# Patient Record
Sex: MALE | Race: WHITE | NOT HISPANIC OR LATINO | Employment: OTHER | URBAN - METROPOLITAN AREA
[De-identification: names, ages, dates, MRNs, and addresses within clinical notes are randomized per-mention and may not be internally consistent; named-entity substitution may affect disease eponyms.]

---

## 2017-01-31 DIAGNOSIS — C90.00 MULTIPLE MYELOMA NOT HAVING ACHIEVED REMISSION (H): ICD-10-CM

## 2017-01-31 DIAGNOSIS — C90.00 MULTIPLE MYELOMA (H): ICD-10-CM

## 2017-01-31 DIAGNOSIS — I71.40 ABDOMINAL AORTIC ANEURYSM (AAA) WITHOUT RUPTURE (H): ICD-10-CM

## 2017-01-31 DIAGNOSIS — Z79.01 ANTICOAGULATION GOAL OF INR 2 TO 3: ICD-10-CM

## 2017-01-31 DIAGNOSIS — Z51.81 ANTICOAGULATION GOAL OF INR 2 TO 3: ICD-10-CM

## 2017-01-31 DIAGNOSIS — Z95.810 AUTOMATIC IMPLANTABLE CARDIOVERTER-DEFIBRILLATOR IN SITU: ICD-10-CM

## 2017-01-31 LAB
ALBUMIN SERPL-MCNC: 3.6 G/DL (ref 3.4–5)
ALP SERPL-CCNC: 82 U/L (ref 40–150)
ALT SERPL W P-5'-P-CCNC: 27 U/L (ref 0–70)
ANION GAP SERPL CALCULATED.3IONS-SCNC: 8 MMOL/L (ref 3–14)
AST SERPL W P-5'-P-CCNC: 19 U/L (ref 0–45)
BASOPHILS # BLD AUTO: 0 10E9/L (ref 0–0.2)
BASOPHILS NFR BLD AUTO: 0.4 %
BILIRUB SERPL-MCNC: 0.8 MG/DL (ref 0.2–1.3)
BUN SERPL-MCNC: 28 MG/DL (ref 7–30)
CALCIUM SERPL-MCNC: 8.7 MG/DL (ref 8.5–10.1)
CHLORIDE SERPL-SCNC: 104 MMOL/L (ref 94–109)
CO2 SERPL-SCNC: 29 MMOL/L (ref 20–32)
COLLECT DURATION TIME UR: 24 H
CREAT 24H UR-MRATE: 1.5 G/(24.H) (ref 1–2)
CREAT SERPL-MCNC: 1.24 MG/DL (ref 0.66–1.25)
CREAT UR-MCNC: 92 MG/DL
DIFFERENTIAL METHOD BLD: ABNORMAL
EOSINOPHIL # BLD AUTO: 0.1 10E9/L (ref 0–0.7)
EOSINOPHIL NFR BLD AUTO: 1 %
ERYTHROCYTE [DISTWIDTH] IN BLOOD BY AUTOMATED COUNT: 12.8 % (ref 10–15)
ERYTHROCYTE [SEDIMENTATION RATE] IN BLOOD BY WESTERGREN METHOD: 46 MM/H (ref 0–20)
GFR SERPL CREATININE-BSD FRML MDRD: 58 ML/MIN/1.7M2
GLUCOSE SERPL-MCNC: 104 MG/DL (ref 70–99)
HCT VFR BLD AUTO: 37.9 % (ref 40–53)
HGB BLD-MCNC: 12.8 G/DL (ref 13.3–17.7)
IMM GRANULOCYTES # BLD: 0 10E9/L (ref 0–0.4)
IMM GRANULOCYTES NFR BLD: 0.6 %
INR PPP: 2.15 (ref 0.86–1.14)
LYMPHOCYTES # BLD AUTO: 2.3 10E9/L (ref 0.8–5.3)
LYMPHOCYTES NFR BLD AUTO: 31.6 %
MCH RBC QN AUTO: 34.6 PG (ref 26.5–33)
MCHC RBC AUTO-ENTMCNC: 33.8 G/DL (ref 31.5–36.5)
MCV RBC AUTO: 102 FL (ref 78–100)
MONOCYTES # BLD AUTO: 0.7 10E9/L (ref 0–1.3)
MONOCYTES NFR BLD AUTO: 9.1 %
NEUTROPHILS # BLD AUTO: 4.1 10E9/L (ref 1.6–8.3)
NEUTROPHILS NFR BLD AUTO: 57.3 %
NRBC # BLD AUTO: 0 10*3/UL
NRBC BLD AUTO-RTO: 0 /100
PLATELET # BLD AUTO: 195 10E9/L (ref 150–450)
POTASSIUM SERPL-SCNC: 4.4 MMOL/L (ref 3.4–5.3)
PROT 24H UR-MRATE: 0.65 G/(24.H) (ref 0.04–0.23)
PROT SERPL-MCNC: 7.2 G/DL (ref 6.8–8.8)
PROT UR-MCNC: 0.4 G/L
PROT/CREAT 24H UR: 0.43 G/G CR (ref 0–0.2)
RBC # BLD AUTO: 3.7 10E12/L (ref 4.4–5.9)
SODIUM SERPL-SCNC: 141 MMOL/L (ref 133–144)
SPECIMEN VOL UR: 1620 ML
URATE SERPL-MCNC: 5.6 MG/DL (ref 3.5–7.2)
WBC # BLD AUTO: 7.2 10E9/L (ref 4–11)

## 2017-01-31 PROCEDURE — 84550 ASSAY OF BLOOD/URIC ACID: CPT | Performed by: INTERNAL MEDICINE

## 2017-01-31 PROCEDURE — 84166 PROTEIN E-PHORESIS/URINE/CSF: CPT | Performed by: INTERNAL MEDICINE

## 2017-01-31 PROCEDURE — 85652 RBC SED RATE AUTOMATED: CPT | Performed by: INTERNAL MEDICINE

## 2017-01-31 PROCEDURE — 83883 ASSAY NEPHELOMETRY NOT SPEC: CPT | Performed by: INTERNAL MEDICINE

## 2017-01-31 PROCEDURE — 99212 OFFICE O/P EST SF 10 MIN: CPT

## 2017-01-31 PROCEDURE — 85610 PROTHROMBIN TIME: CPT | Performed by: INTERNAL MEDICINE

## 2017-01-31 PROCEDURE — 86335 IMMUNFIX E-PHORSIS/URINE/CSF: CPT | Performed by: INTERNAL MEDICINE

## 2017-01-31 PROCEDURE — 80053 COMPREHEN METABOLIC PANEL: CPT | Performed by: INTERNAL MEDICINE

## 2017-01-31 PROCEDURE — 82784 ASSAY IGA/IGD/IGG/IGM EACH: CPT | Performed by: INTERNAL MEDICINE

## 2017-01-31 PROCEDURE — 82785 ASSAY OF IGE: CPT | Performed by: INTERNAL MEDICINE

## 2017-01-31 PROCEDURE — 85025 COMPLETE CBC W/AUTO DIFF WBC: CPT | Performed by: INTERNAL MEDICINE

## 2017-01-31 PROCEDURE — 40000269 ZZH STATISTIC NO CHARGE FACILITY FEE

## 2017-01-31 PROCEDURE — 84165 PROTEIN E-PHORESIS SERUM: CPT | Performed by: INTERNAL MEDICINE

## 2017-01-31 PROCEDURE — 00000402 ZZHCL STATISTIC TOTAL PROTEIN: Performed by: INTERNAL MEDICINE

## 2017-02-01 LAB
ALBUMIN SERPL ELPH-MCNC: 3.7 G/DL (ref 3.7–5.1)
ALPHA1 GLOB SERPL ELPH-MCNC: 0.4 G/DL (ref 0.2–0.4)
ALPHA2 GLOB SERPL ELPH-MCNC: 0.9 G/DL (ref 0.5–0.9)
B-GLOBULIN SERPL ELPH-MCNC: 0.8 G/DL (ref 0.6–1)
GAMMA GLOB SERPL ELPH-MCNC: 0.9 G/DL (ref 0.7–1.6)
IGA SERPL-MCNC: 381 MG/DL (ref 70–380)
IGG SERPL-MCNC: 784 MG/DL (ref 695–1620)
IGM SERPL-MCNC: 29 MG/DL (ref 60–265)
IMMUNOFIX ELP, URINE: NORMAL
KAPPA LC UR-MCNC: 1.75 MG/DL (ref 0.33–1.94)
KAPPA LC/LAMBDA SER: 0.02 {RATIO} (ref 0.26–1.65)
LAMBDA LC SERPL-MCNC: 84.75 MG/DL (ref 0.57–2.63)
M PROTEIN SERPL ELPH-MCNC: 0.2 G/DL
PROT PATTERN SERPL ELPH-IMP: ABNORMAL
PROT PATTERN UR ELPH-IMP: NORMAL

## 2017-02-02 LAB — IGE SERPL-ACNC: 17 KIU/L (ref 0–114)

## 2017-02-03 ENCOUNTER — PRE VISIT (OUTPATIENT)
Dept: CARDIOLOGY | Facility: CLINIC | Age: 67
End: 2017-02-03

## 2017-02-07 ENCOUNTER — ONCOLOGY VISIT (OUTPATIENT)
Dept: TRANSPLANT | Facility: CLINIC | Age: 67
End: 2017-02-07
Attending: INTERNAL MEDICINE
Payer: MEDICARE

## 2017-02-07 ENCOUNTER — RADIANT APPOINTMENT (OUTPATIENT)
Dept: CARDIOLOGY | Facility: CLINIC | Age: 67
End: 2017-02-07

## 2017-02-07 ENCOUNTER — ALLIED HEALTH/NURSE VISIT (OUTPATIENT)
Dept: CARDIOLOGY | Facility: CLINIC | Age: 67
End: 2017-02-07
Attending: INTERNAL MEDICINE
Payer: MEDICARE

## 2017-02-07 VITALS
WEIGHT: 198.1 LBS | DIASTOLIC BLOOD PRESSURE: 70 MMHG | HEART RATE: 68 BPM | OXYGEN SATURATION: 94 % | SYSTOLIC BLOOD PRESSURE: 105 MMHG | HEIGHT: 70 IN | BODY MASS INDEX: 28.36 KG/M2

## 2017-02-07 VITALS
HEART RATE: 77 BPM | DIASTOLIC BLOOD PRESSURE: 66 MMHG | SYSTOLIC BLOOD PRESSURE: 111 MMHG | BODY MASS INDEX: 29.83 KG/M2 | RESPIRATION RATE: 16 BRPM | TEMPERATURE: 97 F | OXYGEN SATURATION: 96 % | WEIGHT: 207.89 LBS

## 2017-02-07 DIAGNOSIS — Z95.810 AUTOMATIC IMPLANTABLE CARDIOVERTER-DEFIBRILLATOR IN SITU: ICD-10-CM

## 2017-02-07 DIAGNOSIS — I47.29 PAROXYSMAL VENTRICULAR TACHYCARDIA (H): ICD-10-CM

## 2017-02-07 DIAGNOSIS — C90.00 MULTIPLE MYELOMA NOT HAVING ACHIEVED REMISSION (H): Primary | ICD-10-CM

## 2017-02-07 DIAGNOSIS — I48.91 ATRIAL FIBRILLATION, UNSPECIFIED TYPE (H): ICD-10-CM

## 2017-02-07 DIAGNOSIS — I42.0 DILATED CARDIOMYOPATHY (H): ICD-10-CM

## 2017-02-07 DIAGNOSIS — I48.21 PERMANENT ATRIAL FIBRILLATION (H): ICD-10-CM

## 2017-02-07 DIAGNOSIS — I42.0 DILATED CARDIOMYOPATHY (H): Primary | ICD-10-CM

## 2017-02-07 DIAGNOSIS — I25.5 ISCHEMIC CARDIOMYOPATHY: Primary | ICD-10-CM

## 2017-02-07 PROCEDURE — 93289 INTERROG DEVICE EVAL HEART: CPT | Mod: 26 | Performed by: INTERNAL MEDICINE

## 2017-02-07 PROCEDURE — 99214 OFFICE O/P EST MOD 30 MIN: CPT | Mod: ZP | Performed by: INTERNAL MEDICINE

## 2017-02-07 PROCEDURE — 93282 PRGRMG EVAL IMPLANTABLE DFB: CPT | Mod: ZF

## 2017-02-07 RX ORDER — DEXAMETHASONE 4 MG/1
TABLET ORAL
Qty: 30 TABLET | Refills: 3 | COMMUNITY
Start: 2017-02-07 | End: 2017-11-21

## 2017-02-07 RX ORDER — LENALIDOMIDE 10 MG/1
CAPSULE ORAL
Qty: 21 EACH | Refills: 3 | COMMUNITY
Start: 2017-02-07 | End: 2017-06-02

## 2017-02-07 RX ORDER — METOPROLOL SUCCINATE 100 MG/1
150 TABLET, EXTENDED RELEASE ORAL DAILY
Qty: 135 TABLET | Refills: 3 | Status: SHIPPED
Start: 2017-02-07 | End: 2017-02-07

## 2017-02-07 RX ORDER — METOPROLOL SUCCINATE 100 MG/1
150 TABLET, EXTENDED RELEASE ORAL DAILY
Qty: 135 TABLET | Refills: 3 | Status: SHIPPED | OUTPATIENT
Start: 2017-02-07 | End: 2017-04-20

## 2017-02-07 RX ADMIN — Medication 6 ML: at 10:40

## 2017-02-07 ASSESSMENT — PAIN SCALES - GENERAL
PAINLEVEL: NO PAIN (0)
PAINLEVEL: NO PAIN (0)

## 2017-02-07 NOTE — MR AVS SNAPSHOT
After Visit Summary   2/7/2017    Erasmo Pearce    MRN: [id number]           Patient Information     Date Of Birth          1950        Visit Information        Provider Department      2/7/2017 9:00 AM 1, Uc Cv Device [facility]        Today's Diagnoses     Dilated cardiomyopathy (H)    -  1       Care Instructions    It was a pleasure to see you in clinic today. Please do not hesitate to call with any questions or concerns. You are scheduled for a remote ICD transmission on 5/15/17. This will happen automatically in the night. We will call in 1-2 business days to discuss the results with you. We look forward to seeing you in clinic at your next device check in 6 months.    Natali Arredondo, RN  Electrophysiology Nurse Clinician  [facility]  During business hours call:  [phone]  After business hours please call: [phone]- select option #4 and ask for job code [id number].          Follow-ups after your visit        Follow-up notes from your care team     Return in about 6 months (around 8/7/2017) for [place], ICD check.      Your next 10 appointments already scheduled     Feb 07, 2017 10:00 AM   Ech Complete with [id number]   [facility] Echo ([facility])    [address] [place] [address] [phone]           1.  Please bring or wear a comfortable two-piece outfit. 2.  You may eat, drink and take your normal medicines. 3.  For any questions that cannot be answered, please contact the ordering physician            Feb 07, 2017 11:00 AM   Return with Rojas Raygoza MD   [facility] Blood and Marrow Transplant ([facility])    [address] [place] [address] [phone]            Aug 08, 2017  9:30 AM   (Arrive by 9:15 AM)   Implanted Defibulator with Uc Cv Device 1   [facility] Heart Care ([facility])    [address] [place] [address]  [address] [facility] [address] [phone]            Aug 08, 2017 10:00 AM   (Arrive by 9:45 AM)   RETURN ARRHYTHMIA with Chuy Jean-Baptiste MD   [facility] ([facility])    [address]  [address] [facility] [address] [phone]              Future tests that were ordered for you today     Open Future Orders        Priority Expected Expires Ordered    Echocardiogram Complete Routine  2/7/2018 2/7/2017            Who to contact     If you have questions or need follow up information about today's clinic visit or your schedule please contact [facility] directly at [phone].  Normal or non-critical lab and imaging results will be communicated to you by Inkivehart, letter or phone within 4 business days after the clinic has received the results. If you do not hear from us within 7 days, please contact the clinic through [employer] or phone. If you have a critical or abnormal lab result, we will notify you by phone as soon as possible.  Submit refill requests through Hurray! or call your pharmacy and they will forward the refill request to us. Please allow 3 business days for your refill to be completed.          Additional Information About Your Visit        InkiveharBrightkit Information     Hurray! gives you secure access to your electronic health record. If you see a primary care provider, you can also send messages to your care team and make appointments. If you have questions, please call your primary care clinic.  If you do not have a primary care provider, please call [phone] and they will assist you.        Care EveryWhere ID     This is your Care EveryWhere ID. This could be used by other organizations to access your [place] medical records  [id number]         Blood Pressure from Last 3 Encounters:   02/07/17 105/70   11/08/16 108/62   08/02/16 113/72    Weight from Last 3 Encounters:   02/07/17 89.858 kg (198 lb 1.6 oz)   11/08/16 93.8  kg (206 lb 12.7 oz)   08/02/16 89.812 kg (198 lb)              We Performed the Following     ICD DEVICE PROGRAMMING EVAL, SINGLE LEAD ICD          Today's Medication Changes          These changes are accurate as of: 2/7/17  9:32 AM.  If you have any questions, ask your nurse or doctor.               Start taking these medicines.        Dose/Directions    metoprolol 100 MG 24 hr tablet   Commonly known as:  TOPROL-XL   Used for:  Dilated cardiomyopathy (H), Atrial fibrillation, unspecified type (H)   Started by:  Chuy Jean-Baptiste MD        Dose:  150 mg   Take 1.5 tablets (150 mg) by mouth daily   Quantity:  135 tablet   Refills:  3         Stop taking these medicines if you haven't already. Please contact your care team if you have questions.     metoprolol 50 MG tablet   Commonly known as:  LOPRESSOR   Stopped by:  Chuy Jean-Baptiste MD                Where to get your medicines      Some of these will need a paper prescription and others can be bought over the counter.  Ask your nurse if you have questions.     Bring a paper prescription for each of these medications    - metoprolol 100 MG 24 hr tablet             Primary Care Provider Office Phone # Fax #    Cong Mcdonald [phone]       [place] [address]        Thank you!     Thank you for choosing [facility]  for your care. Our goal is always to provide you with excellent care. Hearing back from our patients is one way we can continue to improve our services. Please take a few minutes to complete the written survey that you may receive in the mail after your visit with us. Thank you!             Your Updated Medication List - Protect others around you: Learn how to safely use, store and throw away your medicines at www.disposemymeds.org.          This list is accurate as of: 2/7/17  9:32 AM.  Always use your most recent med list.                   Brand Name Dispense Instructions for use     albuterol 108 (90 BASE) MCG/ACT Inhaler    PROAIR HFA/PROVENTIL HFA/VENTOLIN HFA     Inhale 2 puffs into the lungs every 6 hours as needed       amoxicillin 500 MG tablet    AMOXIL    4 tablet    Take 4 tabs one hour before dental appointment       baclofen 10 MG tablet    LIORESAL     Take 10 mg by mouth 3 times daily as needed prn       CALCIUM 500 + D PO      Take 2 tablets by mouth daily.       diclofenac 75 MG EC tablet    VOLTAREN     Take 75 mg by mouth 2 times daily as needed 1 tab prn       fentaNYL 25 mcg/hr 72 hr patch    DURAGESIC     Place 1 patch onto the skin every 72 hours       furosemide 20 MG tablet    LASIX    30 tablet    Take 40 mg by mouth daily       lisinopril 5 MG tablet    PRINIVIL/ZESTRIL     Take 5 mg by mouth 2 times daily       magnesium oxide 400 MG tablet    MAG-OX    4 tablet    Take 1,200 mg by mouth 2 times daily       metoprolol 100 MG 24 hr tablet    TOPROL-XL    135 tablet    Take 1.5 tablets (150 mg) by mouth daily       nitroglycerin 0.4 MG sublingual tablet    NITROSTAT     Place 0.4 mg under the tongue every 5 minutes as needed Prn       omeprazole 20 MG CR capsule    priLOSEC    90 capsule    Take 20 mg by mouth daily       oxyCODONE 5 MG IR tablet    ROXICODONE     Take 1-2 tablets by mouth every 6 hours as needed. For pain.       simvastatin 40 MG tablet    ZOCOR    30 tablet    Take 40 mg by mouth At Bedtime       tiotropium 18 MCG capsule    SPIRIVA    30 capsule    Inhale 1 capsule (18 mcg) into the lungs daily       TRAZODONE HCL PO      Take 50 mg by mouth At Bedtime       warfarin 5 MG tablet    COUMADIN    30 tablet    Take 0.5 tablets (2.5 mg) by mouth See Admin Instructions Take as directed. Take 2.5 mg daily for 5 days/week, then 5 mg daily the other 2 days, pending INR levels.

## 2017-02-07 NOTE — PROGRESS NOTES
Pt seen in the [facility] for evaluation and iterative programming of a [employer] single lead ICD, per MD orders. He also has an appointment with Dr. Jean-Baptiste. Today his intrinsic rhythm is an irregular ventricular rhythm ~70 bpm. Normal ICD function. 1,300 NSVT episodes recorded. The available EGMs show irregular ventricular rhythms, suggesting AF with RVR. = 2%. Lead trends appear stable. Pt reports that he is feeling well. Battery estimates 6 years to ROSEMARIE. Plan for pt to send a [place] remote transmission on 5/15/17 and RTC in 6 months.

## 2017-02-07 NOTE — Clinical Note
2/7/2017      RE: Erasmo Pearce  [address]  [address] [facility] [address]       Dear Colleague,    Thank you for the opportunity to participate in the care of your patient, Erasmo Pearce, at the [facility]. Please see a copy of my visit note below.    HPI:   Erasmo Pearce returns for follow-up. He is a 66-year-old gentleman with multiple myeloma, permanent atrial fibrillation, ischemic cardiomyopathy with an ejection fraction of approximately 35% who had an implantable cardioverter-defibrillator placed in December 2009 for primary prevention with subsequent appropriate shocks. He has been on amiodarone in the past, but this was discontinued relatively quickly because of a low DLCO. He was subsequently treated with sotalol, initially 80 mg twice daily, then reduce to 40 mg twice daily for prolonged QT interval and then discontinued because of low blood pressure.    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   He denies chest pain, lightheadedness, palpitations, shortness of breath or peripheral edema. He will follow up with Dr. Raygoza today for evaluation and treatment of multiple myeloma. He denies problems with warfarin or symptoms suggestive of embolic events.    Defibrillator evaluation shows normal function. Patient had multiple episodes of NSVT all atrial atrial fibrillation with a rapid ventricular response, no recent shocks.      PAST MEDICAL HISTORY:  Past Medical History   Diagnosis Date     Atrial fibrillation (H)      VF (ventricular fibrillation) (H)      Other premature beats        CURRENT MEDICATIONS:  Current Outpatient Prescriptions   Medication Sig Dispense Refill     metoprolol (TOPROL-XL) 100 MG 24 hr tablet Take 1.5 tablets (150 mg) by mouth daily 135 tablet 3      [DISCONTINUED] metoprolol (TOPROL-XL) 100 MG 24 hr tablet Take 1.5 tablets (150 mg) by mouth daily 135 tablet 3     TRAZODONE HCL PO Take 50 mg by mouth At Bedtime       fentaNYL (DURAGESIC) 25 mcg/hr patch 72 hr Place 1 patch onto the skin every 72 hours       furosemide (LASIX) 20 MG tablet Take 40 mg by mouth daily  30 tablet 3     lisinopril (PRINIVIL,ZESTRIL) 5 MG tablet Take 5 mg by mouth 2 times daily       tiotropium (SPIRIVA) 18 MCG inhalation capsule Inhale 1 capsule (18 mcg) into the lungs daily 30 capsule 3     simvastatin (ZOCOR) 40 MG tablet Take 40 mg by mouth At Bedtime  30 tablet      warfarin (COUMADIN) 5 MG tablet Take 0.5 tablets (2.5 mg) by mouth See Admin Instructions Take as directed. Take 2.5 mg daily for 5 days/week, then 5 mg daily the other 2 days, pending INR levels. 30 tablet      albuterol (PROAIR HFA, PROVENTIL HFA, VENTOLIN HFA) 108 (90 BASE) MCG/ACT inhaler Inhale 2 puffs into the lungs every 6 hours as needed        magnesium oxide (MAG-OX) 400 MG tablet Take 1,200 mg by mouth 2 times daily  4 tablet 0     baclofen (LIORESAL) 10 MG tablet Take 10 mg by mouth 3 times daily as needed prn       diclofenac (VOLTAREN) 75 MG EC tablet Take 75 mg by mouth 2 times daily as needed 1 tab prn       amoxicillin (AMOXIL) 500 MG tablet Take 4 tabs one hour before dental appointment 4 tablet 5     omeprazole (PRILOSEC) 20 MG capsule Take 20 mg by mouth daily  90 capsule 3     Calcium Carbonate-Vitamin D (CALCIUM 500 + D PO) Take 2 tablets by mouth daily.       nitroGLYCERIN (NITROSTAT) 0.4 MG SL tablet Place 0.4 mg under the tongue every 5 minutes as needed Prn         oxycodone (ROXICODONE) 5 MG immediate release tablet Take 1-2 tablets by mouth every 6 hours as needed. For pain.          PAST SURGICAL HISTORY:  Past Surgical History   Procedure Laterality Date     Implant automatic implantable cardioverter defibrillator         ALLERGIES:     Allergies   Allergen Reactions     Nkda [No Known  "Drug Allergies]        FAMILY HISTORY:  No family history on file.    SOCIAL HISTORY:  Social History   Substance Use Topics     Smoking status: Former Smoker -- 1.00 packs/day for 25 years     Types: Cigarettes     Quit date: 10/15/1995     Smokeless tobacco: Not on file     Alcohol Use: No       ROS:   Constitutional: No fever, chills, or sweats. Weight stable.   ENT: No visual disturbance, ear ache, epistaxis, sore throat.   Cardiovascular: As per HPI.   Respiratory: No cough, hemoptysis.    GI: No nausea, vomiting, hematemesis, melena, or hematochezia.   : No hematuria.   Integument: Negative.   Psychiatric: Negative.   Hematologic:  Easy bruising, no easy bleeding.  Neuro: Negative.   Endocrinology: No significant heat or cold intolerance   Musculoskeletal: No myalgia.    Exam:  /70 mmHg  Pulse 68  Ht 1.778 m (5' 10\")  Wt 89.858 kg (198 lb 1.6 oz)  BMI 28.42 kg/m2  SpO2 94%  No acute distress.  Alert and oriented.  HEENT:  Normocephalic, atraumatic, EOMI, sclera non-icteric, mucosa moist  Neck: No lymphadenopathy.  JVP approximately 6 cm without HJR.  Cor: Irregularly irregular rhythm.  Controlled ventricular rate. Variable S1, normal S2.  No murmur, rub, or gallop.    Lungs:  Clear  Abd: Soft, nontender, bowel sounds present.  No hepatosplenomegaly..  Extremities: No C/C/E.      Labs:  CBC RESULTS:   Lab Results   Component Value Date    WBC 7.2 01/31/2017    RBC 3.70* 01/31/2017    HGB 12.8* 01/31/2017    HCT 37.9* 01/31/2017    * 01/31/2017    MCH 34.6* 01/31/2017    MCHC 33.8 01/31/2017    RDW 12.8 01/31/2017     01/31/2017       BMP RESULTS:  Lab Results   Component Value Date     01/31/2017    POTASSIUM 4.4 01/31/2017    CHLORIDE 104 01/31/2017    CO2 29 01/31/2017    ANIONGAP 8 01/31/2017    * 01/31/2017    BUN 28 01/31/2017    CR 1.24 01/31/2017    CR 0.83 08/09/2006    GFRESTIMATED 58* 01/31/2017    GFRESTBLACK 71 01/31/2017    EILEEN 8.7 01/31/2017        INR " RESULTS:  Lab Results   Component Value Date    INR 2.15* 01/31/2017    INR 2.18* 11/01/2016    INR 1.71* 02/02/2016    INR 3.04* 04/14/2015       Procedures:  Echocardiogram: Plan to repeat echo today.     Assessment and Plan:   Erasmo Pearce is a 66-year-old gentleman with multiple myeloma, permanent atrial fibrillation, ischemic cardiomyopathy with an ejection fraction of approximately 35%, and an implantable cardioverter-defibrillator.  He has no cardiac symptoms at this time. He has had no recent defibrillator therapies and his defibrillator shows normal function.  Cardiomypathy: ischemic cardiomyopathy with LVEF 35%, plan to repeat echocardiogram today. Will transition his metoprolol to XL as there is mortality benefit in patients with heart failure and reduced LV function. We would like to increase his lisinopril dose and possibly start spironolactone for the treatment of heart failure but will wait for now as he might be having another round of chemotherapy and tends to have episodes of lower blood pressure.   - Switch metoprolol 75 mg BID to metoprolol  mg qday.   -continue lisinopril 5mg po daily  -continue lasix, we advised patient to hold lasix after chemo if he loses >5lbs in one day or has poor PO intake.   - continue simvastatin.     Follow up in 6 months.   Walter Martinez MD  Cardiology Fellow  Pager [phone]    Patient seen and discussed with Dr. Jean-Baptiste.     CARDIOLOGY STAFF  Patient seen and examined by me.  History and physical examination discussed with Dr. Martinez whose note reflects our joint assessment and recommendation/plans.  Mr. Pearce is doing very well from a cardiac standpoint.  His AF rate is well controlled.  His ICD shows normal function.  He may be starting chemotherapy again and is seeing Dr. Raygoza later today.  We will obtain an echo today as a new baseline.  We discussed a plan to reduce his diuretic if he loses > 5 lb or has poor oral intake with  initiation of chemotherapy.  We will arrange follow-up in 6 months.  It was a pleasure seeing Erasmo Pearce today.  Chuy Lenz  .      CC  CHUY LENZ        Please do not hesitate to contact me if you have any questions/concerns.     Sincerely,     Chuy Lenz MD

## 2017-02-07 NOTE — PROGRESS NOTES
HPI:   Erasmo Pearce returns for follow-up. He is a 66-year-old gentleman with multiple myeloma, permanent atrial fibrillation, ischemic cardiomyopathy with an ejection fraction of approximately 35% who had an implantable cardioverter-defibrillator placed in December 2009 for primary prevention with subsequent appropriate shocks. He has been on amiodarone in the past, but this was discontinued relatively quickly because of a low DLCO. He was subsequently treated with sotalol, initially 80 mg twice daily, then reduce to 40 mg twice daily for prolonged QT interval and then discontinued because of low blood pressure.    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   He denies chest pain, lightheadedness, palpitations, shortness of breath or peripheral edema. He will follow up with Dr. Raygoza today for evaluation and treatment of multiple myeloma. He denies problems with warfarin or symptoms suggestive of embolic events.    Defibrillator evaluation shows normal function. Patient had multiple episodes of NSVT all atrial atrial fibrillation with a rapid ventricular response, no recent shocks.      PAST MEDICAL HISTORY:  Past Medical History   Diagnosis Date     Atrial fibrillation (H)      VF (ventricular fibrillation) (H)      Other premature beats        CURRENT MEDICATIONS:  Current Outpatient Prescriptions   Medication Sig Dispense Refill     metoprolol (TOPROL-XL) 100 MG 24 hr tablet Take 1.5 tablets (150 mg) by mouth daily 135 tablet 3     [DISCONTINUED] metoprolol (TOPROL-XL) 100 MG 24 hr tablet Take 1.5 tablets (150 mg) by mouth daily 135 tablet 3     TRAZODONE HCL PO Take 50 mg by mouth At Bedtime       fentaNYL (DURAGESIC) 25 mcg/hr patch 72 hr Place 1 patch onto the skin every 72 hours       furosemide (LASIX) 20 MG tablet Take 40 mg by mouth daily  30 tablet 3      lisinopril (PRINIVIL,ZESTRIL) 5 MG tablet Take 5 mg by mouth 2 times daily       tiotropium (SPIRIVA) 18 MCG inhalation capsule Inhale 1 capsule (18 mcg) into the lungs daily 30 capsule 3     simvastatin (ZOCOR) 40 MG tablet Take 40 mg by mouth At Bedtime  30 tablet      warfarin (COUMADIN) 5 MG tablet Take 0.5 tablets (2.5 mg) by mouth See Admin Instructions Take as directed. Take 2.5 mg daily for 5 days/week, then 5 mg daily the other 2 days, pending INR levels. 30 tablet      albuterol (PROAIR HFA, PROVENTIL HFA, VENTOLIN HFA) 108 (90 BASE) MCG/ACT inhaler Inhale 2 puffs into the lungs every 6 hours as needed        magnesium oxide (MAG-OX) 400 MG tablet Take 1,200 mg by mouth 2 times daily  4 tablet 0     baclofen (LIORESAL) 10 MG tablet Take 10 mg by mouth 3 times daily as needed prn       diclofenac (VOLTAREN) 75 MG EC tablet Take 75 mg by mouth 2 times daily as needed 1 tab prn       amoxicillin (AMOXIL) 500 MG tablet Take 4 tabs one hour before dental appointment 4 tablet 5     omeprazole (PRILOSEC) 20 MG capsule Take 20 mg by mouth daily  90 capsule 3     Calcium Carbonate-Vitamin D (CALCIUM 500 + D PO) Take 2 tablets by mouth daily.       nitroGLYCERIN (NITROSTAT) 0.4 MG SL tablet Place 0.4 mg under the tongue every 5 minutes as needed Prn         oxycodone (ROXICODONE) 5 MG immediate release tablet Take 1-2 tablets by mouth every 6 hours as needed. For pain.          PAST SURGICAL HISTORY:  Past Surgical History   Procedure Laterality Date     Implant automatic implantable cardioverter defibrillator         ALLERGIES:     Allergies   Allergen Reactions     Nkda [No Known Drug Allergies]        FAMILY HISTORY:  No family history on file.    SOCIAL HISTORY:  Social History   Substance Use Topics     Smoking status: Former Smoker -- 1.00 packs/day for 25 years     Types: Cigarettes     Quit date: 10/15/1995     Smokeless tobacco: Not on file     Alcohol Use: No       ROS:   Constitutional: No fever, chills,  "or sweats. Weight stable.   ENT: No visual disturbance, ear ache, epistaxis, sore throat.   Cardiovascular: As per HPI.   Respiratory: No cough, hemoptysis.    GI: No nausea, vomiting, hematemesis, melena, or hematochezia.   : No hematuria.   Integument: Negative.   Psychiatric: Negative.   Hematologic:  Easy bruising, no easy bleeding.  Neuro: Negative.   Endocrinology: No significant heat or cold intolerance   Musculoskeletal: No myalgia.    Exam:  /70 mmHg  Pulse 68  Ht 1.778 m (5' 10\")  Wt 89.858 kg (198 lb 1.6 oz)  BMI 28.42 kg/m2  SpO2 94%  No acute distress.  Alert and oriented.  HEENT:  Normocephalic, atraumatic, EOMI, sclera non-icteric, mucosa moist  Neck: No lymphadenopathy.  JVP approximately 6 cm without HJR.  Cor: Irregularly irregular rhythm.  Controlled ventricular rate. Variable S1, normal S2.  No murmur, rub, or gallop.    Lungs:  Clear  Abd: Soft, nontender, bowel sounds present.  No hepatosplenomegaly..  Extremities: No C/C/E.      Labs:  CBC RESULTS:   Lab Results   Component Value Date    WBC 7.2 01/31/2017    RBC 3.70* 01/31/2017    HGB 12.8* 01/31/2017    HCT 37.9* 01/31/2017    * 01/31/2017    MCH 34.6* 01/31/2017    MCHC 33.8 01/31/2017    RDW 12.8 01/31/2017     01/31/2017       BMP RESULTS:  Lab Results   Component Value Date     01/31/2017    POTASSIUM 4.4 01/31/2017    CHLORIDE 104 01/31/2017    CO2 29 01/31/2017    ANIONGAP 8 01/31/2017    * 01/31/2017    BUN 28 01/31/2017    CR 1.24 01/31/2017    CR 0.83 08/09/2006    GFRESTIMATED 58* 01/31/2017    GFRESTBLACK 71 01/31/2017    EILEEN 8.7 01/31/2017        INR RESULTS:  Lab Results   Component Value Date    INR 2.15* 01/31/2017    INR 2.18* 11/01/2016    INR 1.71* 02/02/2016    INR 3.04* 04/14/2015       Procedures:  Echocardiogram: Plan to repeat echo today.     Assessment and Plan:   Erasmo Pearce is a 66-year-old gentleman with multiple myeloma, permanent atrial fibrillation, ischemic " cardiomyopathy with an ejection fraction of approximately 35%, and an implantable cardioverter-defibrillator.  He has no cardiac symptoms at this time. He has had no recent defibrillator therapies and his defibrillator shows normal function.  Cardiomypathy: ischemic cardiomyopathy with LVEF 35%, plan to repeat echocardiogram today. Will transition his metoprolol to XL as there is mortality benefit in patients with heart failure and reduced LV function. We would like to increase his lisinopril dose and possibly start spironolactone for the treatment of heart failure but will wait for now as he might be having another round of chemotherapy and tends to have episodes of lower blood pressure.   - Switch metoprolol 75 mg BID to metoprolol  mg qday.   -continue lisinopril 5mg po daily  -continue lasix, we advised patient to hold lasix after chemo if he loses >5lbs in one day or has poor PO intake.   - continue simvastatin.     Follow up in 6 months.   Walter Martinez MD  Cardiology Fellow  Pager [phone]    Patient seen and discussed with Dr. Jean-Baptiste.     CARDIOLOGY STAFF  Patient seen and examined by me.  History and physical examination discussed with Dr. Martinez whose note reflects our joint assessment and recommendation/plans.  Mr. Pearce is doing very well from a cardiac standpoint.  His AF rate is well controlled.  His ICD shows normal function.  He may be starting chemotherapy again and is seeing Dr. Raygoza later today.  We will obtain an echo today as a new baseline.  We discussed a plan to reduce his diuretic if he loses > 5 lb or has poor oral intake with initiation of chemotherapy.  We will arrange follow-up in 6 months.  It was a pleasure seeing Erasmo Pearce today.  Chuy Jean-Baptiste  .      CC  CHUY JEAN-BAPTISTE

## 2017-02-07 NOTE — Clinical Note
2/7/2017      RE: Erasmo Zapata  [address]  [address]  [place] [address]       HISTORY OF PRESENT ILLNESS:  John [facility] for evaluation of multiple myeloma, status post autologous peripheral blood stem cell transplant.  He also has cardiomyopathy and chronic atrial fibrillation.  Remains on warfarin to keep INR 2-3He has an ICD placed.  He has had no cardiac events recorded.  He is feeling good.  He denies fevers, chills, nausea or vomiting. No double vision. He had an URI in November given antibiotic which resolved and recently had another URI but no antibiotic.  He continues on metoprolol  75 mg twice a day and was given new Rx for 150mg XL/day. Had ultrasound of abdominal aorta done as seen below     PAST MEDICAL HISTORY, SOCIAL HISTORY AND FAMILY HISTORY:  See my note from 11/2016.      REVIEW OF SYSTEMS:  A 12-point review of systems is done and is negative as in HPI.      PHYSICAL EXAMINATION:   GENERAL:  He is an alert gentleman in no acute distress.   VITAL SIGNS:  Stable. /66 mmHg  Pulse 77  Temp(Src) 97  F (36.1  C)  Resp 16  Wt 94.3 kg (207 lb 14.3 oz)  SpO2 96%    HEENT:  Normocephalic.  EOM okay, no scleral icterus.  Mouth without lesion.   RESPIRATORY:  Clear to percussion and auscultation.   CARDIAC:  Atrial fibrillation noted.  No murmur.   ABDOMEN:  Soft.   EXTREMITIES:  Without edema.   Results for ERASMO ZAPATA (MRN [id number]) as of 2/7/2017 11:32   Ref. Range 1/31/2017 05:30 1/31/2017 10:05 1/31/2017 10:54 2/7/2017 10:42   Sodium Latest Ref Range: 133-144 mmol/L   141    Potassium Latest Ref Range: 3.4-5.3 mmol/L   4.4    Chloride Latest Ref Range:  mmol/L   104    Carbon Dioxide Latest Ref Range: 20-32 mmol/L   29    Urea Nitrogen Latest Ref Range: 7-30 mg/dL   28    Creatinine Latest Ref Range: 0.66-1.25 mg/dL   1.24    GFR Estimate Latest Ref Range: >60 mL/min/1.7m2   58 (L)    GFR Estimate If Black Latest Ref  Range: >60 mL/min/1.7m2   71    Calcium Latest Ref Range: 8.5-10.1 mg/dL   8.7    Anion Gap Latest Ref Range: 3-14 mmol/L   8    Albumin Latest Ref Range: 3.4-5.0 g/dL   3.6    Protein Total Latest Ref Range: 6.8-8.8 g/dL   7.2    Bilirubin Total Latest Ref Range: 0.2-1.3 mg/dL   0.8    Alkaline Phosphatase Latest Ref Range:  U/L   82    ALT Latest Ref Range: 0-70 U/L   27    AST Latest Ref Range: 0-45 U/L   19    Creatinine Urine Timed Latest Ref Range: 1.00-2.00  1.50      Creatinine Urine Latest Units: mg/dL 92      IGE Latest Ref Range: 0-114 KIU/L   17    Protein Random Urine Latest Units: g/L 0.40      Protein Total Urine g/gr Creatinine Latest Ref Range: 0-0.2 g/g Cr 0.43 (H)      Protein Total 24 Hr Urine Latest Ref Range: 0.04-0.23  0.65 (H)      Uric Acid Latest Ref Range: 3.5-7.2 mg/dL   5.6    Glucose Latest Ref Range: 70-99 mg/dL   104 (H)    WBC Latest Ref Range: 4.0-11.0 10e9/L   7.2    Hemoglobin Latest Ref Range: 13.3-17.7 g/dL   12.8 (L)    Hematocrit Latest Ref Range: 40.0-53.0 %   37.9 (L)    Platelet Count Latest Ref Range: 150-450 10e9/L   195    RBC Count Latest Ref Range: 4.4-5.9 10e12/L   3.70 (L)    MCV Latest Ref Range:  fl   102 (H)    MCH Latest Ref Range: 26.5-33.0 pg   34.6 (H)    MCHC Latest Ref Range: 31.5-36.5 g/dL   33.8    RDW Latest Ref Range: 10.0-15.0 %   12.8    Diff Method Unknown   Automated Method    % Neutrophils Latest Units: %   57.3    % Lymphocytes Latest Units: %   31.6    % Monocytes Latest Units: %   9.1    % Eosinophils Latest Units: %   1.0    % Basophils Latest Units: %   0.4    % Immature Granulocytes Latest Units: %   0.6    Nucleated RBCs Latest Ref Range: 0 /100   0    Absolute Neutrophil Latest Ref Range: 1.6-8.3 10e9/L   4.1    Absolute Lymphocytes Latest Ref Range: 0.8-5.3 10e9/L   2.3    Absolute Monocytes Latest Ref Range: 0.0-1.3 10e9/L   0.7    Absolute Eosinophils Latest Ref Range: 0.0-0.7 10e9/L   0.1    Absolute Basophils Latest Ref  Range: 0.0-0.2 10e9/L   0.0    Abs Immature Granulocytes Latest Ref Range: 0-0.4 10e9/L   0.0    Absolute Nucleated RBC Unknown   0.0    Sed Rate Latest Ref Range: 0-20 mm/h   46 (H)    INR Latest Ref Range: 0.86-1.14    2.15 (H)    Albumin Fraction Latest Ref Range: 3.7-5.1 g/dL   3.7    Alpha 1 Fraction Latest Ref Range: 0.2-0.4 g/dL   0.4    Alpha 2 Fraction Latest Ref Range: 0.5-0.9 g/dL   0.9    Beta Fraction Latest Ref Range: 0.6-1.0 g/dL   0.8    ELP Interpretation: Unknown   Small monoclonal ...    ELP Interpretation Urine Unknown Albumin and globu...      Gamma Fraction Latest Ref Range: 0.7-1.6 g/dL   0.9    IGA Latest Ref Range:  mg/dL   381 (H)    IGG Latest Ref Range: 695-1620 mg/dL   784    IGM Latest Ref Range:  mg/dL   29 (L)    Immunofix ELP Urine Unknown (Note)...      Kappa Free Lt Chain Latest Ref Range: 0.33-1.94 mg/dL   1.75    Kappa Lambda Ratio Latest Ref Range: 0.26-1.65    0.02 (L)    Lambda Free Lt Chain Latest Ref Range: 0.57-2.63 mg/dL   84.75 (H)    Monoclonal Peak Latest Ref Range: 0.0 g/dL   0.2 (H)    US ABDOMINAL AORTA LIMITED Unknown  Rpt     ECHO COMPLETE WITH DEFINITY Unknown    Rpt   EXAMINATION: US ABDOMINAL AORTA LIMITED, 1/31/2017 10:05 AM       COMPARISON: None     HISTORY: Abdominal aortic aneurysm     TECHNIQUE: Gray-scale and color Doppler ultrasound of the abdominal  aorta.     FINDINGS: Examination of the abdominal aorta is performed.     Proximal abdominal aorta, suprarenal aorta: 3.2 cm.     Mid abdominal aorta: 2.4 cm.     Distal abdominal aorta: 2.9 cm, which may be mildly overestimated.     Calcified atherosclerotic plaques along the abdominal aorta.     Proximal right common iliac artery is normal in caliber measuring 1.5  cm in maximal diameter.     Proximal left common iliac artery is normal in caliber measuring 1.0  cm in maximal diameter.                                                                       IMPRESSION:  Ectatic appearance of the  distal infrarenal abdominal aorta.     ------------------------------------------------------------  Aorta diameter measurement classification:  < 2.5cm: Normal.  2.5 - 2.9cm: Ectatic.  >3cm: Aneurysmal.     I have personally reviewed the examination and initial interpretation  and I agree with the findings.     CAMILLE HENSON MD  Name: DARRYL ZAPATA  MRN: [id number]  : 1950  Study Date: 2017 10:01 AM  Age: 66 yrs  Gender: Male  Patient Location: [facility]  Reason For Study: Dilated cardiomyopathy, Unspecified atrial fibrillation  Ordering Physician: NARINDER LENZ  Referring Physician: NARINDER LENZ  Performed By: Jaden Schaffer JESS     BSA: 2.1 m2  Height: 70 in  Weight: 198 lb  HR: 70  BP: 105/70 mmHg  _____________________________________________________________________________  __        Procedure  Echocardiogram with two-dimensional, color and spectral Doppler performed.  Contrast Definity. Definity (NDC #58807-110-86) given intravenously. Patient  was given 6ml mixture of 1.5ml Definity and 8.5ml saline. 4 ml wasted.  _____________________________________________________________________________  __        Interpretation Summary  Left ventricular size is normal. Moderately reduced left ventricular function  is present. The Ejection Fraction is estimated at 30-35%.  Moderate right ventricular dilation is present. Global right ventricular  function is moderately reduced.  No pericardial effusion is present.  This study was compared with the study from 2014 and there has been no  change.  _____________________________________________________________________________  __        Left Ventricle  Left ventricular size is normal. Moderately (EF 30-35%) reduced left  ventricular function is present. The Ejection Fraction is estimated at 30-35%.  Diastolic function not assessed due to atrial fibrillation. Inferior wall  akinesis is present. Wall thinning in the presence of a wall  motion  abnormality is present consistent with inferior scar.     Right Ventricle  Moderate right ventricular dilation is present. Global right ventricular  function is moderately reduced. A pacemaker lead is noted in the right  ventricle.     Atria  Severe biatrial enlargement is present. A pacemaker lead is noted in the right  atrium.     Mitral Valve  Mitral leaflet thickness is increased . Mild mitral insufficiency is present.  The cause of the mitral insufficiency appears to be restricted posterior  leaflet.        Aortic Valve  The aortic valve is tricuspid. Mild aortic valve sclerosis is present.     Tricuspid Valve  Mild to moderate tricuspid insufficiency is present. The right ventricular  systolic pressure is approximated at 22.2 mmHg plus the right atrial pressure.  Pulmonary artery systolic pressure is normal.     Pulmonic Valve  Mild to moderate pulmonic insufficiency is present.     Vessels  The aorta root is normal. The inferior vena cava was normal in size with  preserved respiratory variability.     Pericardium  No pericardial effusion is present.        Compared to Previous Study  This study was compared with the study from 2014 . There has been no change.  _____________________________________________________________________________  __  MMode/2D Measurements & Calculations  IVSd: 1.2 cm     LVIDd: 5.7 cm  LVIDs: 4.9 cm  LVPWd: 0.83 cm  FS: 14.1 %  EDV(Teich): 160.2 ml  ESV(Teich): 112.8 ml  LV mass(C)d: 223.4 grams  Ao root diam: 3.5 cm  asc Aorta Diam: 3.6 cm  LA/Ao: 1.7  LVOT diam: 2.4 cm  LVOT area: 4.6 cm2  LA Volume (BP): 165.0 ml  LA Volume Index (BP): 79.3 ml/m2           Doppler Measurements & Calculations  MV E max beverly: 110.3 cm/sec  PA acc time: 0.09 sec  TR max beverly: 235.2 cm/sec  TR max P.2 mmHg  Lateral E/e': 11.8  Medial E/e': 14.3           _____________________________________________________________________________  __           Report approved by: Rj Stephenson  02/07/2017 11:35 AM   Exam: Bone survey dated 11/1/2016.     COMPARISON: 10/6/2015.     CLINICAL HISTORY: Multiple myeloma.     FINDINGS:     Skull: Subtle lucencies in the skull, grossly unchanged.     Thoracolumbar spine: Multilevel degenerative disc disease in the  visualized spine without evidence of compression fracture.  Calcification in the distal aorta and iliac vessels measuring at least  3.4 cm in the distal aorta.     CHEST: Left-sided cardiac device in place. Cardiac silhouette mildly  prominent. No distinct rib lesions are noted.     Pelvis: Bones are diffusely osteopenic appearing, however no distinct  lytic lesions are noted.     Bilateral lower extremities: No distinct lytic lesions in the  bilateral lower extremities.     Bilateral upper extremities: No distinct lytic lesions in the  bilateral upper extremities.                                                                       IMPRESSION:  1. Unchanged subtle lucencies in the skull.  2. No distinct lytic lesions otherwise in the axial or appendicular  skeletal system.  3. No new lesions noted.  4. No compression fractures in the visualized spine.  5. Unchanged abdominal aortic aneurysm, further evaluation as  clinically indicated.     SHARAD HOWARD MD           ASSESSMENT:   1.  Multiple myeloma.   2.  Status post autologous peripheral blood stem cell transplant.   3.  Cardiomyopathy.   4.  Atrial fibrillation.   5.  History of deep venous thrombosis.   6.  Chronic back pain.   7.  Anticoagulation.   8.  Abdominal aortic aneurysm.   9.  History of plasmacytoma of the clivus, status post radiation.   10.  History of ventricular tachycardia with implantable defibrillator in place.       His light chains are increased to 84 and M spike  is 0.2 IgA is up and the % gamma protein increased to64% in his urine. He will need treatment for his relapsed myeloma with Revlimid 10mg/day every 21days and one week off in a 28day schedule and dexamethasone  40mg Days 1, 8 and 15.This regimen has worked with Jonh in the past March 2013-July 2014.He does feel good with no new lytic disease. He will see his primary doc at the [facility] and an oncologist to obtain the prescription.He should get cbc plat diff and CMP every 2 weeks and would check light chains every month. I expect he will be treated with this regimen for 3-6 months and then I would consider keeping him on daily low dose revlimid.  At this time, his hemoglobin is good, his white count is good and platelet count is good.  I will see him for 3 months for his relapsed myeloma.  He is now nearly 11+ years since diagnosis. Pt noted to have abdominal aortic aneurysm but ultrasound ok.Echocardiocardiogram shows stable LVEF at 30-35%.      Rojas Raygoza MD   [phone]

## 2017-02-07 NOTE — NURSING NOTE
Chief Complaint   Patient presents with     Follow Up For     ICD check, 6MFU: 8/2/16- perm AF, sotalol/warfarin

## 2017-02-07 NOTE — MR AVS SNAPSHOT
After Visit Summary   2/7/2017    Erasmo Pearce    MRN: [id number]           Patient Information     Date Of Birth          1950        Visit Information        Provider Department      2/7/2017 9:30 AM Chuy Jean-Baptiste MD [facility]        Today's Diagnoses     Dilated cardiomyopathy (H)    -  1     Atrial fibrillation, unspecified type (H)           Care Instructions    You will be scheduled for a follow up visit as instructed.    Instructions from today;   1. Stop metoprolol tartrate  2. Start metoprolol succinate 150 mg once daily  3. When undergoing chemotherapy: If weight drops 5 pounds or more- hold lasix (furosemide)  Resume lasix when weight it back to baseline.   4. Echocardiogram today. Aleida will contact you with the results.     If you have any questions regarding to your visit please contact your care team:     Cardiology  Telephone Number    Aleida Angeles [phone]   Or send a message to your provider via my chart.   For scheduling appts or procedures:    Stephanie Wells     [phone] or  [phone]   For the Device Clinic (Pacemakers and ICD's)   RN's :   Natali Vale  During business hours: [phone]    After business hours:   [phone]- select option 4 and ask for job code [phone].    You can also contact us using My Chart. If you need assistance in setting this up, please contact our office or ask at your follow up visit.     If you need a medication refill please contact your pharmacy. Please allow 3 business days for your refill to be completed.     As always, Thank you for trusting us with your health care needs!        Follow-ups after your visit        Follow-up notes from your care team     Return in about 6 months (around 8/7/2017) for device, sqakaguci.      Your next 10 appointments already scheduled     Feb 07, 2017 10:00 AM   Ech Complete with [id number]   [facility] ([facility])    Jace Pandey  [address] [address] [phone]           1.  Please bring or wear a comfortable two-piece outfit. 2.  You may eat, drink and take your normal medicines. 3.  For any questions that cannot be answered, please contact the ordering physician            Feb 07, 2017 11:00 AM   Return with Rojas Raygoza MD   [facility] ([place] [facility])    [address] [place] [id number]   [phone]            Aug 08, 2017  9:30 AM   (Arrive by 9:15 AM)   Implanted Defibulator with Uc Cv Device 1   [facility] ([place] [facility])    [address] [place] [id number] [phone]            Aug 08, 2017 10:00 AM   (Arrive by 9:45 AM)   RETURN ARRHYTHMIA with Chuy Jean-Baptiste MD   [facility] ([place] [facility])    [address] [place] [id number] [phone]              Future tests that were ordered for you today     Open Future Orders        Priority Expected Expires Ordered    Echocardiogram Complete Routine  2/7/2018 2/7/2017            Who to contact     If you have questions or need follow up information about today's clinic visit or your schedule please contact [facility] directly at [phone].  Normal or non-critical lab and imaging results will be communicated to you by SmartDrive Systemshart, letter or phone within 4 business days after the clinic has received the results. If you do not hear from us within 7 days, please contact the clinic through SmartDrive Systemshart or phone. If you have a critical or abnormal lab result, we will notify you by phone as soon as possible.  Submit refill requests through Eventdoo or call your pharmacy and they will forward the refill request to us. Please allow 3 business days for your refill to be completed.          Additional Information About Your Visit        SmartDrive SystemsharAgari Information      "What's Hot gives you secure access to your electronic health record. If you see a primary care provider, you can also send messages to your care team and make appointments. If you have questions, please call your primary care clinic.  If you do not have a primary care provider, please call [phone] and they will assist you.        Care EveryWhere ID     This is your Care EveryWhere ID. This could be used by other organizations to access your [facility] medical records  [id number]        Your Vitals Were     Pulse Height BMI (Body Mass Index) Pulse Oximetry          68 1.778 m (5' 10\") 28.42 kg/m2 94%         Blood Pressure from Last 3 Encounters:   02/07/17 105/70   11/08/16 108/62   08/02/16 113/72    Weight from Last 3 Encounters:   02/07/17 89.858 kg (198 lb 1.6 oz)   11/08/16 93.8 kg (206 lb 12.7 oz)   08/02/16 89.812 kg (198 lb)                 Today's Medication Changes          These changes are accurate as of: 2/7/17  9:32 AM.  If you have any questions, ask your nurse or doctor.               Start taking these medicines.        Dose/Directions    metoprolol 100 MG 24 hr tablet   Commonly known as:  TOPROL-XL   Used for:  Dilated cardiomyopathy (H), Atrial fibrillation, unspecified type (H)   Started by:  Chuy Jean-Baptiste MD        Dose:  150 mg   Take 1.5 tablets (150 mg) by mouth daily   Quantity:  135 tablet   Refills:  3         Stop taking these medicines if you haven't already. Please contact your care team if you have questions.     metoprolol 50 MG tablet   Commonly known as:  LOPRESSOR   Stopped by:  Chuy Jean-Baptiste MD                Where to get your medicines      Some of these will need a paper prescription and others can be bought over the counter.  Ask your nurse if you have questions.     Bring a paper prescription for each of these medications    - metoprolol 100 MG 24 hr tablet             Primary Care Provider Office Phone # Fax #    Cong Harry [phone]       " [facility] [address] [facility] [address]        Thank you!     Thank you for choosing [facility]  for your care. Our goal is always to provide you with excellent care. Hearing back from our patients is one way we can continue to improve our services. Please take a few minutes to complete the written survey that you may receive in the mail after your visit with us. Thank you!             Your Updated Medication List - Protect others around you: Learn how to safely use, store and throw away your medicines at www.disposemymeds.org.          This list is accurate as of: 2/7/17  9:32 AM.  Always use your most recent med list.                   Brand Name Dispense Instructions for use    albuterol 108 (90 BASE) MCG/ACT Inhaler    PROAIR HFA/PROVENTIL HFA/VENTOLIN HFA     Inhale 2 puffs into the lungs every 6 hours as needed       amoxicillin 500 MG tablet    AMOXIL    4 tablet    Take 4 tabs one hour before dental appointment       baclofen 10 MG tablet    LIORESAL     Take 10 mg by mouth 3 times daily as needed prn       CALCIUM 500 + D PO      Take 2 tablets by mouth daily.       diclofenac 75 MG EC tablet    VOLTAREN     Take 75 mg by mouth 2 times daily as needed 1 tab prn       fentaNYL 25 mcg/hr 72 hr patch    DURAGESIC     Place 1 patch onto the skin every 72 hours       furosemide 20 MG tablet    LASIX    30 tablet    Take 40 mg by mouth daily       lisinopril 5 MG tablet    PRINIVIL/ZESTRIL     Take 5 mg by mouth 2 times daily       magnesium oxide 400 MG tablet    MAG-OX    4 tablet    Take 1,200 mg by mouth 2 times daily       metoprolol 100 MG 24 hr tablet    TOPROL-XL    135 tablet    Take 1.5 tablets (150 mg) by mouth daily       nitroglycerin 0.4 MG sublingual tablet    NITROSTAT     Place 0.4 mg under the tongue every 5 minutes as needed Prn       omeprazole 20 MG CR capsule    priLOSEC    90 capsule    Take 20 mg by mouth daily       oxyCODONE 5 MG IR tablet    ROXICODONE     Take 1-2  tablets by mouth every 6 hours as needed. For pain.       simvastatin 40 MG tablet    ZOCOR    30 tablet    Take 40 mg by mouth At Bedtime       tiotropium 18 MCG capsule    SPIRIVA    30 capsule    Inhale 1 capsule (18 mcg) into the lungs daily       TRAZODONE HCL PO      Take 50 mg by mouth At Bedtime       warfarin 5 MG tablet    COUMADIN    30 tablet    Take 0.5 tablets (2.5 mg) by mouth See Admin Instructions Take as directed. Take 2.5 mg daily for 5 days/week, then 5 mg daily the other 2 days, pending INR levels.

## 2017-02-07 NOTE — Clinical Note
2/7/2017       RE: Erasmo Zapata  [address]  [address] [facility] [address]     Dear Colleague,    Thank you for referring your patient, Erasmo Zapata, to the [facility] BLOOD [facility] MARROW TRANSPLANT at [facility]. Please see a copy of my visit note below.    HISTORY OF PRESENT ILLNESS:  Jonh returns to the [facility] for evaluation of multiple myeloma, status post autologous peripheral blood stem cell transplant.  He also has cardiomyopathy and chronic atrial fibrillation.  Remains on warfarin to keep INR 2-3He has an ICD placed.  He has had no cardiac events recorded.  He is feeling good.  He denies fevers, chills, nausea or vomiting. No double vision. He had an URI in November given antibiotic which resolved and recently had another URI but no antibiotic.  He continues on metoprolol  75 mg twice a day and was given new Rx for 150mg XL/day. Had ultrasound of abdominal aorta done as seen below     PAST MEDICAL HISTORY, SOCIAL HISTORY AND FAMILY HISTORY:  See my note from 11/2016.      REVIEW OF SYSTEMS:  A 12-point review of systems is done and is negative as in HPI.      PHYSICAL EXAMINATION:   GENERAL:  He is an alert gentleman in no acute distress.   VITAL SIGNS:  Stable. /66 mmHg  Pulse 77  Temp(Src) 97  F (36.1  C)  Resp 16  Wt 94.3 kg (207 lb 14.3 oz)  SpO2 96%    HEENT:  Normocephalic.  EOM okay, no scleral icterus.  Mouth without lesion.   RESPIRATORY:  Clear to percussion and auscultation.   CARDIAC:  Atrial fibrillation noted.  No murmur.   ABDOMEN:  Soft.   EXTREMITIES:  Without edema.   Results for ERASMO ZAPATA (MRN [id number]) as of 2/7/2017 11:32   Ref. Range 1/31/2017 05:30 1/31/2017 10:05 1/31/2017 10:54 2/7/2017 10:42   Sodium Latest Ref Range: 133-144 mmol/L   141    Potassium Latest Ref Range: 3.4-5.3 mmol/L   4.4    Chloride Latest Ref Range:  mmol/L   104    Carbon Dioxide Latest Ref Range:  20-32 mmol/L   29    Urea Nitrogen Latest Ref Range: 7-30 mg/dL   28    Creatinine Latest Ref Range: 0.66-1.25 mg/dL   1.24    GFR Estimate Latest Ref Range: >60 mL/min/1.7m2   58 (L)    GFR Estimate If Black Latest Ref Range: >60 mL/min/1.7m2   71    Calcium Latest Ref Range: 8.5-10.1 mg/dL   8.7    Anion Gap Latest Ref Range: 3-14 mmol/L   8    Albumin Latest Ref Range: 3.4-5.0 g/dL   3.6    Protein Total Latest Ref Range: 6.8-8.8 g/dL   7.2    Bilirubin Total Latest Ref Range: 0.2-1.3 mg/dL   0.8    Alkaline Phosphatase Latest Ref Range:  U/L   82    ALT Latest Ref Range: 0-70 U/L   27    AST Latest Ref Range: 0-45 U/L   19    Creatinine Urine Timed Latest Ref Range: 1.00-2.00  1.50      Creatinine Urine Latest Units: mg/dL 92      IGE Latest Ref Range: 0-114 KIU/L   17    Protein Random Urine Latest Units: g/L 0.40      Protein Total Urine g/gr Creatinine Latest Ref Range: 0-0.2 g/g Cr 0.43 (H)      Protein Total 24 Hr Urine Latest Ref Range: 0.04-0.23  0.65 (H)      Uric Acid Latest Ref Range: 3.5-7.2 mg/dL   5.6    Glucose Latest Ref Range: 70-99 mg/dL   104 (H)    WBC Latest Ref Range: 4.0-11.0 10e9/L   7.2    Hemoglobin Latest Ref Range: 13.3-17.7 g/dL   12.8 (L)    Hematocrit Latest Ref Range: 40.0-53.0 %   37.9 (L)    Platelet Count Latest Ref Range: 150-450 10e9/L   195    RBC Count Latest Ref Range: 4.4-5.9 10e12/L   3.70 (L)    MCV Latest Ref Range:  fl   102 (H)    MCH Latest Ref Range: 26.5-33.0 pg   34.6 (H)    MCHC Latest Ref Range: 31.5-36.5 g/dL   33.8    RDW Latest Ref Range: 10.0-15.0 %   12.8    Diff Method Unknown   Automated Method    % Neutrophils Latest Units: %   57.3    % Lymphocytes Latest Units: %   31.6    % Monocytes Latest Units: %   9.1    % Eosinophils Latest Units: %   1.0    % Basophils Latest Units: %   0.4    % Immature Granulocytes Latest Units: %   0.6    Nucleated RBCs Latest Ref Range: 0 /100   0    Absolute Neutrophil Latest Ref Range: 1.6-8.3 10e9/L   4.1     Absolute Lymphocytes Latest Ref Range: 0.8-5.3 10e9/L   2.3    Absolute Monocytes Latest Ref Range: 0.0-1.3 10e9/L   0.7    Absolute Eosinophils Latest Ref Range: 0.0-0.7 10e9/L   0.1    Absolute Basophils Latest Ref Range: 0.0-0.2 10e9/L   0.0    Abs Immature Granulocytes Latest Ref Range: 0-0.4 10e9/L   0.0    Absolute Nucleated RBC Unknown   0.0    Sed Rate Latest Ref Range: 0-20 mm/h   46 (H)    INR Latest Ref Range: 0.86-1.14    2.15 (H)    Albumin Fraction Latest Ref Range: 3.7-5.1 g/dL   3.7    Alpha 1 Fraction Latest Ref Range: 0.2-0.4 g/dL   0.4    Alpha 2 Fraction Latest Ref Range: 0.5-0.9 g/dL   0.9    Beta Fraction Latest Ref Range: 0.6-1.0 g/dL   0.8    ELP Interpretation: Unknown   Small monoclonal ...    ELP Interpretation Urine Unknown Albumin and globu...      Gamma Fraction Latest Ref Range: 0.7-1.6 g/dL   0.9    IGA Latest Ref Range:  mg/dL   381 (H)    IGG Latest Ref Range: 695-1620 mg/dL   784    IGM Latest Ref Range:  mg/dL   29 (L)    Immunofix ELP Urine Unknown (Note)...      Kappa Free Lt Chain Latest Ref Range: 0.33-1.94 mg/dL   1.75    Kappa Lambda Ratio Latest Ref Range: 0.26-1.65    0.02 (L)    Lambda Free Lt Chain Latest Ref Range: 0.57-2.63 mg/dL   84.75 (H)    Monoclonal Peak Latest Ref Range: 0.0 g/dL   0.2 (H)    US ABDOMINAL AORTA LIMITED Unknown  Rpt     ECHO COMPLETE WITH DEFINITY Unknown    Rpt   EXAMINATION: US ABDOMINAL AORTA LIMITED, 1/31/2017 10:05 AM       COMPARISON: None     HISTORY: Abdominal aortic aneurysm     TECHNIQUE: Gray-scale and color Doppler ultrasound of the abdominal  aorta.     FINDINGS: Examination of the abdominal aorta is performed.     Proximal abdominal aorta, suprarenal aorta: 3.2 cm.     Mid abdominal aorta: 2.4 cm.     Distal abdominal aorta: 2.9 cm, which may be mildly overestimated.     Calcified atherosclerotic plaques along the abdominal aorta.     Proximal right common iliac artery is normal in caliber measuring 1.5  cm in maximal  diameter.     Proximal left common iliac artery is normal in caliber measuring 1.0  cm in maximal diameter.                                                                       IMPRESSION:  Ectatic appearance of the distal infrarenal abdominal aorta.     ------------------------------------------------------------  Aorta diameter measurement classification:  < 2.5cm: Normal.  2.5 - 2.9cm: Ectatic.  >3cm: Aneurysmal.     I have personally reviewed the examination and initial interpretation  and I agree with the findings.     CAMILLE HENSON MD  Name: DARRYL ZAPATA  MRN: [id number]  : 1950  Study Date: 2017 10:01 AM  Age: 66 yrs  Gender: Male  Patient Location: [facility]  Reason For Study: Dilated cardiomyopathy, Unspecified atrial fibrillation  Ordering Physician: NARINDER LENZ  Referring Physician: NARINDER LENZ  Performed By: Jaden Schaffer JESS     BSA: 2.1 m2  Height: 70 in  Weight: 198 lb  HR: 70  BP: 105/70 mmHg  _____________________________________________________________________________  __        Procedure  Echocardiogram with two-dimensional, color and spectral Doppler performed.  Contrast Definity. Definity (NDC #38274-342-07) given intravenously. Patient  was given 6ml mixture of 1.5ml Definity and 8.5ml saline. 4 ml wasted.  _____________________________________________________________________________  __        Interpretation Summary  Left ventricular size is normal. Moderately reduced left ventricular function  is present. The Ejection Fraction is estimated at 30-35%.  Moderate right ventricular dilation is present. Global right ventricular  function is moderately reduced.  No pericardial effusion is present.  This study was compared with the study from 2014 and there has been no  change.  _____________________________________________________________________________  __        Left Ventricle  Left ventricular size is normal. Moderately (EF 30-35%) reduced  left  ventricular function is present. The Ejection Fraction is estimated at 30-35%.  Diastolic function not assessed due to atrial fibrillation. Inferior wall  akinesis is present. Wall thinning in the presence of a wall motion  abnormality is present consistent with inferior scar.     Right Ventricle  Moderate right ventricular dilation is present. Global right ventricular  function is moderately reduced. A pacemaker lead is noted in the right  ventricle.     Atria  Severe biatrial enlargement is present. A pacemaker lead is noted in the right  atrium.     Mitral Valve  Mitral leaflet thickness is increased . Mild mitral insufficiency is present.  The cause of the mitral insufficiency appears to be restricted posterior  leaflet.        Aortic Valve  The aortic valve is tricuspid. Mild aortic valve sclerosis is present.     Tricuspid Valve  Mild to moderate tricuspid insufficiency is present. The right ventricular  systolic pressure is approximated at 22.2 mmHg plus the right atrial pressure.  Pulmonary artery systolic pressure is normal.     Pulmonic Valve  Mild to moderate pulmonic insufficiency is present.     Vessels  The aorta root is normal. The inferior vena cava was normal in size with  preserved respiratory variability.     Pericardium  No pericardial effusion is present.        Compared to Previous Study  This study was compared with the study from 6/2014 . There has been no change.  _____________________________________________________________________________  __  MMode/2D Measurements & Calculations  IVSd: 1.2 cm     LVIDd: 5.7 cm  LVIDs: 4.9 cm  LVPWd: 0.83 cm  FS: 14.1 %  EDV(Teich): 160.2 ml  ESV(Teich): 112.8 ml  LV mass(C)d: 223.4 grams  Ao root diam: 3.5 cm  asc Aorta Diam: 3.6 cm  LA/Ao: 1.7  LVOT diam: 2.4 cm  LVOT area: 4.6 cm2  LA Volume (BP): 165.0 ml  LA Volume Index (BP): 79.3 ml/m2           Doppler Measurements & Calculations  MV E max beverly: 110.3 cm/sec  PA acc time: 0.09 sec  TR max beverly:  235.2 cm/sec  TR max P.2 mmHg  Lateral E/e': 11.8  Medial E/e': 14.3           _____________________________________________________________________________  __           Report approved by: Rj Stephenson 2017 11:35 AM   Exam: Bone survey dated 2016.     COMPARISON: 10/6/2015.     CLINICAL HISTORY: Multiple myeloma.     FINDINGS:     Skull: Subtle lucencies in the skull, grossly unchanged.     Thoracolumbar spine: Multilevel degenerative disc disease in the  visualized spine without evidence of compression fracture.  Calcification in the distal aorta and iliac vessels measuring at least  3.4 cm in the distal aorta.     CHEST: Left-sided cardiac device in place. Cardiac silhouette mildly  prominent. No distinct rib lesions are noted.     Pelvis: Bones are diffusely osteopenic appearing, however no distinct  lytic lesions are noted.     Bilateral lower extremities: No distinct lytic lesions in the  bilateral lower extremities.     Bilateral upper extremities: No distinct lytic lesions in the  bilateral upper extremities.                                                                       IMPRESSION:  1. Unchanged subtle lucencies in the skull.  2. No distinct lytic lesions otherwise in the axial or appendicular  skeletal system.  3. No new lesions noted.  4. No compression fractures in the visualized spine.  5. Unchanged abdominal aortic aneurysm, further evaluation as  clinically indicated.     SHARAD HOWARD MD           ASSESSMENT:   1.  Multiple myeloma.   2.  Status post autologous peripheral blood stem cell transplant.   3.  Cardiomyopathy.   4.  Atrial fibrillation.   5.  History of deep venous thrombosis.   6.  Chronic back pain.   7.  Anticoagulation.   8.  Abdominal aortic aneurysm.   9.  History of plasmacytoma of the clivus, status post radiation.   10.  History of ventricular tachycardia with implantable defibrillator in place.       His light chains are increased to 84 and M spike   is 0.2 IgA is up and the % gamma protein increased to64% in his urine. He will need treatment for his relapsed myeloma with Revlimid 10mg/day every 21days and one week off in a 28day schedule and dexamethasone 40mg Days 1, 8 and 15.This regimen has worked with Jonh in the past March 2013-July 2014.He does feel good with no new lytic disease. He will see his primary doc at the [facility] and an oncologist to obtain the prescription.He should get cbc plat diff and CMP every 2 weeks and would check light chains every month. I expect he will be treated with this regimen for 3-6 months and then I would consider keeping him on daily low dose revlimid.  At this time, his hemoglobin is good, his white count is good and platelet count is good.  I will see him for 3 months for his relapsed myeloma.  He is now nearly 11+ years since diagnosis. Pt noted to have abdominal aortic aneurysm but ultrasound ok.Echocardiocardiogram shows stable LVEF at 30-35%.      Rojas Raygoza MD   [phone]    Again, thank you for allowing me to participate in the care of your patient.      Sincerely,    Rojas Raygoza MD

## 2017-02-07 NOTE — MR AVS SNAPSHOT
After Visit Summary   2/7/2017    Erasmo Pearce    MRN: [id number]           Patient Information     Date Of Birth          1950        Visit Information        Provider Department      2/7/2017 11:00 AM Rojas Raygoza MD [facility] Blood and Marrow Transplant        Today's Diagnoses     Multiple myeloma not having achieved remission (H)    -  1           [facility] ([facility])  [address] [place] [id number]  Phone: [phone]  Clinic Hours:   Monday-Friday:    8am to 4:30pm   Weekends and holidays:    8am to noon (in general)  If your fever is 100.5  or greater,   call the clinic.  After hours call the   hospital at [phone] or   [phone]. Ask for the BMT   fellow for pediatric or adult patients           Care Instructions    5/9 11am labs only  5/16 10am with         Follow-ups after your visit        Follow-up notes from your care team     Return in about 3 months (around 5/7/2017).      Your next 10 appointments already scheduled     May 09, 2017 11:30 AM   Return with Rojas Raygoza MD   [facility] Blood and Marrow Transplant ([facility])    [address] [place] [id number] [phone]            May 16, 2017 10:00 AM   Return with Rojas Raygoza MD   [facility] Blood and Marrow Transplant ([facility])    [address] [place] [id number] [phone]            Aug 08, 2017  9:30 AM   (Arrive by 9:15 AM)   Implanted Defibulator with Uc Cv Device 1   [facility] ([facility])    [address] [place] [id number] [phone]            Aug 08, 2017 10:00 AM   (Arrive by 9:45 AM)   RETURN ARRHYTHMIA with Chuy Jean-Baptiste MD   [facility] ([facility])    [address] [place] [id number] [phone]               Future tests that were ordered for you today     Open Future Orders        Priority Expected Expires Ordered    CBC with platelets differential Routine 5/7/2017 7/7/2017 2/7/2017    Comprehensive metabolic panel Routine 5/7/2017 7/7/2017 2/7/2017    Lindstrom and lambda light chain Routine 5/7/2017 7/7/2017 2/7/2017    Protein electrophoresis Routine 5/7/2017 7/7/2017 2/7/2017    IgA Routine 5/7/2017 7/7/2017 2/7/2017    IgE Routine 5/7/2017 7/7/2017 2/7/2017    IgG Routine 5/7/2017 7/7/2017 2/7/2017    IgM Routine 5/7/2017 7/7/2017 2/7/2017    Protein timed urine Routine 5/7/2017 7/7/2017 2/7/2017    Protein immunofixation urine Routine 5/7/2017 7/7/2017 2/7/2017    Protein electrophoresis timed urine Routine 5/7/2017 7/7/2017 2/7/2017            Who to contact     If you have questions or need follow up information about today's clinic visit or your schedule please contact [facility] BLOOD AND MARROW TRANSPLANT directly at [phone].  Normal or non-critical lab and imaging results will be communicated to you by [employer], letter or phone within 4 business days after the clinic has received the results. If you do not hear from us within 7 days, please contact the clinic through [employer] or phone. If you have a critical or abnormal lab result, we will notify you by phone as soon as possible.  Submit refill requests through [employer] or call your pharmacy and they will forward the refill request to us. Please allow 3 business days for your refill to be completed.          Additional Information About Your Visit        [employer] Information     [employer] gives you secure access to your electronic health record. If you see a primary care provider, you can also send messages to your care team and make appointments. If you have questions, please call your primary care clinic.  If you do not have a primary care provider, please call [phone] and they will assist you.        Care EveryWhere ID     This is your Care  EveryWhere ID. This could be used by other organizations to access your [facility] medical records  [id number]        Your Vitals Were     Pulse Temperature Respirations Pulse Oximetry          77 97  F (36.1  C) 16 96%         Blood Pressure from Last 3 Encounters:   02/07/17 111/66   02/07/17 105/70   11/08/16 108/62    Weight from Last 3 Encounters:   02/07/17 94.3 kg (207 lb 14.3 oz)   02/07/17 89.858 kg (198 lb 1.6 oz)   11/08/16 93.8 kg (206 lb 12.7 oz)                 Today's Medication Changes          These changes are accurate as of: 2/7/17 11:56 AM.  If you have any questions, ask your nurse or doctor.               Start taking these medicines.        Dose/Directions    metoprolol 100 MG 24 hr tablet   Commonly known as:  TOPROL-XL   Used for:  Atrial fibrillation, unspecified type (H), Paroxysmal ventricular tachycardia (H), Permanent atrial fibrillation (H)   Started by:  Chuy Jean-Baptiste MD        Dose:  150 mg   Take 1.5 tablets (150 mg) by mouth daily   Quantity:  135 tablet   Refills:  3         Stop taking these medicines if you haven't already. Please contact your care team if you have questions.     metoprolol 50 MG tablet   Commonly known as:  LOPRESSOR   Stopped by:  Chuy Jean-Baptiste MD                Where to get your medicines      Some of these will need a paper prescription and others can be bought over the counter.  Ask your nurse if you have questions.     Bring a paper prescription for each of these medications    - metoprolol 100 MG 24 hr tablet             Recent Review Flowsheet Data     BMT Recent Results Latest Ref Rng 7/21/2015 10/6/2015 2/2/2016 5/10/2016 7/6/2016 11/1/2016 1/31/2017    WBC 4.0 - 11.0 10e9/L 6.4 8.0 9.0 9.1 7.5 8.0 7.2    Hemoglobin 13.3 - 17.7 g/dL 12.4(L) 13.1(L) 13.1(L) 12.8(L) 12.3(L) 12.9(L) 12.8(L)    Platelet Count 150 - 450 10e9/L 163 178 179 195 169 180 195    Neutrophils (Absolute) 1.6 - 8.3 10e9/L 4.0 5.1 5.6 6.3 5.1 4.7 4.1    INR 0.86 - 1.14 - -  1.71(H) - - 2.18(H) 2.15(H)    Sodium 133 - 144 mmol/L 138 140 138 139 137 139 141    Potassium 3.4 - 5.3 mmol/L 4.1 4.0 4.3 4.8 4.2 4.8 4.4    Chloride 94 - 109 mmol/L 107 105 106 106 106 104 104    Glucose 70 - 99 mg/dL 110(H) 101(H) 105(H) 97 107(H) 106(H) 104(H)    Urea Nitrogen 7 - 30 mg/dL 22 24 29 30 18 23 28    Creatinine 0.66 - 1.25 mg/dL 0.99 1.17 1.12 1.22 1.10 1.15 1.24    Calcium (Total) 8.5 - 10.1 mg/dL 8.4(L) 8.9 9.1 9.3 9.2 8.9 8.7    Protein (Total) 6.8 - 8.8 g/dL 7.2 7.3 7.5 7.3 7.3 8.1 7.2    Albumin 3.4 - 5.0 g/dL 3.9 3.9 3.9 4.0 3.9 3.9 3.6    Alkaline Phosphatase 40 - 150 U/L 90 80 77 76 78 88 82    AST 0 - 45 U/L 19 18 16 17 22 20 19    ALT 0 - 70 U/L 24 25 19 24 21 23 27    MCV 78 - 100 fl 105(H) 105(H) 104(H) 105(H) 105(H) 103(H) 102(H)               Primary Care Provider Office Phone # Fax #    Cong Mcdonald [phone]       [place] [phone]        Thank you!     Thank you for choosing [facility] BLOOD AND MARROW TRANSPLANT  for your care. Our goal is always to provide you with excellent care. Hearing back from our patients is one way we can continue to improve our services. Please take a few minutes to complete the written survey that you may receive in the mail after your visit with us. Thank you!             Your Updated Medication List - Protect others around you: Learn how to safely use, store and throw away your medicines at www.disposemymeds.org.          This list is accurate as of: 2/7/17 11:56 AM.  Always use your most recent med list.                   Brand Name Dispense Instructions for use    albuterol 108 (90 BASE) MCG/ACT Inhaler    PROAIR HFA/PROVENTIL HFA/VENTOLIN HFA     Inhale 2 puffs into the lungs every 6 hours as needed       amoxicillin 500 MG tablet    AMOXIL    4 tablet    Take 4 tabs one hour before dental appointment       baclofen 10 MG tablet    LIORESAL     Take 10 mg by mouth 3 times daily as needed prn        CALCIUM 500 + D PO      Take 2 tablets by mouth daily.       dexamethasone 4 MG tablet    DECADRON    30 tablet    Take 10 tabs (40mg) Days 1, 8, and 15.       diclofenac 75 MG EC tablet    VOLTAREN     Take 75 mg by mouth 2 times daily as needed 1 tab prn       fentaNYL 25 mcg/hr 72 hr patch    DURAGESIC     Place 1 patch onto the skin every 72 hours       furosemide 20 MG tablet    LASIX    30 tablet    Take 40 mg by mouth daily       LENalidomide 10 MG Caps capsule CHEMO    REVLIMID    21 each    Take 10 mg daily days 1-21       lisinopril 5 MG tablet    PRINIVIL/ZESTRIL     Take 5 mg by mouth 2 times daily       magnesium oxide 400 MG tablet    MAG-OX    4 tablet    Take 1,200 mg by mouth 2 times daily       metoprolol 100 MG 24 hr tablet    TOPROL-XL    135 tablet    Take 1.5 tablets (150 mg) by mouth daily       nitroglycerin 0.4 MG sublingual tablet    NITROSTAT     Place 0.4 mg under the tongue every 5 minutes as needed Prn       omeprazole 20 MG CR capsule    priLOSEC    90 capsule    Take 20 mg by mouth daily       oxyCODONE 5 MG IR tablet    ROXICODONE     Take 1-2 tablets by mouth every 6 hours as needed. For pain.       simvastatin 40 MG tablet    ZOCOR    30 tablet    Take 40 mg by mouth At Bedtime       tiotropium 18 MCG capsule    SPIRIVA    30 capsule    Inhale 1 capsule (18 mcg) into the lungs daily       TRAZODONE HCL PO      Take 50 mg by mouth At Bedtime       warfarin 5 MG tablet    COUMADIN    30 tablet    Take 0.5 tablets (2.5 mg) by mouth See Admin Instructions Take as directed. Take 2.5 mg daily for 5 days/week, then 5 mg daily the other 2 days, pending INR levels.

## 2017-02-07 NOTE — PATIENT INSTRUCTIONS
It was a pleasure to see you in clinic today. Please do not hesitate to call with any questions or concerns. You are scheduled for a remote ICD transmission on 5/15/17. This will happen automatically in the night. We will call in 1-2 business days to discuss the results with you. We look forward to seeing you in clinic at your next device check in 6 months.    Natali Arredondo RN  Electrophysiology Nurse Clinician  [facility]  During business hours call:  [phone]  After business hours please call: [phone]- select option #4 and ask for job code [phone].

## 2017-02-07 NOTE — PATIENT INSTRUCTIONS
You will be scheduled for a follow up visit as instructed.    Instructions from today;   1. Stop metoprolol tartrate  2. Start metoprolol succinate 150 mg once daily  3. When undergoing chemotherapy: If weight drops 5 pounds or more- hold lasix (furosemide)  Resume lasix when weight it back to baseline.   4. Echocardiogram today. Aleida will contact you with the results.     If you have any questions regarding to your visit please contact your care team:     Cardiology  Telephone Number    Aleida Angeles [phone]   Or send a message to your provider via my chart.   For scheduling appts or procedures:    Stephanie Wells     [phone] or  [phone]   For the Device Clinic (Pacemakers and ICD's)   RN's :   Natali Vale  During business hours: [phone]    After business hours:   [phone]- select option 4 and ask for job code [phone].    You can also contact us using My Chart. If you need assistance in setting this up, please contact our office or ask at your follow up visit.     If you need a medication refill please contact your pharmacy. Please allow 3 business days for your refill to be completed.     As always, Thank you for trusting us with your health care needs!

## 2017-02-07 NOTE — PROGRESS NOTES
HISTORY OF PRESENT ILLNESS:  Jonh returns [facility] for evaluation of multiple myeloma, status post autologous peripheral blood stem cell transplant.  He also has cardiomyopathy and chronic atrial fibrillation.  Remains on warfarin to keep INR 2-3He has an ICD placed.  He has had no cardiac events recorded.  He is feeling good.  He denies fevers, chills, nausea or vomiting. No double vision. He had an URI in November given antibiotic which resolved and recently had another URI but no antibiotic.  He continues on metoprolol  75 mg twice a day and was given new Rx for 150mg XL/day. Had ultrasound of abdominal aorta done as seen below     PAST MEDICAL HISTORY, SOCIAL HISTORY AND FAMILY HISTORY:  See my note from 11/2016.      REVIEW OF SYSTEMS:  A 12-point review of systems is done and is negative as in HPI.      PHYSICAL EXAMINATION:   GENERAL:  He is an alert gentleman in no acute distress.   VITAL SIGNS:  Stable. /66 mmHg  Pulse 77  Temp(Src) 97  F (36.1  C)  Resp 16  Wt 94.3 kg (207 lb 14.3 oz)  SpO2 96%    HEENT:  Normocephalic.  EOM okay, no scleral icterus.  Mouth without lesion.   RESPIRATORY:  Clear to percussion and auscultation.   CARDIAC:  Atrial fibrillation noted.  No murmur.   ABDOMEN:  Soft.   EXTREMITIES:  Without edema.   Results for DARRYL ZAPATA (MRN [id number]) as of 2/7/2017 11:32   Ref. Range 1/31/2017 05:30 1/31/2017 10:05 1/31/2017 10:54 2/7/2017 10:42   Sodium Latest Ref Range: 133-144 mmol/L   141    Potassium Latest Ref Range: 3.4-5.3 mmol/L   4.4    Chloride Latest Ref Range:  mmol/L   104    Carbon Dioxide Latest Ref Range: 20-32 mmol/L   29    Urea Nitrogen Latest Ref Range: 7-30 mg/dL   28    Creatinine Latest Ref Range: 0.66-1.25 mg/dL   1.24    GFR Estimate Latest Ref Range: >60 mL/min/1.7m2   58 (L)    GFR Estimate If Black Latest Ref Range: >60 mL/min/1.7m2   71    Calcium Latest Ref Range: 8.5-10.1 mg/dL   8.7    Anion Gap Latest  Ref Range: 3-14 mmol/L   8    Albumin Latest Ref Range: 3.4-5.0 g/dL   3.6    Protein Total Latest Ref Range: 6.8-8.8 g/dL   7.2    Bilirubin Total Latest Ref Range: 0.2-1.3 mg/dL   0.8    Alkaline Phosphatase Latest Ref Range:  U/L   82    ALT Latest Ref Range: 0-70 U/L   27    AST Latest Ref Range: 0-45 U/L   19    Creatinine Urine Timed Latest Ref Range: 1.00-2.00  1.50      Creatinine Urine Latest Units: mg/dL 92      IGE Latest Ref Range: 0-114 KIU/L   17    Protein Random Urine Latest Units: g/L 0.40      Protein Total Urine g/gr Creatinine Latest Ref Range: 0-0.2 g/g Cr 0.43 (H)      Protein Total 24 Hr Urine Latest Ref Range: 0.04-0.23  0.65 (H)      Uric Acid Latest Ref Range: 3.5-7.2 mg/dL   5.6    Glucose Latest Ref Range: 70-99 mg/dL   104 (H)    WBC Latest Ref Range: 4.0-11.0 10e9/L   7.2    Hemoglobin Latest Ref Range: 13.3-17.7 g/dL   12.8 (L)    Hematocrit Latest Ref Range: 40.0-53.0 %   37.9 (L)    Platelet Count Latest Ref Range: 150-450 10e9/L   195    RBC Count Latest Ref Range: 4.4-5.9 10e12/L   3.70 (L)    MCV Latest Ref Range:  fl   102 (H)    MCH Latest Ref Range: 26.5-33.0 pg   34.6 (H)    MCHC Latest Ref Range: 31.5-36.5 g/dL   33.8    RDW Latest Ref Range: 10.0-15.0 %   12.8    Diff Method Unknown   Automated Method    % Neutrophils Latest Units: %   57.3    % Lymphocytes Latest Units: %   31.6    % Monocytes Latest Units: %   9.1    % Eosinophils Latest Units: %   1.0    % Basophils Latest Units: %   0.4    % Immature Granulocytes Latest Units: %   0.6    Nucleated RBCs Latest Ref Range: 0 /100   0    Absolute Neutrophil Latest Ref Range: 1.6-8.3 10e9/L   4.1    Absolute Lymphocytes Latest Ref Range: 0.8-5.3 10e9/L   2.3    Absolute Monocytes Latest Ref Range: 0.0-1.3 10e9/L   0.7    Absolute Eosinophils Latest Ref Range: 0.0-0.7 10e9/L   0.1    Absolute Basophils Latest Ref Range: 0.0-0.2 10e9/L   0.0    Abs Immature Granulocytes Latest Ref Range: 0-0.4 10e9/L   0.0     Absolute Nucleated RBC Unknown   0.0    Sed Rate Latest Ref Range: 0-20 mm/h   46 (H)    INR Latest Ref Range: 0.86-1.14    2.15 (H)    Albumin Fraction Latest Ref Range: 3.7-5.1 g/dL   3.7    Alpha 1 Fraction Latest Ref Range: 0.2-0.4 g/dL   0.4    Alpha 2 Fraction Latest Ref Range: 0.5-0.9 g/dL   0.9    Beta Fraction Latest Ref Range: 0.6-1.0 g/dL   0.8    ELP Interpretation: Unknown   Small monoclonal ...    ELP Interpretation Urine Unknown Albumin and globu...      Gamma Fraction Latest Ref Range: 0.7-1.6 g/dL   0.9    IGA Latest Ref Range:  mg/dL   381 (H)    IGG Latest Ref Range: 695-1620 mg/dL   784    IGM Latest Ref Range:  mg/dL   29 (L)    Immunofix ELP Urine Unknown (Note)...      Kappa Free Lt Chain Latest Ref Range: 0.33-1.94 mg/dL   1.75    Kappa Lambda Ratio Latest Ref Range: 0.26-1.65    0.02 (L)    Lambda Free Lt Chain Latest Ref Range: 0.57-2.63 mg/dL   84.75 (H)    Monoclonal Peak Latest Ref Range: 0.0 g/dL   0.2 (H)    US ABDOMINAL AORTA LIMITED Unknown  Rpt     ECHO COMPLETE WITH DEFINITY Unknown    Rpt   EXAMINATION: US ABDOMINAL AORTA LIMITED, 1/31/2017 10:05 AM       COMPARISON: None     HISTORY: Abdominal aortic aneurysm     TECHNIQUE: Gray-scale and color Doppler ultrasound of the abdominal  aorta.     FINDINGS: Examination of the abdominal aorta is performed.     Proximal abdominal aorta, suprarenal aorta: 3.2 cm.     Mid abdominal aorta: 2.4 cm.     Distal abdominal aorta: 2.9 cm, which may be mildly overestimated.     Calcified atherosclerotic plaques along the abdominal aorta.     Proximal right common iliac artery is normal in caliber measuring 1.5  cm in maximal diameter.     Proximal left common iliac artery is normal in caliber measuring 1.0  cm in maximal diameter.                                                                       IMPRESSION:  Ectatic appearance of the distal infrarenal abdominal  aorta.     ------------------------------------------------------------  Aorta diameter measurement classification:  < 2.5cm: Normal.  2.5 - 2.9cm: Ectatic.  >3cm: Aneurysmal.     I have personally reviewed the examination and initial interpretation  and I agree with the findings.     CAMILLE HENSON MD  Name: DARRYL ZAPATA  MRN: [id number]  : 1950  Study Date: 2017 10:01 AM  Age: 66 yrs  Gender: Male  Patient Location: [facility]  Reason For Study: Dilated cardiomyopathy, Unspecified atrial fibrillation  Ordering Physician: NARINDER LENZ  Referring Physician: NARINDER LENZ  Performed By: Jaden Schaffer [facility]     BSA: 2.1 m2  Height: 70 in  Weight: 198 lb  HR: 70  BP: 105/70 mmHg  _____________________________________________________________________________  __        Procedure  Echocardiogram with two-dimensional, color and spectral Doppler performed.  Contrast Definity. Definity (NDC #80700-601-45) given intravenously. Patient  was given 6ml mixture of 1.5ml Definity and 8.5ml saline. 4 ml wasted.  _____________________________________________________________________________  __        Interpretation Summary  Left ventricular size is normal. Moderately reduced left ventricular function  is present. The Ejection Fraction is estimated at 30-35%.  Moderate right ventricular dilation is present. Global right ventricular  function is moderately reduced.  No pericardial effusion is present.  This study was compared with the study from 2014 and there has been no  change.  _____________________________________________________________________________  __        Left Ventricle  Left ventricular size is normal. Moderately (EF 30-35%) reduced left  ventricular function is present. The Ejection Fraction is estimated at 30-35%.  Diastolic function not assessed due to atrial fibrillation. Inferior wall  akinesis is present. Wall thinning in the presence of a wall motion  abnormality is present  consistent with inferior scar.     Right Ventricle  Moderate right ventricular dilation is present. Global right ventricular  function is moderately reduced. A pacemaker lead is noted in the right  ventricle.     Atria  Severe biatrial enlargement is present. A pacemaker lead is noted in the right  atrium.     Mitral Valve  Mitral leaflet thickness is increased . Mild mitral insufficiency is present.  The cause of the mitral insufficiency appears to be restricted posterior  leaflet.        Aortic Valve  The aortic valve is tricuspid. Mild aortic valve sclerosis is present.     Tricuspid Valve  Mild to moderate tricuspid insufficiency is present. The right ventricular  systolic pressure is approximated at 22.2 mmHg plus the right atrial pressure.  Pulmonary artery systolic pressure is normal.     Pulmonic Valve  Mild to moderate pulmonic insufficiency is present.     Vessels  The aorta root is normal. The inferior vena cava was normal in size with  preserved respiratory variability.     Pericardium  No pericardial effusion is present.        Compared to Previous Study  This study was compared with the study from 2014 . There has been no change.  _____________________________________________________________________________  __  MMode/2D Measurements & Calculations  IVSd: 1.2 cm     LVIDd: 5.7 cm  LVIDs: 4.9 cm  LVPWd: 0.83 cm  FS: 14.1 %  EDV(Teich): 160.2 ml  ESV(Teich): 112.8 ml  LV mass(C)d: 223.4 grams  Ao root diam: 3.5 cm  asc Aorta Diam: 3.6 cm  LA/Ao: 1.7  LVOT diam: 2.4 cm  LVOT area: 4.6 cm2  LA Volume (BP): 165.0 ml  LA Volume Index (BP): 79.3 ml/m2           Doppler Measurements & Calculations  MV E max beverly: 110.3 cm/sec  PA acc time: 0.09 sec  TR max beverly: 235.2 cm/sec  TR max P.2 mmHg  Lateral E/e': 11.8  Medial E/e': 14.3           _____________________________________________________________________________  __           Report approved by: Rj Stephenson 2017 11:35 AM   Exam: Bone  survey dated 11/1/2016.     COMPARISON: 10/6/2015.     CLINICAL HISTORY: Multiple myeloma.     FINDINGS:     Skull: Subtle lucencies in the skull, grossly unchanged.     Thoracolumbar spine: Multilevel degenerative disc disease in the  visualized spine without evidence of compression fracture.  Calcification in the distal aorta and iliac vessels measuring at least  3.4 cm in the distal aorta.     CHEST: Left-sided cardiac device in place. Cardiac silhouette mildly  prominent. No distinct rib lesions are noted.     Pelvis: Bones are diffusely osteopenic appearing, however no distinct  lytic lesions are noted.     Bilateral lower extremities: No distinct lytic lesions in the  bilateral lower extremities.     Bilateral upper extremities: No distinct lytic lesions in the  bilateral upper extremities.                                                                       IMPRESSION:  1. Unchanged subtle lucencies in the skull.  2. No distinct lytic lesions otherwise in the axial or appendicular  skeletal system.  3. No new lesions noted.  4. No compression fractures in the visualized spine.  5. Unchanged abdominal aortic aneurysm, further evaluation as  clinically indicated.     SHARAD HOWARD MD           ASSESSMENT:   1.  Multiple myeloma.   2.  Status post autologous peripheral blood stem cell transplant.   3.  Cardiomyopathy.   4.  Atrial fibrillation.   5.  History of deep venous thrombosis.   6.  Chronic back pain.   7.  Anticoagulation.   8.  Abdominal aortic aneurysm.   9.  History of plasmacytoma of the clivus, status post radiation.   10.  History of ventricular tachycardia with implantable defibrillator in place.       His light chains are increased to 84 and M spike  is 0.2 IgA is up and the % gamma protein increased to64% in his urine. He will need treatment for his relapsed myeloma with Revlimid 10mg/day every 21days and one week off in a 28day schedule and dexamethasone 40mg Days 1, 8 and 15.This regimen  has worked with Jonh in the past March 2013-July 2014.He does feel good with no new lytic disease. He will see his primary doc at the [facility] and an oncologist to obtain the prescription.He should get cbc plat diff and CMP every 2 weeks and would check light chains every month. I expect he will be treated with this regimen for 3-6 months and then I would consider keeping him on daily low dose revlimid.  At this time, his hemoglobin is good, his white count is good and platelet count is good.  I will see him for 3 months for his relapsed myeloma.  He is now nearly 11+ years since diagnosis. Pt noted to have abdominal aortic aneurysm but ultrasound ok.Echocardiocardiogram shows stable LVEF at 30-35%.      Rojas Raygoza MD   [phone]

## 2017-02-07 NOTE — NURSING NOTE
Chief Complaint   Patient presents with     Follow Up For     ICD check, 6MFU: 8/2/16- perm AF, sotalol/warfarin   NOTE- Patient has been 'feeling well' overall.

## 2017-02-14 ENCOUNTER — CARE COORDINATION (OUTPATIENT)
Dept: TRANSPLANT | Facility: CLINIC | Age: 67
End: 2017-02-14

## 2017-02-14 NOTE — PROGRESS NOTES
RE: Erasmo Pearce  : 52    Multiple Myeloma 203.00    [address]   [address] [place] [address]  [phone] (H)  [phone] (M)    Verbal order from Dr. Rock Raygoza to Merry Mas RN    CBC diff and platelets, CMP to be drawn every two weeks.  Serum light chains to be drawn monthly.   Patient will call and arrange for his first blood draw.  Please fax results to Dr. Raygoza at [phone].    Labs to be drawn at [facility] in [place]  Lab phone: [phone]  Fax: [phone]    Please call Merry Mas RN care coordinator at [phone] with any questions.                    Dr. Rojas Raygoza MD/frankie  [facility]   Blood and Marrow Transplant Program  [facility] [address] [place] [address]      Ph [phone] fax [phone]

## 2017-03-08 ENCOUNTER — ALLIED HEALTH/NURSE VISIT (OUTPATIENT)
Dept: CARDIOLOGY | Facility: CLINIC | Age: 67
End: 2017-03-08
Attending: INTERNAL MEDICINE
Payer: MEDICARE

## 2017-03-08 ENCOUNTER — CARE COORDINATION (OUTPATIENT)
Dept: TRANSPLANT | Facility: CLINIC | Age: 67
End: 2017-03-08

## 2017-03-08 DIAGNOSIS — I42.0 DILATED CARDIOMYOPATHY (H): Primary | ICD-10-CM

## 2017-03-08 PROCEDURE — 93296 REM INTERROG EVL PM/IDS: CPT | Mod: ZF

## 2017-03-08 PROCEDURE — 93295 DEV INTERROG REMOTE 1/2/MLT: CPT | Performed by: INTERNAL MEDICINE

## 2017-03-08 NOTE — PROGRESS NOTES
RE: Erasmo Pearce  : [birth year]     Multiple Myeloma 203.00     [address]   [address]  [place] [address]  [phone] (H)  [phone] (M)     Verbal order from Dr. Rock Raygoza to Merry Mas, RN    Please add a magnesium to the previously requested CBC d/p and CMP to be drawn every two weeks. (see below previous request)     CBC diff and platelets, CMP to be drawn every two weeks.  Serum light chains to be drawn monthly.     Please fax results to Dr. Raygoza at [phone].     Labs to be drawn at [facility] in [place]  Lab phone: [phone]  Fax: [phone]     Please call Merry Mas RN care coordinator at [phone] with any questions.                          Dr. Rojas Raygoza MD/frankie  [facility]   Blood and Marrow Transplant Program  [facility] [address] [facility], [place] [address]       Ph [phone] fax [phone]

## 2017-03-08 NOTE — MR AVS SNAPSHOT
After Visit Summary   3/8/2017    Erasmo Pearce    MRN: [id number]           Patient Information     Date Of Birth          1950        Visit Information        Provider Department      3/8/2017 6:00 AM UC ICD REMOTE [facility]        Today's Diagnoses     Dilated cardiomyopathy (H)    -  1       Follow-ups after your visit        Your next 10 appointments already scheduled     May 09, 2017 11:30 AM CDT   [facility] Lab Draw with  MASONIC LAB DRAW   [facility] Masonic Lab Draw ([facility])    [address] [place] [id number] [phone]            May 16, 2017 10:00 AM CDT   Return with Rojas Raygoza MD   [facility] Blood and Marrow Transplant ([facility])    [address] [place] [id number] [phone]            Aug 08, 2017  9:30 AM CDT   (Arrive by 9:15 AM)   Implanted Defibulator with Uc Cv Device 1   [facility] ([facility])    [address] [place] [id number] [phone]            Aug 08, 2017 10:00 AM CDT   (Arrive by 9:45 AM)   RETURN ARRHYTHMIA with Chuy Jean-Baptiste MD   [facility] ([facility])    [address] [place] [id number] [phone]              Who to contact     If you have questions or need follow up information about today's clinic visit or your schedule please contact [facility] directly at [phone].  Normal or non-critical lab and imaging results will be communicated to you by MyChart, letter or phone within 4 business days after the clinic has received the results. If you do not hear from us within 7 days, please contact the clinic through MyChart or phone. If you have a critical or abnormal lab result, we will notify you by phone as soon as possible.  Submit refill requests through [employer] or call your  pharmacy and they will forward the refill request to us. Please allow 3 business days for your refill to be completed.          Additional Information About Your Visit        [employer] Information     [employer] gives you secure access to your electronic health record. If you see a primary care provider, you can also send messages to your care team and make appointments. If you have questions, please call your primary care clinic.  If you do not have a primary care provider, please call [phone] and they will assist you.        Care EveryWhere ID     This is your Care EveryWhere ID. This could be used by other organizations to access your [facility] medical records  [id number]         Blood Pressure from Last 3 Encounters:   02/07/17 111/66   02/07/17 105/70   11/08/16 108/62    Weight from Last 3 Encounters:   02/07/17 94.3 kg (207 lb 14.3 oz)   02/07/17 89.9 kg (198 lb 1.6 oz)   11/08/16 93.8 kg (206 lb 12.7 oz)              We Performed the Following     INTERROGATION DEVICE EVAL REMOTE, PACER/ICD        Primary Care Provider Office Phone # Fax #    Cong Mcdonald [phone]       [place] [address]        Thank you!     Thank you for choosing [facility]  for your care. Our goal is always to provide you with excellent care. Hearing back from our patients is one way we can continue to improve our services. Please take a few minutes to complete the written survey that you may receive in the mail after your visit with us. Thank you!             Your Updated Medication List - Protect others around you: Learn how to safely use, store and throw away your medicines at www.disposemymeds.org.          This list is accurate as of: 3/8/17 12:46 PM.  Always use your most recent med list.                   Brand Name Dispense Instructions for use    albuterol 108 (90 BASE) MCG/ACT Inhaler    PROAIR HFA/PROVENTIL HFA/VENTOLIN HFA     Inhale 2 puffs into the lungs every 6  hours as needed       amoxicillin 500 MG tablet    AMOXIL    4 tablet    Take 4 tabs one hour before dental appointment       baclofen 10 MG tablet    LIORESAL     Take 10 mg by mouth 3 times daily as needed prn       CALCIUM 500 + D PO      Take 2 tablets by mouth daily.       dexamethasone 4 MG tablet    DECADRON    30 tablet    Take 10 tabs (40mg) Days 1, 8, and 15.       diclofenac 75 MG EC tablet    VOLTAREN     Take 75 mg by mouth 2 times daily as needed 1 tab prn       fentaNYL 25 mcg/hr 72 hr patch    DURAGESIC     Place 1 patch onto the skin every 72 hours       furosemide 20 MG tablet    LASIX    30 tablet    Take 40 mg by mouth daily       LENalidomide 10 MG Caps capsule CHEMO    REVLIMID    21 each    Take 10 mg daily days 1-21       lisinopril 5 MG tablet    PRINIVIL/ZESTRIL     Take 5 mg by mouth 2 times daily       magnesium oxide 400 MG tablet    MAG-OX    4 tablet    Take 1,200 mg by mouth 2 times daily       metoprolol 100 MG 24 hr tablet    TOPROL-XL    135 tablet    Take 1.5 tablets (150 mg) by mouth daily       nitroglycerin 0.4 MG sublingual tablet    NITROSTAT     Place 0.4 mg under the tongue every 5 minutes as needed Prn       omeprazole 20 MG CR capsule    priLOSEC    90 capsule    Take 20 mg by mouth daily       oxyCODONE 5 MG IR tablet    ROXICODONE     Take 1-2 tablets by mouth every 6 hours as needed. For pain.       simvastatin 40 MG tablet    ZOCOR    30 tablet    Take 40 mg by mouth At Bedtime       tiotropium 18 MCG capsule    SPIRIVA    30 capsule    Inhale 1 capsule (18 mcg) into the lungs daily       TRAZODONE HCL PO      Take 50 mg by mouth At Bedtime       warfarin 5 MG tablet    COUMADIN    30 tablet    Take 0.5 tablets (2.5 mg) by mouth See Admin Instructions Take as directed. Take 2.5 mg daily for 5 days/week, then 5 mg daily the other 2 days, pending INR levels.

## 2017-03-08 NOTE — PROGRESS NOTES
Remote ICD transmission received and reviewed.  Device transmission sent per MD orders.  Patient has a [employer] single lead ICD.  Alert received for VF episode recorded on 3/6/17 at 1722 - 270 bpm, 41 J ICD shock delivered with successful termination of arrhythmia.  Patient called at home.  Patient reports that he was working at his computer on 3/6/17 at ~ 1720 when he started to feel lightheaded, layed his head down on the desk and woke up on the floor.  Patient denies injury.  Patient reports he is feeling well today.  Patient is taking Warfarin.  Normal ICD function.  362 NSVT episodes recorded - 5 sec - 2 min 3 sec in duration, 144-165 bpm, .  41 episodes recorded in the VT monitor zone - 141-190 bpm, 17 sec - 5 min 4 sec in duration.  Stored episodes appear to be AF with RVR.  Presenting EGM = irregular ventricular rhythm @ ~ 60-70 bpm.   = 2%.  Estimated battery longevity to ROSEMARIE = 6 years.  Plan for remote transmission in 3 mos and RTC in 6 mos.  Dr. Jean-Baptiste notified of interrogation results.    Remote ICD transmission

## 2017-03-10 ENCOUNTER — MEDICAL CORRESPONDENCE (OUTPATIENT)
Dept: TRANSPLANT | Facility: CLINIC | Age: 67
End: 2017-03-10

## 2017-03-15 ENCOUNTER — TRANSFERRED RECORDS (OUTPATIENT)
Dept: HEALTH INFORMATION MANAGEMENT | Facility: CLINIC | Age: 67
End: 2017-03-15

## 2017-04-20 DIAGNOSIS — I48.21 PERMANENT ATRIAL FIBRILLATION (H): ICD-10-CM

## 2017-04-20 DIAGNOSIS — I47.29 PAROXYSMAL VENTRICULAR TACHYCARDIA (H): ICD-10-CM

## 2017-04-20 DIAGNOSIS — I48.91 ATRIAL FIBRILLATION, UNSPECIFIED TYPE (H): ICD-10-CM

## 2017-04-20 RX ORDER — METOPROLOL SUCCINATE 100 MG/1
150 TABLET, EXTENDED RELEASE ORAL DAILY
Qty: 135 TABLET | Refills: 3 | Status: SHIPPED | OUTPATIENT
Start: 2017-04-20 | End: 2017-04-20

## 2017-04-20 RX ORDER — METOPROLOL SUCCINATE 100 MG/1
150 TABLET, EXTENDED RELEASE ORAL DAILY
Qty: 135 TABLET | Refills: 3 | Status: SHIPPED | OUTPATIENT
Start: 2017-04-20 | End: 2017-04-21

## 2017-04-21 RX ORDER — METOPROLOL SUCCINATE 100 MG/1
150 TABLET, EXTENDED RELEASE ORAL DAILY
Qty: 135 TABLET | Refills: 3 | Status: SHIPPED | OUTPATIENT
Start: 2017-04-21 | End: 2018-07-24

## 2017-04-24 ENCOUNTER — MEDICAL CORRESPONDENCE (OUTPATIENT)
Dept: TRANSPLANT | Facility: CLINIC | Age: 67
End: 2017-04-24

## 2017-05-09 DIAGNOSIS — C90.00 MULTIPLE MYELOMA NOT HAVING ACHIEVED REMISSION (H): ICD-10-CM

## 2017-05-09 LAB
ALBUMIN SERPL-MCNC: 3.2 G/DL (ref 3.4–5)
ALP SERPL-CCNC: 83 U/L (ref 40–150)
ALT SERPL W P-5'-P-CCNC: 28 U/L (ref 0–70)
ANION GAP SERPL CALCULATED.3IONS-SCNC: 9 MMOL/L (ref 3–14)
AST SERPL W P-5'-P-CCNC: 14 U/L (ref 0–45)
BASOPHILS # BLD AUTO: 0 10E9/L (ref 0–0.2)
BASOPHILS NFR BLD AUTO: 0.8 %
BILIRUB SERPL-MCNC: 1 MG/DL (ref 0.2–1.3)
BUN SERPL-MCNC: 17 MG/DL (ref 7–30)
CALCIUM SERPL-MCNC: 8.5 MG/DL (ref 8.5–10.1)
CHLORIDE SERPL-SCNC: 102 MMOL/L (ref 94–109)
CO2 SERPL-SCNC: 27 MMOL/L (ref 20–32)
COLLECT DURATION TIME UR: 24 H
CREAT 24H UR-MRATE: 1.25 G/(24.H) (ref 1–2)
CREAT SERPL-MCNC: 0.94 MG/DL (ref 0.66–1.25)
CREAT UR-MCNC: 60 MG/DL
DIFFERENTIAL METHOD BLD: ABNORMAL
EOSINOPHIL # BLD AUTO: 0.5 10E9/L (ref 0–0.7)
EOSINOPHIL NFR BLD AUTO: 9.4 %
ERYTHROCYTE [DISTWIDTH] IN BLOOD BY AUTOMATED COUNT: 14.4 % (ref 10–15)
GFR SERPL CREATININE-BSD FRML MDRD: 81 ML/MIN/1.7M2
GLUCOSE SERPL-MCNC: 101 MG/DL (ref 70–99)
HCT VFR BLD AUTO: 34.5 % (ref 40–53)
HGB BLD-MCNC: 11.3 G/DL (ref 13.3–17.7)
IGA SERPL-MCNC: 237 MG/DL (ref 70–380)
IGG SERPL-MCNC: 547 MG/DL (ref 695–1620)
IGM SERPL-MCNC: 53 MG/DL (ref 60–265)
IMM GRANULOCYTES # BLD: 0 10E9/L (ref 0–0.4)
IMM GRANULOCYTES NFR BLD: 0.6 %
KAPPA LC UR-MCNC: 1.47 MG/DL (ref 0.33–1.94)
KAPPA LC/LAMBDA SER: 0.06 {RATIO} (ref 0.26–1.65)
LAMBDA LC SERPL-MCNC: 26 MG/DL (ref 0.57–2.63)
LYMPHOCYTES # BLD AUTO: 1.2 10E9/L (ref 0.8–5.3)
LYMPHOCYTES NFR BLD AUTO: 22.1 %
MCH RBC QN AUTO: 34.1 PG (ref 26.5–33)
MCHC RBC AUTO-ENTMCNC: 32.8 G/DL (ref 31.5–36.5)
MCV RBC AUTO: 104 FL (ref 78–100)
MONOCYTES # BLD AUTO: 0.7 10E9/L (ref 0–1.3)
MONOCYTES NFR BLD AUTO: 13.1 %
NEUTROPHILS # BLD AUTO: 2.8 10E9/L (ref 1.6–8.3)
NEUTROPHILS NFR BLD AUTO: 54 %
NRBC # BLD AUTO: 0 10*3/UL
NRBC BLD AUTO-RTO: 0 /100
PLATELET # BLD AUTO: 141 10E9/L (ref 150–450)
POTASSIUM SERPL-SCNC: 4 MMOL/L (ref 3.4–5.3)
PROT 24H UR-MRATE: 0.5 G/(24.H) (ref 0.04–0.23)
PROT SERPL-MCNC: 6.6 G/DL (ref 6.8–8.8)
PROT UR-MCNC: 0.24 G/L
PROT/CREAT 24H UR: 0.4 G/G CR (ref 0–0.2)
RBC # BLD AUTO: 3.31 10E12/L (ref 4.4–5.9)
SODIUM SERPL-SCNC: 138 MMOL/L (ref 133–144)
SPECIMEN VOL UR: 2080 ML
WBC # BLD AUTO: 5.2 10E9/L (ref 4–11)

## 2017-05-09 PROCEDURE — 82784 ASSAY IGA/IGD/IGG/IGM EACH: CPT | Performed by: INTERNAL MEDICINE

## 2017-05-09 PROCEDURE — 00000402 ZZHCL STATISTIC TOTAL PROTEIN: Performed by: INTERNAL MEDICINE

## 2017-05-09 PROCEDURE — 82785 ASSAY OF IGE: CPT | Performed by: INTERNAL MEDICINE

## 2017-05-09 PROCEDURE — 86335 IMMUNFIX E-PHORSIS/URINE/CSF: CPT | Performed by: INTERNAL MEDICINE

## 2017-05-09 PROCEDURE — 84166 PROTEIN E-PHORESIS/URINE/CSF: CPT | Performed by: INTERNAL MEDICINE

## 2017-05-09 PROCEDURE — 83883 ASSAY NEPHELOMETRY NOT SPEC: CPT | Performed by: INTERNAL MEDICINE

## 2017-05-09 PROCEDURE — 85025 COMPLETE CBC W/AUTO DIFF WBC: CPT | Performed by: INTERNAL MEDICINE

## 2017-05-09 PROCEDURE — 80053 COMPREHEN METABOLIC PANEL: CPT | Performed by: INTERNAL MEDICINE

## 2017-05-09 PROCEDURE — 84165 PROTEIN E-PHORESIS SERUM: CPT | Performed by: INTERNAL MEDICINE

## 2017-05-09 NOTE — NURSING NOTE
Chief Complaint   Patient presents with     Lab Only     peripheral blood draw in right arm     Patient here with MM - here for labs only

## 2017-05-10 LAB
ALBUMIN MFR UR ELPH: 12.4 %
ALBUMIN SERPL ELPH-MCNC: 3.4 G/DL (ref 3.7–5.1)
ALPHA1 GLOB MFR UR ELPH: 8.2 %
ALPHA1 GLOB SERPL ELPH-MCNC: 0.4 G/DL (ref 0.2–0.4)
ALPHA2 GLOB MFR UR ELPH: 6 %
ALPHA2 GLOB SERPL ELPH-MCNC: 0.9 G/DL (ref 0.5–0.9)
B-GLOBULIN MFR UR ELPH: 8.3 %
B-GLOBULIN SERPL ELPH-MCNC: 0.7 G/DL (ref 0.6–1)
GAMMA GLOB MFR UR ELPH: 65.1 %
GAMMA GLOB SERPL ELPH-MCNC: 0.6 G/DL (ref 0.7–1.6)
IGE SERPL-ACNC: 7 KIU/L (ref 0–114)
IMMUNOFIX ELP, URINE: NORMAL
M PROTEIN MFR UR ELPH: 51 %
M PROTEIN SERPL ELPH-MCNC: 0.1 G/DL
PROT PATTERN SERPL ELPH-IMP: ABNORMAL
PROT PATTERN UR ELPH-IMP: ABNORMAL

## 2017-05-12 ENCOUNTER — MEDICAL CORRESPONDENCE (OUTPATIENT)
Dept: TRANSPLANT | Facility: CLINIC | Age: 67
End: 2017-05-12

## 2017-05-16 ENCOUNTER — ONCOLOGY VISIT (OUTPATIENT)
Dept: TRANSPLANT | Facility: CLINIC | Age: 67
End: 2017-05-16
Attending: INTERNAL MEDICINE
Payer: MEDICARE

## 2017-05-16 VITALS
DIASTOLIC BLOOD PRESSURE: 59 MMHG | OXYGEN SATURATION: 98 % | RESPIRATION RATE: 16 BRPM | TEMPERATURE: 97.1 F | BODY MASS INDEX: 29.08 KG/M2 | WEIGHT: 202.7 LBS | SYSTOLIC BLOOD PRESSURE: 91 MMHG | HEART RATE: 72 BPM

## 2017-05-16 DIAGNOSIS — C90.02 MULTIPLE MYELOMA IN RELAPSE (H): Primary | ICD-10-CM

## 2017-05-16 PROCEDURE — 99213 OFFICE O/P EST LOW 20 MIN: CPT | Mod: ZF

## 2017-05-16 ASSESSMENT — PAIN SCALES - GENERAL: PAINLEVEL: NO PAIN (0)

## 2017-05-16 NOTE — NURSING NOTE
"Chief Complaint   Patient presents with     RECHECK     Provider visit, PAZ     Oncology Rooming Note    May 16, 2017 10:26 AM   Erasmo Pearce is a 66 year old male who presents for:    Chief Complaint   Patient presents with     RECHECK     Provider visit, MM     Initial Vitals: BP 91/59  Pulse 72  Temp 97.1  F (36.2  C) (Oral)  Resp 16  Wt 91.9 kg (202 lb 11.2 oz)  SpO2 98%  BMI 29.08 kg/m2 Estimated body mass index is 29.08 kg/(m^2) as calculated from the following:    Height as of 2/7/17: 1.778 m (5' 10\").    Weight as of this encounter: 91.9 kg (202 lb 11.2 oz). Body surface area is 2.13 meters squared.  No Pain (0) Comment: Data Unavailable   No LMP for male patient.  Allergies reviewed: Yes  Medications reviewed: Yes    Medications: Medication refills not needed today.  Pharmacy name entered into EPIC:    [facility] - [place], [place] - [address] [place] [address] ('S) [facility] [place], [place] - [address] [facility] [phone]    Clinical concerns: None Dr. Raygoza was NOT notified.    8 minutes for nursing intake (face to face time)     Maru Reese RN              "

## 2017-05-16 NOTE — MR AVS SNAPSHOT
After Visit Summary   5/16/2017    Erasmo Pearce    MRN: [id number]           Patient Information     Date Of Birth          1950        Visit Information        Provider Department      5/16/2017 10:00 AM Rojas Raygoza MD [facility]        Today's Diagnoses     Multiple myeloma in relapse (H)    -  1          [facility] ([facility])  [address] [place] [id number]  Phone: [phone]  Clinic Hours:   Monday-Thursday: 7am to 7pm   Friday: 7am to 5:30pm   Weekends and holidays:    8am to noon (in general)  If your fever is 100.5  or greater,   call the clinic.  After hours call the   hospital at [phone] or   [phone]. Ask for the BMT   fellow on-call           Care Instructions    7/6 1030AM labs         Follow-ups after your visit        Follow-up notes from your care team     Return in about 8 weeks (around 7/11/2017).      Your next 10 appointments already scheduled     Jul 06, 2017 10:30 AM CDT   [facility] with  [facility]   [facility] ([facility])    [address] [place] [id number] [phone]            Jul 11, 2017 10:30 AM CDT   Return with Rojas Raygoza MD   [facility] ([facility])    [address] [place] [id number]   [phone]            Aug 08, 2017  9:30 AM CDT   (Arrive by 9:15 AM)   Implanted Defibulator with  Cv Device 1   [facility] ([facility])    [address] [place] [id number] [phone]            Aug 08, 2017 10:00 AM CDT   (Arrive by 9:45 AM)   RETURN ARRHYTHMIA with Chuy Jean-Baptiste MD   [facility] ([facility])    [address] [place] [id number] [phone]              Future tests that were  ordered for you today     Open Future Orders        Priority Expected Expires Ordered    CBC with platelets differential Routine 7/5/2017 8/16/2017 5/16/2017    Comprehensive metabolic panel Routine 7/5/2017 8/16/2017 5/16/2017    Crouch and lambda light chain Routine 7/5/2017 8/16/2017 5/16/2017    Magnesium Routine 7/5/2017 8/16/2017 5/16/2017    Protein electrophoresis Routine 7/5/2017 8/16/2017 5/16/2017    IgG Routine 7/5/2017 8/16/2017 5/16/2017    IgM Routine 7/5/2017 8/16/2017 5/16/2017    IgA Routine 7/5/2017 8/16/2017 5/16/2017    IgE Routine 7/5/2017 8/16/2017 5/16/2017    Lactate Dehydrogenase Routine 7/5/2017 8/16/2017 5/16/2017    Protein electrophoresis timed urine Routine 7/5/2017 8/16/2017 5/16/2017    Protein electrophoresis random urine Routine 7/5/2017 8/16/2017 5/16/2017    Protein immunofixation urine Routine 7/5/2017 8/16/2017 5/16/2017            Who to contact     If you have questions or need follow up information about today's clinic visit or your schedule please contact [facility] directly at [phone].  Normal or non-critical lab and imaging results will be communicated to you by Ledburyhart, letter or phone within 4 business days after the clinic has received the results. If you do not hear from us within 7 days, please contact the clinic through Ledburyhart or phone. If you have a critical or abnormal lab result, we will notify you by phone as soon as possible.  Submit refill requests through Neuravi or call your pharmacy and they will forward the refill request to us. Please allow 3 business days for your refill to be completed.          Additional Information About Your Visit        Neuravi Information     Neuravi gives you secure access to your electronic health record. If you see a primary care provider, you can also send messages to your care team and make appointments. If you have questions, please call your primary care clinic.  If you do not have a primary  care provider, please call [phone] and they will assist you.        Care EveryWhere ID     This is your Care EveryWhere ID. This could be used by other organizations to access your [facility] medical records  [id number]        Your Vitals Were     Pulse Temperature Respirations Pulse Oximetry BMI (Body Mass Index)       72 97.1  F (36.2  C) (Oral) 16 98% 29.08 kg/m2        Blood Pressure from Last 3 Encounters:   05/16/17 91/59   02/07/17 111/66   02/07/17 105/70    Weight from Last 3 Encounters:   05/16/17 91.9 kg (202 lb 11.2 oz)   02/07/17 94.3 kg (207 lb 14.3 oz)   02/07/17 89.9 kg (198 lb 1.6 oz)               Recent Review Flowsheet Data     BMT Recent Results Latest Ref Rng & Units 10/6/2015 2/2/2016 5/10/2016 7/6/2016 11/1/2016 1/31/2017 5/9/2017    WBC 4.0 - 11.0 10e9/L 8.0 9.0 9.1 7.5 8.0 7.2 5.2    Hemoglobin 13.3 - 17.7 g/dL 13.1(L) 13.1(L) 12.8(L) 12.3(L) 12.9(L) 12.8(L) 11.3(L)    Platelet Count 150 - 450 10e9/L 178 179 195 169 180 195 141(L)    Neutrophils (Absolute) 1.6 - 8.3 10e9/L 5.1 5.6 6.3 5.1 4.7 4.1 2.8    INR 0.86 - 1.14 - 1.71(H) - - 2.18(H) 2.15(H) -    Sodium 133 - 144 mmol/L 140 138 139 137 139 141 138    Potassium 3.4 - 5.3 mmol/L 4.0 4.3 4.8 4.2 4.8 4.4 4.0    Chloride 94 - 109 mmol/L 105 106 106 106 104 104 102    Glucose 70 - 99 mg/dL 101(H) 105(H) 97 107(H) 106(H) 104(H) 101(H)    Urea Nitrogen 7 - 30 mg/dL 24 29 30 18 23 28 17    Creatinine 0.66 - 1.25 mg/dL 1.17 1.12 1.22 1.10 1.15 1.24 0.94    Calcium (Total) 8.5 - 10.1 mg/dL 8.9 9.1 9.3 9.2 8.9 8.7 8.5    Protein (Total) 6.8 - 8.8 g/dL 7.3 7.5 7.3 7.3 8.1 7.2 6.6(L)    Albumin 3.4 - 5.0 g/dL 3.9 3.9 4.0 3.9 3.9 3.6 3.2(L)    Alkaline Phosphatase 40 - 150 U/L 80 77 76 78 88 82 83    AST 0 - 45 U/L 18 16 17 22 20 19 14    ALT 0 - 70 U/L 25 19 24 21 23 27 28    MCV 78 - 100 fl 105(H) 104(H) 105(H) 105(H) 103(H) 102(H) 104(H)               Primary Care Provider Office Phone # Fax #    Cong Harry [phone]        [facility] [address] [address]        Thank you!     Thank you for choosing [facility]  for your care. Our goal is always to provide you with excellent care. Hearing back from our patients is one way we can continue to improve our services. Please take a few minutes to complete the written survey that you may receive in the mail after your visit with us. Thank you!             Your Updated Medication List - Protect others around you: Learn how to safely use, store and throw away your medicines at www.disposemymeds.org.          This list is accurate as of: 5/16/17 11:02 AM.  Always use your most recent med list.                   Brand Name Dispense Instructions for use    albuterol 108 (90 BASE) MCG/ACT Inhaler    PROAIR HFA/PROVENTIL HFA/VENTOLIN HFA     Inhale 2 puffs into the lungs every 6 hours as needed       amoxicillin 500 MG tablet    AMOXIL    4 tablet    Take 4 tabs one hour before dental appointment       baclofen 10 MG tablet    LIORESAL     Take 10 mg by mouth 3 times daily as needed prn       CALCIUM 500 + D PO      Take 2 tablets by mouth daily.       dexamethasone 4 MG tablet    DECADRON    30 tablet    Take 10 tabs (40mg) Days 1, 8, and 15.       diclofenac 75 MG EC tablet    VOLTAREN     Take 75 mg by mouth 2 times daily as needed 1 tab prn       fentaNYL 25 mcg/hr 72 hr patch    DURAGESIC     Place 1 patch onto the skin every 72 hours       furosemide 20 MG tablet    LASIX    30 tablet    Take 40 mg by mouth daily       LENalidomide 10 MG Caps capsule CHEMO    REVLIMID    21 each    Take 10 mg daily days 1-21       lisinopril 5 MG tablet    PRINIVIL/ZESTRIL     Take 5 mg by mouth 2 times daily       magnesium oxide 400 MG tablet    MAG-OX    4 tablet    Take 1,200 mg by mouth 2 times daily       metoprolol 100 MG 24 hr tablet    TOPROL-XL    135 tablet    Take 1.5 tablets (150 mg) by mouth daily       nitroglycerin 0.4 MG sublingual tablet     NITROSTAT     Place 0.4 mg under the tongue every 5 minutes as needed Reported on 5/16/2017       omeprazole 20 MG CR capsule    priLOSEC    90 capsule    Take 20 mg by mouth daily       oxyCODONE 5 MG IR tablet    ROXICODONE     Take 1-2 tablets by mouth every 6 hours as needed. For pain.       simvastatin 40 MG tablet    ZOCOR    30 tablet    Take 40 mg by mouth At Bedtime       tiotropium 18 MCG capsule    SPIRIVA    30 capsule    Inhale 1 capsule (18 mcg) into the lungs daily       TRAZODONE HCL PO      Take 50 mg by mouth nightly as needed       warfarin 5 MG tablet    COUMADIN    30 tablet    Take 0.5 tablets (2.5 mg) by mouth See Admin Instructions Take as directed. Take 2.5 mg daily for 5 days/week, then 5 mg daily the other 2 days, pending INR levels.

## 2017-05-16 NOTE — Clinical Note
5/16/2017       RE: Erasmo Pearce  [address]  [address] [facility] [address]     Dear Colleague,    Thank you for referring your patient, Erasmo Pearce, to the [facility] MARROW TRANSPL[facility] at [facility]. Please see a copy of my visit note below.    No notes on file    Again, thank you for allowing me to participate in the care of your patient.      Sincerely,    Rojas Raygoza MD

## 2017-05-16 NOTE — PROGRESS NOTES
HISTORY OF PRESENT ILLNESS:  Jonh returns to the [facility] for evaluation of multiple myeloma, status post tandem autologous bone marrow transplant, a history of cardiomyopathy, and a history of atrial fibrillation.  He had a rise in his light chains.  We placed him on Revlimid 10 mg days 1 through 21 and dexamethasone 40 mg on days 1, 8 and 15 for a 28-day cycle.  Jonh has tolerated this well.  He has had some tingling and cramping, especially in the feet and legs.  He has also had some orthostatic changes.  He has completed 2 cycles but otherwise is feeling well.  He denies any chest pain.  He denies any significant arrhythmia.  He denies any nausea, vomiting or diarrhea.       PAST MEDICAL HISTORY:  See my note from 02/2017.        SOCIAL HISTORY:  See my note from 02/2017.       FAMILY HISTORY:  See my note from 02/2017.      REVIEW OF SYSTEMS:  A 12-point review of systems is done and is negative as in HPI.      PHYSICAL EXAMINATION:   GENERAL:  He is an alert gentleman in no acute distress.   VITAL SIGNS:  Stable. BP 91/59  Pulse 72  Temp 97.1  F (36.2  C) (Oral)  Resp 16  Wt 91.9 kg (202 lb 11.2 oz)  SpO2 98%  BMI 29.08 kg/m2    HEENT:  Normocephalic.  EOM okay, no scleral icterus.  Mouth without lesion.   RESPIRATORY:  Clear to percussion and auscultation.   CARDIAC:  Irregularly regular rhythm.  No murmur.   ABDOMEN:  Soft without hepatosplenomegaly.   EXTREMITIES:  Without edema.   NEUROLOGIC:  No resting tremor.  Finger-to-nose okay.  Strength good.    Results for DARRYL ZAPATA (MRN [id number]) as of 5/16/2017 14:49   Ref. Range 5/9/2017 11:30   Sodium Latest Ref Range: 133 - 144 mmol/L 138   Potassium Latest Ref Range: 3.4 - 5.3 mmol/L 4.0   Chloride Latest Ref Range: 94 - 109 mmol/L 102   Carbon Dioxide Latest Ref Range: 20 - 32 mmol/L 27   Urea Nitrogen Latest Ref Range: 7 - 30 mg/dL 17   Creatinine Latest Ref Range: 0.66 - 1.25 mg/dL 0.94   GFR Estimate Latest Ref  Range: >60 mL/min/1.7m2 81   GFR Estimate If Black Latest Ref Range: >60 mL/min/1.7m2 >90...   Calcium Latest Ref Range: 8.5 - 10.1 mg/dL 8.5   Anion Gap Latest Ref Range: 3 - 14 mmol/L 9   Albumin Latest Ref Range: 3.4 - 5.0 g/dL 3.2 (L)   Protein Total Latest Ref Range: 6.8 - 8.8 g/dL 6.6 (L)   Bilirubin Total Latest Ref Range: 0.2 - 1.3 mg/dL 1.0   Alkaline Phosphatase Latest Ref Range: 40 - 150 U/L 83   ALT Latest Ref Range: 0 - 70 U/L 28   AST Latest Ref Range: 0 - 45 U/L 14   IGE Latest Ref Range: 0 - 114 KIU/L 7   Glucose Latest Ref Range: 70 - 99 mg/dL 101 (H)   WBC Latest Ref Range: 4.0 - 11.0 10e9/L 5.2   Hemoglobin Latest Ref Range: 13.3 - 17.7 g/dL 11.3 (L)   Hematocrit Latest Ref Range: 40.0 - 53.0 % 34.5 (L)   Platelet Count Latest Ref Range: 150 - 450 10e9/L 141 (L)   RBC Count Latest Ref Range: 4.4 - 5.9 10e12/L 3.31 (L)   MCV Latest Ref Range: 78 - 100 fl 104 (H)   MCH Latest Ref Range: 26.5 - 33.0 pg 34.1 (H)   MCHC Latest Ref Range: 31.5 - 36.5 g/dL 32.8   RDW Latest Ref Range: 10.0 - 15.0 % 14.4   Diff Method Unknown Automated Method   % Neutrophils Latest Units: % 54.0   % Lymphocytes Latest Units: % 22.1   % Monocytes Latest Units: % 13.1   % Eosinophils Latest Units: % 9.4   % Basophils Latest Units: % 0.8   % Immature Granulocytes Latest Units: % 0.6   Nucleated RBCs Latest Ref Range: 0 /100 0   Absolute Neutrophil Latest Ref Range: 1.6 - 8.3 10e9/L 2.8   Absolute Lymphocytes Latest Ref Range: 0.8 - 5.3 10e9/L 1.2   Absolute Monocytes Latest Ref Range: 0.0 - 1.3 10e9/L 0.7   Absolute Eosinophils Latest Ref Range: 0.0 - 0.7 10e9/L 0.5   Absolute Basophils Latest Ref Range: 0.0 - 0.2 10e9/L 0.0   Abs Immature Granulocytes Latest Ref Range: 0 - 0.4 10e9/L 0.0   Absolute Nucleated RBC Unknown 0.0   Albumin Fraction Latest Ref Range: 3.7 - 5.1 g/dL 3.4 (L)   Alpha 1 Fraction Latest Ref Range: 0.2 - 0.4 g/dL 0.4   Alpha 2 Fraction Latest Ref Range: 0.5 - 0.9 g/dL 0.9   Beta Fraction Latest Ref  Range: 0.6 - 1.0 g/dL 0.7   ELP Interpretation: Unknown Hypoalbuminemia. ...   Gamma Fraction Latest Ref Range: 0.7 - 1.6 g/dL 0.6 (L)   IGA Latest Ref Range: 70 - 380 mg/dL 237   IGG Latest Ref Range: 695 - 1620 mg/dL 547 (L)   IGM Latest Ref Range: 60 - 265 mg/dL 53 (L)   [place] Free Lt Chain Latest Ref Range: 0.33 - 1.94 mg/dL 1.47   Kappa Lambda Ratio Latest Ref Range: 0.26 - 1.65  0.06 (L)   Lambda Free Lt Chain Latest Ref Range: 0.57 - 2.63 mg/dL 26.00 (H)   Monoclonal Peak Latest Ref Range: 0.0 g/dL 0.1 (H)     ASSESSMENT:   1.  Multiple myeloma.   2.  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   3.  Cardiomyopathy.   4.  Atrial fibrillation.   5.  History of deep vein thrombosis, on anticoagulation.   6.  Chronic back pain.   7.  Abdominal aortic aneurysm.   8.  History of plasmacytoma of the clivus, status post radiation.   9.  History of ventricular tachycardia with implantable defibrillator in place.     10. Hand cramps     Jonh has done remarkably well.  His lambda light chains have fallen very nicely from 84 to 26.  His    M-spike has decreased from 0.2 to 0.1.  His IgG has decreased to 547.  He seems to be tolerating this well except for muscle cramping.  I suggested that he continue the magnesium and potassium as needed.  I will see him again in 2 months' time.  Possibly we will just go with a total of 4 cycles and then stop and see how he does and then maybe consider low-dose Revlimid, maybe at 5 mg a day.  Jonh is feeling otherwise well.  His hemoglobin is stable.  [facility] and Dr. Fuentes.

## 2017-05-16 NOTE — LETTER
5/16/2017      RE: Erasmo Zapata  [address]  [address]   [place] [address]       HISTORY OF PRESENT ILLNESS:  Jonh returns to the [facility] for evaluation of multiple myeloma, status post tandem autologous bone marrow transplant, a history of cardiomyopathy, and a history of atrial fibrillation.  He had a rise in his light chains.  We placed him on Revlimid 10 mg days 1 through 21 and dexamethasone 40 mg on days 1, 8 and 15 for a 28-day cycle.  Jonh has tolerated this well.  He has had some tingling and cramping, especially in the feet and legs.  He has also had some orthostatic changes.  He has completed 2 cycles but otherwise is feeling well.  He denies any chest pain.  He denies any significant arrhythmia.  He denies any nausea, vomiting or diarrhea.       PAST MEDICAL HISTORY:  See my note from 02/2017.        SOCIAL HISTORY:  See my note from 02/2017.       FAMILY HISTORY:  See my note from 02/2017.      REVIEW OF SYSTEMS:  A 12-point review of systems is done and is negative as in HPI.      PHYSICAL EXAMINATION:   GENERAL:  He is an alert gentleman in no acute distress.   VITAL SIGNS:  Stable. BP 91/59  Pulse 72  Temp 97.1  F (36.2  C) (Oral)  Resp 16  Wt 91.9 kg (202 lb 11.2 oz)  SpO2 98%  BMI 29.08 kg/m2    HEENT:  Normocephalic.  EOM okay, no scleral icterus.  Mouth without lesion.   RESPIRATORY:  Clear to percussion and auscultation.   CARDIAC:  Irregularly regular rhythm.  No murmur.   ABDOMEN:  Soft without hepatosplenomegaly.   EXTREMITIES:  Without edema.   NEUROLOGIC:  No resting tremor.  Finger-to-nose okay.  Strength good.    Results for ERASMO ZAPATA (MRN [id number]) as of 5/16/2017 14:49   Ref. Range 5/9/2017 11:30   Sodium Latest Ref Range: 133 - 144 mmol/L 138   Potassium Latest Ref Range: 3.4 - 5.3 mmol/L 4.0   Chloride Latest Ref Range: 94 - 109 mmol/L 102   Carbon Dioxide Latest Ref Range: 20 - 32 mmol/L 27   Urea Nitrogen Latest Ref  Range: 7 - 30 mg/dL 17   Creatinine Latest Ref Range: 0.66 - 1.25 mg/dL 0.94   GFR Estimate Latest Ref Range: >60 mL/min/1.7m2 81   GFR Estimate If Black Latest Ref Range: >60 mL/min/1.7m2 >90...   Calcium Latest Ref Range: 8.5 - 10.1 mg/dL 8.5   Anion Gap Latest Ref Range: 3 - 14 mmol/L 9   Albumin Latest Ref Range: 3.4 - 5.0 g/dL 3.2 (L)   Protein Total Latest Ref Range: 6.8 - 8.8 g/dL 6.6 (L)   Bilirubin Total Latest Ref Range: 0.2 - 1.3 mg/dL 1.0   Alkaline Phosphatase Latest Ref Range: 40 - 150 U/L 83   ALT Latest Ref Range: 0 - 70 U/L 28   AST Latest Ref Range: 0 - 45 U/L 14   IGE Latest Ref Range: 0 - 114 KIU/L 7   Glucose Latest Ref Range: 70 - 99 mg/dL 101 (H)   WBC Latest Ref Range: 4.0 - 11.0 10e9/L 5.2   Hemoglobin Latest Ref Range: 13.3 - 17.7 g/dL 11.3 (L)   Hematocrit Latest Ref Range: 40.0 - 53.0 % 34.5 (L)   Platelet Count Latest Ref Range: 150 - 450 10e9/L 141 (L)   RBC Count Latest Ref Range: 4.4 - 5.9 10e12/L 3.31 (L)   MCV Latest Ref Range: 78 - 100 fl 104 (H)   MCH Latest Ref Range: 26.5 - 33.0 pg 34.1 (H)   MCHC Latest Ref Range: 31.5 - 36.5 g/dL 32.8   RDW Latest Ref Range: 10.0 - 15.0 % 14.4   Diff Method Unknown Automated Method   % Neutrophils Latest Units: % 54.0   % Lymphocytes Latest Units: % 22.1   % Monocytes Latest Units: % 13.1   % Eosinophils Latest Units: % 9.4   % Basophils Latest Units: % 0.8   % Immature Granulocytes Latest Units: % 0.6   Nucleated RBCs Latest Ref Range: 0 /100 0   Absolute Neutrophil Latest Ref Range: 1.6 - 8.3 10e9/L 2.8   Absolute Lymphocytes Latest Ref Range: 0.8 - 5.3 10e9/L 1.2   Absolute Monocytes Latest Ref Range: 0.0 - 1.3 10e9/L 0.7   Absolute Eosinophils Latest Ref Range: 0.0 - 0.7 10e9/L 0.5   Absolute Basophils Latest Ref Range: 0.0 - 0.2 10e9/L 0.0   Abs Immature Granulocytes Latest Ref Range: 0 - 0.4 10e9/L 0.0   Absolute Nucleated RBC Unknown 0.0   Albumin Fraction Latest Ref Range: 3.7 - 5.1 g/dL 3.4 (L)   Alpha 1 Fraction Latest Ref Range: 0.2  - 0.4 g/dL 0.4   Alpha 2 Fraction Latest Ref Range: 0.5 - 0.9 g/dL 0.9   Beta Fraction Latest Ref Range: 0.6 - 1.0 g/dL 0.7   ELP Interpretation: Unknown Hypoalbuminemia. ...   Gamma Fraction Latest Ref Range: 0.7 - 1.6 g/dL 0.6 (L)   IGA Latest Ref Range: 70 - 380 mg/dL 237   IGG Latest Ref Range: 695 - 1620 mg/dL 547 (L)   IGM Latest Ref Range: 60 - 265 mg/dL 53 (L)   [place] Free Lt Chain Latest Ref Range: 0.33 - 1.94 mg/dL 1.47   Kappa Lambda Ratio Latest Ref Range: 0.26 - 1.65  0.06 (L)   Lambda Free Lt Chain Latest Ref Range: 0.57 - 2.63 mg/dL 26.00 (H)   Monoclonal Peak Latest Ref Range: 0.0 g/dL 0.1 (H)     ASSESSMENT:   1.  Multiple myeloma.   2.  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   3.  Cardiomyopathy.   4.  Atrial fibrillation.   5.  History of deep vein thrombosis, on anticoagulation.   6.  Chronic back pain.   7.  Abdominal aortic aneurysm.   8.  History of plasmacytoma of the clivus, status post radiation.   9.  History of ventricular tachycardia with implantable defibrillator in place.     10. Hand cramps     Jonh has done remarkably well.  His lambda light chains have fallen very nicely from 84 to 26.  His    M-spike has decreased from 0.2 to 0.1.  His IgG has decreased to 547.  He seems to be tolerating this well except for muscle cramping.  I suggested that he continue the magnesium and potassium as needed.  I will see him again in 2 months' time.  Possibly we will just go with a total of 4 cycles and then stop and see how he does and then maybe consider low-dose Revlimid, maybe at 5 mg a day.  Jonh is feeling otherwise well.  His hemoglobin is stable.  [facility] Dr. Fuentes.         Rojas Raygoza MD

## 2017-05-19 ENCOUNTER — ALLIED HEALTH/NURSE VISIT (OUTPATIENT)
Dept: CARDIOLOGY | Facility: CLINIC | Age: 67
End: 2017-05-19
Attending: INTERNAL MEDICINE
Payer: MEDICARE

## 2017-05-19 DIAGNOSIS — I42.0 DILATED CARDIOMYOPATHY (H): Primary | ICD-10-CM

## 2017-05-19 PROCEDURE — 93296 REM INTERROG EVL PM/IDS: CPT | Mod: ZF

## 2017-05-19 PROCEDURE — 93295 DEV INTERROG REMOTE 1/2/MLT: CPT | Performed by: INTERNAL MEDICINE

## 2017-05-19 NOTE — PROGRESS NOTES
VTEX remote ICD transmission received and reviewed. Device transmission sent per MD orders. Transmission received for ICD shock delivered. His presenting rhythm is an irregular ventricular rhythm ~70 bpm. PMVT episode recorded 5/19/17 @ 0131. 1 41j shock converted the arrhythmia. Over the last 5 days 11 NSVT episodes have been recorded. Pt states the shock woke him up last night, but he is feeling fine now. He re-started chemo again. He is on chemo for 28 days and off for 7 days. He states he has been tired, but doesn't think that is unusual. Pt was instructed to seek medical attention should he receive another shock or have symptoms. He verbalized understanding. Normal device function. = 2%.  Battery estimates 5.5 years to ROSEMARIE.

## 2017-05-19 NOTE — MR AVS SNAPSHOT
After Visit Summary   5/19/2017    Erasmo Pearce    MRN: [id number]           Patient Information     Date Of Birth          1950        Visit Information        Provider Department      5/19/2017 6:00 AM UC ICD REMOTE [facility]        Today's Diagnoses     Dilated cardiomyopathy (H)    -  1       Follow-ups after your visit        Your next 10 appointments already scheduled     Jul 06, 2017 10:30 AM CDT   Masonic Lab Draw with  MASONIC LAB DRAW   [facility] Masonic Lab Draw ([facility])    [address] [place] [id number] [phone]            Jul 11, 2017 10:30 AM CDT   Return with Rojas Raygoza MD   [facility] Blood and Marrow Transplant ([facility])    [address] [place] [id number] [phone]            Aug 08, 2017  9:30 AM CDT   (Arrive by 9:15 AM)   Implanted Defibulator with Uc Cv Device 1   [facility] ([facility])    [address] [place] [id number] [phone]            Aug 08, 2017 10:00 AM CDT   (Arrive by 9:45 AM)   RETURN ARRHYTHMIA with Chuy Jean-Baptiste MD   [facility] ([facility])    [address] [place] [id number] [phone]              Who to contact     If you have questions or need follow up information about today's clinic visit or your schedule please contact [facility] directly at [phone].  Normal or non-critical lab and imaging results will be communicated to you by MyChart, letter or phone within 4 business days after the clinic has received the results. If you do not hear from us within 7 days, please contact the clinic through MyChart or phone. If you have a critical or abnormal lab result, we will notify you by phone as soon as possible.  Submit refill requests through [employer] or call your  pharmacy and they will forward the refill request to us. Please allow 3 business days for your refill to be completed.          Additional Information About Your Visit        [employer] Information     [employer] gives you secure access to your electronic health record. If you see a primary care provider, you can also send messages to your care team and make appointments. If you have questions, please call your primary care clinic.  If you do not have a primary care provider, please call [phone] and they will assist you.        Care EveryWhere ID     This is your Care EveryWhere ID. This could be used by other organizations to access your [place] medical records  [id number]         Blood Pressure from Last 3 Encounters:   05/16/17 91/59   02/07/17 111/66   02/07/17 105/70    Weight from Last 3 Encounters:   05/16/17 91.9 kg (202 lb 11.2 oz)   02/07/17 94.3 kg (207 lb 14.3 oz)   02/07/17 89.9 kg (198 lb 1.6 oz)              We Performed the Following     INTERROGATION DEVICE EVAL REMOTE, PACER/ICD        Primary Care Provider Office Phone # Fax #    Cong Mcdonald [phone]       [place] [address]        Thank you!     Thank you for choosing [facility]  for your care. Our goal is always to provide you with excellent care. Hearing back from our patients is one way we can continue to improve our services. Please take a few minutes to complete the written survey that you may receive in the mail after your visit with us. Thank you!             Your Updated Medication List - Protect others around you: Learn how to safely use, store and throw away your medicines at www.disposemymeds.org.          This list is accurate as of: 5/19/17  3:40 PM.  Always use your most recent med list.                   Brand Name Dispense Instructions for use    albuterol 108 (90 BASE) MCG/ACT Inhaler    PROAIR HFA/PROVENTIL HFA/VENTOLIN HFA     Inhale 2 puffs into the lungs every 6  hours as needed       amoxicillin 500 MG tablet    AMOXIL    4 tablet    Take 4 tabs one hour before dental appointment       baclofen 10 MG tablet    LIORESAL     Take 10 mg by mouth 3 times daily as needed prn       CALCIUM 500 + D PO      Take 2 tablets by mouth daily.       dexamethasone 4 MG tablet    DECADRON    30 tablet    Take 10 tabs (40mg) Days 1, 8, and 15.       diclofenac 75 MG EC tablet    VOLTAREN     Take 75 mg by mouth 2 times daily as needed 1 tab prn       fentaNYL 25 mcg/hr 72 hr patch    DURAGESIC     Place 1 patch onto the skin every 72 hours       furosemide 20 MG tablet    LASIX    30 tablet    Take 40 mg by mouth daily       LENalidomide 10 MG Caps capsule CHEMO    REVLIMID    21 each    Take 10 mg daily days 1-21       lisinopril 5 MG tablet    PRINIVIL/ZESTRIL     Take 5 mg by mouth 2 times daily       magnesium oxide 400 MG tablet    MAG-OX    4 tablet    Take 1,200 mg by mouth 2 times daily       metoprolol 100 MG 24 hr tablet    TOPROL-XL    135 tablet    Take 1.5 tablets (150 mg) by mouth daily       nitroglycerin 0.4 MG sublingual tablet    NITROSTAT     Place 0.4 mg under the tongue every 5 minutes as needed Reported on 5/16/2017       omeprazole 20 MG CR capsule    priLOSEC    90 capsule    Take 20 mg by mouth daily       oxyCODONE 5 MG IR tablet    ROXICODONE     Take 1-2 tablets by mouth every 6 hours as needed. For pain.       simvastatin 40 MG tablet    ZOCOR    30 tablet    Take 40 mg by mouth At Bedtime       tiotropium 18 MCG capsule    SPIRIVA    30 capsule    Inhale 1 capsule (18 mcg) into the lungs daily       TRAZODONE HCL PO      Take 50 mg by mouth nightly as needed       warfarin 5 MG tablet    COUMADIN    30 tablet    Take 0.5 tablets (2.5 mg) by mouth See Admin Instructions Take as directed. Take 2.5 mg daily for 5 days/week, then 5 mg daily the other 2 days, pending INR levels.

## 2017-05-31 ENCOUNTER — TRANSFERRED RECORDS (OUTPATIENT)
Dept: HEALTH INFORMATION MANAGEMENT | Facility: CLINIC | Age: 67
End: 2017-05-31

## 2017-06-01 ENCOUNTER — TELEPHONE (OUTPATIENT)
Dept: ONCOLOGY | Facility: CLINIC | Age: 67
End: 2017-06-01

## 2017-06-01 ENCOUNTER — CARE COORDINATION (OUTPATIENT)
Dept: TRANSPLANT | Facility: CLINIC | Age: 67
End: 2017-06-01

## 2017-06-01 DIAGNOSIS — Z92.21 STATUS POST CHEMOTHERAPY: ICD-10-CM

## 2017-06-01 DIAGNOSIS — R06.02 SOB (SHORTNESS OF BREATH): ICD-10-CM

## 2017-06-01 DIAGNOSIS — Z95.810 AUTOMATIC IMPLANTABLE CARDIOVERTER-DEFIBRILLATOR IN SITU: ICD-10-CM

## 2017-06-01 DIAGNOSIS — E83.42 HYPOMAGNESEMIA: ICD-10-CM

## 2017-06-01 DIAGNOSIS — T45.1X5A ANEMIA DUE TO ANTINEOPLASTIC CHEMOTHERAPY: ICD-10-CM

## 2017-06-01 DIAGNOSIS — I48.91 ATRIAL FIBRILLATION (H): ICD-10-CM

## 2017-06-01 DIAGNOSIS — I49.01 VF (VENTRICULAR FIBRILLATION) (H): ICD-10-CM

## 2017-06-01 DIAGNOSIS — I42.0 DILATED CARDIOMYOPATHY (H): ICD-10-CM

## 2017-06-01 DIAGNOSIS — C90.00 MULTIPLE MYELOMA (H): Primary | ICD-10-CM

## 2017-06-01 DIAGNOSIS — N28.9 RENAL INSUFFICIENCY: ICD-10-CM

## 2017-06-01 DIAGNOSIS — D64.81 ANEMIA DUE TO ANTINEOPLASTIC CHEMOTHERAPY: ICD-10-CM

## 2017-06-01 NOTE — PROGRESS NOTES
Outside labs were reviewed by Dr. Raygoza and found to have mg++ of 1.1, creat 1.59. Patient was instructed by Dr. HILL to hold his Lasix and Revlamid (day 15 of cycle) as of today until assessed tomorrow. Patient was provided with [place] [facility] [place] and to see a provider with possible infusion of fluids. Dr. HILL will look on the patient while he is in clinic. This information was conveyed to patient's wife by phone who agreed to the plan and was able to accurately verbalize it back.

## 2017-06-01 NOTE — TELEPHONE ENCOUNTER
Jonh had labs done yesterday on May 31.Cr up to1.59 and Mg 1.1. Not having diarrhea.Advise to increase mag 400 mg to 3x per day. Hold revlimid ,hold lasix, take gatorade and come to clinic tomorrow to see midlevel and labs  gr

## 2017-06-02 ENCOUNTER — INFUSION THERAPY VISIT (OUTPATIENT)
Dept: TRANSPLANT | Facility: CLINIC | Age: 67
End: 2017-06-02
Attending: PHYSICIAN ASSISTANT
Payer: MEDICARE

## 2017-06-02 ENCOUNTER — APPOINTMENT (OUTPATIENT)
Dept: LAB | Facility: CLINIC | Age: 67
End: 2017-06-02
Attending: INTERNAL MEDICINE
Payer: MEDICARE

## 2017-06-02 VITALS
TEMPERATURE: 97.7 F | SYSTOLIC BLOOD PRESSURE: 118 MMHG | DIASTOLIC BLOOD PRESSURE: 59 MMHG | WEIGHT: 196.65 LBS | OXYGEN SATURATION: 96 % | HEART RATE: 90 BPM | RESPIRATION RATE: 16 BRPM | BODY MASS INDEX: 28.22 KG/M2

## 2017-06-02 DIAGNOSIS — I42.0 DILATED CARDIOMYOPATHY (H): Primary | ICD-10-CM

## 2017-06-02 DIAGNOSIS — E83.42 HYPOMAGNESEMIA: ICD-10-CM

## 2017-06-02 DIAGNOSIS — N28.9 RENAL INSUFFICIENCY: ICD-10-CM

## 2017-06-02 DIAGNOSIS — Z95.810 AUTOMATIC IMPLANTABLE CARDIOVERTER-DEFIBRILLATOR IN SITU: ICD-10-CM

## 2017-06-02 DIAGNOSIS — C90.00 MULTIPLE MYELOMA NOT HAVING ACHIEVED REMISSION (H): ICD-10-CM

## 2017-06-02 DIAGNOSIS — D64.81 ANEMIA DUE TO ANTINEOPLASTIC CHEMOTHERAPY: ICD-10-CM

## 2017-06-02 DIAGNOSIS — N28.9 RENAL INSUFFICIENCY: Primary | ICD-10-CM

## 2017-06-02 DIAGNOSIS — Z92.21 STATUS POST CHEMOTHERAPY: ICD-10-CM

## 2017-06-02 DIAGNOSIS — R06.02 SOB (SHORTNESS OF BREATH): ICD-10-CM

## 2017-06-02 DIAGNOSIS — I49.01 VF (VENTRICULAR FIBRILLATION) (H): ICD-10-CM

## 2017-06-02 DIAGNOSIS — T45.1X5A ANEMIA DUE TO ANTINEOPLASTIC CHEMOTHERAPY: ICD-10-CM

## 2017-06-02 DIAGNOSIS — I42.0 DILATED CARDIOMYOPATHY (H): ICD-10-CM

## 2017-06-02 LAB
ALBUMIN SERPL-MCNC: 2.9 G/DL (ref 3.4–5)
ALP SERPL-CCNC: 71 U/L (ref 40–150)
ALT SERPL W P-5'-P-CCNC: 25 U/L (ref 0–70)
ANION GAP SERPL CALCULATED.3IONS-SCNC: 11 MMOL/L (ref 3–14)
AST SERPL W P-5'-P-CCNC: 16 U/L (ref 0–45)
BASOPHILS # BLD AUTO: 0 10E9/L (ref 0–0.2)
BASOPHILS NFR BLD AUTO: 0.1 %
BILIRUB SERPL-MCNC: 0.5 MG/DL (ref 0.2–1.3)
BUN SERPL-MCNC: 28 MG/DL (ref 7–30)
CALCIUM SERPL-MCNC: 8.2 MG/DL (ref 8.5–10.1)
CHLORIDE SERPL-SCNC: 106 MMOL/L (ref 94–109)
CO2 SERPL-SCNC: 23 MMOL/L (ref 20–32)
CREAT SERPL-MCNC: 1.23 MG/DL (ref 0.66–1.25)
DIFFERENTIAL METHOD BLD: ABNORMAL
EOSINOPHIL # BLD AUTO: 0 10E9/L (ref 0–0.7)
EOSINOPHIL NFR BLD AUTO: 0 %
ERYTHROCYTE [DISTWIDTH] IN BLOOD BY AUTOMATED COUNT: 15.1 % (ref 10–15)
GFR SERPL CREATININE-BSD FRML MDRD: 59 ML/MIN/1.7M2
GLUCOSE SERPL-MCNC: 130 MG/DL (ref 70–99)
HCT VFR BLD AUTO: 32.4 % (ref 40–53)
HGB BLD-MCNC: 10.4 G/DL (ref 13.3–17.7)
IMM GRANULOCYTES # BLD: 0.1 10E9/L (ref 0–0.4)
IMM GRANULOCYTES NFR BLD: 0.9 %
LYMPHOCYTES # BLD AUTO: 0.5 10E9/L (ref 0.8–5.3)
LYMPHOCYTES NFR BLD AUTO: 8 %
MAGNESIUM SERPL-MCNC: 1.4 MG/DL (ref 1.6–2.3)
MCH RBC QN AUTO: 33.8 PG (ref 26.5–33)
MCHC RBC AUTO-ENTMCNC: 32.1 G/DL (ref 31.5–36.5)
MCV RBC AUTO: 105 FL (ref 78–100)
MONOCYTES # BLD AUTO: 0.8 10E9/L (ref 0–1.3)
MONOCYTES NFR BLD AUTO: 12.2 %
NEUTROPHILS # BLD AUTO: 5.3 10E9/L (ref 1.6–8.3)
NEUTROPHILS NFR BLD AUTO: 78.8 %
NRBC # BLD AUTO: 0 10*3/UL
NRBC BLD AUTO-RTO: 0 /100
NT-PROBNP SERPL-MCNC: 9755 PG/ML (ref 0–125)
PLATELET # BLD AUTO: 143 10E9/L (ref 150–450)
POTASSIUM SERPL-SCNC: 3.8 MMOL/L (ref 3.4–5.3)
PROT SERPL-MCNC: 7.3 G/DL (ref 6.8–8.8)
RBC # BLD AUTO: 3.08 10E12/L (ref 4.4–5.9)
SODIUM SERPL-SCNC: 140 MMOL/L (ref 133–144)
WBC # BLD AUTO: 6.8 10E9/L (ref 4–11)

## 2017-06-02 PROCEDURE — 83735 ASSAY OF MAGNESIUM: CPT | Performed by: INTERNAL MEDICINE

## 2017-06-02 PROCEDURE — 85025 COMPLETE CBC W/AUTO DIFF WBC: CPT | Performed by: INTERNAL MEDICINE

## 2017-06-02 PROCEDURE — 83880 ASSAY OF NATRIURETIC PEPTIDE: CPT | Performed by: INTERNAL MEDICINE

## 2017-06-02 PROCEDURE — 40000268 ZZH STATISTIC NO CHARGES: Mod: ZF

## 2017-06-02 PROCEDURE — 36415 COLL VENOUS BLD VENIPUNCTURE: CPT | Performed by: INTERNAL MEDICINE

## 2017-06-02 PROCEDURE — 80053 COMPREHEN METABOLIC PANEL: CPT | Performed by: INTERNAL MEDICINE

## 2017-06-02 RX ORDER — MAGNESIUM SULFATE HEPTAHYDRATE 40 MG/ML
2 INJECTION, SOLUTION INTRAVENOUS ONCE
Status: CANCELLED
Start: 2017-06-02 | End: 2017-06-02

## 2017-06-02 RX ORDER — MAGNESIUM OXIDE 400 MG/1
800 TABLET ORAL 2 TIMES DAILY
Qty: 4 TABLET | Refills: 0 | COMMUNITY
Start: 2017-06-02 | End: 2017-06-02

## 2017-06-02 RX ORDER — MAGNESIUM OXIDE 400 MG/1
1200 TABLET ORAL 3 TIMES DAILY
Qty: 4 TABLET | Refills: 0 | COMMUNITY
Start: 2017-06-02 | End: 2018-01-23

## 2017-06-02 RX ORDER — LENALIDOMIDE 10 MG/1
CAPSULE ORAL
Qty: 21 EACH | Refills: 3 | COMMUNITY
Start: 2017-06-02 | End: 2017-11-21

## 2017-06-02 RX ORDER — MAGNESIUM SULFATE HEPTAHYDRATE 40 MG/ML
2 INJECTION, SOLUTION INTRAVENOUS ONCE
Status: DISCONTINUED | OUTPATIENT
Start: 2017-06-02 | End: 2017-06-02 | Stop reason: HOSPADM

## 2017-06-02 ASSESSMENT — PAIN SCALES - GENERAL: PAINLEVEL: NO PAIN (0)

## 2017-06-02 NOTE — MR AVS SNAPSHOT
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  After hours call the   hospital at [phone] or   [phone]. Ask for the BMT   fellow on-call           Care Instructions    F/U with Dr. Raygoza on 6/13 or 6/20 labs, visit      6/13 11am labs and           Follow-ups after your visit        Your next 10 appointments already scheduled     Jun 13, 2017 11:00 AM CDT   Masonic Lab Draw with  Ezoic LAB DRAW   [facility] Masonic Lab Draw ([facility])    [address] [place] [id number] [phone]            Jun 13, 2017 11:30 AM CDT   Return with Rojas Raygoza MD   [facility] Blood and Marrow Transplant ([facility])    [address] [place] [id number] [phone]            Jul 06, 2017 10:30 AM CDT   Masonic Lab Draw with  Ezoic LAB DRAW   [facility] Masonic Lab Draw ([facility])    [address] [place] [id number]    [phone]            Jul 11, 2017 10:30 AM CDT   Return with Rojas Raygoza MD   [facility] Blood and Marrow Transplant ([facility])    [address]  [address] [facility] [id number] [phone]            Aug 08, 2017  9:30 AM CDT   (Arrive by 9:15 AM)   Implanted Defibulator with Uc Cv Device 1   [facility] ([facility])    [address]  [address] [facility] [id number] [phone]            Aug 08, 2017 10:00 AM CDT   (Arrive by 9:45 AM)   RETURN ARRHYTHMIA with Chuy Jean-Baptiste MD   [facility] ([facility])    [address] [place] [address]  [address] [facility] [address] [phone]              Who to contact     If you have questions or need follow up information about today's clinic visit or your schedule please contact [facility] B[facility]OD [facility] MARROW [facility]ANSPLANT directly at [phone].  Normal or non-critical lab and imaging results will be communicated to you by [employer], letter or phone within 4 business days after the clinic has received the results. If you do not hear from us within 7 days, please contact the clinic through [employer] or phone. If you have a critical or abnormal lab result, we will notify you by phone as soon as possible.  Submit refill requests through [employer] or call your pharmacy and they will forward the refill request to us. Please allow 3 business days for your refill to be completed.          Additional Information About Your Visit        [employer] Information     [employer] gives you secure access to your electronic health record. If you see a primary care provider, you can also send messages to your care team and make appointments. If you have questions, please call your primary care clinic.  If you do not have a primary care provider, please call [phone] and they will assist you.        Care EveryWhere ID     This is your Care EveryWhere ID. This could be  used by other organizations to access your [place] medical records  [id number]        Your Vitals Were     Pulse Temperature Respirations Pulse Oximetry BMI (Body Mass Index)       90 97.7  F (36.5  C) 16 96% 28.22 kg/m2        Blood Pressure from Last 3 Encounters:   06/02/17 118/59   05/16/17 91/59   02/07/17 111/66    Weight from Last 3 Encounters:   06/02/17 89.2 kg (196 lb 10.4 oz)   05/16/17 91.9 kg (202 lb 11.2 oz)   02/07/17 94.3 kg (207 lb 14.3 oz)              We Performed the Following     CBC with platelets differential     Comprehensive metabolic panel     Magnesium     N terminal pro BNP outpatient        Recent Review Flowsheet Data     BMT Recent Results Latest Ref Rng & Units 2/2/2016 5/10/2016 7/6/2016 11/1/2016 1/31/2017 5/9/2017 6/2/2017    WBC 4.0 - 11.0 10e9/L 9.0 9.1 7.5 8.0 7.2 5.2 6.8    Hemoglobin 13.3 - 17.7 g/dL 13.1(L) 12.8(L) 12.3(L) 12.9(L) 12.8(L) 11.3(L) 10.4(L)    Platelet Count 150 - 450 10e9/L 179 195 169 180 195 141(L) 143(L)    Neutrophils (Absolute) 1.6 - 8.3 10e9/L 5.6 6.3 5.1 4.7 4.1 2.8 5.3    INR 0.86 - 1.14 1.71(H) - - 2.18(H) 2.15(H) - -    Sodium 133 - 144 mmol/L 138 139 137 139 141 138 140    Potassium 3.4 - 5.3 mmol/L 4.3 4.8 4.2 4.8 4.4 4.0 3.8    Chloride 94 - 109 mmol/L 106 106 106 104 104 102 106    Glucose 70 - 99 mg/dL 105(H) 97 107(H) 106(H) 104(H) 101(H) 130(H)    Urea Nitrogen 7 - 30 mg/dL 29 30 18 23 28 17 28    Creatinine 0.66 - 1.25 mg/dL 1.12 1.22 1.10 1.15 1.24 0.94 1.23    Calcium (Total) 8.5 - 10.1 mg/dL 9.1 9.3 9.2 8.9 8.7 8.5 8.2(L)    Protein (Total) 6.8 - 8.8 g/dL 7.5 7.3 7.3 8.1 7.2 6.6(L) 7.3    Albumin 3.4 - 5.0 g/dL 3.9 4.0 3.9 3.9 3.6 3.2(L) 2.9(L)    Alkaline Phosphatase 40 - 150 U/L 77 76 78 88 82 83 71    AST 0 - 45 U/L 16 17 22 20 19 14 16    ALT 0 - 70 U/L 19 24 21 23 27 28 25    MCV 78 - 100 fl 104(H) 105(H) 105(H) 103(H) 102(H) 104(H) 105(H)               Primary Care Provider Office Phone # Fax #    Cong Harry [phone]  [phone]       [address] [address]        Thank you!     Thank you for choosing [facility] BLOOD AND MARROW TRANSPLANT  for your care. Our goal is always to provide you with excellent care. Hearing back from our patients is one way we can continue to improve our services. Please take a few minutes to complete the written survey that you may receive in the mail after your visit with us. Thank you!             Your Updated Medication List - Protect others around you: Learn how to safely use, store and throw away your medicines at www.disposemymeds.org.          This list is accurate as of: 6/2/17 12:39 PM.  Always use your most recent med list.                   Brand Name Dispense Instructions for use    albuterol 108 (90 BASE) MCG/ACT Inhaler    PROAIR HFA/PROVENTIL HFA/VENTOLIN HFA     Inhale 2 puffs into the lungs every 6 hours as needed       amoxicillin 500 MG tablet    AMOXIL    4 tablet    Take 4 tabs one hour before dental appointment       baclofen 10 MG tablet    LIORESAL     Take 10 mg by mouth 3 times daily as needed prn       CALCIUM 500 + D PO      Take 2 tablets by mouth daily.       dexamethasone 4 MG tablet    DECADRON    30 tablet    Take 10 tabs (40mg) Days 1, 8, and 15.       diclofenac 75 MG EC tablet    VOLTAREN     Take 75 mg by mouth 2 times daily as needed 1 tab prn       fentaNYL 25 mcg/hr 72 hr patch    DURAGESIC     Place 1 patch onto the skin every 72 hours       furosemide 20 MG tablet    LASIX    30 tablet    Take 40 mg by mouth daily       LENalidomide 10 MG Caps capsule CHEMO    REVLIMID    21 each    Take 10 mg daily days 1-21       lisinopril 5 MG tablet    PRINIVIL/ZESTRIL     Take 5 mg by mouth 2 times daily       magnesium oxide 400 MG tablet    MAG-OX    4 tablet    Take 1,200 mg by mouth 2 times daily       metoprolol 100 MG 24 hr tablet    TOPROL-XL    135 tablet    Take 1.5 tablets (150 mg) by mouth daily       nitroglycerin 0.4 MG sublingual  tablet    NITROSTAT     Place 0.4 mg under the tongue every 5 minutes as needed Reported on 5/16/2017       omeprazole 20 MG CR capsule    priLOSEC    90 capsule    Take 20 mg by mouth daily       oxyCODONE 5 MG IR tablet    ROXICODONE     Take 1-2 tablets by mouth every 6 hours as needed. For pain.       simvastatin 40 MG tablet    ZOCOR    30 tablet    Take 40 mg by mouth At Bedtime       tiotropium 18 MCG capsule    SPIRIVA    30 capsule    Inhale 1 capsule (18 mcg) into the lungs daily       TRAZODONE HCL PO      Take 50 mg by mouth nightly as needed       warfarin 5 MG tablet    COUMADIN    30 tablet    Take 0.5 tablets (2.5 mg) by mouth See Admin Instructions Take as directed. Take 2.5 mg daily for 5 days/week, then 5 mg daily the other 2 days, pending INR levels.

## 2017-06-02 NOTE — PROGRESS NOTES
S: presenting today with elevated Cr and low mg on labs from yesterday. He tells he has had a cough for a couple weeks--finally improving some and low energy. No fevers. No other issues.    O:  /59  Pulse 90  Temp 97.7  F (36.5  C)  Resp 16  Wt 89.2 kg (196 lb 10.4 oz)  SpO2 96%  BMI 28.22 kg/m2   Gen: NAD  Cardiac: No murmur. Irregularly regular rhythm  Pulm: BCTA  Lymph: no real LE edema    Lab Results   Component Value Date    WBC 6.8 06/02/2017    ANEU 5.3 06/02/2017    HGB 10.4 (L) 06/02/2017    HCT 32.4 (L) 06/02/2017     (L) 06/02/2017     06/02/2017    POTASSIUM 3.8 06/02/2017    CHLORIDE 106 06/02/2017    CO2 23 06/02/2017     (H) 06/02/2017    BUN 28 06/02/2017    CR 1.23 06/02/2017    MAG 1.4 (L) 06/02/2017    INR 2.15 (H) 01/31/2017     BNP: 9755    CXR: Final read pending but don't see any acute issues.     A & P:    1.) MM: Hold revlimid for a few weeks to allow some recovery.   2.) CV:   -hx a.fib, cardiomyopathy, v. Tach with implantable defibrillator in place. Will hold lasix today and resume tomorrow. CXR doesn't show any volume overload. BNP hard to make anything of this?   3.) FEN/Renal:  - Renal fx back near baseline today. Push some fluids today. No IVF given as no IV access right now and didn't feel necessary to place IV to give 500cc fluid. Resume lasix radha.  - Mg 1.4. Will take the additional 3 tabs today then resume 3 tabs BID.   4.) ID:  - Cough: CXR clear. Lungs clear. Afebrile. Not neutropenic. No reason for anti-microbial. ACEI cough vs viral vs CHF vs other.     RTC:   in a few weeks with Dr. Raygoza. He will call in the meantime with any acute changes.     Roxanne Fox PA-C  #[id number]

## 2017-06-02 NOTE — MR AVS SNAPSHOT
After Visit Summary   6/2/2017    Erasmo Pearce    MRN: [id number]           Patient Information     Date Of Birth          1950        Visit Information        Provider Department      6/2/2017 12:30 PM UC 2 ATC; UC BMT INFUSION [facility] Blood and Marrow Transplant        Today's Diagnoses     Renal insufficiency    -  1          [facility] ([facility])  [address] [place] [address]  Phone: [phone]  Clinic Hours:   Monday-Thursday:7am to 7pm   Friday: 7am to 5pm   Weekends and holidays:    8am to noon (in general)  If your fever is 100.5  or greater,   call the clinic.  After hours call the   hospital at [phone] or   [phone]. Ask for the BMT   fellow on-call            Follow-ups after your visit        Your next 10 appointments already scheduled     Jun 13, 2017 11:00 AM CDT   Masonic Lab Draw with  MASONIC LAB DRAW   [facility] Masonic Lab Draw ([facility])    [address] [place] [address]   [phone]            Jun 13, 2017 11:30 AM CDT   Return with Rojas Raygoza MD   [facility] Blood and Marrow Transplant ([facility])    [address] [place] [address]   [phone]            Jul 06, 2017 10:30 AM CDT   Masonic Lab Draw with  MASONIC LAB DRAW   [facility] Masonic Lab Draw ([facility])    [address] [place] [address]   [phone]            Jul 11, 2017 10:30 AM CDT   Return with Rojas Raygoza MD   [facility] Blood and Marrow Transplant ([facility])    [address] [place] [address]   [phone]            Aug 08, 2017  9:30 AM CDT   (Arrive by 9:15 AM)   Implanted Defibulator with Uc Cv Device 1   [facility] ([facility])    [address] [place] [address]  [id number] [phone]            Aug 08, 2017 10:00 AM CDT   (Arrive by 9:45 AM)   RETURN ARRHYTHMIA with Chuy Jean-Baptiste MD   [facility] ([facility] [place])    [address] [facility] [address] [phone]              Future tests that were ordered for you today     Open Future Orders        Priority Expected Expires Ordered    CBC with platelets differential Routine 6/13/2017 11/29/2017 6/2/2017    Comprehensive metabolic panel Routine 6/13/2017 11/29/2017 6/2/2017    Magnesium Routine 6/13/2017 11/29/2017 6/2/2017            Who to contact     If you have questions or need follow up information about today's clinic visit or your schedule please contact [facility] BLOOD AND MARROW TRANSPLANT directly at [phone].  Normal or non-critical lab and imaging results will be communicated to you by MyChart, letter or phone within 4 business days after the clinic has received the results. If you do not hear from us within 7 days, please contact the clinic through [employer] or phone. If you have a critical or abnormal lab result, we will notify you by phone as soon as possible.  Submit refill requests through [employer] or call your pharmacy and they will forward the refill request to us. Please allow 3 business days for your refill to be completed.          Additional Information About Your Visit        MyChart Information     [employer] gives you secure access to your electronic health record. If you see a primary care provider, you can also send messages to your care team and make appointments. If you have questions, please call your primary care clinic.  If you do not have a primary care provider, please call [phone] and they will assist you.        Care EveryWhere ID     This is your Care EveryWhere ID. This could be used by other organizations to access your [facility] medical records  [id number]         Blood Pressure from Last 3 Encounters:   06/02/17  118/59   05/16/17 91/59   02/07/17 111/66    Weight from Last 3 Encounters:   06/02/17 89.2 kg (196 lb 10.4 oz)   05/16/17 91.9 kg (202 lb 11.2 oz)   02/07/17 94.3 kg (207 lb 14.3 oz)              Today, you had the following     No orders found for display         Today's Medication Changes          These changes are accurate as of: 6/2/17 12:56 PM.  If you have any questions, ask your nurse or doctor.               Start taking these medicines.        Dose/Directions    magnesium oxide 400 MG tablet   Commonly known as:  MAG-OX   Used for:  Hypomagnesemia        Dose:  1200 mg   Take 3 tablets (1,200 mg) by mouth 2 times daily   Quantity:  4 tablet   Refills:  0                Recent Review Flowsheet Data     BMT Recent Results Latest Ref Rng & Units 2/2/2016 5/10/2016 7/6/2016 11/1/2016 1/31/2017 5/9/2017 6/2/2017    WBC 4.0 - 11.0 10e9/L 9.0 9.1 7.5 8.0 7.2 5.2 6.8    Hemoglobin 13.3 - 17.7 g/dL 13.1(L) 12.8(L) 12.3(L) 12.9(L) 12.8(L) 11.3(L) 10.4(L)    Platelet Count 150 - 450 10e9/L 179 195 169 180 195 141(L) 143(L)    Neutrophils (Absolute) 1.6 - 8.3 10e9/L 5.6 6.3 5.1 4.7 4.1 2.8 5.3    INR 0.86 - 1.14 1.71(H) - - 2.18(H) 2.15(H) - -    Sodium 133 - 144 mmol/L 138 139 137 139 141 138 140    Potassium 3.4 - 5.3 mmol/L 4.3 4.8 4.2 4.8 4.4 4.0 3.8    Chloride 94 - 109 mmol/L 106 106 106 104 104 102 106    Glucose 70 - 99 mg/dL 105(H) 97 107(H) 106(H) 104(H) 101(H) 130(H)    Urea Nitrogen 7 - 30 mg/dL 29 30 18 23 28 17 28    Creatinine 0.66 - 1.25 mg/dL 1.12 1.22 1.10 1.15 1.24 0.94 1.23    Calcium (Total) 8.5 - 10.1 mg/dL 9.1 9.3 9.2 8.9 8.7 8.5 8.2(L)    Protein (Total) 6.8 - 8.8 g/dL 7.5 7.3 7.3 8.1 7.2 6.6(L) 7.3    Albumin 3.4 - 5.0 g/dL 3.9 4.0 3.9 3.9 3.6 3.2(L) 2.9(L)    Alkaline Phosphatase 40 - 150 U/L 77 76 78 88 82 83 71    AST 0 - 45 U/L 16 17 22 20 19 14 16    ALT 0 - 70 U/L 19 24 21 23 27 28 25    MCV 78 - 100 fl 104(H) 105(H) 105(H) 103(H) 102(H) 104(H) 105(H)               Primary Care Provider  Office Phone # Fax Cleopatra Mcdonald [phone]       [place] [address] [place] [address]        Thank you!     Thank you for choosing [facility] BLOOD AND MARROW TRANSPLANT  for your care. Our goal is always to provide you with excellent care. Hearing back from our patients is one way we can continue to improve our services. Please take a few minutes to complete the written survey that you may receive in the mail after your visit with us. Thank you!             Your Updated Medication List - Protect others around you: Learn how to safely use, store and throw away your medicines at www.disposemymeds.org.          This list is accurate as of: 6/2/17 12:56 PM.  Always use your most recent med list.                   Brand Name Dispense Instructions for use    albuterol 108 (90 BASE) MCG/ACT Inhaler    PROAIR HFA/PROVENTIL HFA/VENTOLIN HFA     Inhale 2 puffs into the lungs every 6 hours as needed       amoxicillin 500 MG tablet    AMOXIL    4 tablet    Take 4 tabs one hour before dental appointment       baclofen 10 MG tablet    LIORESAL     Take 10 mg by mouth 3 times daily as needed prn       CALCIUM 500 + D PO      Take 2 tablets by mouth daily.       dexamethasone 4 MG tablet    DECADRON    30 tablet    Take 10 tabs (40mg) Days 1, 8, and 15.       diclofenac 75 MG EC tablet    VOLTAREN     Take 75 mg by mouth 2 times daily as needed 1 tab prn       fentaNYL 25 mcg/hr 72 hr patch    DURAGESIC     Place 1 patch onto the skin every 72 hours       furosemide 20 MG tablet    LASIX    30 tablet    Take 40 mg by mouth daily       LENalidomide 10 MG Caps capsule CHEMO    REVLIMID    21 each    Take 10 mg daily days 1-21       lisinopril 5 MG tablet    PRINIVIL/ZESTRIL     Take 5 mg by mouth 2 times daily       magnesium oxide 400 MG tablet    MAG-OX    4 tablet    Take 3 tablets (1,200 mg) by mouth 2 times daily       metoprolol 100 MG 24 hr tablet    TOPROL-XL    135 tablet    Take 1.5  tablets (150 mg) by mouth daily       nitroglycerin 0.4 MG sublingual tablet    NITROSTAT     Place 0.4 mg under the tongue every 5 minutes as needed Reported on 5/16/2017       omeprazole 20 MG CR capsule    priLOSEC    90 capsule    Take 20 mg by mouth daily       oxyCODONE 5 MG IR tablet    ROXICODONE     Take 1-2 tablets by mouth every 6 hours as needed. For pain.       simvastatin 40 MG tablet    ZOCOR    30 tablet    Take 40 mg by mouth At Bedtime       tiotropium 18 MCG capsule    SPIRIVA    30 capsule    Inhale 1 capsule (18 mcg) into the lungs daily       TRAZODONE HCL PO      Take 50 mg by mouth nightly as needed       warfarin 5 MG tablet    COUMADIN    30 tablet    Take 0.5 tablets (2.5 mg) by mouth See Admin Instructions Take as directed. Take 2.5 mg daily for 5 days/week, then 5 mg daily the other 2 days, pending INR levels.

## 2017-06-04 ENCOUNTER — TELEPHONE (OUTPATIENT)
Dept: CARDIOLOGY | Facility: CLINIC | Age: 67
End: 2017-06-04

## 2017-06-06 ENCOUNTER — TRANSFERRED RECORDS (OUTPATIENT)
Dept: HEALTH INFORMATION MANAGEMENT | Facility: CLINIC | Age: 67
End: 2017-06-06

## 2017-06-08 NOTE — TELEPHONE ENCOUNTER
[employer] remote transmission received and reviewed.   6.4.17 @ 1804: Pt's wife called stating pt had ICD shock last night at 0230. He hasn't been feeling well and wife states labs are out of whack. [facility] [place] [place] He has chemo 3 wks/mo.  Transmission shows VT/VF lasting 12 seconds until successfully converted with 1 41j shock.

## 2017-06-13 ENCOUNTER — ONCOLOGY VISIT (OUTPATIENT)
Dept: TRANSPLANT | Facility: CLINIC | Age: 67
End: 2017-06-13
Attending: INTERNAL MEDICINE
Payer: MEDICARE

## 2017-06-13 VITALS
WEIGHT: 199.08 LBS | DIASTOLIC BLOOD PRESSURE: 64 MMHG | RESPIRATION RATE: 18 BRPM | BODY MASS INDEX: 28.56 KG/M2 | SYSTOLIC BLOOD PRESSURE: 110 MMHG | TEMPERATURE: 98.2 F | HEART RATE: 75 BPM | OXYGEN SATURATION: 95 %

## 2017-06-13 DIAGNOSIS — Z92.21 STATUS POST CHEMOTHERAPY: ICD-10-CM

## 2017-06-13 DIAGNOSIS — C90.02 MULTIPLE MYELOMA IN RELAPSE (H): Primary | ICD-10-CM

## 2017-06-13 LAB
ALBUMIN SERPL-MCNC: 3.3 G/DL (ref 3.4–5)
ALP SERPL-CCNC: 77 U/L (ref 40–150)
ALT SERPL W P-5'-P-CCNC: 17 U/L (ref 0–70)
ANION GAP SERPL CALCULATED.3IONS-SCNC: 8 MMOL/L (ref 3–14)
AST SERPL W P-5'-P-CCNC: 14 U/L (ref 0–45)
BASOPHILS # BLD AUTO: 0.1 10E9/L (ref 0–0.2)
BASOPHILS NFR BLD AUTO: 1.1 %
BILIRUB SERPL-MCNC: 0.4 MG/DL (ref 0.2–1.3)
BUN SERPL-MCNC: 19 MG/DL (ref 7–30)
CALCIUM SERPL-MCNC: 8.7 MG/DL (ref 8.5–10.1)
CHLORIDE SERPL-SCNC: 105 MMOL/L (ref 94–109)
CO2 SERPL-SCNC: 28 MMOL/L (ref 20–32)
CREAT SERPL-MCNC: 1.02 MG/DL (ref 0.66–1.25)
DIFFERENTIAL METHOD BLD: ABNORMAL
EOSINOPHIL # BLD AUTO: 0.1 10E9/L (ref 0–0.7)
EOSINOPHIL NFR BLD AUTO: 1.4 %
ERYTHROCYTE [DISTWIDTH] IN BLOOD BY AUTOMATED COUNT: 16.2 % (ref 10–15)
GFR SERPL CREATININE-BSD FRML MDRD: 73 ML/MIN/1.7M2
GLUCOSE SERPL-MCNC: 142 MG/DL (ref 70–99)
HCT VFR BLD AUTO: 30.3 % (ref 40–53)
HGB BLD-MCNC: 9.9 G/DL (ref 13.3–17.7)
IGG SERPL-MCNC: 689 MG/DL (ref 695–1620)
IMM GRANULOCYTES # BLD: 0 10E9/L (ref 0–0.4)
IMM GRANULOCYTES NFR BLD: 0.6 %
KAPPA LC UR-MCNC: 2.09 MG/DL (ref 0.33–1.94)
KAPPA LC/LAMBDA SER: 0.07 {RATIO} (ref 0.26–1.65)
LAMBDA LC SERPL-MCNC: 31.25 MG/DL (ref 0.57–2.63)
LYMPHOCYTES # BLD AUTO: 1.5 10E9/L (ref 0.8–5.3)
LYMPHOCYTES NFR BLD AUTO: 23.1 %
MAGNESIUM SERPL-MCNC: 1.4 MG/DL (ref 1.6–2.3)
MCH RBC QN AUTO: 34.6 PG (ref 26.5–33)
MCHC RBC AUTO-ENTMCNC: 32.7 G/DL (ref 31.5–36.5)
MCV RBC AUTO: 106 FL (ref 78–100)
MONOCYTES # BLD AUTO: 0.7 10E9/L (ref 0–1.3)
MONOCYTES NFR BLD AUTO: 10.8 %
NEUTROPHILS # BLD AUTO: 4 10E9/L (ref 1.6–8.3)
NEUTROPHILS NFR BLD AUTO: 63 %
NRBC # BLD AUTO: 0 10*3/UL
NRBC BLD AUTO-RTO: 1 /100
PLATELET # BLD AUTO: 217 10E9/L (ref 150–450)
POTASSIUM SERPL-SCNC: 3.9 MMOL/L (ref 3.4–5.3)
PROT SERPL-MCNC: 6.7 G/DL (ref 6.8–8.8)
RBC # BLD AUTO: 2.86 10E12/L (ref 4.4–5.9)
SODIUM SERPL-SCNC: 141 MMOL/L (ref 133–144)
WBC # BLD AUTO: 6.4 10E9/L (ref 4–11)

## 2017-06-13 PROCEDURE — 82784 ASSAY IGA/IGD/IGG/IGM EACH: CPT | Performed by: INTERNAL MEDICINE

## 2017-06-13 PROCEDURE — 00000402 ZZHCL STATISTIC TOTAL PROTEIN: Performed by: INTERNAL MEDICINE

## 2017-06-13 PROCEDURE — 83735 ASSAY OF MAGNESIUM: CPT | Performed by: PHYSICIAN ASSISTANT

## 2017-06-13 PROCEDURE — 84165 PROTEIN E-PHORESIS SERUM: CPT | Performed by: INTERNAL MEDICINE

## 2017-06-13 PROCEDURE — 99212 OFFICE O/P EST SF 10 MIN: CPT | Mod: ZF

## 2017-06-13 PROCEDURE — 80053 COMPREHEN METABOLIC PANEL: CPT | Performed by: PHYSICIAN ASSISTANT

## 2017-06-13 PROCEDURE — 83883 ASSAY NEPHELOMETRY NOT SPEC: CPT | Performed by: INTERNAL MEDICINE

## 2017-06-13 PROCEDURE — 85025 COMPLETE CBC W/AUTO DIFF WBC: CPT | Performed by: PHYSICIAN ASSISTANT

## 2017-06-13 PROCEDURE — 36415 COLL VENOUS BLD VENIPUNCTURE: CPT | Performed by: PHYSICIAN ASSISTANT

## 2017-06-13 ASSESSMENT — PAIN SCALES - GENERAL: PAINLEVEL: NO PAIN (0)

## 2017-06-13 NOTE — MR AVS SNAPSHOT
After Visit Summary   6/13/2017    Erasmo Pearce    MRN: [id number]           Patient Information     Date Of Birth          1950        Visit Information        Provider Department      6/13/2017 11:30 AM Rojas Raygoza MD [facility] Blood and Marrow Transplant        Today's Diagnoses     Multiple myeloma in relapse (H)    -  1          [facility] ([facility])  [address] [place] [address]  Phone: [phone]  Clinic Hours:   Monday-Thursday:7am to 7pm   Friday: 7am to 5pm   Weekends and holidays:    8am to noon (in general)  If your fever is 100.5  or greater,   call the clinic.  After hours call the   hospital at [phone] or   [phone]. Ask for the BMT   fellow on-call           Care Instructions    His labs is scheduled 7/6 per pt    Kya          Follow-ups after your visit        Your next 10 appointments already scheduled     Jul 06, 2017 10:30 AM CDT   Masonic Lab Draw with  MASONIC LAB DRAW   [facility] Masonic Lab Draw ([facility])    [address] [place] [id number] [phone]            Jul 11, 2017 10:30 AM CDT   Return with Rojas Raygoza MD   [facility] Blood and Marrow Transplant ([facility])    [address] [place] [id number] [phone]            Aug 08, 2017  9:30 AM CDT   (Arrive by 9:15 AM)   Implanted Defibulator with  Cv Device 1   [facility] ([facility])    [address] [place] [id number] [phone]            Aug 08, 2017 10:00 AM CDT   (Arrive by 9:45 AM)   RETURN ARRHYTHMIA with Chuy Jean-Baptiste MD   [facility] ([facility])    [address] [place] [id number] [phone]              Who to contact     If you have questions or need follow up information about today's clinic visit  or your schedule please contact [facility] BLOOD AND MARROW TRANSPLANT directly at [phone].  Normal or non-critical lab and imaging results will be communicated to you by MyChart, letter or phone within 4 business days after the clinic has received the results. If you do not hear from us within 7 days, please contact the clinic through [employer] or phone. If you have a critical or abnormal lab result, we will notify you by phone as soon as possible.  Submit refill requests through [employer] or call your pharmacy and they will forward the refill request to us. Please allow 3 business days for your refill to be completed.          Additional Information About Your Visit        [employer] Information     [employer] gives you secure access to your electronic health record. If you see a primary care provider, you can also send messages to your care team and make appointments. If you have questions, please call your primary care clinic.  If you do not have a primary care provider, please call [phone] and they will assist you.        Care EveryWhere ID     This is your Care EveryWhere ID. This could be used by other organizations to access your [facility] medical records  [id number]        Your Vitals Were     Pulse Temperature Respirations Pulse Oximetry BMI (Body Mass Index)       75 98.2  F (36.8  C) (Oral) 18 95% 28.56 kg/m2        Blood Pressure from Last 3 Encounters:   06/13/17 110/64   06/02/17 118/59   05/16/17 91/59    Weight from Last 3 Encounters:   06/13/17 90.3 kg (199 lb 1.2 oz)   06/02/17 89.2 kg (196 lb 10.4 oz)   05/16/17 91.9 kg (202 lb 11.2 oz)              We Performed the Following     IgG     Kappa and lambda light chain     Protein electrophoresis        Recent Review Flowsheet Data     BMT Recent Results Latest Ref Rng & Units 5/10/2016 7/6/2016 11/1/2016 1/31/2017 5/9/2017 6/2/2017 6/13/2017    WBC 4.0 - 11.0 10e9/L 9.1 7.5 8.0 7.2 5.2 6.8 6.4    Hemoglobin 13.3 - 17.7 g/dL 12.8(L) 12.3(L) 12.9(L)  12.8(L) 11.3(L) 10.4(L) 9.9(L)    Platelet Count 150 - 450 10e9/L 195 169 180 195 141(L) 143(L) 217    Neutrophils (Absolute) 1.6 - 8.3 10e9/L 6.3 5.1 4.7 4.1 2.8 5.3 4.0    INR 0.86 - 1.14 - - 2.18(H) 2.15(H) - - -    Sodium 133 - 144 mmol/L 139 137 139 141 138 140 141    Potassium 3.4 - 5.3 mmol/L 4.8 4.2 4.8 4.4 4.0 3.8 3.9    Chloride 94 - 109 mmol/L 106 106 104 104 102 106 105    Glucose 70 - 99 mg/dL 97 107(H) 106(H) 104(H) 101(H) 130(H) 142(H)    Urea Nitrogen 7 - 30 mg/dL 30 18 23 28 17 28 19    Creatinine 0.66 - 1.25 mg/dL 1.22 1.10 1.15 1.24 0.94 1.23 1.02    Calcium (Total) 8.5 - 10.1 mg/dL 9.3 9.2 8.9 8.7 8.5 8.2(L) 8.7    Protein (Total) 6.8 - 8.8 g/dL 7.3 7.3 8.1 7.2 6.6(L) 7.3 6.7(L)    Albumin 3.4 - 5.0 g/dL 4.0 3.9 3.9 3.6 3.2(L) 2.9(L) 3.3(L)    Alkaline Phosphatase 40 - 150 U/L 76 78 88 82 83 71 77    AST 0 - 45 U/L 17 22 20 19 14 16 14    ALT 0 - 70 U/L 24 21 23 27 28 25 17    MCV 78 - 100 fl 105(H) 105(H) 103(H) 102(H) 104(H) 105(H) 106(H)               Primary Care Provider Office Phone # Fax #    Cong Mcdonald [phone]       [address] [address]        Thank you!     Thank you for choosing [facility] BLOOD AND MARROW TRANSPLANT  for your care. Our goal is always to provide you with excellent care. Hearing back from our patients is one way we can continue to improve our services. Please take a few minutes to complete the written survey that you may receive in the mail after your visit with us. Thank you!             Your Updated Medication List - Protect others around you: Learn how to safely use, store and throw away your medicines at www.disposemymeds.org.          This list is accurate as of: 6/13/17 11:59 PM.  Always use your most recent med list.                   Brand Name Dispense Instructions for use    albuterol 108 (90 BASE) MCG/ACT Inhaler    PROAIR HFA/PROVENTIL HFA/VENTOLIN HFA     Inhale 2 puffs into the lungs every 6 hours as  needed       amoxicillin 500 MG tablet    AMOXIL    4 tablet    Take 4 tabs one hour before dental appointment       baclofen 10 MG tablet    LIORESAL     Take 10 mg by mouth 3 times daily as needed prn       CALCIUM 500 + D PO      Take 2 tablets by mouth daily.       dexamethasone 4 MG tablet    DECADRON    30 tablet    Take 10 tabs (40mg) Days 1, 8, and 15.       diclofenac 75 MG EC tablet    VOLTAREN     Take 75 mg by mouth 2 times daily as needed 1 tab prn       fentaNYL 25 mcg/hr 72 hr patch    DURAGESIC     Place 1 patch onto the skin every 72 hours       furosemide 20 MG tablet    LASIX    30 tablet    Take 40 mg by mouth daily       LENalidomide 10 MG Caps capsule CHEMO    REVLIMID    21 each    Take 10 mg daily days 1-21       lisinopril 5 MG tablet    PRINIVIL/ZESTRIL     Take 5 mg by mouth 2 times daily       magnesium oxide 400 MG tablet    MAG-OX    4 tablet    Take 3 tablets (1,200 mg) by mouth 2 times daily       metoprolol 100 MG 24 hr tablet    TOPROL-XL    135 tablet    Take 1.5 tablets (150 mg) by mouth daily       nitroglycerin 0.4 MG sublingual tablet    NITROSTAT     Place 0.4 mg under the tongue every 5 minutes as needed Reported on 5/16/2017       omeprazole 20 MG CR capsule    priLOSEC    90 capsule    Take 20 mg by mouth daily       oxyCODONE 5 MG IR tablet    ROXICODONE     Take 1-2 tablets by mouth every 6 hours as needed. For pain.       simvastatin 40 MG tablet    ZOCOR    30 tablet    Take 40 mg by mouth At Bedtime       tiotropium 18 MCG capsule    SPIRIVA    30 capsule    Inhale 1 capsule (18 mcg) into the lungs daily       TRAZODONE HCL PO      Take 50 mg by mouth nightly as needed       warfarin 5 MG tablet    COUMADIN    30 tablet    Take 0.5 tablets (2.5 mg) by mouth See Admin Instructions Take as directed. Take 2.5 mg daily for 5 days/week, then 5 mg daily the other 2 days, pending INR levels.

## 2017-06-13 NOTE — PROGRESS NOTES
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  Jonh has relapsed and was on Revlimid and dexamethasone.  He developed some loose stools but otherwise was doing okay.  He came in to the clinic last week with some fatigue.  It was unclear exactly what was going on.  His labs looked okay.  About a day later he developed abdominal pain and was hospitalized in [place] with an ileus.  The next day he had an enema and he felt much better.  He is feeling good now.  He is now off of Revlimid and dexamethasone.  He has had a nice response with a decrease in his serum free light chains on Revlimid and dexamethasone for his relapsed myeloma.  He denies fevers or chills.  He has increased his magnesium.  Over the weekend before hospitalization, his interventricular cardioverter did trigger, suggesting that he did have an episode of either V-tach or V-fib.       PAST MEDICAL HISTORY:  See my note from 05/2017.       SOCIAL HISTORY:  See my note from 05/2017.       FAMILY HISTORY:  See my note from 05/2017.      REVIEW OF SYSTEMS:  A 12-point review of systems is done and is negative as in HPI.      PHYSICAL EXAMINATION:   GENERAL:  He is an alert gentleman in no acute distress.   VITAL SIGNS:  Stable.   HEENT:  Normocephalic.  EOM okay, no scleral icterus.  Mouth without lesion.   RESPIRATORY:  Clear to percussion and auscultation.   CARDIAC:  Irregularly irregular rhythm, no murmur.  Cardioverter in place in the left upper chest wall.   ABDOMEN:  Soft without splenomegaly.   EXTREMITIES:  Without edema.   NEUROLOGIC:  No resting tremor.      ASSESSMENT:   1.  Multiple myeloma.   2.  Status post autologous peripheral blood stem cell transplant.   3.  Cardiomyopathy.   4.  Atrial  fibrillation.   5.  History of deep venous thrombosis, on anticoagulation.   6.  Chronic back pain.   7.  Aortic aneurysm.   8.  History of plasmacytoma of the clivus, status post radiation.   9.  History of ventricular tachycardia with an implantable defibrillator in place.     10.  Recent abdominal pain and ileus requiring hospitalization.       I am sure what happened to Jonh, whether this was a viral syndrome.  He did say that he had a cough and was feeling poorly for a couple of weeks before this event occurred.  It is possible that he did have possibly an impaction because the enema seemed to help very much.  It is disturbing, though, that it did trigger his implantable defibrillator.  We have increased his magnesium and his potassium is in better control.  I would like to suggest that we continue to hold the Revlimid and dexamethasone.  I am going to see him in a couple of weeks.  I am going to check his light chains today and M-spike and keep him off the Revlimid and dexamethasone for now.  We may come back with a lower dose of dexamethasone and Revlimid, but I would like to see what his 24-hour urine protein looks like as well as the serum proteins.  He should continue the magnesium and potassium as needed.  [facility]   and  , about this event.       He is feeling good, went golfing Thursday

## 2017-06-13 NOTE — NURSING NOTE
Chief Complaint   Patient presents with     Blood Draw     Labs only     Vitals and labs done peripherally.  See doc flow sheets for details.  Elena Luna CMA

## 2017-06-13 NOTE — NURSING NOTE
"Oncology Rooming Note    June 13, 2017 10:42 AM   Erasmo Pearce is a 66 year old male who presents for:    Chief Complaint   Patient presents with     RECHECK     MM     Initial Vitals: /64  Pulse 75  Temp 98.2  F (36.8  C) (Oral)  Resp 18  Wt 90.3 kg (199 lb 1.2 oz)  SpO2 95%  BMI 28.56 kg/m2 Estimated body mass index is 28.56 kg/(m^2) as calculated from the following:    Height as of 2/7/17: 1.778 m (5' 10\").    Weight as of this encounter: 90.3 kg (199 lb 1.2 oz). Body surface area is 2.11 meters squared.  No Pain (0) Comment: Data Unavailable   No LMP for male patient.  Allergies reviewed: Yes  Medications reviewed: Yes    Medications: Medication refills not needed today.  Pharmacy name entered into EPIC:    [facility] - [place], [place] - [address] [place] [address] ('S) [facility] - [place], [place] - [address] [facility] [phone]    Clinical concerns: n/a     6 minutes for nursing intake (face to face time)     JESSY LIRA CMA              "

## 2017-06-14 LAB
ALBUMIN SERPL ELPH-MCNC: 3.4 G/DL (ref 3.7–5.1)
ALPHA1 GLOB SERPL ELPH-MCNC: 0.4 G/DL (ref 0.2–0.4)
ALPHA2 GLOB SERPL ELPH-MCNC: 0.9 G/DL (ref 0.5–0.9)
B-GLOBULIN SERPL ELPH-MCNC: 0.8 G/DL (ref 0.6–1)
GAMMA GLOB SERPL ELPH-MCNC: 0.8 G/DL (ref 0.7–1.6)
M PROTEIN SERPL ELPH-MCNC: 0.2 G/DL
PROT PATTERN SERPL ELPH-IMP: ABNORMAL

## 2017-06-28 ENCOUNTER — TRANSFERRED RECORDS (OUTPATIENT)
Dept: HEALTH INFORMATION MANAGEMENT | Facility: CLINIC | Age: 67
End: 2017-06-28

## 2017-07-06 DIAGNOSIS — C90.00 MULTIPLE MYELOMA (H): Primary | ICD-10-CM

## 2017-07-06 DIAGNOSIS — C90.00 MULTIPLE MYELOMA (H): ICD-10-CM

## 2017-07-06 DIAGNOSIS — C90.02 MULTIPLE MYELOMA IN RELAPSE (H): ICD-10-CM

## 2017-07-06 LAB
ALBUMIN SERPL-MCNC: 4 G/DL (ref 3.4–5)
ALP SERPL-CCNC: 83 U/L (ref 40–150)
ALT SERPL W P-5'-P-CCNC: 19 U/L (ref 0–70)
ANION GAP SERPL CALCULATED.3IONS-SCNC: 9 MMOL/L (ref 3–14)
AST SERPL W P-5'-P-CCNC: 17 U/L (ref 0–45)
BASOPHILS # BLD AUTO: 0 10E9/L (ref 0–0.2)
BASOPHILS NFR BLD AUTO: 0.3 %
BILIRUB SERPL-MCNC: 1.2 MG/DL (ref 0.2–1.3)
BUN SERPL-MCNC: 22 MG/DL (ref 7–30)
CALCIUM SERPL-MCNC: 9 MG/DL (ref 8.5–10.1)
CHLORIDE SERPL-SCNC: 104 MMOL/L (ref 94–109)
CO2 SERPL-SCNC: 25 MMOL/L (ref 20–32)
COLLECT DURATION TIME UR: 24 H
CREAT SERPL-MCNC: 1.23 MG/DL (ref 0.66–1.25)
DIFFERENTIAL METHOD BLD: ABNORMAL
EOSINOPHIL # BLD AUTO: 0.1 10E9/L (ref 0–0.7)
EOSINOPHIL NFR BLD AUTO: 1.2 %
ERYTHROCYTE [DISTWIDTH] IN BLOOD BY AUTOMATED COUNT: 16.8 % (ref 10–15)
GFR SERPL CREATININE-BSD FRML MDRD: 59 ML/MIN/1.7M2
GLUCOSE SERPL-MCNC: 151 MG/DL (ref 70–99)
HCT VFR BLD AUTO: 33 % (ref 40–53)
HGB BLD-MCNC: 10.8 G/DL (ref 13.3–17.7)
IGA SERPL-MCNC: 248 MG/DL (ref 70–380)
IGG SERPL-MCNC: 633 MG/DL (ref 695–1620)
IGM SERPL-MCNC: 39 MG/DL (ref 60–265)
IMM GRANULOCYTES # BLD: 0 10E9/L (ref 0–0.4)
IMM GRANULOCYTES NFR BLD: 0.5 %
KAPPA LC UR-MCNC: 1.88 MG/DL (ref 0.33–1.94)
KAPPA LC/LAMBDA SER: 0.06 {RATIO} (ref 0.26–1.65)
LAMBDA LC SERPL-MCNC: 34 MG/DL (ref 0.57–2.63)
LDH SERPL L TO P-CCNC: 205 U/L (ref 85–227)
LYMPHOCYTES # BLD AUTO: 1.2 10E9/L (ref 0.8–5.3)
LYMPHOCYTES NFR BLD AUTO: 15.3 %
MAGNESIUM SERPL-MCNC: 2.1 MG/DL (ref 1.6–2.3)
MCH RBC QN AUTO: 35.4 PG (ref 26.5–33)
MCHC RBC AUTO-ENTMCNC: 32.7 G/DL (ref 31.5–36.5)
MCV RBC AUTO: 108 FL (ref 78–100)
MONOCYTES # BLD AUTO: 0.6 10E9/L (ref 0–1.3)
MONOCYTES NFR BLD AUTO: 7.8 %
NEUTROPHILS # BLD AUTO: 5.8 10E9/L (ref 1.6–8.3)
NEUTROPHILS NFR BLD AUTO: 74.9 %
NRBC # BLD AUTO: 0 10*3/UL
NRBC BLD AUTO-RTO: 0 /100
PLATELET # BLD AUTO: 165 10E9/L (ref 150–450)
POTASSIUM SERPL-SCNC: 4.1 MMOL/L (ref 3.4–5.3)
PROT SERPL-MCNC: 7.2 G/DL (ref 6.8–8.8)
RBC # BLD AUTO: 3.05 10E12/L (ref 4.4–5.9)
SODIUM SERPL-SCNC: 138 MMOL/L (ref 133–144)
SPECIMEN VOL UR: 1240 ML
WBC # BLD AUTO: 7.7 10E9/L (ref 4–11)

## 2017-07-06 PROCEDURE — 80053 COMPREHEN METABOLIC PANEL: CPT | Performed by: INTERNAL MEDICINE

## 2017-07-06 PROCEDURE — 82784 ASSAY IGA/IGD/IGG/IGM EACH: CPT | Performed by: INTERNAL MEDICINE

## 2017-07-06 PROCEDURE — 85025 COMPLETE CBC W/AUTO DIFF WBC: CPT | Performed by: INTERNAL MEDICINE

## 2017-07-06 PROCEDURE — 86335 IMMUNFIX E-PHORSIS/URINE/CSF: CPT | Performed by: INTERNAL MEDICINE

## 2017-07-06 PROCEDURE — 84166 PROTEIN E-PHORESIS/URINE/CSF: CPT | Performed by: INTERNAL MEDICINE

## 2017-07-06 PROCEDURE — 84156 ASSAY OF PROTEIN URINE: CPT | Performed by: INTERNAL MEDICINE

## 2017-07-06 PROCEDURE — 83883 ASSAY NEPHELOMETRY NOT SPEC: CPT | Performed by: INTERNAL MEDICINE

## 2017-07-06 PROCEDURE — 00000402 ZZHCL STATISTIC TOTAL PROTEIN: Performed by: INTERNAL MEDICINE

## 2017-07-06 PROCEDURE — 83615 LACTATE (LD) (LDH) ENZYME: CPT | Performed by: INTERNAL MEDICINE

## 2017-07-06 PROCEDURE — 83735 ASSAY OF MAGNESIUM: CPT | Performed by: INTERNAL MEDICINE

## 2017-07-06 PROCEDURE — 36415 COLL VENOUS BLD VENIPUNCTURE: CPT | Performed by: INTERNAL MEDICINE

## 2017-07-06 PROCEDURE — 84165 PROTEIN E-PHORESIS SERUM: CPT | Performed by: INTERNAL MEDICINE

## 2017-07-06 PROCEDURE — 82785 ASSAY OF IGE: CPT | Performed by: INTERNAL MEDICINE

## 2017-07-06 PROCEDURE — 81050 URINALYSIS VOLUME MEASURE: CPT | Performed by: INTERNAL MEDICINE

## 2017-07-06 NOTE — PROGRESS NOTES
Chief Complaint   Patient presents with     Lab Only     patient here with MM - here for labs and 24 hour urine drop off   labs drawn from right arm

## 2017-07-07 LAB
ALBUMIN MFR UR ELPH: 16.4 %
ALBUMIN SERPL ELPH-MCNC: 3.9 G/DL (ref 3.7–5.1)
ALPHA1 GLOB MFR UR ELPH: 3.7 %
ALPHA1 GLOB SERPL ELPH-MCNC: 0.4 G/DL (ref 0.2–0.4)
ALPHA2 GLOB MFR UR ELPH: 4.2 %
ALPHA2 GLOB SERPL ELPH-MCNC: 0.8 G/DL (ref 0.5–0.9)
B-GLOBULIN MFR UR ELPH: 3 %
B-GLOBULIN SERPL ELPH-MCNC: 0.8 G/DL (ref 0.6–1)
GAMMA GLOB MFR UR ELPH: 72.7 %
GAMMA GLOB SERPL ELPH-MCNC: 0.8 G/DL (ref 0.7–1.6)
IGE SERPL-ACNC: 6 KIU/L (ref 0–114)
IMMUNOFIX ELP, URINE: NORMAL
M PROTEIN MFR UR ELPH: 53 %
M PROTEIN SERPL ELPH-MCNC: 0.1 G/DL
PROT 24H UR-MRATE: 0.33 G/(24.H) (ref 0.04–0.23)
PROT PATTERN SERPL ELPH-IMP: ABNORMAL
PROT PATTERN UR ELPH-IMP: ABNORMAL
PROT UR-MCNC: 0.27 G/L
PROT/CREAT 24H UR: 0.22 G/G CR (ref 0–0.2)

## 2017-07-11 ENCOUNTER — ONCOLOGY VISIT (OUTPATIENT)
Dept: TRANSPLANT | Facility: CLINIC | Age: 67
End: 2017-07-11
Attending: INTERNAL MEDICINE
Payer: MEDICARE

## 2017-07-11 VITALS
WEIGHT: 201.5 LBS | SYSTOLIC BLOOD PRESSURE: 104 MMHG | OXYGEN SATURATION: 94 % | RESPIRATION RATE: 18 BRPM | TEMPERATURE: 97.6 F | DIASTOLIC BLOOD PRESSURE: 66 MMHG | BODY MASS INDEX: 28.91 KG/M2 | HEART RATE: 81 BPM

## 2017-07-11 DIAGNOSIS — C90.02 MULTIPLE MYELOMA IN RELAPSE (H): Primary | ICD-10-CM

## 2017-07-11 PROCEDURE — 99213 OFFICE O/P EST LOW 20 MIN: CPT | Mod: ZF

## 2017-07-11 RX ORDER — WARFARIN SODIUM 5 MG/1
TABLET ORAL
COMMUNITY
Start: 2017-06-14 | End: 2018-05-15

## 2017-07-11 ASSESSMENT — PAIN SCALES - GENERAL: PAINLEVEL: NO PAIN (0)

## 2017-07-11 NOTE — NURSING NOTE
"Oncology Rooming Note    July 11, 2017 10:03 AM   Erasmo Pearce is a 66 year old male who presents for:    Chief Complaint   Patient presents with     RECHECK     patient here with Multiple Myeloma - here for provider visit and vitals     Initial Vitals: /66  Pulse 81  Temp 97.6  F (36.4  C) (Oral)  Resp 18  Wt 91.4 kg (201 lb 8 oz)  SpO2 94%  BMI 28.91 kg/m2 Estimated body mass index is 28.91 kg/(m^2) as calculated from the following:    Height as of 2/7/17: 1.778 m (5' 10\").    Weight as of this encounter: 91.4 kg (201 lb 8 oz). Body surface area is 2.12 meters squared.  No Pain (0) Comment: Data Unavailable   No LMP for male patient.  Allergies reviewed: Yes  Medications reviewed: Yes    Medications: Medication refills not needed today.  Pharmacy name entered into EPIC:    [facility] - [place], [place] - [address] [place] [address] ('[facility] [place], [place] - [address] [facility] [phone]    Clinical concerns: NA Dr. Raygoza was NOT notified.    10 minutes for nursing intake (face to face time)     Tammy Goetz MA              "

## 2017-07-11 NOTE — PROGRESS NOTES
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  Jonh has relapsed and was on Revlimid and dexamethasone. He has been off revlimid since June 2. He had an episode of ileus in June that  Resolved with an enema. Feels good  Has some loose stools and nausea and vomiting. Thinks he caugh a \"bug\" Feels good this morning. His defibrillator possibly went off las night. No loss of consciousness. No chest pain.  PAST MEDICAL HISTORY:  See my note from 06/2017.       SOCIAL HISTORY:  See my note from 06/2017.       FAMILY HISTORY:  See my note from 06/2017.      REVIEW OF SYSTEMS:  A 12-point review of systems is done and is negative as in HPI.      PHYSICAL EXAMINATION:   GENERAL:  He is an alert gentleman in no acute distress.   VITAL SIGNS:  Stable.   HEENT:  Normocephalic.  EOM okay, no scleral icterus.  Mouth without lesion.   RESPIRATORY:  Clear to percussion and auscultation.   CARDIAC:  Irregularly irregular rhythm, no murmur.  Cardioverter in place in the left upper chest wall.   ABDOMEN:  Soft without splenomegaly.   EXTREMITIES:  Without edema.   NEUROLOGIC:  No resting tremor.    Results for DARRYL ZAPATA (MRN [id number]) as of 7/11/2017 10:29   Ref. Range 7/6/2017 08:00 7/6/2017 10:21   Sodium Latest Ref Range: 133 - 144 mmol/L  138   Potassium Latest Ref Range: 3.4 - 5.3 mmol/L  4.1   Chloride Latest Ref Range: 94 - 109 mmol/L  104   Carbon Dioxide Latest Ref Range: 20 - 32 mmol/L  25   Urea Nitrogen Latest Ref Range: 7 - 30 mg/dL  22   Creatinine Latest Ref Range: 0.66 - 1.25 mg/dL  1.23   GFR Estimate Latest Ref Range: >60 mL/min/1.7m2  59 (L)   GFR Estimate If Black Latest Ref Range: >60 mL/min/1.7m2  71   Calcium Latest Ref Range: 8.5 - 10.1 mg/dL  9.0 "   Anion Gap Latest Ref Range: 3 - 14 mmol/L  9   Magnesium Latest Ref Range: 1.6 - 2.3 mg/dL  2.1   Albumin Latest Ref Range: 3.4 - 5.0 g/dL  4.0   Protein Total Latest Ref Range: 6.8 - 8.8 g/dL  7.2   Bilirubin Total Latest Ref Range: 0.2 - 1.3 mg/dL  1.2   Alkaline Phosphatase Latest Ref Range: 40 - 150 U/L  83   ALT Latest Ref Range: 0 - 70 U/L  19   AST Latest Ref Range: 0 - 45 U/L  17   IGE Latest Ref Range: 0 - 114 KIU/L  6   Lactate Dehydrogenase Latest Ref Range: 85 - 227 U/L  205   Protein Random Urine Latest Units: g/L 0.27    Protein Total Urine g/gr Creatinine Latest Ref Range: 0 - 0.2 g/g Cr 0.22 (H)    Protein Total 24 Hr Urine Latest Ref Range: 0.04 - 0.23  0.33 (H)    URINALYSIS COLLECTION VOLUME & DURATION Unknown Rpt    Glucose Latest Ref Range: 70 - 99 mg/dL  151 (H)   WBC Latest Ref Range: 4.0 - 11.0 10e9/L  7.7   Hemoglobin Latest Ref Range: 13.3 - 17.7 g/dL  10.8 (L)   Hematocrit Latest Ref Range: 40.0 - 53.0 %  33.0 (L)   Platelet Count Latest Ref Range: 150 - 450 10e9/L  165   RBC Count Latest Ref Range: 4.4 - 5.9 10e12/L  3.05 (L)   MCV Latest Ref Range: 78 - 100 fl  108 (H)   MCH Latest Ref Range: 26.5 - 33.0 pg  35.4 (H)   MCHC Latest Ref Range: 31.5 - 36.5 g/dL  32.7   RDW Latest Ref Range: 10.0 - 15.0 %  16.8 (H)   Diff Method Unknown  Automated Method   % Neutrophils Latest Units: %  74.9   % Lymphocytes Latest Units: %  15.3   % Monocytes Latest Units: %  7.8   % Eosinophils Latest Units: %  1.2   % Basophils Latest Units: %  0.3   % Immature Granulocytes Latest Units: %  0.5   Nucleated RBCs Latest Ref Range: 0 /100  0   Absolute Neutrophil Latest Ref Range: 1.6 - 8.3 10e9/L  5.8   Absolute Lymphocytes Latest Ref Range: 0.8 - 5.3 10e9/L  1.2   Absolute Monocytes Latest Ref Range: 0.0 - 1.3 10e9/L  0.6   Absolute Eosinophils Latest Ref Range: 0.0 - 0.7 10e9/L  0.1   Absolute Basophils Latest Ref Range: 0.0 - 0.2 10e9/L  0.0   Abs Immature Granulocytes Latest Ref Range: 0 - 0.4 10e9/L   0.0   Absolute Nucleated RBC Unknown  0.0   Albumin Fraction Latest Ref Range: 3.7 - 5.1 g/dL  3.9   Albumin Fraction Urine Latest Ref Range: 0 % 16.4 (H)    Alpha 1 Fraction Latest Ref Range: 0.2 - 0.4 g/dL  0.4   Alpha 1 Fraction Urine Latest Ref Range: 0 % 3.7 (H)    Alpha 2 Fraction Latest Ref Range: 0.5 - 0.9 g/dL  0.8   Alpha 2 Fraction Urine Latest Ref Range: 0 % 4.2 (H)    Beta Fraction Latest Ref Range: 0.6 - 1.0 g/dL  0.8   Beta Fraction Urine Latest Ref Range: 0 % 3.0 (H)    ELP Interpretation: Unknown  Very small monocl...   ELP Interpretation Urine Unknown Albumin and globu...    Gamma Fraction Latest Ref Range: 0.7 - 1.6 g/dL  0.8   Gamma Fraction Urine Latest Ref Range: 0 % 72.7 (H)    IGA Latest Ref Range: 70 - 380 mg/dL  248   IGG Latest Ref Range: 695 - 1620 mg/dL  633 (L)   IGM Latest Ref Range: 60 - 265 mg/dL  39 (L)   Immunofix ELP Urine Unknown (Note)...    Kappa Free Lt Chain Latest Ref Range: 0.33 - 1.94 mg/dL  1.88   Kappa Lambda Ratio Latest Ref Range: 0.26 - 1.65   0.06 (L)   Lambda Free Lt Chain Latest Ref Range: 0.57 - 2.63 mg/dL  34.00 (H)   Monoclonal Peak Latest Ref Range: 0.0 g/dL  0.1 (H)   Monoclonal Peak Urine Latest Ref Range: 0% % 53.0 (H)      ASSESSMENT:   1.  Multiple myeloma.   2.  Status post autologous peripheral blood stem cell transplant.   3.  Cardiomyopathy.   4.  Atrial fibrillation.   5.  History of deep venous thrombosis, on anticoagulation.   6.  Chronic back pain.   7.  Aortic aneurysm.   8.  History of plasmacytoma of the clivus, status post radiation.   9.  History of ventricular tachycardia with an implantable defibrillator in place.     10.  Recent abdominal pain and ileus requiring hospitalization.       Lambda light chains are stable and he feels great.  I would like to suggest that we continue to hold the Revlimid and dexamethasone.  I am going to see him in a couple of months.  I am going to check his light chains today and M-spike and keep him off the  Revlimid and dexamethasone for now.  We may come back with a lower dose of dexamethasone and Revlimid, but I would like to see what his 24-hour urine protein looks like as well as the serum proteins.  He should continue the magnesium but decrease to 6 tabs with Mg 2.1 and potassium as needed.  [facility]   and  , about these datat.

## 2017-07-12 ENCOUNTER — ALLIED HEALTH/NURSE VISIT (OUTPATIENT)
Dept: CARDIOLOGY | Facility: CLINIC | Age: 67
End: 2017-07-12
Attending: INTERNAL MEDICINE
Payer: MEDICARE

## 2017-07-12 DIAGNOSIS — I42.0 DILATED CARDIOMYOPATHY (H): Primary | ICD-10-CM

## 2017-07-12 PROCEDURE — 93296 REM INTERROG EVL PM/IDS: CPT | Mod: ZF

## 2017-07-12 PROCEDURE — 93295 DEV INTERROG REMOTE 1/2/MLT: CPT | Performed by: INTERNAL MEDICINE

## 2017-07-12 NOTE — MR AVS SNAPSHOT
After Visit Summary   7/12/2017    Erasmo Pearce    MRN: [id number]           Patient Information     Date Of Birth          1950        Visit Information        Provider Department      7/12/2017 6:00 AM UC ICD REMOTE [facility]        Today's Diagnoses     Dilated cardiomyopathy (H)    -  1       Follow-ups after your visit        Your next 10 appointments already scheduled     Aug 08, 2017  9:30 AM CDT   (Arrive by 9:15 AM)   Implanted Defibulator with  Cv Device 1   [facility] ([facility])    [address] [place] [id number]   [phone]            Aug 08, 2017 10:00 AM CDT   (Arrive by 9:45 AM)   RETURN ARRHYTHMIA with Chuy Jean-Baptiste MD   [facility] ([facility])    [address] [place] [id number] [phone]            Sep 11, 2017 11:00 AM CDT   [facility] Draw with  [facility] LAB DRAW   [facility] ([facility])    [address] [place] [id number] [phone]            Sep 19, 2017 11:00 AM CDT   Return with Rojas Raygoza MD   [facility] ([facility])    [address] [place] [id number] [phone]              Who to contact     If you have questions or need follow up information about today's clinic visit or your schedule please contact [facility] directly at [phone].  Normal or non-critical lab and imaging results will be communicated to you by MyChart, letter or phone within 4 business days after the clinic has received the results. If you do not hear from us within 7 days, please contact the clinic through MyChart or phone. If you have a critical or abnormal lab result, we will notify you by phone as soon as possible.  Submit refill requests through [employer] or call your  pharmacy and they will forward the refill request to us. Please allow 3 business days for your refill to be completed.          Additional Information About Your Visit        [employer] Information     [employer] gives you secure access to your electronic health record. If you see a primary care provider, you can also send messages to your care team and make appointments. If you have questions, please call your primary care clinic.  If you do not have a primary care provider, please call [phone] and they will assist you.        Care EveryWhere ID     This is your Care EveryWhere ID. This could be used by other organizations to access your [facility] medical records  [id number]         Blood Pressure from Last 3 Encounters:   07/11/17 104/66   06/13/17 110/64   06/02/17 118/59    Weight from Last 3 Encounters:   07/11/17 91.4 kg (201 lb 8 oz)   06/13/17 90.3 kg (199 lb 1.2 oz)   06/02/17 89.2 kg (196 lb 10.4 oz)              We Performed the Following     INTERROGATION DEVICE EVAL REMOTE, PACER/ICD        Primary Care Provider Office Phone # Fax #    Cong Mcdonald [phone]       [place] [address]        Equal Access to Services     CALI DAVILA : Hadii aad ku hadasho Soomaali, waaxda luqadaha, qaybta kaalmada adeegyada, nino sierra. So [facility] [phone].    ATENCIÓN: Si habla español, tiene a miles disposición servicios gratuitos de asistencia lingüística. Llame al [phone].    We comply with applicable federal civil rights laws and [place] laws. We do not discriminate on the basis of race, color, national origin, age, disability sex, sexual orientation or gender identity.            Thank you!     Thank you for choosing [facility]  for your care. Our goal is always to provide you with excellent care. Hearing back from our patients is one way we can continue to improve our services. Please take a few minutes to complete the  written survey that you may receive in the mail after your visit with us. Thank you!             Your Updated Medication List - Protect others around you: Learn how to safely use, store and throw away your medicines at www.disposemymeds.org.          This list is accurate as of: 7/12/17 11:59 PM.  Always use your most recent med list.                   Brand Name Dispense Instructions for use Diagnosis    albuterol 108 (90 BASE) MCG/ACT Inhaler    PROAIR HFA/PROVENTIL HFA/VENTOLIN HFA     Inhale 2 puffs into the lungs every 6 hours as needed        amoxicillin 500 MG tablet    AMOXIL    4 tablet    Take 4 tabs one hour before dental appointment    Multiple myeloma, without mention of having achieved remission       baclofen 10 MG tablet    LIORESAL     Take 10 mg by mouth 3 times daily as needed prn        CALCIUM 500 + D PO      Take 2 tablets by mouth daily.        dexamethasone 4 MG tablet    DECADRON    30 tablet    Take 10 tabs (40mg) Days 1, 8, and 15.    Multiple myeloma not having achieved remission (H)       diclofenac 75 MG EC tablet    VOLTAREN     Take 75 mg by mouth 2 times daily as needed 1 tab prn        fentaNYL 25 mcg/hr 72 hr patch    DURAGESIC     Place 1 patch onto the skin every 72 hours        furosemide 20 MG tablet    LASIX    30 tablet    Take 40 mg by mouth daily    Status post chemotherapy, Multiple myeloma, without mention of having achieved remission       LENalidomide 10 MG Caps capsule CHEMO    REVLIMID    21 each    Take 10 mg daily days 1-21    Multiple myeloma not having achieved remission (H)       lisinopril 5 MG tablet    PRINIVIL/ZESTRIL     Take 5 mg by mouth 2 times daily    Multiple myeloma, without mention of having achieved remission, Hypomagnesemia, Ventricular fibrillation (H), Anemia of other chronic disease, Renal insufficiency, Dilated cardiomyopathy (H), Automatic implantable cardiac defibrillator in situ, Personal history of diseases of blood and blood-forming  organs, History of long-term use of multiple prescription drugs       magnesium oxide 400 MG tablet    MAG-OX    4 tablet    Take 1,200 mg by mouth 3 times daily    Hypomagnesemia       metoprolol 100 MG 24 hr tablet    TOPROL-XL    135 tablet    Take 1.5 tablets (150 mg) by mouth daily    Atrial fibrillation, unspecified type (H), Paroxysmal ventricular tachycardia (H), Permanent atrial fibrillation (H)       nitroGLYcerin 0.4 MG sublingual tablet    NITROSTAT     Place 0.4 mg under the tongue every 5 minutes as needed Reported on 5/16/2017        omeprazole 20 MG CR capsule    priLOSEC    90 capsule    Take 20 mg by mouth daily    GERD (gastroesophageal reflux disease)       oxyCODONE 5 MG IR tablet    ROXICODONE     Take 1-2 tablets by mouth every 6 hours as needed. For pain.        simvastatin 40 MG tablet    ZOCOR    30 tablet    Take 40 mg by mouth At Bedtime        tiotropium 18 MCG capsule    SPIRIVA    30 capsule    Inhale 1 capsule (18 mcg) into the lungs daily    SOB (shortness of breath)       TRAZODONE HCL PO      Take 50 mg by mouth nightly as needed        warfarin 5 MG tablet    COUMADIN      Multiple myeloma in relapse (H)

## 2017-07-13 ENCOUNTER — TRANSFERRED RECORDS (OUTPATIENT)
Dept: HEALTH INFORMATION MANAGEMENT | Facility: CLINIC | Age: 67
End: 2017-07-13

## 2017-07-13 NOTE — PROGRESS NOTES
[facility] remote ICD transmission received and reviewed. Device transmission sent per MD orders. Pt received a shock for VT on 7/11/17 @ 0413. VT @ 252 bpm was successfully converted with 1 41j shock. His presenting rhythm is an irregular ventricular rhythm ~80 bpm. The arrhythmia log shows pt also received a shock on 5/19/17. Normal device function. = 1%. Lead trends appear stable. Battery estimates 5 years to ROSEMARIE. Pt notified of transmission results via . Pt is scheduled to RTC 8/8/17 for an ICD check and appt with Dr. Jean-Baptiste.  Remote ICD transmission    7.13.17 @ 1400: Pt called stating he is feeling well. He didn't know if he was having a dream or had an ICD shock. He states he will be off chemo from 6/2/17 to August or September.

## 2017-07-26 ENCOUNTER — TRANSFERRED RECORDS (OUTPATIENT)
Dept: HEALTH INFORMATION MANAGEMENT | Facility: CLINIC | Age: 67
End: 2017-07-26

## 2017-08-08 ENCOUNTER — ALLIED HEALTH/NURSE VISIT (OUTPATIENT)
Dept: CARDIOLOGY | Facility: CLINIC | Age: 67
End: 2017-08-08
Attending: INTERNAL MEDICINE
Payer: MEDICARE

## 2017-08-08 ENCOUNTER — PRE VISIT (OUTPATIENT)
Dept: CARDIOLOGY | Facility: CLINIC | Age: 67
End: 2017-08-08

## 2017-08-08 VITALS
HEIGHT: 70 IN | OXYGEN SATURATION: 96 % | SYSTOLIC BLOOD PRESSURE: 96 MMHG | DIASTOLIC BLOOD PRESSURE: 53 MMHG | HEART RATE: 53 BPM | BODY MASS INDEX: 28.77 KG/M2 | WEIGHT: 201 LBS

## 2017-08-08 DIAGNOSIS — I49.01 VF (VENTRICULAR FIBRILLATION) (H): ICD-10-CM

## 2017-08-08 DIAGNOSIS — I48.0 PAROXYSMAL ATRIAL FIBRILLATION (H): Primary | ICD-10-CM

## 2017-08-08 DIAGNOSIS — I42.0 DILATED CARDIOMYOPATHY (H): Primary | ICD-10-CM

## 2017-08-08 DIAGNOSIS — Z95.810 AUTOMATIC IMPLANTABLE CARDIOVERTER-DEFIBRILLATOR IN SITU: Primary | ICD-10-CM

## 2017-08-08 DIAGNOSIS — I48.0 PAROXYSMAL ATRIAL FIBRILLATION (H): ICD-10-CM

## 2017-08-08 PROCEDURE — 99214 OFFICE O/P EST MOD 30 MIN: CPT | Mod: 25 | Performed by: INTERNAL MEDICINE

## 2017-08-08 PROCEDURE — 93282 PRGRMG EVAL IMPLANTABLE DFB: CPT | Mod: 26 | Performed by: INTERNAL MEDICINE

## 2017-08-08 PROCEDURE — 99212 OFFICE O/P EST SF 10 MIN: CPT | Mod: 25,ZF

## 2017-08-08 PROCEDURE — 93010 ELECTROCARDIOGRAM REPORT: CPT | Mod: 59 | Performed by: INTERNAL MEDICINE

## 2017-08-08 PROCEDURE — 93282 PRGRMG EVAL IMPLANTABLE DFB: CPT | Mod: ZF

## 2017-08-08 PROCEDURE — 93005 ELECTROCARDIOGRAM TRACING: CPT | Mod: ZF

## 2017-08-08 ASSESSMENT — PAIN SCALES - GENERAL: PAINLEVEL: NO PAIN (0)

## 2017-08-08 NOTE — LETTER
8/8/2017      RE: Earsmo Pearce  [address]  [address] [facility] [address]       Dear Colleague,    Thank you for the opportunity to participate in the care of your patient, Erasmo Pearce, at the [facility] at [facility]. Please see a copy of my visit note below.    CC: routine follow up    HPI:   67-year-old gentleman with multiple myeloma, permanent atrial fibrillation, ischemic cardiomyopathy (LVEF 30-35%) s/p ICD placed December 2009 for primary prevention with subsequent shocks.     He has been on amiodarone in the past, but this was discontinued relatively quickly because of a low DLCO. He was subsequently treated with sotalol, initially 80 mg twice daily, then reduce to 40 mg twice daily for prolonged QT interval and then discontinued because of low blood pressure.    Since last visit in 2/2017, he's been doing well. Currently off chemo since June and possibly could have more in September. He denies chest pain, lightheadedness, palpitations, peripheral edema. He denies problems with warfarin or symptoms suggestive of embolic events. Does have occasional dyspnea    He's had 3 shocks since last visit. He was on chemo (Revlimid + dex) since March-June 5/19/17 @ 0131 PMVT episode recorded s/p 1 41j shock  6.4.17 @ 1830- VT/VF lasting 12 seconds until successfully converted with 1 41j shock  7/11/17 @ 0413 - VT @ 252 bpm was successfully converted with 1 41j shock. His presenting rhythm is an irregular ventricular rhythm ~80 bpm     Interrogation today shows normal ICD function.  558 episodes recorded as VT since his last remote transmission on 7/11/17 - 5 sec - 1 min 21 sec, 132-186 bpm. The stored EGM's reveal what appears to be AF with RVR. Patient is taking Coumadin.  Intrinsic rhythm = irregular ventricular rhythm @ ~ 70's bpm.   = 1%.  Estimated battery longevity to ROSEMARIE = 5 years.      PAST MEDICAL HISTORY:  Past Medical History:    Diagnosis Date     Atrial fibrillation (H)      Other premature beats      VF (ventricular fibrillation) (H)        CURRENT MEDICATIONS:  Current Outpatient Prescriptions   Medication Sig Dispense Refill     warfarin (COUMADIN) 5 MG tablet        magnesium oxide (MAG-OX) 400 MG tablet Take 1,200 mg by mouth 3 times daily  4 tablet 0     metoprolol (TOPROL-XL) 100 MG 24 hr tablet Take 1.5 tablets (150 mg) by mouth daily 135 tablet 3     TRAZODONE HCL PO Take 50 mg by mouth nightly as needed        fentaNYL (DURAGESIC) 25 mcg/hr patch 72 hr Place 1 patch onto the skin every 72 hours       furosemide (LASIX) 20 MG tablet Take 40 mg by mouth daily  30 tablet 3     lisinopril (PRINIVIL,ZESTRIL) 5 MG tablet Take 5 mg by mouth 2 times daily       tiotropium (SPIRIVA) 18 MCG inhalation capsule Inhale 1 capsule (18 mcg) into the lungs daily 30 capsule 3     simvastatin (ZOCOR) 40 MG tablet Take 40 mg by mouth At Bedtime  30 tablet      albuterol (PROAIR HFA, PROVENTIL HFA, VENTOLIN HFA) 108 (90 BASE) MCG/ACT inhaler Inhale 2 puffs into the lungs every 6 hours as needed        baclofen (LIORESAL) 10 MG tablet Take 10 mg by mouth 3 times daily as needed prn       diclofenac (VOLTAREN) 75 MG EC tablet Take 75 mg by mouth 2 times daily as needed 1 tab prn       amoxicillin (AMOXIL) 500 MG tablet Take 4 tabs one hour before dental appointment 4 tablet 5     omeprazole (PRILOSEC) 20 MG capsule Take 20 mg by mouth daily  90 capsule 3     Calcium Carbonate-Vitamin D (CALCIUM 500 + D PO) Take 2 tablets by mouth daily.       nitroGLYCERIN (NITROSTAT) 0.4 MG SL tablet Place 0.4 mg under the tongue every 5 minutes as needed Reported on 5/16/2017       oxycodone (ROXICODONE) 5 MG immediate release tablet Take 1-2 tablets by mouth every 6 hours as needed. For pain.        LENalidomide (REVLIMID) 10 MG CAPS capsule CHEMO Take 10 mg daily days 1-21 21 each 3     dexamethasone (DECADRON) 4 MG tablet Take 10 tabs (40mg) Days 1, 8, and 15.  "30 tablet 3       PAST SURGICAL HISTORY:  Past Surgical History:   Procedure Laterality Date     IMPLANT AUTOMATIC IMPLANTABLE CARDIOVERTER DEFIBRILLATOR         ALLERGIES:     Allergies   Allergen Reactions     Nkda [No Known Drug Allergies]        FAMILY HISTORY:  No family history on file.    SOCIAL HISTORY:  Social History   Substance Use Topics     Smoking status: Former Smoker     Packs/day: 1.00     Years: 25.00     Types: Cigarettes     Quit date: 10/15/1995     Smokeless tobacco: Not on file     Alcohol use No       ROS:   Constitutional: No fever, chills, or sweats. Weight stable.   ENT: No visual disturbance, ear ache, epistaxis, sore throat.   Cardiovascular: As per HPI.   Respiratory: No cough, hemoptysis.    GI: No nausea, vomiting, hematemesis, melena, or hematochezia.   : No hematuria.   Integument: Negative.   Psychiatric: Negative.   Hematologic:  Easy bruising, no easy bleeding.  Neuro: Negative.   Endocrinology: No significant heat or cold intolerance   Musculoskeletal: No myalgia.    Exam:  BP 96/53 (BP Location: Right arm, Patient Position: Chair, Cuff Size: Adult Regular)  Pulse 53  Ht 1.778 m (5' 10\")  SpO2 96%  No acute distress.  Alert and oriented.  HEENT: EOMI, sclera non-icteric, mucosa moist  Neck: JVP approximately 6 cm without HJR.  Cor: Irregularly irregular rhythm, normal rate. normal S2.  No murmur, rub, or gallop.    Lungs:  Clear  Abd: Soft, nontender, bowel sounds present.  Extremities: No C/C/E.    Labs:  CBC RESULTS:   Lab Results   Component Value Date    WBC 7.7 07/06/2017    RBC 3.05 (L) 07/06/2017    HGB 10.8 (L) 07/06/2017    HCT 33.0 (L) 07/06/2017     (H) 07/06/2017    MCH 35.4 (H) 07/06/2017    MCHC 32.7 07/06/2017    RDW 16.8 (H) 07/06/2017     07/06/2017       BMP RESULTS:  Lab Results   Component Value Date     07/06/2017    POTASSIUM 4.1 07/06/2017    CHLORIDE 104 07/06/2017    CO2 25 07/06/2017    ANIONGAP 9 07/06/2017     (H) " 07/06/2017    BUN 22 07/06/2017    CR 1.23 07/06/2017    GFRESTIMATED 59 (L) 07/06/2017    GFRESTBLACK 71 07/06/2017    EILEEN 9.0 07/06/2017        INR RESULTS:  Lab Results   Component Value Date    INR 2.15 (H) 01/31/2017    INR 2.18 (H) 11/01/2016    INR 1.71 (H) 02/02/2016    INR 3.04 (H) 04/14/2015       Procedures:  TTE 2/7/17  Left ventricular size is normal. Moderately reduced left ventricular function is present. The Ejection Fraction is estimated at 30-35%. Moderate right ventricular dilation is present. Global right ventricular function is moderately reduced. No pericardial effusion is present. This study was compared with the study from 6/2014 and there has been no change.    Assessment and Plan:   67-year-old gentleman with multiple myeloma, permanent atrial fibrillation, ischemic cardiomyopathy with an ejection fraction of approximately 30-35%, and an implantable cardioverter-defibrillator.    ICM s/p ICD - LVEF 30-35%  - Continue metoprolol XL 150mg qdaily  - continue lisinopril 5mg po BID  - given low BP, will hold off on spironolactone or up titrating lisinopril  - continue lasix, we advised patient to hold lasix after chemo if he loses >5lbs in one day or has poor PO intake.   - continue simvastatin.     VT - reviewed shocks he's had since last visit which were appropriate. Since they occurred around the time of chemotherapy and he's doing well now, we will hold off on pursuing medical management of reducing VT burden. Also, our options are limited since amiodarone and sotalol had to be stopped for various reasons in the past.     Atrial fibrillation, permanent  - diagnosed 2006, non valvular, severe LA enlargement  - rate control w/ metoprolol  - anticoagulation w/ warfarin, goal INR 2-3    Follow up in 3 months.   Thelma Romero MD  Cardiology Fellow      ELECTROPHYSIOLOGY STAFF  Patient seen and examined by me.  History and physical examination discussed with Dr. Romero whose note reflects our joint  assessment and recommendation/plans.  Erasmo Pearce returns for follow-up. I last saw him in February 2017. He is a 67-year-old gentleman with multiple myeloma, permanent atrial fibrillation, ischemic cardiomyopathy with an ejection fraction of approximately 35% who had an implantable cardioverter-defibrillator placed in December 2009 for primary prevention with subsequent appropriate shocks. He has been on amiodarone in the past, but this was discontinued relatively quickly because of a low DLCO. He was subsequently treated with sotalol, initially 80 mg twice daily, then reduce to 40 mg twice daily for prolonged QT interval and then discontinued because of low blood pressure.   Later in the day that we last saw him he saw Dr. Raygoza and he has begun chemotherapy for relapse of his myeloma.   In anticipation of the possibility of restarting chemotherapy we obtained a new  baseline  echo on 2/7/2017. LVEF was 30-35%. there was inferior akinesis and wall thinning, consistent with a scar.   He received a shock for polymorphic VT 5/19/2017 at 1:31 am, terminated with a single 41J shock.   He received a shock for VT/VF 6/4/2017 at 6:30 pm for VT/VF, converted with one 41J shock. Per patient report he was not feeling well and his labs were abnormal.   He received an appropriate shock on 7/11/2017 (at 4:13 am) for an episode of VT at 252 bpm that was successfully terminated with a singe 41J shock.  He has been off chemotherapy since June. He had some problems with chemotherapy but also had a good initial response to treatment. He may be going back on chemotherapy in the next month or 2. As to empiric initiation of antiarrhythmic therapy, I think it would be best not to begin a new medication at this time. I would like to see if his rhythm remains stable in the absence of chemotherapy. We will arrange early follow-up, in 3 months, to see how his rhythm is doing, particularly if he resumes chemotherapy. As always,  it was a pleasure seeing Jessica Ramses today.   Chuy Jean-Baptiste

## 2017-08-08 NOTE — PROGRESS NOTES
CC: routine follow up    HPI:   67-year-old gentleman with multiple myeloma, permanent atrial fibrillation, ischemic cardiomyopathy (LVEF 30-35%) s/p ICD placed December 2009 for primary prevention with subsequent shocks.     He has been on amiodarone in the past, but this was discontinued relatively quickly because of a low DLCO. He was subsequently treated with sotalol, initially 80 mg twice daily, then reduce to 40 mg twice daily for prolonged QT interval and then discontinued because of low blood pressure.    Since last visit in 2/2017, he's been doing well. Currently off chemo since June and possibly could have more in September. He denies chest pain, lightheadedness, palpitations, peripheral edema. He denies problems with warfarin or symptoms suggestive of embolic events. Does have occasional dyspnea    He's had 3 shocks since last visit. He was on chemo (Revlimid + dex) since March-June 5/19/17 @ 0131 PMVT episode recorded s/p 1 41j shock  6.4.17 @ 1830- VT/VF lasting 12 seconds until successfully converted with 1 41j shock  7/11/17 @ 0413 - VT @ 252 bpm was successfully converted with 1 41j shock. His presenting rhythm is an irregular ventricular rhythm ~80 bpm     Interrogation today shows normal ICD function.  558 episodes recorded as VT since his last remote transmission on 7/11/17 - 5 sec - 1 min 21 sec, 132-186 bpm. The stored EGM's reveal what appears to be AF with RVR. Patient is taking Coumadin.  Intrinsic rhythm = irregular ventricular rhythm @ ~ 70's bpm.   = 1%.  Estimated battery longevity to ROSEMARIE = 5 years.     PAST MEDICAL HISTORY:  Past Medical History:   Diagnosis Date     Atrial fibrillation (H)      Other premature beats      VF (ventricular fibrillation) (H)        CURRENT MEDICATIONS:  Current Outpatient Prescriptions   Medication Sig Dispense Refill     warfarin (COUMADIN) 5 MG tablet        magnesium oxide (MAG-OX) 400 MG tablet Take 1,200 mg by mouth 3 times daily  4 tablet 0      metoprolol (TOPROL-XL) 100 MG 24 hr tablet Take 1.5 tablets (150 mg) by mouth daily 135 tablet 3     TRAZODONE HCL PO Take 50 mg by mouth nightly as needed        fentaNYL (DURAGESIC) 25 mcg/hr patch 72 hr Place 1 patch onto the skin every 72 hours       furosemide (LASIX) 20 MG tablet Take 40 mg by mouth daily  30 tablet 3     lisinopril (PRINIVIL,ZESTRIL) 5 MG tablet Take 5 mg by mouth 2 times daily       tiotropium (SPIRIVA) 18 MCG inhalation capsule Inhale 1 capsule (18 mcg) into the lungs daily 30 capsule 3     simvastatin (ZOCOR) 40 MG tablet Take 40 mg by mouth At Bedtime  30 tablet      albuterol (PROAIR HFA, PROVENTIL HFA, VENTOLIN HFA) 108 (90 BASE) MCG/ACT inhaler Inhale 2 puffs into the lungs every 6 hours as needed        baclofen (LIORESAL) 10 MG tablet Take 10 mg by mouth 3 times daily as needed prn       diclofenac (VOLTAREN) 75 MG EC tablet Take 75 mg by mouth 2 times daily as needed 1 tab prn       amoxicillin (AMOXIL) 500 MG tablet Take 4 tabs one hour before dental appointment 4 tablet 5     omeprazole (PRILOSEC) 20 MG capsule Take 20 mg by mouth daily  90 capsule 3     Calcium Carbonate-Vitamin D (CALCIUM 500 + D PO) Take 2 tablets by mouth daily.       nitroGLYCERIN (NITROSTAT) 0.4 MG SL tablet Place 0.4 mg under the tongue every 5 minutes as needed Reported on 5/16/2017       oxycodone (ROXICODONE) 5 MG immediate release tablet Take 1-2 tablets by mouth every 6 hours as needed. For pain.        LENalidomide (REVLIMID) 10 MG CAPS capsule CHEMO Take 10 mg daily days 1-21 21 each 3     dexamethasone (DECADRON) 4 MG tablet Take 10 tabs (40mg) Days 1, 8, and 15. 30 tablet 3       PAST SURGICAL HISTORY:  Past Surgical History:   Procedure Laterality Date     IMPLANT AUTOMATIC IMPLANTABLE CARDIOVERTER DEFIBRILLATOR         ALLERGIES:     Allergies   Allergen Reactions     Nkda [No Known Drug Allergies]        FAMILY HISTORY:  No family history on file.    SOCIAL HISTORY:  Social History   Substance  "Use Topics     Smoking status: Former Smoker     Packs/day: 1.00     Years: 25.00     Types: Cigarettes     Quit date: 10/15/1995     Smokeless tobacco: Not on file     Alcohol use No       ROS:   Constitutional: No fever, chills, or sweats. Weight stable.   ENT: No visual disturbance, ear ache, epistaxis, sore throat.   Cardiovascular: As per HPI.   Respiratory: No cough, hemoptysis.    GI: No nausea, vomiting, hematemesis, melena, or hematochezia.   : No hematuria.   Integument: Negative.   Psychiatric: Negative.   Hematologic:  Easy bruising, no easy bleeding.  Neuro: Negative.   Endocrinology: No significant heat or cold intolerance   Musculoskeletal: No myalgia.    Exam:  BP 96/53 (BP Location: Right arm, Patient Position: Chair, Cuff Size: Adult Regular)  Pulse 53  Ht 1.778 m (5' 10\")  SpO2 96%  No acute distress.  Alert and oriented.  HEENT: EOMI, sclera non-icteric, mucosa moist  Neck: JVP approximately 6 cm without HJR.  Cor: Irregularly irregular rhythm, normal rate. normal S2.  No murmur, rub, or gallop.    Lungs:  Clear  Abd: Soft, nontender, bowel sounds present.  Extremities: No C/C/E.    Labs:  CBC RESULTS:   Lab Results   Component Value Date    WBC 7.7 07/06/2017    RBC 3.05 (L) 07/06/2017    HGB 10.8 (L) 07/06/2017    HCT 33.0 (L) 07/06/2017     (H) 07/06/2017    MCH 35.4 (H) 07/06/2017    MCHC 32.7 07/06/2017    RDW 16.8 (H) 07/06/2017     07/06/2017       BMP RESULTS:  Lab Results   Component Value Date     07/06/2017    POTASSIUM 4.1 07/06/2017    CHLORIDE 104 07/06/2017    CO2 25 07/06/2017    ANIONGAP 9 07/06/2017     (H) 07/06/2017    BUN 22 07/06/2017    CR 1.23 07/06/2017    GFRESTIMATED 59 (L) 07/06/2017    GFRESTBLACK 71 07/06/2017    EILEEN 9.0 07/06/2017        INR RESULTS:  Lab Results   Component Value Date    INR 2.15 (H) 01/31/2017    INR 2.18 (H) 11/01/2016    INR 1.71 (H) 02/02/2016    INR 3.04 (H) 04/14/2015       Procedures:  TTE 2/7/17  Left " ventricular size is normal. Moderately reduced left ventricular function is present. The Ejection Fraction is estimated at 30-35%. Moderate right ventricular dilation is present. Global right ventricular function is moderately reduced. No pericardial effusion is present. This study was compared with the study from 6/2014 and there has been no change.    Assessment and Plan:   67-year-old gentleman with multiple myeloma, permanent atrial fibrillation, ischemic cardiomyopathy with an ejection fraction of approximately 30-35%, and an implantable cardioverter-defibrillator.    ICM s/p ICD - LVEF 30-35%  - Continue metoprolol XL 150mg qdaily  - continue lisinopril 5mg po BID  - given low BP, will hold off on spironolactone or up titrating lisinopril  - continue lasix, we advised patient to hold lasix after chemo if he loses >5lbs in one day or has poor PO intake.   - continue simvastatin.     VT - reviewed shocks he's had since last visit which were appropriate. Since they occurred around the time of chemotherapy and he's doing well now, we will hold off on pursuing medical management of reducing VT burden. Also, our options are limited since amiodarone and sotalol had to be stopped for various reasons in the past.     Atrial fibrillation, permanent  - diagnosed 2006, non valvular, severe LA enlargement  - rate control w/ metoprolol  - anticoagulation w/ warfarin, goal INR 2-3    Follow up in 3 months.   Thelma Romero MD  Cardiology Fellow      ELECTROPHYSIOLOGY STAFF  Patient seen and examined by me.  History and physical examination discussed with Dr. Romero whose note reflects our joint assessment and recommendation/plans.  Erasmo Pearce returns for follow-up. I last saw him in February 2017. He is a 67-year-old gentleman with multiple myeloma, permanent atrial fibrillation, ischemic cardiomyopathy with an ejection fraction of approximately 35% who had an implantable cardioverter-defibrillator placed in December  2009 for primary prevention with subsequent appropriate shocks. He has been on amiodarone in the past, but this was discontinued relatively quickly because of a low DLCO. He was subsequently treated with sotalol, initially 80 mg twice daily, then reduce to 40 mg twice daily for prolonged QT interval and then discontinued because of low blood pressure.   Later in the day that we last saw him he saw Dr. Raygoza and he has begun chemotherapy for relapse of his myeloma.   In anticipation of the possibility of restarting chemotherapy we obtained a new  baseline  echo on 2/7/2017. LVEF was 30-35%. there was inferior akinesis and wall thinning, consistent with a scar.   He received a shock for polymorphic VT 5/19/2017 at 1:31 am, terminated with a single 41J shock.   He received a shock for VT/VF 6/4/2017 at 6:30 pm for VT/VF, converted with one 41J shock. Per patient report he was not feeling well and his labs were abnormal.   He received an appropriate shock on 7/11/2017 (at 4:13 am) for an episode of VT at 252 bpm that was successfully terminated with a singe 41J shock.  He has been off chemotherapy since June. He had some problems with chemotherapy but also had a good initial response to treatment. He may be going back on chemotherapy in the next month or 2. As to empiric initiation of antiarrhythmic therapy, I think it would be best not to begin a new medication at this time. I would like to see if his rhythm remains stable in the absence of chemotherapy. We will arrange early follow-up, in 3 months, to see how his rhythm is doing, particularly if he resumes chemotherapy. As always, it was a pleasure seeing Mr. Pearce today.   NARINDER Rose

## 2017-08-08 NOTE — NURSING NOTE
Chief Complaint   Patient presents with     Follow Up For     ICD- EKG- NSVT, 7/11/17- shock for VT- has restarted chemotherapy.

## 2017-08-08 NOTE — PATIENT INSTRUCTIONS
It was a pleasure to see you in clinic today.  Please do not hesitate to call with any questions or concerns.  You are scheduled for a remote transmission on 11/9/17.  We look forward to seeing you in clinic at your next device check in 6 months.    Whit Lipscomb, RN, MS, CCRN  Electrophysiology Nurse Clinician  [facility]    During Business Hours Please Call:  [phone]  After Hours Please Call:  [phone] - select option #4 and ask for job code [phone]

## 2017-08-08 NOTE — MR AVS SNAPSHOT
After Visit Summary   8/8/2017    Erasmo Paerce    MRN: [id number]           Patient Information     Date Of Birth          1950        Visit Information        Provider Department      8/8/2017 10:00 AM Chuy Jean-Baptiste MD [facility]        Today's Diagnoses     Paroxysmal atrial fibrillation (H)        VF (ventricular fibrillation) (H)          Care Instructions    You will be scheduled for a follow up visit as instructed.    If you have any questions regarding to your visit please contact your care team:     Cardiology  Telephone Number    Aleida Angeles [phone]   Or send a message to your provider via my chart.   For scheduling appts or procedures:    Stephnaie Wells     [phone] or  [phone]   For the Device Clinic (Pacemakers and ICD's)   RN's :   Natali Vale  During business hours: [phone]    After business hours:   [phone]- select option 4 and ask for job code [phone].    You can also contact us using My Chart. If you need assistance in setting this up, please contact our office or ask at your follow up visit.     If you need a medication refill please contact your pharmacy. Please allow 3 business days for your refill to be completed.     As always, Thank you for trusting us with your health care needs!          Follow-ups after your visit        Follow-up notes from your care team     Return in about 3 months (around 11/8/2017) for ICD and kristy.      Your next 10 appointments already scheduled     Sep 11, 2017 11:00 AM CDT   [facility] Draw with  [facility] DRAW   [facility] ([facility])    [address] [place] [id number] [phone]            Sep 19, 2017 11:00 AM CDT   Return with Rojas Raygoza MD   [facility] Marrow Transplant ([facility])    [address] [place] [id number]  "  [phone]            Nov 21, 2017  8:30 AM CST   (Arrive by 8:15 AM)   Implanted Defibulator with Uc Cv Device 1   [facility] ([facility])    [address] [place] [id number] [phone]            Nov 21, 2017  9:00 AM CST   (Arrive by 8:45 AM)   RETURN ARRHYTHMIA with Chuy Jean-Baptiste MD   [facility] ([facility])    [address] [place] [address] [phone]              Who to contact     If you have questions or need follow up information about today's clinic visit or your schedule please contact [facility] directly at [phone].  Normal or non-critical lab and imaging results will be communicated to you by [employer], letter or phone within 4 business days after the clinic has received the results. If you do not hear from us within 7 days, please contact the clinic through [employer] or phone. If you have a critical or abnormal lab result, we will notify you by phone as soon as possible.  Submit refill requests through [employer] or call your pharmacy and they will forward the refill request to us. Please allow 3 business days for your refill to be completed.          Additional Information About Your Visit        [employer] Information     [employer] gives you secure access to your electronic health record. If you see a primary care provider, you can also send messages to your care team and make appointments. If you have questions, please call your primary care clinic.  If you do not have a primary care provider, please call [phone] and they will assist you.        Care EveryWhere ID     This is your Care EveryWhere ID. This could be used by other organizations to access your [facility] medical records  [id number]        Your Vitals Were     Pulse Height Pulse Oximetry BMI (Body Mass Index)          53 1.778 m (5' 10\") 96% 28.84 kg/m2         Blood Pressure from Last 3 " Encounters:   08/08/17 96/53   07/11/17 104/66   06/13/17 110/64    Weight from Last 3 Encounters:   08/08/17 91.2 kg (201 lb)   07/11/17 91.4 kg (201 lb 8 oz)   06/13/17 90.3 kg (199 lb 1.2 oz)              We Performed the Following     EKG 12-lead, tracing only (Future)        Primary Care Provider Office Phone # Fax #    Cong Mcdonald [phone]       [address] [address]        Equal Access to Services     [facility]: Hadii jericho ku hadasho Soomaali, waaxda luqadaha, qaybta kaalmada carlos, nino harmon . So [facility] [phone].    ATENCIÓN: Si habla español, tiene a miles disposición servicios gratuitos de asistencia lingüística. [facility] [phone].    We comply with applicable federal civil rights laws and [place] laws. We do not discriminate on the basis of race, color, national origin, age, disability sex, sexual orientation or gender identity.            Thank you!     Thank you for choosing [facility]  for your care. Our goal is always to provide you with excellent care. Hearing back from our patients is one way we can continue to improve our services. Please take a few minutes to complete the written survey that you may receive in the mail after your visit with us. Thank you!             Your Updated Medication List - Protect others around you: Learn how to safely use, store and throw away your medicines at www.disposemymeds.org.          This list is accurate as of: 8/8/17 11:14 AM.  Always use your most recent med list.                   Brand Name Dispense Instructions for use Diagnosis    albuterol 108 (90 BASE) MCG/ACT Inhaler    PROAIR HFA/PROVENTIL HFA/VENTOLIN HFA     Inhale 2 puffs into the lungs every 6 hours as needed        amoxicillin 500 MG tablet    AMOXIL    4 tablet    Take 4 tabs one hour before dental appointment    Multiple myeloma, without mention of having achieved remission       baclofen 10 MG  tablet    LIORESAL     Take 10 mg by mouth 3 times daily as needed prn        CALCIUM 500 + D PO      Take 2 tablets by mouth daily.        dexamethasone 4 MG tablet    DECADRON    30 tablet    Take 10 tabs (40mg) Days 1, 8, and 15.    Multiple myeloma not having achieved remission (H)       diclofenac 75 MG EC tablet    VOLTAREN     Take 75 mg by mouth 2 times daily as needed 1 tab prn        fentaNYL 25 mcg/hr 72 hr patch    DURAGESIC     Place 1 patch onto the skin every 72 hours        furosemide 20 MG tablet    LASIX    30 tablet    Take 40 mg by mouth daily    Status post chemotherapy, Multiple myeloma, without mention of having achieved remission       LENalidomide 10 MG Caps capsule CHEMO    REVLIMID    21 each    Take 10 mg daily days 1-21    Multiple myeloma not having achieved remission (H)       lisinopril 5 MG tablet    PRINIVIL/ZESTRIL     Take 5 mg by mouth 2 times daily    Multiple myeloma, without mention of having achieved remission, Hypomagnesemia, Ventricular fibrillation (H), Anemia of other chronic disease, Renal insufficiency, Dilated cardiomyopathy (H), Automatic implantable cardiac defibrillator in situ, Personal history of diseases of blood and blood-forming organs, History of long-term use of multiple prescription drugs       magnesium oxide 400 MG tablet    MAG-OX    4 tablet    Take 1,200 mg by mouth 3 times daily    Hypomagnesemia       metoprolol 100 MG 24 hr tablet    TOPROL-XL    135 tablet    Take 1.5 tablets (150 mg) by mouth daily    Atrial fibrillation, unspecified type (H), Paroxysmal ventricular tachycardia (H), Permanent atrial fibrillation (H)       nitroGLYcerin 0.4 MG sublingual tablet    NITROSTAT     Place 0.4 mg under the tongue every 5 minutes as needed Reported on 5/16/2017        omeprazole 20 MG CR capsule    priLOSEC    90 capsule    Take 20 mg by mouth daily    GERD (gastroesophageal reflux disease)       oxyCODONE 5 MG IR tablet    ROXICODONE     Take 1-2 tablets  by mouth every 6 hours as needed. For pain.        simvastatin 40 MG tablet    ZOCOR    30 tablet    Take 40 mg by mouth At Bedtime        tiotropium 18 MCG capsule    SPIRIVA    30 capsule    Inhale 1 capsule (18 mcg) into the lungs daily    SOB (shortness of breath)       TRAZODONE HCL PO      Take 50 mg by mouth nightly as needed        warfarin 5 MG tablet    COUMADIN      Multiple myeloma in relapse (H)

## 2017-08-08 NOTE — PATIENT INSTRUCTIONS
You will be scheduled for a follow up visit as instructed.    If you have any questions regarding to your visit please contact your care team:     Cardiology  Telephone Number    Aleida Angeles [phone]   Or send a message to your provider via my chart.   For scheduling appts or procedures:    Stephanie Wells     [phone] or  [phone]   For the Device Clinic (Pacemakers and ICD's)   RN's :   Natali Vale  During business hours: [phone]    After business hours:   [phone]- select option 4 and ask for job code [phone].    You can also contact us using My Chart. If you need assistance in setting this up, please contact our office or ask at your follow up visit.     If you need a medication refill please contact your pharmacy. Please allow 3 business days for your refill to be completed.     As always, Thank you for trusting us with your health care needs!

## 2017-08-08 NOTE — MR AVS SNAPSHOT
After Visit Summary   8/8/2017    Erasmo Pearce    MRN: [id number]           Patient Information     Date Of Birth          1950        Visit Information        Provider Department      8/8/2017 9:30 AM 1, Uc Cv Device [facility]        Today's Diagnoses     Dilated cardiomyopathy (H)    -  1      Care Instructions    It was a pleasure to see you in clinic today.  Please do not hesitate to call with any questions or concerns.  You are scheduled for a remote transmission on 11/9/17.  We look forward to seeing you in clinic at your next device check in 6 months.    Whit Lipscomb, RN, MS, CCRN  Electrophysiology Nurse Clinician  [facility] [place]    During Business Hours Please Call:  [phone]  After Hours Please Call:  [phone] - select option #4 and ask for job code [phone]                        Follow-ups after your visit        Follow-up notes from your care team     Return in about 6 months (around 2/8/2018) for ICD Check.      Your next 10 appointments already scheduled     Sep 11, 2017 11:00 AM CDT   Masonic Lab Draw with  MASONIC LAB DRAW   [facility] Masonic Lab Draw ([facility])    [address] [place] [address]   [phone]            Sep 19, 2017 11:00 AM CDT   Return with Rojas Raygoza MD   [facility] Blood and Marrow Transplant ([facility])    [address] [place] [address]   [phone]            Nov 21, 2017  8:30 AM CST   (Arrive by 8:15 AM)   Implanted Defibulator with Uc Cv Device 1   [facility] ([facility])    [address] [place] [address]   [phone]            Nov 21, 2017  9:00 AM CST   (Arrive by 8:45 AM)   RETURN ARRHYTHMIA with Chuy Jean-Baptiste MD   [facility] ([facility])    [address] [place] [address]  [place] [address] [phone]              Who to contact     If you have questions or need follow up information about today's clinic visit or your schedule please contact [facility] directly at [phone].  Normal or non-critical lab and imaging results will be communicated to you by MyChart, letter or phone within 4 business days after the clinic has received the results. If you do not hear from us within 7 days, please contact the clinic through MyChart or phone. If you have a critical or abnormal lab result, we will notify you by phone as soon as possible.  Submit refill requests through [employer] or call your pharmacy and they will forward the refill request to us. Please allow 3 business days for your refill to be completed.          Additional Information About Your Visit        [employer] Information     [employer] gives you secure access to your electronic health record. If you see a primary care provider, you can also send messages to your care team and make appointments. If you have questions, please call your primary care clinic.  If you do not have a primary care provider, please call [phone] and they will assist you.        Care EveryWhere ID     This is your Care EveryWhere ID. This could be used by other organizations to access your [facility] medical records  [id number]         Blood Pressure from Last 3 Encounters:   08/08/17 96/53   07/11/17 104/66   06/13/17 110/64    Weight from Last 3 Encounters:   08/08/17 91.2 kg (201 lb)   07/11/17 91.4 kg (201 lb 8 oz)   06/13/17 90.3 kg (199 lb 1.2 oz)              We Performed the Following     ICD DEVICE PROGRAMMING EVAL, SINGLE LEAD ICD        Primary Care Provider Office Phone # Fax #    Cong Mcdonald [phone]       [address] [address]        Equal Access to Services     CALI DAVILA : Jese Truong, devi carr, nino cummings  la'neyn rigo. So [facility] [phone].    ATENCIÓN: Si habla mariama, tiene a miles disposición servicios gratuitos de asistencia lingüística. Era mascorro [phone].    We comply with applicable federal civil rights laws and [place] laws. We do not discriminate on the basis of race, color, national origin, age, disability sex, sexual orientation or gender identity.            Thank you!     Thank you for choosing [facility]  for your care. Our goal is always to provide you with excellent care. Hearing back from our patients is one way we can continue to improve our services. Please take a few minutes to complete the written survey that you may receive in the mail after your visit with us. Thank you!             Your Updated Medication List - Protect others around you: Learn how to safely use, store and throw away your medicines at www.disposemymeds.org.          This list is accurate as of: 8/8/17 11:14 AM.  Always use your most recent med list.                   Brand Name Dispense Instructions for use Diagnosis    albuterol 108 (90 BASE) MCG/ACT Inhaler    PROAIR HFA/PROVENTIL HFA/VENTOLIN HFA     Inhale 2 puffs into the lungs every 6 hours as needed        amoxicillin 500 MG tablet    AMOXIL    4 tablet    Take 4 tabs one hour before dental appointment    Multiple myeloma, without mention of having achieved remission       baclofen 10 MG tablet    LIORESAL     Take 10 mg by mouth 3 times daily as needed prn        CALCIUM 500 + D PO      Take 2 tablets by mouth daily.        dexamethasone 4 MG tablet    DECADRON    30 tablet    Take 10 tabs (40mg) Days 1, 8, and 15.    Multiple myeloma not having achieved remission (H)       diclofenac 75 MG EC tablet    VOLTAREN     Take 75 mg by mouth 2 times daily as needed 1 tab prn        fentaNYL 25 mcg/hr 72 hr patch    DURAGESIC     Place 1 patch onto the skin every 72 hours        furosemide 20 MG tablet    LASIX    30 tablet    Take 40 mg by mouth daily    Status post  chemotherapy, Multiple myeloma, without mention of having achieved remission       LENalidomide 10 MG Caps capsule CHEMO    REVLIMID    21 each    Take 10 mg daily days 1-21    Multiple myeloma not having achieved remission (H)       lisinopril 5 MG tablet    PRINIVIL/ZESTRIL     Take 5 mg by mouth 2 times daily    Multiple myeloma, without mention of having achieved remission, Hypomagnesemia, Ventricular fibrillation (H), Anemia of other chronic disease, Renal insufficiency, Dilated cardiomyopathy (H), Automatic implantable cardiac defibrillator in situ, Personal history of diseases of blood and blood-forming organs, History of long-term use of multiple prescription drugs       magnesium oxide 400 MG tablet    MAG-OX    4 tablet    Take 1,200 mg by mouth 3 times daily    Hypomagnesemia       metoprolol 100 MG 24 hr tablet    TOPROL-XL    135 tablet    Take 1.5 tablets (150 mg) by mouth daily    Atrial fibrillation, unspecified type (H), Paroxysmal ventricular tachycardia (H), Permanent atrial fibrillation (H)       nitroGLYcerin 0.4 MG sublingual tablet    NITROSTAT     Place 0.4 mg under the tongue every 5 minutes as needed Reported on 5/16/2017        omeprazole 20 MG CR capsule    priLOSEC    90 capsule    Take 20 mg by mouth daily    GERD (gastroesophageal reflux disease)       oxyCODONE 5 MG IR tablet    ROXICODONE     Take 1-2 tablets by mouth every 6 hours as needed. For pain.        simvastatin 40 MG tablet    ZOCOR    30 tablet    Take 40 mg by mouth At Bedtime        tiotropium 18 MCG capsule    SPIRIVA    30 capsule    Inhale 1 capsule (18 mcg) into the lungs daily    SOB (shortness of breath)       TRAZODONE HCL PO      Take 50 mg by mouth nightly as needed        warfarin 5 MG tablet    COUMADIN      Multiple myeloma in relapse (H)

## 2017-08-08 NOTE — PROGRESS NOTES
Patient seen in clinic for evaluation and iterative programming of his [employer] single lead ICD per MD orders.  Patient has an appointment to see Dr. Jean-Baptiste today.  Normal ICD function.  558 episodes recorded as VT since his last remote transmission on 7/11/17 - 5 sec - 1 min 21 sec, 132-186 bpm.  The stored EGM's reveal what appears to be AF with RVR.  Patient is taking Coumadin.  Intrinsic rhythm = irregular ventricular rhythm @ ~ 70's bpm.   = 1%.  Estimated battery longevity to ROSEMARIE = 5 years.  Patient reports that he is feeling well and denies complaints.  Plan for patient to send a remote transmission in 3 months and return to clinic in 6 months.    Single lead ICD

## 2017-08-09 LAB — INTERPRETATION ECG - MUSE: NORMAL

## 2017-08-23 ENCOUNTER — TRANSFERRED RECORDS (OUTPATIENT)
Dept: HEALTH INFORMATION MANAGEMENT | Facility: CLINIC | Age: 67
End: 2017-08-23

## 2017-09-06 ENCOUNTER — TRANSFERRED RECORDS (OUTPATIENT)
Dept: HEALTH INFORMATION MANAGEMENT | Facility: CLINIC | Age: 67
End: 2017-09-06

## 2017-09-11 DIAGNOSIS — C90.02 MULTIPLE MYELOMA IN RELAPSE (H): ICD-10-CM

## 2017-09-11 LAB
ALBUMIN SERPL-MCNC: 3.7 G/DL (ref 3.4–5)
ALP SERPL-CCNC: 83 U/L (ref 40–150)
ALT SERPL W P-5'-P-CCNC: 21 U/L (ref 0–70)
ANION GAP SERPL CALCULATED.3IONS-SCNC: 5 MMOL/L (ref 3–14)
AST SERPL W P-5'-P-CCNC: 18 U/L (ref 0–45)
BASOPHILS # BLD AUTO: 0 10E9/L (ref 0–0.2)
BASOPHILS NFR BLD AUTO: 0.4 %
BILIRUB SERPL-MCNC: 1.1 MG/DL (ref 0.2–1.3)
BUN SERPL-MCNC: 21 MG/DL (ref 7–30)
CALCIUM SERPL-MCNC: 9.1 MG/DL (ref 8.5–10.1)
CHLORIDE SERPL-SCNC: 103 MMOL/L (ref 94–109)
CO2 SERPL-SCNC: 27 MMOL/L (ref 20–32)
COLLECT DURATION TIME UR: 24 H
CREAT 24H UR-MRATE: 1.52 G/(24.H) (ref 1–2)
CREAT SERPL-MCNC: 1.12 MG/DL (ref 0.66–1.25)
CREAT UR-MCNC: 70 MG/DL
DIFFERENTIAL METHOD BLD: ABNORMAL
EOSINOPHIL # BLD AUTO: 0.1 10E9/L (ref 0–0.7)
EOSINOPHIL NFR BLD AUTO: 0.6 %
ERYTHROCYTE [DISTWIDTH] IN BLOOD BY AUTOMATED COUNT: 12.8 % (ref 10–15)
GFR SERPL CREATININE-BSD FRML MDRD: 66 ML/MIN/1.7M2
GLUCOSE SERPL-MCNC: 112 MG/DL (ref 70–99)
HCT VFR BLD AUTO: 38.6 % (ref 40–53)
HGB BLD-MCNC: 13.1 G/DL (ref 13.3–17.7)
IMM GRANULOCYTES # BLD: 0 10E9/L (ref 0–0.4)
IMM GRANULOCYTES NFR BLD: 0.2 %
LYMPHOCYTES # BLD AUTO: 1.7 10E9/L (ref 0.8–5.3)
LYMPHOCYTES NFR BLD AUTO: 21.2 %
MAGNESIUM SERPL-MCNC: 1.8 MG/DL (ref 1.6–2.3)
MCH RBC QN AUTO: 34.4 PG (ref 26.5–33)
MCHC RBC AUTO-ENTMCNC: 33.9 G/DL (ref 31.5–36.5)
MCV RBC AUTO: 101 FL (ref 78–100)
MONOCYTES # BLD AUTO: 0.6 10E9/L (ref 0–1.3)
MONOCYTES NFR BLD AUTO: 7.7 %
NEUTROPHILS # BLD AUTO: 5.7 10E9/L (ref 1.6–8.3)
NEUTROPHILS NFR BLD AUTO: 69.9 %
NRBC # BLD AUTO: 0 10*3/UL
NRBC BLD AUTO-RTO: 0 /100
PLATELET # BLD AUTO: 173 10E9/L (ref 150–450)
POTASSIUM SERPL-SCNC: 4.4 MMOL/L (ref 3.4–5.3)
PROT 24H UR-MRATE: 0.38 G/(24.H) (ref 0.04–0.23)
PROT SERPL-MCNC: 7.3 G/DL (ref 6.8–8.8)
PROT UR-MCNC: 0.18 G/L
PROT/CREAT 24H UR: 0.26 G/G CR (ref 0–0.2)
RBC # BLD AUTO: 3.81 10E12/L (ref 4.4–5.9)
SODIUM SERPL-SCNC: 136 MMOL/L (ref 133–144)
SPECIMEN VOL UR: 2180 ML
WBC # BLD AUTO: 8.2 10E9/L (ref 4–11)

## 2017-09-11 PROCEDURE — 84165 PROTEIN E-PHORESIS SERUM: CPT | Performed by: INTERNAL MEDICINE

## 2017-09-11 PROCEDURE — 82784 ASSAY IGA/IGD/IGG/IGM EACH: CPT | Performed by: INTERNAL MEDICINE

## 2017-09-11 PROCEDURE — 86335 IMMUNFIX E-PHORSIS/URINE/CSF: CPT | Performed by: INTERNAL MEDICINE

## 2017-09-11 PROCEDURE — 82785 ASSAY OF IGE: CPT | Performed by: INTERNAL MEDICINE

## 2017-09-11 PROCEDURE — 00000402 ZZHCL STATISTIC TOTAL PROTEIN: Performed by: INTERNAL MEDICINE

## 2017-09-11 PROCEDURE — 84166 PROTEIN E-PHORESIS/URINE/CSF: CPT | Performed by: INTERNAL MEDICINE

## 2017-09-11 PROCEDURE — 83883 ASSAY NEPHELOMETRY NOT SPEC: CPT | Performed by: INTERNAL MEDICINE

## 2017-09-11 PROCEDURE — 85025 COMPLETE CBC W/AUTO DIFF WBC: CPT | Performed by: INTERNAL MEDICINE

## 2017-09-11 PROCEDURE — 84156 ASSAY OF PROTEIN URINE: CPT | Performed by: INTERNAL MEDICINE

## 2017-09-11 PROCEDURE — 80053 COMPREHEN METABOLIC PANEL: CPT | Performed by: INTERNAL MEDICINE

## 2017-09-11 PROCEDURE — 83735 ASSAY OF MAGNESIUM: CPT | Performed by: INTERNAL MEDICINE

## 2017-09-11 PROCEDURE — 81050 URINALYSIS VOLUME MEASURE: CPT | Performed by: INTERNAL MEDICINE

## 2017-09-11 NOTE — NURSING NOTE
Chief Complaint   Patient presents with     Blood Draw     VPT blood draw and 24 hour urine drop off     Venipuncture labs drawn from right arm

## 2017-09-11 NOTE — NURSING NOTE
Chief Complaint   Patient presents with     Blood Draw     VPT blood draw and 24 christopher urine drop off     Venipuncture labs drawn from right arm

## 2017-09-12 LAB
ALBUMIN SERPL ELPH-MCNC: 3.9 G/DL (ref 3.7–5.1)
ALPHA1 GLOB SERPL ELPH-MCNC: 0.4 G/DL (ref 0.2–0.4)
ALPHA2 GLOB SERPL ELPH-MCNC: 0.9 G/DL (ref 0.5–0.9)
B-GLOBULIN SERPL ELPH-MCNC: 0.8 G/DL (ref 0.6–1)
GAMMA GLOB SERPL ELPH-MCNC: 0.8 G/DL (ref 0.7–1.6)
IGA SERPL-MCNC: 283 MG/DL (ref 70–380)
IGG SERPL-MCNC: 687 MG/DL (ref 695–1620)
IGM SERPL-MCNC: 27 MG/DL (ref 60–265)
KAPPA LC UR-MCNC: 0.98 MG/DL (ref 0.33–1.94)
KAPPA LC/LAMBDA SER: 0.02 {RATIO} (ref 0.26–1.65)
LAMBDA LC SERPL-MCNC: 48.25 MG/DL (ref 0.57–2.63)
M PROTEIN SERPL ELPH-MCNC: 0.2 G/DL
PROT ELPH PNL UR ELPH: NORMAL
PROT PATTERN SERPL ELPH-IMP: ABNORMAL
PROT PATTERN UR ELPH-IMP: NORMAL

## 2017-09-13 LAB — IGE SERPL-ACNC: 8 KIU/L (ref 0–114)

## 2017-09-19 ENCOUNTER — ONCOLOGY VISIT (OUTPATIENT)
Dept: TRANSPLANT | Facility: CLINIC | Age: 67
End: 2017-09-19
Attending: INTERNAL MEDICINE
Payer: MEDICARE

## 2017-09-19 VITALS
HEIGHT: 70 IN | SYSTOLIC BLOOD PRESSURE: 113 MMHG | TEMPERATURE: 97 F | WEIGHT: 203.8 LBS | BODY MASS INDEX: 29.18 KG/M2 | RESPIRATION RATE: 16 BRPM | DIASTOLIC BLOOD PRESSURE: 66 MMHG | OXYGEN SATURATION: 98 % | HEART RATE: 72 BPM

## 2017-09-19 DIAGNOSIS — C90.00 MULTIPLE MYELOMA, REMISSION STATUS UNSPECIFIED (H): Primary | ICD-10-CM

## 2017-09-19 PROCEDURE — 99211 OFF/OP EST MAY X REQ PHY/QHP: CPT | Mod: ZF

## 2017-09-19 PROCEDURE — 99212 OFFICE O/P EST SF 10 MIN: CPT

## 2017-09-19 ASSESSMENT — PAIN SCALES - GENERAL: PAINLEVEL: NO PAIN (0)

## 2017-09-19 NOTE — MR AVS SNAPSHOT
After Visit Summary   9/19/2017    Erasmo Pearce    MRN: [id number]           Patient Information     Date Of Birth          1950        Visit Information        Provider Department      9/19/2017 11:00 AM Rojas Raygoza MD [facility] Blood and Marrow Transplant        Today's Diagnoses     Multiple myeloma, remission status unspecified (H)    -  1          [facility] ([facility])  [address] [place] [address]  Phone: [phone]  Clinic Hours:   Monday-Thursday:7am to 7pm   Friday: 7am to 5pm   Weekends and holidays:    8am to noon (in general)  If your fever is 100.5  or greater,   call the clinic.  After hours call the   hospital at [phone] or   [phone]. Ask for the BMT   fellow on-call            Follow-ups after your visit        Follow-up notes from your care team     Return in about 2 months (around 11/21/2017).      Your next 10 appointments already scheduled     Nov 17, 2017 10:00 AM CST   US AORTA MEDICARE AAA SCREENING with [id number] [address] [facility] ([facility])    [address] [place] [address] [phone]           Please bring a list of your medicines (including vitamins, minerals and over-the-counter drugs). Also, tell your doctor about any allergies you may have. Wear comfortable clothes and leave your valuables at home.  Adults: No eating or drinking for 8 hours before the exam. You may take medicine with a small sip of water.  Children: - Children 6+ years: No food or drink for 6 hours before exam. - Children 1-5 years: No food or drink for 4 hours before exam. - Infants, breast-fed: may have breast milk up to 2 hours before exam. - Infants, formula: may have bottle until 4 hours before exam.  Please call the Imaging Department at your exam site with any questions.            Nov 17, 2017 10:30 AM CST   Lab with  LAB   [facility] Lab ([facility])    [address] [facility]  [address] [facility] [address]   [phone]            Nov 21, 2017  8:30 AM CST   (Arrive by 8:15 AM)   Implanted Defibulator with Uc Cv Device 1   [facility] ([facility])    [address] [place] [address]  [address] [facility] [address] [phone]            Nov 21, 2017  9:00 AM CST   (Arrive by 8:45 AM)   RETURN ARRHYTHMIA with Chuy Jean-Baptiste MD   [facility] ([facility])    [address] [place] [address]  [address] [facility] [address] [phone]            Nov 21, 2017 11:00 AM CST   Return with Rojas Raygoza MD   [facility] Blood and Marrow Transplant ([facility])    [address] [place] [id number] [phone]              Future tests that were ordered for you today     Open Future Orders        Priority Expected Expires Ordered    Protein electrophoresis timed urine Routine 11/16/2017 12/19/2017 9/19/2017    Protein immunofixation urine Routine 11/16/2017 12/19/2017 9/19/2017    Protein timed urine with Creat Ratio Routine 11/16/2017 12/19/2017 9/19/2017    Comprehensive metabolic panel Routine 11/17/2017 9/5/2018 9/19/2017    Protein electrophoresis Routine 11/17/2017 9/5/2018 9/19/2017    [place] and lambda light chain Routine 11/17/2017 9/5/2018 9/19/2017    IgA Routine 11/17/2017 9/5/2018 9/19/2017    IgE Routine 11/17/2017 9/5/2018 9/19/2017    IgG Routine 11/17/2017 9/5/2018 9/19/2017    IgM Routine 11/17/2017 9/5/2018 9/19/2017    Magnesium Routine 11/17/2017 9/5/2018 9/19/2017    CBC with platelets differential Routine 11/17/2017 9/5/2018 9/19/2017    Beta 2 microglobulin Routine 11/17/2017 9/5/2018 9/19/2017            Who to contact     If you have questions or need follow up information about today's clinic visit or your schedule please contact [facility] B[facility]OD [facility] MARROW TRANSPLANT directly at [phone].  Normal or non-critical  "lab and imaging results will be communicated to you by Phone[employer]hart, letter or phone within 4 business days after the clinic has received the results. If you do not hear from us within 7 days, please contact the clinic through [employer] or phone. If you have a critical or abnormal lab result, we will notify you by phone as soon as possible.  Submit refill requests through [employer] or call your pharmacy and they will forward the refill request to us. Please allow 3 business days for your refill to be completed.          Additional Information About Your Visit        [employer] Information     [employer] gives you secure access to your electronic health record. If you see a primary care provider, you can also send messages to your care team and make appointments. If you have questions, please call your primary care clinic.  If you do not have a primary care provider, please call [phone] and they will assist you.        Care EveryWhere ID     This is your Care EveryWhere ID. This could be used by other organizations to access your [place] medical records  [id number]        Your Vitals Were     Pulse Temperature Respirations Height Pulse Oximetry BMI (Body Mass Index)    72 97  F (36.1  C) (Oral) 16 1.778 m (5' 10\") 98% 29.24 kg/m2       Blood Pressure from Last 3 Encounters:   09/19/17 113/66   08/08/17 96/53   07/11/17 104/66    Weight from Last 3 Encounters:   09/19/17 92.4 kg (203 lb 12.8 oz)   08/08/17 91.2 kg (201 lb)   07/11/17 91.4 kg (201 lb 8 oz)               Recent Review Flowsheet Data     BMT Recent Results Latest Ref Rng & Units 11/1/2016 1/31/2017 5/9/2017 6/2/2017 6/13/2017 7/6/2017 9/11/2017    WBC 4.0 - 11.0 10e9/L 8.0 7.2 5.2 6.8 6.4 7.7 8.2    Hemoglobin 13.3 - 17.7 g/dL 12.9(L) 12.8(L) 11.3(L) 10.4(L) 9.9(L) 10.8(L) 13.1(L)    Platelet Count 150 - 450 10e9/L 180 195 141(L) 143(L) 217 165 173    Neutrophils (Absolute) 1.6 - 8.3 10e9/L 4.7 4.1 2.8 5.3 4.0 5.8 5.7    INR 0.86 - 1.14 2.18(H) 2.15(H) - - - - " -    Sodium 133 - 144 mmol/L 139 141 138 140 141 138 136    Potassium 3.4 - 5.3 mmol/L 4.8 4.4 4.0 3.8 3.9 4.1 4.4    Chloride 94 - 109 mmol/L 104 104 102 106 105 104 103    Glucose 70 - 99 mg/dL 106(H) 104(H) 101(H) 130(H) 142(H) 151(H) 112(H)    Urea Nitrogen 7 - 30 mg/dL 23 28 17 28 19 22 21    Creatinine 0.66 - 1.25 mg/dL 1.15 1.24 0.94 1.23 1.02 1.23 1.12    Calcium (Total) 8.5 - 10.1 mg/dL 8.9 8.7 8.5 8.2(L) 8.7 9.0 9.1    Protein (Total) 6.8 - 8.8 g/dL 8.1 7.2 6.6(L) 7.3 6.7(L) 7.2 7.3    Albumin 3.4 - 5.0 g/dL 3.9 3.6 3.2(L) 2.9(L) 3.3(L) 4.0 3.7    Alkaline Phosphatase 40 - 150 U/L 88 82 83 71 77 83 83    AST 0 - 45 U/L 20 19 14 16 14 17 18    ALT 0 - 70 U/L 23 27 28 25 17 19 21    MCV 78 - 100 fl 103(H) 102(H) 104(H) 105(H) 106(H) 108(H) 101(H)               Primary Care Provider Office Phone # Fax #    Cong Mcdonald [phone]       [place] [address]        Equal Access to Services     [facility]: Hadii jericho pop Sohomer, waaxda luqadaha, qaybta kaalmada nino gonzalez idijake sierra. So [facility] [phone].    ATENCIÓN: Si habla español, tiene a miles disposición servicios gratuitos de asistencia lingüística. Era al [phone].    We comply with applicable federal civil rights laws and [place] laws. We do not discriminate on the basis of race, color, national origin, age, disability sex, sexual orientation or gender identity.            Thank you!     Thank you for choosing [facility] BLOOD AND MARROW TRANSPLANT  for your care. Our goal is always to provide you with excellent care. Hearing back from our patients is one way we can continue to improve our services. Please take a few minutes to complete the written survey that you may receive in the mail after your visit with us. Thank you!             Your Updated Medication List - Protect others around you: Learn how to safely use, store and throw away your medicines at  www.disposemymeds.org.          This list is accurate as of: 9/19/17 11:40 AM.  Always use your most recent med list.                   Brand Name Dispense Instructions for use Diagnosis    albuterol 108 (90 BASE) MCG/ACT Inhaler    PROAIR HFA/PROVENTIL HFA/VENTOLIN HFA     Inhale 2 puffs into the lungs every 6 hours as needed        amoxicillin 500 MG tablet    AMOXIL    4 tablet    Take 4 tabs one hour before dental appointment    Multiple myeloma, without mention of having achieved remission       baclofen 10 MG tablet    LIORESAL     Take 10 mg by mouth 3 times daily as needed prn        CALCIUM 500 + D PO      Take 2 tablets by mouth daily.        dexamethasone 4 MG tablet    DECADRON    30 tablet    Take 10 tabs (40mg) Days 1, 8, and 15.    Multiple myeloma not having achieved remission (H)       diclofenac 75 MG EC tablet    VOLTAREN     Take 75 mg by mouth 2 times daily as needed 1 tab prn        fentaNYL 25 mcg/hr 72 hr patch    DURAGESIC     Place 1 patch onto the skin every 72 hours        furosemide 20 MG tablet    LASIX    30 tablet    Take 20 mg by mouth daily    Status post chemotherapy, Multiple myeloma, without mention of having achieved remission       LENalidomide 10 MG Caps capsule CHEMO    REVLIMID    21 each    Take 10 mg daily days 1-21    Multiple myeloma not having achieved remission (H)       lisinopril 5 MG tablet    PRINIVIL/ZESTRIL     Take 5 mg by mouth 2 times daily    Multiple myeloma, without mention of having achieved remission, Hypomagnesemia, Ventricular fibrillation (H), Anemia of other chronic disease, Renal insufficiency, Dilated cardiomyopathy (H), Automatic implantable cardiac defibrillator in situ, Personal history of diseases of blood and blood-forming organs, History of long-term use of multiple prescription drugs       magnesium oxide 400 MG tablet    MAG-OX    4 tablet    Take 1,200 mg by mouth 3 times daily    Hypomagnesemia       metoprolol 100 MG 24 hr tablet     TOPROL-XL    135 tablet    Take 1.5 tablets (150 mg) by mouth daily    Atrial fibrillation, unspecified type (H), Paroxysmal ventricular tachycardia (H), Permanent atrial fibrillation (H)       nitroGLYcerin 0.4 MG sublingual tablet    NITROSTAT     Place 0.4 mg under the tongue every 5 minutes as needed Reported on 5/16/2017        omeprazole 20 MG CR capsule    priLOSEC    90 capsule    Take 20 mg by mouth daily    GERD (gastroesophageal reflux disease)       oxyCODONE 5 MG IR tablet    ROXICODONE     Take 1-2 tablets by mouth every 6 hours as needed. For pain.        simvastatin 40 MG tablet    ZOCOR    30 tablet    Take 40 mg by mouth At Bedtime        tiotropium 18 MCG capsule    SPIRIVA    30 capsule    Inhale 1 capsule (18 mcg) into the lungs daily    SOB (shortness of breath)       TRAZODONE HCL PO      Take 50 mg by mouth nightly as needed        warfarin 5 MG tablet    COUMADIN      Multiple myeloma in relapse (H)

## 2017-09-19 NOTE — PROGRESS NOTES
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  Jonh has relapsed and was on Revlimid and dexamethasone. He has been off revlimid since June 2. He had an episode of ileus in June that  Resolved with an enema. Feels good Still aas some loose stools..  No defibrillator  Discharges.Seen by Dr Jean-Baptiste in August. No change I meds  PAST MEDICAL HISTORY:  See my note from 07/2017.       SOCIAL HISTORY:  See my note from 07/2017.       FAMILY HISTORY:  See my note from 07/2017.      REVIEW OF SYSTEMS:  A 12-point review of systems is done and is negative as in HPI.      PHYSICAL EXAMINATION:   GENERAL:  He is an alert gentleman in no acute distress.   VITAL SIGNS:  Stable.   HEENT:  Normocephalic.  EOM okay, no scleral icterus.  Mouth without lesion.   RESPIRATORY:  Clear to percussion and auscultation.   CARDIAC:  Irregularly irregular rhythm, no murmur.  Cardioverter in place in the left upper chest wall.   ABDOMEN:  Soft without splenomegaly.   EXTREMITIES:  Without edema.   NEUROLOGIC:  No resting tremor.   Results for DARRYL ZAPATA (MRN [id number]) as of 9/19/2017 11:03   Ref. Range 9/10/2017 06:30 9/11/2017 11:26   Sodium Latest Ref Range: 133 - 144 mmol/L  136   Potassium Latest Ref Range: 3.4 - 5.3 mmol/L  4.4   Chloride Latest Ref Range: 94 - 109 mmol/L  103   Carbon Dioxide Latest Ref Range: 20 - 32 mmol/L  27   Urea Nitrogen Latest Ref Range: 7 - 30 mg/dL  21   Creatinine Latest Ref Range: 0.66 - 1.25 mg/dL  1.12   GFR Estimate Latest Ref Range: >60 mL/min/1.7m2  66   GFR Estimate If Black Latest Ref Range: >60 mL/min/1.7m2  79   Calcium Latest Ref Range: 8.5 - 10.1 mg/dL  9.1   Anion Gap Latest Ref Range: 3 - 14 mmol/L  5   Magnesium Latest Ref Range:  1.6 - 2.3 mg/dL  1.8   Albumin Latest Ref Range: 3.4 - 5.0 g/dL  3.7   Protein Total Latest Ref Range: 6.8 - 8.8 g/dL  7.3   Bilirubin Total Latest Ref Range: 0.2 - 1.3 mg/dL  1.1   Alkaline Phosphatase Latest Ref Range: 40 - 150 U/L  83   ALT Latest Ref Range: 0 - 70 U/L  21   AST Latest Ref Range: 0 - 45 U/L  18   Creatinine Urine Timed Latest Ref Range: 1.00 - 2.00  1.52    Creatinine Urine Latest Units: mg/dL 70    IGE Latest Ref Range: 0 - 114 KIU/L  8   Protein Random Urine Latest Units: g/L 0.18    Protein Total Urine g/gr Creatinine Latest Ref Range: 0 - 0.2 g/g Cr 0.26 (H)    Protein Total 24 Hr Urine Latest Ref Range: 0.04 - 0.23  0.38 (H)    Glucose Latest Ref Range: 70 - 99 mg/dL  112 (H)   WBC Latest Ref Range: 4.0 - 11.0 10e9/L  8.2   Hemoglobin Latest Ref Range: 13.3 - 17.7 g/dL  13.1 (L)   Hematocrit Latest Ref Range: 40.0 - 53.0 %  38.6 (L)   Platelet Count Latest Ref Range: 150 - 450 10e9/L  173   RBC Count Latest Ref Range: 4.4 - 5.9 10e12/L  3.81 (L)   MCV Latest Ref Range: 78 - 100 fl  101 (H)   MCH Latest Ref Range: 26.5 - 33.0 pg  34.4 (H)   MCHC Latest Ref Range: 31.5 - 36.5 g/dL  33.9   RDW Latest Ref Range: 10.0 - 15.0 %  12.8   Diff Method Unknown  Automated Method   % Neutrophils Latest Units: %  69.9   % Lymphocytes Latest Units: %  21.2   % Monocytes Latest Units: %  7.7   % Eosinophils Latest Units: %  0.6   % Basophils Latest Units: %  0.4   % Immature Granulocytes Latest Units: %  0.2   Nucleated RBCs Latest Ref Range: 0 /100  0   Absolute Neutrophil Latest Ref Range: 1.6 - 8.3 10e9/L  5.7   Absolute Lymphocytes Latest Ref Range: 0.8 - 5.3 10e9/L  1.7   Absolute Monocytes Latest Ref Range: 0.0 - 1.3 10e9/L  0.6   Absolute Eosinophils Latest Ref Range: 0.0 - 0.7 10e9/L  0.1   Absolute Basophils Latest Ref Range: 0.0 - 0.2 10e9/L  0.0   Abs Immature Granulocytes Latest Ref Range: 0 - 0.4 10e9/L  0.0   Absolute Nucleated RBC Unknown  0.0   Albumin Fraction Latest Ref Range: 3.7 - 5.1  g/dL  3.9   Alpha 1 Fraction Latest Ref Range: 0.2 - 0.4 g/dL  0.4   Alpha 2 Fraction Latest Ref Range: 0.5 - 0.9 g/dL  0.9   Beta Fraction Latest Ref Range: 0.6 - 1.0 g/dL  0.8   ELP Interpretation: Unknown  Small monoclonal ...   ELP Interpretation Urine Unknown Albumin and globu...    Gamma Fraction Latest Ref Range: 0.7 - 1.6 g/dL  0.8   IGA Latest Ref Range: 70 - 380 mg/dL  283   IGG Latest Ref Range: 695 - 1620 mg/dL  687 (L)   IGM Latest Ref Range: 60 - 265 mg/dL  27 (L)   Immunofix ELP Urine Unknown (Note)    Kappa Free Lt Chain Latest Ref Range: 0.33 - 1.94 mg/dL  0.98   Kappa Lambda Ratio Latest Ref Range: 0.26 - 1.65   0.02 (L)   Lambda Free Lt Chain Latest Ref Range: 0.57 - 2.63 mg/dL  48.25 (H)   Monoclonal Peak Latest Ref Range: 0.0 g/dL  0.2 (H)       ASSESSMENT:   1.  Multiple myeloma.   2.  Status post autologous peripheral blood stem cell transplant.   3.  Cardiomyopathy.   4.  Atrial fibrillation.   5.  History of deep venous thrombosis, on anticoagulation.   6.  Chronic back pain.   7.  Aortic aneurysm.   8.  History of plasmacytoma of the clivus, status post radiation.   9.  History of ventricular tachycardia with an implantable defibrillator in place.     10.  Recent abdominal pain and ileus requiring hospitalization.       Lambda light chains are inceased some but he feels great.  I would like to suggest that we continue to hold the Revlimid and dexamethasone.  I am going to see him in a couple of months.  I am going to check his light chains  and M-spike and keep him off the Revlimid and dexamethasone for now.    He should continue the magnesium 6 tabs/d with Mg 2.1.  [facility]  , [facility] these [facility]. Will get ultrasound of aorta in November

## 2017-09-19 NOTE — NURSING NOTE
"Oncology Rooming Note    September 19, 2017 11:00 AM   Erasmo Pearce is a 67 year old male who presents for:    Chief Complaint   Patient presents with     RECHECK     Multiple myeloma      Initial Vitals: /66 (BP Location: Right arm, Patient Position: Chair, Cuff Size: Adult Regular)  Pulse 72  Temp 97  F (36.1  C) (Oral)  Resp 16  Ht 1.778 m (5' 10\")  Wt 92.4 kg (203 lb 12.8 oz)  SpO2 98%  BMI 29.24 kg/m2 Estimated body mass index is 29.24 kg/(m^2) as calculated from the following:    Height as of this encounter: 1.778 m (5' 10\").    Weight as of this encounter: 92.4 kg (203 lb 12.8 oz). Body surface area is 2.14 meters squared.  No Pain (0) Comment: Data Unavailable   No LMP for male patient.  Allergies reviewed: Yes  Medications reviewed: Yes    Medications: Medication refills not needed today.  Pharmacy name entered into [email or web address]:    [facility] [place] - [address] [place] [address] ('S) [facility]    Clinical concerns:  No new concerns  Provider was notified.    7 minutes for nursing intake (face to face time)     Mireya Spence MA              "

## 2017-09-19 NOTE — LETTER
9/19/2017       RE: Erasmo Zapata  [address]  [address] [facility] [address]     Dear Colleague,    Thank you for referring your patient, Erasmo Zapata, to the [facility] at [facility]. Please see a copy of my visit note below.    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  Jonh has relapsed and was on Revlimid and dexamethasone. He has been off revlimid since June 2. He had an episode of ileus in June that  Resolved with an enema. Feels good Still aas some loose stools..  No defibrillator  Discharges.Seen by Dr Jean-Baptiste in August. No change I meds  PAST MEDICAL HISTORY:  See my note from 07/2017.       SOCIAL HISTORY:  See my note from 07/2017.       FAMILY HISTORY:  See my note from 07/2017.      REVIEW OF SYSTEMS:  A 12-point review of systems is done and is negative as in HPI.      PHYSICAL EXAMINATION:   GENERAL:  He is an alert gentleman in no acute distress.   VITAL SIGNS:  Stable.   HEENT:  Normocephalic.  EOM okay, no scleral icterus.  Mouth without lesion.   RESPIRATORY:  Clear to percussion and auscultation.   CARDIAC:  Irregularly irregular rhythm, no murmur.  Cardioverter in place in the left upper chest wall.   ABDOMEN:  Soft without splenomegaly.   EXTREMITIES:  Without edema.   NEUROLOGIC:  No resting tremor.   Results for ERASMO ZAPATA (MRN [id number]) as of 9/19/2017 11:03   Ref. Range 9/10/2017 06:30 9/11/2017 11:26   Sodium Latest Ref Range: 133 - 144 mmol/L  136   Potassium Latest Ref Range: 3.4 - 5.3 mmol/L  4.4   Chloride Latest Ref Range: 94 - 109 mmol/L  103   Carbon Dioxide Latest Ref Range: 20 - 32 mmol/L  27   Urea Nitrogen Latest Ref Range:  7 - 30 mg/dL  21   Creatinine Latest Ref Range: 0.66 - 1.25 mg/dL  1.12   GFR Estimate Latest Ref Range: >60 mL/min/1.7m2  66   GFR Estimate If Black Latest Ref Range: >60 mL/min/1.7m2  79   Calcium Latest Ref Range: 8.5 - 10.1 mg/dL  9.1   Anion Gap Latest Ref Range: 3 - 14 mmol/L  5   Magnesium Latest Ref Range: 1.6 - 2.3 mg/dL  1.8   Albumin Latest Ref Range: 3.4 - 5.0 g/dL  3.7   Protein Total Latest Ref Range: 6.8 - 8.8 g/dL  7.3   Bilirubin Total Latest Ref Range: 0.2 - 1.3 mg/dL  1.1   Alkaline Phosphatase Latest Ref Range: 40 - 150 U/L  83   ALT Latest Ref Range: 0 - 70 U/L  21   AST Latest Ref Range: 0 - 45 U/L  18   Creatinine Urine Timed Latest Ref Range: 1.00 - 2.00  1.52    Creatinine Urine Latest Units: mg/dL 70    IGE Latest Ref Range: 0 - 114 KIU/L  8   Protein Random Urine Latest Units: g/L 0.18    Protein Total Urine g/gr Creatinine Latest Ref Range: 0 - 0.2 g/g Cr 0.26 (H)    Protein Total 24 Hr Urine Latest Ref Range: 0.04 - 0.23  0.38 (H)    Glucose Latest Ref Range: 70 - 99 mg/dL  112 (H)   WBC Latest Ref Range: 4.0 - 11.0 10e9/L  8.2   Hemoglobin Latest Ref Range: 13.3 - 17.7 g/dL  13.1 (L)   Hematocrit Latest Ref Range: 40.0 - 53.0 %  38.6 (L)   Platelet Count Latest Ref Range: 150 - 450 10e9/L  173   RBC Count Latest Ref Range: 4.4 - 5.9 10e12/L  3.81 (L)   MCV Latest Ref Range: 78 - 100 fl  101 (H)   MCH Latest Ref Range: 26.5 - 33.0 pg  34.4 (H)   MCHC Latest Ref Range: 31.5 - 36.5 g/dL  33.9   RDW Latest Ref Range: 10.0 - 15.0 %  12.8   Diff Method Unknown  Automated Method   % Neutrophils Latest Units: %  69.9   % Lymphocytes Latest Units: %  21.2   % Monocytes Latest Units: %  7.7   % Eosinophils Latest Units: %  0.6   % Basophils Latest Units: %  0.4   % Immature Granulocytes Latest Units: %  0.2   Nucleated RBCs Latest Ref Range: 0 /100  0   Absolute Neutrophil Latest Ref Range: 1.6 - 8.3 10e9/L  5.7   Absolute Lymphocytes Latest Ref Range: 0.8 - 5.3 10e9/L  1.7   Absolute Monocytes  Latest Ref Range: 0.0 - 1.3 10e9/L  0.6   Absolute Eosinophils Latest Ref Range: 0.0 - 0.7 10e9/L  0.1   Absolute Basophils Latest Ref Range: 0.0 - 0.2 10e9/L  0.0   Abs Immature Granulocytes Latest Ref Range: 0 - 0.4 10e9/L  0.0   Absolute Nucleated RBC Unknown  0.0   Albumin Fraction Latest Ref Range: 3.7 - 5.1 g/dL  3.9   Alpha 1 Fraction Latest Ref Range: 0.2 - 0.4 g/dL  0.4   Alpha 2 Fraction Latest Ref Range: 0.5 - 0.9 g/dL  0.9   Beta Fraction Latest Ref Range: 0.6 - 1.0 g/dL  0.8   ELP Interpretation: Unknown  Small monoclonal ...   ELP Interpretation Urine Unknown Albumin and globu...    Gamma Fraction Latest Ref Range: 0.7 - 1.6 g/dL  0.8   IGA Latest Ref Range: 70 - 380 mg/dL  283   IGG Latest Ref Range: 695 - 1620 mg/dL  687 (L)   IGM Latest Ref Range: 60 - 265 mg/dL  27 (L)   Immunofix ELP Urine Unknown (Note)    Kappa Free Lt Chain Latest Ref Range: 0.33 - 1.94 mg/dL  0.98   Kappa Lambda Ratio Latest Ref Range: 0.26 - 1.65   0.02 (L)   Lambda Free Lt Chain Latest Ref Range: 0.57 - 2.63 mg/dL  48.25 (H)   Monoclonal Peak Latest Ref Range: 0.0 g/dL  0.2 (H)       ASSESSMENT:   1.  Multiple myeloma.   2.  Status post autologous peripheral blood stem cell transplant.   3.  Cardiomyopathy.   4.  Atrial fibrillation.   5.  History of deep venous thrombosis, on anticoagulation.   6.  Chronic back pain.   7.  Aortic aneurysm.   8.  History of plasmacytoma of the clivus, status post radiation.   9.  History of ventricular tachycardia with an implantable defibrillator in place.     10.  Recent abdominal pain and ileus requiring hospitalization.       Lambda light chains are inceased some but he feels great.  I would like to suggest that we continue to hold the Revlimid and dexamethasone.  I am going to see him in a couple of months.  I am going to check his light chains  and M-spike and keep him off the Revlimid and dexamethasone for now.    He should continue the magnesium 6 tabs/d with Mg 2.1.  [facility]   and  , about these data. Will get ultrasound of aorta in November    Again, thank you for allowing me to participate in the care of your patient.      Sincerely,    Rojas Raygoza MD

## 2017-10-18 ENCOUNTER — TRANSFERRED RECORDS (OUTPATIENT)
Dept: HEALTH INFORMATION MANAGEMENT | Facility: CLINIC | Age: 67
End: 2017-10-18

## 2017-11-01 ENCOUNTER — TRANSFERRED RECORDS (OUTPATIENT)
Dept: HEALTH INFORMATION MANAGEMENT | Facility: CLINIC | Age: 67
End: 2017-11-01

## 2017-11-06 ENCOUNTER — ALLIED HEALTH/NURSE VISIT (OUTPATIENT)
Dept: CARDIOLOGY | Facility: CLINIC | Age: 67
End: 2017-11-06
Attending: INTERNAL MEDICINE
Payer: MEDICARE

## 2017-11-06 DIAGNOSIS — I42.0 DILATED CARDIOMYOPATHY (H): Primary | ICD-10-CM

## 2017-11-06 PROCEDURE — 93296 REM INTERROG EVL PM/IDS: CPT | Mod: ZF

## 2017-11-06 PROCEDURE — 93295 DEV INTERROG REMOTE 1/2/MLT: CPT | Performed by: INTERNAL MEDICINE

## 2017-11-06 NOTE — PROGRESS NOTES
Latitude yellow alert received on 11/6/17 for ICD shock delivered.  Transmission received and reviewed.  Device transmission sent per MD orders.  Patient has a [employer] single lead ICD.  Normal ICD function.  1 episode recorded in the VT therapy zone on 11/5/17 at 1918 - 160 bpm, 1 min 32 sec, ATP x 1 and 1 41J ICD shock delivered.  Stored EGM reviewed with Dr. Duarte.  It appear that patient was in AFib with RVR followed by a run of VT which was terminated with 1 burst of ATP followed by an ICD shock delivered for AF with RVR. 1330 NSVT episodes recorded - 120-170 bpm, 6 sec - 1 min 29 sec, stored EGM's reveal what appears to be AF with RVR.  98 episodes recorded in the monitor zone - 146-197 bpm, 5 sec - 1 min 35 sec, stored EGM's reveal what appears to be AF with RVR.  Presenting EGM = irregular ventricular rhythm @ ~ 80 bpm.  Patient is taking Coumadin.   = 1%.  Estimated battery longevity to [facility] = 5 years.  Patient notified of interrogation results.  Patient reports that he forgot to take his medicine yesterday morning and then spent 9-10 hours working in his shop.  Patient states he had just gone into the house when he received the shock from his ICD.  Patient denies symptoms prior to and after the ICD shock.  Patient reports that he is leaving for [place] on 11/8 returning 11/15/17.  Will notify Dr. Jean-Baptiste of interrogation results.  Will call patient with further MD orders.  Patient is scheduled to see Dr. Jean-Baptiste and have his device checked on 11/21/17.      Remote ICD transmission    11/7/17 - Dr. Jean-Baptiste notified of interrogation results.  Plan for patient to come to clinic as scheduled on 11/21/17 to see Dr. Jean-Baptiste.

## 2017-11-06 NOTE — MR AVS SNAPSHOT
After Visit Summary   11/6/2017    Erasmo Pearce    MRN: [id number]           Patient Information     Date Of Birth          1950        Visit Information        Provider Department      11/6/2017 6:00 AM  ICD REMOTE [facility]        Today's Diagnoses     Dilated cardiomyopathy (H)    -  1       Follow-ups after your visit        Your next 10 appointments already scheduled     Nov 17, 2017 10:00 AM CST   US AORTA MEDICARE AAA SCREENING with [id number]   [facility] ([facility])    [address] [place] [address] [id number] [phone]           Please bring a list of your medicines (including vitamins, minerals and over-the-counter drugs). Also, tell your doctor about any allergies you may have. Wear comfortable clothes and leave your valuables at home.  Adults: No eating or drinking for 8 hours before the exam. You may take medicine with a small sip of water.  Children: - Children 6+ years: No food or drink for 6 hours before exam. - Children 1-5 years: No food or drink for 4 hours before exam. - Infants, breast-fed: may have breast milk up to 2 hours before exam. - Infants, formula: may have bottle until 4 hours before exam.  Please call the Imaging Department at your exam site with any questions.            Nov 17, 2017 10:30 AM CST   Lab with [facility]   [facility] ([facility])    [address] [place] [address] [id number] [phone]            Nov 21, 2017  8:30 AM CST   (Arrive by 8:15 AM)   Implanted Defibulator with  Cv Device 1   [facility])    [address] [place] [id number] [phone]            Nov 21, 2017  9:00 AM CST   (Arrive by 8:45 AM)   RETURN ARRHYTHMIA with Chuy Jean-Baptiste MD   [facility])    [address] [place] [address]  [place] [address]   [phone]            Nov 21, 2017 11:00 AM CST   Return with Rojas Raygoza MD   [facility] Blood and Marrow Transplant ([facility])    [address] [facility]  [address] [facility] [id number] [phone]              Who to contact     If you have questions or need follow up information about today's clinic visit or your schedule please contact [facility] directly at [phone].  Normal or non-critical lab and imaging results will be communicated to you by [employer], letter or phone within 4 business days after the clinic has received the results. If you do not hear from us within 7 days, please contact the clinic through [employer] or phone. If you have a critical or abnormal lab result, we will notify you by phone as soon as possible.  Submit refill requests through [employer] or call your pharmacy and they will forward the refill request to us. Please allow 3 business days for your refill to be completed.          Additional Information About Your Visit        [employer] Information     [employer] gives you secure access to your electronic health record. If you see a primary care provider, you can also send messages to your care team and make appointments. If you have questions, please call your primary care clinic.  If you do not have a primary care provider, please call [phone] and they will assist you.        Care EveryWhere ID     This is your Care EveryWhere ID. This could be used by other organizations to access your [facility] medical records  [id number]         Blood Pressure from Last 3 Encounters:   09/19/17 113/66   08/08/17 96/53   07/11/17 104/66    Weight from Last 3 Encounters:   09/19/17 92.4 kg (203 lb 12.8 oz)   08/08/17 91.2 kg (201 lb)   07/11/17 91.4 kg (201 lb 8 oz)              We Performed the Following     INTERROGATION DEVICE EVAL REMOTE, PACER/ICD        Primary Care Provider Office Phone # Fax #    Cong Mcdonald [phone]  [phone]       [address] [address]        Equal Access to Services     CALI DAVILA : Hadii jericho courtney kiesha Truong, wajjda lualba, jacintata kamervinda carlos, nino sarahin hayaamandie yuenandres arellano faustino sierra. So [facility] [phone].    ATENCIÓN: Si habla español, tiene a miles disposición servicios gratuitos de asistencia lingüística. Llame al [phone].    We comply with applicable federal civil rights laws and [place] laws. We do not discriminate on the basis of race, color, national origin, age, disability, sex, sexual orientation, or gender identity.            Thank you!     Thank you for choosing [facility]  for your care. Our goal is always to provide you with excellent care. Hearing back from our patients is one way we can continue to improve our services. Please take a few minutes to complete the written survey that you may receive in the mail after your visit with us. Thank you!             Your Updated Medication List - Protect others around you: Learn how to safely use, store and throw away your medicines at www.disposemymeds.org.          This list is accurate as of: 11/6/17  4:59 PM.  Always use your most recent med list.                   Brand Name Dispense Instructions for use Diagnosis    albuterol 108 (90 BASE) MCG/ACT Inhaler    PROAIR HFA/PROVENTIL HFA/VENTOLIN HFA     Inhale 2 puffs into the lungs every 6 hours as needed        amoxicillin 500 MG tablet    AMOXIL    4 tablet    Take 4 tabs one hour before dental appointment    Multiple myeloma, without mention of having achieved remission       baclofen 10 MG tablet    LIORESAL     Take 10 mg by mouth 3 times daily as needed prn        CALCIUM 500 + D PO      Take 2 tablets by mouth daily.        dexamethasone 4 MG tablet    DECADRON    30 tablet    Take 10 tabs (40mg) Days 1, 8, and 15.    Multiple myeloma not having achieved remission (H)       diclofenac 75 MG EC tablet    VOLTAREN     Take 75 mg by  mouth 2 times daily as needed 1 tab prn        fentaNYL 25 mcg/hr 72 hr patch    DURAGESIC     Place 1 patch onto the skin every 72 hours        furosemide 20 MG tablet    LASIX    30 tablet    Take 20 mg by mouth daily    Status post chemotherapy, Multiple myeloma, without mention of having achieved remission       LENalidomide 10 MG Caps capsule CHEMO    REVLIMID    21 each    Take 10 mg daily days 1-21    Multiple myeloma not having achieved remission (H)       lisinopril 5 MG tablet    PRINIVIL/ZESTRIL     Take 5 mg by mouth 2 times daily    Multiple myeloma, without mention of having achieved remission, Hypomagnesemia, Ventricular fibrillation (H), Anemia of other chronic disease, Renal insufficiency, Dilated cardiomyopathy (H), Automatic implantable cardiac defibrillator in situ, Personal history of diseases of blood and blood-forming organs, History of long-term use of multiple prescription drugs       magnesium oxide 400 MG tablet    MAG-OX    4 tablet    Take 1,200 mg by mouth 3 times daily    Hypomagnesemia       metoprolol 100 MG 24 hr tablet    TOPROL-XL    135 tablet    Take 1.5 tablets (150 mg) by mouth daily    Atrial fibrillation, unspecified type (H), Paroxysmal ventricular tachycardia (H), Permanent atrial fibrillation (H)       nitroGLYcerin 0.4 MG sublingual tablet    NITROSTAT     Place 0.4 mg under the tongue every 5 minutes as needed Reported on 5/16/2017        omeprazole 20 MG CR capsule    priLOSEC    90 capsule    Take 20 mg by mouth daily    GERD (gastroesophageal reflux disease)       oxyCODONE IR 5 MG tablet    ROXICODONE     Take 1-2 tablets by mouth every 6 hours as needed. For pain.        simvastatin 40 MG tablet    ZOCOR    30 tablet    Take 40 mg by mouth At Bedtime        tiotropium 18 MCG capsule    SPIRIVA    30 capsule    Inhale 1 capsule (18 mcg) into the lungs daily    SOB (shortness of breath)       TRAZODONE HCL PO      Take 50 mg by mouth nightly as needed        warfarin  5 MG tablet    COUMADIN      Multiple myeloma in relapse (H)

## 2017-11-16 ENCOUNTER — TRANSFERRED RECORDS (OUTPATIENT)
Dept: HEALTH INFORMATION MANAGEMENT | Facility: CLINIC | Age: 67
End: 2017-11-16

## 2017-11-17 DIAGNOSIS — C90.00 MULTIPLE MYELOMA, REMISSION STATUS UNSPECIFIED (H): ICD-10-CM

## 2017-11-17 LAB
ALBUMIN SERPL-MCNC: 4.1 G/DL (ref 3.4–5)
ALP SERPL-CCNC: 87 U/L (ref 40–150)
ALT SERPL W P-5'-P-CCNC: 24 U/L (ref 0–70)
ANION GAP SERPL CALCULATED.3IONS-SCNC: 7 MMOL/L (ref 3–14)
AST SERPL W P-5'-P-CCNC: 21 U/L (ref 0–45)
B2 MICROGLOB SERPL-MCNC: 2.9 MG/L
BASOPHILS # BLD AUTO: 0 10E9/L (ref 0–0.2)
BASOPHILS NFR BLD AUTO: 0.4 %
BILIRUB SERPL-MCNC: 1 MG/DL (ref 0.2–1.3)
BUN SERPL-MCNC: 31 MG/DL (ref 7–30)
CALCIUM SERPL-MCNC: 9.6 MG/DL (ref 8.5–10.1)
CHLORIDE SERPL-SCNC: 104 MMOL/L (ref 94–109)
CO2 SERPL-SCNC: 29 MMOL/L (ref 20–32)
COLLECT DURATION TIME UR: 24 H
CREAT 24H UR-MRATE: 1.44 G/(24.H) (ref 1–2)
CREAT SERPL-MCNC: 1.12 MG/DL (ref 0.66–1.25)
CREAT UR-MCNC: 71 MG/DL
DIFFERENTIAL METHOD BLD: ABNORMAL
EOSINOPHIL # BLD AUTO: 0.1 10E9/L (ref 0–0.7)
EOSINOPHIL NFR BLD AUTO: 0.6 %
ERYTHROCYTE [DISTWIDTH] IN BLOOD BY AUTOMATED COUNT: 13.5 % (ref 10–15)
GFR SERPL CREATININE-BSD FRML MDRD: 66 ML/MIN/1.7M2
GLUCOSE SERPL-MCNC: 118 MG/DL (ref 70–99)
HCT VFR BLD AUTO: 41.1 % (ref 40–53)
HGB BLD-MCNC: 13.6 G/DL (ref 13.3–17.7)
IGA SERPL-MCNC: 304 MG/DL (ref 70–380)
IGE SERPL-ACNC: 13 KIU/L (ref 0–114)
IGG SERPL-MCNC: 670 MG/DL (ref 695–1620)
IGM SERPL-MCNC: 28 MG/DL (ref 60–265)
IMM GRANULOCYTES # BLD: 0 10E9/L (ref 0–0.4)
IMM GRANULOCYTES NFR BLD: 0.4 %
KAPPA LC UR-MCNC: 0.9 MG/DL (ref 0.33–1.94)
KAPPA LC/LAMBDA SER: 0.02 {RATIO} (ref 0.26–1.65)
LAMBDA LC SERPL-MCNC: 55.5 MG/DL (ref 0.57–2.63)
LYMPHOCYTES # BLD AUTO: 1.6 10E9/L (ref 0.8–5.3)
LYMPHOCYTES NFR BLD AUTO: 18.3 %
MAGNESIUM SERPL-MCNC: 2.1 MG/DL (ref 1.6–2.3)
MCH RBC QN AUTO: 34.3 PG (ref 26.5–33)
MCHC RBC AUTO-ENTMCNC: 33.1 G/DL (ref 31.5–36.5)
MCV RBC AUTO: 104 FL (ref 78–100)
MONOCYTES # BLD AUTO: 0.7 10E9/L (ref 0–1.3)
MONOCYTES NFR BLD AUTO: 8.2 %
NEUTROPHILS # BLD AUTO: 6.1 10E9/L (ref 1.6–8.3)
NEUTROPHILS NFR BLD AUTO: 72.1 %
NRBC # BLD AUTO: 0 10*3/UL
NRBC BLD AUTO-RTO: 0 /100
PLATELET # BLD AUTO: 187 10E9/L (ref 150–450)
POTASSIUM SERPL-SCNC: 4.4 MMOL/L (ref 3.4–5.3)
PROT 24H UR-MRATE: 0.36 G/(24.H) (ref 0.04–0.23)
PROT SERPL-MCNC: 7.2 G/DL (ref 6.8–8.8)
PROT UR-MCNC: 0.18 G/L
PROT/CREAT 24H UR: 0.25 G/G CR (ref 0–0.2)
RBC # BLD AUTO: 3.97 10E12/L (ref 4.4–5.9)
SODIUM SERPL-SCNC: 139 MMOL/L (ref 133–144)
SPECIMEN VOL UR: 2039 ML
WBC # BLD AUTO: 8.5 10E9/L (ref 4–11)

## 2017-11-17 PROCEDURE — 83883 ASSAY NEPHELOMETRY NOT SPEC: CPT | Performed by: INTERNAL MEDICINE

## 2017-11-17 PROCEDURE — 82232 ASSAY OF BETA-2 PROTEIN: CPT | Performed by: INTERNAL MEDICINE

## 2017-11-17 PROCEDURE — 82784 ASSAY IGA/IGD/IGG/IGM EACH: CPT | Performed by: INTERNAL MEDICINE

## 2017-11-17 PROCEDURE — 84166 PROTEIN E-PHORESIS/URINE/CSF: CPT | Performed by: INTERNAL MEDICINE

## 2017-11-17 PROCEDURE — 00000402 ZZHCL STATISTIC TOTAL PROTEIN: Performed by: INTERNAL MEDICINE

## 2017-11-17 PROCEDURE — 82785 ASSAY OF IGE: CPT | Performed by: INTERNAL MEDICINE

## 2017-11-17 PROCEDURE — 84165 PROTEIN E-PHORESIS SERUM: CPT | Performed by: INTERNAL MEDICINE

## 2017-11-17 PROCEDURE — 86335 IMMUNFIX E-PHORSIS/URINE/CSF: CPT | Performed by: INTERNAL MEDICINE

## 2017-11-20 LAB
ALBUMIN MFR UR ELPH: 7.6 %
ALBUMIN SERPL ELPH-MCNC: 4.1 G/DL (ref 3.7–5.1)
ALPHA1 GLOB MFR UR ELPH: 4 %
ALPHA1 GLOB SERPL ELPH-MCNC: 0.3 G/DL (ref 0.2–0.4)
ALPHA2 GLOB MFR UR ELPH: 5.4 %
ALPHA2 GLOB SERPL ELPH-MCNC: 0.8 G/DL (ref 0.5–0.9)
B-GLOBULIN MFR UR ELPH: 9.7 %
B-GLOBULIN SERPL ELPH-MCNC: 0.8 G/DL (ref 0.6–1)
GAMMA GLOB MFR UR ELPH: 73.3 %
GAMMA GLOB SERPL ELPH-MCNC: 0.8 G/DL (ref 0.7–1.6)
M PROTEIN MFR UR ELPH: 50.9 %
M PROTEIN SERPL ELPH-MCNC: 0.2 G/DL
PROT PATTERN SERPL ELPH-IMP: ABNORMAL
PROT PATTERN UR ELPH-IMP: ABNORMAL

## 2017-11-21 ENCOUNTER — ONCOLOGY VISIT (OUTPATIENT)
Dept: TRANSPLANT | Facility: CLINIC | Age: 67
End: 2017-11-21
Attending: INTERNAL MEDICINE
Payer: MEDICARE

## 2017-11-21 ENCOUNTER — OFFICE VISIT (OUTPATIENT)
Dept: CARDIOLOGY | Facility: CLINIC | Age: 67
End: 2017-11-21
Attending: INTERNAL MEDICINE
Payer: MEDICARE

## 2017-11-21 ENCOUNTER — PRE VISIT (OUTPATIENT)
Dept: CARDIOLOGY | Facility: CLINIC | Age: 67
End: 2017-11-21

## 2017-11-21 VITALS
DIASTOLIC BLOOD PRESSURE: 61 MMHG | WEIGHT: 204 LBS | HEART RATE: 74 BPM | OXYGEN SATURATION: 98 % | BODY MASS INDEX: 29.27 KG/M2 | SYSTOLIC BLOOD PRESSURE: 124 MMHG

## 2017-11-21 DIAGNOSIS — C90.00 MULTIPLE MYELOMA NOT HAVING ACHIEVED REMISSION (H): ICD-10-CM

## 2017-11-21 DIAGNOSIS — I48.21 PERMANENT ATRIAL FIBRILLATION (H): Primary | ICD-10-CM

## 2017-11-21 DIAGNOSIS — I42.0 DILATED CARDIOMYOPATHY (H): ICD-10-CM

## 2017-11-21 DIAGNOSIS — Z95.810 AUTOMATIC IMPLANTABLE CARDIOVERTER-DEFIBRILLATOR IN SITU: ICD-10-CM

## 2017-11-21 DIAGNOSIS — Z51.81 ANTICOAGULATION GOAL OF INR 2 TO 3: Primary | ICD-10-CM

## 2017-11-21 DIAGNOSIS — Z79.01 ANTICOAGULATION GOAL OF INR 2 TO 3: Primary | ICD-10-CM

## 2017-11-21 DIAGNOSIS — I47.29 PAROXYSMAL VENTRICULAR TACHYCARDIA (H): ICD-10-CM

## 2017-11-21 DIAGNOSIS — I42.0 DILATED CARDIOMYOPATHY (H): Primary | ICD-10-CM

## 2017-11-21 LAB — PROT ELPH PNL UR ELPH: NORMAL

## 2017-11-21 PROCEDURE — 99211 OFF/OP EST MAY X REQ PHY/QHP: CPT | Mod: 27

## 2017-11-21 PROCEDURE — 93289 INTERROG DEVICE EVAL HEART: CPT | Mod: 26 | Performed by: INTERNAL MEDICINE

## 2017-11-21 PROCEDURE — 99212 OFFICE O/P EST SF 10 MIN: CPT | Mod: ZF

## 2017-11-21 PROCEDURE — 99214 OFFICE O/P EST MOD 30 MIN: CPT | Mod: ZP | Performed by: INTERNAL MEDICINE

## 2017-11-21 PROCEDURE — 93282 PRGRMG EVAL IMPLANTABLE DFB: CPT | Mod: ZF

## 2017-11-21 PROCEDURE — 99212 OFFICE O/P EST SF 10 MIN: CPT | Mod: 25,ZF

## 2017-11-21 PROCEDURE — 99212 OFFICE O/P EST SF 10 MIN: CPT

## 2017-11-21 RX ORDER — LENALIDOMIDE 10 MG/1
CAPSULE ORAL
Qty: 21 EACH | Refills: 3 | COMMUNITY
Start: 2017-11-21 | End: 2018-01-24

## 2017-11-21 RX ORDER — DEXAMETHASONE 4 MG/1
TABLET ORAL
Qty: 30 TABLET | Refills: 3 | COMMUNITY
Start: 2017-11-21 | End: 2018-01-24

## 2017-11-21 ASSESSMENT — PAIN SCALES - GENERAL: PAINLEVEL: NO PAIN (0)

## 2017-11-21 NOTE — LETTER
11/21/2017       RE: Erasmo Zapata  [address]  [address] [facility] [address]     Dear Colleague,    Thank you for referring your patient, Erasmo Zapata, to the [facility] BLOOD [facility] TRANSPLANT at [facility]. Please see a copy of my visit note below.    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  Jonh has relapsed and was on Revlimid and dexamethasone. He has been off revlimid since June 2. He had ao defibrillator  Discharges on Nov 5 when he forgot to take Toprol.No other sx no chest pain no SOB, KANG,Had abdominal ultrasound which suggests the aorta looks same,2.8cm .Seen by Dr Jean-Baptiste this am. No change in meds Continues to have low back pain on meds    PAST MEDICAL HISTORY:  See my note from 09/2017.       SOCIAL HISTORY:  See my note from 09/2017.       FAMILY HISTORY:  See my note from 09/2017.      REVIEW OF SYSTEMS:  A 12-point review of systems is done and is negative as in HPI.      PHYSICAL EXAMINATION:   GENERAL:  He is an alert gentleman in no acute distress.   VITAL SIGNS:  Stable.   HEENT:  Normocephalic.  EOM okay, no scleral icterus.  Mouth without lesion.   RESPIRATORY:  Clear to percussion and auscultation.   CARDIAC:  Irregularly irregular rhythm, no murmur.  Cardioverter in place in the left upper chest wall.   ABDOMEN:  Soft without splenomegaly.   EXTREMITIES:  Without edema.   NEUROLOGIC:  No resting tremor.     Results for ERASMO ZAPATA (MRN [id number]) as of 11/21/2017 09:39   Ref. Range 11/16/2017 10:00 11/17/2017 05:30 11/17/2017 09:25 11/17/2017 09:39   Sodium Latest Ref Range: 133 - 144 mmol/L    139   Sodium (External) Latest Ref Range: 136 - 146 MMOL/L 141       Potassium Latest Ref Range: 3.4 - 5.3 mmol/L    4.4   Potassium (External) Latest Ref Range: 3.5 - 5.1 MMOL/L 4.6      Chloride Latest Ref Range: 94 - 109 mmol/L    104   Chloride (External) Latest Ref Range: 98 - 107 MMOL/L 103      Carbon Dioxide Latest Ref Range: 20 - 32 mmol/L    29   CO2 (External) Latest Ref Range: 22.0 - 29.0 MMOL/L 30.0 (H)      Urea Nitrogen Latest Ref Range: 7 - 30 mg/dL    31 (H)   Urea Nitrogen (External) Latest Ref Range: 10.0 - 20.0 MG/DL 25.0 (H)      Creatinine Latest Ref Range: 0.66 - 1.25 mg/dL    1.12   Creatinine (External) Latest Ref Range: 0.72 - 1.25 MG/DL 1.32 (H)      GFR Estimate Latest Ref Range: >60 mL/min/1.7m2    66   GFR Estimate If Black Latest Ref Range: >60 mL/min/1.7m2    79   GFR Est if Black (External) Latest Ref Range: >60 ml/min/1.73m2 >60      GFR Estimated (External) Latest Ref Range: >60 ml/min/1.73m2 54 (L)      Calcium Latest Ref Range: 8.5 - 10.1 mg/dL    9.6   Calcium (External) Latest Ref Range: 8.6 - 10.5 MG/DL 9.8      Anion Gap Latest Ref Range: 3 - 14 mmol/L    7   Anion Gap (External) Latest Ref Range: 10.0 - 20.0  12.6      Magnesium Latest Ref Range: 1.6 - 2.3 mg/dL    2.1   Magnesium (External) Latest Ref Range: 1.8 - 2.6 MG/DL 2.0      Albumin Latest Ref Range: 3.4 - 5.0 g/dL    4.1   Albumin (External) Latest Ref Range: 3.4 - 4.8 GM/DL 4.3      Protein Total Latest Ref Range: 6.8 - 8.8 g/dL    7.2   Protein Total (External) Latest Ref Range: 6.0 - 8.0 GM/DL 7.2      Bilirubin Total Latest Ref Range: 0.2 - 1.3 mg/dL    1.0   Alkaline Phosphatase Latest Ref Range: 40 - 150 U/L    87   Alk Phosphatase (External) Latest Ref Range: 36 - 150 U/L 82      ALT Latest Ref Range: 0 - 70 U/L    24   ALT (External) Latest Ref Range: 8 - 45 U/L 17      AST Latest Ref Range: 0 - 45 U/L    21   AST (External) Latest Ref Range: 5 - 41 U/L 21      Beta-2-Microglobulin Latest Ref Range: <2.3 mg/L    2.9 (H)   Bilirubin Total (External) Latest Ref Range: 0.2 -  1.2 MG/DL 1.0      BUN/Creatinine Ratio (External) Latest Ref Range: 11.7 - 22.9  18.9      Creatinine Urine Timed Latest Ref Range: 1.00 - 2.00   1.44     Creatinine Urine Latest Units: mg/dL  71     IGE Latest Ref Range: 0 - 114 KIU/L    13   Protein Random Urine Latest Units: g/L  0.18     Protein Total Urine g/gr Creatinine Latest Ref Range: 0 - 0.2 g/g Cr  0.25 (H)     Protein Total 24 Hr Urine Latest Ref Range: 0.04 - 0.23   0.36 (H)     Glucose Latest Ref Range: 70 - 99 mg/dL    118 (H)   Glucose (External) Latest Ref Range: 70 - 100 MG/ (H)      WBC Latest Ref Range: 4.0 - 11.0 10e9/L    8.5   WBC Count (External) Latest Ref Range: 3.9 - 11.9 K/UL 7.6      Hemoglobin Latest Ref Range: 13.3 - 17.7 g/dL    13.6   Hemoglobin (External) Latest Ref Range: 13.1 - 17.1 GM/DL 13.8      Hematocrit Latest Ref Range: 40.0 - 53.0 %    41.1   Hematocrit (External) Latest Ref Range: 38.7 - 51.4 % 41.1      Platelet Count Latest Ref Range: 150 - 450 10e9/L    187   Platelet Count (External) Latest Ref Range: 179 - 450 K/      RBC Count Latest Ref Range: 4.4 - 5.9 10e12/L    3.97 (L)   RBC Count (External) Latest Ref Range: 4.08 - 5.79 M/UL 4.02 (L)      MCV Latest Ref Range: 78 - 100 fl    104 (H)   MCV (External) Latest Ref Range: 82.9 - 100.6 .2 (H)      MCH Latest Ref Range: 26.5 - 33.0 pg    34.3 (H)   MCH (External) Latest Ref Range: 27.6 - 33.2 PG 34.2 (H)      MCHC Latest Ref Range: 31.5 - 36.5 g/dL    33.1   MCHC (External) Latest Ref Range: 32.0 - 36.0 % 33.4      RDW Latest Ref Range: 10.0 - 15.0 %    13.5   RDW (External) Latest Ref Range: 10.0 - 16.2 % 11.1      Diff Method Unknown    Automated Method   % Neutrophils Latest Units: %    72.1   % Lymphocytes Latest Units: %    18.3   % Neutrophils (External) Latest Ref Range: 43 - 80 % 70.5      % Lymphocytes (External) Latest Ref Range: 16.0 - 49.0 % 19.8      % Monocytes Latest Units: %    8.2   % Monocytes (External) Latest Ref Range: 0.0 -  10.0 % 7.8      % Eosinophils Latest Units: %    0.6   % Eosinophils (External) Latest Ref Range: 0 - 7 % 1.6      % Basophils Latest Units: %    0.4   % Basophils (External) Latest Ref Range: 0 - 2 % 0.3      % Immature Granulocytes Latest Units: %    0.4   Nucleated RBCs Latest Ref Range: 0 /100    0   Absolute Neutrophil Latest Ref Range: 1.6 - 8.3 10e9/L    6.1   Absolute Lymphocytes Latest Ref Range: 0.8 - 5.3 10e9/L    1.6   Absolute Monocytes Latest Ref Range: 0.0 - 1.3 10e9/L    0.7   Absolute Eosinophils Latest Ref Range: 0.0 - 0.7 10e9/L    0.1   Absolute Basophils Latest Ref Range: 0.0 - 0.2 10e9/L    0.0   Absolute Basophils (External) Latest Ref Range: 0.0 - 0.2 K/UL 0.0      Absolute Eosinophils (External) Latest Ref Range: 0.0 - 0.8 K/UL 0.1      Absolute Lymphocytes (External) Latest Ref Range: 0.9 - 3.5 K/UL 1.5      Absolute Monocytes (External) Latest Ref Range: 0.0 - 1.1 K/UL 0.6      Absolute Neutrophils (External) Latest Ref Range: 1.6 - 8.1 K/UL 5.4      Abs Immature Granulocytes Latest Ref Range: 0 - 0.4 10e9/L    0.0   Absolute Nucleated RBC Unknown    0.0   Albumin Fraction Latest Ref Range: 3.7 - 5.1 g/dL    4.1   Albumin Fraction Urine Latest Ref Range: 0 %  7.6 (H)     Alpha 1 Fraction Latest Ref Range: 0.2 - 0.4 g/dL    0.3   Alpha 1 Fraction Urine Latest Ref Range: 0 %  4.0 (H)     Alpha 2 Fraction Latest Ref Range: 0.5 - 0.9 g/dL    0.8   Alpha 2 Fraction Urine Latest Ref Range: 0 %  5.4 (H)     Beta Fraction Latest Ref Range: 0.6 - 1.0 g/dL    0.8   Beta Fraction Urine Latest Ref Range: 0 %  9.7 (H)     ELP Interpretation: Unknown    Small monoclonal ...   ELP Interpretation Urine Unknown  Albumin and globu...     Gamma Fraction Latest Ref Range: 0.7 - 1.6 g/dL    0.8   Gamma Fraction Urine Latest Ref Range: 0 %  73.3 (H)     IGA Latest Ref Range: 70 - 380 mg/dL    304   IGG Latest Ref Range: 695 - 1620 mg/dL    670 (L)   IGM Latest Ref Range: 60 - 265 mg/dL    28 (L)   Immunofix ELP  Urine Unknown  (Note)     Kappa Free Lt Chain Latest Ref Range: 0.33 - 1.94 mg/dL    0.90   Kappa Lambda Ratio Latest Ref Range: 0.26 - 1.65     0.02 (L)   Lambda Free Lt Chain Latest Ref Range: 0.57 - 2.63 mg/dL    55.50 (H)   Monoclonal Peak Latest Ref Range: 0.0 g/dL    0.2 (H)   Monoclonal Peak Urine Latest Ref Range: 0% %  50.9 (H)     US AORTA MEDICARE AAA SCREENING Unknown   Rpt    Ultrasound retroperitoneal dated 11/17/2017 9:25 AM     Comparison study: PET/CT from 8/21/2012     Clinical history: Abdominal aortic aneurysm     Findings:  On the transverse view the aortic AP diameters were as follows:  Supraceliac: 2.7 cm  Suprarenal: 2.0 cm  Infrarenal (proximal): 2.0 cm  Infrarenal (mid): 2.8 cm  Infrarenal (distal): 2.8 cm     Right GILMER: 1.1 cm  Left GILMER: 1.6 cm proximally, 1.1 cm distally.         Impression:   Maximal AP diameter of the infrarenal abdominal aorta is 2.8  centimeters, ectatic, previously 2.7 cm on 8/21/2012.       Aorta diameter measurement classification:  < 2.5cm: Normal.  2.5 - 2.9cm: Ectatic.  >3cm: Aneurysmal.    ASSESSMENT:   1.  Multiple myeloma.   2.  Status post autologous peripheral blood stem cell transplant.   3.  Cardiomyopathy.   4.  Atrial fibrillation.   5.  History of deep venous thrombosis, on anticoagulation.   6.  Chronic back pain.   7.  Aortic aneurysm.   8.  History of plasmacytoma of the clivus, status post radiation.   9.  History of ventricular tachycardia with an implantable defibrillator in place.       Lambda light chains are inceased some but he feels great.  I would like to suggest that we continue to hold the Revlimid and dexamethasone.  I am going to see him in a couple of months in Jan 2018.  I am going to check his light chains  and M-spike and keep him off the Revlimid and dexamethasone for now but likely begin in Jan same dose 10mg rev x 21days and weekly dex 40mg days 1 ,8 and 15.    He should continue the magnesium 6 tabs/d with .  [facility],   and  , about these data. The ultrasound of aorta show no significant change Continue on wafarin Continue pain meds      Again, thank you for allowing me to participate in the care of your patient.      Sincerely,    Rojas Raygoza MD

## 2017-11-21 NOTE — PROGRESS NOTES
Patient seen in clinic for evaluation and iterative programming of his [employer] single lead ICD per MD orders.  Patient has an appointment to see Dr. Jean-Baptiste today.  Normal ICD function.  10 NSVT episodes recorded since patient's last ICD shock on 11/5/17 - 141-185 bpm, 4-8 beats.  1 episode recorded in the VF zone on 11/6/17 - 16 beats, 274 bpm.  Intrinsic rhythm = Irregular ventricular rhythm @ ~ 60 bpm.   = <1%.  Estimated battery longevity to ROSEMARIE = 5 years.  Patient reports that he is feeling well and denies complaints.  Plan for patient to send a remote transmission in 3 months and return to clinic in 6 months.    Single lead ICD

## 2017-11-21 NOTE — MR AVS SNAPSHOT
"              After Visit Summary   11/21/2017    Erasmo Pearce    MRN: [id number]           Patient Information     Date Of Birth          1950        Visit Information        Provider Department      11/21/2017 9:00 AM Chuy Jean-Baptiste MD [facility]        Today's Diagnoses     Permanent atrial fibrillation (H)    -  1    Automatic implantable cardioverter-defibrillator in situ        Paroxysmal ventricular tachycardia (H)        Dilated cardiomyopathy (H)          Care Instructions    You will be scheduled for a follow up visit: 6 months with Device check.    [facility] will mail out an adapter for your ICD remote checks. It will take 2-4 weeks to receive. This will allow you to stop \"land line service\"      We encourage you to use My Chart as your primary form of communication if possible. If you need assistance in setting this up, please contact our office or ask at your follow up visit.     If you need a medication refill please contact your pharmacy. Please allow at least 3 business days for your refill to be completed.       Cardiology  Telephone Number    Aleida Angeles [phone]   Or send a message to your provider via my chart.   For scheduling procedures:    [facility]    Clinic appointments       [phone]   For the Device Clinic (Pacemakers and ICD's)   RN's :   Natali Vale  During business hours: [phone]    After business hours:   [phone]- select option 4 and ask for job code [id number].          As always, Thank you for trusting us with your health care needs!          Follow-ups after your visit        Follow-up notes from your care team     Return in about 6 months (around 5/21/2018) for device and kristy .      Your next 10 appointments already scheduled     Nov 21, 2017  9:00 AM CST   (Arrive by 8:45 AM)   RETURN ARRHYTHMIA with Chuy Jean-Baptiste MD   [facility] ([facility])    [address]  3rd [address]  [facility] [id number] [phone]            Nov 21, 2017 11:00 AM CST   Return with Rojas Raygoza MD   [facility] Blood and Marrow Transplant ([facility])    [address]  [address] [facility] [id number] [phone]              Who to contact     If you have questions or need follow up information about today's clinic visit or your schedule please contact [facility] directly at [phone].  Normal or non-critical lab and imaging results will be communicated to you by Deliv[facility], letter or phone within 4 business days after the clinic has received the results. If you do not hear from us within 7 days, please contact the clinic through iWelcomet or phone. If you have a critical or abnormal lab result, we will notify you by phone as soon as possible.  Submit refill requests through Renrendai or call your pharmacy and they will forward the refill request to us. Please allow 3 business days for your refill to be completed.          Additional Information About Your Visit        [facility] Information     [facility] gives you secure access to your electronic health record. If you see a primary care provider, you can also send messages to your care team and make appointments. If you have questions, please call your primary care clinic.  If you do not have a primary care provider, please call [phone] and they will assist you.        Care EveryWhere ID     This is your Care EveryWhere ID. This could be used by other organizations to access your [facility] medical records  [id number]        Your Vitals Were     Pulse Pulse Oximetry BMI (Body Mass Index)             74 98% 29.27 kg/m2          Blood Pressure from Last 3 Encounters:   11/21/17 124/61   09/19/17 113/66   08/08/17 96/53    Weight from Last 3 Encounters:   11/21/17 92.5 kg (204 lb)   09/19/17 92.4 kg (203 lb 12.8 oz)   08/08/17 91.2 kg (201 lb)              Today, you had the  following     No orders found for display       Primary Care Provider Office Phone # Fax Cleopatra Mcdonald [phone]       [place] [address] [address] [facility] [address]        Equal Access to Services     CALI DAVILA : Jese courtney elpidioo Somykeali, waaxda luqadaha, qaybta kaalmada ademarc, nino arellano laTamikomj sierra. So [facility] [phone].    ATENCIÓN: Si habla español, tiene a miles disposición servicios gratuitos de asistencia lingüística. Llame al [phone].    We comply with applicable federal civil rights laws and [place] laws. We do not discriminate on the basis of race, color, national origin, age, disability, sex, sexual orientation, or gender identity.            Thank you!     Thank you for choosing [facility]  for your care. Our goal is always to provide you with excellent care. Hearing back from our patients is one way we can continue to improve our services. Please take a few minutes to complete the written survey that you may receive in the mail after your visit with us. Thank you!             Your Updated Medication List - Protect others around you: Learn how to safely use, store and throw away your medicines at www.disposemymeds.org.          This list is accurate as of: 11/21/17  8:35 AM.  Always use your most recent med list.                   Brand Name Dispense Instructions for use Diagnosis    albuterol 108 (90 BASE) MCG/ACT Inhaler    PROAIR HFA/PROVENTIL HFA/VENTOLIN HFA     Inhale 2 puffs into the lungs every 6 hours as needed        amoxicillin 500 MG tablet    AMOXIL    4 tablet    Take 4 tabs one hour before dental appointment    Multiple myeloma, without mention of having achieved remission       baclofen 10 MG tablet    LIORESAL     Take 10 mg by mouth 3 times daily as needed prn        CALCIUM 500 + D PO      Take 2 tablets by mouth daily.        dexamethasone 4 MG tablet    DECADRON    30 tablet    Take 10 tabs (40mg) Days 1, 8, and  15.    Multiple myeloma not having achieved remission (H)       diclofenac 75 MG EC tablet    VOLTAREN     Take 75 mg by mouth 2 times daily as needed 1 tab prn        fentaNYL 25 mcg/hr 72 hr patch    DURAGESIC     Place 1 patch onto the skin every 72 hours        furosemide 20 MG tablet    LASIX    30 tablet    Take 20 mg by mouth daily    Status post chemotherapy, Multiple myeloma, without mention of having achieved remission       LENalidomide 10 MG Caps capsule CHEMO    REVLIMID    21 each    Take 10 mg daily days 1-21    Multiple myeloma not having achieved remission (H)       lisinopril 5 MG tablet    PRINIVIL/ZESTRIL     Take 5 mg by mouth 2 times daily    Multiple myeloma, without mention of having achieved remission, Hypomagnesemia, Ventricular fibrillation (H), Anemia of other chronic disease, Renal insufficiency, Dilated cardiomyopathy (H), Automatic implantable cardiac defibrillator in situ, Personal history of diseases of blood and blood-forming organs, History of long-term use of multiple prescription drugs       magnesium oxide 400 MG tablet    MAG-OX    4 tablet    Take 1,200 mg by mouth 3 times daily    Hypomagnesemia       metoprolol 100 MG 24 hr tablet    TOPROL-XL    135 tablet    Take 1.5 tablets (150 mg) by mouth daily    Atrial fibrillation, unspecified type (H), Paroxysmal ventricular tachycardia (H), Permanent atrial fibrillation (H)       nitroGLYcerin 0.4 MG sublingual tablet    NITROSTAT     Place 0.4 mg under the tongue every 5 minutes as needed Reported on 5/16/2017        omeprazole 20 MG CR capsule    priLOSEC    90 capsule    Take 20 mg by mouth daily    GERD (gastroesophageal reflux disease)       oxyCODONE IR 5 MG tablet    ROXICODONE     Take 1-2 tablets by mouth every 6 hours as needed. For pain.        simvastatin 40 MG tablet    ZOCOR    30 tablet    Take 40 mg by mouth At Bedtime        tiotropium 18 MCG capsule    SPIRIVA    30 capsule    Inhale 1 capsule (18 mcg) into the  lungs daily    SOB (shortness of breath)       TRAZODONE HCL PO      Take 50 mg by mouth nightly as needed        warfarin 5 MG tablet    COUMADIN      Multiple myeloma in relapse (H)

## 2017-11-21 NOTE — MR AVS SNAPSHOT
After Visit Summary   11/21/2017    Erasmo Pearce    MRN: [id number]           Patient Information     Date Of Birth          1950        Visit Information        Provider Department      11/21/2017 8:30 AM 1, Uc Cv Device [facility]        Today's Diagnoses     Dilated cardiomyopathy (H)    -  1      Care Instructions    It was a pleasure to see you in clinic today.  Please do not hesitate to call with any questions or concerns.  You are scheduled for a remote transmission on 2/22/18.  We look forward to seeing you in clinic at your next device check in 6 months.    Whit Lipscomb, RN, MS, CCRN  Electrophysiology Nurse Clinician  [facility]    During Business Hours Please Call:  [phone]  After Hours Please Call:  [phone] - select option #4 and ask for job code [phone]                        Follow-ups after your visit        Follow-up notes from your care team     Return in about 6 months (around 5/21/2018) for ICD Check.      Your next 10 appointments already scheduled     Nov 21, 2017  8:30 AM CST   (Arrive by 8:15 AM)   Implanted Defibulator with Uc Cv Device 1   [facility] ([facility])    [address] [place] [id number] [phone]            Nov 21, 2017  9:00 AM CST   (Arrive by 8:45 AM)   RETURN ARRHYTHMIA with Chuy Jean-Baptiste MD   [facility] ([facility])    [address] [place] [id number] [phone]            Nov 21, 2017 11:00 AM CST   Return with Rojas Raygoza MD   [facility] ([facility])    [address] [place] [id number] [phone]              Who to contact     If you have questions or need follow up information about today's clinic visit or your schedule please contact [facility] directly at  [phone].  Normal or non-critical lab and imaging results will be communicated to you by MyChart, letter or phone within 4 business days after the clinic has received the results. If you do not hear from us within 7 days, please contact the clinic through Medlaneshart or phone. If you have a critical or abnormal lab result, we will notify you by phone as soon as possible.  Submit refill requests through fabrooms or call your pharmacy and they will forward the refill request to us. Please allow 3 business days for your refill to be completed.          Additional Information About Your Visit        Med[facility] Information     fabrooms gives you secure access to your electronic health record. If you see a primary care provider, you can also send messages to your care team and make appointments. If you have questions, please call your primary care clinic.  If you do not have a primary care provider, please call [phone] and they will assist you.        Care EveryWhere ID     This is your Care EveryWhere ID. This could be used by other organizations to access your [place] medical records  [id number]         Blood Pressure from Last 3 Encounters:   09/19/17 113/66   08/08/17 96/53   07/11/17 104/66    Weight from Last 3 Encounters:   09/19/17 92.4 kg (203 lb 12.8 oz)   08/08/17 91.2 kg (201 lb)   07/11/17 91.4 kg (201 lb 8 oz)              We Performed the Following     ICD DEVICE PROGRAMMING EVAL, SINGLE LEAD ICD        Primary Care Provider Office Phone # Fax #    Cong Mcdonald [phone]       [place] [address]        Equal Access to Services     [place] campus[facility]SIN : Hadii aad ku hadasho Somykeali, waaxda luqadaha, qaybta kaalmada carlos, nino sierra. So [facility] [phone].    ATENCIÓN: Si habla español, tiene a miles disposición servicios gratuitos de asistencia lingüística. Llame al [phone].    We comply with applicable federal civil rights  laws and [place] laws. We do not discriminate on the basis of race, color, national origin, age, disability, sex, sexual orientation, or gender identity.            Thank you!     Thank you for choosing [facility]  for your care. Our goal is always to provide you with excellent care. Hearing back from our patients is one way we can continue to improve our services. Please take a few minutes to complete the written survey that you may receive in the mail after your visit with us. Thank you!             Your Updated Medication List - Protect others around you: Learn how to safely use, store and throw away your medicines at www.disposemymeds.org.          This list is accurate as of: 11/21/17  8:03 AM.  Always use your most recent med list.                   Brand Name Dispense Instructions for use Diagnosis    albuterol 108 (90 BASE) MCG/ACT Inhaler    PROAIR HFA/PROVENTIL HFA/VENTOLIN HFA     Inhale 2 puffs into the lungs every 6 hours as needed        amoxicillin 500 MG tablet    AMOXIL    4 tablet    Take 4 tabs one hour before dental appointment    Multiple myeloma, without mention of having achieved remission       baclofen 10 MG tablet    LIORESAL     Take 10 mg by mouth 3 times daily as needed prn        CALCIUM 500 + D PO      Take 2 tablets by mouth daily.        dexamethasone 4 MG tablet    DECADRON    30 tablet    Take 10 tabs (40mg) Days 1, 8, and 15.    Multiple myeloma not having achieved remission (H)       diclofenac 75 MG EC tablet    VOLTAREN     Take 75 mg by mouth 2 times daily as needed 1 tab prn        fentaNYL 25 mcg/hr 72 hr patch    DURAGESIC     Place 1 patch onto the skin every 72 hours        furosemide 20 MG tablet    LASIX    30 tablet    Take 20 mg by mouth daily    Status post chemotherapy, Multiple myeloma, without mention of having achieved remission       LENalidomide 10 MG Caps capsule CHEMO    REVLIMID    21 each    Take 10 mg daily days 1-21    Multiple myeloma not  having achieved remission (H)       lisinopril 5 MG tablet    PRINIVIL/ZESTRIL     Take 5 mg by mouth 2 times daily    Multiple myeloma, without mention of having achieved remission, Hypomagnesemia, Ventricular fibrillation (H), Anemia of other chronic disease, Renal insufficiency, Dilated cardiomyopathy (H), Automatic implantable cardiac defibrillator in situ, Personal history of diseases of blood and blood-forming organs, History of long-term use of multiple prescription drugs       magnesium oxide 400 MG tablet    MAG-OX    4 tablet    Take 1,200 mg by mouth 3 times daily    Hypomagnesemia       metoprolol 100 MG 24 hr tablet    TOPROL-XL    135 tablet    Take 1.5 tablets (150 mg) by mouth daily    Atrial fibrillation, unspecified type (H), Paroxysmal ventricular tachycardia (H), Permanent atrial fibrillation (H)       nitroGLYcerin 0.4 MG sublingual tablet    NITROSTAT     Place 0.4 mg under the tongue every 5 minutes as needed Reported on 5/16/2017        omeprazole 20 MG CR capsule    priLOSEC    90 capsule    Take 20 mg by mouth daily    GERD (gastroesophageal reflux disease)       oxyCODONE IR 5 MG tablet    ROXICODONE     Take 1-2 tablets by mouth every 6 hours as needed. For pain.        simvastatin 40 MG tablet    ZOCOR    30 tablet    Take 40 mg by mouth At Bedtime        tiotropium 18 MCG capsule    SPIRIVA    30 capsule    Inhale 1 capsule (18 mcg) into the lungs daily    SOB (shortness of breath)       TRAZODONE HCL PO      Take 50 mg by mouth nightly as needed        warfarin 5 MG tablet    COUMADIN      Multiple myeloma in relapse (H)

## 2017-11-21 NOTE — LETTER
11/21/2017      RE: Erasmo Pearce  [address]  [address] [facility] [address]       Dear Colleague,    Thank you for the opportunity to participate in the care of your patient, Erasmo Pearce, at the [facility]. Please see a copy of my visit note below.    HPI:  Erasmo Pearce returns for a previously scheduled follow-up. We last saw him in August 2017. He is a 67-year-old gentleman with multiple myeloma, permanent atrial fibrillation, ischemic cardiomyopathy with an ejection fraction of approximately 35% who had an implantable cardioverter-defibrillator placed in December 2009 for primary prevention with subsequent appropriate shocks. He has been on amiodarone in the past, but this was discontinued relatively quickly because of a low DLCO. He was subsequently treated with sotalol, initially 80 mg twice daily, then reduce to 40 mg twice daily for prolonged QT interval and then discontinued because of low blood pressure.   He had been back on chemotherapy for relapse of his myeloma. In anticipation of restarting chemotherapy we obtained a new  baseline  echo on 2/7/2017. LVEF was 30-35%. there was inferior akinesis and wall thinning, consistent with a scar. Chemotherapy ended last June.  He received an ICD shock on 11/6/2017. He had AF with RVR that initialed VT that was successfully pace-terminated, but he then received a shock for continued AF with RVR.  I reviewed the EGMs.  Mr. 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 He denies symptoms before or after his shock.   At this time, Mr. Pearce feels well. He denies chest pain, shortness of breath or palpitations. He has orthostatic lightheadedness that is better when he is not on chemotherapy. He will be seeing Dr. Raygoza later today.    Defibrillator evaluation shows normal function. He had 10  episodes of nonsustained ventricular tachycardia lasting 4-6 beats. He had one 3-1/2 second episode of tachycardia that fell into the ventricular fibrillation zone. His heart rate histogram is excellent.      PAST MEDICAL HISTORY:  Past Medical History:   Diagnosis Date     Atrial fibrillation (H)      Other premature beats      VF (ventricular fibrillation) (H)        CURRENT MEDICATIONS:  Current Outpatient Prescriptions   Medication Sig Dispense Refill     warfarin (COUMADIN) 5 MG tablet        LENalidomide (REVLIMID) 10 MG CAPS capsule CHEMO Take 10 mg daily days 1-21 21 each 3     magnesium oxide (MAG-OX) 400 MG tablet Take 1,200 mg by mouth 3 times daily  4 tablet 0     metoprolol (TOPROL-XL) 100 MG 24 hr tablet Take 1.5 tablets (150 mg) by mouth daily 135 tablet 3     dexamethasone (DECADRON) 4 MG tablet Take 10 tabs (40mg) Days 1, 8, and 15. 30 tablet 3     TRAZODONE HCL PO Take 50 mg by mouth nightly as needed        fentaNYL (DURAGESIC) 25 mcg/hr patch 72 hr Place 1 patch onto the skin every 72 hours       furosemide (LASIX) 20 MG tablet Take 20 mg by mouth daily  30 tablet 3     lisinopril (PRINIVIL,ZESTRIL) 5 MG tablet Take 5 mg by mouth 2 times daily       tiotropium (SPIRIVA) 18 MCG inhalation capsule Inhale 1 capsule (18 mcg) into the lungs daily 30 capsule 3     simvastatin (ZOCOR) 40 MG tablet Take 40 mg by mouth At Bedtime  30 tablet      albuterol (PROAIR HFA, PROVENTIL HFA, VENTOLIN HFA) 108 (90 BASE) MCG/ACT inhaler Inhale 2 puffs into the lungs every 6 hours as needed        baclofen (LIORESAL) 10 MG tablet Take 10 mg by mouth 3 times daily as needed prn       diclofenac (VOLTAREN) 75 MG EC tablet Take 75 mg by mouth 2 times daily as needed 1 tab prn       amoxicillin (AMOXIL) 500 MG tablet Take 4 tabs one hour before dental appointment (Patient not taking: Reported on 9/19/2017) 4 tablet 5     omeprazole (PRILOSEC) 20 MG capsule Take 20 mg by mouth daily  90 capsule 3     Calcium  Carbonate-Vitamin D (CALCIUM 500 + D PO) Take 2 tablets by mouth daily.       nitroGLYCERIN (NITROSTAT) 0.4 MG SL tablet Place 0.4 mg under the tongue every 5 minutes as needed Reported on 5/16/2017       oxycodone (ROXICODONE) 5 MG immediate release tablet Take 1-2 tablets by mouth every 6 hours as needed. For pain.          PAST SURGICAL HISTORY:  Past Surgical History:   Procedure Laterality Date     IMPLANT AUTOMATIC IMPLANTABLE CARDIOVERTER DEFIBRILLATOR         ALLERGIES:     Allergies   Allergen Reactions     Nkda [No Known Drug Allergies]        FAMILY HISTORY:  History reviewed. No pertinent family history.    SOCIAL HISTORY:  Social History   Substance Use Topics     Smoking status: Former Smoker     Packs/day: 1.00     Years: 25.00     Types: Cigarettes     Quit date: 10/15/1995     Smokeless tobacco: Not on file     Alcohol use No       ROS:   Constitutional: No fever, chills, or sweats. Weight stable.   ENT: No visual disturbance, ear ache, epistaxis, sore throat.   Cardiovascular: As per HPI.   Respiratory: No cough, hemoptysis.    GI: No nausea, vomiting, hematemesis, melena, or hematochezia.   : No hematuria.   Integument: Negative.   Psychiatric: Negative.   Hematologic:  Easy bruising, no easy bleeding.  Neuro: Negative.   Endocrinology: No significant heat or cold intolerance   Musculoskeletal: No myalgia.    Exam:  /61 (BP Location: Left arm, Patient Position: Sitting)  Pulse 74  Wt 92.5 kg (204 lb)  SpO2 98%  BMI 29.27 kg/m2  No acute distress.  Alert and oriented.  HEENT:  Normocephalic, atraumatic, EOMI, sclera non-icteric, mucosa moist  Neck: No lymphadenopathy.  JVP 6 cm without HJR.  Cor: Irregularly irregular rhythm.  Controlled ventricular rate. Variable S1, normal S2.  No murmur, rub, or gallop.  PMI in Lf 5th ICS.  Lungs:  Clear  Abd: Soft, nontender, bowel sounds present.  No hepatosplenomegaly..  Extremities: No C/C.  No edema.        Labs:  CBC RESULTS:   Lab Results    Component Value Date    WBC 8.5 2017    RBC 3.97 (L) 2017    HGB 13.6 2017    HCT 41.1 2017     (H) 2017    MCH 34.3 (H) 2017    MCHC 33.1 2017    RDW 13.5 2017     2017       BMP RESULTS:  Lab Results   Component Value Date     2017    POTASSIUM 4.4 2017    CHLORIDE 104 2017    CO2 29 2017    ANIONGAP 7 2017     (H) 2017    BUN 31 (H) 2017    CR 1.12 2017    GFRESTIMATED 66 2017    GFRESTBLACK 79 2017    EILEEN 9.6 2017        INR RESULTS:  Lab Results   Component Value Date    INR 2.15 (H) 2017    INR 2.18 (H) 2016    INR 1.71 (H) 2016    INR 3.04 (H) 2015       Procedures:  Echocardiogram:   Recent Results (from the past 8760 hour(s))   Echo complete with definity    Narrative    [id number]  [id number]  [id number]  [id number]^YOANNA^NARINDER^           [facility]  [facility]  [address] [place] [id number]     Name: DARRYL ZAPATA  MRN: [id number]  : 1950  Study Date: 2017 10:01 AM  Age: 66 yrs  Gender: Male  Patient Location: [facility]  Reason For Study: Dilated cardiomyopathy, Unspecified atrial fibrillation  Ordering Physician: NARINDER LENZ  Referring Physician: NARINDER LENZ  Performed By: Jaden Schaffer RDCS     BSA: 2.1 m2  Height: 70 in  Weight: 198 lb  HR: 70  BP: 105/70 mmHg  _____________________________________________________________________________  __        Procedure  Echocardiogram with two-dimensional, color and spectral Doppler performed.  Contrast Definity. Definity (NDC #74553-217-75) given intravenously. Patient  was given 6ml mixture of 1.5ml Definity and 8.5ml saline. 4 ml wasted.  _____________________________________________________________________________  __        Interpretation Summary  Left ventricular size is normal.  Moderately reduced left ventricular function  is present. The Ejection Fraction is estimated at 30-35%.  Moderate right ventricular dilation is present. Global right ventricular  function is moderately reduced.  No pericardial effusion is present.  This study was compared with the study from 6/2014 and there has been no  change.  _____________________________________________________________________________  __        Left Ventricle  Left ventricular size is normal. Moderately (EF 30-35%) reduced left  ventricular function is present. The Ejection Fraction is estimated at 30-35%.  Diastolic function not assessed due to atrial fibrillation. Inferior wall  akinesis is present. Wall thinning in the presence of a wall motion  abnormality is present consistent with inferior scar.     Right Ventricle  Moderate right ventricular dilation is present. Global right ventricular  function is moderately reduced. A pacemaker lead is noted in the right  ventricle.     Atria  Severe biatrial enlargement is present. A pacemaker lead is noted in the right  atrium.     Mitral Valve  Mitral leaflet thickness is increased . Mild mitral insufficiency is present.  The cause of the mitral insufficiency appears to be restricted posterior  leaflet.        Aortic Valve  The aortic valve is tricuspid. Mild aortic valve sclerosis is present.     Tricuspid Valve  Mild to moderate tricuspid insufficiency is present. The right ventricular  systolic pressure is approximated at 22.2 mmHg plus the right atrial pressure.  Pulmonary artery systolic pressure is normal.     Pulmonic Valve  Mild to moderate pulmonic insufficiency is present.     Vessels  The aorta root is normal. The inferior vena cava was normal in size with  preserved respiratory variability.     Pericardium  No pericardial effusion is present.        Compared to Previous Study  This study was compared with the study from 6/2014 . There has been no  change.  _____________________________________________________________________________  __  MMode/2D Measurements & Calculations  IVSd: 1.2 cm     LVIDd: 5.7 cm  LVIDs: 4.9 cm  LVPWd: 0.83 cm  FS: 14.1 %  EDV(Teich): 160.2 ml  ESV(Teich): 112.8 ml  LV mass(C)d: 223.4 grams  Ao root diam: 3.5 cm  asc Aorta Diam: 3.6 cm  LA/Ao: 1.7  LVOT diam: 2.4 cm  LVOT area: 4.6 cm2  LA Volume (BP): 165.0 ml  LA Volume Index (BP): 79.3 ml/m2           Doppler Measurements & Calculations  MV E max beverly: 110.3 cm/sec  PA acc time: 0.09 sec  TR max beverly: 235.2 cm/sec  TR max P.2 mmHg  Lateral E/e': 11.8  Medial E/e': 14.3           _____________________________________________________________________________  __           Report approved by: Rj Stephenson 2017 11:35 AM          Assessment and Plan:  Erasmo Pearce is a 67-year-old gentleman with multiple myeloma, permanent atrial fibrillation, ischemic cardiomyopathy with an ejection fraction of approximately 35% who had an implantable cardioverter-defibrillator placed in 2009 for primary prevention with subsequent appropriate shocks. He is currently off antiarrhythmic drugs (he had a low DLCO precluding amiodarone; sotalol was discontinued due to low blood pressure). He is on metoprolol succinate 150 mg daily for rate control. His heart rate histogram is excellent. He had one defibrillator shock earlier this month when he forgot to take his medication and his atrial fibrillation produced a rapid ventricular response that triggered ventricular tachycardia that was successfully pace terminated with the continued rapid atrial fibrillation resulted in a defibrillator shock. His defibrillator showing normal function. We will make no change in his medications. More aggressive attempts to control his ventricular response will likely worsen his orthostatic symptoms. He will continue follow-up in the device clinic. Unless he has problems sooner, we will see him  back in 6 months with a defibrillator check. It was a pleasure seeing Erasmo Pearce today.    Chuy Jean-Baptiste MD

## 2017-11-21 NOTE — NURSING NOTE
"Oncology Rooming Note    November 21, 2017 9:25 AM   Erasmo Pearce is a 67 year old male who presents for:    Chief Complaint   Patient presents with     RECHECK     provider visit, hx MM.     Initial Vitals: There were no vitals taken for this visit. Estimated body mass index is 29.27 kg/(m^2) as calculated from the following:    Height as of 9/19/17: 1.778 m (5' 10\").    Weight as of an earlier encounter on 11/21/17: 92.5 kg (204 lb). There is no height or weight on file to calculate BSA.  Data Unavailable Comment: Data Unavailable   No LMP for male patient.  Allergies reviewed: Yes  Medications reviewed: Yes    Medications: Medication refills not needed today.  Pharmacy name entered into NEXAGE:    [facility] - [place], [place] - [address] [place] [address] ('[facility]    Clinical concerns: None    2 minutes for nursing intake (face to face time)     Tawny Aldridge RN              "

## 2017-11-21 NOTE — MR AVS SNAPSHOT
After Visit Summary   11/21/2017    Erasmo Pearce    MRN: [id number]           Patient Information     Date Of Birth          1950        Visit Information        Provider Department      11/21/2017 11:00 AM Rojas Raygoza MD [facility] and Marrow Transplant        Today's Diagnoses     Anticoagulation goal of INR 2 to 3    -  1    Multiple myeloma not having achieved remission (H)              [facility] ([facility] [place])  [address] [place] [address]  Phone: [phone]  Clinic Hours:   Monday-Thursday:7am to 7pm   Friday: 7am to 5pm   Weekends and holidays:    8am to noon (in general)  If your fever is 100.5  or greater,   call the clinic.  After hours call the   hospital at [phone] or   [phone]. Ask for the BMT   fellow on-call            Follow-ups after your visit        Follow-up notes from your care team     Return in about 2 months (around 1/23/2018).      Your next 10 appointments already scheduled     Nov 21, 2017 11:00 AM CST   Return with Rojas Raygoza MD   [facility] and Marrow Transplant ([facility])    [address] [place] [id number] [phone]            Jan 23, 2018 11:30 AM CST   [facility] with  [facility]   [facility] ([facility])    [address] [place] [address]   [phone]            Jan 23, 2018 12:00 PM CST   Return with Rojas Raygoza MD   [facility] Marrow Transplant ([facility])    [address] [place] [address]   [phone]              Future tests that were ordered for you today     Open Future Orders        Priority Expected Expires Ordered    CBC with platelets differential Routine 1/16/2018 1/25/2018 11/21/2017    Comprehensive metabolic panel Routine 1/16/2018 1/25/2018 11/21/2017     Protein electrophoresis Routine 1/16/2018 1/25/2018 11/21/2017    Kappa and lambda light chain Routine 1/16/2018 1/25/2018 11/21/2017    Magnesium Routine 1/16/2018 1/25/2018 11/21/2017    INR Routine 1/16/2018 1/25/2018 11/21/2017    Uric acid Routine 1/16/2018 1/25/2018 11/21/2017    Protein electrophoresis timed urine Routine 1/16/2018 1/25/2018 11/21/2017    Protein immunofixation urine Routine 1/16/2018 1/25/2018 11/21/2017    Protein timed urine with Creat Ratio Routine 1/16/2018 1/25/2018 11/21/2017    IgM Routine 1/16/2018 1/25/2018 11/21/2017    IgG Routine 1/16/2018 1/25/2018 11/21/2017    IgE Routine 1/16/2018 1/25/2018 11/21/2017    IgA Routine 1/16/2018 1/25/2018 11/21/2017            Who to contact     If you have questions or need follow up information about today's clinic visit or your schedule please contact [facility] directly at [phone].  Normal or non-critical lab and imaging results will be communicated to you by Marketbrighthart, letter or phone within 4 business days after the clinic has received the results. If you do not hear from us within 7 days, please contact the clinic through HomeVivat or phone. If you have a critical or abnormal lab result, we will notify you by phone as soon as possible.  Submit refill requests through MedCenterDisplay or call your pharmacy and they will forward the refill request to us. Please allow 3 business days for your refill to be completed.          Additional Information About Your Visit        MarketbrighthareZono Information     MedCenterDisplay gives you secure access to your electronic health record. If you see a primary care provider, you can also send messages to your care team and make appointments. If you have questions, please call your primary care clinic.  If you do not have a primary care provider, please call [phone] and they will assist you.        Care EveryWhere ID     This is your Care EveryWhere ID. This could be used by other organizations to  access your [place] medical records  [id number]         Blood Pressure from Last 3 Encounters:   11/21/17 124/61   09/19/17 113/66   08/08/17 96/53    Weight from Last 3 Encounters:   11/21/17 92.5 kg (204 lb)   09/19/17 92.4 kg (203 lb 12.8 oz)   08/08/17 91.2 kg (201 lb)                 Today's Medication Changes          These changes are accurate as of: 11/21/17 10:17 AM.  If you have any questions, ask your nurse or doctor.               These medicines have changed or have updated prescriptions.        Dose/Directions    dexamethasone 4 MG tablet   Commonly known as:  DECADRON   This may have changed:  additional instructions   Used for:  Multiple myeloma not having achieved remission (H), Anticoagulation goal of INR 2 to 3   Changed by:  Rojas Raygoza MD        Take 10 tabs (40mg) Days 1, 8, and 15.ON HOLD SINCE June 2017   Quantity:  30 tablet   Refills:  3       LENalidomide 10 MG Caps capsule CHEMO   Commonly known as:  REVLIMID   This may have changed:  additional instructions   Used for:  Multiple myeloma not having achieved remission (H), Anticoagulation goal of INR 2 to 3   Changed by:  Rojas Raygoza MD        Take 10 mg daily days 1-21 ON HOLD SINCE June 2017   Quantity:  21 each   Refills:  3                Recent Review Flowsheet Data     BMT Recent Results Latest Ref Rng & Units 1/31/2017 5/9/2017 6/2/2017 6/13/2017 7/6/2017 9/11/2017 11/17/2017    WBC 4.0 - 11.0 10e9/L 7.2 5.2 6.8 6.4 7.7 8.2 8.5    Hemoglobin 13.3 - 17.7 g/dL 12.8(L) 11.3(L) 10.4(L) 9.9(L) 10.8(L) 13.1(L) 13.6    Platelet Count 150 - 450 10e9/L 195 141(L) 143(L) 217 165 173 187    Neutrophils (Absolute) 1.6 - 8.3 10e9/L 4.1 2.8 5.3 4.0 5.8 5.7 6.1    INR 0.86 - 1.14 2.15(H) - - - - - -    Sodium 133 - 144 mmol/L 141 138 140 141 138 136 139    Potassium 3.4 - 5.3 mmol/L 4.4 4.0 3.8 3.9 4.1 4.4 4.4    Chloride 94 - 109 mmol/L 104 102 106 105 104 103 104    Glucose 70 - 99 mg/dL 104(H) 101(H) 130(H) 142(H)  151(H) 112(H) 118(H)    Urea Nitrogen 7 - 30 mg/dL 28 17 28 19 22 21 31(H)    Creatinine 0.66 - 1.25 mg/dL 1.24 0.94 1.23 1.02 1.23 1.12 1.12    Calcium (Total) 8.5 - 10.1 mg/dL 8.7 8.5 8.2(L) 8.7 9.0 9.1 9.6    Protein (Total) 6.8 - 8.8 g/dL 7.2 6.6(L) 7.3 6.7(L) 7.2 7.3 7.2    Albumin 3.4 - 5.0 g/dL 3.6 3.2(L) 2.9(L) 3.3(L) 4.0 3.7 4.1    Alkaline Phosphatase 40 - 150 U/L 82 83 71 77 83 83 87    AST 0 - 45 U/L 19 14 16 14 17 18 21    ALT 0 - 70 U/L 27 28 25 17 19 21 24    MCV 78 - 100 fl 102(H) 104(H) 105(H) 106(H) 108(H) 101(H) 104(H)               Primary Care Provider Office Phone # Fax #    Cong Mcdonald [phone]       [place] [address]        Equal Access to Services     CALI DAVILA AH: Hadii jericho magallaneso Soomaali, waaxda luqadaha, qaybta kaalmada adeegyada, nino idijake sierra. So [facility] [phone].    ATENCIÓN: Si habla español, tiene a miles disposición servicios gratuitos de asistencia lingüística. [facility] [phone].    We comply with applicable federal civil rights laws and [place] laws. We do not discriminate on the basis of race, color, national origin, age, disability, sex, sexual orientation, or gender identity.            Thank you!     Thank you for choosing [facility]  for your care. Our goal is always to provide you with excellent care. Hearing back from our patients is one way we can continue to improve our services. Please take a few minutes to complete the written survey that you may receive in the mail after your visit with us. Thank you!             Your Updated Medication List - Protect others around you: Learn how to safely use, store and throw away your medicines at www.disposemymeds.org.          This list is accurate as of: 11/21/17 10:17 AM.  Always use your most recent med list.                   Brand Name Dispense Instructions for use Diagnosis    albuterol 108 (90 BASE) MCG/ACT  Inhaler    PROAIR HFA/PROVENTIL HFA/VENTOLIN HFA     Inhale 2 puffs into the lungs every 6 hours as needed        amoxicillin 500 MG tablet    AMOXIL    4 tablet    Take 4 tabs one hour before dental appointment    Multiple myeloma, without mention of having achieved remission       baclofen 10 MG tablet    LIORESAL     Take 10 mg by mouth 3 times daily as needed prn        CALCIUM 500 + D PO      Take 2 tablets by mouth daily.        dexamethasone 4 MG tablet    DECADRON    30 tablet    Take 10 tabs (40mg) Days 1, 8, and 15.ON HOLD SINCE June 2017    Multiple myeloma not having achieved remission (H), Anticoagulation goal of INR 2 to 3       diclofenac 75 MG EC tablet    VOLTAREN     Take 75 mg by mouth 2 times daily as needed 1 tab prn        fentaNYL 25 mcg/hr 72 hr patch    DURAGESIC     Place 1 patch onto the skin every 72 hours        furosemide 20 MG tablet    LASIX    30 tablet    Take 20 mg by mouth daily    Status post chemotherapy, Multiple myeloma, without mention of having achieved remission       LENalidomide 10 MG Caps capsule CHEMO    REVLIMID    21 each    Take 10 mg daily days 1-21 ON HOLD SINCE June 2017    Multiple myeloma not having achieved remission (H), Anticoagulation goal of INR 2 to 3       lisinopril 5 MG tablet    PRINIVIL/ZESTRIL     Take 5 mg by mouth 2 times daily    Multiple myeloma, without mention of having achieved remission, Hypomagnesemia, Ventricular fibrillation (H), Anemia of other chronic disease, Renal insufficiency, Dilated cardiomyopathy (H), Automatic implantable cardiac defibrillator in situ, Personal history of diseases of blood and blood-forming organs, History of long-term use of multiple prescription drugs       magnesium oxide 400 MG tablet    MAG-OX    4 tablet    Take 1,200 mg by mouth 3 times daily    Hypomagnesemia       metoprolol 100 MG 24 hr tablet    TOPROL-XL    135 tablet    Take 1.5 tablets (150 mg) by mouth daily    Atrial fibrillation, unspecified type  (H), Paroxysmal ventricular tachycardia (H), Permanent atrial fibrillation (H)       nitroGLYcerin 0.4 MG sublingual tablet    NITROSTAT     Place 0.4 mg under the tongue every 5 minutes as needed Reported on 5/16/2017        omeprazole 20 MG CR capsule    priLOSEC    90 capsule    Take 20 mg by mouth daily    GERD (gastroesophageal reflux disease)       oxyCODONE IR 5 MG tablet    ROXICODONE     Take 1-2 tablets by mouth every 6 hours as needed. For pain.        simvastatin 40 MG tablet    ZOCOR    30 tablet    Take 40 mg by mouth At Bedtime        tiotropium 18 MCG capsule    SPIRIVA    30 capsule    Inhale 1 capsule (18 mcg) into the lungs daily    SOB (shortness of breath)       TRAZODONE HCL PO      Take 50 mg by mouth nightly as needed        warfarin 5 MG tablet    COUMADIN      Multiple myeloma in relapse (H)

## 2017-11-21 NOTE — NURSING NOTE
Med Reconcile: Reviewed and verified all current medications with the patient. The updated medication list was printed and given to the patient.  Return Appointment: Patient given instructions regarding scheduling next clinic visit. Patient demonstrated understanding of this information and agreed to call with further questions or concerns.

## 2017-11-21 NOTE — PATIENT INSTRUCTIONS
"You will be scheduled for a follow up visit: 6 months with Device check.    [employer] will mail out an adapter for your ICD remote checks. It will take 2-4 weeks to receive. This will allow you to stop \"land line service\"      We encourage you to use My Chart as your primary form of communication if possible. If you need assistance in setting this up, please contact our office or ask at your follow up visit.     If you need a medication refill please contact your pharmacy. Please allow at least 3 business days for your refill to be completed.       Cardiology  Telephone Number    lAeida Angeles [phone]   Or send a message to your provider via my chart.   For scheduling procedures:    [facility]    Clinic appointments       [phone]   For the Device Clinic (Pacemakers and ICD's)   RN's :   Natali Vale  During business hours: [phone]    After business hours:   [phone]- select option 4 and ask for job code [id number].          As always, Thank you for trusting us with your health care needs!  "

## 2017-11-21 NOTE — PROGRESS NOTES
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  Jonh has relapsed and was on Revlimid and dexamethasone. He has been off revlimid since June 2. He had ao defibrillator  Discharges on Nov 5 when he forgot to take Toprol.No other sx no chest pain no SOB, KANG,Had abdominal ultrasound which suggests the aorta looks same,2.8cm .Seen by Dr Jean-Baptiste this am. No change in meds Continues to have low back pain on meds    PAST MEDICAL HISTORY:  See my note from 09/2017.       SOCIAL HISTORY:  See my note from 09/2017.       FAMILY HISTORY:  See my note from 09/2017.      REVIEW OF SYSTEMS:  A 12-point review of systems is done and is negative as in HPI.      PHYSICAL EXAMINATION:   GENERAL:  He is an alert gentleman in no acute distress.   VITAL SIGNS:  Stable.   HEENT:  Normocephalic.  EOM okay, no scleral icterus.  Mouth without lesion.   RESPIRATORY:  Clear to percussion and auscultation.   CARDIAC:  Irregularly irregular rhythm, no murmur.  Cardioverter in place in the left upper chest wall.   ABDOMEN:  Soft without splenomegaly.   EXTREMITIES:  Without edema.   NEUROLOGIC:  No resting tremor.     Results for DARRYL ZAPATA (MRN [id number]) as of 11/21/2017 09:39   Ref. Range 11/16/2017 10:00 11/17/2017 05:30 11/17/2017 09:25 11/17/2017 09:39   Sodium Latest Ref Range: 133 - 144 mmol/L    139   Sodium (External) Latest Ref Range: 136 - 146 MMOL/L 141      Potassium Latest Ref Range: 3.4 - 5.3 mmol/L    4.4   Potassium (External) Latest Ref Range: 3.5 - 5.1 MMOL/L 4.6      Chloride Latest Ref Range: 94 - 109 mmol/L    104   Chloride (External) Latest Ref Range: 98 - 107 MMOL/L 103      Carbon Dioxide Latest Ref Range: 20 - 32 mmol/L    29   CO2 (External) Latest Ref  Range: 22.0 - 29.0 MMOL/L 30.0 (H)      Urea Nitrogen Latest Ref Range: 7 - 30 mg/dL    31 (H)   Urea Nitrogen (External) Latest Ref Range: 10.0 - 20.0 MG/DL 25.0 (H)      Creatinine Latest Ref Range: 0.66 - 1.25 mg/dL    1.12   Creatinine (External) Latest Ref Range: 0.72 - 1.25 MG/DL 1.32 (H)      GFR Estimate Latest Ref Range: >60 mL/min/1.7m2    66   GFR Estimate If Black Latest Ref Range: >60 mL/min/1.7m2    79   GFR Est if Black (External) Latest Ref Range: >60 ml/min/1.73m2 >60      GFR Estimated (External) Latest Ref Range: >60 ml/min/1.73m2 54 (L)      Calcium Latest Ref Range: 8.5 - 10.1 mg/dL    9.6   Calcium (External) Latest Ref Range: 8.6 - 10.5 MG/DL 9.8      Anion Gap Latest Ref Range: 3 - 14 mmol/L    7   Anion Gap (External) Latest Ref Range: 10.0 - 20.0  12.6      Magnesium Latest Ref Range: 1.6 - 2.3 mg/dL    2.1   Magnesium (External) Latest Ref Range: 1.8 - 2.6 MG/DL 2.0      Albumin Latest Ref Range: 3.4 - 5.0 g/dL    4.1   Albumin (External) Latest Ref Range: 3.4 - 4.8 GM/DL 4.3      Protein Total Latest Ref Range: 6.8 - 8.8 g/dL    7.2   Protein Total (External) Latest Ref Range: 6.0 - 8.0 GM/DL 7.2      Bilirubin Total Latest Ref Range: 0.2 - 1.3 mg/dL    1.0   Alkaline Phosphatase Latest Ref Range: 40 - 150 U/L    87   Alk Phosphatase (External) Latest Ref Range: 36 - 150 U/L 82      ALT Latest Ref Range: 0 - 70 U/L    24   ALT (External) Latest Ref Range: 8 - 45 U/L 17      AST Latest Ref Range: 0 - 45 U/L    21   AST (External) Latest Ref Range: 5 - 41 U/L 21      Beta-2-Microglobulin Latest Ref Range: <2.3 mg/L    2.9 (H)   Bilirubin Total (External) Latest Ref Range: 0.2 - 1.2 MG/DL 1.0      BUN/Creatinine Ratio (External) Latest Ref Range: 11.7 - 22.9  18.9      Creatinine Urine Timed Latest Ref Range: 1.00 - 2.00   1.44     Creatinine Urine Latest Units: mg/dL  71     IGE Latest Ref Range: 0 - 114 KIU/L    13   Protein Random Urine Latest Units: g/L  0.18     Protein Total Urine g/gr  Creatinine Latest Ref Range: 0 - 0.2 g/g Cr  0.25 (H)     Protein Total 24 Hr Urine Latest Ref Range: 0.04 - 0.23   0.36 (H)     Glucose Latest Ref Range: 70 - 99 mg/dL    118 (H)   Glucose (External) Latest Ref Range: 70 - 100 MG/ (H)      WBC Latest Ref Range: 4.0 - 11.0 10e9/L    8.5   WBC Count (External) Latest Ref Range: 3.9 - 11.9 K/UL 7.6      Hemoglobin Latest Ref Range: 13.3 - 17.7 g/dL    13.6   Hemoglobin (External) Latest Ref Range: 13.1 - 17.1 GM/DL 13.8      Hematocrit Latest Ref Range: 40.0 - 53.0 %    41.1   Hematocrit (External) Latest Ref Range: 38.7 - 51.4 % 41.1      Platelet Count Latest Ref Range: 150 - 450 10e9/L    187   Platelet Count (External) Latest Ref Range: 179 - 450 K/      RBC Count Latest Ref Range: 4.4 - 5.9 10e12/L    3.97 (L)   RBC Count (External) Latest Ref Range: 4.08 - 5.79 M/UL 4.02 (L)      MCV Latest Ref Range: 78 - 100 fl    104 (H)   MCV (External) Latest Ref Range: 82.9 - 100.6 .2 (H)      MCH Latest Ref Range: 26.5 - 33.0 pg    34.3 (H)   MCH (External) Latest Ref Range: 27.6 - 33.2 PG 34.2 (H)      MCHC Latest Ref Range: 31.5 - 36.5 g/dL    33.1   MCHC (External) Latest Ref Range: 32.0 - 36.0 % 33.4      RDW Latest Ref Range: 10.0 - 15.0 %    13.5   RDW (External) Latest Ref Range: 10.0 - 16.2 % 11.1      Diff Method Unknown    Automated Method   % Neutrophils Latest Units: %    72.1   % Lymphocytes Latest Units: %    18.3   % Neutrophils (External) Latest Ref Range: 43 - 80 % 70.5      % Lymphocytes (External) Latest Ref Range: 16.0 - 49.0 % 19.8      % Monocytes Latest Units: %    8.2   % Monocytes (External) Latest Ref Range: 0.0 - 10.0 % 7.8      % Eosinophils Latest Units: %    0.6   % Eosinophils (External) Latest Ref Range: 0 - 7 % 1.6      % Basophils Latest Units: %    0.4   % Basophils (External) Latest Ref Range: 0 - 2 % 0.3      % Immature Granulocytes Latest Units: %    0.4   Nucleated RBCs Latest Ref Range: 0 /100    0   Absolute  Neutrophil Latest Ref Range: 1.6 - 8.3 10e9/L    6.1   Absolute Lymphocytes Latest Ref Range: 0.8 - 5.3 10e9/L    1.6   Absolute Monocytes Latest Ref Range: 0.0 - 1.3 10e9/L    0.7   Absolute Eosinophils Latest Ref Range: 0.0 - 0.7 10e9/L    0.1   Absolute Basophils Latest Ref Range: 0.0 - 0.2 10e9/L    0.0   Absolute Basophils (External) Latest Ref Range: 0.0 - 0.2 K/UL 0.0      Absolute Eosinophils (External) Latest Ref Range: 0.0 - 0.8 K/UL 0.1      Absolute Lymphocytes (External) Latest Ref Range: 0.9 - 3.5 K/UL 1.5      Absolute Monocytes (External) Latest Ref Range: 0.0 - 1.1 K/UL 0.6      Absolute Neutrophils (External) Latest Ref Range: 1.6 - 8.1 K/UL 5.4      Abs Immature Granulocytes Latest Ref Range: 0 - 0.4 10e9/L    0.0   Absolute Nucleated RBC Unknown    0.0   Albumin Fraction Latest Ref Range: 3.7 - 5.1 g/dL    4.1   Albumin Fraction Urine Latest Ref Range: 0 %  7.6 (H)     Alpha 1 Fraction Latest Ref Range: 0.2 - 0.4 g/dL    0.3   Alpha 1 Fraction Urine Latest Ref Range: 0 %  4.0 (H)     Alpha 2 Fraction Latest Ref Range: 0.5 - 0.9 g/dL    0.8   Alpha 2 Fraction Urine Latest Ref Range: 0 %  5.4 (H)     Beta Fraction Latest Ref Range: 0.6 - 1.0 g/dL    0.8   Beta Fraction Urine Latest Ref Range: 0 %  9.7 (H)     ELP Interpretation: Unknown    Small monoclonal ...   ELP Interpretation Urine Unknown  Albumin and globu...     Gamma Fraction Latest Ref Range: 0.7 - 1.6 g/dL    0.8   Gamma Fraction Urine Latest Ref Range: 0 %  73.3 (H)     IGA Latest Ref Range: 70 - 380 mg/dL    304   IGG Latest Ref Range: 695 - 1620 mg/dL    670 (L)   IGM Latest Ref Range: 60 - 265 mg/dL    28 (L)   Immunofix ELP Urine Unknown  (Note)     Kappa Free Lt Chain Latest Ref Range: 0.33 - 1.94 mg/dL    0.90   Kappa Lambda Ratio Latest Ref Range: 0.26 - 1.65     0.02 (L)   Lambda Free Lt Chain Latest Ref Range: 0.57 - 2.63 mg/dL    55.50 (H)   Monoclonal Peak Latest Ref Range: 0.0 g/dL    0.2 (H)   Monoclonal Peak Urine Latest  Ref Range: 0% %  50.9 (H)     [employer] AAA SCREENING Unknown   Rpt    Ultrasound retroperitoneal dated 11/17/2017 9:25 AM     Comparison study: PET/CT from 8/21/2012     Clinical history: Abdominal aortic aneurysm     Findings:  On the transverse view the aortic AP diameters were as follows:  Supraceliac: 2.7 cm  Suprarenal: 2.0 cm  Infrarenal (proximal): 2.0 cm  Infrarenal (mid): 2.8 cm  Infrarenal (distal): 2.8 cm     Right GILMER: 1.1 cm  Left GILMER: 1.6 cm proximally, 1.1 cm distally.         Impression:   Maximal AP diameter of the infrarenal abdominal aorta is 2.8  centimeters, ectatic, previously 2.7 cm on 8/21/2012.       Aorta diameter measurement classification:  < 2.5cm: Normal.  2.5 - 2.9cm: Ectatic.  >3cm: Aneurysmal.    ASSESSMENT:   1.  Multiple myeloma.   2.  Status post autologous peripheral blood stem cell transplant.   3.  Cardiomyopathy.   4.  Atrial fibrillation.   5.  History of deep venous thrombosis, on anticoagulation.   6.  Chronic back pain.   7.  Aortic aneurysm.   8.  History of plasmacytoma of the clivus, status post radiation.   9.  History of ventricular tachycardia with an implantable defibrillator in place.       Lambda light chains are inceased some but he feels great.  I would like to suggest that we continue to hold the Revlimid and dexamethasone.  I am going to see him in a couple of months in Jan 2018.  I am going to check his light chains  and M-spike and keep him off the Revlimid and dexamethasone for now but likely begin in Jan same dose 10mg rev x 21days and weekly dex 40mg days 1 ,8 and 15.    He should continue the magnesium 6 tabs/d with .  [facility],   and  , about these data. The ultrasound of aorta show no significant change Continue on wafarin Continue pain meds

## 2017-11-21 NOTE — PROGRESS NOTES
HPI:  Erasmo Pearce returns for a previously scheduled follow-up. We last saw him in August 2017. He is a 67-year-old gentleman with multiple myeloma, permanent atrial fibrillation, ischemic cardiomyopathy with an ejection fraction of approximately 35% who had an implantable cardioverter-defibrillator placed in December 2009 for primary prevention with subsequent appropriate shocks. He has been on amiodarone in the past, but this was discontinued relatively quickly because of a low DLCO. He was subsequently treated with sotalol, initially 80 mg twice daily, then reduce to 40 mg twice daily for prolonged QT interval and then discontinued because of low blood pressure.   He had been back on chemotherapy for relapse of his myeloma. In anticipation of restarting chemotherapy we obtained a new  baseline  echo on 2/7/2017. LVEF was 30-35%. there was inferior akinesis and wall thinning, consistent with a scar. Chemotherapy ended last June.  He received an ICD shock on 11/6/2017. He had AF with RVR that initialed VT that was successfully pace-terminated, but he then received a shock for continued AF with RVR.  I reviewed the EGMs.  Mr. 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 He denies symptoms before or after his shock.   At this time, Mr. Pearce feels well. He denies chest pain, shortness of breath or palpitations. He has orthostatic lightheadedness that is better when he is not on chemotherapy. He will be seeing Dr. Raygoza later today.    Defibrillator evaluation shows normal function. He had 10 episodes of nonsustained ventricular tachycardia lasting 4-6 beats. He had one 3-1/2 second episode of tachycardia that fell into the ventricular fibrillation zone. His heart rate histogram is excellent.      PAST MEDICAL HISTORY:  Past Medical History:   Diagnosis Date     Atrial fibrillation (H)      Other premature beats      VF  (ventricular fibrillation) (H)        CURRENT MEDICATIONS:  Current Outpatient Prescriptions   Medication Sig Dispense Refill     warfarin (COUMADIN) 5 MG tablet        LENalidomide (REVLIMID) 10 MG CAPS capsule CHEMO Take 10 mg daily days 1-21 21 each 3     magnesium oxide (MAG-OX) 400 MG tablet Take 1,200 mg by mouth 3 times daily  4 tablet 0     metoprolol (TOPROL-XL) 100 MG 24 hr tablet Take 1.5 tablets (150 mg) by mouth daily 135 tablet 3     dexamethasone (DECADRON) 4 MG tablet Take 10 tabs (40mg) Days 1, 8, and 15. 30 tablet 3     TRAZODONE HCL PO Take 50 mg by mouth nightly as needed        fentaNYL (DURAGESIC) 25 mcg/hr patch 72 hr Place 1 patch onto the skin every 72 hours       furosemide (LASIX) 20 MG tablet Take 20 mg by mouth daily  30 tablet 3     lisinopril (PRINIVIL,ZESTRIL) 5 MG tablet Take 5 mg by mouth 2 times daily       tiotropium (SPIRIVA) 18 MCG inhalation capsule Inhale 1 capsule (18 mcg) into the lungs daily 30 capsule 3     simvastatin (ZOCOR) 40 MG tablet Take 40 mg by mouth At Bedtime  30 tablet      albuterol (PROAIR HFA, PROVENTIL HFA, VENTOLIN HFA) 108 (90 BASE) MCG/ACT inhaler Inhale 2 puffs into the lungs every 6 hours as needed        baclofen (LIORESAL) 10 MG tablet Take 10 mg by mouth 3 times daily as needed prn       diclofenac (VOLTAREN) 75 MG EC tablet Take 75 mg by mouth 2 times daily as needed 1 tab prn       amoxicillin (AMOXIL) 500 MG tablet Take 4 tabs one hour before dental appointment (Patient not taking: Reported on 9/19/2017) 4 tablet 5     omeprazole (PRILOSEC) 20 MG capsule Take 20 mg by mouth daily  90 capsule 3     Calcium Carbonate-Vitamin D (CALCIUM 500 + D PO) Take 2 tablets by mouth daily.       nitroGLYCERIN (NITROSTAT) 0.4 MG SL tablet Place 0.4 mg under the tongue every 5 minutes as needed Reported on 5/16/2017       oxycodone (ROXICODONE) 5 MG immediate release tablet Take 1-2 tablets by mouth every 6 hours as needed. For pain.          PAST SURGICAL  HISTORY:  Past Surgical History:   Procedure Laterality Date     IMPLANT AUTOMATIC IMPLANTABLE CARDIOVERTER DEFIBRILLATOR         ALLERGIES:     Allergies   Allergen Reactions     Nkda [No Known Drug Allergies]        FAMILY HISTORY:  History reviewed. No pertinent family history.    SOCIAL HISTORY:  Social History   Substance Use Topics     Smoking status: Former Smoker     Packs/day: 1.00     Years: 25.00     Types: Cigarettes     Quit date: 10/15/1995     Smokeless tobacco: Not on file     Alcohol use No       ROS:   Constitutional: No fever, chills, or sweats. Weight stable.   ENT: No visual disturbance, ear ache, epistaxis, sore throat.   Cardiovascular: As per HPI.   Respiratory: No cough, hemoptysis.    GI: No nausea, vomiting, hematemesis, melena, or hematochezia.   : No hematuria.   Integument: Negative.   Psychiatric: Negative.   Hematologic:  Easy bruising, no easy bleeding.  Neuro: Negative.   Endocrinology: No significant heat or cold intolerance   Musculoskeletal: No myalgia.    Exam:  /61 (BP Location: Left arm, Patient Position: Sitting)  Pulse 74  Wt 92.5 kg (204 lb)  SpO2 98%  BMI 29.27 kg/m2  No acute distress.  Alert and oriented.  HEENT:  Normocephalic, atraumatic, EOMI, sclera non-icteric, mucosa moist  Neck: No lymphadenopathy.  JVP 6 cm without HJR.  Cor: Irregularly irregular rhythm.  Controlled ventricular rate. Variable S1, normal S2.  No murmur, rub, or gallop.  PMI in Lf 5th ICS.  Lungs:  Clear  Abd: Soft, nontender, bowel sounds present.  No hepatosplenomegaly..  Extremities: No C/C.  No edema.        Labs:  CBC RESULTS:   Lab Results   Component Value Date    WBC 8.5 11/17/2017    RBC 3.97 (L) 11/17/2017    HGB 13.6 11/17/2017    HCT 41.1 11/17/2017     (H) 11/17/2017    MCH 34.3 (H) 11/17/2017    MCHC 33.1 11/17/2017    RDW 13.5 11/17/2017     11/17/2017       BMP RESULTS:  Lab Results   Component Value Date     11/17/2017    POTASSIUM 4.4 11/17/2017     CHLORIDE 104 2017    CO2 29 2017    ANIONGAP 7 2017     (H) 2017    BUN 31 (H) 2017    CR 1.12 2017    GFRESTIMATED 66 2017    GFRESTBLACK 79 2017    EILEEN 9.6 2017        INR RESULTS:  Lab Results   Component Value Date    INR 2.15 (H) 2017    INR 2.18 (H) 2016    INR 1.71 (H) 2016    INR 3.04 (H) 2015       Procedures:  Echocardiogram:   Recent Results (from the past 8760 hour(s))   Echo complete with definity    Narrative    [id number]  [id number]  [id number]  [id number]^YOANNA^NARINDER^           [facility]  [facility]  [address] [place] [address]     Name: DARRYL ZAPATA  MRN: [id number]  : 1950  Study Date: 2017 10:01 AM  Age: 66 yrs  Gender: Male  Patient Location: [facility]  Reason For Study: Dilated cardiomyopathy, Unspecified atrial fibrillation  Ordering Physician: NARINDER LENZ  Referring Physician: NARINDER LENZ  Performed By: Jaden Schaffer RDCS     BSA: 2.1 m2  Height: 70 in  Weight: 198 lb  HR: 70  BP: 105/70 mmHg  _____________________________________________________________________________  __        Procedure  Echocardiogram with two-dimensional, color and spectral Doppler performed.  Contrast Definity. Definity (NDC #28940-485-61) given intravenously. Patient  was given 6ml mixture of 1.5ml Definity and 8.5ml saline. 4 ml wasted.  _____________________________________________________________________________  __        Interpretation Summary  Left ventricular size is normal. Moderately reduced left ventricular function  is present. The Ejection Fraction is estimated at 30-35%.  Moderate right ventricular dilation is present. Global right ventricular  function is moderately reduced.  No pericardial effusion is present.  This study was compared with the study from 2014 and there has been  no  change.  _____________________________________________________________________________  __        Left Ventricle  Left ventricular size is normal. Moderately (EF 30-35%) reduced left  ventricular function is present. The Ejection Fraction is estimated at 30-35%.  Diastolic function not assessed due to atrial fibrillation. Inferior wall  akinesis is present. Wall thinning in the presence of a wall motion  abnormality is present consistent with inferior scar.     Right Ventricle  Moderate right ventricular dilation is present. Global right ventricular  function is moderately reduced. A pacemaker lead is noted in the right  ventricle.     Atria  Severe biatrial enlargement is present. A pacemaker lead is noted in the right  atrium.     Mitral Valve  Mitral leaflet thickness is increased . Mild mitral insufficiency is present.  The cause of the mitral insufficiency appears to be restricted posterior  leaflet.        Aortic Valve  The aortic valve is tricuspid. Mild aortic valve sclerosis is present.     Tricuspid Valve  Mild to moderate tricuspid insufficiency is present. The right ventricular  systolic pressure is approximated at 22.2 mmHg plus the right atrial pressure.  Pulmonary artery systolic pressure is normal.     Pulmonic Valve  Mild to moderate pulmonic insufficiency is present.     Vessels  The aorta root is normal. The inferior vena cava was normal in size with  preserved respiratory variability.     Pericardium  No pericardial effusion is present.        Compared to Previous Study  This study was compared with the study from 6/2014 . There has been no change.  _____________________________________________________________________________  __  MMode/2D Measurements & Calculations  IVSd: 1.2 cm     LVIDd: 5.7 cm  LVIDs: 4.9 cm  LVPWd: 0.83 cm  FS: 14.1 %  EDV(Teich): 160.2 ml  ESV(Teich): 112.8 ml  LV mass(C)d: 223.4 grams  Ao root diam: 3.5 cm  asc Aorta Diam: 3.6 cm  LA/Ao: 1.7  LVOT diam: 2.4 cm  LVOT  area: 4.6 cm2  LA Volume (BP): 165.0 ml  LA Volume Index (BP): 79.3 ml/m2           Doppler Measurements & Calculations  MV E max beverly: 110.3 cm/sec  PA acc time: 0.09 sec  TR max beverly: 235.2 cm/sec  TR max P.2 mmHg  Lateral E/e': 11.8  Medial E/e': 14.3           _____________________________________________________________________________  __           Report approved by: Rj Stephenson 2017 11:35 AM          Assessment and Plan:  Erasmo Pearce is a 67-year-old gentleman with multiple myeloma, permanent atrial fibrillation, ischemic cardiomyopathy with an ejection fraction of approximately 35% who had an implantable cardioverter-defibrillator placed in 2009 for primary prevention with subsequent appropriate shocks. He is currently off antiarrhythmic drugs (he had a low DLCO precluding amiodarone; sotalol was discontinued due to low blood pressure). He is on metoprolol succinate 150 mg daily for rate control. His heart rate histogram is excellent. He had one defibrillator shock earlier this month when he forgot to take his medication and his atrial fibrillation produced a rapid ventricular response that triggered ventricular tachycardia that was successfully pace terminated with the continued rapid atrial fibrillation resulted in a defibrillator shock. His defibrillator showing normal function. We will make no change in his medications. More aggressive attempts to control his ventricular response will likely worsen his orthostatic symptoms. He will continue follow-up in the device clinic. Unless he has problems sooner, we will see him back in 6 months with a defibrillator check. It was a pleasure seeing Erasmo Pearce today.    NARINDER WRIGHT

## 2017-11-21 NOTE — PATIENT INSTRUCTIONS
It was a pleasure to see you in clinic today.  Please do not hesitate to call with any questions or concerns.  You are scheduled for a remote transmission on 2/22/18.  We look forward to seeing you in clinic at your next device check in 6 months.    Whit Lipscomb, RN, MS, CCRN  Electrophysiology Nurse Clinician  [facility]    During Business Hours Please Call:  [phone]  After Hours Please Call:  [phone] - select option #[phone] and ask for job code [phone]

## 2017-11-29 ENCOUNTER — TRANSFERRED RECORDS (OUTPATIENT)
Dept: HEALTH INFORMATION MANAGEMENT | Facility: CLINIC | Age: 67
End: 2017-11-29

## 2017-12-13 ENCOUNTER — TRANSFERRED RECORDS (OUTPATIENT)
Dept: HEALTH INFORMATION MANAGEMENT | Facility: CLINIC | Age: 67
End: 2017-12-13

## 2017-12-27 ENCOUNTER — TRANSFERRED RECORDS (OUTPATIENT)
Dept: HEALTH INFORMATION MANAGEMENT | Facility: CLINIC | Age: 67
End: 2017-12-27

## 2018-01-16 DIAGNOSIS — Z79.01 ANTICOAGULATION GOAL OF INR 2 TO 3: ICD-10-CM

## 2018-01-16 DIAGNOSIS — C90.00 MULTIPLE MYELOMA NOT HAVING ACHIEVED REMISSION (H): ICD-10-CM

## 2018-01-16 DIAGNOSIS — Z51.81 ANTICOAGULATION GOAL OF INR 2 TO 3: ICD-10-CM

## 2018-01-16 LAB
ALBUMIN SERPL-MCNC: 3.6 G/DL (ref 3.4–5)
ALP SERPL-CCNC: 88 U/L (ref 40–150)
ALT SERPL W P-5'-P-CCNC: 18 U/L (ref 0–70)
ANION GAP SERPL CALCULATED.3IONS-SCNC: 4 MMOL/L (ref 3–14)
AST SERPL W P-5'-P-CCNC: 18 U/L (ref 0–45)
BASOPHILS # BLD AUTO: 0.1 10E9/L (ref 0–0.2)
BASOPHILS NFR BLD AUTO: 0.6 %
BILIRUB SERPL-MCNC: 0.6 MG/DL (ref 0.2–1.3)
BUN SERPL-MCNC: 21 MG/DL (ref 7–30)
CALCIUM SERPL-MCNC: 8.6 MG/DL (ref 8.5–10.1)
CHLORIDE SERPL-SCNC: 104 MMOL/L (ref 94–109)
CO2 SERPL-SCNC: 30 MMOL/L (ref 20–32)
COLLECT DURATION TIME UR: 24 H
CREAT 24H UR-MRATE: 1.43 G/(24.H) (ref 1–2)
CREAT SERPL-MCNC: 1.04 MG/DL (ref 0.66–1.25)
CREAT UR-MCNC: 71 MG/DL
DIFFERENTIAL METHOD BLD: ABNORMAL
EOSINOPHIL # BLD AUTO: 0.1 10E9/L (ref 0–0.7)
EOSINOPHIL NFR BLD AUTO: 0.9 %
ERYTHROCYTE [DISTWIDTH] IN BLOOD BY AUTOMATED COUNT: 13.2 % (ref 10–15)
GFR SERPL CREATININE-BSD FRML MDRD: 71 ML/MIN/1.7M2
GLUCOSE SERPL-MCNC: 89 MG/DL (ref 70–99)
HCT VFR BLD AUTO: 38.8 % (ref 40–53)
HGB BLD-MCNC: 12.6 G/DL (ref 13.3–17.7)
IGA SERPL-MCNC: 363 MG/DL (ref 70–380)
IGG SERPL-MCNC: 738 MG/DL (ref 695–1620)
IGM SERPL-MCNC: 27 MG/DL (ref 60–265)
IMM GRANULOCYTES # BLD: 0 10E9/L (ref 0–0.4)
IMM GRANULOCYTES NFR BLD: 0.3 %
INR PPP: 2.25 (ref 0.86–1.14)
LYMPHOCYTES # BLD AUTO: 1.9 10E9/L (ref 0.8–5.3)
LYMPHOCYTES NFR BLD AUTO: 17.8 %
MAGNESIUM SERPL-MCNC: 1.8 MG/DL (ref 1.6–2.3)
MCH RBC QN AUTO: 34 PG (ref 26.5–33)
MCHC RBC AUTO-ENTMCNC: 32.5 G/DL (ref 31.5–36.5)
MCV RBC AUTO: 105 FL (ref 78–100)
MONOCYTES # BLD AUTO: 0.9 10E9/L (ref 0–1.3)
MONOCYTES NFR BLD AUTO: 8.8 %
NEUTROPHILS # BLD AUTO: 7.5 10E9/L (ref 1.6–8.3)
NEUTROPHILS NFR BLD AUTO: 71.6 %
NRBC # BLD AUTO: 0 10*3/UL
NRBC BLD AUTO-RTO: 0 /100
PLATELET # BLD AUTO: 184 10E9/L (ref 150–450)
POTASSIUM SERPL-SCNC: 4.3 MMOL/L (ref 3.4–5.3)
PROT 24H UR-MRATE: 0.58 G/(24.H) (ref 0.04–0.23)
PROT SERPL-MCNC: 7.3 G/DL (ref 6.8–8.8)
PROT UR-MCNC: 0.28 G/L
PROT/CREAT 24H UR: 0.4 G/G CR (ref 0–0.2)
RBC # BLD AUTO: 3.71 10E12/L (ref 4.4–5.9)
SODIUM SERPL-SCNC: 138 MMOL/L (ref 133–144)
SPECIMEN VOL UR: 2020 ML
URATE SERPL-MCNC: 5.8 MG/DL (ref 3.5–7.2)
WBC # BLD AUTO: 10.5 10E9/L (ref 4–11)

## 2018-01-16 PROCEDURE — 82784 ASSAY IGA/IGD/IGG/IGM EACH: CPT | Performed by: INTERNAL MEDICINE

## 2018-01-16 PROCEDURE — 83883 ASSAY NEPHELOMETRY NOT SPEC: CPT | Performed by: INTERNAL MEDICINE

## 2018-01-16 PROCEDURE — 83735 ASSAY OF MAGNESIUM: CPT | Performed by: INTERNAL MEDICINE

## 2018-01-16 PROCEDURE — 00000402 ZZHCL STATISTIC TOTAL PROTEIN: Performed by: INTERNAL MEDICINE

## 2018-01-16 PROCEDURE — 80053 COMPREHEN METABOLIC PANEL: CPT | Performed by: INTERNAL MEDICINE

## 2018-01-16 PROCEDURE — 85025 COMPLETE CBC W/AUTO DIFF WBC: CPT | Performed by: INTERNAL MEDICINE

## 2018-01-16 PROCEDURE — 84550 ASSAY OF BLOOD/URIC ACID: CPT | Performed by: INTERNAL MEDICINE

## 2018-01-16 PROCEDURE — 85610 PROTHROMBIN TIME: CPT | Performed by: INTERNAL MEDICINE

## 2018-01-16 PROCEDURE — 84166 PROTEIN E-PHORESIS/URINE/CSF: CPT | Performed by: INTERNAL MEDICINE

## 2018-01-16 PROCEDURE — 86335 IMMUNFIX E-PHORSIS/URINE/CSF: CPT | Performed by: INTERNAL MEDICINE

## 2018-01-16 PROCEDURE — 82785 ASSAY OF IGE: CPT | Performed by: INTERNAL MEDICINE

## 2018-01-16 PROCEDURE — G0463 HOSPITAL OUTPT CLINIC VISIT: HCPCS

## 2018-01-16 PROCEDURE — 84165 PROTEIN E-PHORESIS SERUM: CPT | Performed by: INTERNAL MEDICINE

## 2018-01-16 NOTE — NURSING NOTE
Chief Complaint   Patient presents with     Blood Draw     Labs drawn from right arm in lab by CMA     Pt tolerated well  Mireya Spence CMA

## 2018-01-17 LAB
ALBUMIN MFR UR ELPH: 8.2 %
ALBUMIN SERPL ELPH-MCNC: 3.9 G/DL (ref 3.7–5.1)
ALPHA1 GLOB MFR UR ELPH: 4 %
ALPHA1 GLOB SERPL ELPH-MCNC: 0.3 G/DL (ref 0.2–0.4)
ALPHA2 GLOB MFR UR ELPH: 2.7 %
ALPHA2 GLOB SERPL ELPH-MCNC: 0.8 G/DL (ref 0.5–0.9)
B-GLOBULIN MFR UR ELPH: 3.1 %
B-GLOBULIN SERPL ELPH-MCNC: 0.8 G/DL (ref 0.6–1)
GAMMA GLOB MFR UR ELPH: 82 %
GAMMA GLOB SERPL ELPH-MCNC: 0.9 G/DL (ref 0.7–1.6)
IGE SERPL-ACNC: 11 KIU/L (ref 0–114)
KAPPA LC UR-MCNC: 1.14 MG/DL (ref 0.33–1.94)
KAPPA LC/LAMBDA SER: 0.02 {RATIO} (ref 0.26–1.65)
LAMBDA LC SERPL-MCNC: 66 MG/DL (ref 0.57–2.63)
M PROTEIN MFR UR ELPH: 67.6 %
M PROTEIN SERPL ELPH-MCNC: 0.3 G/DL
PROT ELPH PNL UR ELPH: NORMAL
PROT PATTERN SERPL ELPH-IMP: ABNORMAL
PROT PATTERN UR ELPH-IMP: ABNORMAL

## 2018-01-23 ENCOUNTER — ONCOLOGY VISIT (OUTPATIENT)
Dept: TRANSPLANT | Facility: CLINIC | Age: 68
End: 2018-01-23
Attending: INTERNAL MEDICINE
Payer: MEDICARE

## 2018-01-23 ENCOUNTER — APPOINTMENT (OUTPATIENT)
Dept: LAB | Facility: CLINIC | Age: 68
End: 2018-01-23
Attending: INTERNAL MEDICINE
Payer: MEDICARE

## 2018-01-23 VITALS
TEMPERATURE: 97.9 F | BODY MASS INDEX: 29.7 KG/M2 | DIASTOLIC BLOOD PRESSURE: 58 MMHG | OXYGEN SATURATION: 95 % | RESPIRATION RATE: 16 BRPM | WEIGHT: 207 LBS | HEART RATE: 71 BPM | SYSTOLIC BLOOD PRESSURE: 101 MMHG

## 2018-01-23 DIAGNOSIS — E83.42 HYPOMAGNESEMIA: ICD-10-CM

## 2018-01-23 DIAGNOSIS — C90.02 MULTIPLE MYELOMA IN RELAPSE (H): Primary | ICD-10-CM

## 2018-01-23 DIAGNOSIS — Z51.81 ANTICOAGULATION GOAL OF INR 2 TO 3: ICD-10-CM

## 2018-01-23 DIAGNOSIS — Z79.01 ANTICOAGULATION GOAL OF INR 2 TO 3: ICD-10-CM

## 2018-01-23 PROCEDURE — G0463 HOSPITAL OUTPT CLINIC VISIT: HCPCS | Mod: ZF

## 2018-01-23 RX ORDER — MAGNESIUM OXIDE 400 MG/1
1200 TABLET ORAL 3 TIMES DAILY
Qty: 180 TABLET | Refills: 3 | Status: SHIPPED | OUTPATIENT
Start: 2018-01-23 | End: 2018-01-30

## 2018-01-23 ASSESSMENT — PAIN SCALES - GENERAL: PAINLEVEL: NO PAIN (0)

## 2018-01-23 NOTE — NURSING NOTE
"Oncology Rooming Note    January 23, 2018 12:03 PM   Erasmo Pearce is a 67 year old male who presents for:    Chief Complaint   Patient presents with     Blood Draw     no labs ordered in epic; vital signs taken.     Initial Vitals: /58 (BP Location: Right arm, Patient Position: Sitting, Cuff Size: Adult Regular)  Pulse 71  Temp 97.9  F (36.6  C) (Oral)  Resp 16  Wt 93.9 kg (207 lb)  SpO2 95%  BMI 29.7 kg/m2 Estimated body mass index is 29.7 kg/(m^2) as calculated from the following:    Height as of 9/19/17: 1.778 m (5' 10\").    Weight as of this encounter: 93.9 kg (207 lb). Body surface area is 2.15 meters squared.  No Pain (0) Comment: Data Unavailable   No LMP for male patient.  Allergies reviewed: Yes  Medications reviewed: Yes    Medications: Medication refills not needed today.  Pharmacy name entered into [facility]:    [facility] [place] - [address] [place] [address] ('S) [facility]    Clinical concerns: \"feeling stable\"    5 minutes for nursing intake (face to face time)     IRENE WEBER RN              "

## 2018-01-23 NOTE — NURSING NOTE
Chief Complaint   Patient presents with     Blood Draw     no labs ordered in epic; vital signs taken.     No labs ordered in epic.  Vital signs taken.  Pt checked in to next appointment.  Arlet Monroy RN

## 2018-01-23 NOTE — MR AVS SNAPSHOT
After Visit Summary   1/23/2018    Erasmo Pearce    MRN: [id number]           Patient Information     Date Of Birth          1950        Visit Information        Provider Department      1/23/2018 12:00 PM Rojas Raygoza MD [facility] Blood and Marrow Transplant        Today's Diagnoses     Multiple myeloma in relapse (H)    -  1    Anticoagulation goal of INR 2 to 3        Hypomagnesemia              [facility] ([facility])  [address] [place] [address]  Phone: [phone]  Clinic Hours:   Monday-Thursday:7am to 7pm   Friday: 7am to 5pm   Weekends and holidays:    8am to noon (in general)  If your fever is 100.5  or greater,   call the clinic.  After hours call the   hospital at [phone] or   [phone]. Ask for the BMT   fellow on-call            Follow-ups after your visit        Your next 10 appointments already scheduled     Apr 10, 2018 11:00 AM CDT   [facility] with  [facility]   [facility] ([place] [facility])    [address] [place] [address]  [address] [place] [id number] [phone]            Apr 17, 2018 11:00 AM CDT   Return with Rojas Raygoza MD   [facility] Blood and Marrow Transplant ([place] [facility])    [address] [place] [address]  [address]  [facility] [id number] [phone]            May 15, 2018  9:00 AM CDT   (Arrive by 8:45 AM)   Implanted Defibulator with  Cv Device 1   [facility] ([place] [facility])    [address] [place] [address]  [address] [place] [id number] [phone]            May 15, 2018  9:30 AM CDT   (Arrive by 9:15 AM)   RETURN ARRHYTHMIA with Chuy Jean-Baptiste MD   [facility] ([facility] [place] [facility])    [address] [place] [address]  [address] [place] [id number] [phone]              Future tests that were ordered for you today     Open Future Orders        Priority Expected Expires  Ordered    CBC with platelets differential Routine 4/23/2018 5/23/2018 1/23/2018    Comprehensive metabolic panel Routine 4/23/2018 5/23/2018 1/23/2018    Protein electrophoresis Routine 4/23/2018 5/23/2018 1/23/2018    [place] and lambda light chain - Blood Routine 4/23/2018 5/23/2018 1/23/2018    INR Routine 4/23/2018 5/23/2018 1/23/2018    IgA Routine 4/23/2018 7/22/2018 1/23/2018    Protein electrophoresis timed urine Routine 4/23/2018 7/22/2018 1/23/2018    Protein immunofixation urine Routine 4/23/2018 7/22/2018 1/23/2018    Protein timed urine with Creat Ratio Routine 4/23/2018 7/22/2018 1/23/2018    IgM Routine 4/23/2018 7/22/2018 1/23/2018    IgG Routine 4/23/2018 7/22/2018 1/23/2018    IgE Routine 4/23/2018 7/22/2018 1/23/2018            Who to contact     If you have questions or need follow up information about today's clinic visit or your schedule please contact [facility] BLOOD AND MARROW TRANSPLANT directly at [phone].  Normal or non-critical lab and imaging results will be communicated to you by [email or web address], letter or phone within 4 business days after the clinic has received the results. If you do not hear from us within 7 days, please contact the clinic through [email or web address] or phone. If you have a critical or abnormal lab result, we will notify you by phone as soon as possible.  Submit refill requests through [employer] or call your pharmacy and they will forward the refill request to us. Please allow 3 business days for your refill to be completed.          Additional Information About Your Visit        [email or web address] Information     [employer] gives you secure access to your electronic health record. If you see a primary care provider, you can also send messages to your care team and make appointments. If you have questions, please call your primary care clinic.  If you do not have a primary care provider, please call [phone] and they will assist you.        Care EveryWhere ID     This is your Care EveryWhere  ID. This could be used by other organizations to access your [facility] medical records  [id number]        Your Vitals Were     Pulse Temperature Respirations Pulse Oximetry BMI (Body Mass Index)       71 97.9  F (36.6  C) (Oral) 16 95% 29.7 kg/m2        Blood Pressure from Last 3 Encounters:   01/23/18 101/58   11/21/17 124/61   09/19/17 113/66    Weight from Last 3 Encounters:   01/23/18 93.9 kg (207 lb)   11/21/17 92.5 kg (204 lb)   09/19/17 92.4 kg (203 lb 12.8 oz)              We Performed the Following     CBC with platelets differential     Comprehensive metabolic panel     CRP inflammation     INR     Kappa and lambda light chain     Protein electrophoresis          Where to get your medicines      Some of these will need a paper prescription and others can be bought over the counter.  Ask your nurse if you have questions.     Bring a paper prescription for each of these medications     magnesium oxide 400 MG tablet          Recent Review Flowsheet Data     BMT Recent Results Latest Ref Rng & Units 5/9/2017 6/2/2017 6/13/2017 7/6/2017 9/11/2017 11/17/2017 1/16/2018    WBC 4.0 - 11.0 10e9/L 5.2 6.8 6.4 7.7 8.2 8.5 10.5    Hemoglobin 13.3 - 17.7 g/dL 11.3(L) 10.4(L) 9.9(L) 10.8(L) 13.1(L) 13.6 12.6(L)    Platelet Count 150 - 450 10e9/L 141(L) 143(L) 217 165 173 187 184    Neutrophils (Absolute) 1.6 - 8.3 10e9/L 2.8 5.3 4.0 5.8 5.7 6.1 7.5    INR 0.86 - 1.14 - - - - - - 2.25(H)    Sodium 133 - 144 mmol/L 138 140 141 138 136 139 138    Potassium 3.4 - 5.3 mmol/L 4.0 3.8 3.9 4.1 4.4 4.4 4.3    Chloride 94 - 109 mmol/L 102 106 105 104 103 104 104    Glucose 70 - 99 mg/dL 101(H) 130(H) 142(H) 151(H) 112(H) 118(H) 89    Urea Nitrogen 7 - 30 mg/dL 17 28 19 22 21 31(H) 21    Creatinine 0.66 - 1.25 mg/dL 0.94 1.23 1.02 1.23 1.12 1.12 1.04    Calcium (Total) 8.5 - 10.1 mg/dL 8.5 8.2(L) 8.7 9.0 9.1 9.6 8.6    Protein (Total) 6.8 - 8.8 g/dL 6.6(L) 7.3 6.7(L) 7.2 7.3 7.2 7.3    Albumin 3.4 - 5.0 g/dL 3.2(L) 2.9(L) 3.3(L)  4.0 3.7 4.1 3.6    Alkaline Phosphatase 40 - 150 U/L 83 71 77 83 83 87 88    AST 0 - 45 U/L 14 16 14 17 18 21 18    ALT 0 - 70 U/L 28 25 17 19 21 24 18    MCV 78 - 100 fl 104(H) 105(H) 106(H) 108(H) 101(H) 104(H) 105(H)               Primary Care Provider Office Phone # Fax #    Cong Mcdonald [phone]       [address] [address]        Equal Access to Services     [facility] LOLA : Hadii aad ku hadasho Soomaali, waaxda luqadaha, qaybta kaalmada adeandresyawilliam, nino harmon . So [facility] [phone].    ATENCIÓN: Si habla español, tiene a miles disposición servicios gratuitos de asistencia lingüística. [facility] [phone].    We comply with applicable federal civil rights laws and [place] laws. We do not discriminate on the basis of race, color, national origin, age, disability, sex, sexual orientation, or gender identity.            Thank you!     Thank you for choosing [facility] BLOOD AND MARROW TRANSPLANT  for your care. Our goal is always to provide you with excellent care. Hearing back from our patients is one way we can continue to improve our services. Please take a few minutes to complete the written survey that you may receive in the mail after your visit with us. Thank you!             Your Updated Medication List - Protect others around you: Learn how to safely use, store and throw away your medicines at www.disposemymeds.org.          This list is accurate as of: 1/23/18  1:01 PM.  Always use your most recent med list.                   Brand Name Dispense Instructions for use Diagnosis    albuterol 108 (90 BASE) MCG/ACT Inhaler    PROAIR HFA/PROVENTIL HFA/VENTOLIN HFA     Inhale 2 puffs into the lungs every 6 hours as needed        amoxicillin 500 MG tablet    AMOXIL    4 tablet    Take 4 tabs one hour before dental appointment    Multiple myeloma, without mention of having achieved remission       baclofen 10 MG tablet    LIORESAL      Take 10 mg by mouth 3 times daily as needed prn        CALCIUM 500 + D PO      Take 2 tablets by mouth daily.        dexamethasone 4 MG tablet    DECADRON    30 tablet    Take 10 tabs (40mg) Days 1, 8, and 15.ON HOLD SINCE June 2017    Multiple myeloma not having achieved remission (H), Anticoagulation goal of INR 2 to 3       diclofenac 75 MG EC tablet    VOLTAREN     Take 75 mg by mouth 2 times daily as needed 1 tab prn        fentaNYL 25 mcg/hr 72 hr patch    DURAGESIC     Place 1 patch onto the skin every 72 hours        furosemide 20 MG tablet    LASIX    30 tablet    Take 20 mg by mouth daily    Status post chemotherapy, Multiple myeloma, without mention of having achieved remission       LENalidomide 10 MG Caps capsule CHEMO    REVLIMID    21 each    Take 10 mg daily days 1-21 ON HOLD SINCE June 2017    Multiple myeloma not having achieved remission (H), Anticoagulation goal of INR 2 to 3       lisinopril 5 MG tablet    PRINIVIL/ZESTRIL     Take 5 mg by mouth 2 times daily    Multiple myeloma, without mention of having achieved remission, Hypomagnesemia, Ventricular fibrillation (H), Anemia of other chronic disease, Renal insufficiency, Dilated cardiomyopathy (H), Automatic implantable cardiac defibrillator in situ, Personal history of diseases of blood and blood-forming organs, History of long-term use of multiple prescription drugs       magnesium oxide 400 MG tablet    MAG-OX    180 tablet    Take 3 tablets (1,200 mg) by mouth 3 times daily    Hypomagnesemia       metoprolol succinate 100 MG 24 hr tablet    TOPROL-XL    135 tablet    Take 1.5 tablets (150 mg) by mouth daily    Atrial fibrillation, unspecified type (H), Paroxysmal ventricular tachycardia (H), Permanent atrial fibrillation (H)       nitroGLYcerin 0.4 MG sublingual tablet    NITROSTAT     Place 0.4 mg under the tongue every 5 minutes as needed Reported on 5/16/2017        omeprazole 20 MG CR capsule    priLOSEC    90 capsule    Take 20 mg by  mouth daily    GERD (gastroesophageal reflux disease)       oxyCODONE IR 5 MG tablet    ROXICODONE     Take 1-2 tablets by mouth every 6 hours as needed. For pain.        simvastatin 40 MG tablet    ZOCOR    30 tablet    Take 40 mg by mouth At Bedtime        tiotropium 18 MCG capsule    SPIRIVA    30 capsule    Inhale 1 capsule (18 mcg) into the lungs daily    SOB (shortness of breath)       TRAZODONE HCL PO      Take 50 mg by mouth nightly as needed        warfarin 5 MG tablet    COUMADIN      Multiple myeloma in relapse (H)

## 2018-01-23 NOTE — LETTER
1/23/2018       RE: Erasmo Zapata  [address]  [address] [facility] [address]     Dear Colleague,    Thank you for referring your patient, Erasmo Zapata, to the [facility]. Please see a copy of my visit note below.    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  Jonh has relapsed and was on Revlimid and dexamethasone. He has been off revlimid since June 2. .No other sx no chest pain no SOB, KANG, Continues to have low back pain on meds    PAST MEDICAL HISTORY:  See my note from 11/2017.       SOCIAL HISTORY:  See my note from 11/2017.       FAMILY HISTORY:  See my note from 11/2017.      REVIEW OF SYSTEMS:  A 12-point review of systems is done and is negative as in HPI.      PHYSICAL EXAMINATION:   GENERAL:  He is an alert gentleman in no acute distress.   VITAL SIGNS:  Stable. /58 (BP Location: Right arm, Patient Position: Sitting, Cuff Size: Adult Regular)  Pulse 71  Temp 97.9  F (36.6  C) (Oral)  Resp 16  Wt 93.9 kg (207 lb)  SpO2 95%  BMI 29.7 kg/m2    HEENT:  Normocephalic.  EOM okay, no scleral icterus.  Mouth without lesion.   RESPIRATORY:  Clear to percussion and auscultation.   CARDIAC:  Irregularly irregular rhythm, no murmur.  Cardioverter in place in the left upper chest wall.   ABDOMEN:  Soft without splenomegaly.   EXTREMITIES:  Without edema.   NEUROLOGIC:  No resting tremor.     Results for ERASMO ZAPATA (MRN [id number]) as of 1/23/2018 13:02   Ref. Range 12/27/2017 00:00 1/16/2018 05:45 1/16/2018 10:18   Sodium Latest Ref Range: 133 - 144 mmol/L   138   Potassium Latest Ref Range: 3.4 - 5.3 mmol/L   4.3   Chloride Latest Ref  Range: 94 - 109 mmol/L   104   Carbon Dioxide Latest Ref Range: 20 - 32 mmol/L   30   Urea Nitrogen Latest Ref Range: 7 - 30 mg/dL   21   Creatinine Latest Ref Range: 0.66 - 1.25 mg/dL   1.04   GFR Estimate Latest Ref Range: >60 mL/min/1.7m2   71   GFR Estimate If Black Latest Ref Range: >60 mL/min/1.7m2   86   Calcium Latest Ref Range: 8.5 - 10.1 mg/dL   8.6   Anion Gap Latest Ref Range: 3 - 14 mmol/L   4   Magnesium Latest Ref Range: 1.6 - 2.3 mg/dL   1.8   Albumin Latest Ref Range: 3.4 - 5.0 g/dL   3.6   Protein Total Latest Ref Range: 6.8 - 8.8 g/dL   7.3   Bilirubin Total Latest Ref Range: 0.2 - 1.3 mg/dL   0.6   Alkaline Phosphatase Latest Ref Range: 40 - 150 U/L   88   ALT Latest Ref Range: 0 - 70 U/L   18   AST Latest Ref Range: 0 - 45 U/L   18   Creatinine Urine Timed Latest Ref Range: 1.00 - 2.00   1.43    Creatinine Urine Latest Units: mg/dL  71    IGE Latest Ref Range: 0 - 114 KIU/L   11   Protein Random Urine Latest Units: g/L  0.28    Protein Total Urine g/gr Creatinine Latest Ref Range: 0 - 0.2 g/g Cr  0.40 (H)    Protein Total 24 Hr Urine Latest Ref Range: 0.04 - 0.23   0.58 (H)    Uric Acid Latest Ref Range: 3.5 - 7.2 mg/dL   5.8   Glucose Latest Ref Range: 70 - 99 mg/dL   89   WBC Latest Ref Range: 4.0 - 11.0 10e9/L   10.5   Hemoglobin Latest Ref Range: 13.3 - 17.7 g/dL   12.6 (L)   Hematocrit Latest Ref Range: 40.0 - 53.0 %   38.8 (L)   Platelet Count Latest Ref Range: 150 - 450 10e9/L   184   RBC Count Latest Ref Range: 4.4 - 5.9 10e12/L   3.71 (L)   MCV Latest Ref Range: 78 - 100 fl   105 (H)   MCH Latest Ref Range: 26.5 - 33.0 pg   34.0 (H)   MCHC Latest Ref Range: 31.5 - 36.5 g/dL   32.5   RDW Latest Ref Range: 10.0 - 15.0 %   13.2   Diff Method Unknown   Automated Method   % Neutrophils Latest Units: %   71.6   % Lymphocytes Latest Units: %   17.8   % Monocytes Latest Units: %   8.8   % Eosinophils Latest Units: %   0.9   % Basophils Latest Units: %   0.6   % Immature Granulocytes Latest  Units: %   0.3   Nucleated RBCs Latest Ref Range: 0 /100   0   Absolute Neutrophil Latest Ref Range: 1.6 - 8.3 10e9/L   7.5   Absolute Lymphocytes Latest Ref Range: 0.8 - 5.3 10e9/L   1.9   Absolute Monocytes Latest Ref Range: 0.0 - 1.3 10e9/L   0.9   Absolute Eosinophils Latest Ref Range: 0.0 - 0.7 10e9/L   0.1   Absolute Basophils Latest Ref Range: 0.0 - 0.2 10e9/L   0.1   Abs Immature Granulocytes Latest Ref Range: 0 - 0.4 10e9/L   0.0   Absolute Nucleated RBC Unknown   0.0   INR Latest Ref Range: 0.86 - 1.14    2.25 (H)   Albumin Fraction Latest Ref Range: 3.7 - 5.1 g/dL   3.9   Albumin Fraction Urine Latest Ref Range: 0 %  8.2 (H)    Alpha 1 Fraction Latest Ref Range: 0.2 - 0.4 g/dL   0.3   Alpha 1 Fraction Urine Latest Ref Range: 0 %  4.0 (H)    Alpha 2 Fraction Latest Ref Range: 0.5 - 0.9 g/dL   0.8   Alpha 2 Fraction Urine Latest Ref Range: 0 %  2.7 (H)    Beta Fraction Latest Ref Range: 0.6 - 1.0 g/dL   0.8   Beta Fraction Urine Latest Ref Range: 0 %  3.1 (H)    ELP Interpretation: Unknown   Small monoclonal ...   ELP Interpretation Urine Unknown  Albumin and globu...    Gamma Fraction Latest Ref Range: 0.7 - 1.6 g/dL   0.9   Gamma Fraction Urine Latest Ref Range: 0 %  82.0 (H)    IGA Latest Ref Range: 70 - 380 mg/dL   363   IGG Latest Ref Range: 695 - 1620 mg/dL   738   IGM Latest Ref Range: 60 - 265 mg/dL   27 (L)   Immunofix ELP Urine Unknown  (Note)    Kappa Free Lt Chain Latest Ref Range: 0.33 - 1.94 mg/dL   1.14   Kappa Lambda Ratio Latest Ref Range: 0.26 - 1.65    0.02 (L)   Lambda Free Lt Chain Latest Ref Range: 0.57 - 2.63 mg/dL   66.00 (H)   Monoclonal Peak Latest Ref Range: 0.0 g/dL   0.3 (H)   Monoclonal Peak Urine Latest Ref Range: 0% %  67.6 (H)    LAB RESULT - HIM SCAN Unknown Attch       ASSESSMENT:   1.  Multiple myeloma.   2.  Status post autologous peripheral blood stem cell transplant.   3.  Cardiomyopathy.   4.  Atrial fibrillation.   5.  History of deep venous thrombosis, on  anticoagulation.   6.  Chronic back pain.   7.  Aortic aneurysm.   8.  History of plasmacytoma of the clivus, status post radiation.   9.  History of ventricular tachycardia with an implantable defibrillator in place.       Lambda light chains are increased in serum and urine .  I would like to start the Revlimid 10mg Days 1-21 and dexamethasone 40mg Days 1,8 and 15..  I am going to see him in 3 months in April 2018. He will go to the [facility] to get the revlimid  I am going to check his light chains  and M-spike monthly and CBC plat diff and CMP every 2 weeks.   He should continue the magnesium 420mg tabs,3 tabs BID while on revlimid due to chronic magnesium losses while on Revlimid.  [facility]   [facility]  , [facility] these [facility]. The ultrasound of aorta show no significant change Continue on warfarin Continue pain meds      Again, thank you for allowing me to participate in the care of your patient.      Sincerely,    Rojas Raygoza MD

## 2018-01-23 NOTE — PROGRESS NOTES
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  Jonh has relapsed and was on Revlimid and dexamethasone. He has been off revlimid since June 2. .No other sx no chest pain no SOB, KANG, Continues to have low back pain on meds    PAST MEDICAL HISTORY:  See my note from 11/2017.       SOCIAL HISTORY:  See my note from 11/2017.       FAMILY HISTORY:  See my note from 11/2017.      REVIEW OF SYSTEMS:  A 12-point review of systems is done and is negative as in HPI.      PHYSICAL EXAMINATION:   GENERAL:  He is an alert gentleman in no acute distress.   VITAL SIGNS:  Stable. /58 (BP Location: Right arm, Patient Position: Sitting, Cuff Size: Adult Regular)  Pulse 71  Temp 97.9  F (36.6  C) (Oral)  Resp 16  Wt 93.9 kg (207 lb)  SpO2 95%  BMI 29.7 kg/m2    HEENT:  Normocephalic.  EOM okay, no scleral icterus.  Mouth without lesion.   RESPIRATORY:  Clear to percussion and auscultation.   CARDIAC:  Irregularly irregular rhythm, no murmur.  Cardioverter in place in the left upper chest wall.   ABDOMEN:  Soft without splenomegaly.   EXTREMITIES:  Without edema.   NEUROLOGIC:  No resting tremor.     Results for DARRYL ZAPATA (MRN [id number]) as of 1/23/2018 13:02   Ref. Range 12/27/2017 00:00 1/16/2018 05:45 1/16/2018 10:18   Sodium Latest Ref Range: 133 - 144 mmol/L   138   Potassium Latest Ref Range: 3.4 - 5.3 mmol/L   4.3   Chloride Latest Ref Range: 94 - 109 mmol/L   104   Carbon Dioxide Latest Ref Range: 20 - 32 mmol/L   30   Urea Nitrogen Latest Ref Range: 7 - 30 mg/dL   21   Creatinine Latest Ref Range: 0.66 - 1.25 mg/dL   1.04   GFR Estimate Latest Ref Range: >60 mL/min/1.7m2   71   GFR Estimate If Black Latest Ref Range: >60 mL/min/1.7m2   86   Calcium  Latest Ref Range: 8.5 - 10.1 mg/dL   8.6   Anion Gap Latest Ref Range: 3 - 14 mmol/L   4   Magnesium Latest Ref Range: 1.6 - 2.3 mg/dL   1.8   Albumin Latest Ref Range: 3.4 - 5.0 g/dL   3.6   Protein Total Latest Ref Range: 6.8 - 8.8 g/dL   7.3   Bilirubin Total Latest Ref Range: 0.2 - 1.3 mg/dL   0.6   Alkaline Phosphatase Latest Ref Range: 40 - 150 U/L   88   ALT Latest Ref Range: 0 - 70 U/L   18   AST Latest Ref Range: 0 - 45 U/L   18   Creatinine Urine Timed Latest Ref Range: 1.00 - 2.00   1.43    Creatinine Urine Latest Units: mg/dL  71    IGE Latest Ref Range: 0 - 114 KIU/L   11   Protein Random Urine Latest Units: g/L  0.28    Protein Total Urine g/gr Creatinine Latest Ref Range: 0 - 0.2 g/g Cr  0.40 (H)    Protein Total 24 Hr Urine Latest Ref Range: 0.04 - 0.23   0.58 (H)    Uric Acid Latest Ref Range: 3.5 - 7.2 mg/dL   5.8   Glucose Latest Ref Range: 70 - 99 mg/dL   89   WBC Latest Ref Range: 4.0 - 11.0 10e9/L   10.5   Hemoglobin Latest Ref Range: 13.3 - 17.7 g/dL   12.6 (L)   Hematocrit Latest Ref Range: 40.0 - 53.0 %   38.8 (L)   Platelet Count Latest Ref Range: 150 - 450 10e9/L   184   RBC Count Latest Ref Range: 4.4 - 5.9 10e12/L   3.71 (L)   MCV Latest Ref Range: 78 - 100 fl   105 (H)   MCH Latest Ref Range: 26.5 - 33.0 pg   34.0 (H)   MCHC Latest Ref Range: 31.5 - 36.5 g/dL   32.5   RDW Latest Ref Range: 10.0 - 15.0 %   13.2   Diff Method Unknown   Automated Method   % Neutrophils Latest Units: %   71.6   % Lymphocytes Latest Units: %   17.8   % Monocytes Latest Units: %   8.8   % Eosinophils Latest Units: %   0.9   % Basophils Latest Units: %   0.6   % Immature Granulocytes Latest Units: %   0.3   Nucleated RBCs Latest Ref Range: 0 /100   0   Absolute Neutrophil Latest Ref Range: 1.6 - 8.3 10e9/L   7.5   Absolute Lymphocytes Latest Ref Range: 0.8 - 5.3 10e9/L   1.9   Absolute Monocytes Latest Ref Range: 0.0 - 1.3 10e9/L   0.9   Absolute Eosinophils Latest Ref Range: 0.0 - 0.7 10e9/L   0.1   Absolute  Basophils Latest Ref Range: 0.0 - 0.2 10e9/L   0.1   Abs Immature Granulocytes Latest Ref Range: 0 - 0.4 10e9/L   0.0   Absolute Nucleated RBC Unknown   0.0   INR Latest Ref Range: 0.86 - 1.14    2.25 (H)   Albumin Fraction Latest Ref Range: 3.7 - 5.1 g/dL   3.9   Albumin Fraction Urine Latest Ref Range: 0 %  8.2 (H)    Alpha 1 Fraction Latest Ref Range: 0.2 - 0.4 g/dL   0.3   Alpha 1 Fraction Urine Latest Ref Range: 0 %  4.0 (H)    Alpha 2 Fraction Latest Ref Range: 0.5 - 0.9 g/dL   0.8   Alpha 2 Fraction Urine Latest Ref Range: 0 %  2.7 (H)    Beta Fraction Latest Ref Range: 0.6 - 1.0 g/dL   0.8   Beta Fraction Urine Latest Ref Range: 0 %  3.1 (H)    ELP Interpretation: Unknown   Small monoclonal ...   ELP Interpretation Urine Unknown  Albumin and globu...    Gamma Fraction Latest Ref Range: 0.7 - 1.6 g/dL   0.9   Gamma Fraction Urine Latest Ref Range: 0 %  82.0 (H)    IGA Latest Ref Range: 70 - 380 mg/dL   363   IGG Latest Ref Range: 695 - 1620 mg/dL   738   IGM Latest Ref Range: 60 - 265 mg/dL   27 (L)   Immunofix ELP Urine Unknown  (Note)    Kappa Free Lt Chain Latest Ref Range: 0.33 - 1.94 mg/dL   1.14   Kappa Lambda Ratio Latest Ref Range: 0.26 - 1.65    0.02 (L)   Lambda Free Lt Chain Latest Ref Range: 0.57 - 2.63 mg/dL   66.00 (H)   Monoclonal Peak Latest Ref Range: 0.0 g/dL   0.3 (H)   Monoclonal Peak Urine Latest Ref Range: 0% %  67.6 (H)    LAB RESULT - HIM SCAN Unknown Attch       ASSESSMENT:   1.  Multiple myeloma.   2.  Status post autologous peripheral blood stem cell transplant.   3.  Cardiomyopathy.   4.  Atrial fibrillation.   5.  History of deep venous thrombosis, on anticoagulation.   6.  Chronic back pain.   7.  Aortic aneurysm.   8.  History of plasmacytoma of the clivus, status post radiation.   9.  History of ventricular tachycardia with an implantable defibrillator in place.       Lambda light chains are increased in serum and urine .  I would like to start the Revlimid 10mg Days 1-21 and  dexamethasone 40mg Days 1,8 and 15..  I am going to see him in 3 months in April 2018. He will go to the [facility] to get the revlimid  I am going to check his light chains  and M-spike monthly and CBC plat diff and CMP every 2 weeks.   He should continue the magnesium 420mg tabs,3 tabs BID while on revlimid due to chronic magnesium losses while on Revlimid.  [facility],   and  , about these data. The ultrasound of aorta show no significant change Continue on warfarin Continue pain meds

## 2018-01-24 ENCOUNTER — CARE COORDINATION (OUTPATIENT)
Dept: TRANSPLANT | Facility: CLINIC | Age: 68
End: 2018-01-24

## 2018-01-24 DIAGNOSIS — Z79.01 ANTICOAGULATION GOAL OF INR 2 TO 3: ICD-10-CM

## 2018-01-24 DIAGNOSIS — C90.00 MULTIPLE MYELOMA NOT HAVING ACHIEVED REMISSION (H): ICD-10-CM

## 2018-01-24 DIAGNOSIS — Z51.81 ANTICOAGULATION GOAL OF INR 2 TO 3: ICD-10-CM

## 2018-01-24 RX ORDER — DEXAMETHASONE 4 MG/1
TABLET ORAL
Qty: 30 TABLET | Refills: 5 | Status: SHIPPED | OUTPATIENT
Start: 2018-01-24 | End: 2018-01-31

## 2018-01-24 RX ORDER — LENALIDOMIDE 10 MG/1
CAPSULE ORAL
Qty: 21 EACH | Refills: 0 | Status: SHIPPED | OUTPATIENT
Start: 2018-01-24 | End: 2018-01-31

## 2018-01-24 NOTE — PROGRESS NOTES
RE: Erasmo Perace   52    Multiple Myeloma 203.00    [address]   [address] [place]  [address]  [phone] (H)  [phone] (M)    Verbal order from Dr. Rock Raygoza to Merry Mas RN    CBC d/plts, CMP with Mg++ to be drawn every two weeks.  Serum light chains and SPEP to be drawn monthly.  Patient will call and arrange for his first blood draw for the CBC,CMP, Mg for about mid February.  Please fax results to Dr. Raygoza at [phone].    Labs to be drawn at [facility] in [place], [place]  Lab phone: [phone]  Fax: [phone]    Please call Merry Mas RN care coordinator at [phone] with any questions.          Dr.Gregory Jaret MD/frankie  [facility]   Blood and Marrow Transplant Program  [facility] [address] [facility], [place] [address]     [phone]

## 2018-01-30 DIAGNOSIS — E83.42 HYPOMAGNESEMIA: Primary | ICD-10-CM

## 2018-01-30 RX ORDER — MAGNESIUM OXIDE 420 MG/1
1260 TABLET ORAL 2 TIMES DAILY
Qty: 180 TABLET | Refills: 3 | Status: SHIPPED | OUTPATIENT
Start: 2018-01-30 | End: 2019-06-20

## 2018-01-31 DIAGNOSIS — Z79.01 ANTICOAGULATION GOAL OF INR 2 TO 3: ICD-10-CM

## 2018-01-31 DIAGNOSIS — C90.00 MULTIPLE MYELOMA NOT HAVING ACHIEVED REMISSION (H): ICD-10-CM

## 2018-01-31 DIAGNOSIS — Z51.81 ANTICOAGULATION GOAL OF INR 2 TO 3: ICD-10-CM

## 2018-01-31 RX ORDER — DEXAMETHASONE 4 MG/1
TABLET ORAL
Qty: 30 TABLET | Refills: 5 | Status: SHIPPED | OUTPATIENT
Start: 2018-01-31 | End: 2019-01-22

## 2018-01-31 RX ORDER — LENALIDOMIDE 10 MG/1
CAPSULE ORAL
Qty: 21 EACH | Refills: 0 | COMMUNITY
Start: 2018-01-31 | End: 2019-01-22

## 2018-02-07 ENCOUNTER — TRANSFERRED RECORDS (OUTPATIENT)
Dept: HEALTH INFORMATION MANAGEMENT | Facility: CLINIC | Age: 68
End: 2018-02-07

## 2018-02-22 ENCOUNTER — ALLIED HEALTH/NURSE VISIT (OUTPATIENT)
Dept: CARDIOLOGY | Facility: CLINIC | Age: 68
End: 2018-02-22
Attending: INTERNAL MEDICINE
Payer: MEDICARE

## 2018-04-10 DIAGNOSIS — C90.02 MULTIPLE MYELOMA IN RELAPSE (H): ICD-10-CM

## 2018-04-10 DIAGNOSIS — Z79.01 ANTICOAGULATION GOAL OF INR 2 TO 3: ICD-10-CM

## 2018-04-10 DIAGNOSIS — Z51.81 ANTICOAGULATION GOAL OF INR 2 TO 3: ICD-10-CM

## 2018-04-10 DIAGNOSIS — E83.42 HYPOMAGNESEMIA: ICD-10-CM

## 2018-04-10 LAB
ALBUMIN SERPL-MCNC: 3.5 G/DL (ref 3.4–5)
ALP SERPL-CCNC: 70 U/L (ref 40–150)
ALT SERPL W P-5'-P-CCNC: 19 U/L (ref 0–70)
ANION GAP SERPL CALCULATED.3IONS-SCNC: 7 MMOL/L (ref 3–14)
AST SERPL W P-5'-P-CCNC: 15 U/L (ref 0–45)
BASOPHILS # BLD AUTO: 0 10E9/L (ref 0–0.2)
BASOPHILS NFR BLD AUTO: 0.4 %
BILIRUB SERPL-MCNC: 0.4 MG/DL (ref 0.2–1.3)
BUN SERPL-MCNC: 31 MG/DL (ref 7–30)
CALCIUM SERPL-MCNC: 9.1 MG/DL (ref 8.5–10.1)
CHLORIDE SERPL-SCNC: 108 MMOL/L (ref 94–109)
CO2 SERPL-SCNC: 24 MMOL/L (ref 20–32)
COLLECT DURATION TIME UR: 24 H
CREAT 24H UR-MRATE: 1.56 G/(24.H) (ref 1–2)
CREAT SERPL-MCNC: 0.88 MG/DL (ref 0.66–1.25)
CREAT UR-MCNC: 73 MG/DL
DIFFERENTIAL METHOD BLD: ABNORMAL
EOSINOPHIL # BLD AUTO: 0.1 10E9/L (ref 0–0.7)
EOSINOPHIL NFR BLD AUTO: 0.6 %
ERYTHROCYTE [DISTWIDTH] IN BLOOD BY AUTOMATED COUNT: 16.7 % (ref 10–15)
GFR SERPL CREATININE-BSD FRML MDRD: 86 ML/MIN/1.7M2
GLUCOSE SERPL-MCNC: 83 MG/DL (ref 70–99)
HCT VFR BLD AUTO: 32.3 % (ref 40–53)
HGB BLD-MCNC: 10.8 G/DL (ref 13.3–17.7)
IMM GRANULOCYTES # BLD: 0.1 10E9/L (ref 0–0.4)
IMM GRANULOCYTES NFR BLD: 0.6 %
INR PPP: 2.99 (ref 0.86–1.14)
LYMPHOCYTES # BLD AUTO: 2.1 10E9/L (ref 0.8–5.3)
LYMPHOCYTES NFR BLD AUTO: 25.8 %
MAGNESIUM SERPL-MCNC: 2 MG/DL (ref 1.6–2.3)
MCH RBC QN AUTO: 34.7 PG (ref 26.5–33)
MCHC RBC AUTO-ENTMCNC: 33.4 G/DL (ref 31.5–36.5)
MCV RBC AUTO: 104 FL (ref 78–100)
MONOCYTES # BLD AUTO: 1.2 10E9/L (ref 0–1.3)
MONOCYTES NFR BLD AUTO: 14.5 %
NEUTROPHILS # BLD AUTO: 4.8 10E9/L (ref 1.6–8.3)
NEUTROPHILS NFR BLD AUTO: 58.1 %
NRBC # BLD AUTO: 0 10*3/UL
NRBC BLD AUTO-RTO: 0 /100
PLATELET # BLD AUTO: 165 10E9/L (ref 150–450)
POTASSIUM SERPL-SCNC: 3.7 MMOL/L (ref 3.4–5.3)
PROT 24H UR-MRATE: 0.57 G/(24.H) (ref 0.04–0.23)
PROT SERPL-MCNC: 6.6 G/DL (ref 6.8–8.8)
PROT UR-MCNC: 0.27 G/L
PROT/CREAT 24H UR: 0.37 G/G CR (ref 0–0.2)
RBC # BLD AUTO: 3.11 10E12/L (ref 4.4–5.9)
SODIUM SERPL-SCNC: 140 MMOL/L (ref 133–144)
SPECIMEN VOL UR: 2140 ML
WBC # BLD AUTO: 8.2 10E9/L (ref 4–11)

## 2018-04-10 PROCEDURE — 82784 ASSAY IGA/IGD/IGG/IGM EACH: CPT | Performed by: INTERNAL MEDICINE

## 2018-04-10 PROCEDURE — 84166 PROTEIN E-PHORESIS/URINE/CSF: CPT | Performed by: INTERNAL MEDICINE

## 2018-04-10 PROCEDURE — 83883 ASSAY NEPHELOMETRY NOT SPEC: CPT | Performed by: INTERNAL MEDICINE

## 2018-04-10 PROCEDURE — 85025 COMPLETE CBC W/AUTO DIFF WBC: CPT | Performed by: INTERNAL MEDICINE

## 2018-04-10 PROCEDURE — 86335 IMMUNFIX E-PHORSIS/URINE/CSF: CPT | Performed by: INTERNAL MEDICINE

## 2018-04-10 PROCEDURE — 83735 ASSAY OF MAGNESIUM: CPT | Performed by: INTERNAL MEDICINE

## 2018-04-10 PROCEDURE — 84156 ASSAY OF PROTEIN URINE: CPT | Performed by: INTERNAL MEDICINE

## 2018-04-10 PROCEDURE — 82785 ASSAY OF IGE: CPT | Performed by: INTERNAL MEDICINE

## 2018-04-10 PROCEDURE — 80053 COMPREHEN METABOLIC PANEL: CPT | Performed by: INTERNAL MEDICINE

## 2018-04-10 PROCEDURE — 00000402 ZZHCL STATISTIC TOTAL PROTEIN: Performed by: INTERNAL MEDICINE

## 2018-04-10 PROCEDURE — 84165 PROTEIN E-PHORESIS SERUM: CPT | Performed by: INTERNAL MEDICINE

## 2018-04-10 PROCEDURE — 81050 URINALYSIS VOLUME MEASURE: CPT | Performed by: INTERNAL MEDICINE

## 2018-04-10 PROCEDURE — 85610 PROTHROMBIN TIME: CPT | Performed by: INTERNAL MEDICINE

## 2018-04-10 NOTE — NURSING NOTE
Chief Complaint   Patient presents with     Lab Only     Pt here for venipuncture blood draw.     Thu Ambrocio MA

## 2018-04-11 LAB
ALBUMIN MFR UR ELPH: 4.4 %
ALBUMIN SERPL ELPH-MCNC: 3.6 G/DL (ref 3.7–5.1)
ALPHA1 GLOB MFR UR ELPH: 8.4 %
ALPHA1 GLOB SERPL ELPH-MCNC: 0.4 G/DL (ref 0.2–0.4)
ALPHA2 GLOB MFR UR ELPH: 4.5 %
ALPHA2 GLOB SERPL ELPH-MCNC: 0.8 G/DL (ref 0.5–0.9)
B-GLOBULIN MFR UR ELPH: 3.2 %
B-GLOBULIN SERPL ELPH-MCNC: 0.8 G/DL (ref 0.6–1)
GAMMA GLOB MFR UR ELPH: 79.5 %
GAMMA GLOB SERPL ELPH-MCNC: 0.6 G/DL (ref 0.7–1.6)
IGA SERPL-MCNC: 210 MG/DL (ref 70–380)
IGG SERPL-MCNC: 511 MG/DL (ref 695–1620)
IGM SERPL-MCNC: 17 MG/DL (ref 60–265)
KAPPA LC UR-MCNC: <0.3 MG/DL (ref 0.33–1.94)
KAPPA LC/LAMBDA SER: ABNORMAL {RATIO} (ref 0.26–1.65)
LAMBDA LC SERPL-MCNC: 35.25 MG/DL (ref 0.57–2.63)
M PROTEIN MFR UR ELPH: 60.3 %
M PROTEIN SERPL ELPH-MCNC: 0.2 G/DL
PROT ELPH PNL UR ELPH: NORMAL
PROT PATTERN SERPL ELPH-IMP: ABNORMAL
PROT PATTERN UR ELPH-IMP: ABNORMAL

## 2018-04-12 ENCOUNTER — ALLIED HEALTH/NURSE VISIT (OUTPATIENT)
Dept: CARDIOLOGY | Facility: CLINIC | Age: 68
End: 2018-04-12
Attending: INTERNAL MEDICINE
Payer: MEDICARE

## 2018-04-12 PROCEDURE — 93295 DEV INTERROG REMOTE 1/2/MLT: CPT | Performed by: INTERNAL MEDICINE

## 2018-04-13 ENCOUNTER — TELEPHONE (OUTPATIENT)
Dept: CARDIOLOGY | Facility: CLINIC | Age: 68
End: 2018-04-13

## 2018-04-13 LAB — IGE SERPL-ACNC: 5 KIU/L (ref 0–114)

## 2018-04-13 NOTE — TELEPHONE ENCOUNTER
"Preliminary Device Interrogation Results.  Final physician signed paceart report to be scanned and attached.    [place] remote ICD transmission received and reviewed. Device transmission sent per MD orders. Alert transmission received for ICD shock on 4/11/18 @ 0228. 15 seconds of VF were converted with 1 41j shock. Pt states he has been back on chemo for 3 days (21 days on and 7 days off). He reports not sleeping well and went out to the couch. He became very faint and thought he should lay on the floor. When he woke up he was on the floor. He is taking coumadin. He reports a small bruise on his cheek. He did not alert us to the episode \"because I feel just fine and this happens every now and then\". His presenting rhythm is an irregular ventricular rhythm ~70 bpm. Prior to the shock the most recent episode was 2/21/18 where he received ATP therapy. Normal device function. = 1%. Lead trends appear stable. Battery estimates 4.5 years to ROSEM[place]. Pt notified of transmission results. Plan for pt to RTC in 1 month as scheduled.  Remote ICD transmission      "

## 2018-04-17 ENCOUNTER — ONCOLOGY VISIT (OUTPATIENT)
Dept: TRANSPLANT | Facility: CLINIC | Age: 68
End: 2018-04-17
Attending: INTERNAL MEDICINE
Payer: MEDICARE

## 2018-04-17 VITALS
OXYGEN SATURATION: 100 % | BODY MASS INDEX: 28.55 KG/M2 | WEIGHT: 199 LBS | HEART RATE: 79 BPM | DIASTOLIC BLOOD PRESSURE: 56 MMHG | SYSTOLIC BLOOD PRESSURE: 98 MMHG

## 2018-04-17 DIAGNOSIS — Z51.81 ANTICOAGULATION GOAL OF INR 2 TO 3: ICD-10-CM

## 2018-04-17 DIAGNOSIS — C90.02 MULTIPLE MYELOMA IN RELAPSE (H): Primary | ICD-10-CM

## 2018-04-17 DIAGNOSIS — Z79.01 ANTICOAGULATION GOAL OF INR 2 TO 3: ICD-10-CM

## 2018-04-17 PROCEDURE — G0463 HOSPITAL OUTPT CLINIC VISIT: HCPCS

## 2018-04-17 ASSESSMENT — PAIN SCALES - GENERAL: PAINLEVEL: NO PAIN (0)

## 2018-04-17 NOTE — PROGRESS NOTES
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  Jonh has relapsed and started on Revlimid 10mg Days 1-21 off 7d and dexamethasone 40mg days 1,8 and 15, he has completed 2 full cycles and now on Day 11 of 3rd cycle.Some cramps and tolerated the revlimid and dex fairly well this time, less mood swings No chest pain no SOB, KANG, Continues to have low back pain on meds He is planning a trip to [place] May 1.    PAST MEDICAL HISTORY:  See my note from 01/2018.       SOCIAL HISTORY:  See my note from 01/2018.       FAMILY HISTORY:  See my note from 01/2018.      REVIEW OF SYSTEMS:  A 12-point review of systems is done and is negative as in HPI.      PHYSICAL EXAMINATION:   GENERAL:  He is an alert gentleman in no acute distress.   VITAL SIGNS:  Stable. BP 98/56 (BP Location: Right arm, Patient Position: Right side, Cuff Size: Adult Regular)  Pulse 79  Wt 90.3 kg (199 lb)  SpO2 100%  BMI 28.55 kg/m2    HEENT:  Normocephalic.  EOM okay, no scleral icterus.  Mouth without lesion.   RESPIRATORY:  Clear to percussion and auscultation.   CARDIAC:  Irregularly irregular rhythm, no murmur.  Cardioverter in place in the left upper chest wall.   ABDOMEN:  Soft without splenomegaly.   EXTREMITIES:  Without edema.   NEUROLOGIC:  No resting tremor.   Results for DARRYL ZAPATA (MRN [id number]) as of 4/17/2018 11:22   Ref. Range 4/10/2018 11:15 4/10/2018 11:16   Sodium Latest Ref Range: 133 - 144 mmol/L 140    Potassium Latest Ref Range: 3.4 - 5.3 mmol/L 3.7    Chloride Latest Ref Range: 94 - 109 mmol/L 108    Carbon Dioxide Latest Ref Range: 20 - 32 mmol/L 24    Urea Nitrogen Latest Ref Range: 7 - 30 mg/dL 31 (H)    Creatinine Latest Ref Range: 0.66 - 1.25 mg/dL 0.88    GFR  Estimate Latest Ref Range: >60 mL/min/1.7m2 86    GFR Estimate If Black Latest Ref Range: >60 mL/min/1.7m2 >90    Calcium Latest Ref Range: 8.5 - 10.1 mg/dL 9.1    Anion Gap Latest Ref Range: 3 - 14 mmol/L 7    Magnesium Latest Ref Range: 1.6 - 2.3 mg/dL 2.0    Albumin Latest Ref Range: 3.4 - 5.0 g/dL 3.5    Protein Total Latest Ref Range: 6.8 - 8.8 g/dL 6.6 (L)    Bilirubin Total Latest Ref Range: 0.2 - 1.3 mg/dL 0.4    Alkaline Phosphatase Latest Ref Range: 40 - 150 U/L 70    ALT Latest Ref Range: 0 - 70 U/L 19    AST Latest Ref Range: 0 - 45 U/L 15    Creatinine Urine Timed Latest Ref Range: 1.00 - 2.00   1.56   Creatinine Urine Latest Units: mg/dL  73   IGE Latest Ref Range: 0 - 114 KIU/L 5    Protein Random Urine Latest Units: g/L  0.27   Protein Total Urine g/gr Creatinine Latest Ref Range: 0 - 0.2 g/g Cr  0.37 (H)   Protein Total 24 Hr Urine Latest Ref Range: 0.04 - 0.23   0.57 (H)   Glucose Latest Ref Range: 70 - 99 mg/dL 83    WBC Latest Ref Range: 4.0 - 11.0 10e9/L 8.2    Hemoglobin Latest Ref Range: 13.3 - 17.7 g/dL 10.8 (L)    Hematocrit Latest Ref Range: 40.0 - 53.0 % 32.3 (L)    Platelet Count Latest Ref Range: 150 - 450 10e9/L 165    RBC Count Latest Ref Range: 4.4 - 5.9 10e12/L 3.11 (L)    MCV Latest Ref Range: 78 - 100 fl 104 (H)    MCH Latest Ref Range: 26.5 - 33.0 pg 34.7 (H)    MCHC Latest Ref Range: 31.5 - 36.5 g/dL 33.4    RDW Latest Ref Range: 10.0 - 15.0 % 16.7 (H)    Diff Method Unknown Automated Method    % Neutrophils Latest Units: % 58.1    % Lymphocytes Latest Units: % 25.8    % Monocytes Latest Units: % 14.5    % Eosinophils Latest Units: % 0.6    % Basophils Latest Units: % 0.4    % Immature Granulocytes Latest Units: % 0.6    Nucleated RBCs Latest Ref Range: 0 /100 0    Absolute Neutrophil Latest Ref Range: 1.6 - 8.3 10e9/L 4.8    Absolute Lymphocytes Latest Ref Range: 0.8 - 5.3 10e9/L 2.1    Absolute Monocytes Latest Ref Range: 0.0 - 1.3 10e9/L 1.2    Absolute Eosinophils Latest Ref  Range: 0.0 - 0.7 10e9/L 0.1    Absolute Basophils Latest Ref Range: 0.0 - 0.2 10e9/L 0.0    Abs Immature Granulocytes Latest Ref Range: 0 - 0.4 10e9/L 0.1    Absolute Nucleated RBC Unknown 0.0    INR Latest Ref Range: 0.86 - 1.14  2.99 (H)    Albumin Fraction Latest Ref Range: 3.7 - 5.1 g/dL 3.6 (L)    Albumin Fraction Urine Latest Ref Range: 0 %  4.4 (H)   Alpha 1 Fraction Latest Ref Range: 0.2 - 0.4 g/dL 0.4    Alpha 1 Fraction Urine Latest Ref Range: 0 %  8.4 (H)   Alpha 2 Fraction Latest Ref Range: 0.5 - 0.9 g/dL 0.8    Alpha 2 Fraction Urine Latest Ref Range: 0 %  4.5 (H)   Beta Fraction Latest Ref Range: 0.6 - 1.0 g/dL 0.8    Beta Fraction Urine Latest Ref Range: 0 %  3.2 (H)   ELP Interpretation: Unknown Small monoclonal ...    ELP Interpretation Urine Unknown  Albumin and globu...   Gamma Fraction Latest Ref Range: 0.7 - 1.6 g/dL 0.6 (L)    Gamma Fraction Urine Latest Ref Range: 0 %  79.5 (H)   IGA Latest Ref Range: 70 - 380 mg/dL 210    IGG Latest Ref Range: 695 - 1620 mg/dL 511 (L)    IGM Latest Ref Range: 60 - 265 mg/dL 17 (L)    Immunofix ELP Urine Unknown (Note)    Kappa Free Lt Chain Latest Ref Range: 0.33 - 1.94 mg/dL <0.30 (L)    Kappa Lambda Ratio Latest Ref Range: 0.26 - 1.65  Unable to Calculate    Lambda Free Lt Chain Latest Ref Range: 0.57 - 2.63 mg/dL 35.25 (H)    Monoclonal Peak Latest Ref Range: 0.0 g/dL 0.2 (H)    Monoclonal Peak Urine Latest Ref Range: 0% %  60.3 (H)       ASSESSMENT:   1.  Multiple myeloma, relapsed  2.  Status post autologous peripheral blood stem cell transplant.   3.  Cardiomyopathy.   4.  Atrial fibrillation.   5.  History of deep venous thrombosis, on anticoagulation.   6.  Chronic back pain.   7.  Aortic aneurysm.   8.  History of plasmacytoma of the clivus, status post radiation.   9.  History of ventricular tachycardia with an implantable defibrillator in place.       Lambda light chains are decreased in serum and urine .  I would continue  the Revlimid 10mg Days  1-21 and dexamethasone 40mg Days 1,8 and 15..  I am going to see him in 3 months in July 2018 He will go to the [facility] to get the revlimid  I am going to chec his light chains  and M-spike monthly and CBC plat diff and CMP every 2 weeks.   He should continue the magnesium 420mg tabs,3 tabs BID while on revlimid due to chronic magnesium losses while on Revlimid.  [facility]   and  , about these data.  Continue on warfarin Continue pain meds

## 2018-04-17 NOTE — MR AVS SNAPSHOT
After Visit Summary   4/17/2018    Erasmo Pearce    MRN: [id number]           Patient Information     Date Of Birth          1950        Visit Information        Provider Department      4/17/2018 11:00 AM Rojas Raygoza MD [facility] Blood and Marrow Transplant        Today's Diagnoses     Multiple myeloma in relapse (H)    -  1    Anticoagulation goal of INR 2 to 3              [facility] ([facility])  [address] [place] [address]  Phone: [phone]  Clinic Hours:   Monday-Thursday:7am to 7pm   Friday: 7am to 5pm   Weekends and holidays:    8am to noon (in general)  If your fever is 100.5  or greater,   call the clinic.  After hours call the   hospital at [phone] or   [phone]. Ask for the BMT   fellow on-call            Follow-ups after your visit        Follow-up notes from your care team     Return in about 3 months (around 7/31/2018).      Your next 10 appointments already scheduled     May 15, 2018  9:00 AM CDT   (Arrive by 8:45 AM)   Implanted Defibulator with Uc Cv Device 1   [facility] ([facility])    [address] [place] [address]  [address] [place] [id number] [phone]            May 15, 2018  9:30 AM CDT   (Arrive by 9:15 AM)   RETURN ARRHYTHMIA with Chuy Jean-Baptiste MD   [facility] ([facility])    [address] [place] [address]  [address] [place] [id number] [phone]            Jul 24, 2018 11:00 AM CDT   [facility] Draw with  [facility] DRAW   [facility] Draw ([facility])    [address] [place] [address]  [address]  [facility] [id number] [phone]            Jul 31, 2018 11:00 AM CDT   Return with Rojas Raygoza MD   [facility] Blood and Marrow Transplant ([facility])    [address] [place] [address]  [address]  [facility] [id number] [phone]              Future tests that were ordered for  you today     Open Future Orders        Priority Expected Expires Ordered    IgG Routine 7/31/2018 8/17/2018 4/17/2018    IgA Routine 7/31/2018 8/17/2018 4/17/2018    IgM Routine 7/31/2018 8/17/2018 4/17/2018    [place] and lambda light chain - Blood Routine 7/31/2018 8/17/2018 4/17/2018    Protein electrophoresis Routine 7/31/2018 8/17/2018 4/17/2018    Protein timed urine with Creat Ratio Routine 7/31/2018 8/17/2018 4/17/2018    Protein immunofixation urine Routine 7/31/2018 8/17/2018 4/17/2018    Protein electrophoresis timed urine Routine 7/31/2018 8/17/2018 4/17/2018    INR Routine 7/31/2018 8/17/2018 4/17/2018    Comprehensive metabolic panel Routine 7/31/2018 8/17/2018 4/17/2018    Magnesium Routine 7/31/2018 8/17/2018 4/17/2018    CBC with platelets differential Routine 7/31/2018 8/17/2018 4/17/2018            Who to contact     If you have questions or need follow up information about today's clinic visit or your schedule please contact [facility] BLOOD AND MARROW TRANSPLANT directly at [phone].  Normal or non-critical lab and imaging results will be communicated to you by [employer], letter or phone within 4 business days after the clinic has received the results. If you do not hear from us within 7 days, please contact the clinic through [facility] or phone. If you have a critical or abnormal lab result, we will notify you by phone as soon as possible.  Submit refill requests through [employer] or call your pharmacy and they will forward the refill request to us. Please allow 3 business days for your refill to be completed.          Additional Information About Your Visit        [employer] Information     [employer] gives you secure access to your electronic health record. If you see a primary care provider, you can also send messages to your care team and make appointments. If you have questions, please call your primary care clinic.  If you do not have a primary care provider, please call [phone] and they will  assist you.        Care EveryWhere ID     This is your Care EveryWhere ID. This could be used by other organizations to access your [place] medical records  [id number]        Your Vitals Were     Pulse Pulse Oximetry BMI (Body Mass Index)             79 100% 28.55 kg/m2          Blood Pressure from Last 3 Encounters:   04/17/18 98/56   01/23/18 101/58   11/21/17 124/61    Weight from Last 3 Encounters:   04/17/18 90.3 kg (199 lb)   01/23/18 93.9 kg (207 lb)   11/21/17 92.5 kg (204 lb)               Recent Review Flowsheet Data     BMT Recent Results Latest Ref Rng & Units 6/2/2017 6/13/2017 7/6/2017 9/11/2017 11/17/2017 1/16/2018 4/10/2018    WBC 4.0 - 11.0 10e9/L 6.8 6.4 7.7 8.2 8.5 10.5 8.2    Hemoglobin 13.3 - 17.7 g/dL 10.4(L) 9.9(L) 10.8(L) 13.1(L) 13.6 12.6(L) 10.8(L)    Platelet Count 150 - 450 10e9/L 143(L) 217 165 173 187 184 165    Neutrophils (Absolute) 1.6 - 8.3 10e9/L 5.3 4.0 5.8 5.7 6.1 7.5 4.8    INR 0.86 - 1.14 - - - - - 2.25(H) 2.99(H)    Sodium 133 - 144 mmol/L 140 141 138 136 139 138 140    Potassium 3.4 - 5.3 mmol/L 3.8 3.9 4.1 4.4 4.4 4.3 3.7    Chloride 94 - 109 mmol/L 106 105 104 103 104 104 108    Glucose 70 - 99 mg/dL 130(H) 142(H) 151(H) 112(H) 118(H) 89 83    Urea Nitrogen 7 - 30 mg/dL 28 19 22 21 31(H) 21 31(H)    Creatinine 0.66 - 1.25 mg/dL 1.23 1.02 1.23 1.12 1.12 1.04 0.88    Calcium (Total) 8.5 - 10.1 mg/dL 8.2(L) 8.7 9.0 9.1 9.6 8.6 9.1    Protein (Total) 6.8 - 8.8 g/dL 7.3 6.7(L) 7.2 7.3 7.2 7.3 6.6(L)    Albumin 3.4 - 5.0 g/dL 2.9(L) 3.3(L) 4.0 3.7 4.1 3.6 3.5    Alkaline Phosphatase 40 - 150 U/L 71 77 83 83 87 88 70    AST 0 - 45 U/L 16 14 17 18 21 18 15    ALT 0 - 70 U/L 25 17 19 21 24 18 19    MCV 78 - 100 fl 105(H) 106(H) 108(H) 101(H) 104(H) 105(H) 104(H)               Primary Care Provider Office Phone # Fax #    Cong Mcdonald [phone]       [address] [address]        Equal Access to Services     CALI DAVILA AH: Jese echavarria  roz Truong, wajjda luqadaha, qaybta kagrecia gonzalez, nino sarahin hayaamandie yuenandres arellano lahollymandie rigo. So [facility] [phone].    ATENCIÓN: Si ulisesla mariama, tiene a miles disposición servicios gratuitos de asistencia lingüística. Era al [phone].    We comply with applicable federal civil rights laws and [place] laws. We do not discriminate on the basis of race, color, national origin, age, disability, sex, sexual orientation, or gender identity.            Thank you!     Thank you for choosing [facility] BLOOD [facility] MARROW TRANSPLANT  for your care. Our goal is always to provide you with excellent care. Hearing back from our patients is one way we can continue to improve our services. Please take a few minutes to complete the written survey that you may receive in the mail after your visit with us. Thank you!             Your Updated Medication List - Protect others around you: Learn how to safely use, store and throw away your medicines at www.disposemymeds.org.          This list is accurate as of 4/17/18 11:53 AM.  Always use your most recent med list.                   Brand Name Dispense Instructions for use Diagnosis    albuterol 108 (90 Base) MCG/ACT Inhaler    PROAIR HFA/PROVENTIL HFA/VENTOLIN HFA     Inhale 2 puffs into the lungs every 6 hours as needed        amoxicillin 500 MG tablet    AMOXIL    4 tablet    Take 4 tabs one hour before dental appointment    Multiple myeloma, without mention of having achieved remission       baclofen 10 MG tablet    LIORESAL     Take 10 mg by mouth 3 times daily as needed prn        CALCIUM 500 + D PO      Take 2 tablets by mouth daily.        dexamethasone 4 MG tablet    DECADRON    30 tablet    Take 10 tabs (40mg) Days 1, 8, and 15 of 28 day cycle.    Multiple myeloma not having achieved remission (H), Anticoagulation goal of INR 2 to 3       diclofenac 75 MG EC tablet    VOLTAREN     Take 75 mg by mouth 2 times daily as needed 1 tab prn        fentaNYL 25 mcg/hr  72 hr patch    DURAGESIC     Place 1 patch onto the skin every 72 hours        furosemide 20 MG tablet    LASIX    30 tablet    Take 20 mg by mouth daily    Status post chemotherapy, Multiple myeloma, without mention of having achieved remission       LENalidomide 10 MG Caps capsule CHEMO    REVLIMID    21 each    Take 10 mg by mouth daily on days 1-21 of 28 day cycle.    Multiple myeloma not having achieved remission (H), Anticoagulation goal of INR 2 to 3       lisinopril 5 MG tablet    PRINIVIL/ZESTRIL     Take 5 mg by mouth 2 times daily    Multiple myeloma, without mention of having achieved remission, Hypomagnesemia, Ventricular fibrillation (H), Anemia of other chronic disease, Renal insufficiency, Dilated cardiomyopathy (H), Automatic implantable cardiac defibrillator in situ, Personal history of diseases of blood and blood-forming organs, History of long-term use of multiple prescription drugs       Magnesium Oxide 420 MG Tabs     180 tablet    Take 1,260 mg by mouth 2 times daily    Hypomagnesemia       metoprolol succinate 100 MG 24 hr tablet    TOPROL-XL    135 tablet    Take 1.5 tablets (150 mg) by mouth daily    Atrial fibrillation, unspecified type (H), Paroxysmal ventricular tachycardia (H), Permanent atrial fibrillation (H)       nitroGLYcerin 0.4 MG sublingual tablet    NITROSTAT     Place 0.4 mg under the tongue every 5 minutes as needed Reported on 5/16/2017        omeprazole 20 MG CR capsule    priLOSEC    90 capsule    Take 20 mg by mouth daily    GERD (gastroesophageal reflux disease)       oxyCODONE IR 5 MG tablet    ROXICODONE     Take 1-2 tablets by mouth every 6 hours as needed. For pain.        simvastatin 40 MG tablet    ZOCOR    30 tablet    Take 40 mg by mouth At Bedtime        tiotropium 18 MCG capsule    SPIRIVA    30 capsule    Inhale 1 capsule (18 mcg) into the lungs daily    SOB (shortness of breath)       TRAZODONE HCL PO      Take 50 mg by mouth nightly as needed        warfarin  5 MG tablet    COUMADIN      Multiple myeloma in relapse (H)

## 2018-04-17 NOTE — PATIENT INSTRUCTIONS
Pt scheduled 7/24 for labs at 11am and 7/31 at 11am with Dr. Raygoza. Scheduled with pt in 2F pod.     Rosario

## 2018-04-17 NOTE — NURSING NOTE
"Oncology Rooming Note    April 17, 2018 11:06 AM   Erasmo Pearce is a 67 year old male who presents for:    Chief Complaint   Patient presents with     RECHECK     Pt is here for a rtn visit     Initial Vitals: There were no vitals taken for this visit. Estimated body mass index is 29.7 kg/(m^2) as calculated from the following:    Height as of 9/19/17: 1.778 m (5' 10\").    Weight as of 1/23/18: 93.9 kg (207 lb). There is no height or weight on file to calculate BSA.  Data Unavailable Comment: Data Unavailable   No LMP for male patient.  Allergies reviewed: Yes  Medications reviewed: Yes    Medications: Medication refills not needed today.  Pharmacy name entered into [employer]:    [facility] - [place], [place] - [address] [place] [address] ('[facility]    Clinical concerns: none     6 minutes for nursing intake (face to face time)     Kina Barraza MA              "

## 2018-05-01 ENCOUNTER — EXTERNAL ORDER RESULTS (OUTPATIENT)
Dept: CARE COORDINATION | Facility: CLINIC | Age: 68
End: 2018-05-01

## 2018-05-01 RX ORDER — WARFARIN SODIUM 5 MG/1
7.5 TABLET ORAL SEE ADMIN INSTRUCTIONS
COMMUNITY
Start: 2018-03-16

## 2018-05-15 ENCOUNTER — ALLIED HEALTH/NURSE VISIT (OUTPATIENT)
Dept: CARDIOLOGY | Facility: CLINIC | Age: 68
End: 2018-05-15
Attending: INTERNAL MEDICINE
Payer: MEDICARE

## 2018-05-15 VITALS
DIASTOLIC BLOOD PRESSURE: 72 MMHG | HEIGHT: 70 IN | HEART RATE: 57 BPM | BODY MASS INDEX: 29.02 KG/M2 | OXYGEN SATURATION: 99 % | SYSTOLIC BLOOD PRESSURE: 110 MMHG | WEIGHT: 202.7 LBS

## 2018-05-15 DIAGNOSIS — I42.0 DILATED CARDIOMYOPATHY (H): ICD-10-CM

## 2018-05-15 DIAGNOSIS — I42.0 DILATED CARDIOMYOPATHY (H): Primary | ICD-10-CM

## 2018-05-15 DIAGNOSIS — C90.02 MULTIPLE MYELOMA IN RELAPSE (H): ICD-10-CM

## 2018-05-15 DIAGNOSIS — I49.01 VF (VENTRICULAR FIBRILLATION) (H): ICD-10-CM

## 2018-05-15 DIAGNOSIS — I48.21 PERMANENT ATRIAL FIBRILLATION (H): Primary | ICD-10-CM

## 2018-05-15 DIAGNOSIS — Z95.810 AUTOMATIC IMPLANTABLE CARDIOVERTER-DEFIBRILLATOR IN SITU: ICD-10-CM

## 2018-05-15 PROCEDURE — 93282 PRGRMG EVAL IMPLANTABLE DFB: CPT | Mod: ZF

## 2018-05-15 PROCEDURE — 99214 OFFICE O/P EST MOD 30 MIN: CPT | Mod: ZP | Performed by: INTERNAL MEDICINE

## 2018-05-15 PROCEDURE — 93282 PRGRMG EVAL IMPLANTABLE DFB: CPT | Mod: 26 | Performed by: INTERNAL MEDICINE

## 2018-05-15 ASSESSMENT — PAIN SCALES - GENERAL: PAINLEVEL: NO PAIN (0)

## 2018-05-15 NOTE — PROGRESS NOTES
HPI:  Erasmo Pearce returns for follow-up. We last saw him in November 2017. He is a 67-year-old gentleman with multiple myeloma, permanent atrial fibrillation, ischemic cardiomyopathy with an ejection fraction of approximately 35% who had an implantable cardioverter-defibrillator placed in December 2009 for primary prevention with subsequent appropriate shocks. He is currently off antiarrhythmic drugs (he had a low DLCO precluding amiodarone; sotalol was discontinued due to low blood pressure). He is on metoprolol succinate 150 mg daily for rate control.   Since we last saw him he is now back on chemotherapy for a relapse. He had a successful shock for VF on April 11, 2018.  In retrospect, his defibrillator shocks have tended to occur during chemotherapy. He saw Dr. Raygoza in follow-up April 17 and he will see him back in July.  The  [place] returned from a trip to [place]. At this time Mr. Pearce reports feeling well. He is tolerating chemotherapy better than in the past. This is likely related to a lower dose but he seems to respond well to his treatments. He denies chest pain, shortness of breath or palpitations. He notes that he did not take his Lasix this morning because of the long drive here.    Defibrillator evaluation shows normal function. I reviewed the EGMs showing appropriate therapy for ventricular fibrillation. He has had some other episodes in the ventricular tachycardia zone that, based on irregularity, are most likely episodes of rapid ventricular response during atrial fibrillation. His heart rate histogram, however, shows an excellent distribution of heart rates.      PAST MEDICAL HISTORY:  Past Medical History:   Diagnosis Date     Atrial fibrillation (H)      Other premature beats      VF (ventricular fibrillation) (H)        CURRENT MEDICATIONS:  Current Outpatient Prescriptions   Medication Sig Dispense Refill     albuterol (PROAIR HFA, PROVENTIL HFA, VENTOLIN HFA) 108 (90  BASE) MCG/ACT inhaler Inhale 2 puffs into the lungs every 6 hours as needed        amoxicillin (AMOXIL) 500 MG tablet Take 4 tabs one hour before dental appointment 4 tablet 5     baclofen (LIORESAL) 10 MG tablet Take 10 mg by mouth 3 times daily as needed prn       Calcium Carbonate-Vitamin D (CALCIUM 500 + D PO) Take 2 tablets by mouth daily.       dexamethasone (DECADRON) 4 MG tablet Take 10 tabs (40mg) Days 1, 8, and 15 of 28 day cycle. 30 tablet 5     diclofenac (VOLTAREN) 75 MG EC tablet Take 75 mg by mouth 2 times daily as needed 1 tab prn       fentaNYL (DURAGESIC) 25 mcg/hr patch 72 hr Place 1 patch onto the skin every 72 hours       furosemide (LASIX) 20 MG tablet Take 20 mg by mouth daily  30 tablet 3     LENalidomide (REVLIMID) 10 MG CAPS capsule CHEMO Take 10 mg by mouth daily on days 1-21 of 28 day cycle. 21 each 0     lisinopril (PRINIVIL,ZESTRIL) 5 MG tablet Take 5 mg by mouth 2 times daily       Magnesium Oxide 420 MG TABS Take 1,260 mg by mouth 2 times daily 180 tablet 3     metoprolol (TOPROL-XL) 100 MG 24 hr tablet Take 1.5 tablets (150 mg) by mouth daily 135 tablet 3     nitroGLYCERIN (NITROSTAT) 0.4 MG SL tablet Place 0.4 mg under the tongue every 5 minutes as needed Reported on 5/16/2017       omeprazole (PRILOSEC) 20 MG capsule Take 20 mg by mouth daily  90 capsule 3     oxycodone (ROXICODONE) 5 MG immediate release tablet Take 1-2 tablets by mouth every 6 hours as needed. For pain.        simvastatin (ZOCOR) 40 MG tablet Take 40 mg by mouth At Bedtime  30 tablet      tiotropium (SPIRIVA) 18 MCG inhalation capsule Inhale 1 capsule (18 mcg) into the lungs daily 30 capsule 3     TRAZODONE HCL PO Take 50 mg by mouth nightly as needed        warfarin (COUMADIN) 5 MG tablet Take 7.5 mg by mouth See Admin Instructions Take 1.5 tab by mojth Monday and Friday and 1 tab Sun Tues WED , Thur and Sat       [DISCONTINUED] warfarin (COUMADIN) 5 MG tablet          PAST SURGICAL HISTORY:  Past Surgical  "History:   Procedure Laterality Date     IMPLANT AUTOMATIC IMPLANTABLE CARDIOVERTER DEFIBRILLATOR         ALLERGIES:     Allergies   Allergen Reactions     Nkda [No Known Drug Allergies]        FAMILY HISTORY:  History reviewed. No pertinent family history.    SOCIAL HISTORY:  Social History   Substance Use Topics     Smoking status: Former Smoker     Packs/day: 1.00     Years: 25.00     Types: Cigarettes     Quit date: 10/15/1995     Smokeless tobacco: Never Used     Alcohol use No       ROS:   Constitutional: No fever, chills, or sweats. Weight stable.   ENT: No visual disturbance, ear ache, epistaxis, sore throat.   Cardiovascular: As per HPI.   Respiratory: No cough, hemoptysis.    GI: No nausea, vomiting, hematemesis, melena, or hematochezia.   : No hematuria.   Integument: Negative.   Psychiatric: Negative.   Hematologic:  Easy bruising, no easy bleeding.  Neuro: Negative.   Endocrinology: No significant heat or cold intolerance   Musculoskeletal: No myalgia.    Exam:  /72 (BP Location: Left arm, Cuff Size: Adult Regular)  Pulse 57  Ht 1.778 m (5' 10\")  Wt 91.9 kg (202 lb 11.2 oz)  SpO2 99%  BMI 29.08 kg/m2  No acute distress.  Alert and oriented.  HEENT:  Normocephalic, atraumatic, EOMI, sclera non-icteric, mucosa moist  Neck: No lymphadenopathy.  JVP 7 cm without HJR.  Cor: Irregularly irregular rhythm.  Controlled ventricular rate. Variable S1, normal S2.  No murmur, rub, or gallop.  PMI in Lf 5th ICS.  Lungs:  Clear  Abd: Soft, nontender, bowel sounds present.  No hepatosplenomegaly..  Extremities: No C/C.  Trace edema        Labs:  CBC RESULTS:   Lab Results   Component Value Date    WBC 8.2 04/10/2018    RBC 3.11 (L) 04/10/2018    HGB 10.8 (L) 04/10/2018    HCT 32.3 (L) 04/10/2018     (H) 04/10/2018    MCH 34.7 (H) 04/10/2018    MCHC 33.4 04/10/2018    RDW 16.7 (H) 04/10/2018     04/10/2018       BMP RESULTS:  Lab Results   Component Value Date     04/10/2018    POTASSIUM " 3.7 04/10/2018    CHLORIDE 108 04/10/2018    CO2 24 04/10/2018    ANIONGAP 7 04/10/2018    GLC 83 04/10/2018    BUN 31 (H) 04/10/2018    CR 0.88 04/10/2018    GFRESTIMATED 86 04/10/2018    GFRESTBLACK >90 04/10/2018    EILEEN 9.1 04/10/2018        INR RESULTS:  Lab Results   Component Value Date    INR 2.99 (H) 04/10/2018    INR 2.25 (H) 01/16/2018    INR 2.15 (H) 01/31/2017    INR 2.18 (H) 11/01/2016       Procedures:  Echocardiogram: No results found for this or any previous visit (from the past 8760 hour(s)).    Assessment and Plan:  Erasmo Pearce is a 67-year-old gentleman with multiple myeloma, permanent atrial fibrillation, ischemic cardiomyopathy with an ejection fraction of approximately 35% who had an implantable cardioverter-defibrillator placed in December 2009 for primary prevention with subsequent appropriate shocks. His most recent appropriate shock was last month. He is not currently on antiarrhythmic drugs.  He is on metoprolol succinate 150 mg daily for rate control during AF.  His heart rate histogram during atrial fibrillation is generally excellent although he has had a few fast episodes that fall into the VT monitor zone. Given past difficulties in tolerating antiarrhythmic agents (low DLCO precluding amiodarone, hypotension on sotalol) and the tendency for him to have ventricular fibrillation during chemotherapy when his electrolytes might be expected to be more labile, we will continue to hold antiarrhythmic therapy. We will make no change in his medications. Since he is continuing chemotherapy at this time, we will plan to see him back in 6 months with a defibrillator check. He will have a remote transmission in 3 months.    NARINDER WRIGHT

## 2018-05-15 NOTE — LETTER
5/15/2018      RE: Erasmo Pearce  [address]  [address] [facility] [address]       Dear Colleague,    Thank you for the opportunity to participate in the care of your patient, Erasmo Pearce, at the [facility] at [facility]. Please see a copy of my visit note below.    HPI:  Erasmo Pearce returns for follow-up. We last saw him in November 2017. He is a 67-year-old gentleman with multiple myeloma, permanent atrial fibrillation, ischemic cardiomyopathy with an ejection fraction of approximately 35% who had an implantable cardioverter-defibrillator placed in December 2009 for primary prevention with subsequent appropriate shocks. He is currently off antiarrhythmic drugs (he had a low DLCO precluding amiodarone; sotalol was discontinued due to low blood pressure). He is on metoprolol succinate 150 mg daily for rate control.   Since we last saw him he is now back on chemotherapy for a relapse. He had a successful shock for VF on April 11, 2018.  In retrospect, his defibrillator shocks have tended to occur during chemotherapy. He saw Dr. Raygoza in follow-up April 17 and he will see him back in July.  The  [facility] returned from a trip to [place]. At this time Mr. Pearce reports feeling well. He is tolerating chemotherapy better than in the past. This is likely related to a lower dose but he seems to respond well to his treatments. He denies chest pain, shortness of breath or palpitations. He notes that he did not take his Lasix this morning because of the long drive here.    Defibrillator evaluation shows normal function. I reviewed the EGMs showing appropriate therapy for ventricular fibrillation. He has had some other episodes in the ventricular tachycardia zone that, based on irregularity, are most likely episodes of rapid ventricular response during atrial fibrillation. His heart rate histogram, however, shows an excellent  distribution of heart rates.      PAST MEDICAL HISTORY:  Past Medical History:   Diagnosis Date     Atrial fibrillation (H)      Other premature beats      VF (ventricular fibrillation) (H)        CURRENT MEDICATIONS:  Current Outpatient Prescriptions   Medication Sig Dispense Refill     albuterol (PROAIR HFA, PROVENTIL HFA, VENTOLIN HFA) 108 (90 BASE) MCG/ACT inhaler Inhale 2 puffs into the lungs every 6 hours as needed        amoxicillin (AMOXIL) 500 MG tablet Take 4 tabs one hour before dental appointment 4 tablet 5     baclofen (LIORESAL) 10 MG tablet Take 10 mg by mouth 3 times daily as needed prn       Calcium Carbonate-Vitamin D (CALCIUM 500 + D PO) Take 2 tablets by mouth daily.       dexamethasone (DECADRON) 4 MG tablet Take 10 tabs (40mg) Days 1, 8, and 15 of 28 day cycle. 30 tablet 5     diclofenac (VOLTAREN) 75 MG EC tablet Take 75 mg by mouth 2 times daily as needed 1 tab prn       fentaNYL (DURAGESIC) 25 mcg/hr patch 72 hr Place 1 patch onto the skin every 72 hours       furosemide (LASIX) 20 MG tablet Take 20 mg by mouth daily  30 tablet 3     LENalidomide (REVLIMID) 10 MG CAPS capsule CHEMO Take 10 mg by mouth daily on days 1-21 of 28 day cycle. 21 each 0     lisinopril (PRINIVIL,ZESTRIL) 5 MG tablet Take 5 mg by mouth 2 times daily       Magnesium Oxide 420 MG TABS Take 1,260 mg by mouth 2 times daily 180 tablet 3     metoprolol (TOPROL-XL) 100 MG 24 hr tablet Take 1.5 tablets (150 mg) by mouth daily 135 tablet 3     nitroGLYCERIN (NITROSTAT) 0.4 MG SL tablet Place 0.4 mg under the tongue every 5 minutes as needed Reported on 5/16/2017       omeprazole (PRILOSEC) 20 MG capsule Take 20 mg by mouth daily  90 capsule 3     oxycodone (ROXICODONE) 5 MG immediate release tablet Take 1-2 tablets by mouth every 6 hours as needed. For pain.        simvastatin (ZOCOR) 40 MG tablet Take 40 mg by mouth At Bedtime  30 tablet      tiotropium (SPIRIVA) 18 MCG inhalation capsule Inhale 1 capsule (18 mcg) into the  "lungs daily 30 capsule 3     TRAZODONE HCL PO Take 50 mg by mouth nightly as needed        warfarin (COUMADIN) 5 MG tablet Take 7.5 mg by mouth See Admin Instructions Take 1.5 tab by mojth Monday and Friday and 1 tab Sun Tues WED , Thur and Sat       [DISCONTINUED] warfarin (COUMADIN) 5 MG tablet          PAST SURGICAL HISTORY:  Past Surgical History:   Procedure Laterality Date     IMPLANT AUTOMATIC IMPLANTABLE CARDIOVERTER DEFIBRILLATOR         ALLERGIES:     Allergies   Allergen Reactions     Nkda [No Known Drug Allergies]        FAMILY HISTORY:  History reviewed. No pertinent family history.    SOCIAL HISTORY:  Social History   Substance Use Topics     Smoking status: Former Smoker     Packs/day: 1.00     Years: 25.00     Types: Cigarettes     Quit date: 10/15/1995     Smokeless tobacco: Never Used     Alcohol use No       ROS:   Constitutional: No fever, chills, or sweats. Weight stable.   ENT: No visual disturbance, ear ache, epistaxis, sore throat.   Cardiovascular: As per HPI.   Respiratory: No cough, hemoptysis.    GI: No nausea, vomiting, hematemesis, melena, or hematochezia.   : No hematuria.   Integument: Negative.   Psychiatric: Negative.   Hematologic:  Easy bruising, no easy bleeding.  Neuro: Negative.   Endocrinology: No significant heat or cold intolerance   Musculoskeletal: No myalgia.    Exam:  /72 (BP Location: Left arm, Cuff Size: Adult Regular)  Pulse 57  Ht 1.778 m (5' 10\")  Wt 91.9 kg (202 lb 11.2 oz)  SpO2 99%  BMI 29.08 kg/m2  No acute distress.  Alert and oriented.  HEENT:  Normocephalic, atraumatic, EOMI, sclera non-icteric, mucosa moist  Neck: No lymphadenopathy.  JVP 7 cm without HJR.  Cor: Irregularly irregular rhythm.  Controlled ventricular rate. Variable S1, normal S2.  No murmur, rub, or gallop.  PMI in Lf 5th ICS.  Lungs:  Clear  Abd: Soft, nontender, bowel sounds present.  No hepatosplenomegaly..  Extremities: No C/C.  Trace edema        Labs:  CBC RESULTS:   Lab " Results   Component Value Date    WBC 8.2 04/10/2018    RBC 3.11 (L) 04/10/2018    HGB 10.8 (L) 04/10/2018    HCT 32.3 (L) 04/10/2018     (H) 04/10/2018    MCH 34.7 (H) 04/10/2018    MCHC 33.4 04/10/2018    RDW 16.7 (H) 04/10/2018     04/10/2018       BMP RESULTS:  Lab Results   Component Value Date     04/10/2018    POTASSIUM 3.7 04/10/2018    CHLORIDE 108 04/10/2018    CO2 24 04/10/2018    ANIONGAP 7 04/10/2018    GLC 83 04/10/2018    BUN 31 (H) 04/10/2018    CR 0.88 04/10/2018    GFRESTIMATED 86 04/10/2018    GFRESTBLACK >90 04/10/2018    EILEEN 9.1 04/10/2018        INR RESULTS:  Lab Results   Component Value Date    INR 2.99 (H) 04/10/2018    INR 2.25 (H) 01/16/2018    INR 2.15 (H) 01/31/2017    INR 2.18 (H) 11/01/2016       Procedures:  Echocardiogram: No results found for this or any previous visit (from the past 8760 hour(s)).    Assessment and Plan:  Erasmo Pearce is a 67-year-old gentleman with multiple myeloma, permanent atrial fibrillation, ischemic cardiomyopathy with an ejection fraction of approximately 35% who had an implantable cardioverter-defibrillator placed in December 2009 for primary prevention with subsequent appropriate shocks. His most recent appropriate shock was last month. He is not currently on antiarrhythmic drugs.  He is on metoprolol succinate 150 mg daily for rate control during AF.  His heart rate histogram during atrial fibrillation is generally excellent although he has had a few fast episodes that fall into the VT monitor zone. Given past difficulties in tolerating antiarrhythmic agents (low DLCO precluding amiodarone, hypotension on sotalol) and the tendency for him to have ventricular fibrillation during chemotherapy when his electrolytes might be expected to be more labile, we will continue to hold antiarrhythmic therapy. We will make no change in his medications. Since he is continuing chemotherapy at this time, we will plan to see him back in 6 months  with a defibrillator check. He will have a remote transmission in 3 months.    CC  CHUY LENZ        Please do not hesitate to contact me if you have any questions/concerns.     Sincerely,     Chuy Lenz MD

## 2018-05-15 NOTE — MR AVS SNAPSHOT
After Visit Summary   5/15/2018    Erasmo Pearce    MRN: [id number]           Patient Information     Date Of Birth          1950        Visit Information        Provider Department      5/15/2018 9:30 AM Chuy Jean-Baptiste MD [facility]        Today's Diagnoses     Paroxysmal ventricular tachycardia (H)    -  1      Care Instructions    You will be scheduled for a follow up visit: 6 months with ICD prior       We encourage you to use My Chart as your primary form of communication if possible. If you need assistance in setting this up, please contact our office or ask at your follow up visit.     If you need a medication refill please contact your pharmacy. Please allow at least 3 business days for your refill to be completed.       Cardiology  Telephone Number    Aleida Angeles [phone]   Or send a message to your provider via my chart.   For scheduling procedures:    [facility]    Clinic appointments       [phone]   For the Device Clinic (Pacemakers and ICD's)   RN's :   Natali Vale  During business hours: [phone]    After business hours:   [phone]- select option 4 and ask for job code [phone].          As always, Thank you for trusting us with your health care needs!          Follow-ups after your visit        Additional Services     Follow-Up with Electrophysiologist - 6 Months                 Your next 10 appointments already scheduled     Jul 24, 2018 11:00 AM CDT   [facility] with  [facility]   [facility] ([facility])    [address] [place] [address]  [address] [place] [address]   [phone]            Jul 31, 2018 11:00 AM CDT   Return with Rojas Raygoza MD   [facility] ([facility])    [address] [place] [address]  [address] [place] [address]   [phone]            Nov 13, 2018  8:30 AM CST   (Arrive  by 8:15 AM)   Implanted Defibulator with Uc Cv Device 1   [facility] ([facility])    [address]  [address]  [facility] [id number] [phone]            Nov 13, 2018  9:00 AM CST   (Arrive by 8:45 AM)   RETURN ARRHYTHMIA with Chuy Jean-Baptiste MD   [facility] ([facility])    [address]  [address]  [facility] [id number] [phone]              Future tests that were ordered for you today     Open Future Orders        Priority Expected Expires Ordered    Follow-Up with Electrophysiologist - 6 Months Routine 11/11/2018 2/9/2019 5/15/2018    Follow-Up with Device Clinic-6 months Routine 11/11/2018 2/9/2019 5/15/2018            Who to contact     If you have questions or need follow up information about today's clinic visit or your schedule please contact [facility] directly at [phone].  Normal or non-critical lab and imaging results will be communicated to you by [employer], letter or phone within 4 business days after the clinic has received the results. If you do not hear from us within 7 days, please contact the clinic through [employer] or phone. If you have a critical or abnormal lab result, we will notify you by phone as soon as possible.  Submit refill requests through [employer] or call your pharmacy and they will forward the refill request to us. Please allow 3 business days for your refill to be completed.          Additional Information About Your Visit        [employer] Information     [employer] gives you secure access to your electronic health record. If you see a primary care provider, you can also send messages to your care team and make appointments. If you have questions, please call your primary care clinic.  If you do not have a primary care provider, please call [phone] and they will assist you.        Care EveryWhere ID     This is your Care EveryWhere ID. This could be used by other  "organizations to access your [place] medical records  [id number]        Your Vitals Were     Pulse Height Pulse Oximetry BMI (Body Mass Index)          57 1.778 m (5' 10\") 99% 29.08 kg/m2         Blood Pressure from Last 3 Encounters:   05/15/18 110/72   04/17/18 98/56   01/23/18 101/58    Weight from Last 3 Encounters:   05/15/18 91.9 kg (202 lb 11.2 oz)   04/17/18 90.3 kg (199 lb)   01/23/18 93.9 kg (207 lb)                 Today's Medication Changes          These changes are accurate as of 5/15/18 10:12 AM.  If you have any questions, ask your nurse or doctor.               Stop taking these medicines if you haven't already. Please contact your care team if you have questions.     MAGNESIUM-OXIDE 400 (241.3 Mg) MG tablet   Generic drug:  magnesium oxide   Stopped by:  Chuy Jean-Baptiste MD                    Primary Care Provider Office Phone # Fax #    Cong Mcdonald [phone]       [place] [address]        Equal Access to Services     [facility]: Hadii jericho Truong, waaxda lilly, qaybta miguel gonzalez, nino sierra. So [facility] [phone].    ATENCIÓN: Si habla español, tiene a miles disposición servicios gratuitos de asistencia lingüística. [facility] [phone].    We comply with applicable federal civil rights laws and [place] laws. We do not discriminate on the basis of race, color, national origin, age, disability, sex, sexual orientation, or gender identity.            Thank you!     Thank you for choosing [facility]  for your care. Our goal is always to provide you with excellent care. Hearing back from our patients is one way we can continue to improve our services. Please take a few minutes to complete the written survey that you may receive in the mail after your visit with us. Thank you!             Your Updated Medication List - Protect others around you: Learn how to safely use, store and " throw away your medicines at www.disposemymeds.org.          This list is accurate as of 5/15/18 10:12 AM.  Always use your most recent med list.                   Brand Name Dispense Instructions for use Diagnosis    albuterol 108 (90 Base) MCG/ACT Inhaler    PROAIR HFA/PROVENTIL HFA/VENTOLIN HFA     Inhale 2 puffs into the lungs every 6 hours as needed        amoxicillin 500 MG tablet    AMOXIL    4 tablet    Take 4 tabs one hour before dental appointment    Multiple myeloma, without mention of having achieved remission       baclofen 10 MG tablet    LIORESAL     Take 10 mg by mouth 3 times daily as needed prn        CALCIUM 500 + D PO      Take 2 tablets by mouth daily.        dexamethasone 4 MG tablet    DECADRON    30 tablet    Take 10 tabs (40mg) Days 1, 8, and 15 of 28 day cycle.    Multiple myeloma not having achieved remission (H), Anticoagulation goal of INR 2 to 3       diclofenac 75 MG EC tablet    VOLTAREN     Take 75 mg by mouth 2 times daily as needed 1 tab prn        fentaNYL 25 mcg/hr 72 hr patch    DURAGESIC     Place 1 patch onto the skin every 72 hours        furosemide 20 MG tablet    LASIX    30 tablet    Take 20 mg by mouth daily    Status post chemotherapy, Multiple myeloma, without mention of having achieved remission       LENalidomide 10 MG Caps capsule CHEMO    REVLIMID    21 each    Take 10 mg by mouth daily on days 1-21 of 28 day cycle.    Multiple myeloma not having achieved remission (H), Anticoagulation goal of INR 2 to 3       lisinopril 5 MG tablet    PRINIVIL/ZESTRIL     Take 5 mg by mouth 2 times daily    Multiple myeloma, without mention of having achieved remission, Hypomagnesemia, Ventricular fibrillation (H), Anemia of other chronic disease, Renal insufficiency, Dilated cardiomyopathy (H), Automatic implantable cardiac defibrillator in situ, Personal history of diseases of blood and blood-forming organs, History of long-term use of multiple prescription drugs       Magnesium  Oxide 420 MG Tabs     180 tablet    Take 1,260 mg by mouth 2 times daily    Hypomagnesemia       metoprolol succinate 100 MG 24 hr tablet    TOPROL-XL    135 tablet    Take 1.5 tablets (150 mg) by mouth daily    Atrial fibrillation, unspecified type (H), Paroxysmal ventricular tachycardia (H), Permanent atrial fibrillation (H)       nitroGLYcerin 0.4 MG sublingual tablet    NITROSTAT     Place 0.4 mg under the tongue every 5 minutes as needed Reported on 5/16/2017        omeprazole 20 MG CR capsule    priLOSEC    90 capsule    Take 20 mg by mouth daily    GERD (gastroesophageal reflux disease)       oxyCODONE IR 5 MG tablet    ROXICODONE     Take 1-2 tablets by mouth every 6 hours as needed. For pain.        simvastatin 40 MG tablet    ZOCOR    30 tablet    Take 40 mg by mouth At Bedtime        tiotropium 18 MCG capsule    SPIRIVA    30 capsule    Inhale 1 capsule (18 mcg) into the lungs daily    SOB (shortness of breath)       TRAZODONE HCL PO      Take 50 mg by mouth nightly as needed        warfarin 5 MG tablet    COUMADIN     Take 7.5 mg by mouth See Admin Instructions Take 1.5 tab by mojth Monday and Friday and 1 tab Sun Tues WED , Thur and Sat

## 2018-05-15 NOTE — PATIENT INSTRUCTIONS
It was a pleasure to see you in clinic today.  Please do not hesitate to call with any questions or concerns.  You are scheduled for a remote transmission on 8/15/18.  We look forward to seeing you in clinic at your next device check in 6 months.    Whit Lipsocmb, RN, MS, CCRN  Electrophysiology Nurse Clinician  [facility]    During Business Hours Please Call:  [phone]  After Hours Please Call:  [phone] - select option #4 and ask for job code [phone]

## 2018-05-15 NOTE — MR AVS SNAPSHOT
After Visit Summary   5/15/2018    Erasmo Pearce    MRN: [id number]           Patient Information     Date Of Birth          1950        Visit Information        Provider Department      5/15/2018 9:00 AM 1, Uc Cv Device [facility]        Today's Diagnoses     Dilated cardiomyopathy (H)    -  1      Care Instructions    It was a pleasure to see you in clinic today.  Please do not hesitate to call with any questions or concerns.  You are scheduled for a remote transmission on 8/15/18.  We look forward to seeing you in clinic at your next device check in 6 months.    Whit Lipscomb, RN, MS, CCRN  Electrophysiology Nurse Clinician  [facility]    During Business Hours Please Call:  [phone]  After Hours Please Call:  [phone] - select option #4 and ask for job code [phone]                      Follow-ups after your visit        Additional Services     Follow-Up with Device Clinic-6 months                 Your next 10 appointments already scheduled     Jul 24, 2018 11:00 AM CDT   Masonic Lab Draw with  MASONIC LAB DRAW   [facility] Masonic Lab Draw ([facility])    [address] [place] [address]  [address] [place] [id number] [phone]            Jul 31, 2018 11:00 AM CDT   Return with Rojas Raygoza MD   [facility] Blood and Marrow Transplant ([facility])    [address] [place] [address]  [address] [place] [id number] [phone]            Nov 13, 2018  8:30 AM CST   (Arrive by 8:15 AM)   Implanted Defibulator with Uc Cv Device 1   [facility] ([facility])    [address] [place] [address]  [address] [place] [id number]   [phone]            Nov 13, 2018  9:00 AM CST   (Arrive by 8:45 AM)   RETURN ARRHYTHMIA with Chuy Jean-Baptiste MD   [facility] ([facility])    [address] [place] [address]  [address] [place] [phone]    [phone]              Future tests that were ordered for you today     Open Future Orders        Priority Expected Expires Ordered    Follow-Up with Electrophysiologist - 6 Months Routine 11/11/2018 2/9/2019 5/15/2018    Follow-Up with Device Clinic-6 months Routine 11/11/2018 2/9/2019 5/15/2018            Who to contact     If you have questions or need follow up information about today's clinic visit or your schedule please contact [facility] directly at [phone].  Normal or non-critical lab and imaging results will be communicated to you by UiTVhart, letter or phone within 4 business days after the clinic has received the results. If you do not hear from us within 7 days, please contact the clinic through [employer] or phone. If you have a critical or abnormal lab result, we will notify you by phone as soon as possible.  Submit refill requests through Lifesum or call your pharmacy and they will forward the refill request to us. Please allow 3 business days for your refill to be completed.          Additional Information About Your Visit        UiTVhart Information     Lifesum gives you secure access to your electronic health record. If you see a primary care provider, you can also send messages to your care team and make appointments. If you have questions, please call your primary care clinic.  If you do not have a primary care provider, please call [phone] and they will assist you.        Care EveryWhere ID     This is your Care EveryWhere ID. This could be used by other organizations to access your [place] medical records  [id number]         Blood Pressure from Last 3 Encounters:   05/15/18 110/72   04/17/18 98/56   01/23/18 101/58    Weight from Last 3 Encounters:   05/15/18 91.9 kg (202 lb 11.2 oz)   04/17/18 90.3 kg (199 lb)   01/23/18 93.9 kg (207 lb)                 Today's Medication Changes          These changes are accurate as of 5/15/18 10:12 AM.  If you have any questions, ask  your nurse or doctor.               Stop taking these medicines if you haven't already. Please contact your care team if you have questions.     MAGNESIUM-OXIDE 400 (241.3 Mg) MG tablet   Generic drug:  magnesium oxide   Stopped by:  Chuy Jean-Baptiste MD                    Primary Care Provider Office Phone # Fax Cleopatra Mcdonald [phone]       [place] [address]        Equal Access to Services     [facility]: Hadii aad ku hadasho Soomaali, waaxda luqadaha, qaybta kaalmada adeegyada, waxay idiin hayaan adeeg lancejimmie la'neymandie . So [facility] [phone].    ATENCIÓN: Si ulisesla espleroy, tiene a miles disposición servicios gratuitos de asistencia lingüística. [facility] [phone].    We comply with applicable federal civil rights laws and [place] laws. We do not discriminate on the basis of race, color, national origin, age, disability, sex, sexual orientation, or gender identity.            Thank you!     Thank you for choosing [facility]  for your care. Our goal is always to provide you with excellent care. Hearing back from our patients is one way we can continue to improve our services. Please take a few minutes to complete the written survey that you may receive in the mail after your visit with us. Thank you!             Your Updated Medication List - Protect others around you: Learn how to safely use, store and throw away your medicines at www.disposemymeds.org.          This list is accurate as of 5/15/18 10:12 AM.  Always use your most recent med list.                   Brand Name Dispense Instructions for use Diagnosis    albuterol 108 (90 Base) MCG/ACT Inhaler    PROAIR HFA/PROVENTIL HFA/VENTOLIN HFA     Inhale 2 puffs into the lungs every 6 hours as needed        amoxicillin 500 MG tablet    AMOXIL    4 tablet    Take 4 tabs one hour before dental appointment    Multiple myeloma, without mention of having achieved remission       baclofen 10 MG  tablet    LIORESAL     Take 10 mg by mouth 3 times daily as needed prn        CALCIUM 500 + D PO      Take 2 tablets by mouth daily.        dexamethasone 4 MG tablet    DECADRON    30 tablet    Take 10 tabs (40mg) Days 1, 8, and 15 of 28 day cycle.    Multiple myeloma not having achieved remission (H), Anticoagulation goal of INR 2 to 3       diclofenac 75 MG EC tablet    VOLTAREN     Take 75 mg by mouth 2 times daily as needed 1 tab prn        fentaNYL 25 mcg/hr 72 hr patch    DURAGESIC     Place 1 patch onto the skin every 72 hours        furosemide 20 MG tablet    LASIX    30 tablet    Take 20 mg by mouth daily    Status post chemotherapy, Multiple myeloma, without mention of having achieved remission       LENalidomide 10 MG Caps capsule CHEMO    REVLIMID    21 each    Take 10 mg by mouth daily on days 1-21 of 28 day cycle.    Multiple myeloma not having achieved remission (H), Anticoagulation goal of INR 2 to 3       lisinopril 5 MG tablet    PRINIVIL/ZESTRIL     Take 5 mg by mouth 2 times daily    Multiple myeloma, without mention of having achieved remission, Hypomagnesemia, Ventricular fibrillation (H), Anemia of other chronic disease, Renal insufficiency, Dilated cardiomyopathy (H), Automatic implantable cardiac defibrillator in situ, Personal history of diseases of blood and blood-forming organs, History of long-term use of multiple prescription drugs       Magnesium Oxide 420 MG Tabs     180 tablet    Take 1,260 mg by mouth 2 times daily    Hypomagnesemia       metoprolol succinate 100 MG 24 hr tablet    TOPROL-XL    135 tablet    Take 1.5 tablets (150 mg) by mouth daily    Atrial fibrillation, unspecified type (H), Paroxysmal ventricular tachycardia (H), Permanent atrial fibrillation (H)       nitroGLYcerin 0.4 MG sublingual tablet    NITROSTAT     Place 0.4 mg under the tongue every 5 minutes as needed Reported on 5/16/2017        omeprazole 20 MG CR capsule    priLOSEC    90 capsule    Take 20 mg by  mouth daily    GERD (gastroesophageal reflux disease)       oxyCODONE IR 5 MG tablet    ROXICODONE     Take 1-2 tablets by mouth every 6 hours as needed. For pain.        simvastatin 40 MG tablet    ZOCOR    30 tablet    Take 40 mg by mouth At Bedtime        tiotropium 18 MCG capsule    SPIRIVA    30 capsule    Inhale 1 capsule (18 mcg) into the lungs daily    SOB (shortness of breath)       TRAZODONE HCL PO      Take 50 mg by mouth nightly as needed        warfarin 5 MG tablet    COUMADIN     Take 7.5 mg by mouth See Admin Instructions Take 1.5 tab by mojth Monday and Friday and 1 tab Sun Tues WED , Thur and Sat

## 2018-05-15 NOTE — PROGRESS NOTES
Preliminary Device Interrogation Results.  Final physician signed paceart report to be scanned and attached.    Patient seen in clinic for evaluation and iterative programming of his [employer] single lead ICD per MD orders.  Patient has an appointment to see Dr. Jean-Baptiste today.  Normal ICD function.  730 episodes recorded as NSVT - 148-166 bpm, 5-13 sec.  Majority of the stored EGM's reveal an irregular ventricular rhythm.  1 stored EGM does reveal 6 beats of what appears to be NSVT.  15 episodes recorded in the VT monitor zone 17 sec - 3 min 44 sec, 145-214 bpm.  Patient is taking Warfarin.  Intrinsic rhythm = irregular ventricular rhythm @ 50-70's bpm.   = 1%.  Estimated battery longevity to ROSEMARIE = 4.5 years.  Patient reports that he is feeling well and denies complaints.  Plan for patient to send a remote transmission in 3 months and return to clinic in 6 months.    Single lead ICD

## 2018-06-13 ENCOUNTER — ALLIED HEALTH/NURSE VISIT (OUTPATIENT)
Dept: CARDIOLOGY | Facility: CLINIC | Age: 68
End: 2018-06-13
Attending: INTERNAL MEDICINE
Payer: MEDICARE

## 2018-06-13 DIAGNOSIS — I47.29 PAROXYSMAL VENTRICULAR TACHYCARDIA (H): Primary | ICD-10-CM

## 2018-06-13 DIAGNOSIS — I42.0 DILATED CARDIOMYOPATHY (H): Primary | ICD-10-CM

## 2018-06-13 PROCEDURE — 93296 REM INTERROG EVL PM/IDS: CPT | Mod: ZF

## 2018-06-13 NOTE — MR AVS SNAPSHOT
After Visit Summary   6/13/2018    Erasmo Pearce    MRN: [id number]           Patient Information     Date Of Birth          1950        Visit Information        Provider Department      6/13/2018 6:00 AM UC ICD REMOTE [facility]        Today's Diagnoses     Dilated cardiomyopathy (H)    -  1       Follow-ups after your visit        Your next 10 appointments already scheduled     Jul 24, 2018 11:00 AM CDT   Masonic Lab Draw with  MASONIC LAB DRAW   [facility] Masonic Lab Draw ([facility])    [address] [place] [address]  [address]  [facility] [id number] [phone]            Jul 31, 2018 11:00 AM CDT   Return with Rojas Raygoza MD   [facility] Blood and Marrow Transplant ([facility])    [address] [place] [address]  [address]  [facility] [id number] [phone]            Nov 13, 2018  8:30 AM CST   (Arrive by 8:15 AM)   Implanted Defibulator with Uc Cv Device 1   [facility] ([facility])    [address] [place] [address]  [address]  [facility] [id number] [phone]            Nov 13, 2018  9:00 AM CST   (Arrive by 8:45 AM)   RETURN ARRHYTHMIA with Chuy Jean-Baptiste MD   [facility] ([facility])    [address] [place] [address]  [address]  [facility] [id number] [phone]              Who to contact     If you have questions or need follow up information about today's clinic visit or your schedule please contact [facility] directly at [phone].  Normal or non-critical lab and imaging results will be communicated to you by MyChart, letter or phone within 4 business days after the clinic has received the results. If you do not hear from us within 7 days, please contact the clinic through MyChart or phone. If you have a critical or abnormal lab result, we will notify you by phone as soon as possible.  Submit refill requests through [employer] or call your  pharmacy and they will forward the refill request to us. Please allow 3 business days for your refill to be completed.          Additional Information About Your Visit        MyChart Information     [employer]hart gives you secure access to your electronic health record. If you see a primary care provider, you can also send messages to your care team and make appointments. If you have questions, please call your primary care clinic.  If you do not have a primary care provider, please call [phone] and they will assist you.        Care EveryWhere ID     This is your Care EveryWhere ID. This could be used by other organizations to access your [place] medical records  [id number]         Blood Pressure from Last 3 Encounters:   05/15/18 110/72   04/17/18 98/56   01/23/18 101/58    Weight from Last 3 Encounters:   05/15/18 91.9 kg (202 lb 11.2 oz)   04/17/18 90.3 kg (199 lb)   01/23/18 93.9 kg (207 lb)              We Performed the Following     INTERROGATION DEVICE EVAL REMOTE, PACER/ICD        Primary Care Provider Office Phone # Fax #    Cong Mcdonald [phone]       [place] [address]        Equal Access to Services     [facility]: Hadii jericho magallaneso Somykeali, waaxda luqadaha, qaybta kaalmada adeegyada, nino sierra. So [facility] [phone].    ATENCIÓN: Si habla español, tiene a miles disposición servicios gratuitos de asistencia lingüística. [facility] [phone].    We comply with applicable federal civil rights laws and [place] laws. We do not discriminate on the basis of race, color, national origin, age, disability, sex, sexual orientation, or gender identity.            Thank you!     Thank you for choosing [facility]  for your care. Our goal is always to provide you with excellent care. Hearing back from our patients is one way we can continue to improve our services. Please take a few minutes to complete the written  survey that you may receive in the mail after your visit with us. Thank you!             Your Updated Medication List - Protect others around you: Learn how to safely use, store and throw away your medicines at www.disposemymeds.org.          This list is accurate as of 6/13/18  4:55 PM.  Always use your most recent med list.                   Brand Name Dispense Instructions for use Diagnosis    albuterol 108 (90 Base) MCG/ACT Inhaler    PROAIR HFA/PROVENTIL HFA/VENTOLIN HFA     Inhale 2 puffs into the lungs every 6 hours as needed        amoxicillin 500 MG tablet    AMOXIL    4 tablet    Take 4 tabs one hour before dental appointment    Multiple myeloma, without mention of having achieved remission       baclofen 10 MG tablet    LIORESAL     Take 10 mg by mouth 3 times daily as needed prn        CALCIUM 500 + D PO      Take 2 tablets by mouth daily.        dexamethasone 4 MG tablet    DECADRON    30 tablet    Take 10 tabs (40mg) Days 1, 8, and 15 of 28 day cycle.    Multiple myeloma not having achieved remission (H), Anticoagulation goal of INR 2 to 3       diclofenac 75 MG EC tablet    VOLTAREN     Take 75 mg by mouth 2 times daily as needed 1 tab prn        fentaNYL 25 mcg/hr 72 hr patch    DURAGESIC     Place 1 patch onto the skin every 72 hours        furosemide 20 MG tablet    LASIX    30 tablet    Take 20 mg by mouth daily    Status post chemotherapy, Multiple myeloma, without mention of having achieved remission       LENalidomide 10 MG Caps capsule CHEMO    REVLIMID    21 each    Take 10 mg by mouth daily on days 1-21 of 28 day cycle.    Multiple myeloma not having achieved remission (H), Anticoagulation goal of INR 2 to 3       lisinopril 5 MG tablet    PRINIVIL/ZESTRIL     Take 5 mg by mouth 2 times daily    Multiple myeloma, without mention of having achieved remission, Hypomagnesemia, Ventricular fibrillation (H), Anemia of other chronic disease, Renal insufficiency, Dilated cardiomyopathy (H),  Automatic implantable cardiac defibrillator in situ, Personal history of diseases of blood and blood-forming organs, History of long-term use of multiple prescription drugs       Magnesium Oxide 420 MG Tabs     180 tablet    Take 1,260 mg by mouth 2 times daily    Hypomagnesemia       metoprolol succinate 100 MG 24 hr tablet    TOPROL-XL    135 tablet    Take 1.5 tablets (150 mg) by mouth daily    Atrial fibrillation, unspecified type (H), Paroxysmal ventricular tachycardia (H), Permanent atrial fibrillation (H)       nitroGLYcerin 0.4 MG sublingual tablet    NITROSTAT     Place 0.4 mg under the tongue every 5 minutes as needed Reported on 5/16/2017        omeprazole 20 MG CR capsule    priLOSEC    90 capsule    Take 20 mg by mouth daily    GERD (gastroesophageal reflux disease)       oxyCODONE IR 5 MG tablet    ROXICODONE     Take 1-2 tablets by mouth every 6 hours as needed. For pain.        simvastatin 40 MG tablet    ZOCOR    30 tablet    Take 40 mg by mouth At Bedtime        tiotropium 18 MCG capsule    SPIRIVA    30 capsule    Inhale 1 capsule (18 mcg) into the lungs daily    SOB (shortness of breath)       TRAZODONE HCL PO      Take 50 mg by mouth nightly as needed        warfarin 5 MG tablet    COUMADIN     Take 7.5 mg by mouth See Admin Instructions Take 1.5 tab by mojth Monday and Friday and 1 tab Sun Tues WED , Thur and Sat

## 2018-06-13 NOTE — PROGRESS NOTES
Preliminary Device Interrogation Results.  Final physician signed paceart report to be scanned and attached.    Remote ICD transmission received and reviewed.  Alert received for ventricular shock therapy delivered to convert arrhythmia.  Device transmission sent per MD orders.  Patient has a [employer] single lead ICD.  Normal ICD function.  1 VT episode recorded on 6/12/18 @ 1026 - 243 bpm, ATP x 1, 41 J ICD shock delivered which converted arrhythmia to  @ 60 bpm.  Patient reports that he was napping and woke up and thought he was dreaming that he received a shock from his ICD.  Patient denies symptoms before and after the ICD shock.  Patient did not call the device clinic or seek medical treatment after the shock.  10 NSVT episodes recorded - 155-182 bpm, 6-16 seconds in duration.  6 episodes recorded in the VT monitor zone - 20 sec - 1 min 28 sec, 174-207 bpm.  Presenting EGM = irregular ventricular rhythm ~ 70-80's bpm.   = 1%.  Estimated battery longevity to ROSEMARIE = 4 years.  Patient is taking Warfarin.  Patient reports that he started chemotherapy again on Sunday.  Plan for patient to send a remote tranmission on 9/18/18 as scheduled.  Sunshine Henderson NP notified of interrogation results.  Plan for patient to come to clinic for lab draw per NP orders.  Patient notified of NP orders.  Patient reports that he had labs drawn at [facility] this morning.  Lab results reported to Sunshine Henderson NP.    ICD remote transmission     Purchase Blood pressure monitor for home. Check 2 times per week. Call if BP consistently above 140 top number.

## 2018-07-09 ENCOUNTER — ALLIED HEALTH/NURSE VISIT (OUTPATIENT)
Dept: CARDIOLOGY | Facility: CLINIC | Age: 68
End: 2018-07-09
Attending: INTERNAL MEDICINE
Payer: MEDICARE

## 2018-07-09 DIAGNOSIS — I49.01 VENTRICULAR FIBRILLATION (H): ICD-10-CM

## 2018-07-09 DIAGNOSIS — I42.0 DILATED CARDIOMYOPATHY (H): Primary | ICD-10-CM

## 2018-07-09 PROCEDURE — 93296 REM INTERROG EVL PM/IDS: CPT | Mod: ZF

## 2018-07-09 NOTE — MR AVS SNAPSHOT
After Visit Summary   7/9/2018    Erasmo Pearce    MRN: [id number]           Patient Information     Date Of Birth          1950        Visit Information        Provider Department      7/9/2018 6:00 AM UC ICD REMOTE [facility]        Today's Diagnoses     Dilated cardiomyopathy (H)    -  1    Ventricular fibrillation (H)           Follow-ups after your visit        Your next 10 appointments already scheduled     Jul 24, 2018 11:00 AM CDT   Masonic Lab Draw with  MASONIC LAB DRAW   [facility] Masonic Lab Draw ([facility])    [address] [place] [address]  [address]  [facility] [address]   [phone]            Jul 31, 2018 11:00 AM CDT   Return with Rojas Raygoza MD   [facility] Blood and Marrow Transplant ([facility])    [address] [place] [address]  [address]  [facility] [address] [phone]            Nov 13, 2018  8:30 AM CST   (Arrive by 8:15 AM)   Implanted Defibulator with  Cv Device 1   [facility] ([facility])    [address] [place] [address]  [address]  [facility] [address] [phone]            Nov 13, 2018  9:00 AM CST   (Arrive by 8:45 AM)   RETURN ARRHYTHMIA with Chuy Jean-Baptiste MD   [facility] ([facility])    [address] [place] [address]  [address]  [facility] [id number] [phone]              Who to contact     If you have questions or need follow up information about today's clinic visit or your schedule please contact [facility] directly at [phone].  Normal or non-critical lab and imaging results will be communicated to you by MyChart, letter or phone within 4 business days after the clinic has received the results. If you do not hear from us within 7 days, please contact the clinic through MyChart or phone. If you have a critical or abnormal lab result, we will notify you by phone as soon as possible.  Submit refill  requests through [employer] or call your pharmacy and they will forward the refill request to us. Please allow 3 business days for your refill to be completed.          Additional Information About Your Visit        [employer] Information     [employer] gives you secure access to your electronic health record. If you see a primary care provider, you can also send messages to your care team and make appointments. If you have questions, please call your primary care clinic.  If you do not have a primary care provider, please call [phone] and they will assist you.        Care EveryWhere ID     This is your Care EveryWhere ID. This could be used by other organizations to access your [facility] medical records  [id number]         Blood Pressure from Last 3 Encounters:   05/15/18 110/72   04/17/18 98/56   01/23/18 101/58    Weight from Last 3 Encounters:   05/15/18 91.9 kg (202 lb 11.2 oz)   04/17/18 90.3 kg (199 lb)   01/23/18 93.9 kg (207 lb)              We Performed the Following     INTERROGATION DEVICE EVAL REMOTE, PACER/ICD        Primary Care Provider Office Phone # Fax #    Cong Mcdonald [phone]       [place] [address]        Equal Access to Services     CALI DAVILA : Hadii aad ku hadasho Soomaali, waaxda luqadaha, qaybta kaalmada adeegyada, nino smithin narayan sierra. So [facility] [phone].    ATENCIÓN: Si habla español, tiene a miles disposición servicios gratuitos de asistencia lingüística. Llame al [phone].    We comply with applicable federal civil rights laws and [place] laws. We do not discriminate on the basis of race, color, national origin, age, disability, sex, sexual orientation, or gender identity.            Thank you!     Thank you for choosing [facility]  for your care. Our goal is always to provide you with excellent care. Hearing back from our patients is one way we can continue to improve our services. Please take a  few minutes to complete the written survey that you may receive in the mail after your visit with us. Thank you!             Your Updated Medication List - Protect others around you: Learn how to safely use, store and throw away your medicines at www.disposemymeds.org.          This list is accurate as of 7/9/18 11:59 PM.  Always use your most recent med list.                   Brand Name Dispense Instructions for use Diagnosis    albuterol 108 (90 Base) MCG/ACT Inhaler    PROAIR HFA/PROVENTIL HFA/VENTOLIN HFA     Inhale 2 puffs into the lungs every 6 hours as needed        amoxicillin 500 MG tablet    AMOXIL    4 tablet    Take 4 tabs one hour before dental appointment    Multiple myeloma, without mention of having achieved remission       baclofen 10 MG tablet    LIORESAL     Take 10 mg by mouth 3 times daily as needed prn        CALCIUM 500 + D PO      Take 2 tablets by mouth daily.        dexamethasone 4 MG tablet    DECADRON    30 tablet    Take 10 tabs (40mg) Days 1, 8, and 15 of 28 day cycle.    Multiple myeloma not having achieved remission (H), Anticoagulation goal of INR 2 to 3       diclofenac 75 MG EC tablet    VOLTAREN     Take 75 mg by mouth 2 times daily as needed 1 tab prn        fentaNYL 25 mcg/hr 72 hr patch    DURAGESIC     Place 1 patch onto the skin every 72 hours        furosemide 20 MG tablet    LASIX    30 tablet    Take 20 mg by mouth daily    Status post chemotherapy, Multiple myeloma, without mention of having achieved remission       LENalidomide 10 MG Caps capsule CHEMO    REVLIMID    21 each    Take 10 mg by mouth daily on days 1-21 of 28 day cycle.    Multiple myeloma not having achieved remission (H), Anticoagulation goal of INR 2 to 3       lisinopril 5 MG tablet    PRINIVIL/ZESTRIL     Take 5 mg by mouth 2 times daily    Multiple myeloma, without mention of having achieved remission, Hypomagnesemia, Ventricular fibrillation (H), Anemia of other chronic disease, Renal insufficiency,  Dilated cardiomyopathy (H), Automatic implantable cardiac defibrillator in situ, Personal history of diseases of blood and blood-forming organs, History of long-term use of multiple prescription drugs       Magnesium Oxide 420 MG Tabs     180 tablet    Take 1,260 mg by mouth 2 times daily    Hypomagnesemia       metoprolol succinate 100 MG 24 hr tablet    TOPROL-XL    135 tablet    Take 1.5 tablets (150 mg) by mouth daily    Atrial fibrillation, unspecified type (H), Paroxysmal ventricular tachycardia (H), Permanent atrial fibrillation (H)       nitroGLYcerin 0.4 MG sublingual tablet    NITROSTAT     Place 0.4 mg under the tongue every 5 minutes as needed Reported on 5/16/2017        omeprazole 20 MG CR capsule    priLOSEC    90 capsule    Take 20 mg by mouth daily    GERD (gastroesophageal reflux disease)       oxyCODONE IR 5 MG tablet    ROXICODONE     Take 1-2 tablets by mouth every 6 hours as needed. For pain.        simvastatin 40 MG tablet    ZOCOR    30 tablet    Take 40 mg by mouth At Bedtime        tiotropium 18 MCG capsule    SPIRIVA    30 capsule    Inhale 1 capsule (18 mcg) into the lungs daily    SOB (shortness of breath)       TRAZODONE HCL PO      Take 50 mg by mouth nightly as needed        warfarin 5 MG tablet    COUMADIN     Take 7.5 mg by mouth See Admin Instructions Take 1.5 tab by mojth Monday and Friday and 1 tab Sun Tues WED , Thur and Sat

## 2018-07-10 NOTE — PROGRESS NOTES
Preliminary Device Interrogation Results.  Final physician signed paceart report to be scanned and attached.    Remote ICD transmission received and reviewed.  Device transmission sent per MD orders.  Patient has a [employer] single lead ICD.  Patient called to report that he had an episode on 7/8/18 while seated watching TV where he felt very dizzy.  Patient reports that he layed down on the floor and then felt better.  The patient's symptoms appear to correlate with an episode recorded in the VF zone on 7/8/18 @ 1926 - 262 bpm, 17 sec, ATP X 1 burst delivered and ICD shock aborted.  Normal ICD function.  Since last remote ICD transmission on 6/13/18 the following episodes have been recorded: 1 other episode recorded in the VF zone on 6/28/18 - 214 bpm, 29 sec, no therapy delivered.  2 episodes recorded in the VT zone - 203-204 bpm, 17 sec, no therapy delivered.  10 NSVT episodes recorded - 155-206 bpm, 5 beats -15 sec.  Presenting EGM = irregular ventricular rhythm @ ~  bpm.   = 1%.  Patient is taking Coumadin.  Estimated battery longevity to ROSEMARIE = 4 years.  Dr. Jean-Baptiste and Aleida Angeles, JOHN notified.  Patient notified of interrogation results.  Patient is scheduled for a lab draw at his Oncology clinic today.      Remote ICD transmission

## 2018-07-18 ENCOUNTER — ALLIED HEALTH/NURSE VISIT (OUTPATIENT)
Dept: CARDIOLOGY | Facility: CLINIC | Age: 68
End: 2018-07-18
Attending: INTERNAL MEDICINE
Payer: MEDICARE

## 2018-07-18 ENCOUNTER — CARE COORDINATION (OUTPATIENT)
Dept: CARDIOLOGY | Facility: CLINIC | Age: 68
End: 2018-07-18

## 2018-07-18 DIAGNOSIS — I42.0 DILATED CARDIOMYOPATHY (H): Primary | ICD-10-CM

## 2018-07-18 PROCEDURE — 93296 REM INTERROG EVL PM/IDS: CPT | Mod: ZF

## 2018-07-18 NOTE — MR AVS SNAPSHOT
After Visit Summary   7/18/2018    Erasmo Pearce    MRN: [id number]           Patient Information     Date Of Birth          1950        Visit Information        Provider Department      7/18/2018 6:00 AM UC ICD REMOTE [facility]        Today's Diagnoses     Dilated cardiomyopathy (H)    -  1       Follow-ups after your visit        Your next 10 appointments already scheduled     Jul 24, 2018 10:30 AM CDT   (Arrive by 10:15 AM)   Implanted Defibulator with Uc Cv Device 1   [facility] ([facility])    [address] [place] [address]  [address] [place] [address]   [phone]            Jul 24, 2018 11:00 AM CDT   Masonic Lab Draw with  MASONIC LAB DRAW   [facility] Masonic Lab Draw ([facility])    [address] [place] [address]  [address] [place] [address] [phone]            Jul 31, 2018 11:00 AM CDT   Return with Rojas Raygoza MD   [facility] Blood and Marrow Transplant ([facility])    [address] [place] [address]  [address] [place] [address] [phone]            Nov 13, 2018  8:30 AM CST   (Arrive by 8:15 AM)   Implanted Defibulator with Uc Cv Device 1   [facility] ([facility])    [address] [place] [address]  [address] [place] [address] [phone]            Nov 13, 2018  9:00 AM CST   (Arrive by 8:45 AM)   RETURN ARRHYTHMIA with Chuy Jean-Baptiste MD   [facility] ([facility])    [address] [place] [address]  [address] [place] [id number] [phone]              Who to contact     If you have questions or need follow up information about today's clinic visit or your schedule please contact [facility] directly at [phone].  Normal or non-critical lab and imaging results will be communicated to you by MyChart, letter or phone within 4 business days after the clinic has received the results. If  you do not hear from us within 7 days, please contact the clinic through [employer] or phone. If you have a critical or abnormal lab result, we will notify you by phone as soon as possible.  Submit refill requests through [employer] or call your pharmacy and they will forward the refill request to us. Please allow 3 business days for your refill to be completed.          Additional Information About Your Visit        [employer] Information     [employer] gives you secure access to your electronic health record. If you see a primary care provider, you can also send messages to your care team and make appointments. If you have questions, please call your primary care clinic.  If you do not have a primary care provider, please call [phone] and they will assist you.        Care EveryWhere ID     This is your Care EveryWhere ID. This could be used by other organizations to access your [place] [facility] medical records  [id number]         Blood Pressure from Last 3 Encounters:   05/15/18 110/72   04/17/18 98/56   01/23/18 101/58    Weight from Last 3 Encounters:   05/15/18 91.9 kg (202 lb 11.2 oz)   04/17/18 90.3 kg (199 lb)   01/23/18 93.9 kg (207 lb)              We Performed the Following     INTERROGATION DEVICE EVAL REMOTE, PACER/ICD        Primary Care Provider Office Phone # Fax #    Cong Mcdonald [phone]       [place] [address]        Equal Access to Services     CALI DAVILA : Hadii aad ku hadasho Soomaali, waaxda luqadaha, qaybta kaalmada adeegyada, nino harmon . So [facility] [phone].    ATENCIÓN: Si habla español, tiene a miles disposición servicios gratuitos de asistencia lingüística. Era al [phone].    We comply with applicable federal civil rights laws and [place] laws. We do not discriminate on the basis of race, color, national origin, age, disability, sex, sexual orientation, or gender identity.            Thank you!     Thank you  for choosing [facility]  for your care. Our goal is always to provide you with excellent care. Hearing back from our patients is one way we can continue to improve our services. Please take a few minutes to complete the written survey that you may receive in the mail after your visit with us. Thank you!             Your Updated Medication List - Protect others around you: Learn how to safely use, store and throw away your medicines at www.disposemymeds.org.          This list is accurate as of 7/18/18  7:49 PM.  Always use your most recent med list.                   Brand Name Dispense Instructions for use Diagnosis    albuterol 108 (90 Base) MCG/ACT Inhaler    PROAIR HFA/PROVENTIL HFA/VENTOLIN HFA     Inhale 2 puffs into the lungs every 6 hours as needed        amoxicillin 500 MG tablet    AMOXIL    4 tablet    Take 4 tabs one hour before dental appointment    Multiple myeloma, without mention of having achieved remission       baclofen 10 MG tablet    LIORESAL     Take 10 mg by mouth 3 times daily as needed prn        CALCIUM 500 + D PO      Take 2 tablets by mouth daily.        dexamethasone 4 MG tablet    DECADRON    30 tablet    Take 10 tabs (40mg) Days 1, 8, and 15 of 28 day cycle.    Multiple myeloma not having achieved remission (H), Anticoagulation goal of INR 2 to 3       diclofenac 75 MG EC tablet    VOLTAREN     Take 75 mg by mouth 2 times daily as needed 1 tab prn        fentaNYL 25 mcg/hr 72 hr patch    DURAGESIC     Place 1 patch onto the skin every 72 hours        furosemide 20 MG tablet    LASIX    30 tablet    Take 20 mg by mouth daily    Status post chemotherapy, Multiple myeloma, without mention of having achieved remission       LENalidomide 10 MG Caps capsule CHEMO    REVLIMID    21 each    Take 10 mg by mouth daily on days 1-21 of 28 day cycle.    Multiple myeloma not having achieved remission (H), Anticoagulation goal of INR 2 to 3       lisinopril 5 MG tablet    PRINIVIL/ZESTRIL      Take 5 mg by mouth 2 times daily    Multiple myeloma, without mention of having achieved remission, Hypomagnesemia, Ventricular fibrillation (H), Anemia of other chronic disease, Renal insufficiency, Dilated cardiomyopathy (H), Automatic implantable cardiac defibrillator in situ, Personal history of diseases of blood and blood-forming organs, History of long-term use of multiple prescription drugs       Magnesium Oxide 420 MG Tabs     180 tablet    Take 1,260 mg by mouth 2 times daily    Hypomagnesemia       metoprolol succinate 100 MG 24 hr tablet    TOPROL-XL    135 tablet    Take 1.5 tablets (150 mg) by mouth daily    Atrial fibrillation, unspecified type (H), Paroxysmal ventricular tachycardia (H), Permanent atrial fibrillation (H)       nitroGLYcerin 0.4 MG sublingual tablet    NITROSTAT     Place 0.4 mg under the tongue every 5 minutes as needed Reported on 5/16/2017        omeprazole 20 MG CR capsule    priLOSEC    90 capsule    Take 20 mg by mouth daily    GERD (gastroesophageal reflux disease)       oxyCODONE IR 5 MG tablet    ROXICODONE     Take 1-2 tablets by mouth every 6 hours as needed. For pain.        simvastatin 40 MG tablet    ZOCOR    30 tablet    Take 40 mg by mouth At Bedtime        tiotropium 18 MCG capsule    SPIRIVA    30 capsule    Inhale 1 capsule (18 mcg) into the lungs daily    SOB (shortness of breath)       TRAZODONE HCL PO      Take 50 mg by mouth nightly as needed        warfarin 5 MG tablet    COUMADIN     Take 7.5 mg by mouth See Admin Instructions Take 1.5 tab by mojth Monday and Friday and 1 tab Sun Tues WED , Thur and Sat

## 2018-07-20 ENCOUNTER — ALLIED HEALTH/NURSE VISIT (OUTPATIENT)
Dept: CARDIOLOGY | Facility: CLINIC | Age: 68
End: 2018-07-20
Attending: INTERNAL MEDICINE
Payer: MEDICARE

## 2018-07-20 ENCOUNTER — TRANSFERRED RECORDS (OUTPATIENT)
Dept: HEALTH INFORMATION MANAGEMENT | Facility: CLINIC | Age: 68
End: 2018-07-20

## 2018-07-20 DIAGNOSIS — I49.01 VENTRICULAR FIBRILLATION (H): Primary | ICD-10-CM

## 2018-07-20 PROCEDURE — 93296 REM INTERROG EVL PM/IDS: CPT | Mod: ZF

## 2018-07-20 NOTE — MR AVS SNAPSHOT
After Visit Summary   7/20/2018    Erasmo Pearce    MRN: [id number]           Patient Information     Date Of Birth          1950        Visit Information        Provider Department      7/20/2018 6:00 AM UC ICD REMOTE [facility]        Today's Diagnoses     Ventricular fibrillation (H)    -  1       Follow-ups after your visit        Your next 10 appointments already scheduled     Jul 24, 2018 10:00 AM CDT   (Arrive by 9:45 AM)   RETURN ARRHYTHMIA with Chuy Jean-Baptiste MD   [facility] ([facility])    [address] [place] [address]  [address] [place] [address]   [phone]            Jul 24, 2018 10:30 AM CDT   (Arrive by 10:15 AM)   Implanted Defibulator with Uc Cv Device [address] [place] [address] ([facility])    [address] [place] [address]  [address] [place] [address]   [phone]            Jul 24, 2018 11:00 AM CDT   Masonic Lab Draw with  MASONIC LAB DRAW   [facility] Masonic Lab Draw ([facility])    [address] [place] [address]  [address] [place] [address]   [phone]            Jul 31, 2018 11:00 AM CDT   Return with Rojas Raygoza MD   [facility] Blood and Marrow Transplant ([facility])    [address] [place] [address]  [address]  [facility] [address]   [phone]            Nov 13, 2018  8:30 AM CST   (Arrive by 8:15 AM)   Implanted Defibulator with Uc Cv Device [address] [place] [address] ([facility])    [address] [place] [address]  [address] [place] [address]   [phone]            Nov 13, 2018  9:00 AM CST   (Arrive by 8:45 AM)   RETURN ARRHYTHMIA with Chuy Jean-Baptiste MD   [facility] ([facility])    [address] [place] [address]  [address] [place] [address]   [phone]              Future tests that were ordered for you today     Open Future Orders        Priority Expected Expires Ordered     CBC with platelets Routine  7/20/2019 7/20/2018    Magnesium Routine  7/20/2019 7/20/2018    Comprehensive metabolic panel Routine  7/20/2019 7/20/2018            Who to contact     If you have questions or need follow up information about today's clinic visit or your schedule please contact [facility] directly at [phone].  Normal or non-critical lab and imaging results will be communicated to you by MyChart, letter or phone within 4 business days after the clinic has received the results. If you do not hear from us within 7 days, please contact the clinic through Pop[employer] Leasing[employer] or phone. If you have a critical or abnormal lab result, we will notify you by phone as soon as possible.  Submit refill requests through MeetBall or call your pharmacy and they will forward the refill request to us. Please allow 3 business days for your refill to be completed.          Additional Information About Your Visit        [employer] Leasing[employer] Information     MeetBall gives you secure access to your electronic health record. If you see a primary care provider, you can also send messages to your care team and make appointments. If you have questions, please call your primary care clinic.  If you do not have a primary care provider, please call [phone] and they will assist you.        Care EveryWhere ID     This is your Care EveryWhere ID. This could be used by other organizations to access your [facility] medical records  [id number]         Blood Pressure from Last 3 Encounters:   05/15/18 110/72   04/17/18 98/56   01/23/18 101/58    Weight from Last 3 Encounters:   05/15/18 91.9 kg (202 lb 11.2 oz)   04/17/18 90.3 kg (199 lb)   01/23/18 93.9 kg (207 lb)              We Performed the Following     EP REPORT - HIM SCAN     INTERROGATION DEVICE EVAL REMOTE, PACER/ICD        Primary Care Provider Office Phone # Fax #    Cong Mcdonald [phone]       [address] [address]        Equal  Access to Services     [facility]: Hadii jericho courtney kiesha Truong, wajjda luqadaha, qaybta kaalamelie kailynpreetluis thomascarolyn arya lucaslatashadebbie sierra. So [facility] [phone].    ATENCIÓN: Si ulisesla mariama, tiene a miles disposición servicios gratuitos de asistencia lingüística. Llame al [phone].    We comply with applicable federal civil rights laws and [place] laws. We do not discriminate on the basis of race, color, national origin, age, disability, sex, sexual orientation, or gender identity.            Thank you!     Thank you for choosing [facility]  for your care. Our goal is always to provide you with excellent care. Hearing back from our patients is one way we can continue to improve our services. Please take a few minutes to complete the written survey that you may receive in the mail after your visit with us. Thank you!             Your Updated Medication List - Protect others around you: Learn how to safely use, store and throw away your medicines at www.disposemymeds.org.          This list is accurate as of 7/20/18  2:06 PM.  Always use your most recent med list.                   Brand Name Dispense Instructions for use Diagnosis    albuterol 108 (90 Base) MCG/ACT Inhaler    PROAIR HFA/PROVENTIL HFA/VENTOLIN HFA     Inhale 2 puffs into the lungs every 6 hours as needed        amoxicillin 500 MG tablet    AMOXIL    4 tablet    Take 4 tabs one hour before dental appointment    Multiple myeloma, without mention of having achieved remission       baclofen 10 MG tablet    LIORESAL     Take 10 mg by mouth 3 times daily as needed prn        CALCIUM 500 + D PO      Take 2 tablets by mouth daily.        dexamethasone 4 MG tablet    DECADRON    30 tablet    Take 10 tabs (40mg) Days 1, 8, and 15 of 28 day cycle.    Multiple myeloma not having achieved remission (H), Anticoagulation goal of INR 2 to 3       diclofenac 75 MG EC tablet    VOLTAREN     Take 75 mg by mouth 2 times daily as needed 1 tab  prn        fentaNYL 25 mcg/hr 72 hr patch    DURAGESIC     Place 1 patch onto the skin every 72 hours        furosemide 20 MG tablet    LASIX    30 tablet    Take 20 mg by mouth daily    Status post chemotherapy, Multiple myeloma, without mention of having achieved remission       LENalidomide 10 MG Caps capsule CHEMO    REVLIMID    21 each    Take 10 mg by mouth daily on days 1-21 of 28 day cycle.    Multiple myeloma not having achieved remission (H), Anticoagulation goal of INR 2 to 3       lisinopril 5 MG tablet    PRINIVIL/ZESTRIL     Take 5 mg by mouth 2 times daily    Multiple myeloma, without mention of having achieved remission, Hypomagnesemia, Ventricular fibrillation (H), Anemia of other chronic disease, Renal insufficiency, Dilated cardiomyopathy (H), Automatic implantable cardiac defibrillator in situ, Personal history of diseases of blood and blood-forming organs, History of long-term use of multiple prescription drugs       Magnesium Oxide 420 MG Tabs     180 tablet    Take 1,260 mg by mouth 2 times daily    Hypomagnesemia       metoprolol succinate 100 MG 24 hr tablet    TOPROL-XL    135 tablet    Take 1.5 tablets (150 mg) by mouth daily    Atrial fibrillation, unspecified type (H), Paroxysmal ventricular tachycardia (H), Permanent atrial fibrillation (H)       nitroGLYcerin 0.4 MG sublingual tablet    NITROSTAT     Place 0.4 mg under the tongue every 5 minutes as needed Reported on 5/16/2017        omeprazole 20 MG CR capsule    priLOSEC    90 capsule    Take 20 mg by mouth daily    GERD (gastroesophageal reflux disease)       oxyCODONE IR 5 MG tablet    ROXICODONE     Take 1-2 tablets by mouth every 6 hours as needed. For pain.        simvastatin 40 MG tablet    ZOCOR    30 tablet    Take 40 mg by mouth At Bedtime        tiotropium 18 MCG capsule    SPIRIVA    30 capsule    Inhale 1 capsule (18 mcg) into the lungs daily    SOB (shortness of breath)       TRAZODONE HCL PO      Take 50 mg by mouth  nightly as needed        warfarin 5 MG tablet    COUMADIN     Take 7.5 mg by mouth See Admin Instructions Take 1.5 tab by mojth Monday and Friday and 1 tab Sun Tues WED , Thur and Sat

## 2018-07-20 NOTE — PROGRESS NOTES
"Preliminary Device Interrogation Results.  Final physician signed paceart report to be scanned and attached.    [employer], single lead ICD, remote transmission received and reviewed. Device transmission sent per MD orders. Pt called reporting 2 ICD shocks this am. His presenting rhythm is an irregular ventricular rhythm ~120 bpm. Pt reports has \"been very physically active lately. He was in the attic moving stuff around when he received the shocks. Log book shows NSVT/VT episodes starting at 0945, with shocks delivered at 1034 and 1038. All of the available EGMs show irregular ventricular rhythms with rates 164- 220 bpm, suggesting AF with RVR and not a ventricular arrhythmia. The shocks were both preceeded with ATP. Neither the ATP or shocks changed the rhythm, but it slowed below detection. 1 episode yesterday received ATP, this did not change the rhythm. Normal device function. = 1%. Lead trends appear stable. Battery estimates 4 years to ROSEMARIE. Sunshine Henderson NP, notified of transmission results.    Er her orders pt was instructed to have labs drawn at his local clinic and to change the following medications: Decrease lisinopril to 5 mg daily and increase Toprol XL to 200 mg daily. Pt an wife verbalized understanding. Plan for pt to C Tuesday 7/24/18 for an ICD check and an appointment with Dr. Jean-Baptiste.  Remote ICD transmission      "

## 2018-07-24 ENCOUNTER — OFFICE VISIT (OUTPATIENT)
Dept: CARDIOLOGY | Facility: CLINIC | Age: 68
End: 2018-07-24
Attending: INTERNAL MEDICINE
Payer: MEDICARE

## 2018-07-24 VITALS
DIASTOLIC BLOOD PRESSURE: 69 MMHG | WEIGHT: 199.3 LBS | BODY MASS INDEX: 28.53 KG/M2 | HEIGHT: 70 IN | OXYGEN SATURATION: 98 % | SYSTOLIC BLOOD PRESSURE: 115 MMHG | HEART RATE: 54 BPM

## 2018-07-24 DIAGNOSIS — Z95.810 AUTOMATIC IMPLANTABLE CARDIOVERTER-DEFIBRILLATOR IN SITU: Primary | ICD-10-CM

## 2018-07-24 DIAGNOSIS — I47.29 PAROXYSMAL VENTRICULAR TACHYCARDIA (H): ICD-10-CM

## 2018-07-24 DIAGNOSIS — C90.02 MULTIPLE MYELOMA IN RELAPSE (H): ICD-10-CM

## 2018-07-24 DIAGNOSIS — Z79.01 ANTICOAGULATION GOAL OF INR 2 TO 3: ICD-10-CM

## 2018-07-24 DIAGNOSIS — I48.91 ATRIAL FIBRILLATION, UNSPECIFIED TYPE (H): ICD-10-CM

## 2018-07-24 DIAGNOSIS — Z51.81 ANTICOAGULATION GOAL OF INR 2 TO 3: ICD-10-CM

## 2018-07-24 DIAGNOSIS — I48.21 PERMANENT ATRIAL FIBRILLATION (H): ICD-10-CM

## 2018-07-24 LAB
ALBUMIN SERPL-MCNC: 3.3 G/DL (ref 3.4–5)
ALP SERPL-CCNC: 70 U/L (ref 40–150)
ALT SERPL W P-5'-P-CCNC: 25 U/L (ref 0–70)
ANION GAP SERPL CALCULATED.3IONS-SCNC: 9 MMOL/L (ref 3–14)
AST SERPL W P-5'-P-CCNC: 13 U/L (ref 0–45)
BASOPHILS # BLD AUTO: 0 10E9/L (ref 0–0.2)
BASOPHILS NFR BLD AUTO: 0 %
BILIRUB SERPL-MCNC: 0.4 MG/DL (ref 0.2–1.3)
BUN SERPL-MCNC: 30 MG/DL (ref 7–30)
CALCIUM SERPL-MCNC: 8.5 MG/DL (ref 8.5–10.1)
CHLORIDE SERPL-SCNC: 105 MMOL/L (ref 94–109)
CO2 SERPL-SCNC: 26 MMOL/L (ref 20–32)
COLLECT DURATION TIME UR: 24 H
CREAT 24H UR-MRATE: 1.13 G/(24.H) (ref 1–2)
CREAT SERPL-MCNC: 1.03 MG/DL (ref 0.66–1.25)
CREAT UR-MCNC: 72 MG/DL
DIFFERENTIAL METHOD BLD: ABNORMAL
EOSINOPHIL # BLD AUTO: 0.2 10E9/L (ref 0–0.7)
EOSINOPHIL NFR BLD AUTO: 2.9 %
ERYTHROCYTE [DISTWIDTH] IN BLOOD BY AUTOMATED COUNT: 15.6 % (ref 10–15)
GFR SERPL CREATININE-BSD FRML MDRD: 72 ML/MIN/1.7M2
GLUCOSE SERPL-MCNC: 72 MG/DL (ref 70–99)
HCT VFR BLD AUTO: 29.2 % (ref 40–53)
HGB BLD-MCNC: 9.5 G/DL (ref 13.3–17.7)
IGA SERPL-MCNC: 209 MG/DL (ref 70–380)
IGG SERPL-MCNC: 569 MG/DL (ref 695–1620)
IGM SERPL-MCNC: 18 MG/DL (ref 60–265)
IMM GRANULOCYTES # BLD: 0 10E9/L (ref 0–0.4)
IMM GRANULOCYTES NFR BLD: 0.5 %
INR PPP: 4.11 (ref 0.86–1.14)
LYMPHOCYTES # BLD AUTO: 1.1 10E9/L (ref 0.8–5.3)
LYMPHOCYTES NFR BLD AUTO: 19.5 %
MAGNESIUM SERPL-MCNC: 1.7 MG/DL (ref 1.6–2.3)
MCH RBC QN AUTO: 36.3 PG (ref 26.5–33)
MCHC RBC AUTO-ENTMCNC: 32.5 G/DL (ref 31.5–36.5)
MCV RBC AUTO: 112 FL (ref 78–100)
MONOCYTES # BLD AUTO: 1 10E9/L (ref 0–1.3)
MONOCYTES NFR BLD AUTO: 17.9 %
NEUTROPHILS # BLD AUTO: 3.3 10E9/L (ref 1.6–8.3)
NEUTROPHILS NFR BLD AUTO: 59.2 %
NRBC # BLD AUTO: 0 10*3/UL
NRBC BLD AUTO-RTO: 0 /100
PLATELET # BLD AUTO: 92 10E9/L (ref 150–450)
POTASSIUM SERPL-SCNC: 3.8 MMOL/L (ref 3.4–5.3)
PROT 24H UR-MRATE: 0.55 G/(24.H) (ref 0.04–0.23)
PROT SERPL-MCNC: 6.7 G/DL (ref 6.8–8.8)
PROT UR-MCNC: 0.35 G/L
PROT/CREAT 24H UR: 0.49 G/G CR (ref 0–0.2)
RBC # BLD AUTO: 2.62 10E12/L (ref 4.4–5.9)
SODIUM SERPL-SCNC: 140 MMOL/L (ref 133–144)
SPECIMEN VOL UR: 1570 ML
WBC # BLD AUTO: 5.6 10E9/L (ref 4–11)

## 2018-07-24 PROCEDURE — 82784 ASSAY IGA/IGD/IGG/IGM EACH: CPT | Performed by: INTERNAL MEDICINE

## 2018-07-24 PROCEDURE — 85610 PROTHROMBIN TIME: CPT | Performed by: INTERNAL MEDICINE

## 2018-07-24 PROCEDURE — 84165 PROTEIN E-PHORESIS SERUM: CPT | Performed by: INTERNAL MEDICINE

## 2018-07-24 PROCEDURE — 80053 COMPREHEN METABOLIC PANEL: CPT | Performed by: INTERNAL MEDICINE

## 2018-07-24 PROCEDURE — 99213 OFFICE O/P EST LOW 20 MIN: CPT | Mod: ZP | Performed by: INTERNAL MEDICINE

## 2018-07-24 PROCEDURE — 83735 ASSAY OF MAGNESIUM: CPT | Performed by: INTERNAL MEDICINE

## 2018-07-24 PROCEDURE — G0463 HOSPITAL OUTPT CLINIC VISIT: HCPCS | Mod: 25,ZF

## 2018-07-24 PROCEDURE — 84166 PROTEIN E-PHORESIS/URINE/CSF: CPT | Performed by: INTERNAL MEDICINE

## 2018-07-24 PROCEDURE — 81050 URINALYSIS VOLUME MEASURE: CPT | Performed by: INTERNAL MEDICINE

## 2018-07-24 PROCEDURE — 00000402 ZZHCL STATISTIC TOTAL PROTEIN: Performed by: INTERNAL MEDICINE

## 2018-07-24 PROCEDURE — 85025 COMPLETE CBC W/AUTO DIFF WBC: CPT | Performed by: INTERNAL MEDICINE

## 2018-07-24 PROCEDURE — 83883 ASSAY NEPHELOMETRY NOT SPEC: CPT | Performed by: INTERNAL MEDICINE

## 2018-07-24 PROCEDURE — 84156 ASSAY OF PROTEIN URINE: CPT | Performed by: INTERNAL MEDICINE

## 2018-07-24 PROCEDURE — 86335 IMMUNFIX E-PHORSIS/URINE/CSF: CPT | Performed by: INTERNAL MEDICINE

## 2018-07-24 RX ORDER — METOPROLOL SUCCINATE 200 MG/1
200 TABLET, EXTENDED RELEASE ORAL DAILY
Qty: 90 TABLET | Refills: 3 | Status: SHIPPED | OUTPATIENT
Start: 2018-07-24 | End: 2019-11-19

## 2018-07-24 ASSESSMENT — PAIN SCALES - GENERAL: PAINLEVEL: NO PAIN (0)

## 2018-07-24 NOTE — PROGRESS NOTES
Cardiac Electrophysiology Clinic    Chief Complaint:  F/u paroxysmal atrial fibrillation and ventricular fibrillation    HPI:  Erasmo Pearce is a 68 yo male with permanent atrial fibrillation, ischemic cardiomyopathy with an LV EF of 35% s/p ICD in 12/2009 initially for primary prevention, history of multiple appropriate shocks for VF typically when undergoing chemotherapy, and multiple myeloma currently under treatment for a relapse.  Since we last saw him in 5/2018 he had an appropriate shock in 6/2018 for VF and an inappropriate shock in 7/2018 for what looks like atrial fibrillation with rapid ventricular rates: review of the EGMs shows an irregularly irregular rhythm with periods of tachycardia sensed as VT; following a shock the rhythm remained irregular although the rate was slower, in contrast to prior episodes where the EGMs showed clear ventricular fibrillation or flutter.  A low DLCO has precluded amiodarone in the past and he did not tolerate sotalol (low blood pressures).  After the appropriate shock in June we decided against starting mexilitine.  After the inappropriate shock for AF last month we increased his metoprolol succinate from 150 to 200 mg daily for better rate control.     In general, however, Mr. Pearce reports feeling well. He is tolerating the chemotherapy fairly well.  He denies chest pain, worsened shortness of breath, increased lightheadedness, or palpitations.  He has had orthostatic dizziness since starting chemotherapy but it is no worse since increasing the metoprolol.  Denies fevers, chills, and discomfort in the device pocket.      Current Outpatient Prescriptions on File Prior to Visit:  albuterol (PROAIR HFA, PROVENTIL HFA, VENTOLIN HFA) 108 (90 BASE) MCG/ACT inhaler Inhale 2 puffs into the lungs every 6 hours as needed    amoxicillin (AMOXIL) 500 MG tablet Take 4 tabs one hour before dental appointment   baclofen (LIORESAL) 10 MG tablet Take 10 mg by mouth 3  times daily as needed prn   Calcium Carbonate-Vitamin D (CALCIUM 500 + D PO) Take 2 tablets by mouth daily.   dexamethasone (DECADRON) 4 MG tablet Take 10 tabs (40mg) Days 1, 8, and 15 of 28 day cycle.   diclofenac (VOLTAREN) 75 MG EC tablet Take 75 mg by mouth 2 times daily as needed 1 tab prn   fentaNYL (DURAGESIC) 25 mcg/hr patch 72 hr Place 1 patch onto the skin every 72 hours   furosemide (LASIX) 20 MG tablet Take 20 mg by mouth daily    LENalidomide (REVLIMID) 10 MG CAPS capsule CHEMO Take 10 mg by mouth daily on days 1-21 of 28 day cycle.   lisinopril (PRINIVIL,ZESTRIL) 5 MG tablet Take 5 mg by mouth daily    Magnesium Oxide 420 MG TABS Take 1,260 mg by mouth 2 times daily   metoprolol (TOPROL-XL) 100 MG 24 hr tablet Take 1.5 tablets (150 mg) by mouth daily (Patient taking differently: Take 200 mg by mouth daily )   nitroGLYCERIN (NITROSTAT) 0.4 MG SL tablet Place 0.4 mg under the tongue every 5 minutes as needed Reported on 5/16/2017   omeprazole (PRILOSEC) 20 MG capsule Take 20 mg by mouth daily    oxycodone (ROXICODONE) 5 MG immediate release tablet Take 1-2 tablets by mouth every 6 hours as needed. For pain.    simvastatin (ZOCOR) 40 MG tablet Take 40 mg by mouth At Bedtime    tiotropium (SPIRIVA) 18 MCG inhalation capsule Inhale 1 capsule (18 mcg) into the lungs daily   TRAZODONE HCL PO Take 50 mg by mouth nightly as needed    warfarin (COUMADIN) 5 MG tablet Take 7.5 mg by mouth See Admin Instructions Take 1.5 tab by mojth Monday and Friday and 1 tab Sun Tues WED , Thur and Sat     No current facility-administered medications on file prior to visit.   Allergies   Allergen Reactions     Nkda [No Known Drug Allergies]        Past Medical History:   Diagnosis Date     Atrial fibrillation (H)      Other premature beats      VF (ventricular fibrillation) (H)      Past Surgical History:   Procedure Laterality Date     IMPLANT AUTOMATIC IMPLANTABLE CARDIOVERTER DEFIBRILLATOR       No family history on  "file.  Social History     Social History     Marital status:      Spouse name: N/A     Number of children: N/A     Years of education: N/A     Occupational History     Not on file.     Social History Main Topics     Smoking status: Former Smoker     Packs/day: 1.00     Years: 25.00     Types: Cigarettes     Quit date: 10/15/1995     Smokeless tobacco: Never Used     Alcohol use No     Drug use: No     Sexual activity: Not on file     Other Topics Concern     Not on file     Social History Narrative         A complete review of systems is negative except as noted above in the HPI.      Physical Examination:  Vitals: /69  Pulse 54  Ht 1.778 m (5' 10\")  Wt 90.4 kg (199 lb 4.8 oz)  SpO2 98%  BMI 28.6 kg/m2  GENERAL APPEARANCE: Comfortable appearing male with unlabored breathing at rest.  HEENT: no scleral icterus, no xanthelasmas  NECK: JVP is 6 cm.  No carotid bruits.    CHEST: lungs clear to auscultation  CARDIOVASCULAR: Regular rate and rhythm with normal S1 and S2.  No murmur or rub.  Warm extremities.  No peripheral edema.  ABDOMEN: Soft, not tender or distended.  No abdominal bruits.  NEURO: Alert and fully oriented.  Normal gait.  Fluent speech.    SKIN: Not jaundiced.  No petechiae/ecchymoses.    Labs, imaging, and procedures were reviewed:  Recent Labs   Lab Test  07/24/18   0939   CR  1.03   BUN  30   POTASSIUM  3.8   NA  140   HGB  9.5*   PLT  92*   WBC  5.6     Recent Labs   Lab Test  07/24/18   0939  04/10/18   1115  01/16/18   1018  01/31/17   1054  11/01/16   1138   INR  4.11*  2.99*  2.25*  2.15*  2.18*         Assessment and recommendations:  Mr. Pearce is a 68 yo male who returns for follow up of permanent atrial fibrillation, recent inappropriate ICD shock for AF with RVR (7/2018), and appropriate ICD shocks for VF typically in the setting of chemotherapy (most recently 4/2018 and 6/2018).  The EGM from 7/2018 showed an iregularly irregular rhythm with intermittent tachycardia " "which slowed to < 100 but did not terminate after a shock, in sharp contrast to the other episodes of VF.  His medical therapy is notable for ischemic cardiomyopathy, LV EF 35%, and multiple myeloma with relapse, currently on chemotherapy.  1  Ventricular fibrillation, typically while undergoing chemotherapy - his last two episodes in 4/2018 and 6/2018 were both terminated with a single shock and were relatively well tolerated symptomatically.  We had previously discussed starting mexilitine (low DLCO has precluded amiodarone in the past and sotalol caused low blood pressures) but since the episodes are relatively infrequent, are relatively well tolerated, and only occur during chemotherapy (\"reversible cause\", despite the persistent substrate) we decided against it.  2. Permanent AF, rate controlled on metoprolol which was recently increased to 200 mg XL daily as mentioned above.  He is anticoagulated with warfarin for his CHADSVasc score of 3 for chf, age, and cad.    3. Ischemic cardiomyopathy, LV EF 35%, NYHA class II, stage C, on ACEi and BB and low dose diuretic with ICD in place.  He is euvolemic on examination today.    He has follow up scheduled in November.    I discussed the patient with Dr. Chuy Jean-Baptiste.    Pierre Chaidez MD  Cardiovascular disease fellow      ELECTROPHYSIOLOGY STAFF  Patient seen and examined by me.  History and physical examination discussed with Dr. Chaidez whose note reflects our joint assessment and recommendation/plans.  Erasmo Pearce returns for follow-up. We last saw him in May 2018. He is a 67-year-old gentleman with multiple myeloma, permanent atrial fibrillation, ischemic cardiomyopathy with an ejection fraction of approximately 35% who had an implantable cardioverter-defibrillator placed in December 2009 for primary prevention with subsequent appropriate shocks. He is not currently on antiarrhythmic drugs. At his last visit he was on metoprolol succinate 150 mg daily " for rate control during AF. His heart rate histogram during atrial fibrillation has been generally excellent although he has had a few fast episodes that fall into the VT monitor zone. At his last visit we made no change in his medications given past difficulties in tolerating antiarrhythmic agents (low DLCO precluding amiodarone, hypotension on sotalol) and the tendency for him to have ventricular fibrillation during chemotherapy when his electrolytes might be expected to be more labile. Since he has been continuing chemotherapy, we planed to see him back in in the Autumn with a defibrillator check.   He had an ICD shock June 6 while asleep. Stored EGM shows abrupt onset of VT/VF, CL about 210 ms.   He had a subsequent chock July 18. I cannot find stored EGM.   He had 2 shocks July 20, about 4 minutes apart. Stored EGMs shows irregularly irregular rhythm more consistent with AF with RVR. We asked him to increase his metoprolol succinate from 150 mg daily to 200 mg daily. We decreased his lisinopril from 10 mg daily to 5 mg daily. Today he reports feeling very well.  He is tolerating the higher dose of metoprolol without problems.  He is continuing chemotherapy.  We will make no other changes in his medications.  If he develops frequent VT/VF we will consider empiric mexiletine.  Unless he has problems sooner he will return for follow-up in 6 months.  It was a pleasure seeing Erasmo Pearce today.  Chuy Jean-Baptiste

## 2018-07-24 NOTE — NURSING NOTE
Chief Complaint   Patient presents with     Follow Up For     [place], ICD therapies, relapse-cancer     Vitals were taken and medications were reconciled.     Janet Cole RMA  9:47 AM

## 2018-07-24 NOTE — NURSING NOTE
Chief Complaint   Patient presents with     Blood Draw     Labs drawn by MA   labs drawn, see flow sheets.  AMY Lechuga

## 2018-07-24 NOTE — LETTER
7/24/2018      RE: Erasmo Pearce  [address]  [address] [facility] [address]       Dear Colleague,    Thank you for the opportunity to participate in the care of your patient, Erasmo Pearce, at the [facility] at [facility]. Please see a copy of my visit note below.          Cardiac Electrophysiology Clinic    Chief Complaint:  F/u paroxysmal atrial fibrillation and ventricular fibrillation    HPI:  Erasmo Pearce is a 66 yo male with permanent atrial fibrillation, ischemic cardiomyopathy with an LV EF of 35% s/p ICD in 12/2009 initially for primary prevention, history of multiple appropriate shocks for VF typically when undergoing chemotherapy, and multiple myeloma currently under treatment for a relapse.  Since we last saw him in 5/2018 he had an appropriate shock in 6/2018 for VF and an inappropriate shock in 7/2018 for what looks like atrial fibrillation with rapid ventricular rates: review of the EGMs shows an irregularly irregular rhythm with periods of tachycardia sensed as VT; following a shock the rhythm remained irregular although the rate was slower, in contrast to prior episodes where the EGMs showed clear ventricular fibrillation or flutter.  A low DLCO has precluded amiodarone in the past and he did not tolerate sotalol (low blood pressures).  After the appropriate shock in June we decided against starting mexilitine.  After the inappropriate shock for AF last month we increased his metoprolol succinate from 150 to 200 mg daily for better rate control.     In general, however, Mr. Pearce reports feeling well. He is tolerating the chemotherapy fairly well.  He denies chest pain, worsened shortness of breath, increased lightheadedness, or palpitations.  He has had orthostatic dizziness since starting chemotherapy but it is no worse since increasing the metoprolol.  Denies fevers, chills, and discomfort in the device  pocket.      Current Outpatient Prescriptions on File Prior to Visit:  albuterol (PROAIR HFA, PROVENTIL HFA, VENTOLIN HFA) 108 (90 BASE) MCG/ACT inhaler Inhale 2 puffs into the lungs every 6 hours as needed    amoxicillin (AMOXIL) 500 MG tablet Take 4 tabs one hour before dental appointment   baclofen (LIORESAL) 10 MG tablet Take 10 mg by mouth 3 times daily as needed prn   Calcium Carbonate-Vitamin D (CALCIUM 500 + D PO) Take 2 tablets by mouth daily.   dexamethasone (DECADRON) 4 MG tablet Take 10 tabs (40mg) Days 1, 8, and 15 of 28 day cycle.   diclofenac (VOLTAREN) 75 MG EC tablet Take 75 mg by mouth 2 times daily as needed 1 tab prn   fentaNYL (DURAGESIC) 25 mcg/hr patch 72 hr Place 1 patch onto the skin every 72 hours   furosemide (LASIX) 20 MG tablet Take 20 mg by mouth daily    LENalidomide (REVLIMID) 10 MG CAPS capsule CHEMO Take 10 mg by mouth daily on days 1-21 of 28 day cycle.   lisinopril (PRINIVIL,ZESTRIL) 5 MG tablet Take 5 mg by mouth daily    Magnesium Oxide 420 MG TABS Take 1,260 mg by mouth 2 times daily   metoprolol (TOPROL-XL) 100 MG 24 hr tablet Take 1.5 tablets (150 mg) by mouth daily (Patient taking differently: Take 200 mg by mouth daily )   nitroGLYCERIN (NITROSTAT) 0.4 MG SL tablet Place 0.4 mg under the tongue every 5 minutes as needed Reported on 5/16/2017   omeprazole (PRILOSEC) 20 MG capsule Take 20 mg by mouth daily    oxycodone (ROXICODONE) 5 MG immediate release tablet Take 1-2 tablets by mouth every 6 hours as needed. For pain.    simvastatin (ZOCOR) 40 MG tablet Take 40 mg by mouth At Bedtime    tiotropium (SPIRIVA) 18 MCG inhalation capsule Inhale 1 capsule (18 mcg) into the lungs daily   TRAZODONE HCL PO Take 50 mg by mouth nightly as needed    warfarin (COUMADIN) 5 MG tablet Take 7.5 mg by mouth See Admin Instructions Take 1.5 tab by mojth Monday and Friday and 1 tab Sun Tues WED , Thur and Sat     No current facility-administered medications on file prior to visit.   Allergies  "  Allergen Reactions     Nkda [No Known Drug Allergies]        Past Medical History:   Diagnosis Date     Atrial fibrillation (H)      Other premature beats      VF (ventricular fibrillation) (H)      Past Surgical History:   Procedure Laterality Date     IMPLANT AUTOMATIC IMPLANTABLE CARDIOVERTER DEFIBRILLATOR       No family history on file.  Social History     Social History     Marital status:      Spouse name: N/A     Number of children: N/A     Years of education: N/A     Occupational History     Not on file.     Social History Main Topics     Smoking status: Former Smoker     Packs/day: 1.00     Years: 25.00     Types: Cigarettes     Quit date: 10/15/1995     Smokeless tobacco: Never Used     Alcohol use No     Drug use: No     Sexual activity: Not on file     Other Topics Concern     Not on file     Social History Narrative         A complete review of systems is negative except as noted above in the HPI.      Physical Examination:  Vitals: /69  Pulse 54  Ht 1.778 m (5' 10\")  Wt 90.4 kg (199 lb 4.8 oz)  SpO2 98%  BMI 28.6 kg/m2  GENERAL APPEARANCE: Comfortable appearing male with unlabored breathing at rest.  HEENT: no scleral icterus, no xanthelasmas  NECK: JVP is 6 cm.  No carotid bruits.    CHEST: lungs clear to auscultation  CARDIOVASCULAR: Regular rate and rhythm with normal S1 and S2.  No murmur or rub.  Warm extremities.  No peripheral edema.  ABDOMEN: Soft, not tender or distended.  No abdominal bruits.  NEURO: Alert and fully oriented.  Normal gait.  Fluent speech.    SKIN: Not jaundiced.  No petechiae/ecchymoses.    Labs, imaging, and procedures were reviewed:  Recent Labs   Lab Test  07/24/18   0939   CR  1.03   BUN  30   POTASSIUM  3.8   NA  140   HGB  9.5*   PLT  92*   WBC  5.6     Recent Labs   Lab Test  07/24/18   0939  04/10/18   1115  01/16/18   1018  01/31/17   1054  11/01/16   1138   INR  4.11*  2.99*  2.25*  2.15*  2.18*         Assessment and recommendations:  " "Ramses is a 68 yo male who returns for follow up of permanent atrial fibrillation, recent inappropriate ICD shock for AF with RVR (7/2018), and appropriate ICD shocks for VF typically in the setting of chemotherapy (most recently 4/2018 and 6/2018).  The EGM from 7/2018 showed an iregularly irregular rhythm with intermittent tachycardia which slowed to < 100 but did not terminate after a shock, in sharp contrast to the other episodes of VF.  His medical therapy is notable for ischemic cardiomyopathy, LV EF 35%, and multiple myeloma with relapse, currently on chemotherapy.  1  Ventricular fibrillation, typically while undergoing chemotherapy - his last two episodes in 4/2018 and 6/2018 were both terminated with a single shock and were relatively well tolerated symptomatically.  We had previously discussed starting mexilitine (low DLCO has precluded amiodarone in the past and sotalol caused low blood pressures) but since the episodes are relatively infrequent, are relatively well tolerated, and only occur during chemotherapy (\"reversible cause\", despite the persistent substrate) we decided against it.  2. Permanent AF, rate controlled on metoprolol which was recently increased to 200 mg XL daily as mentioned above.  He is anticoagulated with warfarin for his CHADSVasc score of 3 for chf, age, and cad.    3. Ischemic cardiomyopathy, LV EF 35%, NYHA class II, stage C, on ACEi and BB and low dose diuretic with ICD in place.  He is euvolemic on examination today.    He has follow up scheduled in November.    I discussed the patient with Dr. Chuy Jean-Baptiste.    Pierre Chaidez MD  Cardiovascular disease fellow      ELECTROPHYSIOLOGY STAFF  Patient seen and examined by me.  History and physical examination discussed with Dr. Chaidez whose note reflects our joint assessment and recommendation/plans.  Erasmo Pearce returns for follow-up. We last saw him in May 2018. He is a 67-year-old gentleman with multiple " myeloma, permanent atrial fibrillation, ischemic cardiomyopathy with an ejection fraction of approximately 35% who had an implantable cardioverter-defibrillator placed in December 2009 for primary prevention with subsequent appropriate shocks. He is not currently on antiarrhythmic drugs. At his last visit he was on metoprolol succinate 150 mg daily for rate control during AF. His heart rate histogram during atrial fibrillation has been generally excellent although he has had a few fast episodes that fall into the VT monitor zone. At his last visit we made no change in his medications given past difficulties in tolerating antiarrhythmic agents (low DLCO precluding amiodarone, hypotension on sotalol) and the tendency for him to have ventricular fibrillation during chemotherapy when his electrolytes might be expected to be more labile. Since he has been continuing chemotherapy, we planed to see him back in in the Autumn with a defibrillator check.   He had an ICD shock June 6 while asleep. Stored EGM shows abrupt onset of VT/VF, CL about 210 ms.   He had a subsequent chock July 18. I cannot find stored EGM.   He had 2 shocks July 20, about 4 minutes apart. Stored EGMs shows irregularly irregular rhythm more consistent with AF with RVR. We asked him to increase his metoprolol succinate from 150 mg daily to 200 mg daily. We decreased his lisinopril from 10 mg daily to 5 mg daily. Today he reports feeling very well.  He is tolerating the higher dose of metoprolol without problems.  He is continuing chemotherapy.  We will make no other changes in his medications.  If he develops frequent VT/VF we will consider empiric mexiletine.  Unless he has problems sooner he will return for follow-up in 6 months.  It was a pleasure seeing Erasmo Pearce today.  Chuy Jean-Baptiste

## 2018-07-24 NOTE — PATIENT INSTRUCTIONS
You will be scheduled for a follow up visit: November as scheduled    We encourage you to use My Chart as your primary form of communication if possible. If you need assistance in setting this up, please contact our office or ask at your follow up visit.     If you need a medication refill please contact your pharmacy. Please allow at least 3 business days for your refill to be completed.       Cardiology  Telephone Number    Aleida Angeles [phone]   Or send a message to your provider via my chart.   For scheduling procedures:    [facility]    Clinic appointments       [phone]   For the Device Clinic (Pacemakers and ICD's)   RN's :   Natali Vale  During business hours: [phone]    After business hours:   [phone]- select option 4 and ask for job code [id number].          As always, Thank you for trusting us with your health care needs!

## 2018-07-24 NOTE — MR AVS SNAPSHOT
After Visit Summary   7/24/2018    Erasmo Pearce    MRN: [id number]           Patient Information     Date Of Birth          1950        Visit Information        Provider Department      7/24/2018 10:00 AM Chuy Jean-Baptiste MD [facility]        Today's Diagnoses     Automatic implantable cardioverter-defibrillator in situ    -  1    Atrial fibrillation, unspecified type (H)        Paroxysmal ventricular tachycardia (H)        Permanent atrial fibrillation (H)          Care Instructions    You will be scheduled for a follow up visit: November as scheduled    We encourage you to use My Chart as your primary form of communication if possible. If you need assistance in setting this up, please contact our office or ask at your follow up visit.     If you need a medication refill please contact your pharmacy. Please allow at least 3 business days for your refill to be completed.       Cardiology  Telephone Number    Aleida Angeles -[phone]   Or send a message to your provider via my chart.   For scheduling procedures:    [facility]    Clinic appointments       [phone]   For the Device Clinic (Pacemakers and ICD's)   RN's :   Natali Vale  During business hours: [phone]    After business hours:   [phone]- select option 4 and ask for job code [id number].          As always, Thank you for trusting us with your health care needs!          Follow-ups after your visit        Your next 10 appointments already scheduled     Jul 31, 2018 11:00 AM CDT   Return with Rojas Raygoza MD   [facility] Blood and Marrow Transplant ([facility])    [address] [place] [address]  [address] [place] [address]   [phone]            Nov 13, 2018  8:30 AM CST   (Arrive by 8:15 AM)   Implanted Defibulator with Uc Cv Device 1   [facility] Heart [facility] ([facility])    [address] [place] [address]  [address]  [facility] [address] [phone]            Nov 13, 2018  9:00 AM CST   (Arrive by 8:45 AM)   RETURN ARRHYTHMIA with Chuy Jean-Baptiste MD   [facility] ([facility] [place])    [address]  [address]  [facility] [id number] [phone]              Who to contact     If you have questions or need follow up information about today's clinic visit or your schedule please contact [facility] directly at [phone].  Normal or non-critical lab and imaging results will be communicated to you by Tokopediahart, letter or phone within 4 business days after the clinic has received the results. If you do not hear from us within 7 days, please contact the clinic through Vivatyt or phone. If you have a critical or abnormal lab result, we will notify you by phone as soon as possible.  Submit refill requests through Dotstudioz or call your pharmacy and they will forward the refill request to us. Please allow 3 business days for your refill to be completed.          Additional Information About Your Visit        TokopediaharuserADgents Information     Dotstudioz gives you secure access to your electronic health record. If you see a primary care provider, you can also send messages to your care team and make appointments. If you have questions, please call your primary care clinic.  If you do not have a primary care provider, please call [phone] and they will assist you.        Care EveryWhere ID     This is your Care EveryWhere ID. This could be used by other organizations to access your [facility] records  [id number]        Your Vitals Were     Pulse Height Pulse Oximetry BMI (Body Mass Index)          54 1.778 m (5' 10\") 98% 28.6 kg/m2         Blood Pressure from Last 3 Encounters:   07/24/18 115/69   05/15/18 110/72   04/17/18 98/56    Weight from Last 3 Encounters:   07/24/18 90.4 kg (199 lb 4.8 oz)   05/15/18 91.9 kg (202 lb 11.2 oz)   04/17/18 90.3 kg (199 lb)              Today, " you had the following     No orders found for display         Today's Medication Changes          These changes are accurate as of 7/24/18 11:59 PM.  If you have any questions, ask your nurse or doctor.               These medicines have changed or have updated prescriptions.        Dose/Directions    metoprolol succinate 200 MG 24 hr tablet   Commonly known as:  TOPROL-XL   This may have changed:    - medication strength  - how much to take   Used for:  Atrial fibrillation, unspecified type (H), Paroxysmal ventricular tachycardia (H), Permanent atrial fibrillation (H)   Changed by:  Chuy Jean-Baptiste MD        Dose:  200 mg   Take 1 tablet (200 mg) by mouth daily   Quantity:  90 tablet   Refills:  3            Where to get your medicines      Some of these will need a paper prescription and others can be bought over the counter.  Ask your nurse if you have questions.     Bring a paper prescription for each of these medications     metoprolol succinate 200 MG 24 hr tablet                Primary Care Provider Office Phone # Fax #    Cong Mcdonald [phone]       [place] [address]        Equal Access to Services     JULIANA DAVILA AH: Hadii jericho ku hadasho Soomaali, waaxda luqadaha, qaybta kaalmada adeegyada, waxay sarahin haymj harmon . So [facility] [phone].    ATENCIÓN: Si habla español, tiene a miles disposición servicios gratuitos de asistencia lingüística. [facility] [phone].    We comply with applicable federal civil rights laws and [place] laws. We do not discriminate on the basis of race, color, national origin, age, disability, sex, sexual orientation, or gender identity.            Thank you!     Thank you for choosing [facility]  for your care. Our goal is always to provide you with excellent care. Hearing back from our patients is one way we can continue to improve our services. Please take a few minutes to complete the written survey  that you may receive in the mail after your visit with us. Thank you!             Your Updated Medication List - Protect others around you: Learn how to safely use, store and throw away your medicines at www.disposemymeds.org.          This list is accurate as of 7/24/18 11:59 PM.  Always use your most recent med list.                   Brand Name Dispense Instructions for use Diagnosis    albuterol 108 (90 Base) MCG/ACT Inhaler    PROAIR HFA/PROVENTIL HFA/VENTOLIN HFA     Inhale 2 puffs into the lungs every 6 hours as needed        amoxicillin 500 MG tablet    AMOXIL    4 tablet    Take 4 tabs one hour before dental appointment    Multiple myeloma, without mention of having achieved remission       baclofen 10 MG tablet    LIORESAL     Take 10 mg by mouth 3 times daily as needed prn        CALCIUM 500 + D PO      Take 2 tablets by mouth daily.        dexamethasone 4 MG tablet    DECADRON    30 tablet    Take 10 tabs (40mg) Days 1, 8, and 15 of 28 day cycle.    Multiple myeloma not having achieved remission (H), Anticoagulation goal of INR 2 to 3       diclofenac 75 MG EC tablet    VOLTAREN     Take 75 mg by mouth 2 times daily as needed 1 tab prn        fentaNYL 25 mcg/hr 72 hr patch    DURAGESIC     Place 1 patch onto the skin every 72 hours        furosemide 20 MG tablet    LASIX    30 tablet    Take 20 mg by mouth daily    Status post chemotherapy, Multiple myeloma, without mention of having achieved remission       LENalidomide 10 MG Caps capsule CHEMO    REVLIMID    21 each    Take 10 mg by mouth daily on days 1-21 of 28 day cycle.    Multiple myeloma not having achieved remission (H), Anticoagulation goal of INR 2 to 3       lisinopril 5 MG tablet    PRINIVIL/ZESTRIL     Take 5 mg by mouth daily    Multiple myeloma, without mention of having achieved remission, Hypomagnesemia, Ventricular fibrillation (H), Anemia of other chronic disease, Renal insufficiency, Dilated cardiomyopathy (H), Automatic implantable  cardiac defibrillator in situ, Personal history of diseases of blood and blood-forming organs, History of long-term use of multiple prescription drugs       Magnesium Oxide 420 MG Tabs     180 tablet    Take 1,260 mg by mouth 2 times daily    Hypomagnesemia       metoprolol succinate 200 MG 24 hr tablet    TOPROL-XL    90 tablet    Take 1 tablet (200 mg) by mouth daily    Atrial fibrillation, unspecified type (H), Paroxysmal ventricular tachycardia (H), Permanent atrial fibrillation (H)       nitroGLYcerin 0.4 MG sublingual tablet    NITROSTAT     Place 0.4 mg under the tongue every 5 minutes as needed Reported on 5/16/2017        omeprazole 20 MG CR capsule    priLOSEC    90 capsule    Take 20 mg by mouth daily    GERD (gastroesophageal reflux disease)       oxyCODONE IR 5 MG tablet    ROXICODONE     Take 1-2 tablets by mouth every 6 hours as needed. For pain.        simvastatin 40 MG tablet    ZOCOR    30 tablet    Take 40 mg by mouth At Bedtime        tiotropium 18 MCG capsule    SPIRIVA    30 capsule    Inhale 1 capsule (18 mcg) into the lungs daily    SOB (shortness of breath)       TRAZODONE HCL PO      Take 50 mg by mouth nightly as needed        warfarin 5 MG tablet    COUMADIN     Take 7.5 mg by mouth See Admin Instructions Take 1.5 tab by mojth Monday and Friday and 1 tab Sun Tues WED , Thur and Sat

## 2018-07-25 LAB
ALBUMIN MFR UR ELPH: 16.7 %
ALBUMIN SERPL ELPH-MCNC: 3.4 G/DL (ref 3.7–5.1)
ALPHA1 GLOB MFR UR ELPH: 8.1 %
ALPHA1 GLOB SERPL ELPH-MCNC: 0.5 G/DL (ref 0.2–0.4)
ALPHA2 GLOB MFR UR ELPH: 8.8 %
ALPHA2 GLOB SERPL ELPH-MCNC: 0.9 G/DL (ref 0.5–0.9)
B-GLOBULIN MFR UR ELPH: 4.8 %
B-GLOBULIN SERPL ELPH-MCNC: 0.7 G/DL (ref 0.6–1)
GAMMA GLOB MFR UR ELPH: 61.6 %
GAMMA GLOB SERPL ELPH-MCNC: 0.6 G/DL (ref 0.7–1.6)
KAPPA LC UR-MCNC: 0.95 MG/DL (ref 0.33–1.94)
KAPPA LC/LAMBDA SER: 0.04 {RATIO} (ref 0.26–1.65)
LAMBDA LC SERPL-MCNC: 22.93 MG/DL (ref 0.57–2.63)
M PROTEIN MFR UR ELPH: 45.5 %
M PROTEIN SERPL ELPH-MCNC: 0.1 G/DL
PROT ELPH PNL UR ELPH: NORMAL
PROT PATTERN SERPL ELPH-IMP: ABNORMAL
PROT PATTERN UR ELPH-IMP: ABNORMAL

## 2018-07-26 ENCOUNTER — ALLIED HEALTH/NURSE VISIT (OUTPATIENT)
Dept: CARDIOLOGY | Facility: CLINIC | Age: 68
End: 2018-07-26
Attending: INTERNAL MEDICINE
Payer: MEDICARE

## 2018-07-26 DIAGNOSIS — I49.01 VENTRICULAR FIBRILLATION (H): Primary | ICD-10-CM

## 2018-07-26 PROCEDURE — 93296 REM INTERROG EVL PM/IDS: CPT | Mod: ZF

## 2018-07-26 NOTE — PROGRESS NOTES
"Preliminary Device Interrogation Results.  Final physician signed paceart report to be scanned and attached.    [employer], single chamber ICD, remote transmission received and reviewed. Device transmission sent per MD orders. His presenting rhythm is an irregular ventricular rhythm ~50 bpm. VF episode recorded 7/25/18 @ 1103. Arrhythmia was converted with 1 41j shock. VF time was 13 seconds. Pt was seen by Dr. Jean-Baptiste on 7/24/18. Normal device function. = 1%. Lead trends appear stable. Battery estimates 3.5 years to ROSEMARIE. Pt and team notified of transmission results. Pt reports he was sleeping at the time and \"thought it was a dream\". He is feeling ok today. He is currently receiving a 3 week dose of chemo. He has an appointment with oncology next week.  Remote ICD transmission      "

## 2018-07-26 NOTE — MR AVS SNAPSHOT
After Visit Summary   7/26/2018    Erasmo Pearce    MRN: [id number]           Patient Information     Date Of Birth          1950        Visit Information        Provider Department      7/26/2018 6:00 AM UC ICD REMOTE [facility]        Today's Diagnoses     Ventricular fibrillation (H)    -  1       Follow-ups after your visit        Your next 10 appointments already scheduled     Jul 31, 2018 11:00 AM CDT   Return with Rojas Raygoza MD   [facility] ([facility])    [address] [place] [address]  [address]  [facility] [id number] [phone]            Nov 13, 2018  8:30 AM CST   (Arrive by 8:15 AM)   Implanted Defibulator with  Cv Device 1   [facility] ([facility])    [address] [place] [address]  [address]  [facility] [id number] [phone]            Nov 13, 2018  9:00 AM CST   (Arrive by 8:45 AM)   RETURN ARRHYTHMIA with Chuy Jean-Baptiste MD   [facility] ([facility])    [address] [place] [address]  [address]  [facility] [id number] [phone]              Who to contact     If you have questions or need follow up information about today's clinic visit or your schedule please contact [facility] directly at [phone].  Normal or non-critical lab and imaging results will be communicated to you by MyChart, letter or phone within 4 business days after the clinic has received the results. If you do not hear from us within 7 days, please contact the clinic through MyChart or phone. If you have a critical or abnormal lab result, we will notify you by phone as soon as possible.  Submit refill requests through [employer] or call your pharmacy and they will forward the refill request to us. Please allow 3 business days for your refill to be completed.          Additional Information About Your Visit        [employer] Information     [employer] gives you secure  access to your electronic health record. If you see a primary care provider, you can also send messages to your care team and make appointments. If you have questions, please call your primary care clinic.  If you do not have a primary care provider, please call [phone] and they will assist you.        Care EveryWhere ID     This is your Care EveryWhere ID. This could be used by other organizations to access your [facility] medical records  [id number]         Blood Pressure from Last 3 Encounters:   07/24/18 115/69   05/15/18 110/72   04/17/18 98/56    Weight from Last 3 Encounters:   07/24/18 90.4 kg (199 lb 4.8 oz)   05/15/18 91.9 kg (202 lb 11.2 oz)   04/17/18 90.3 kg (199 lb)              We Performed the Following     INTERROGATION DEVICE EVAL REMOTE, PACER/ICD        Primary Care Provider Office Phone # Fax #    Cong Mcdonald [phone]       [place] [address]        Equal Access to Services     CALI DAVILA : Hadii aad ku hadasho Soomaali, waaxda luqadaha, qaybta kaalmada adeegyada, waxay idiin haymj harmon . So [facility] [phone].    ATENCIÓN: Si habla español, tiene a miles disposición servicios gratuitos de asistencia lingüística. Llame al [phone].    We comply with applicable federal civil rights laws and [place] laws. We do not discriminate on the basis of race, color, national origin, age, disability, sex, sexual orientation, or gender identity.            Thank you!     Thank you for choosing [facility]  for your care. Our goal is always to provide you with excellent care. Hearing back from our patients is one way we can continue to improve our services. Please take a few minutes to complete the written survey that you may receive in the mail after your visit with us. Thank you!             Your Updated Medication List - Protect others around you: Learn how to safely use, store and throw away your medicines at  www.disposemymeds.org.          This list is accurate as of 7/26/18  9:02 AM.  Always use your most recent med list.                   Brand Name Dispense Instructions for use Diagnosis    albuterol 108 (90 Base) MCG/ACT Inhaler    PROAIR HFA/PROVENTIL HFA/VENTOLIN HFA     Inhale 2 puffs into the lungs every 6 hours as needed        amoxicillin 500 MG tablet    AMOXIL    4 tablet    Take 4 tabs one hour before dental appointment    Multiple myeloma, without mention of having achieved remission       baclofen 10 MG tablet    LIORESAL     Take 10 mg by mouth 3 times daily as needed prn        CALCIUM 500 + D PO      Take 2 tablets by mouth daily.        dexamethasone 4 MG tablet    DECADRON    30 tablet    Take 10 tabs (40mg) Days 1, 8, and 15 of 28 day cycle.    Multiple myeloma not having achieved remission (H), Anticoagulation goal of INR 2 to 3       diclofenac 75 MG EC tablet    VOLTAREN     Take 75 mg by mouth 2 times daily as needed 1 tab prn        fentaNYL 25 mcg/hr 72 hr patch    DURAGESIC     Place 1 patch onto the skin every 72 hours        furosemide 20 MG tablet    LASIX    30 tablet    Take 20 mg by mouth daily    Status post chemotherapy, Multiple myeloma, without mention of having achieved remission       LENalidomide 10 MG Caps capsule CHEMO    REVLIMID    21 each    Take 10 mg by mouth daily on days 1-21 of 28 day cycle.    Multiple myeloma not having achieved remission (H), Anticoagulation goal of INR 2 to 3       lisinopril 5 MG tablet    PRINIVIL/ZESTRIL     Take 5 mg by mouth daily    Multiple myeloma, without mention of having achieved remission, Hypomagnesemia, Ventricular fibrillation (H), Anemia of other chronic disease, Renal insufficiency, Dilated cardiomyopathy (H), Automatic implantable cardiac defibrillator in situ, Personal history of diseases of blood and blood-forming organs, History of long-term use of multiple prescription drugs       Magnesium Oxide 420 MG Tabs     180 tablet     Take 1,260 mg by mouth 2 times daily    Hypomagnesemia       metoprolol succinate 200 MG 24 hr tablet    TOPROL-XL    90 tablet    Take 1 tablet (200 mg) by mouth daily    Atrial fibrillation, unspecified type (H), Paroxysmal ventricular tachycardia (H), Permanent atrial fibrillation (H)       nitroGLYcerin 0.4 MG sublingual tablet    NITROSTAT     Place 0.4 mg under the tongue every 5 minutes as needed Reported on 5/16/2017        omeprazole 20 MG CR capsule    priLOSEC    90 capsule    Take 20 mg by mouth daily    GERD (gastroesophageal reflux disease)       oxyCODONE IR 5 MG tablet    ROXICODONE     Take 1-2 tablets by mouth every 6 hours as needed. For pain.        simvastatin 40 MG tablet    ZOCOR    30 tablet    Take 40 mg by mouth At Bedtime        tiotropium 18 MCG capsule    SPIRIVA    30 capsule    Inhale 1 capsule (18 mcg) into the lungs daily    SOB (shortness of breath)       TRAZODONE HCL PO      Take 50 mg by mouth nightly as needed        warfarin 5 MG tablet    COUMADIN     Take 7.5 mg by mouth See Admin Instructions Take 1.5 tab by mojth Monday and Friday and 1 tab Sun Tues WED , Thur and Sat

## 2018-07-31 ENCOUNTER — ONCOLOGY VISIT (OUTPATIENT)
Dept: TRANSPLANT | Facility: CLINIC | Age: 68
End: 2018-07-31
Attending: INTERNAL MEDICINE
Payer: MEDICARE

## 2018-07-31 VITALS
TEMPERATURE: 97 F | SYSTOLIC BLOOD PRESSURE: 124 MMHG | RESPIRATION RATE: 17 BRPM | HEART RATE: 92 BPM | OXYGEN SATURATION: 98 % | BODY MASS INDEX: 28.28 KG/M2 | DIASTOLIC BLOOD PRESSURE: 64 MMHG | WEIGHT: 197.1 LBS

## 2018-07-31 DIAGNOSIS — C90.02 MULTIPLE MYELOMA IN RELAPSE (H): Primary | ICD-10-CM

## 2018-07-31 PROCEDURE — G0463 HOSPITAL OUTPT CLINIC VISIT: HCPCS

## 2018-07-31 ASSESSMENT — PAIN SCALES - GENERAL: PAINLEVEL: NO PAIN (0)

## 2018-07-31 NOTE — LETTER
7/31/2018       RE: Erasmo Pearce  [address]  [address] [facility] [address]     Dear Colleague,    Thank you for referring your patient, Erasmo Pearce, to the [place] [facility] at [facility]. Please see a copy of my visit note below.    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  Jonh has been on Revlimid 10 mg days 1-21 and dexamethasone 40 mg days 1-8 since 02/2018.  Recently, Jonh has noted defibrillator shocks on 06/06, 07/18 and twice on 07/20.  He is followed by Dr. Jean-Baptiste.  Dr. Jean-Baptiste has increased his metoprolol to 200 mg each day.  He is also taking magnesium as he has been a little bit hypomagnesemic.  He feels tired and a little bit weaker.  He has noticed that he does not recover the week off of the Revlimid.  He has noted more fatigue.  He has not had significant constipation effects, and he says on Revlimid he seems to be more regular.  All in all, though, the fatigue and sleepiness seem to be a little bit worse.  He continues to have chronic pain and continues on long-acting opiates.      PAST MEDICAL HISTORY, SOCIAL HISTORY, FAMILY HISTORY:  See my note from 04/2018.      REVIEW OF SYSTEMS:  A 12 point review of systems done is negative as in HPI.      PHYSICAL EXAMINATION:   GENERAL:  He is an alert gentleman in no acute distress.   VITAL SIGNS:  Stable. /64 (BP Location: Right arm, Patient Position: Chair, Cuff Size: Adult Regular)  Pulse 92  Temp 97  F (36.1  C) (Oral)  Resp 17  Wt 89.4 kg (197 lb 1.6 oz)  SpO2 98%  BMI 28.28 kg/m2    HEENT:  Normocephalic.  EOM okay.  No scleral icterus.  Mouth without lesion.   RESPIRATORY:  Clear to percussion and  auscultation.   CARDIAC:  Irregular regular rhythm.  Cardioverter in the left upper chest wall.   ABDOMEN:  Without splenomegaly or tenderness.   EXTREMITIES:  Without edema.   NEUROLOGIC:  No tremor.      ASSESSMENT:   1.  Multiple myeloma in relapse.   2.  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   3.  Cardiomyopathy.   4.  Atrial fibrillation.   5.  History of deep vein thrombosis, on warfarin.   6.  Chronic back pain.   7.  Aortic aneurysm.   8.  History of plasmacytoma of the clivus, status post radiation.   9.  History of ventricular tachycardia with implantable defibrillator in place.        Jonh's light chains are coming down; they are down to 22.  He has really had a very good response.  I am very happy about that.  However, he has also become quite anemic.  His hemoglobin before we started was in the 13's, and today it is down to 9.5.  I am wondering if some of his fatigue is related to anemia.  His MCV is increased.  I think it is reasonable to give him a break on the Revlimid and dexamethasone for a couple months to see if his hemoglobin comes up and follow his light chains.  He has no other symptoms.  He has no hypercalcemia or other CRAB symptoms.  His renal function is stable.  I am concerned that anemia may promote more ventricular arrhythmias, and so I do think it is worth holding off.  His monoclonal peak is down to 0.1, IgG 569, IgA 209, IgM 18.  I will see him again in a couple months' time.  They will get blood counts every couple of weeks in [place] and another M-spike in a month.  We will decide at that time what to do about the Revlimid.  It is possible we will go back instead of a 21 day cycle, we will maybe go to a 14 day cycle for maintenance.     Results for DARRYL ZAPATA ALEXEY (MRN [id number]) as of 7/31/2018 11:36   Ref. Range 7/18/2018 00:00 7/20/2018 00:00 7/24/2018 07:00 7/24/2018 09:39 7/26/2018 00:00   Sodium Latest Ref Range: 133 - 144 mmol/L     140    Potassium Latest Ref Range: 3.4 - 5.3 mmol/L    3.8    Chloride Latest Ref Range: 94 - 109 mmol/L    105    Carbon Dioxide Latest Ref Range: 20 - 32 mmol/L    26    Urea Nitrogen Latest Ref Range: 7 - 30 mg/dL    30    Creatinine Latest Ref Range: 0.66 - 1.25 mg/dL    1.03    GFR Estimate Latest Ref Range: >60 mL/min/1.7m2    72    GFR Estimate If Black Latest Ref Range: >60 mL/min/1.7m2    87    Calcium Latest Ref Range: 8.5 - 10.1 mg/dL    8.5    Anion Gap Latest Ref Range: 3 - 14 mmol/L    9    Magnesium Latest Ref Range: 1.6 - 2.3 mg/dL    1.7    Albumin Latest Ref Range: 3.4 - 5.0 g/dL    3.3 (L)    Protein Total Latest Ref Range: 6.8 - 8.8 g/dL    6.7 (L)    Bilirubin Total Latest Ref Range: 0.2 - 1.3 mg/dL    0.4    Alkaline Phosphatase Latest Ref Range: 40 - 150 U/L    70    ALT Latest Ref Range: 0 - 70 U/L    25    AST Latest Ref Range: 0 - 45 U/L    13    Creatinine Urine Timed Latest Ref Range: 1.00 - 2.00    1.13     Creatinine Urine Latest Units: mg/dL   72     Protein Random Urine Latest Units: g/L   0.35     Protein Total Urine g/gr Creatinine Latest Ref Range: 0 - 0.2 g/g Cr   0.49 (H)     Protein Total 24 Hr Urine Latest Ref Range: 0.04 - 0.23    0.55 (H)     Glucose Latest Ref Range: 70 - 99 mg/dL    72    WBC Latest Ref Range: 4.0 - 11.0 10e9/L    5.6    Hemoglobin Latest Ref Range: 13.3 - 17.7 g/dL    9.5 (L)    Hematocrit Latest Ref Range: 40.0 - 53.0 %    29.2 (L)    Platelet Count Latest Ref Range: 150 - 450 10e9/L    92 (L)    RBC Count Latest Ref Range: 4.4 - 5.9 10e12/L    2.62 (L)    MCV Latest Ref Range: 78 - 100 fl    112 (H)    MCH Latest Ref Range: 26.5 - 33.0 pg    36.3 (H)    MCHC Latest Ref Range: 31.5 - 36.5 g/dL    32.5    RDW Latest Ref Range: 10.0 - 15.0 %    15.6 (H)    Diff Method Unknown    Automated Method    % Neutrophils Latest Units: %    59.2    % Lymphocytes Latest Units: %    19.5    % Monocytes Latest Units: %    17.9    % Eosinophils Latest Units: %    2.9     % Basophils Latest Units: %    0.0    % Immature Granulocytes Latest Units: %    0.5    Nucleated RBCs Latest Ref Range: 0 /100    0    Absolute Neutrophil Latest Ref Range: 1.6 - 8.3 10e9/L    3.3    Absolute Lymphocytes Latest Ref Range: 0.8 - 5.3 10e9/L    1.1    Absolute Monocytes Latest Ref Range: 0.0 - 1.3 10e9/L    1.0    Absolute Eosinophils Latest Ref Range: 0.0 - 0.7 10e9/L    0.2    Absolute Basophils Latest Ref Range: 0.0 - 0.2 10e9/L    0.0    Abs Immature Granulocytes Latest Ref Range: 0 - 0.4 10e9/L    0.0    Absolute Nucleated RBC Unknown    0.0    INR Latest Ref Range: 0.86 - 1.14     4.11 (H)    Albumin Fraction Latest Ref Range: 3.7 - 5.1 g/dL    3.4 (L)    Albumin Fraction Urine Latest Ref Range: 0 %   16.7 (H)     Alpha 1 Fraction Latest Ref Range: 0.2 - 0.4 g/dL    0.5 (H)    Alpha 1 Fraction Urine Latest Ref Range: 0 %   8.1 (H)     Alpha 2 Fraction Latest Ref Range: 0.5 - 0.9 g/dL    0.9    Alpha 2 Fraction Urine Latest Ref Range: 0 %   8.8 (H)     Beta Fraction Latest Ref Range: 0.6 - 1.0 g/dL    0.7    Beta Fraction Urine Latest Ref Range: 0 %   4.8 (H)     ELP Interpretation: Unknown    Small monoclonal ...    ELP Interpretation Urine Unknown   Albumin and globu...     Gamma Fraction Latest Ref Range: 0.7 - 1.6 g/dL    0.6 (L)    Gamma Fraction Urine Latest Ref Range: 0 %   61.6 (H)     IGA Latest Ref Range: 70 - 380 mg/dL    209    IGG Latest Ref Range: 695 - 1620 mg/dL    569 (L)    IGM Latest Ref Range: 60 - 265 mg/dL    18 (L)    Immunofix ELP Urine Unknown   (Note)     Kappa Free Lt Chain Latest Ref Range: 0.33 - 1.94 mg/dL    0.95    Kappa Lambda Ratio Latest Ref Range: 0.26 - 1.65     0.04 (L)    Lambda Free Lt Chain Latest Ref Range: 0.57 - 2.63 mg/dL    22.93 (H)    Monoclonal Peak Latest Ref Range: 0.0 g/dL    0.1 (H)    Monoclonal Peak Urine Latest Ref Range: 0% %   45.5 (H)     LAB RESULT - HIM SCAN Unknown  Attch      EP REPORT - HIM SCAN Unknown Attch Attch   Attch        Again, thank you for allowing me to participate in the care of your patient.      Sincerely,    Rojas Raygoza MD

## 2018-07-31 NOTE — PROGRESS NOTES
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  Jonh has been on Revlimid 10 mg days 1-21 and dexamethasone 40 mg days 1-8 since 02/2018.  Recently, Jonh has noted defibrillator shocks on 06/06, 07/18 and twice on 07/20.  He is followed by Dr. Jean-Baptiste.  Dr. Jean-Baptiste has increased his metoprolol to 200 mg each day.  He is also taking magnesium as he has been a little bit hypomagnesemic.  He feels tired and a little bit weaker.  He has noticed that he does not recover the week off of the Revlimid.  He has noted more fatigue.  He has not had significant constipation effects, and he says on Revlimid he seems to be more regular.  All in all, though, the fatigue and sleepiness seem to be a little bit worse.  He continues to have chronic pain and continues on long-acting opiates.      PAST MEDICAL HISTORY, SOCIAL HISTORY, FAMILY HISTORY:  See my note from 04/2018.      REVIEW OF SYSTEMS:  A 12 point review of systems done is negative as in HPI.      PHYSICAL EXAMINATION:   GENERAL:  He is an alert gentleman in no acute distress.   VITAL SIGNS:  Stable. /64 (BP Location: Right arm, Patient Position: Chair, Cuff Size: Adult Regular)  Pulse 92  Temp 97  F (36.1  C) (Oral)  Resp 17  Wt 89.4 kg (197 lb 1.6 oz)  SpO2 98%  BMI 28.28 kg/m2    HEENT:  Normocephalic.  EOM okay.  No scleral icterus.  Mouth without lesion.   RESPIRATORY:  Clear to percussion and auscultation.   CARDIAC:  Irregular regular rhythm.  Cardioverter in the left upper chest wall.   ABDOMEN:  Without splenomegaly or tenderness.   EXTREMITIES:  Without edema.   NEUROLOGIC:  No tremor.      ASSESSMENT:   1.  Multiple myeloma in relapse.   2.  Status post double tandem autologous peripheral blood stem cell  transplant.   3.  Cardiomyopathy.   4.  Atrial fibrillation.   5.  History of deep vein thrombosis, on warfarin.   6.  Chronic back pain.   7.  Aortic aneurysm.   8.  History of plasmacytoma of the clivus, status post radiation.   9.  History of ventricular tachycardia with implantable defibrillator in place.        Jonh's light chains are coming down; they are down to 22.  He has really had a very good response.  I am very happy about that.  However, he has also become quite anemic.  His hemoglobin before we started was in the 13's, and today it is down to 9.5.  I am wondering if some of his fatigue is related to anemia.  His MCV is increased.  I think it is reasonable to give him a break on the Revlimid and dexamethasone for a couple months to see if his hemoglobin comes up and follow his light chains.  He has no other symptoms.  He has no hypercalcemia or other CRAB symptoms.  His renal function is stable.  I am concerned that anemia may promote more ventricular arrhythmias, and so I do think it is worth holding off.  His monoclonal peak is down to 0.1, IgG 569, IgA 209, IgM 18.  I will see him again in a couple months' time.  They will get blood counts every couple of weeks in [place] and another M-spike in a month.  We will decide at that time what to do about the Revlimid.  It is possible we will go back instead of a 21 day cycle, we will maybe go to a 14 day cycle for maintenance.     Results for DARRYL ZAPATA ALEXEY (MRN [id number]) as of 7/31/2018 11:36   Ref. Range 7/18/2018 00:00 7/20/2018 00:00 7/24/2018 07:00 7/24/2018 09:39 7/26/2018 00:00   Sodium Latest Ref Range: 133 - 144 mmol/L    140    Potassium Latest Ref Range: 3.4 - 5.3 mmol/L    3.8    Chloride Latest Ref Range: 94 - 109 mmol/L    105    Carbon Dioxide Latest Ref Range: 20 - 32 mmol/L    26    Urea Nitrogen Latest Ref Range: 7 - 30 mg/dL    30    Creatinine Latest Ref Range: 0.66 - 1.25 mg/dL    1.03    GFR Estimate Latest Ref Range: >60  mL/min/1.7m2    72    GFR Estimate If Black Latest Ref Range: >60 mL/min/1.7m2    87    Calcium Latest Ref Range: 8.5 - 10.1 mg/dL    8.5    Anion Gap Latest Ref Range: 3 - 14 mmol/L    9    Magnesium Latest Ref Range: 1.6 - 2.3 mg/dL    1.7    Albumin Latest Ref Range: 3.4 - 5.0 g/dL    3.3 (L)    Protein Total Latest Ref Range: 6.8 - 8.8 g/dL    6.7 (L)    Bilirubin Total Latest Ref Range: 0.2 - 1.3 mg/dL    0.4    Alkaline Phosphatase Latest Ref Range: 40 - 150 U/L    70    ALT Latest Ref Range: 0 - 70 U/L    25    AST Latest Ref Range: 0 - 45 U/L    13    Creatinine Urine Timed Latest Ref Range: 1.00 - 2.00    1.13     Creatinine Urine Latest Units: mg/dL   72     Protein Random Urine Latest Units: g/L   0.35     Protein Total Urine g/gr Creatinine Latest Ref Range: 0 - 0.2 g/g Cr   0.49 (H)     Protein Total 24 Hr Urine Latest Ref Range: 0.04 - 0.23    0.55 (H)     Glucose Latest Ref Range: 70 - 99 mg/dL    72    WBC Latest Ref Range: 4.0 - 11.0 10e9/L    5.6    Hemoglobin Latest Ref Range: 13.3 - 17.7 g/dL    9.5 (L)    Hematocrit Latest Ref Range: 40.0 - 53.0 %    29.2 (L)    Platelet Count Latest Ref Range: 150 - 450 10e9/L    92 (L)    RBC Count Latest Ref Range: 4.4 - 5.9 10e12/L    2.62 (L)    MCV Latest Ref Range: 78 - 100 fl    112 (H)    MCH Latest Ref Range: 26.5 - 33.0 pg    36.3 (H)    MCHC Latest Ref Range: 31.5 - 36.5 g/dL    32.5    RDW Latest Ref Range: 10.0 - 15.0 %    15.6 (H)    Diff Method Unknown    Automated Method    % Neutrophils Latest Units: %    59.2    % Lymphocytes Latest Units: %    19.5    % Monocytes Latest Units: %    17.9    % Eosinophils Latest Units: %    2.9    % Basophils Latest Units: %    0.0    % Immature Granulocytes Latest Units: %    0.5    Nucleated RBCs Latest Ref Range: 0 /100    0    Absolute Neutrophil Latest Ref Range: 1.6 - 8.3 10e9/L    3.3    Absolute Lymphocytes Latest Ref Range: 0.8 - 5.3 10e9/L    1.1    Absolute Monocytes Latest Ref Range: 0.0 - 1.3  10e9/L    1.0    Absolute Eosinophils Latest Ref Range: 0.0 - 0.7 10e9/L    0.2    Absolute Basophils Latest Ref Range: 0.0 - 0.2 10e9/L    0.0    Abs Immature Granulocytes Latest Ref Range: 0 - 0.4 10e9/L    0.0    Absolute Nucleated RBC Unknown    0.0    INR Latest Ref Range: 0.86 - 1.14     4.11 (H)    Albumin Fraction Latest Ref Range: 3.7 - 5.1 g/dL    3.4 (L)    Albumin Fraction Urine Latest Ref Range: 0 %   16.7 (H)     Alpha 1 Fraction Latest Ref Range: 0.2 - 0.4 g/dL    0.5 (H)    Alpha 1 Fraction Urine Latest Ref Range: 0 %   8.1 (H)     Alpha 2 Fraction Latest Ref Range: 0.5 - 0.9 g/dL    0.9    Alpha 2 Fraction Urine Latest Ref Range: 0 %   8.8 (H)     Beta Fraction Latest Ref Range: 0.6 - 1.0 g/dL    0.7    Beta Fraction Urine Latest Ref Range: 0 %   4.8 (H)     ELP Interpretation: Unknown    Small monoclonal ...    ELP Interpretation Urine Unknown   Albumin and globu...     Gamma Fraction Latest Ref Range: 0.7 - 1.6 g/dL    0.6 (L)    Gamma Fraction Urine Latest Ref Range: 0 %   61.6 (H)     IGA Latest Ref Range: 70 - 380 mg/dL    209    IGG Latest Ref Range: 695 - 1620 mg/dL    569 (L)    IGM Latest Ref Range: 60 - 265 mg/dL    18 (L)    Immunofix ELP Urine Unknown   (Note)     Kappa Free Lt Chain Latest Ref Range: 0.33 - 1.94 mg/dL    0.95    Kappa Lambda Ratio Latest Ref Range: 0.26 - 1.65     0.04 (L)    Lambda Free Lt Chain Latest Ref Range: 0.57 - 2.63 mg/dL    22.93 (H)    Monoclonal Peak Latest Ref Range: 0.0 g/dL    0.1 (H)    Monoclonal Peak Urine Latest Ref Range: 0% %   45.5 (H)     LAB RESULT - HIM SCAN Unknown  Attch      EP REPORT - HIM SCAN Unknown Attch Attch   Attch

## 2018-07-31 NOTE — NURSING NOTE
"Oncology Rooming Note    July 31, 2018 11:10 AM   Erasmo Pearce is a 68 year old male who presents for:    Chief Complaint   Patient presents with     Oncology Clinic Visit     Patient with Multiple Myeloma here for provider visit      Initial Vitals: /64 (BP Location: Right arm, Patient Position: Chair, Cuff Size: Adult Regular)  Pulse 92  Temp 97  F (36.1  C) (Oral)  Resp 17  Wt 89.4 kg (197 lb 1.6 oz)  SpO2 98%  BMI 28.28 kg/m2 Estimated body mass index is 28.28 kg/(m^2) as calculated from the following:    Height as of 7/24/18: 1.778 m (5' 10\").    Weight as of this encounter: 89.4 kg (197 lb 1.6 oz). Body surface area is 2.1 meters squared.  No Pain (0) Comment: Data Unavailable   No LMP for male patient.  Allergies reviewed: Yes  Medications reviewed: Yes    Medications: Medication refills not needed today.  Pharmacy name entered into [employer]:    [facility] - [place] - [address] [place] [address] ('S) [facility]    Clinical concerns:     5 minutes for nursing intake (face to face time)     Codie Pham CMA              "

## 2018-08-22 ENCOUNTER — TRANSFERRED RECORDS (OUTPATIENT)
Dept: HEALTH INFORMATION MANAGEMENT | Facility: CLINIC | Age: 68
End: 2018-08-22

## 2018-09-19 ENCOUNTER — TRANSFERRED RECORDS (OUTPATIENT)
Dept: HEALTH INFORMATION MANAGEMENT | Facility: CLINIC | Age: 68
End: 2018-09-19

## 2018-09-19 LAB
ALT SERPL-CCNC: 14 U/L (ref 8–45)
AST SERPL-CCNC: 18 U/L (ref 5–41)
CREAT SERPL-MCNC: 1.24 MG/DL (ref 0.72–1.25)
GFR SERPL CREATININE-BSD FRML MDRD: >60 ML/MIN/1.73M2
GLUCOSE SERPL-MCNC: 113 MG/DL (ref 70–100)
POTASSIUM SERPL-SCNC: 4.5 MMOL/L (ref 3.5–5.1)

## 2018-09-27 DIAGNOSIS — C90.02 MULTIPLE MYELOMA IN RELAPSE (H): ICD-10-CM

## 2018-09-27 LAB
ALBUMIN SERPL-MCNC: 3.8 G/DL (ref 3.4–5)
ALP SERPL-CCNC: 99 U/L (ref 40–150)
ALT SERPL W P-5'-P-CCNC: 21 U/L (ref 0–70)
ANION GAP SERPL CALCULATED.3IONS-SCNC: 5 MMOL/L (ref 3–14)
AST SERPL W P-5'-P-CCNC: 19 U/L (ref 0–45)
BASOPHILS # BLD AUTO: 0.1 10E9/L (ref 0–0.2)
BASOPHILS NFR BLD AUTO: 0.7 %
BILIRUB SERPL-MCNC: 0.8 MG/DL (ref 0.2–1.3)
BUN SERPL-MCNC: 17 MG/DL (ref 7–30)
CALCIUM SERPL-MCNC: 8.8 MG/DL (ref 8.5–10.1)
CHLORIDE SERPL-SCNC: 107 MMOL/L (ref 94–109)
CO2 SERPL-SCNC: 30 MMOL/L (ref 20–32)
COLLECT DURATION TIME UR: 24 H
CREAT 24H UR-MRATE: 1.41 G/(24.H) (ref 1–2)
CREAT SERPL-MCNC: 1.06 MG/DL (ref 0.66–1.25)
CREAT UR-MCNC: 88 MG/DL
CRP SERPL-MCNC: 6.8 MG/L (ref 0–8)
DIFFERENTIAL METHOD BLD: ABNORMAL
EOSINOPHIL # BLD AUTO: 0.1 10E9/L (ref 0–0.7)
EOSINOPHIL NFR BLD AUTO: 0.9 %
ERYTHROCYTE [DISTWIDTH] IN BLOOD BY AUTOMATED COUNT: 13.2 % (ref 10–15)
GFR SERPL CREATININE-BSD FRML MDRD: 70 ML/MIN/1.7M2
GLUCOSE SERPL-MCNC: 110 MG/DL (ref 70–99)
HCT VFR BLD AUTO: 36.6 % (ref 40–53)
HGB BLD-MCNC: 11.7 G/DL (ref 13.3–17.7)
IMM GRANULOCYTES # BLD: 0 10E9/L (ref 0–0.4)
IMM GRANULOCYTES NFR BLD: 0.3 %
LYMPHOCYTES # BLD AUTO: 1.5 10E9/L (ref 0.8–5.3)
LYMPHOCYTES NFR BLD AUTO: 22.2 %
MAGNESIUM SERPL-MCNC: 1.9 MG/DL (ref 1.6–2.3)
MCH RBC QN AUTO: 35.7 PG (ref 26.5–33)
MCHC RBC AUTO-ENTMCNC: 32 G/DL (ref 31.5–36.5)
MCV RBC AUTO: 112 FL (ref 78–100)
MONOCYTES # BLD AUTO: 0.6 10E9/L (ref 0–1.3)
MONOCYTES NFR BLD AUTO: 8.9 %
NEUTROPHILS # BLD AUTO: 4.5 10E9/L (ref 1.6–8.3)
NEUTROPHILS NFR BLD AUTO: 67 %
NRBC # BLD AUTO: 0 10*3/UL
NRBC BLD AUTO-RTO: 0 /100
PLATELET # BLD AUTO: 150 10E9/L (ref 150–450)
POTASSIUM SERPL-SCNC: 4.1 MMOL/L (ref 3.4–5.3)
PROT 24H UR-MRATE: 0.41 G/(24.H) (ref 0.04–0.23)
PROT SERPL-MCNC: 7.3 G/DL (ref 6.8–8.8)
PROT UR-MCNC: 0.25 G/L
PROT/CREAT 24H UR: 0.29 G/G CR (ref 0–0.2)
RBC # BLD AUTO: 3.28 10E12/L (ref 4.4–5.9)
SODIUM SERPL-SCNC: 141 MMOL/L (ref 133–144)
SPECIMEN VOL UR: 1610 ML
VIT B12 SERPL-MCNC: 387 PG/ML (ref 193–986)
WBC # BLD AUTO: 6.8 10E9/L (ref 4–11)

## 2018-09-27 PROCEDURE — 86335 IMMUNFIX E-PHORSIS/URINE/CSF: CPT | Performed by: INTERNAL MEDICINE

## 2018-09-27 PROCEDURE — 83883 ASSAY NEPHELOMETRY NOT SPEC: CPT | Performed by: INTERNAL MEDICINE

## 2018-09-27 PROCEDURE — 80053 COMPREHEN METABOLIC PANEL: CPT | Performed by: INTERNAL MEDICINE

## 2018-09-27 PROCEDURE — 84166 PROTEIN E-PHORESIS/URINE/CSF: CPT | Performed by: INTERNAL MEDICINE

## 2018-09-27 PROCEDURE — 86140 C-REACTIVE PROTEIN: CPT | Performed by: INTERNAL MEDICINE

## 2018-09-27 PROCEDURE — 85025 COMPLETE CBC W/AUTO DIFF WBC: CPT | Performed by: INTERNAL MEDICINE

## 2018-09-27 PROCEDURE — 82607 VITAMIN B-12: CPT | Performed by: INTERNAL MEDICINE

## 2018-09-27 PROCEDURE — 00000402 ZZHCL STATISTIC TOTAL PROTEIN: Performed by: INTERNAL MEDICINE

## 2018-09-27 PROCEDURE — 36415 COLL VENOUS BLD VENIPUNCTURE: CPT

## 2018-09-27 PROCEDURE — 84165 PROTEIN E-PHORESIS SERUM: CPT | Performed by: INTERNAL MEDICINE

## 2018-09-27 PROCEDURE — 83735 ASSAY OF MAGNESIUM: CPT | Performed by: INTERNAL MEDICINE

## 2018-09-27 NOTE — NURSING NOTE
Chief Complaint   Patient presents with     Blood Draw     labs only     Labs drawn vpt in right AC and sent for processing. See flowsheets.    Kendal Downey,CMA

## 2018-09-28 LAB
ALBUMIN MFR UR ELPH: 9 %
ALBUMIN SERPL ELPH-MCNC: 4.1 G/DL (ref 3.7–5.1)
ALPHA1 GLOB MFR UR ELPH: 4.5 %
ALPHA1 GLOB SERPL ELPH-MCNC: 0.4 G/DL (ref 0.2–0.4)
ALPHA2 GLOB MFR UR ELPH: 3.8 %
ALPHA2 GLOB SERPL ELPH-MCNC: 0.8 G/DL (ref 0.5–0.9)
B-GLOBULIN MFR UR ELPH: 5.3 %
B-GLOBULIN SERPL ELPH-MCNC: 0.8 G/DL (ref 0.6–1)
GAMMA GLOB MFR UR ELPH: 77.4 %
GAMMA GLOB SERPL ELPH-MCNC: 0.9 G/DL (ref 0.7–1.6)
KAPPA LC UR-MCNC: 1.2 MG/DL (ref 0.33–1.94)
KAPPA LC/LAMBDA SER: 0.04 {RATIO} (ref 0.26–1.65)
LAMBDA LC SERPL-MCNC: 28.75 MG/DL (ref 0.57–2.63)
M PROTEIN MFR UR ELPH: 61.7 %
M PROTEIN SERPL ELPH-MCNC: 0.1 G/DL
PROT ELPH PNL UR ELPH: NORMAL
PROT PATTERN SERPL ELPH-IMP: ABNORMAL
PROT PATTERN UR ELPH-IMP: ABNORMAL

## 2018-10-02 ENCOUNTER — ONCOLOGY VISIT (OUTPATIENT)
Dept: TRANSPLANT | Facility: CLINIC | Age: 68
End: 2018-10-02
Attending: INTERNAL MEDICINE
Payer: MEDICARE

## 2018-10-02 VITALS
WEIGHT: 197.09 LBS | DIASTOLIC BLOOD PRESSURE: 66 MMHG | BODY MASS INDEX: 28.28 KG/M2 | HEART RATE: 53 BPM | TEMPERATURE: 98.3 F | SYSTOLIC BLOOD PRESSURE: 113 MMHG | OXYGEN SATURATION: 96 %

## 2018-10-02 DIAGNOSIS — Z95.810 AUTOMATIC IMPLANTABLE CARDIOVERTER-DEFIBRILLATOR IN SITU: ICD-10-CM

## 2018-10-02 DIAGNOSIS — C90.02 MULTIPLE MYELOMA IN RELAPSE (H): Primary | ICD-10-CM

## 2018-10-02 DIAGNOSIS — I49.01 VENTRICULAR FIBRILLATION (H): ICD-10-CM

## 2018-10-02 DIAGNOSIS — Z79.01 ANTICOAGULATION GOAL OF INR 2 TO 3: ICD-10-CM

## 2018-10-02 DIAGNOSIS — Z51.81 ANTICOAGULATION GOAL OF INR 2 TO 3: ICD-10-CM

## 2018-10-02 DIAGNOSIS — E83.42 HYPOMAGNESEMIA: ICD-10-CM

## 2018-10-02 PROCEDURE — G0463 HOSPITAL OUTPT CLINIC VISIT: HCPCS | Mod: ZF

## 2018-10-02 ASSESSMENT — PAIN SCALES - GENERAL: PAINLEVEL: NO PAIN (0)

## 2018-10-02 NOTE — PROGRESS NOTES
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  Jonh had been on Revlimid 10 mg days 1-21 and dexamethasone 40 mg days 1-8 since 02/2018 but stopped in July due to low hemoglobin and side effects of cramping..  He feels very good off revlimid and dexamethasone. No fevers, no cramps.Does have loose stools and take 6 magnesium tabs per day. Jonh has not had any defibrillator shocks lately.  He continues to have chronic pain and continues on long-acting opiates.He continues on coumadin to maintain INR  Between 2-3. He did get a flu shot this fall     PAST MEDICAL HISTORY, SOCIAL HISTORY, FAMILY HISTORY:  See my note from 07/2018.      REVIEW OF SYSTEMS:  A 12 point review of systems done is negative as in HPI.      PHYSICAL EXAMINATION:   GENERAL:  He is an alert gentleman in no acute distress.   VITAL SIGNS:  Stable. /66  Pulse 53  Temp 98.3  F (36.8  C) (Oral)  Wt 89.4 kg (197 lb 1.5 oz)  SpO2 96%  BMI 28.28 kg/m2    HEENT:  Normocephalic.  EOM okay.  No scleral icterus.  Mouth without lesion.   RESPIRATORY:  Clear to percussion and auscultation.   CARDIAC:  Irregular regular rhythm.  Cardioverter in the left upper chest wall.   ABDOMEN:  Without splenomegaly or tenderness.   EXTREMITIES:  Without edema.   NEUROLOGIC:  No tremor.    Results for DARRYL ZAPATA (MRN [id number]) as of 10/2/2018 11:30   Ref. Range 9/19/2018 00:00 9/27/2018 06:30 9/27/2018 09:41   Sodium Latest Ref Range: 133 - 144 mmol/L   141   Potassium Latest Ref Range: 3.4 - 5.3 mmol/L   4.1   Chloride Latest Ref Range: 94 - 109 mmol/L   107   Carbon Dioxide Latest Ref Range: 20 - 32 mmol/L   30   Urea Nitrogen Latest Ref Range: 7 - 30 mg/dL   17   Creatinine Latest Ref Range: 0.66 - 1.25 mg/dL    1.06   GFR Estimate Latest Ref Range: >60 mL/min/1.7m2   70   GFR Estimate If Black Latest Ref Range: >60 mL/min/1.7m2   84   Calcium Latest Ref Range: 8.5 - 10.1 mg/dL   8.8   Anion Gap Latest Ref Range: 3 - 14 mmol/L   5   Magnesium Latest Ref Range: 1.6 - 2.3 mg/dL   1.9   Albumin Latest Ref Range: 3.4 - 5.0 g/dL   3.8   Protein Total Latest Ref Range: 6.8 - 8.8 g/dL   7.3   Bilirubin Total Latest Ref Range: 0.2 - 1.3 mg/dL   0.8   Alkaline Phosphatase Latest Ref Range: 40 - 150 U/L   99   ALT Latest Ref Range: 0 - 70 U/L   21   AST Latest Ref Range: 0 - 45 U/L   19   Creatinine Urine Timed Latest Ref Range: 1.00 - 2.00   1.41    Creatinine Urine Latest Units: mg/dL  88    CRP Inflammation Latest Ref Range: 0.0 - 8.0 mg/L   6.8   Protein Random Urine Latest Units: g/L  0.25    Protein Total Urine g/gr Creatinine Latest Ref Range: 0 - 0.2 g/g Cr  0.29 (H)    Protein Total 24 Hr Urine Latest Ref Range: 0.04 - 0.23   0.41 (H)    Vitamin B12 Latest Ref Range: 193 - 986 pg/mL   387   Glucose Latest Ref Range: 70 - 99 mg/dL   110 (H)   WBC Latest Ref Range: 4.0 - 11.0 10e9/L   6.8   Hemoglobin Latest Ref Range: 13.3 - 17.7 g/dL   11.7 (L)   Hematocrit Latest Ref Range: 40.0 - 53.0 %   36.6 (L)   Platelet Count Latest Ref Range: 150 - 450 10e9/L   150   RBC Count Latest Ref Range: 4.4 - 5.9 10e12/L   3.28 (L)   MCV Latest Ref Range: 78 - 100 fl   112 (H)   MCH Latest Ref Range: 26.5 - 33.0 pg   35.7 (H)   MCHC Latest Ref Range: 31.5 - 36.5 g/dL   32.0   RDW Latest Ref Range: 10.0 - 15.0 %   13.2   Diff Method Unknown   Automated Method   % Neutrophils Latest Units: %   67.0   % Lymphocytes Latest Units: %   22.2   % Monocytes Latest Units: %   8.9   % Eosinophils Latest Units: %   0.9   % Basophils Latest Units: %   0.7   % Immature Granulocytes Latest Units: %   0.3   Nucleated RBCs Latest Ref Range: 0 /100   0   Absolute Neutrophil Latest Ref Range: 1.6 - 8.3 10e9/L   4.5   Absolute Lymphocytes Latest Ref Range: 0.8 -  5.3 10e9/L   1.5   Absolute Monocytes Latest Ref Range: 0.0 - 1.3 10e9/L   0.6   Absolute Eosinophils Latest Ref Range: 0.0 - 0.7 10e9/L   0.1   Absolute Basophils Latest Ref Range: 0.0 - 0.2 10e9/L   0.1   Abs Immature Granulocytes Latest Ref Range: 0 - 0.4 10e9/L   0.0   Absolute Nucleated RBC Unknown   0.0   Albumin Fraction Latest Ref Range: 3.7 - 5.1 g/dL   4.1   Albumin Fraction Urine Latest Ref Range: 0 %  9.0 (H)    Alpha 1 Fraction Latest Ref Range: 0.2 - 0.4 g/dL   0.4   Alpha 1 Fraction Urine Latest Ref Range: 0 %  4.5 (H)    Alpha 2 Fraction Latest Ref Range: 0.5 - 0.9 g/dL   0.8   Alpha 2 Fraction Urine Latest Ref Range: 0 %  3.8 (H)    Beta Fraction Latest Ref Range: 0.6 - 1.0 g/dL   0.8   Beta Fraction Urine Latest Ref Range: 0 %  5.3 (H)    ELP Interpretation: Unknown   Small monoclonal ...   ELP Interpretation Urine Unknown  Albumin and globu...    Gamma Fraction Latest Ref Range: 0.7 - 1.6 g/dL   0.9   Gamma Fraction Urine Latest Ref Range: 0 %  77.4 (H)    Immunofix ELP Urine Unknown  (Note)    Kappa Free Lt Chain Latest Ref Range: 0.33 - 1.94 mg/dL   1.20   Kappa Lambda Ratio Latest Ref Range: 0.26 - 1.65    0.04 (L)   Lambda Free Lt Chain Latest Ref Range: 0.57 - 2.63 mg/dL   28.75 (H)   Monoclonal Peak Latest Ref Range: 0.0 g/dL   0.1 (H)   Monoclonal Peak Urine Latest Ref Range: 0% %  61.7 (H)      ASSESSMENT:   1.  Multiple myeloma in relapse.   2.  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   3.  Cardiomyopathy.   4.  Atrial fibrillation.   5.  History of deep vein thrombosis, on warfarin.   6.  Chronic back pain.   7.  Aortic aneurysm.   8.  History of plasmacytoma of the clivus, status post radiation.   9.  History of ventricular tachycardia with implantable defibrillator in place.  10 Diarrhea        Jonh's lambda free light chains are up a bit  to 28 and M  Salvatore is 0.1.But hemoglobin has risen to 11.7  He has really had a very good response.  I am very  happy about the hemoglobin   He has no other symptoms.  He has no hypercalcemia or other CRAB symptoms.  His renal function is stable. Therefore will hold off on revlimid at this time and see him in 3 months with repeat labs. Carla will be having knee replacement in November so Jonh can help her post op.Will get labs monthly.For diarrhea will decrease magnesium from 6 tabs to 3 tabs per day.If goes to dentist needs amoxicillin with ICD.Needs to let dentist know he has had bisphosphates.    PLAN  Labs and clinic in 3 months

## 2018-10-02 NOTE — NURSING NOTE
"Oncology Rooming Note    October 2, 2018 10:39 AM   Erasmo Pearce is a 68 year old male who presents for:    Chief Complaint   Patient presents with     RECHECK     RTN- MM     Initial Vitals: /66  Pulse 53  Temp 98.3  F (36.8  C) (Oral)  Wt 89.4 kg (197 lb 1.5 oz)  SpO2 96%  BMI 28.28 kg/m2 Estimated body mass index is 28.28 kg/(m^2) as calculated from the following:    Height as of 7/24/18: 1.778 m (5' 10\").    Weight as of this encounter: 89.4 kg (197 lb 1.5 oz). Body surface area is 2.1 meters squared.  No Pain (0) Comment: Data Unavailable   No LMP for male patient.  Allergies reviewed: Yes  Medications reviewed: Yes    Medications: Medication refills not needed today.  Pharmacy name entered into [employer]    [facility] - [place], [place] - [address] [place] [address] ('S) [facility]    Clinical concerns: None     8 minutes for nursing intake (face to face time)     Tricia Barajas CMA              "

## 2018-10-02 NOTE — MR AVS SNAPSHOT
After Visit Summary   10/2/2018    Erasmo Pearce    MRN: [id number]           Patient Information     Date Of Birth          1950        Visit Information        Provider Department      10/2/2018 10:30 AM Rojas Raygoza MD [facility] and Mar[facility] Transplant        Today's Diagnoses     Multiple myeloma in relapse (H)    -  1    Ventricular fibrillation (H)        Implanted cardioverter-defibrillator in situ- [employer], single chamber- NOT dependent        Hypomagnesemia        Anticoagulation goal of INR 2 to 3              [facility] ([facility]  [address] [place] [address]  Phone: [phone]  Clinic Hours:   Monday-Thursday:7am to 7pm   Friday: 7am to 5pm   Weekends and holidays:    8am to noon (in general)  If your fever is 100.5  or greater,   call the clinic.  After hours call the   hospital at [phone] or   [phone]. Ask for the BMT   fellow on-call            Follow-ups after your visit        Your next 10 appointments already scheduled     Nov 13, 2018  8:30 AM CST   (Arrive by 8:15 AM)   Implanted Defibulator with  Cv Device 1   [facility] ([facility])    [address] [place] [address]  3rd Floor  [id number]               Nov 13, 2018  9:00 AM CST   (Arrive by 8:45 AM)   RETURN ARRHYTHMIA with Chuy Jean-Baptiste MD   [facility] ([facility])    [address] [place] [address]  [address]  [facility] [id number] [phone]            Jose Luis 15, 2019 10:30 AM CST   [facility] Draw with  [facility] LAB DRAW   [facility] ([facility])    [address] [place] [address]  [address]  [facility] [id number] [phone]            Jan 22, 2019 11:30 AM CST   Return with Rojas Raygoza MD   [facility] Blood and Marrow Transplant ([facility])    [address] [place] [address]  [address]  [facility] [id number] [phone]               Future tests that were ordered for you today     Open Future Orders        Priority Expected Expires Ordered    CBC with platelets differential Routine 1/9/2019 3/2/2019 10/2/2018    Comprehensive metabolic panel Routine 1/9/2019 3/2/2019 10/2/2018    Kappa and lambda light chain Routine 1/9/2019 3/2/2019 10/2/2018    Lactate Dehydrogenase Routine 1/9/2019 3/2/2019 10/2/2018    INR Routine 1/9/2019 3/2/2019 10/2/2018    Beta 2 microglobulin Routine 1/9/2019 3/2/2019 10/2/2018    Protein timed urine with Creat Ratio Routine 1/9/2019 3/2/2019 10/2/2018    Protein immunofixation urine Routine 1/9/2019 3/2/2019 10/2/2018    Protein electrophoresis timed urine Routine 1/9/2019 3/2/2019 10/2/2018            Who to contact     If you have questions or need follow up information about today's clinic visit or your schedule please contact [facility] directly at [phone].  Normal or non-critical lab and imaging results will be communicated to you by [employer], letter or phone within 4 business days after the clinic has received the results. If you do not hear from us within 7 days, please contact the clinic through [employer] or phone. If you have a critical or abnormal lab result, we will notify you by phone as soon as possible.  Submit refill requests through [employer] or call your pharmacy and they will forward the refill request to us. Please allow 3 business days for your refill to be completed.          Additional Information About Your Visit        [employer] Information     [employer] gives you secure access to your electronic health record. If you see a primary care provider, you can also send messages to your care team and make appointments. If you have questions, please call your primary care clinic.  If you do not have a primary care provider, please call [phone] and they will assist you.        Care EveryWhere ID     This is your Care EveryWhere ID. This could be used by other  organizations to access your [facility] medical records  [id number]        Your Vitals Were     Pulse Temperature Pulse Oximetry BMI (Body Mass Index)          53 98.3  F (36.8  C) (Oral) 96% 28.28 kg/m2         Blood Pressure from Last 3 Encounters:   10/02/18 113/66   07/31/18 124/64   07/24/18 115/69    Weight from Last 3 Encounters:   10/02/18 89.4 kg (197 lb 1.5 oz)   07/31/18 89.4 kg (197 lb 1.6 oz)   07/24/18 90.4 kg (199 lb 4.8 oz)              We Performed the Following     CBC with platelets     Comprehensive metabolic panel     Magnesium        Recent Review Flowsheet Data     BMT Recent Results Latest Ref Rng & Units 9/11/2017 11/17/2017 1/16/2018 4/10/2018 7/24/2018 9/19/2018 9/27/2018    WBC 4.0 - 11.0 10e9/L 8.2 8.5 10.5 8.2 5.6 - 6.8    Hemoglobin 13.3 - 17.7 g/dL 13.1(L) 13.6 12.6(L) 10.8(L) 9.5(L) - 11.7(L)    Platelet Count 150 - 450 10e9/L 173 187 184 165 92(L) - 150    Neutrophils (Absolute) 1.6 - 8.3 10e9/L 5.7 6.1 7.5 4.8 3.3 - 4.5    INR 0.86 - 1.14 - - 2.25(H) 2.99(H) 4.11(H) - -    Sodium 133 - 144 mmol/L 136 139 138 140 140 - 141    Potassium 3.4 - 5.3 mmol/L 4.4 4.4 4.3 3.7 3.8 4.5 4.1    Chloride 94 - 109 mmol/L 103 104 104 108 105 - 107    Glucose 70 - 99 mg/dL 112(H) 118(H) 89 83 72 113(H) 110(H)    Urea Nitrogen 7 - 30 mg/dL 21 31(H) 21 31(H) 30 - 17    Creatinine 0.66 - 1.25 mg/dL 1.12 1.12 1.04 0.88 1.03 1.24 1.06    Calcium (Total) 8.5 - 10.1 mg/dL 9.1 9.6 8.6 9.1 8.5 - 8.8    Protein (Total) 6.8 - 8.8 g/dL 7.3 7.2 7.3 6.6(L) 6.7(L) - 7.3    Albumin 3.4 - 5.0 g/dL 3.7 4.1 3.6 3.5 3.3(L) - 3.8    Alkaline Phosphatase 40 - 150 U/L 83 87 88 70 70 - 99    AST 0 - 45 U/L 18 21 18 15 13 18 19    ALT 0 - 70 U/L 21 24 18 19 25 14 21    MCV 78 - 100 fl 101(H) 104(H) 105(H) 104(H) 112(H) - 112(H)               Primary Care Provider Office Phone # Fax #    Cong Harry [phone]       [place] [address] [address] [facility] [address]        Equal Access to Services      CALI DAVILA : Hadii aad ku kiesha Truong, waaxda luqadaha, qaybta kaalmada carlos, nino sarahin hayaamandie avina adan faustino . So [facility] [phone].    ATENCIÓN: Si habla mariama, tiene a miles disposición servicios gratuitos de asistencia lingüística. Llame al [phone].    We comply with applicable federal civil rights laws and [place] laws. We do not discriminate on the basis of race, color, national origin, age, disability, sex, sexual orientation, or gender identity.            Thank you!     Thank you for choosing [facility]  for your care. Our goal is always to provide you with excellent care. Hearing back from our patients is one way we can continue to improve our services. Please take a few minutes to complete the written survey that you may receive in the mail after your visit with us. Thank you!             Your Updated Medication List - Protect others around you: Learn how to safely use, store and throw away your medicines at www.disposemymeds.org.          This list is accurate as of 10/2/18 11:22 AM.  Always use your most recent med list.                   Brand Name Dispense Instructions for use Diagnosis    albuterol 108 (90 Base) MCG/ACT inhaler    PROAIR HFA/PROVENTIL HFA/VENTOLIN HFA     Inhale 2 puffs into the lungs every 6 hours as needed        amoxicillin 500 MG tablet    AMOXIL    4 tablet    Take 4 tabs one hour before dental appointment    Multiple myeloma, without mention of having achieved remission       baclofen 10 MG tablet    LIORESAL     Take 10 mg by mouth 3 times daily as needed prn        CALCIUM 500 + D PO      Take 2 tablets by mouth daily.        dexamethasone 4 MG tablet    DECADRON    30 tablet    Take 10 tabs (40mg) Days 1, 8, and 15 of 28 day cycle.    Multiple myeloma not having achieved remission (H), Anticoagulation goal of INR 2 to 3       diclofenac 75 MG EC tablet    VOLTAREN     Take 75 mg by mouth 2 times daily as needed 1 tab prn         fentaNYL 25 mcg/hr 72 hr patch    DURAGESIC     Place 1 patch onto the skin every 72 hours        furosemide 20 MG tablet    LASIX    30 tablet    Take 20 mg by mouth daily    Status post chemotherapy, Multiple myeloma, without mention of having achieved remission       LENalidomide 10 MG Caps capsule CHEMO    REVLIMID    21 each    Take 10 mg by mouth daily on days 1-21 of 28 day cycle.    Multiple myeloma not having achieved remission (H), Anticoagulation goal of INR 2 to 3       lisinopril 5 MG tablet    PRINIVIL/ZESTRIL     Take 5 mg by mouth daily    Multiple myeloma, without mention of having achieved remission, Hypomagnesemia, Ventricular fibrillation (H), Anemia of other chronic disease, Renal insufficiency, Dilated cardiomyopathy (H), Automatic implantable cardiac defibrillator in situ, Personal history of diseases of blood and blood-forming organs, History of long-term use of multiple prescription drugs       Magnesium Oxide 420 MG Tabs     180 tablet    Take 1,260 mg by mouth 2 times daily    Hypomagnesemia       metoprolol succinate 200 MG 24 hr tablet    TOPROL-XL    90 tablet    Take 1 tablet (200 mg) by mouth daily    Atrial fibrillation, unspecified type (H), Paroxysmal ventricular tachycardia (H), Permanent atrial fibrillation (H)       nitroGLYcerin 0.4 MG sublingual tablet    NITROSTAT     Place 0.4 mg under the tongue every 5 minutes as needed Reported on 5/16/2017        omeprazole 20 MG CR capsule    priLOSEC    90 capsule    Take 20 mg by mouth daily    GERD (gastroesophageal reflux disease)       oxyCODONE IR 5 MG tablet    ROXICODONE     Take 1-2 tablets by mouth every 6 hours as needed. For pain.        simvastatin 40 MG tablet    ZOCOR    30 tablet    Take 40 mg by mouth At Bedtime        tiotropium 18 MCG capsule    SPIRIVA    30 capsule    Inhale 1 capsule (18 mcg) into the lungs daily    SOB (shortness of breath)       TRAZODONE HCL PO      Take 50 mg by mouth nightly as needed         warfarin 5 MG tablet    COUMADIN     Take 7.5 mg by mouth See Admin Instructions Take 1.5 tab by mojth Monday and Friday and 1 tab Sun Tues WED , Thur and Sat

## 2018-10-03 ENCOUNTER — TRANSFERRED RECORDS (OUTPATIENT)
Dept: HEALTH INFORMATION MANAGEMENT | Facility: CLINIC | Age: 68
End: 2018-10-03

## 2018-11-12 ENCOUNTER — TRANSFERRED RECORDS (OUTPATIENT)
Dept: HEALTH INFORMATION MANAGEMENT | Facility: CLINIC | Age: 68
End: 2018-11-12

## 2018-11-12 LAB
ALT SERPL-CCNC: 14 U/L (ref 8–45)
AST SERPL-CCNC: 15 U/L (ref 5–41)
CREAT SERPL-MCNC: 0.98 MG/DL (ref 0.72–1.25)
GFR SERPL CREATININE-BSD FRML MDRD: >60 ML/MIN/1.73M2
GLUCOSE SERPL-MCNC: 127 MG/DL (ref 70–100)
POTASSIUM SERPL-SCNC: 4.1 MMOL/L (ref 3.5–5.1)

## 2018-11-13 ENCOUNTER — ALLIED HEALTH/NURSE VISIT (OUTPATIENT)
Dept: CARDIOLOGY | Facility: CLINIC | Age: 68
End: 2018-11-13
Attending: INTERNAL MEDICINE
Payer: MEDICARE

## 2018-11-13 VITALS
OXYGEN SATURATION: 97 % | WEIGHT: 203.4 LBS | HEART RATE: 66 BPM | DIASTOLIC BLOOD PRESSURE: 67 MMHG | SYSTOLIC BLOOD PRESSURE: 122 MMHG | HEIGHT: 70 IN | BODY MASS INDEX: 29.12 KG/M2

## 2018-11-13 DIAGNOSIS — Z95.810 ICD (IMPLANTABLE CARDIOVERTER-DEFIBRILLATOR), SINGLE, IN SITU: Primary | ICD-10-CM

## 2018-11-13 DIAGNOSIS — I49.01 VF (VENTRICULAR FIBRILLATION) (H): ICD-10-CM

## 2018-11-13 DIAGNOSIS — I42.0 DILATED CARDIOMYOPATHY (H): ICD-10-CM

## 2018-11-13 DIAGNOSIS — I48.21 PERMANENT ATRIAL FIBRILLATION (H): ICD-10-CM

## 2018-11-13 PROCEDURE — 99214 OFFICE O/P EST MOD 30 MIN: CPT | Mod: GC | Performed by: INTERNAL MEDICINE

## 2018-11-13 PROCEDURE — G0463 HOSPITAL OUTPT CLINIC VISIT: HCPCS | Mod: ZF

## 2018-11-13 ASSESSMENT — PAIN SCALES - GENERAL: PAINLEVEL: NO PAIN (0)

## 2018-11-13 NOTE — PROGRESS NOTES
Cardiac Electrophysiology Clinic      HPI:  Mr. Pearce is a 69 yo male with permanent atrial fibrillation, ischemic cardiomyopathy and LV EF of 35% s/p ICD in 12/2009 initially for primary prevention, history of multiple appropriate shocks for VF (typically while undergoing chemotherapy), and multiple myeloma recently under treatment for a relapse.  After an appropriate shock in 6/2018 for VF and an inappropriate shock in 7/2018 for what looked like atrial fibrillation with rapid ventricular rates (EGMs showed an irregularly irregular rhythm with periods of tachycardia sensed as VT; following a shock the rhythm remained irregular although the rate was slower, in contrast to prior episodes where the EGMs showed clear ventricular fibrillation or flutter) we had increased his metoprolol from 150 mg to 200 mg daily for rate control as well as suppression of ventricular ectopy.  We had considered antiarrhythmic therapy but a low DLCO precluded amiodarone in the past and he did not tolerate sotalol (low blood pressures).  We decided to start mexilitine if he had further ICD shocks.  Since we last saw him a few weeks ago he has overall been feeling well.  His oncologist has stopped his chemotherapy for now based on his favorable response, which Mr. Pearce feels has improved his energy level considerably.    Apart from mild episodic dizziness which can occur anytime - sitting, standing, walking, or lying down - and resolves after a few seconds, he has no complaints.  He denies chest pain, worsened shortness of breath, or palpitations.      Current Outpatient Prescriptions on File Prior to Visit:  albuterol (PROAIR HFA, PROVENTIL HFA, VENTOLIN HFA) 108 (90 BASE) MCG/ACT inhaler Inhale 2 puffs into the lungs every 6 hours as needed    amoxicillin (AMOXIL) 500 MG tablet Take 4 tabs one hour before dental appointment   baclofen (LIORESAL) 10 MG tablet Take 10 mg by mouth 3 times daily as needed prn   Calcium  Carbonate-Vitamin D (CALCIUM 500 + D PO) Take 2 tablets by mouth daily.   dexamethasone (DECADRON) 4 MG tablet Take 10 tabs (40mg) Days 1, 8, and 15 of 28 day cycle.   diclofenac (VOLTAREN) 75 MG EC tablet Take 75 mg by mouth 2 times daily as needed 1 tab prn   fentaNYL (DURAGESIC) 25 mcg/hr patch 72 hr Place 1 patch onto the skin every 72 hours   furosemide (LASIX) 20 MG tablet Take 20 mg by mouth daily    LENalidomide (REVLIMID) 10 MG CAPS capsule CHEMO Take 10 mg by mouth daily on days 1-21 of 28 day cycle.   lisinopril (PRINIVIL,ZESTRIL) 5 MG tablet Take 5 mg by mouth daily    Magnesium Oxide 420 MG TABS Take 1,260 mg by mouth 2 times daily   metoprolol succinate (TOPROL-XL) 200 MG 24 hr tablet Take 1 tablet (200 mg) by mouth daily   nitroGLYCERIN (NITROSTAT) 0.4 MG SL tablet Place 0.4 mg under the tongue every 5 minutes as needed Reported on 5/16/2017   omeprazole (PRILOSEC) 20 MG capsule Take 20 mg by mouth daily    oxycodone (ROXICODONE) 5 MG immediate release tablet Take 1-2 tablets by mouth every 6 hours as needed. For pain.    simvastatin (ZOCOR) 40 MG tablet Take 40 mg by mouth At Bedtime    tiotropium (SPIRIVA) 18 MCG inhalation capsule Inhale 1 capsule (18 mcg) into the lungs daily   TRAZODONE HCL PO Take 50 mg by mouth nightly as needed    warfarin (COUMADIN) 5 MG tablet Take 7.5 mg by mouth See Admin Instructions Take 1.5 tab by mojth Monday and Friday and 1 tab Sun Tues WED , Thur and Sat     No current facility-administered medications on file prior to visit.   Allergies   Allergen Reactions     Nkda [No Known Drug Allergies]        Past Medical History:   Diagnosis Date     Atrial fibrillation (H)      Other premature beats      VF (ventricular fibrillation) (H)      Past Surgical History:   Procedure Laterality Date     IMPLANT AUTOMATIC IMPLANTABLE CARDIOVERTER DEFIBRILLATOR       No family history on file.  Social History     Social History     Marital status:      Spouse name: N/A      "Number of children: N/A     Years of education: N/A     Occupational History     Not on file.     Social History Main Topics     Smoking status: Former Smoker     Packs/day: 1.00     Years: 25.00     Types: Cigarettes     Quit date: 10/15/1995     Smokeless tobacco: Never Used     Alcohol use No     Drug use: No     Sexual activity: Not on file     Other Topics Concern     Not on file     Social History Narrative     A complete review of systems is negative except as noted above in the HPI.    Physical Examination:  Vitals: /67 (BP Location: Right arm, Patient Position: Chair, Cuff Size: Adult Regular)  Pulse 66  Ht 1.778 m (5' 10\")  Wt 92.3 kg (203 lb 6.4 oz)  SpO2 97%  BMI 29.18 kg/m2  GENERAL APPEARANCE: comfortable appearing male with unlabored breathing at rest.  HEENT: no scleral icterus, no xanthelasmas  NECK:  JVP 10 cm (he didn't take his lasix this morning).  No carotid bruits.  CHEST: lungs clear to auscultation  CARDIOVASCULAR:  RRR with normal s1 and s2, no murmur or rub, warm extremities, and no peripheral edema.  ABDOMEN: soft, not tender, no abdominal bruits.  NEURO: alert and fully oriented, fluent speech  SKIN: no peripheral petechiae/ecchymoses, not jaundiced    Relevant labs, imaging, and procedures were reviewed.  Recent Labs   Lab Test  09/27/18   0941 09/19/18 07/24/18   0939  04/10/18   1115   CR  1.06  1.24  1.03  0.88   BUN  17   --   30  31*   POTASSIUM  4.1  4.5  3.8  3.7   NA  141   --   140  140   HGB  11.7*   --   9.5*  10.8*   PLT  150   --   92*  165   WBC  6.8   --   5.6  8.2     Echocardiogram in 2/2017 showed an LV EF of 30-35%, moderate RV dilation and moderately reduced RV function, mild MR & mild-moderate TR, and no pericardial effusion.        Assessment and recommendations:  Mr. Pearce is a 69 yo male with an ICD implanted initially for primary prevention of SCD associated with an ischemic cardiomyopathy, LV EF 35%, but who subsequently had appropriate ICD " "shocks for VF (last in 6/2018) as well as an inappropriate shock for AF in 7/2018.    1  Ventricular fibrillation and ICD shocks, typically while undergoing chemotherapy - his last two episodes in 4/2018 and 6/2018 were both terminated with a single shock and were relatively well tolerated, symptomatically.  Device interrogation today showed only brief episodes of NSVT (which may actually be AF) but no sustained arrhythmias or device therapies.  As mentioned in our last note, ee had previously discussed starting mexilitine (low DLCO has precluded amiodarone in the past and sotalol caused low blood pressures) but since the episodes are relatively infrequent, are relatively well tolerated, and only occur during chemotherapy (\"reversible cause\", despite the persistent substrate) we decided against it.  If he develops more frequent VT/VF we will consider empiric mexiletine.    2. Permanent AF, rate controlled on 200 mg of metoprolol daily.  He is anticoagulated with warfarin for his CHADSVasc score of 3 for chf, age, and cad.    3. Ischemic cardiomyopathy, LV EF 35%, NYHA class II, stage C, on ACEi and BB and low dose diuretic with ICD in place.  He is slightly hypervolemic on examination today but has no symptoms of congestion and had not yet taken his lasix today.    4. Lightheadedness, brief episodes with no relationship to exertion - may be due to the non-sustained VT/short runs of AF seen on device interrogation or to low BPs (90s-110s/60s on home checks).  They are not very bothersome to him so we will continue his lisinopril for the cardiovascular/mortality benefit but we did recommend suggest trying a single dose at night rather than split in the morning & evening.    Follow up in 5-6 month in clinic.    I discussed the patient with Dr. Chuy Jean-Baptiste.    Pierre Chaidez MD  Cardiovascular disease fellow    CARDIOLOGY STAFF  Patient seen and examined by me.  History and physical examination discussed with Dr." "Dm whose note reflects our joint assessment and recommendation/plans.  Erasmo Pearce returns for follow-up. We last saw him in July 2018. He is a 68-year-old gentleman with multiple myeloma, permanent atrial fibrillation, ischemic cardiomyopathy with an ejection fraction of approximately 35% who had an implantable cardioverter-defibrillator placed in December 2009 for primary prevention with subsequent appropriate shocks. He is not currently on antiarrhythmic drugs. His heart rate histogram during atrial fibrillation has been generally excellent although he has had a few fast episodes that fall into the VT monitor zone, and may have caused two shocks on July 20. At his last visit we increased his metoprolol succinate to 200 mg daily for rate control during AF. We decreased his lisinopril from 10 mg daily to 5 mg daily (to make \"BP room\" for the higher dose of metoprolol). He has had past difficulties in tolerating antiarrhythmic agents (low DLCO precluding amiodarone, hypotension on sotalol) and he has had a tendency to have ventricular fibrillation during chemotherapy, possibly because his electrolytes are more labile. He saw Dr. Raygoza last month. Mr. Pearce appeared to be doing well and they appear to be holding off on chemotherapy for now and will re-assess in about 2 months from now.  He has had no ICD therapies.  His device has many non-sustained episodes logged, at least some are AF with RVR.  We will make no changes in his medications.  Unless he has problems sooner he will return for follow-up in 5-6 months (at which point he may be back on chemotherapy.)  Chuy Jean-Baptiste"

## 2018-11-13 NOTE — MR AVS SNAPSHOT
MRN:[id number]                      After Visit Summary   11/13/2018    Erasmo Pearce    MRN: [id number]           Visit Information        Provider Department      11/13/2018 8:30 AM 1, U[facility] Cv Device [facility]        Your next 10 appointments already scheduled     Jose Luis 15, 2019 10:30 AM CST   [facility] with  [facility]   [facility] ([facility])    [address]  [address]  [place] [facility] [address] [phone]            Jan 22, 2019 11:30 AM CST   Return with Rojas Raygoza MD   [facility] Blood and Marrow Transplant ([facility])    [address]  [address]  [place] [facility] [address] [phone]              [employer] Information     [employer] gives you secure access to your electronic health record. If you see a primary care provider, you can also send messages to your care team and make appointments. If you have questions, please call your primary care clinic.  If you do not have a primary care provider, please call [phone] and they will assist you.      [employer] is an electronic gateway that provides easy, online access to your medical records. With [employer], you can request a clinic appointment, read your test results, renew a prescription or communicate with your care team.     To access your existing account, please contact your [facility] or call [phone] for assistance.        Care EveryWhere ID     This is your Care EveryWhere ID. This could be used by other organizations to access your [place] medical records  [id number]        Equal Access to Services     CALI DAVILA : Hadii aad ku hadasho Somykeali, waaxda luqadaha, qaybta kaalmada adeegyada, nino sierra. So [facility] [phone].    ATENCIÓN: Si habla español, tiene a miles disposición servicios gratuitos de asistencia lingüística. Llame al  [phone].    We comply with applicable federal civil rights laws and [place] laws. We do not discriminate on the basis of race, color, national origin, age, disability, sex, sexual orientation, or gender identity.

## 2018-11-13 NOTE — LETTER
11/13/2018      RE: Erasmo Pearce  [address]  [address] [place] [facility] [address]       Dear Colleague,    Thank you for the opportunity to participate in the care of your patient, Erasmo Pearce, at the [facility]. Please see a copy of my visit note below.    Cardiac Electrophysiology Clinic      HPI:  Mr. Pearce is a 69 yo male with permanent atrial fibrillation, ischemic cardiomyopathy and LV EF of 35% s/p ICD in 12/2009 initially for primary prevention, history of multiple appropriate shocks for VF (typically while undergoing chemotherapy), and multiple myeloma recently under treatment for a relapse.  After an appropriate shock in 6/2018 for VF and an inappropriate shock in 7/2018 for what looked like atrial fibrillation with rapid ventricular rates (EGMs showed an irregularly irregular rhythm with periods of tachycardia sensed as VT; following a shock the rhythm remained irregular although the rate was slower, in contrast to prior episodes where the EGMs showed clear ventricular fibrillation or flutter) we had increased his metoprolol from 150 mg to 200 mg daily for rate control as well as suppression of ventricular ectopy.  We had considered antiarrhythmic therapy but a low DLCO precluded amiodarone in the past and he did not tolerate sotalol (low blood pressures).  We decided to start mexilitine if he had further ICD shocks.  Since we last saw him a few weeks ago he has overall been feeling well.  His oncologist has stopped his chemotherapy for now based on his favorable response, which Mr. Pearce feels has improved his energy level considerably.    Apart from mild episodic dizziness which can occur anytime - sitting, standing, walking, or lying down - and resolves after a few seconds, he has no complaints.  He denies chest pain, worsened shortness of breath, or palpitations.      Current Outpatient Prescriptions on File  Prior to Visit:  albuterol (PROAIR HFA, PROVENTIL HFA, VENTOLIN HFA) 108 (90 BASE) MCG/ACT inhaler Inhale 2 puffs into the lungs every 6 hours as needed    amoxicillin (AMOXIL) 500 MG tablet Take 4 tabs one hour before dental appointment   baclofen (LIORESAL) 10 MG tablet Take 10 mg by mouth 3 times daily as needed prn   Calcium Carbonate-Vitamin D (CALCIUM 500 + D PO) Take 2 tablets by mouth daily.   dexamethasone (DECADRON) 4 MG tablet Take 10 tabs (40mg) Days 1, 8, and 15 of 28 day cycle.   diclofenac (VOLTAREN) 75 MG EC tablet Take 75 mg by mouth 2 times daily as needed 1 tab prn   fentaNYL (DURAGESIC) 25 mcg/hr patch 72 hr Place 1 patch onto the skin every 72 hours   furosemide (LASIX) 20 MG tablet Take 20 mg by mouth daily    LENalidomide (REVLIMID) 10 MG CAPS capsule CHEMO Take 10 mg by mouth daily on days 1-21 of 28 day cycle.   lisinopril (PRINIVIL,ZESTRIL) 5 MG tablet Take 5 mg by mouth daily    Magnesium Oxide 420 MG TABS Take 1,260 mg by mouth 2 times daily   metoprolol succinate (TOPROL-XL) 200 MG 24 hr tablet Take 1 tablet (200 mg) by mouth daily   nitroGLYCERIN (NITROSTAT) 0.4 MG SL tablet Place 0.4 mg under the tongue every 5 minutes as needed Reported on 5/16/2017   omeprazole (PRILOSEC) 20 MG capsule Take 20 mg by mouth daily    oxycodone (ROXICODONE) 5 MG immediate release tablet Take 1-2 tablets by mouth every 6 hours as needed. For pain.    simvastatin (ZOCOR) 40 MG tablet Take 40 mg by mouth At Bedtime    tiotropium (SPIRIVA) 18 MCG inhalation capsule Inhale 1 capsule (18 mcg) into the lungs daily   TRAZODONE HCL PO Take 50 mg by mouth nightly as needed    warfarin (COUMADIN) 5 MG tablet Take 7.5 mg by mouth See Admin Instructions Take 1.5 tab by mojth Monday and Friday and 1 tab Sun Tues WED , Thur and Sat     No current facility-administered medications on file prior to visit.   Allergies   Allergen Reactions     Nkda [No Known Drug Allergies]        Past Medical History:   Diagnosis Date      "Atrial fibrillation (H)      Other premature beats      VF (ventricular fibrillation) (H)      Past Surgical History:   Procedure Laterality Date     IMPLANT AUTOMATIC IMPLANTABLE CARDIOVERTER DEFIBRILLATOR       No family history on file.  Social History     Social History     Marital status:      Spouse name: N/A     Number of children: N/A     Years of education: N/A     Occupational History     Not on file.     Social History Main Topics     Smoking status: Former Smoker     Packs/day: 1.00     Years: 25.00     Types: Cigarettes     Quit date: 10/15/1995     Smokeless tobacco: Never Used     Alcohol use No     Drug use: No     Sexual activity: Not on file     Other Topics Concern     Not on file     Social History Narrative     A complete review of systems is negative except as noted above in the HPI.    Physical Examination:  Vitals: /67 (BP Location: Right arm, Patient Position: Chair, Cuff Size: Adult Regular)  Pulse 66  Ht 1.778 m (5' 10\")  Wt 92.3 kg (203 lb 6.4 oz)  SpO2 97%  BMI 29.18 kg/m2  GENERAL APPEARANCE: comfortable appearing male with unlabored breathing at rest.  HEENT: no scleral icterus, no xanthelasmas  NECK:  JVP 10 cm (he didn't take his lasix this morning).  No carotid bruits.  CHEST: lungs clear to auscultation  CARDIOVASCULAR:  RRR with normal s1 and s2, no murmur or rub, warm extremities, and no peripheral edema.  ABDOMEN: soft, not tender, no abdominal bruits.  NEURO: alert and fully oriented, fluent speech  SKIN: no peripheral petechiae/ecchymoses, not jaundiced    Relevant labs, imaging, and procedures were reviewed.  Recent Labs   Lab Test  09/27/18   0941 09/19/18 07/24/18   0939  04/10/18   1115   CR  1.06  1.24  1.03  0.88   BUN  17   --   30  31*   POTASSIUM  4.1  4.5  3.8  3.7   NA  141   --   140  140   HGB  11.7*   --   9.5*  10.8*   PLT  150   --   92*  165   WBC  6.8   --   5.6  8.2     Echocardiogram in 2/2017 showed an LV EF of 30-35%, moderate RV " "dilation and moderately reduced RV function, mild MR & mild-moderate TR, and no pericardial effusion.        Assessment and recommendations:  Mr. Pearce is a 69 yo male with an ICD implanted initially for primary prevention of SCD associated with an ischemic cardiomyopathy, LV EF 35%, but who subsequently had appropriate ICD shocks for VF (last in 6/2018) as well as an inappropriate shock for AF in 7/2018.    1  Ventricular fibrillation and ICD shocks, typically while undergoing chemotherapy - his last two episodes in 4/2018 and 6/2018 were both terminated with a single shock and were relatively well tolerated, symptomatically.   Device interrogation today showed only brief episodes of NSVT (which may actually be AF) but no sustained arrhythmias or device therapies.  As mentioned in our last note, ee had previously discussed starting mexilitine (low DLCO has precluded amiodarone in the past and sotalol caused low blood pressures) but since the episodes are relatively infrequent, are relatively well tolerated, and only occur during chemotherapy (\"reversible cause\", despite the persistent substrate) we decided against it.  If he develops more frequent VT/VF we will consider empiric mexiletine.    2. Permanent AF, rate controlled on 200 mg of metoprolol daily.  He is anticoagulated with warfarin for his CHADSVasc score of 3 for chf, age, and cad.    3. Ischemic cardiomyopathy, LV EF 35%, NYHA class II, stage C, on ACEi and BB and low dose diuretic with ICD in place.  He is slightly hypervolemic on examination today but has no symptoms of congestion and had not yet taken his lasix today.    4. Lightheadedness, brief episodes with no relationship to exertion - may be due to the non-sustained VT/short runs of AF seen on device interrogation or to low BPs (90s-110s/60s on home checks).  They are not very bothersome to him so we will continue his lisinopril for the cardiovascular/mortality benefit but we did recommend " "suggest trying a single dose at night rather than split in the morning & evening.    Follow up in 5-6 month in clinic.    I discussed the patient with Dr. Chuy Jean-Baptiste.    Pierre Chaidez MD  Cardiovascular disease fellow    CARDIOLOGY STAFF  Patient seen and examined by me.  History and physical examination discussed with Dr. Chaidez whose note reflects our joint assessment and recommendation/plans.  Erasmo Pearce returns for follow-up. We last saw him in July 2018. He is a 68-year-old gentleman with multiple myeloma, permanent atrial fibrillation, ischemic cardiomyopathy with an ejection fraction of approximately 35% who had an implantable cardioverter-defibrillator placed in December 2009 for primary prevention with subsequent appropriate shocks. He is not currently on antiarrhythmic drugs. His heart rate histogram during atrial fibrillation has been generally excellent although he has had a few fast episodes that fall into the VT monitor zone, and may have caused two shocks on July 20. At his last visit we increased his metoprolol succinate to 200 mg daily for rate control during AF. We decreased his lisinopril from 10 mg daily to 5 mg daily (to make \"BP room\" for the higher dose of metoprolol). He has had past difficulties in tolerating antiarrhythmic agents (low DLCO precluding amiodarone, hypotension on sotalol) and he has had a tendency to have ventricular fibrillation during chemotherapy, possibly because his electrolytes are more labile. He saw Dr. Raygoza last month. Mr. Pearce appeared to be doing well and they appear to be holding off on chemotherapy for now and will re-assess in about 2 months from now.  He has had no ICD therapies.  His device has many non-sustained episodes logged, at least some are AF with RVR.  We will make no changes in his medications.  Unless he has problems sooner he will return for follow-up in 5-6 months (at which point he may be back on chemotherapy.)  Chuy " Jerilyn

## 2018-11-13 NOTE — MR AVS SNAPSHOT
After Visit Summary   11/13/2018    Erasmo Pearce    MRN: [id number]           Patient Information     Date Of Birth          1950        Visit Information        Provider Department      11/13/2018 9:00 AM Chuy Jean-Baptiste MD [facility]        Today's Diagnoses     Permanent atrial fibrillation (H)          Care Instructions    You will be scheduled for a follow up visit: 5-6  months    We encourage you to use My Chart as your primary form of communication if possible. If you need assistance in setting this up, please contact our office or ask at your follow up visit.     If you need a medication refill please contact your pharmacy. Please allow at least 3 business days for your refill to be completed.       Cardiology  Telephone Number    Aleida Angeles [phone]   Or send a message to your provider via my chart.   For scheduling procedures:    [facility]    Clinic appointments       [phone]   For the Device Clinic (Pacemakers and ICD's)   RN's :   Natali Vale  During business hours: [phone]    After business hours:   [phone]- select option 4 and ask for job code [id number].          As always, Thank you for trusting us with your health care needs!          Follow-ups after your visit        Additional Services     Follow-Up with Electrophysiologist - 6 Months                 Your next 10 appointments already scheduled     Jose Luis 15, 2019 10:30 AM CST   Masonic Lab Draw with  MASONIC LAB DRAW   [facility] Lab Draw ([facility])    [address] [place] [address]  [address] [place] [id number]   [phone]            Jan 22, 2019 11:30 AM CST   Return with Rojas Raygoza MD   [facility] ([facility])    [address] [place] [address]  [address] [place] [id number]   [phone]            Mar 19, 2019  9:00 AM CDT   (Arrive by 8:45 AM)   Implanted  Defibulator with Uc Cv Device 1   [facility] ([facility])    [address]  3rd Floor  [address]               Mar 19, 2019  9:30 AM CDT   (Arrive by 9:15 AM)   RETURN ARRHYTHMIA with Chuy Jean-Baptiste MD   [facility] ([facility])    [address]  [address]  [facility] [address] [phone]              Future tests that were ordered for you today     Open Future Orders        Priority Expected Expires Ordered    Follow-Up with Electrophysiologist - 6 Months Routine 5/12/2019 8/10/2019 11/13/2018    Follow-Up with Device Clinic-6 months Routine 5/12/2019 8/10/2019 11/13/2018            Who to contact     If you have questions or need follow up information about today's clinic visit or your schedule please contact [facility] directly at [phone].  Normal or non-critical lab and imaging results will be communicated to you by Media Templehart, letter or phone within 4 business days after the clinic has received the results. If you do not hear from us within 7 days, please contact the clinic through Media Templehart or phone. If you have a critical or abnormal lab result, we will notify you by phone as soon as possible.  Submit refill requests through PlasmaSi or call your pharmacy and they will forward the refill request to us. Please allow 3 business days for your refill to be completed.          Additional Information About Your Visit        Media [email or web address] Information     PlasmaSi gives you secure access to your electronic health record. If you see a primary care provider, you can also send messages to your care team and make appointments. If you have questions, please call your primary care clinic.  If you do not have a primary care provider, please call [phone] and they will assist you.        Care EveryWhere ID     This is your Care EveryWhere ID. This could be used by other organizations to access your [facility]  "records  [id number]        Your Vitals Were     Pulse Height Pulse Oximetry BMI (Body Mass Index)          66 1.778 m (5' 10\") 97% 29.18 kg/m2         Blood Pressure from Last 3 Encounters:   11/13/18 122/67   10/02/18 113/66   07/31/18 124/64    Weight from Last 3 Encounters:   11/13/18 92.3 kg (203 lb 6.4 oz)   10/02/18 89.4 kg (197 lb 1.5 oz)   07/31/18 89.4 kg (197 lb 1.6 oz)              We Performed the Following     Follow-Up with Electrophysiologist - 6 Months        Primary Care Provider Office Phone # Fax #    Cong Mcdonald [phone]       [place] [address]        Equal Access to Services     CALI DAVILA : Jese Truong, waeverardo carr, naty kaalamelie gonzalez, nino harmon . So [facility] [phone].    ATENCIÓN: Si habla español, tiene a miles disposición servicios gratuitos de asistencia lingüística. Era al [phone].    We comply with applicable federal civil rights laws and [place] laws. We do not discriminate on the basis of race, color, national origin, age, disability, sex, sexual orientation, or gender identity.            Thank you!     Thank you for choosing [facility]  for your care. Our goal is always to provide you with excellent care. Hearing back from our patients is one way we can continue to improve our services. Please take a few minutes to complete the written survey that you may receive in the mail after your visit with us. Thank you!             Your Updated Medication List - Protect others around you: Learn how to safely use, store and throw away your medicines at www.disposemymeds.org.          This list is accurate as of 11/13/18  9:46 AM.  Always use your most recent med list.                   Brand Name Dispense Instructions for use Diagnosis    albuterol 108 (90 Base) MCG/ACT inhaler    PROAIR HFA/PROVENTIL HFA/VENTOLIN HFA     Inhale 2 puffs into the lungs every 6 " hours as needed        amoxicillin 500 MG tablet    AMOXIL    4 tablet    Take 4 tabs one hour before dental appointment    Multiple myeloma, without mention of having achieved remission       baclofen 10 MG tablet    LIORESAL     Take 10 mg by mouth 3 times daily as needed prn        CALCIUM 500 + D PO      Take 2 tablets by mouth daily.        dexamethasone 4 MG tablet    DECADRON    30 tablet    Take 10 tabs (40mg) Days 1, 8, and 15 of 28 day cycle.    Multiple myeloma not having achieved remission (H), Anticoagulation goal of INR 2 to 3       diclofenac 75 MG EC tablet    VOLTAREN     Take 75 mg by mouth 2 times daily as needed 1 tab prn        fentaNYL 25 mcg/hr 72 hr patch    DURAGESIC     Place 1 patch onto the skin every 72 hours        furosemide 20 MG tablet    LASIX    30 tablet    Take 20 mg by mouth daily    Status post chemotherapy, Multiple myeloma, without mention of having achieved remission       LENalidomide 10 MG Caps capsule CHEMO    REVLIMID    21 each    Take 10 mg by mouth daily on days 1-21 of 28 day cycle.    Multiple myeloma not having achieved remission (H), Anticoagulation goal of INR 2 to 3       lisinopril 5 MG tablet    PRINIVIL/ZESTRIL     Take 5 mg by mouth daily    Multiple myeloma, without mention of having achieved remission, Hypomagnesemia, Ventricular fibrillation (H), Anemia of other chronic disease, Renal insufficiency, Dilated cardiomyopathy (H), Automatic implantable cardiac defibrillator in situ, Personal history of diseases of blood and blood-forming organs, History of long-term use of multiple prescription drugs       Magnesium Oxide 420 MG Tabs     180 tablet    Take 1,260 mg by mouth 2 times daily    Hypomagnesemia       metoprolol succinate 200 MG 24 hr tablet    TOPROL-XL    90 tablet    Take 1 tablet (200 mg) by mouth daily    Atrial fibrillation, unspecified type (H), Paroxysmal ventricular tachycardia (H), Permanent atrial fibrillation (H)       nitroGLYcerin 0.4  MG sublingual tablet    NITROSTAT     Place 0.4 mg under the tongue every 5 minutes as needed Reported on 5/16/2017        omeprazole 20 MG CR capsule    priLOSEC    90 capsule    Take 20 mg by mouth daily    GERD (gastroesophageal reflux disease)       oxyCODONE IR 5 MG tablet    ROXICODONE     Take 1-2 tablets by mouth every 6 hours as needed. For pain.        simvastatin 40 MG tablet    ZOCOR    30 tablet    Take 40 mg by mouth At Bedtime        tiotropium 18 MCG capsule    SPIRIVA    30 capsule    Inhale 1 capsule (18 mcg) into the lungs daily    SOB (shortness of breath)       TRAZODONE HCL PO      Take 50 mg by mouth nightly as needed        warfarin 5 MG tablet    COUMADIN     Take 7.5 mg by mouth See Admin Instructions Take 1.5 tab by mojth Monday and Friday and 1 tab Sun Tues WED , Thur and Sat

## 2018-11-13 NOTE — PROGRESS NOTES
Preliminary Device Interrogation Results.  Final physician signed paceart report to be scanned and attached.    Patient seen in clinic for evaluation and iterative programming of Dabble Teligen VR  single lead ICD per MD orders.  Patient has an appointment to see Dr. Jean-Baptiste today.  Normal ICD function.  1.9K nonsustained VHR, 119 no therapy programmed and 23 other untreated episodes recorded lasting from a few seconds to 1 min 45 seconds that were irregular and also have some morphology differences @ rates 150-180's bpm. In July the device had stored previous therapy delivered that was already documented on through last remote transmission.  Intrinsic rhythm = AF with Ventricular Response @  bpm.   = 1%.  Estimated battery longevity to ROSEMARIE = 3 years.  Patient reports that he is feeling well and does offer he has some shortness of breath with activity but only after being active for a longer period of time.  Plan for patient to send a remote transmission in 3 months and return to clinic in 6 months.    single lead ICD

## 2018-11-13 NOTE — NURSING NOTE
Vitals completed successfully and medication reconciled. Katlyn Benoit MA  Chief Complaint   Patient presents with     Follow Up For      Jerilyn, 3 month Follow-up (7/24/18)

## 2018-11-13 NOTE — PATIENT INSTRUCTIONS
You will be scheduled for a follow up visit: 5-6  months    We encourage you to use My Chart as your primary form of communication if possible. If you need assistance in setting this up, please contact our office or ask at your follow up visit.     If you need a medication refill please contact your pharmacy. Please allow at least 3 business days for your refill to be completed.       Cardiology  Telephone Number    Aleida Angeles [phone]   Or send a message to your provider via my chart.   For scheduling procedures:    [facility]    Clinic appointments       [phone]   For the Device Clinic (Pacemakers and ICD's)   RN's :   Natali Vale  During business hours: [phone]    After business hours:   [phone]- select option 4 and ask for job code [id number].          As always, Thank you for trusting us with your health care needs!

## 2018-11-13 NOTE — MR AVS SNAPSHOT
After Visit Summary   11/13/2018    Erasmo Pearce    MRN: [id number]           Patient Information     Date Of Birth          1950        Visit Information        Provider Department      11/13/2018 8:30 AM 1, Uc Cv Device [facility]        Today's Diagnoses     Dilated cardiomyopathy (H)           Follow-ups after your visit        Additional Services     Follow-Up with Device Clinic-6 months                 Your next 10 appointments already scheduled     Jose Luis 15, 2019 10:30 AM CST   Masonic Lab Draw with  MASONIC LAB DRAW   [facility] Masonic Lab Draw ([facility])    [address] [place] [address]  [address]  [facility] [address]   [phone]            Jan 22, 2019 11:30 AM CST   Return with Rojas Raygoza MD   [facility] Blood and Marrow Transplant ([facility])    [address] [place] [address]  [address]  [facility] [id number] [phone]            Mar 19, 2019  9:00 AM CDT   (Arrive by 8:45 AM)   Implanted Defibulator with Uc Cv Device 1   [facility] ([facility])    [address] [place] [address]  3rd Floor  [id number]               Mar 19, 2019  9:30 AM CDT   (Arrive by 9:15 AM)   RETURN ARRHYTHMIA with Chuy Jean-Baptiste MD   [facility] ([facility])    [address] [place] [address]  [address]  [facility] [id number] [phone]              Future tests that were ordered for you today     Open Future Orders        Priority Expected Expires Ordered    Follow-Up with Electrophysiologist - 6 Months Routine 5/12/2019 8/10/2019 11/13/2018    Follow-Up with Device Clinic-6 months Routine 5/12/2019 8/10/2019 11/13/2018            Who to contact     Please call your clinic at No information on file. to:    Ask questions about your health    Make or cancel appointments    Discuss your medicines    Learn about your test results    Speak to your doctor            Additional  Information About Your Visit        Cadigohart Information     Dindong gives you secure access to your electronic health record. If you see a primary care provider, you can also send messages to your care team and make appointments. If you have questions, please call your primary care clinic.  If you do not have a primary care provider, please call [phone] and they will assist you.      Dindong is an electronic gateway that provides easy, online access to your medical records. With Dindong, you can request a clinic appointment, read your test results, renew a prescription or communicate with your care team.     To access your existing account, please contact your [facility] or call [phone] for assistance.        Care EveryWhere ID     This is your Care EveryWhere ID. This could be used by other organizations to access your [facility] medical records  [id number]         Blood Pressure from Last 3 Encounters:   11/13/18 122/67   10/02/18 113/66   07/31/18 124/64    Weight from Last 3 Encounters:   11/13/18 92.3 kg (203 lb 6.4 oz)   10/02/18 89.4 kg (197 lb 1.5 oz)   07/31/18 89.4 kg (197 lb 1.6 oz)              We Performed the Following     (07081) ICD DEVICE PROGRAMMING EVAL, SINGLE LEAD ICD     Follow-Up with Device Clinic-6 months        Primary Care Provider Office Phone # Fax Cleopatra Mcdonald [phone]       [place] [address]        Equal Access to Services     CALI DAVILA : Hadii aad ku hadasho Soomaali, waaxda luqadaha, qaybta kaalmada adeegyada, waxcarolyn arya sierra. So [facility] [phone].    ATENCIÓN: Si habla español, tiene a miles disposición servicios gratuitos de asistencia lingüística. Llame al [phone].    We comply with applicable federal civil rights laws and [place] laws. We do not discriminate on the basis of race, color, national origin, age, disability, sex, sexual orientation, or  gender identity.            Thank you!     Thank you for choosing [facility]  for your care. Our goal is always to provide you with excellent care. Hearing back from our patients is one way we can continue to improve our services. Please take a few minutes to complete the written survey that you may receive in the mail after your visit with us. Thank you!             Your Updated Medication List - Protect others around you: Learn how to safely use, store and throw away your medicines at www.disposemymeds.org.          This list is accurate as of 11/13/18  9:46 AM.  Always use your most recent med list.                   Brand Name Dispense Instructions for use Diagnosis    albuterol 108 (90 Base) MCG/ACT inhaler    PROAIR HFA/PROVENTIL HFA/VENTOLIN HFA     Inhale 2 puffs into the lungs every 6 hours as needed        amoxicillin 500 MG tablet    AMOXIL    4 tablet    Take 4 tabs one hour before dental appointment    Multiple myeloma, without mention of having achieved remission       baclofen 10 MG tablet    LIORESAL     Take 10 mg by mouth 3 times daily as needed prn        CALCIUM 500 + D PO      Take 2 tablets by mouth daily.        dexamethasone 4 MG tablet    DECADRON    30 tablet    Take 10 tabs (40mg) Days 1, 8, and 15 of 28 day cycle.    Multiple myeloma not having achieved remission (H), Anticoagulation goal of INR 2 to 3       diclofenac 75 MG EC tablet    VOLTAREN     Take 75 mg by mouth 2 times daily as needed 1 tab prn        fentaNYL 25 mcg/hr 72 hr patch    DURAGESIC     Place 1 patch onto the skin every 72 hours        furosemide 20 MG tablet    LASIX    30 tablet    Take 20 mg by mouth daily    Status post chemotherapy, Multiple myeloma, without mention of having achieved remission       LENalidomide 10 MG Caps capsule CHEMO    REVLIMID    21 each    Take 10 mg by mouth daily on days 1-21 of 28 day cycle.    Multiple myeloma not having achieved remission (H), Anticoagulation goal of INR 2 to  3       lisinopril 5 MG tablet    PRINIVIL/ZESTRIL     Take 5 mg by mouth daily    Multiple myeloma, without mention of having achieved remission, Hypomagnesemia, Ventricular fibrillation (H), Anemia of other chronic disease, Renal insufficiency, Dilated cardiomyopathy (H), Automatic implantable cardiac defibrillator in situ, Personal history of diseases of blood and blood-forming organs, History of long-term use of multiple prescription drugs       Magnesium Oxide 420 MG Tabs     180 tablet    Take 1,260 mg by mouth 2 times daily    Hypomagnesemia       metoprolol succinate 200 MG 24 hr tablet    TOPROL-XL    90 tablet    Take 1 tablet (200 mg) by mouth daily    Atrial fibrillation, unspecified type (H), Paroxysmal ventricular tachycardia (H), Permanent atrial fibrillation (H)       nitroGLYcerin 0.4 MG sublingual tablet    NITROSTAT     Place 0.4 mg under the tongue every 5 minutes as needed Reported on 5/16/2017        omeprazole 20 MG CR capsule    priLOSEC    90 capsule    Take 20 mg by mouth daily    GERD (gastroesophageal reflux disease)       oxyCODONE IR 5 MG tablet    ROXICODONE     Take 1-2 tablets by mouth every 6 hours as needed. For pain.        simvastatin 40 MG tablet    ZOCOR    30 tablet    Take 40 mg by mouth At Bedtime        tiotropium 18 MCG capsule    SPIRIVA    30 capsule    Inhale 1 capsule (18 mcg) into the lungs daily    SOB (shortness of breath)       TRAZODONE HCL PO      Take 50 mg by mouth nightly as needed        warfarin 5 MG tablet    COUMADIN     Take 7.5 mg by mouth See Admin Instructions Take 1.5 tab by mojth Monday and Friday and 1 tab Sun Tues WED , Thur and Sat

## 2018-12-10 ENCOUNTER — ANCILLARY PROCEDURE (OUTPATIENT)
Dept: CARDIOLOGY | Facility: CLINIC | Age: 68
End: 2018-12-10
Attending: INTERNAL MEDICINE
Payer: MEDICARE

## 2018-12-10 DIAGNOSIS — I49.5 SINUS NODE DYSFUNCTION (H): ICD-10-CM

## 2018-12-10 PROCEDURE — 93296 REM INTERROG EVL PM/IDS: CPT | Mod: ZF

## 2018-12-12 ENCOUNTER — TRANSFERRED RECORDS (OUTPATIENT)
Dept: HEALTH INFORMATION MANAGEMENT | Facility: CLINIC | Age: 68
End: 2018-12-12

## 2018-12-12 LAB
ALT SERPL-CCNC: 20 U/L (ref 8–45)
AST SERPL-CCNC: 23 U/L (ref 5–41)
CREAT SERPL-MCNC: 1.13 MG/DL (ref 0.72–1.25)
GFR SERPL CREATININE-BSD FRML MDRD: >60 ML/MIN/1.73M2
GLUCOSE SERPL-MCNC: 104 MG/DL (ref 70–100)
POTASSIUM SERPL-SCNC: 4.3 MMOL/L (ref 3.5–5.1)

## 2018-12-18 LAB
ICDO DEVICE COMMENTS: NORMAL
MDC_IDC_EPISODE_DTM: NORMAL
MDC_IDC_EPISODE_DURATION: 11 S
MDC_IDC_EPISODE_DURATION: 20 S
MDC_IDC_EPISODE_DURATION: 20 S
MDC_IDC_EPISODE_DURATION: 5 S
MDC_IDC_EPISODE_DURATION: 8 S
MDC_IDC_EPISODE_DURATION: 9 S
MDC_IDC_EPISODE_DURATION: 9 S
MDC_IDC_EPISODE_ID: NORMAL
MDC_IDC_EPISODE_TYPE: NORMAL
MDC_IDC_EPISODE_VENDOR_TYPE: NORMAL
MDC_IDC_LEAD_IMPLANT_DT: NORMAL
MDC_IDC_LEAD_LOCATION: NORMAL
MDC_IDC_LEAD_LOCATION_DETAIL_1: NORMAL
MDC_IDC_LEAD_MFG: NORMAL
MDC_IDC_LEAD_MODEL: NORMAL
MDC_IDC_LEAD_POLARITY_TYPE: NORMAL
MDC_IDC_LEAD_SERIAL: NORMAL
MDC_IDC_MSMT_BATTERY_DTM: NORMAL
MDC_IDC_MSMT_BATTERY_REMAINING_LONGEVITY: 42 MO
MDC_IDC_MSMT_BATTERY_REMAINING_PERCENTAGE: 44 %
MDC_IDC_MSMT_BATTERY_STATUS: NORMAL
MDC_IDC_MSMT_CAP_CHARGE_DTM: NORMAL
MDC_IDC_MSMT_CAP_CHARGE_DTM: NORMAL
MDC_IDC_MSMT_CAP_CHARGE_ENERGY: 41 J
MDC_IDC_MSMT_CAP_CHARGE_TIME: 9 S
MDC_IDC_MSMT_CAP_CHARGE_TIME: 9.2 S
MDC_IDC_MSMT_CAP_CHARGE_TYPE: NORMAL
MDC_IDC_MSMT_CAP_CHARGE_TYPE: NORMAL
MDC_IDC_MSMT_LEADCHNL_RV_IMPEDANCE_VALUE: 349 OHM
MDC_IDC_MSMT_LEADCHNL_RV_PACING_THRESHOLD_AMPLITUDE: 1 V
MDC_IDC_MSMT_LEADCHNL_RV_PACING_THRESHOLD_PULSEWIDTH: 0.5 MS
MDC_IDC_PG_IMPLANT_DTM: NORMAL
MDC_IDC_PG_MFG: NORMAL
MDC_IDC_PG_MODEL: NORMAL
MDC_IDC_PG_SERIAL: NORMAL
MDC_IDC_PG_TYPE: NORMAL
MDC_IDC_SESS_CLINIC_NAME: NORMAL
MDC_IDC_SESS_DTM: NORMAL
MDC_IDC_SESS_TYPE: NORMAL
MDC_IDC_SET_BRADY_LOWRATE: 40 {BEATS}/MIN
MDC_IDC_SET_BRADY_MODE: NORMAL
MDC_IDC_SET_LEADCHNL_RV_PACING_AMPLITUDE: 2.4 V
MDC_IDC_SET_LEADCHNL_RV_PACING_POLARITY: NORMAL
MDC_IDC_SET_LEADCHNL_RV_PACING_PULSEWIDTH: 0.5 MS
MDC_IDC_SET_LEADCHNL_RV_SENSING_ADAPTATION_MODE: NORMAL
MDC_IDC_SET_LEADCHNL_RV_SENSING_POLARITY: NORMAL
MDC_IDC_SET_LEADCHNL_RV_SENSING_SENSITIVITY: 0.6 MV
MDC_IDC_SET_ZONE_DETECTION_INTERVAL: 286 MS
MDC_IDC_SET_ZONE_DETECTION_INTERVAL: 375 MS
MDC_IDC_SET_ZONE_TYPE: NORMAL
MDC_IDC_SET_ZONE_TYPE: NORMAL
MDC_IDC_SET_ZONE_VENDOR_TYPE: NORMAL
MDC_IDC_SET_ZONE_VENDOR_TYPE: NORMAL
MDC_IDC_STAT_BRADY_DTM_END: NORMAL
MDC_IDC_STAT_BRADY_DTM_START: NORMAL
MDC_IDC_STAT_BRADY_RV_PERCENT_PACED: 2 %
MDC_IDC_STAT_EPISODE_RECENT_COUNT: 0
MDC_IDC_STAT_EPISODE_RECENT_COUNT: 1
MDC_IDC_STAT_EPISODE_RECENT_COUNT: 35
MDC_IDC_STAT_EPISODE_RECENT_COUNT_DTM_END: NORMAL
MDC_IDC_STAT_EPISODE_RECENT_COUNT_DTM_START: NORMAL
MDC_IDC_STAT_EPISODE_TYPE: NORMAL
MDC_IDC_STAT_EPISODE_VENDOR_TYPE: NORMAL
MDC_IDC_STAT_TACHYTHERAPY_ATP_DELIVERED_RECENT: 1
MDC_IDC_STAT_TACHYTHERAPY_ATP_DELIVERED_TOTAL: 17
MDC_IDC_STAT_TACHYTHERAPY_RECENT_DTM_END: NORMAL
MDC_IDC_STAT_TACHYTHERAPY_RECENT_DTM_START: NORMAL
MDC_IDC_STAT_TACHYTHERAPY_SHOCKS_ABORTED_RECENT: 1
MDC_IDC_STAT_TACHYTHERAPY_SHOCKS_ABORTED_TOTAL: 18
MDC_IDC_STAT_TACHYTHERAPY_SHOCKS_DELIVERED_RECENT: 0
MDC_IDC_STAT_TACHYTHERAPY_SHOCKS_DELIVERED_TOTAL: 18
MDC_IDC_STAT_TACHYTHERAPY_TOTAL_DTM_END: NORMAL
MDC_IDC_STAT_TACHYTHERAPY_TOTAL_DTM_START: NORMAL

## 2019-01-09 ENCOUNTER — TRANSFERRED RECORDS (OUTPATIENT)
Dept: HEALTH INFORMATION MANAGEMENT | Facility: CLINIC | Age: 69
End: 2019-01-09

## 2019-01-09 LAB
ALT SERPL-CCNC: 17 U/L (ref 8–45)
AST SERPL-CCNC: 19 U/L (ref 5–41)
CREAT SERPL-MCNC: 0.98 MG/DL (ref 0.72–1.25)
GFR SERPL CREATININE-BSD FRML MDRD: >60 ML/MIN/1.73M2
GLUCOSE SERPL-MCNC: 118 MG/DL (ref 70–100)
INR PPP: 2 (ref 0.8–1.2)
POTASSIUM SERPL-SCNC: 4.4 MMOL/L (ref 3.5–5.1)

## 2019-01-15 DIAGNOSIS — Z51.81 ANTICOAGULATION GOAL OF INR 2 TO 3: ICD-10-CM

## 2019-01-15 DIAGNOSIS — Z95.810 AUTOMATIC IMPLANTABLE CARDIOVERTER-DEFIBRILLATOR IN SITU: ICD-10-CM

## 2019-01-15 DIAGNOSIS — I49.01 VENTRICULAR FIBRILLATION (H): ICD-10-CM

## 2019-01-15 DIAGNOSIS — E83.42 HYPOMAGNESEMIA: ICD-10-CM

## 2019-01-15 DIAGNOSIS — Z79.01 ANTICOAGULATION GOAL OF INR 2 TO 3: ICD-10-CM

## 2019-01-15 DIAGNOSIS — C90.02 MULTIPLE MYELOMA IN RELAPSE (H): ICD-10-CM

## 2019-01-15 LAB
ALBUMIN SERPL-MCNC: 3.9 G/DL (ref 3.4–5)
ALP SERPL-CCNC: 95 U/L (ref 40–150)
ALT SERPL W P-5'-P-CCNC: 21 U/L (ref 0–70)
ANION GAP SERPL CALCULATED.3IONS-SCNC: 7 MMOL/L (ref 3–14)
AST SERPL W P-5'-P-CCNC: 16 U/L (ref 0–45)
B2 MICROGLOB SERPL-MCNC: 2.3 MG/L
BASOPHILS # BLD AUTO: 0 10E9/L (ref 0–0.2)
BASOPHILS NFR BLD AUTO: 0.6 %
BILIRUB SERPL-MCNC: 1 MG/DL (ref 0.2–1.3)
BUN SERPL-MCNC: 19 MG/DL (ref 7–30)
CALCIUM SERPL-MCNC: 8.9 MG/DL (ref 8.5–10.1)
CHLORIDE SERPL-SCNC: 104 MMOL/L (ref 94–109)
CO2 SERPL-SCNC: 28 MMOL/L (ref 20–32)
COLLECT DURATION TIME UR: 24 H
CREAT 24H UR-MRATE: 1.22 G/(24.H) (ref 1–2)
CREAT SERPL-MCNC: 0.91 MG/DL (ref 0.66–1.25)
CREAT UR-MCNC: 65 MG/DL
DIFFERENTIAL METHOD BLD: ABNORMAL
EOSINOPHIL # BLD AUTO: 0.1 10E9/L (ref 0–0.7)
EOSINOPHIL NFR BLD AUTO: 0.7 %
ERYTHROCYTE [DISTWIDTH] IN BLOOD BY AUTOMATED COUNT: 14.1 % (ref 10–15)
GFR SERPL CREATININE-BSD FRML MDRD: 86 ML/MIN/{1.73_M2}
GLUCOSE SERPL-MCNC: 121 MG/DL (ref 70–99)
HCT VFR BLD AUTO: 37.2 % (ref 40–53)
HGB BLD-MCNC: 12.4 G/DL (ref 13.3–17.7)
IMM GRANULOCYTES # BLD: 0 10E9/L (ref 0–0.4)
IMM GRANULOCYTES NFR BLD: 0.4 %
INR PPP: 2.16 (ref 0.86–1.14)
KAPPA LC UR-MCNC: 1.2 MG/DL (ref 0.33–1.94)
KAPPA LC/LAMBDA SER: 0.02 {RATIO} (ref 0.26–1.65)
LAMBDA LC SERPL-MCNC: 66.25 MG/DL (ref 0.57–2.63)
LDH SERPL L TO P-CCNC: 182 U/L (ref 85–227)
LYMPHOCYTES # BLD AUTO: 1.4 10E9/L (ref 0.8–5.3)
LYMPHOCYTES NFR BLD AUTO: 20.5 %
MCH RBC QN AUTO: 34.6 PG (ref 26.5–33)
MCHC RBC AUTO-ENTMCNC: 33.3 G/DL (ref 31.5–36.5)
MCV RBC AUTO: 104 FL (ref 78–100)
MONOCYTES # BLD AUTO: 0.6 10E9/L (ref 0–1.3)
MONOCYTES NFR BLD AUTO: 7.9 %
NEUTROPHILS # BLD AUTO: 4.9 10E9/L (ref 1.6–8.3)
NEUTROPHILS NFR BLD AUTO: 69.9 %
NRBC # BLD AUTO: 0 10*3/UL
NRBC BLD AUTO-RTO: 0 /100
PLATELET # BLD AUTO: 148 10E9/L (ref 150–450)
POTASSIUM SERPL-SCNC: 3.9 MMOL/L (ref 3.4–5.3)
PROT 24H UR-MRATE: 0.68 G/(24.H) (ref 0.04–0.23)
PROT SERPL-MCNC: 7.6 G/DL (ref 6.8–8.8)
PROT UR-MCNC: 0.36 G/L
PROT/CREAT 24H UR: 0.56 G/G CR (ref 0–0.2)
RBC # BLD AUTO: 3.58 10E12/L (ref 4.4–5.9)
SODIUM SERPL-SCNC: 139 MMOL/L (ref 133–144)
SPECIMEN VOL UR: 1870 ML
WBC # BLD AUTO: 7 10E9/L (ref 4–11)

## 2019-01-15 PROCEDURE — 83615 LACTATE (LD) (LDH) ENZYME: CPT | Performed by: INTERNAL MEDICINE

## 2019-01-15 PROCEDURE — 85025 COMPLETE CBC W/AUTO DIFF WBC: CPT | Performed by: INTERNAL MEDICINE

## 2019-01-15 PROCEDURE — 82232 ASSAY OF BETA-2 PROTEIN: CPT | Performed by: INTERNAL MEDICINE

## 2019-01-15 PROCEDURE — 84166 PROTEIN E-PHORESIS/URINE/CSF: CPT | Performed by: INTERNAL MEDICINE

## 2019-01-15 PROCEDURE — 85610 PROTHROMBIN TIME: CPT | Performed by: INTERNAL MEDICINE

## 2019-01-15 PROCEDURE — 83883 ASSAY NEPHELOMETRY NOT SPEC: CPT | Performed by: INTERNAL MEDICINE

## 2019-01-15 PROCEDURE — 80053 COMPREHEN METABOLIC PANEL: CPT | Performed by: INTERNAL MEDICINE

## 2019-01-15 PROCEDURE — 86335 IMMUNFIX E-PHORSIS/URINE/CSF: CPT | Performed by: INTERNAL MEDICINE

## 2019-01-15 NOTE — NURSING NOTE
Chief Complaint   Patient presents with     Blood Draw     Right AC butterfly  X 1, labs drawn and sent, no vitals needed, no further appointments.   Sara Conner RN

## 2019-01-16 LAB
ALBUMIN MFR UR ELPH: 6.8 %
ALPHA1 GLOB MFR UR ELPH: 5.3 %
ALPHA2 GLOB MFR UR ELPH: 4 %
B-GLOBULIN MFR UR ELPH: 2.8 %
GAMMA GLOB MFR UR ELPH: 81.1 %
M PROTEIN MFR UR ELPH: 72.9 %
PROT ELPH PNL UR ELPH: NORMAL
PROT PATTERN UR ELPH-IMP: ABNORMAL

## 2019-01-22 ENCOUNTER — ONCOLOGY VISIT (OUTPATIENT)
Dept: TRANSPLANT | Facility: CLINIC | Age: 69
End: 2019-01-22
Attending: INTERNAL MEDICINE
Payer: MEDICARE

## 2019-01-22 VITALS
SYSTOLIC BLOOD PRESSURE: 118 MMHG | TEMPERATURE: 97.7 F | DIASTOLIC BLOOD PRESSURE: 70 MMHG | WEIGHT: 200 LBS | BODY MASS INDEX: 28.7 KG/M2 | HEART RATE: 70 BPM | OXYGEN SATURATION: 97 %

## 2019-01-22 DIAGNOSIS — C90.00 MULTIPLE MYELOMA NOT HAVING ACHIEVED REMISSION (H): ICD-10-CM

## 2019-01-22 DIAGNOSIS — Z51.81 ANTICOAGULATION GOAL OF INR 2 TO 3: ICD-10-CM

## 2019-01-22 DIAGNOSIS — Z79.01 ANTICOAGULATION GOAL OF INR 2 TO 3: ICD-10-CM

## 2019-01-22 PROCEDURE — G0463 HOSPITAL OUTPT CLINIC VISIT: HCPCS

## 2019-01-22 RX ORDER — DEXAMETHASONE 4 MG/1
TABLET ORAL
Qty: 30 TABLET | Refills: 5 | Status: SHIPPED | OUTPATIENT
Start: 2019-01-22 | End: 2019-01-23

## 2019-01-22 RX ORDER — LENALIDOMIDE 10 MG/1
CAPSULE ORAL
Qty: 21 EACH | Refills: 0 | Status: SHIPPED | OUTPATIENT
Start: 2019-01-22 | End: 2019-01-23

## 2019-01-22 ASSESSMENT — PAIN SCALES - GENERAL: PAINLEVEL: NO PAIN (0)

## 2019-01-22 NOTE — PROGRESS NOTES
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  John had been on Revlimid 10 mg days 1-21 and dexamethasone 40 mg days 1-8 since 02/2018 but stopped in July due to low hemoglobin and side effects of cramping..  He feels very good off revlimid and dexamethasone. No fevers, no cramps.. Jonh has not had any defibrillator shocks lately.  He continues to have chronic pain and continues on long-acting opiates.He continues on coumadin to maintain INR  Between 2-3. He did get a flu shot this fall His wife Carla had a total knee replacement     PAST MEDICAL HISTORY, SOCIAL HISTORY, FAMILY HISTORY:  See my note from 10/2018.      REVIEW OF SYSTEMS:  A 12 point review of systems done is negative as in HPI.      PHYSICAL EXAMINATION:   GENERAL:  He is an alert gentleman in no acute distress.   VITAL SIGNS:  Stable. /70 (BP Location: Right arm, Patient Position: Chair, Cuff Size: Adult Regular)   Pulse 70   Temp 97.7  F (36.5  C) (Oral)   Wt 90.7 kg (200 lb)   SpO2 97%   BMI 28.70 kg/m      HEENT:  Normocephalic.  EOM okay.  No scleral icterus.  Mouth without lesion.   RESPIRATORY:  Clear to percussion and auscultation.   CARDIAC:  Irregular regular rhythm.  Cardioverter in the left upper chest wall.   ABDOMEN:  Without splenomegaly or tenderness.   EXTREMITIES:  Without edema.   NEUROLOGIC:  No tremor.   Results for JODI DARRYL BLACKBURN (MRN [id number]) as of 1/22/2019 15:45   Ref. Range 1/15/2019 06:30 1/15/2019 09:50   Sodium Latest Ref Range: 133 - 144 mmol/L  139   Potassium Latest Ref Range: 3.4 - 5.3 mmol/L  3.9   Chloride Latest Ref Range: 94 - 109 mmol/L  104   Carbon Dioxide Latest Ref Range: 20 - 32 mmol/L  28   Urea Nitrogen Latest Ref Range: 7 - 30 mg/dL  19    Creatinine Latest Ref Range: 0.66 - 1.25 mg/dL  0.91   GFR Estimate Latest Ref Range: >60 mL/min/1.73_m2  86   GFR Estimate If Black Latest Ref Range: >60 mL/min/1.73_m2  >90   Calcium Latest Ref Range: 8.5 - 10.1 mg/dL  8.9   Anion Gap Latest Ref Range: 3 - 14 mmol/L  7   Albumin Latest Ref Range: 3.4 - 5.0 g/dL  3.9   Protein Total Latest Ref Range: 6.8 - 8.8 g/dL  7.6   Bilirubin Total Latest Ref Range: 0.2 - 1.3 mg/dL  1.0   Alkaline Phosphatase Latest Ref Range: 40 - 150 U/L  95   ALT Latest Ref Range: 0 - 70 U/L  21   AST Latest Ref Range: 0 - 45 U/L  16   Beta-2-Microglobulin Latest Ref Range: <2.3 mg/L  2.3 (H)   Creatinine Urine Timed Latest Ref Range: 1.00 - 2.00  1.22    Creatinine Urine Latest Units: mg/dL 65    Lactate Dehydrogenase Latest Ref Range: 85 - 227 U/L  182   Protein Random Urine Latest Units: g/L 0.36    Protein Total Urine g/gr Creatinine Latest Ref Range: 0 - 0.2 g/g Cr 0.56 (H)    Protein Total 24 Hr Urine Latest Ref Range: 0.04 - 0.23  0.68 (H)    Glucose Latest Ref Range: 70 - 99 mg/dL  121 (H)   WBC Latest Ref Range: 4.0 - 11.0 10e9/L  7.0   Hemoglobin Latest Ref Range: 13.3 - 17.7 g/dL  12.4 (L)   Hematocrit Latest Ref Range: 40.0 - 53.0 %  37.2 (L)   Platelet Count Latest Ref Range: 150 - 450 10e9/L  148 (L)   RBC Count Latest Ref Range: 4.4 - 5.9 10e12/L  3.58 (L)   MCV Latest Ref Range: 78 - 100 fl  104 (H)   MCH Latest Ref Range: 26.5 - 33.0 pg  34.6 (H)   MCHC Latest Ref Range: 31.5 - 36.5 g/dL  33.3   RDW Latest Ref Range: 10.0 - 15.0 %  14.1   Diff Method Unknown  Automated Method   % Neutrophils Latest Units: %  69.9   % Lymphocytes Latest Units: %  20.5   % Monocytes Latest Units: %  7.9   % Eosinophils Latest Units: %  0.7   % Basophils Latest Units: %  0.6   % Immature Granulocytes Latest Units: %  0.4   Nucleated RBCs Latest Ref Range: 0 /100  0   Absolute Neutrophil Latest Ref Range: 1.6 - 8.3 10e9/L  4.9   Absolute Lymphocytes Latest Ref Range: 0.8 - 5.3 10e9/L  1.4    Absolute Monocytes Latest Ref Range: 0.0 - 1.3 10e9/L  0.6   Absolute Eosinophils Latest Ref Range: 0.0 - 0.7 10e9/L  0.1   Absolute Basophils Latest Ref Range: 0.0 - 0.2 10e9/L  0.0   Abs Immature Granulocytes Latest Ref Range: 0 - 0.4 10e9/L  0.0   Absolute Nucleated RBC Unknown  0.0   INR Latest Ref Range: 0.86 - 1.14   2.16 (H)   Albumin Fraction Urine Latest Ref Range: 0 % 6.8 (H)    Alpha 1 Fraction Urine Latest Ref Range: 0 % 5.3 (H)    Alpha 2 Fraction Urine Latest Ref Range: 0 % 4.0 (H)    Beta Fraction Urine Latest Ref Range: 0 % 2.8 (H)    ELP Interpretation Urine Unknown Albumin and globu...    Gamma Fraction Urine Latest Ref Range: 0 % 81.1 (H)    Immunofix ELP Urine Unknown (Note)    Kappa Free Lt Chain Latest Ref Range: 0.33 - 1.94 mg/dL  1.20   Kappa Lambda Ratio Latest Ref Range: 0.26 - 1.65   0.02 (L)   Lambda Free Lt Chain Latest Ref Range: 0.57 - 2.63 mg/dL  66.25 (H)   Monoclonal Peak Urine Latest Ref Range: 0% % 72.9 (H)      ASSESSMENT:   1.  Multiple myeloma in relapse.   2.  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   3.  Cardiomyopathy.   4.  Atrial fibrillation.   5.  History of deep vein thrombosis, on warfarin.   6.  Chronic back pain.   7.  Aortic aneurysm.   8.  History of plasmacytoma of the clivus, status post radiation.   9.  History of ventricular tachycardia with implantable defibrillator in place.  10 Diarrhea        Jonh's lambda free light chains are to  66 hemoglobin has risen to 12.4.  He has no other symptoms.  He has no hypercalcemia or other CRAB symptoms.  His renal function is stable. I would like to restart revlimid at this time but at a lower dose 10mg/d Days 1-14 in a 28 day cycle and a decrease in dexamethasone to 20mg Day 1,8, and 15 every 28 days . Hopefully this will lower the light  Chains and not be as hard on his BM and Hgb. He will get labs every 2 weeks, light chains every 4 weeks and I will see him in 4 months with repeat  labs.       Rojas Raygoza MD  [phone]

## 2019-01-22 NOTE — NURSING NOTE
"Oncology Rooming Note    January 22, 2019 11:15 AM   Erasmo Pearce is a 68 year old male who presents for:    Chief Complaint   Patient presents with     RECHECK     RTN- multiple myeloma     Initial Vitals: There were no vitals taken for this visit. Estimated body mass index is 29.18 kg/m  as calculated from the following:    Height as of 11/13/18: 1.778 m (5' 10\").    Weight as of 11/13/18: 92.3 kg (203 lb 6.4 oz). There is no height or weight on file to calculate BSA.  Data Unavailable Comment: Data Unavailable   No LMP for male patient.  Allergies reviewed: Yes  Medications reviewed: Yes    Medications: Medication refills not needed today.  Pharmacy name entered into [employer]:    [facility] - [place] - [address] [place] [address] ('[facility]    Clinical concerns: none    7 minutes for nursing intake (face to face time)     Glenna Carl CMA              "

## 2019-01-23 ENCOUNTER — TELEPHONE (OUTPATIENT)
Dept: ONCOLOGY | Facility: CLINIC | Age: 69
End: 2019-01-23

## 2019-01-23 DIAGNOSIS — I42.0 DILATED CARDIOMYOPATHY (H): ICD-10-CM

## 2019-01-23 DIAGNOSIS — I48.20 CHRONIC ATRIAL FIBRILLATION (H): ICD-10-CM

## 2019-01-23 DIAGNOSIS — C90.01 MULTIPLE MYELOMA IN REMISSION (H): Primary | ICD-10-CM

## 2019-01-23 DIAGNOSIS — C90.02 MULTIPLE MYELOMA IN RELAPSE (H): Primary | ICD-10-CM

## 2019-01-23 RX ORDER — DEXAMETHASONE 4 MG/1
20 TABLET ORAL WEEKLY
Qty: 15 TABLET | Refills: 0 | Status: SHIPPED | OUTPATIENT
Start: 2019-01-23 | End: 2019-09-10

## 2019-01-23 RX ORDER — LENALIDOMIDE 10 MG/1
10 CAPSULE ORAL DAILY
Qty: 14 CAPSULE | Refills: 0 | Status: SHIPPED | OUTPATIENT
Start: 2019-01-23 | End: 2019-03-01

## 2019-01-23 RX ORDER — LENALIDOMIDE 10 MG/1
10 CAPSULE ORAL DAILY
Qty: 14 CAPSULE | COMMUNITY
Start: 2019-01-23 | End: 2019-01-23

## 2019-01-23 RX ORDER — PROCHLORPERAZINE MALEATE 10 MG
10 TABLET ORAL EVERY 6 HOURS PRN
Qty: 30 TABLET | Refills: 2 | Status: CANCELLED | OUTPATIENT
Start: 2019-01-23

## 2019-01-23 NOTE — ORAL ONC MGMT
"Oral Chemotherapy Monitoring Program    Primary Oncologist: Dr. Raygoza  Primary Oncology Clinic: [facility]  Cancer Diagnosis: Multiple Myeloma    Drug: Revlimid  Start Date: as soon as available  Dose is appropriate for patients: Renal Function   Expected duration of therapy: Until disease progression or unacceptable toxicity    Drug Interaction Assessment: No drug interactions with Lenalidomide found at this time.    Drugs Checked include: Albuterol -Amoxicillin -Baclofen -Calcium Carbonate and Vitamin D -Dexamethasone -Diclofenac -FentaNYL -Furosemide -Lenalidomide -Lisinopril -Magnesium Oxide -Metoprolol -Nitroglycerin -Omeprazole -OxyCODONE -Simvastatin -Tiotropium -TraZODone -Warfarin    Lab Monitoring Plan  CBC Q2 weeks for first 3 months, then monthly  CMP monthly    Subjective/Objective:  Erasmo Pearce is a 68 year old male contacted by phone for an initial visit for oral chemotherapy education.      ORAL CHEMOTHERAPY 1/23/2019   Drug Name Revlimid (Lenalidomide)   Current Dosage 10mg   Current Schedule Daily   Cycle Details 2 weeks on 2 weeks off   Any new drug interactions? No   Is the dose as ordered appropriate for the patient? Yes         Vitals:  BP:   BP Readings from Last 1 Encounters:   01/22/19 118/70     Wt Readings from Last 1 Encounters:   01/22/19 90.7 kg (200 lb)     Estimated body surface area is 2.12 meters squared as calculated from the following:    Height as of 11/13/18: 1.778 m (5' 10\").    Weight as of 1/22/19: 90.7 kg (200 lb).      Labs:  _  Result Component Current Result Ref Range   Sodium 139 (1/15/2019) 133 - 144 mmol/L     _  Result Component Current Result Ref Range   Potassium 3.9 (1/15/2019) 3.4 - 5.3 mmol/L     _  Result Component Current Result Ref Range   Calcium 8.9 (1/15/2019) 8.5 - 10.1 mg/dL     No results found for Mag within last 30 days.     No results found for Phos within last 30 days.     _  Result Component Current Result Ref Range "   Albumin 3.9 (1/15/2019) 3.4 - 5.0 g/dL     _  Result Component Current Result Ref Range   Urea Nitrogen 19 (1/15/2019) 7 - 30 mg/dL     _  Result Component Current Result Ref Range   Creatinine 0.91 (1/15/2019) 0.66 - 1.25 mg/dL       _  Result Component Current Result Ref Range   AST 16 (1/15/2019) 0 - 45 U/L     _  Result Component Current Result Ref Range   ALT 21 (1/15/2019) 0 - 70 U/L     _  Result Component Current Result Ref Range   Bilirubin Total 1.0 (1/15/2019) 0.2 - 1.3 mg/dL       _  Result Component Current Result Ref Range   WBC 7.0 (1/15/2019) 4.0 - 11.0 10e9/L     _  Result Component Current Result Ref Range   Hemoglobin 12.4 (L) (1/15/2019) 13.3 - 17.7 g/dL     _  Result Component Current Result Ref Range   Platelet Count 148 (L) (1/15/2019) 150 - 450 10e9/L     _  Result Component Current Result Ref Range   Absolute Neutrophil 4.9 (1/15/2019) 1.6 - 8.3 10e9/L       Assessment:  Patient is appropriate to start therapy.    Plan:  Basic chemotherapy teaching was reviewed with the patient including indication, start date of therapy, dose, administration, adverse effects, missed doses, food and drug interactions, monitoring, side effect management, office contact information, and safe handling. Written materials were mailed and all questions answered to Jonh's stated satisfaction.    Follow-Up:  About one week from start of Revlimid.     Favio Diop PharmD  [facility]  [phone]  January 23, 2019

## 2019-01-25 ENCOUNTER — CARE COORDINATION (OUTPATIENT)
Dept: TRANSPLANT | Facility: CLINIC | Age: 69
End: 2019-01-25

## 2019-01-25 NOTE — PROGRESS NOTES
RE: Erasmo Pearce   52     Multiple Myeloma 203.00     [address]   [address] [place]  [address]  [phone] (H)  [phone] (M)     Verbal order from Dr. Rock Raygoza to Merry Mas RN     CBC d/plts, BMP with Mg++ to be drawn every two weeks.  Serum light chains and SPEP to be drawn monthly.  (Patient states that he already has a lab appointment scheduled for 2019.)  Please fax results to Dr. Raygoza at [phone].     Labs to be drawn at [facility] in [place], [place]  Lab phone: [phone]  Fax: [phone]     Please call Merry Mas RN BMT care coordinator at [phone] with any questions.              Dr.Gregory Jaret MD/frankie  [facility]   Blood and Marrow Transplant Program  [facility] [address], [address] [place] [address], [place] [address]      [phone]

## 2019-02-06 ENCOUNTER — TRANSFERRED RECORDS (OUTPATIENT)
Dept: HEALTH INFORMATION MANAGEMENT | Facility: CLINIC | Age: 69
End: 2019-02-06

## 2019-02-06 LAB
CREAT SERPL-MCNC: 0.96 MG/DL (ref 0.72–1.25)
GFR SERPL CREATININE-BSD FRML MDRD: >60 ML/MIN/1.73M2
GLUCOSE SERPL-MCNC: 117 MG/DL (ref 70–100)
POTASSIUM SERPL-SCNC: 4.3 MMOL/L (ref 3.5–5.1)

## 2019-02-12 ENCOUNTER — TELEPHONE (OUTPATIENT)
Dept: ONCOLOGY | Facility: CLINIC | Age: 69
End: 2019-02-12

## 2019-02-12 NOTE — ORAL ONC MGMT
Oral Chemotherapy Monitoring Program     Primary Oncologist: Dr. Raygoza  Primary Oncology Clinic: [facility]  Cancer Diagnosis: Multiple Myeloma     Drug: Revlimid  Start Date: 2/3/2019  Dose is appropriate for patients: Renal Function         Expected duration of therapy: Until disease progression or unacceptable toxicity     Drug Interaction Assessment: No drug interactions with Lenalidomide found at this time.     Drugs Checked include: Albuterol -Amoxicillin -Baclofen -Calcium Carbonate and Vitamin D -Dexamethasone -Diclofenac -FentaNYL -Furosemide -Lenalidomide -Lisinopril -Magnesium Oxide -Metoprolol -Nitroglycerin -Omeprazole -OxyCODONE -Simvastatin -Tiotropium -TraZODone -Warfarin     Lab Monitoring Plan  CBC Q2 weeks for first 3 months, then monthly  CMP monthly    Subjective/Objective:  Erasmo Pearce is a 68 year old male contacted by phone for a follow-up visit for oral chemotherapy.  Jonh said he feels good. He said his voice is hoarse at times and he sometimes loses his voice but this is normal for him even prior to Revlimid therapy. He said he also has amanda cheeks. He denied any rash. He said he does not have any nausea. He said his stools are regular with no diarrhea or constipation. He thanked me for the call and care.    ORAL CHEMOTHERAPY 1/23/2019 2/12/2019   Drug Name Revlimid (Lenalidomide) Revlimid (Lenalidomide)   Current Dosage 10mg 10mg   Current Schedule Daily Daily   Cycle Details 2 weeks on 2 weeks off 2 weeks on 2 weeks off   Start Date of Last Cycle - 2/3/2019   Planned next cycle start date - 3/3/2019   Doses missed in last 2 weeks - 0   Adherence Assessment - Adherent   Adverse Effects - No AE identified during assessment   Any new drug interactions? No No   Is the dose as ordered appropriate for the patient? Yes Yes       Vitals:  BP:   BP Readings from Last 1 Encounters:   01/22/19 118/70     Wt Readings from Last 1 Encounters:   01/22/19 90.7 kg (200 lb)  "    Estimated body surface area is 2.12 meters squared as calculated from the following:    Height as of 11/13/18: 1.778 m (5' 10\").    Weight as of 1/22/19: 90.7 kg (200 lb).    Labs:  _  Result Component Current Result Ref Range   Sodium 139 (1/15/2019) 133 - 144 mmol/L     _  Result Component Current Result Ref Range   Potassium 3.9 (1/15/2019) 3.4 - 5.3 mmol/L     _  Result Component Current Result Ref Range   Calcium 8.9 (1/15/2019) 8.5 - 10.1 mg/dL     No results found for Mag within last 30 days.     No results found for Phos within last 30 days.     _  Result Component Current Result Ref Range   Albumin 3.9 (1/15/2019) 3.4 - 5.0 g/dL     _  Result Component Current Result Ref Range   Urea Nitrogen 19 (1/15/2019) 7 - 30 mg/dL     _  Result Component Current Result Ref Range   Creatinine 0.91 (1/15/2019) 0.66 - 1.25 mg/dL       _  Result Component Current Result Ref Range   AST 16 (1/15/2019) 0 - 45 U/L     _  Result Component Current Result Ref Range   ALT 21 (1/15/2019) 0 - 70 U/L     _  Result Component Current Result Ref Range   Bilirubin Total 1.0 (1/15/2019) 0.2 - 1.3 mg/dL       _  Result Component Current Result Ref Range   WBC 7.0 (1/15/2019) 4.0 - 11.0 10e9/L     _  Result Component Current Result Ref Range   Hemoglobin 12.4 (L) (1/15/2019) 13.3 - 17.7 g/dL     _  Result Component Current Result Ref Range   Platelet Count 148 (L) (1/15/2019) 150 - 450 10e9/L     _  Result Component Current Result Ref Range   Absolute Neutrophil 4.9 (1/15/2019) 1.6 - 8.3 10e9/L     Assessment:  Jonh appears to be doing well with his first couple of weeks on Revlimid.    Plan:  Continue Revlimid. Labs scheduled every 2 weeks on Wednesdays at [facility] in [place]. Next labs scheduled for 2/20/2019. Next office visit scheduled for 5/14/2019.    Follow-Up:  Pending lab results    Refill Due:  [facility] He will need it delivered to him by 3/1/2019 to allow him to start cycle on " 3/3/2019.    Favio Diop PharmD  [facility]  [phone]  February 12, 2019

## 2019-02-15 ENCOUNTER — ANCILLARY PROCEDURE (OUTPATIENT)
Dept: CARDIOLOGY | Facility: CLINIC | Age: 69
End: 2019-02-15
Attending: INTERNAL MEDICINE
Payer: MEDICARE

## 2019-02-15 DIAGNOSIS — I48.91 AF (ATRIAL FIBRILLATION) (H): ICD-10-CM

## 2019-02-15 LAB
MDC_IDC_EPISODE_DTM: NORMAL
MDC_IDC_EPISODE_DURATION: 114 S
MDC_IDC_EPISODE_DURATION: 116 S
MDC_IDC_EPISODE_DURATION: 118 S
MDC_IDC_EPISODE_DURATION: 136 S
MDC_IDC_EPISODE_DURATION: 14 S
MDC_IDC_EPISODE_DURATION: 14 S
MDC_IDC_EPISODE_DURATION: 162 S
MDC_IDC_EPISODE_DURATION: 18 S
MDC_IDC_EPISODE_DURATION: 18 S
MDC_IDC_EPISODE_DURATION: 2264 S
MDC_IDC_EPISODE_DURATION: 23 S
MDC_IDC_EPISODE_DURATION: 26 S
MDC_IDC_EPISODE_DURATION: 26 S
MDC_IDC_EPISODE_DURATION: 28 S
MDC_IDC_EPISODE_DURATION: 29 S
MDC_IDC_EPISODE_DURATION: 30 S
MDC_IDC_EPISODE_DURATION: 31 S
MDC_IDC_EPISODE_DURATION: 33 S
MDC_IDC_EPISODE_DURATION: 38 S
MDC_IDC_EPISODE_DURATION: 40 S
MDC_IDC_EPISODE_DURATION: 42 S
MDC_IDC_EPISODE_DURATION: 45 S
MDC_IDC_EPISODE_DURATION: 46 S
MDC_IDC_EPISODE_DURATION: 6 S
MDC_IDC_EPISODE_DURATION: 77 S
MDC_IDC_EPISODE_DURATION: 8 S
MDC_IDC_EPISODE_DURATION: 80 S
MDC_IDC_EPISODE_DURATION: 85 S
MDC_IDC_EPISODE_DURATION: 86 S
MDC_IDC_EPISODE_DURATION: 98 S
MDC_IDC_EPISODE_DURATION: 99 S
MDC_IDC_EPISODE_ID: NORMAL
MDC_IDC_EPISODE_TYPE: NORMAL
MDC_IDC_EPISODE_VENDOR_TYPE: NORMAL
MDC_IDC_LEAD_IMPLANT_DT: NORMAL
MDC_IDC_LEAD_LOCATION: NORMAL
MDC_IDC_LEAD_LOCATION_DETAIL_1: NORMAL
MDC_IDC_LEAD_MFG: NORMAL
MDC_IDC_LEAD_MODEL: NORMAL
MDC_IDC_LEAD_POLARITY_TYPE: NORMAL
MDC_IDC_LEAD_SERIAL: NORMAL
MDC_IDC_MSMT_BATTERY_DTM: NORMAL
MDC_IDC_MSMT_BATTERY_REMAINING_LONGEVITY: 36 MO
MDC_IDC_MSMT_BATTERY_REMAINING_PERCENTAGE: 40 %
MDC_IDC_MSMT_BATTERY_STATUS: NORMAL
MDC_IDC_MSMT_CAP_CHARGE_DTM: NORMAL
MDC_IDC_MSMT_CAP_CHARGE_DTM: NORMAL
MDC_IDC_MSMT_CAP_CHARGE_ENERGY: 41 J
MDC_IDC_MSMT_CAP_CHARGE_TIME: 9.3 S
MDC_IDC_MSMT_CAP_CHARGE_TIME: 9.3 S
MDC_IDC_MSMT_CAP_CHARGE_TYPE: NORMAL
MDC_IDC_MSMT_CAP_CHARGE_TYPE: NORMAL
MDC_IDC_MSMT_LEADCHNL_RV_IMPEDANCE_VALUE: 400 OHM
MDC_IDC_MSMT_LEADCHNL_RV_PACING_THRESHOLD_AMPLITUDE: 1 V
MDC_IDC_MSMT_LEADCHNL_RV_PACING_THRESHOLD_PULSEWIDTH: 0.5 MS
MDC_IDC_PG_IMPLANT_DTM: NORMAL
MDC_IDC_PG_MFG: NORMAL
MDC_IDC_PG_MODEL: NORMAL
MDC_IDC_PG_SERIAL: NORMAL
MDC_IDC_PG_TYPE: NORMAL
MDC_IDC_SESS_CLINIC_NAME: NORMAL
MDC_IDC_SESS_DTM: NORMAL
MDC_IDC_SESS_TYPE: NORMAL
MDC_IDC_SET_BRADY_LOWRATE: 40 {BEATS}/MIN
MDC_IDC_SET_BRADY_MODE: NORMAL
MDC_IDC_SET_LEADCHNL_RV_PACING_AMPLITUDE: 2.4 V
MDC_IDC_SET_LEADCHNL_RV_PACING_POLARITY: NORMAL
MDC_IDC_SET_LEADCHNL_RV_PACING_PULSEWIDTH: 0.5 MS
MDC_IDC_SET_LEADCHNL_RV_SENSING_ADAPTATION_MODE: NORMAL
MDC_IDC_SET_LEADCHNL_RV_SENSING_POLARITY: NORMAL
MDC_IDC_SET_LEADCHNL_RV_SENSING_SENSITIVITY: 0.6 MV
MDC_IDC_SET_ZONE_DETECTION_INTERVAL: 286 MS
MDC_IDC_SET_ZONE_DETECTION_INTERVAL: 375 MS
MDC_IDC_SET_ZONE_TYPE: NORMAL
MDC_IDC_SET_ZONE_TYPE: NORMAL
MDC_IDC_SET_ZONE_VENDOR_TYPE: NORMAL
MDC_IDC_SET_ZONE_VENDOR_TYPE: NORMAL
MDC_IDC_STAT_BRADY_DTM_END: NORMAL
MDC_IDC_STAT_BRADY_DTM_START: NORMAL
MDC_IDC_STAT_BRADY_RV_PERCENT_PACED: 2 %
MDC_IDC_STAT_EPISODE_RECENT_COUNT: 0
MDC_IDC_STAT_EPISODE_RECENT_COUNT: 1
MDC_IDC_STAT_EPISODE_RECENT_COUNT: 3
MDC_IDC_STAT_EPISODE_RECENT_COUNT: 408
MDC_IDC_STAT_EPISODE_RECENT_COUNT: 5
MDC_IDC_STAT_EPISODE_RECENT_COUNT_DTM_END: NORMAL
MDC_IDC_STAT_EPISODE_RECENT_COUNT_DTM_START: NORMAL
MDC_IDC_STAT_EPISODE_TYPE: NORMAL
MDC_IDC_STAT_EPISODE_VENDOR_TYPE: NORMAL
MDC_IDC_STAT_TACHYTHERAPY_ATP_DELIVERED_RECENT: 3
MDC_IDC_STAT_TACHYTHERAPY_ATP_DELIVERED_TOTAL: 19
MDC_IDC_STAT_TACHYTHERAPY_RECENT_DTM_END: NORMAL
MDC_IDC_STAT_TACHYTHERAPY_RECENT_DTM_START: NORMAL
MDC_IDC_STAT_TACHYTHERAPY_SHOCKS_ABORTED_RECENT: 3
MDC_IDC_STAT_TACHYTHERAPY_SHOCKS_ABORTED_TOTAL: 20
MDC_IDC_STAT_TACHYTHERAPY_SHOCKS_DELIVERED_RECENT: 2
MDC_IDC_STAT_TACHYTHERAPY_SHOCKS_DELIVERED_TOTAL: 20
MDC_IDC_STAT_TACHYTHERAPY_TOTAL_DTM_END: NORMAL
MDC_IDC_STAT_TACHYTHERAPY_TOTAL_DTM_START: NORMAL

## 2019-02-15 PROCEDURE — 93295 DEV INTERROG REMOTE 1/2/MLT: CPT | Performed by: INTERNAL MEDICINE

## 2019-02-15 PROCEDURE — 93296 REM INTERROG EVL PM/IDS: CPT | Mod: ZF

## 2019-02-18 ENCOUNTER — ANCILLARY PROCEDURE (OUTPATIENT)
Dept: CARDIOLOGY | Facility: CLINIC | Age: 69
End: 2019-02-18
Attending: INTERNAL MEDICINE
Payer: MEDICARE

## 2019-02-18 DIAGNOSIS — I48.91 AF (ATRIAL FIBRILLATION) (H): ICD-10-CM

## 2019-02-18 PROCEDURE — 93296 REM INTERROG EVL PM/IDS: CPT | Mod: ZF

## 2019-02-20 ENCOUNTER — TELEPHONE (OUTPATIENT)
Dept: PHARMACY | Facility: CLINIC | Age: 69
End: 2019-02-20

## 2019-02-20 ENCOUNTER — TRANSFERRED RECORDS (OUTPATIENT)
Dept: HEALTH INFORMATION MANAGEMENT | Facility: CLINIC | Age: 69
End: 2019-02-20

## 2019-02-20 LAB
CREAT SERPL-MCNC: 1.17 MG/DL (ref 0.72–1.25)
GFR SERPL CREATININE-BSD FRML MDRD: >60 ML/MIN/1.73M2
GLUCOSE SERPL-MCNC: 113 MG/DL (ref 70–100)
POTASSIUM SERPL-SCNC: 4.1 MMOL/L (ref 3.5–0.1)

## 2019-02-20 NOTE — ORAL ONC MGMT
Oral Chemotherapy Monitoring Program  Lab Follow Up    Patient currently on Revlimid therapy for multiple myeloma.    Reviewed lab results from 2/20/19 at [facility].    Labs:      Assessment & Plan:  No concerning abnormalities. Kidney function and blood counts adequate to continue therapy. Will plan to have labs again in 2 weeks, patient is aware and will get labs in [place].     Questions answered to patient's satisfaction. Jonh thanked me for the call and care.     Follow-Up:  3/6/19: review labs    Mal Frye, PharmD, MS  Hematology/Oncology Clinical Pharmacist  [facility]  [facility]

## 2019-02-21 LAB
MDC_IDC_EPISODE_DTM: NORMAL
MDC_IDC_EPISODE_DURATION: 10 S
MDC_IDC_EPISODE_DURATION: 10 S
MDC_IDC_EPISODE_DURATION: 111 S
MDC_IDC_EPISODE_DURATION: 14 S
MDC_IDC_EPISODE_DURATION: 169 S
MDC_IDC_EPISODE_DURATION: 17 S
MDC_IDC_EPISODE_DURATION: 23 S
MDC_IDC_EPISODE_DURATION: 26 S
MDC_IDC_EPISODE_DURATION: 29 S
MDC_IDC_EPISODE_DURATION: 30 S
MDC_IDC_EPISODE_DURATION: 30 S
MDC_IDC_EPISODE_DURATION: 32 S
MDC_IDC_EPISODE_DURATION: 50 S
MDC_IDC_EPISODE_DURATION: 51 S
MDC_IDC_EPISODE_DURATION: 52 S
MDC_IDC_EPISODE_DURATION: 69 S
MDC_IDC_EPISODE_DURATION: 72 S
MDC_IDC_EPISODE_DURATION: 92 S
MDC_IDC_EPISODE_ID: NORMAL
MDC_IDC_EPISODE_TYPE: NORMAL
MDC_IDC_EPISODE_VENDOR_TYPE: NORMAL
MDC_IDC_LEAD_IMPLANT_DT: NORMAL
MDC_IDC_LEAD_LOCATION: NORMAL
MDC_IDC_LEAD_LOCATION_DETAIL_1: NORMAL
MDC_IDC_LEAD_MFG: NORMAL
MDC_IDC_LEAD_MODEL: NORMAL
MDC_IDC_LEAD_POLARITY_TYPE: NORMAL
MDC_IDC_LEAD_SERIAL: NORMAL
MDC_IDC_MSMT_BATTERY_DTM: NORMAL
MDC_IDC_MSMT_BATTERY_REMAINING_LONGEVITY: 36 MO
MDC_IDC_MSMT_BATTERY_REMAINING_PERCENTAGE: 37 %
MDC_IDC_MSMT_BATTERY_STATUS: NORMAL
MDC_IDC_MSMT_CAP_CHARGE_DTM: NORMAL
MDC_IDC_MSMT_CAP_CHARGE_DTM: NORMAL
MDC_IDC_MSMT_CAP_CHARGE_ENERGY: 41 J
MDC_IDC_MSMT_CAP_CHARGE_TIME: 9.1 S
MDC_IDC_MSMT_CAP_CHARGE_TIME: 9.1 S
MDC_IDC_MSMT_CAP_CHARGE_TYPE: NORMAL
MDC_IDC_MSMT_CAP_CHARGE_TYPE: NORMAL
MDC_IDC_MSMT_LEADCHNL_RV_IMPEDANCE_VALUE: 403 OHM
MDC_IDC_MSMT_LEADCHNL_RV_PACING_THRESHOLD_AMPLITUDE: 1 V
MDC_IDC_MSMT_LEADCHNL_RV_PACING_THRESHOLD_PULSEWIDTH: 0.5 MS
MDC_IDC_PG_IMPLANT_DTM: NORMAL
MDC_IDC_PG_MFG: NORMAL
MDC_IDC_PG_MODEL: NORMAL
MDC_IDC_PG_SERIAL: NORMAL
MDC_IDC_PG_TYPE: NORMAL
MDC_IDC_SESS_CLINIC_NAME: NORMAL
MDC_IDC_SESS_DTM: NORMAL
MDC_IDC_SESS_TYPE: NORMAL
MDC_IDC_SET_BRADY_LOWRATE: 40 {BEATS}/MIN
MDC_IDC_SET_BRADY_MODE: NORMAL
MDC_IDC_SET_LEADCHNL_RV_PACING_AMPLITUDE: 2.4 V
MDC_IDC_SET_LEADCHNL_RV_PACING_POLARITY: NORMAL
MDC_IDC_SET_LEADCHNL_RV_PACING_PULSEWIDTH: 0.5 MS
MDC_IDC_SET_LEADCHNL_RV_SENSING_ADAPTATION_MODE: NORMAL
MDC_IDC_SET_LEADCHNL_RV_SENSING_POLARITY: NORMAL
MDC_IDC_SET_LEADCHNL_RV_SENSING_SENSITIVITY: 0.6 MV
MDC_IDC_SET_ZONE_DETECTION_INTERVAL: 286 MS
MDC_IDC_SET_ZONE_DETECTION_INTERVAL: 375 MS
MDC_IDC_SET_ZONE_TYPE: NORMAL
MDC_IDC_SET_ZONE_TYPE: NORMAL
MDC_IDC_SET_ZONE_VENDOR_TYPE: NORMAL
MDC_IDC_SET_ZONE_VENDOR_TYPE: NORMAL
MDC_IDC_STAT_BRADY_DTM_END: NORMAL
MDC_IDC_STAT_BRADY_DTM_START: NORMAL
MDC_IDC_STAT_BRADY_RV_PERCENT_PACED: 2 %
MDC_IDC_STAT_EPISODE_RECENT_COUNT: 0
MDC_IDC_STAT_EPISODE_RECENT_COUNT: 1
MDC_IDC_STAT_EPISODE_RECENT_COUNT: 541
MDC_IDC_STAT_EPISODE_RECENT_COUNT: 6
MDC_IDC_STAT_EPISODE_RECENT_COUNT: 8
MDC_IDC_STAT_EPISODE_RECENT_COUNT_DTM_END: NORMAL
MDC_IDC_STAT_EPISODE_RECENT_COUNT_DTM_START: NORMAL
MDC_IDC_STAT_EPISODE_TYPE: NORMAL
MDC_IDC_STAT_EPISODE_VENDOR_TYPE: NORMAL
MDC_IDC_STAT_TACHYTHERAPY_ATP_DELIVERED_RECENT: 6
MDC_IDC_STAT_TACHYTHERAPY_ATP_DELIVERED_TOTAL: 22
MDC_IDC_STAT_TACHYTHERAPY_RECENT_DTM_END: NORMAL
MDC_IDC_STAT_TACHYTHERAPY_RECENT_DTM_START: NORMAL
MDC_IDC_STAT_TACHYTHERAPY_SHOCKS_ABORTED_RECENT: 5
MDC_IDC_STAT_TACHYTHERAPY_SHOCKS_ABORTED_TOTAL: 22
MDC_IDC_STAT_TACHYTHERAPY_SHOCKS_DELIVERED_RECENT: 3
MDC_IDC_STAT_TACHYTHERAPY_SHOCKS_DELIVERED_TOTAL: 21
MDC_IDC_STAT_TACHYTHERAPY_TOTAL_DTM_END: NORMAL
MDC_IDC_STAT_TACHYTHERAPY_TOTAL_DTM_START: NORMAL

## 2019-02-22 ENCOUNTER — TELEPHONE (OUTPATIENT)
Dept: ONCOLOGY | Facility: CLINIC | Age: 69
End: 2019-02-22

## 2019-02-22 NOTE — TELEPHONE ENCOUNTER
Oral Chemotherapy Monitoring Program   Medication: Revlimid  Rx: 10mg PO daily on days 1 through 14 of 28 day cycle   Auth #: [id number]   Risk Category: Adult Male  Routine survey questions reviewed.   Rx to be Escribed to [facility] pharmacy       [facility]  Oncology Clinic Liaison  [address], [address]  [place] [address]  Ph: [phone]/Fax: [phone]

## 2019-03-01 ENCOUNTER — TELEPHONE (OUTPATIENT)
Dept: ONCOLOGY | Facility: CLINIC | Age: 69
End: 2019-03-01

## 2019-03-01 DIAGNOSIS — C90.02 MULTIPLE MYELOMA IN RELAPSE (H): ICD-10-CM

## 2019-03-01 RX ORDER — LENALIDOMIDE 10 MG/1
10 CAPSULE ORAL DAILY
Qty: 14 CAPSULE | Refills: 0 | Status: SHIPPED | OUTPATIENT
Start: 2019-03-01 | End: 2019-09-10

## 2019-03-01 NOTE — TELEPHONE ENCOUNTER
Spoke with Rosenda, [facility]ena [facility], [facility] [place], when she called today.  She reports they received a bill from [employer] for Revlimid sent out on 2/25 for mutual pt.  She stated this [facility] was not [facility] by [facility] so need to discuss situation.  Her number is [phone].  RN stated not on pt's care team as BMT pt however, will forward information to Oral Chemotherapy Team to call Rosenda and discuss.

## 2019-03-06 ENCOUNTER — ANCILLARY PROCEDURE (OUTPATIENT)
Dept: CARDIOLOGY | Facility: CLINIC | Age: 69
End: 2019-03-06
Attending: INTERNAL MEDICINE
Payer: MEDICARE

## 2019-03-06 ENCOUNTER — TRANSFERRED RECORDS (OUTPATIENT)
Dept: HEALTH INFORMATION MANAGEMENT | Facility: CLINIC | Age: 69
End: 2019-03-06

## 2019-03-06 DIAGNOSIS — I48.91 AF (ATRIAL FIBRILLATION) (H): ICD-10-CM

## 2019-03-06 LAB
CREAT SERPL-MCNC: 0.96 MG/DL (ref 0.72–1.25)
GFR SERPL CREATININE-BSD FRML MDRD: >60 ML/MIN/1.73M2
GLUCOSE SERPL-MCNC: 118 MG/DL (ref 70–100)
POTASSIUM SERPL-SCNC: 4.3 MMOL/L (ref 3.5–5.1)

## 2019-03-06 PROCEDURE — 93296 REM INTERROG EVL PM/IDS: CPT | Mod: ZF

## 2019-03-06 PROCEDURE — 93295 DEV INTERROG REMOTE 1/2/MLT: CPT | Performed by: INTERNAL MEDICINE

## 2019-03-07 ENCOUNTER — TELEPHONE (OUTPATIENT)
Dept: ONCOLOGY | Facility: CLINIC | Age: 69
End: 2019-03-07

## 2019-03-07 LAB
MDC_IDC_EPISODE_DTM: NORMAL
MDC_IDC_EPISODE_DURATION: 10 S
MDC_IDC_EPISODE_DURATION: 11 S
MDC_IDC_EPISODE_DURATION: 113 S
MDC_IDC_EPISODE_DURATION: 129 S
MDC_IDC_EPISODE_DURATION: 13 S
MDC_IDC_EPISODE_DURATION: 135 S
MDC_IDC_EPISODE_DURATION: 158 S
MDC_IDC_EPISODE_DURATION: 189 S
MDC_IDC_EPISODE_DURATION: 19 S
MDC_IDC_EPISODE_DURATION: 22 S
MDC_IDC_EPISODE_DURATION: 27 S
MDC_IDC_EPISODE_DURATION: 29 S
MDC_IDC_EPISODE_DURATION: 47 S
MDC_IDC_EPISODE_DURATION: 48 S
MDC_IDC_EPISODE_DURATION: 5 S
MDC_IDC_EPISODE_DURATION: 56 S
MDC_IDC_EPISODE_DURATION: 6 S
MDC_IDC_EPISODE_DURATION: 6 S
MDC_IDC_EPISODE_DURATION: 62 S
MDC_IDC_EPISODE_DURATION: 8 S
MDC_IDC_EPISODE_DURATION: 84 S
MDC_IDC_EPISODE_DURATION: 9 S
MDC_IDC_EPISODE_DURATION: 91 S
MDC_IDC_EPISODE_ID: NORMAL
MDC_IDC_EPISODE_TYPE: NORMAL
MDC_IDC_EPISODE_VENDOR_TYPE: NORMAL
MDC_IDC_LEAD_IMPLANT_DT: NORMAL
MDC_IDC_LEAD_LOCATION: NORMAL
MDC_IDC_LEAD_LOCATION_DETAIL_1: NORMAL
MDC_IDC_LEAD_MFG: NORMAL
MDC_IDC_LEAD_MODEL: NORMAL
MDC_IDC_LEAD_POLARITY_TYPE: NORMAL
MDC_IDC_LEAD_SERIAL: NORMAL
MDC_IDC_MSMT_BATTERY_DTM: NORMAL
MDC_IDC_MSMT_BATTERY_REMAINING_LONGEVITY: 36 MO
MDC_IDC_MSMT_BATTERY_REMAINING_PERCENTAGE: 38 %
MDC_IDC_MSMT_BATTERY_STATUS: NORMAL
MDC_IDC_MSMT_CAP_CHARGE_DTM: NORMAL
MDC_IDC_MSMT_CAP_CHARGE_DTM: NORMAL
MDC_IDC_MSMT_CAP_CHARGE_ENERGY: 41 J
MDC_IDC_MSMT_CAP_CHARGE_TIME: 9.2 S
MDC_IDC_MSMT_CAP_CHARGE_TIME: 9.2 S
MDC_IDC_MSMT_CAP_CHARGE_TYPE: NORMAL
MDC_IDC_MSMT_CAP_CHARGE_TYPE: NORMAL
MDC_IDC_MSMT_LEADCHNL_RV_IMPEDANCE_VALUE: 341 OHM
MDC_IDC_MSMT_LEADCHNL_RV_PACING_THRESHOLD_AMPLITUDE: 1 V
MDC_IDC_MSMT_LEADCHNL_RV_PACING_THRESHOLD_PULSEWIDTH: 0.5 MS
MDC_IDC_PG_IMPLANT_DTM: NORMAL
MDC_IDC_PG_MFG: NORMAL
MDC_IDC_PG_MODEL: NORMAL
MDC_IDC_PG_SERIAL: NORMAL
MDC_IDC_PG_TYPE: NORMAL
MDC_IDC_SESS_CLINIC_NAME: NORMAL
MDC_IDC_SESS_DTM: NORMAL
MDC_IDC_SESS_TYPE: NORMAL
MDC_IDC_SET_BRADY_LOWRATE: 40 {BEATS}/MIN
MDC_IDC_SET_BRADY_MODE: NORMAL
MDC_IDC_SET_LEADCHNL_RV_PACING_AMPLITUDE: 2.4 V
MDC_IDC_SET_LEADCHNL_RV_PACING_POLARITY: NORMAL
MDC_IDC_SET_LEADCHNL_RV_PACING_PULSEWIDTH: 0.5 MS
MDC_IDC_SET_LEADCHNL_RV_SENSING_ADAPTATION_MODE: NORMAL
MDC_IDC_SET_LEADCHNL_RV_SENSING_POLARITY: NORMAL
MDC_IDC_SET_LEADCHNL_RV_SENSING_SENSITIVITY: 0.6 MV
MDC_IDC_SET_ZONE_DETECTION_INTERVAL: 286 MS
MDC_IDC_SET_ZONE_DETECTION_INTERVAL: 375 MS
MDC_IDC_SET_ZONE_TYPE: NORMAL
MDC_IDC_SET_ZONE_TYPE: NORMAL
MDC_IDC_SET_ZONE_VENDOR_TYPE: NORMAL
MDC_IDC_SET_ZONE_VENDOR_TYPE: NORMAL
MDC_IDC_STAT_BRADY_DTM_END: NORMAL
MDC_IDC_STAT_BRADY_DTM_START: NORMAL
MDC_IDC_STAT_BRADY_RV_PERCENT_PACED: 2 %
MDC_IDC_STAT_EPISODE_RECENT_COUNT: 0
MDC_IDC_STAT_EPISODE_RECENT_COUNT: 1
MDC_IDC_STAT_EPISODE_RECENT_COUNT: 10
MDC_IDC_STAT_EPISODE_RECENT_COUNT: 114
MDC_IDC_STAT_EPISODE_RECENT_COUNT: 6
MDC_IDC_STAT_EPISODE_RECENT_COUNT_DTM_END: NORMAL
MDC_IDC_STAT_EPISODE_RECENT_COUNT_DTM_START: NORMAL
MDC_IDC_STAT_EPISODE_TYPE: NORMAL
MDC_IDC_STAT_EPISODE_VENDOR_TYPE: NORMAL
MDC_IDC_STAT_TACHYTHERAPY_ATP_DELIVERED_RECENT: 7
MDC_IDC_STAT_TACHYTHERAPY_ATP_DELIVERED_TOTAL: 23
MDC_IDC_STAT_TACHYTHERAPY_RECENT_DTM_END: NORMAL
MDC_IDC_STAT_TACHYTHERAPY_RECENT_DTM_START: NORMAL
MDC_IDC_STAT_TACHYTHERAPY_SHOCKS_ABORTED_RECENT: 6
MDC_IDC_STAT_TACHYTHERAPY_SHOCKS_ABORTED_TOTAL: 23
MDC_IDC_STAT_TACHYTHERAPY_SHOCKS_DELIVERED_RECENT: 4
MDC_IDC_STAT_TACHYTHERAPY_SHOCKS_DELIVERED_TOTAL: 22
MDC_IDC_STAT_TACHYTHERAPY_TOTAL_DTM_END: NORMAL
MDC_IDC_STAT_TACHYTHERAPY_TOTAL_DTM_START: NORMAL

## 2019-03-07 NOTE — ORAL ONC MGMT
Oral Chemotherapy Monitoring Program     Primary Oncologist: Dr. Raygoza  Primary Oncology Clinic: [facility]  Cancer Diagnosis: Multiple Myeloma     Drug: Revlimid  Start Date: 2/3/2019  C2D1=3/3/2019    Dose is appropriate for patients: Renal Function         Expected duration of therapy: Until disease progression or unacceptable toxicity     Drug Interaction Assessment: No drug interactions with Lenalidomide found at this time.     Drugs Checked include: Albuterol -Amoxicillin -Baclofen -Calcium Carbonate and Vitamin D -Dexamethasone (Systemic) -Diclofenac (Systemic) -FentaNYL -Furosemide -Lenalidomide -Lisinopril -Magnesium Oxide -Metoprolol -Nitroglycerin -Omeprazole -OxyCODONE -Simvastatin -Tiotropium -TraZODone -Warfarin     Lab Monitoring Plan  CBC Q2 weeks for first 3 months, then monthly  CMP monthly    Subjective/Objective:  Erasmo Pearce is a 68 year old male contacted by phone for a follow-up visit for oral chemotherapy.  Jonh said he has some cramping in his jaw, neck and sometimes down into his chest and down to his feet. Notably his magnesium is low as of yesterday. He said he has not been taking his magnesium supplement but he will visit with his cardiologist about getting back on that and managing it better. He said he only drinks 24-30 ounces of water most days and some days less than that. This lack of proper hydration could contribute to cramping as well. He does not report any side effects related to Revlimid, just some sleep disturbances related to the dexamethasone. He said he does not do any routine exercise. He thanked me for the call and care.     ORAL CHEMOTHERAPY 1/23/2019 2/12/2019 3/7/2019   Drug Name Revlimid (Lenalidomide) Revlimid (Lenalidomide) Revlimid (Lenalidomide)   Current Dosage 10mg 10mg 10mg   Current Schedule Daily Daily Daily   Cycle Details 2 weeks on 2 weeks off 2 weeks on 2 weeks off 2 weeks on 2 weeks off   Start Date of Last Cycle - 2/3/2019  "3/3/2019   Planned next cycle start date - 3/3/2019 3/31/2019   Doses missed in last 2 weeks - 0 0   Adherence Assessment - Adherent Adherent   Adverse Effects - No AE identified during assessment No AE identified during assessment;Other (see note for details)   Pharmacist intervention? - - Yes   Intervention(s) - - Patient education   Home BPs - - not needed   Any new drug interactions? No No No   Is the dose as ordered appropriate for the patient? Yes Yes Yes       Vitals:  BP:   BP Readings from Last 1 Encounters:   01/22/19 118/70     Wt Readings from Last 1 Encounters:   01/22/19 90.7 kg (200 lb)     Estimated body surface area is 2.12 meters squared as calculated from the following:    Height as of 11/13/18: 1.778 m (5' 10\").    Weight as of 1/22/19: 90.7 kg (200 lb).    Labs:  No results found for NA within last 30 days.     _  Result Component Current Result Ref Range   Potassium 4.3 (2/6/2019) 3.5 - 5.1 mmol/L     No results found for CA within last 30 days.     No results found for Mag within last 30 days.     No results found for Phos within last 30 days.     No results found for ALBUMIN within last 30 days.     No results found for BUN within last 30 days.     _  Result Component Current Result Ref Range   Creatinine 0.96 (2/6/2019) 0.72 - 1.25 mg/dL       No results found for AST within last 30 days.     No results found for ALT within last 30 days.     No results found for BILITOTAL within last 30 days.       No results found for WBC within last 30 days.     No results found for HGB within last 30 days.     No results found for PLT within last 30 days.     No results found for ANC within last 30 days.     Assessment:  Jonh appears to be doing well with Revlimid, he has some symptoms probably related to low magnesium, and dexamethasone.    Plan:  Continue Revlimid. Recommended daily intake of 64 ounces of water and other beverage for proper hydration. Recommended exercise as well for all the benefits " of staying active. Plan is for repeat labs on 3/20/2019 at [facility] in [place]. Next office visit is planned for 5/14/2019.    Follow-Up:  Pending lab results on 3/20/19    Refill Due:  By 3/31/2019    Favio CarvajalD  [facility]  [phone]  March 7, 2019

## 2019-03-20 ENCOUNTER — TRANSFERRED RECORDS (OUTPATIENT)
Dept: HEALTH INFORMATION MANAGEMENT | Facility: CLINIC | Age: 69
End: 2019-03-20

## 2019-03-20 ENCOUNTER — TELEPHONE (OUTPATIENT)
Dept: PHARMACY | Facility: CLINIC | Age: 69
End: 2019-03-20

## 2019-03-20 DIAGNOSIS — C90.02 MULTIPLE MYELOMA IN RELAPSE (H): Primary | ICD-10-CM

## 2019-03-20 LAB
AST SERPL-CCNC: 20 U/L (ref 5–41)
CHOLEST SERPL-MCNC: 113 MG/DL (ref 0–200)
CREAT SERPL-MCNC: 1.11 MG/DL (ref 0.72–1.25)
GFR SERPL CREATININE-BSD FRML MDRD: >60 ML/MIN/1.73M2
GLUCOSE SERPL-MCNC: 103 MG/DL (ref 70–100)
HBA1C MFR BLD: 6.5 % (ref 0–5.7)
HDLC SERPL-MCNC: 42 MG/DL
INR PPP: 4.4 (ref 0.8–1.2)
LDLC SERPL CALC-MCNC: 52 MG/DL (ref 0–159)
POTASSIUM SERPL-SCNC: 3.8 MMOL/L (ref 3.5–5.1)
TRIGL SERPL-MCNC: 93 MG/DL (ref 30–150)
TSH SERPL-ACNC: 0.62 MIU/ML (ref 0.47–5)

## 2019-03-20 RX ORDER — LENALIDOMIDE 10 MG/1
10 CAPSULE ORAL DAILY
Qty: 14 CAPSULE | COMMUNITY
Start: 2019-03-20 | End: 2019-03-26

## 2019-03-20 RX ORDER — DEXAMETHASONE 4 MG/1
20 TABLET ORAL WEEKLY
Qty: 15 TABLET | Refills: 0 | Status: SHIPPED | OUTPATIENT
Start: 2019-03-20 | End: 2019-03-26

## 2019-03-20 NOTE — ORAL ONC MGMT
Oral Chemotherapy Monitoring Program  Lab Follow Up    Patient currently on revlimid therapy for myeloma.    Reviewed lab results from 3/20/19.    [facility]      Assessment & Plan:  No concerning abnormalities. Spoke to Erasmo regarding the lab results. Questions answered to patient's satisfaction.    Follow-Up:  4/3: q2wk labs    Mal Frye PharmD, MS  Hematology/Oncology Clinical Pharmacist  [facility]  [facility]  [phone]

## 2019-03-21 ENCOUNTER — TELEPHONE (OUTPATIENT)
Dept: ONCOLOGY | Facility: CLINIC | Age: 69
End: 2019-03-21

## 2019-03-21 NOTE — TELEPHONE ENCOUNTER
Oral Chemotherapy Monitoring Program   Medication: Revlimid  Rx: 10mg PO daily on days 1 through 14 of 28 day cycle   Auth #: [id number]   Risk Category: Adult Male   Routine survey questions reviewed.   [facility]  Oncology Clinic Liaison  [address], [address]  [place] [address]  Ph: [phone]/Fax: [phone]

## 2019-03-26 ENCOUNTER — OFFICE VISIT (OUTPATIENT)
Dept: CARDIOLOGY | Facility: CLINIC | Age: 69
End: 2019-03-26
Attending: INTERNAL MEDICINE
Payer: MEDICARE

## 2019-03-26 VITALS
HEART RATE: 65 BPM | DIASTOLIC BLOOD PRESSURE: 64 MMHG | SYSTOLIC BLOOD PRESSURE: 100 MMHG | HEIGHT: 70 IN | BODY MASS INDEX: 28.13 KG/M2 | WEIGHT: 196.5 LBS | OXYGEN SATURATION: 97 %

## 2019-03-26 DIAGNOSIS — I48.91 ATRIAL FIBRILLATION (H): ICD-10-CM

## 2019-03-26 DIAGNOSIS — I49.01 VENTRICULAR FIBRILLATION (H): ICD-10-CM

## 2019-03-26 DIAGNOSIS — I48.21 PERMANENT ATRIAL FIBRILLATION (H): ICD-10-CM

## 2019-03-26 DIAGNOSIS — Z95.810 AUTOMATIC IMPLANTABLE CARDIOVERTER-DEFIBRILLATOR IN SITU: ICD-10-CM

## 2019-03-26 DIAGNOSIS — I42.9 SECONDARY CARDIOMYOPATHY (H): Primary | ICD-10-CM

## 2019-03-26 PROCEDURE — G0463 HOSPITAL OUTPT CLINIC VISIT: HCPCS | Mod: ZF

## 2019-03-26 PROCEDURE — 99214 OFFICE O/P EST MOD 30 MIN: CPT | Mod: GC | Performed by: INTERNAL MEDICINE

## 2019-03-26 RX ORDER — MEXILETINE HYDROCHLORIDE 150 MG/1
150 CAPSULE ORAL 2 TIMES DAILY
Qty: 180 CAPSULE | Refills: 3 | Status: SHIPPED | OUTPATIENT
Start: 2019-03-26 | End: 2020-05-23

## 2019-03-26 ASSESSMENT — PAIN SCALES - GENERAL: PAINLEVEL: NO PAIN (0)

## 2019-03-26 ASSESSMENT — MIFFLIN-ST. JEOR: SCORE: 1672.57

## 2019-03-26 NOTE — LETTER
3/26/2019      RE: Erasmo Pearce  [address]  [address] [facility] [address]       Dear Colleague,    Thank you for the opportunity to participate in the care of your patient, Erasmo Pearce, at the [facility]. Please see a copy of my visit note below.         Cardiac Electrophysiology Clinic       HPI:   Erasmo Pearce is a 68 year old male with a history of permanent atrial fibrillation, ischemic cardiomyopathy (LVEF 30-35% in 2017) status post [employer] ICD in 12/2009 initially for primary prevention, history of multiple appropriate shocks for VF (typically while undergoing chemotherapy), and relapsed multiple myeloma who presents for follow-up.    He was last seen in clinic on 11/13/18.  He was started on lenalidomide on 2/10/19.  Since initiating lenalidomide, he has received 4 appropriate ICD shocks (on 2/11, 2/14, 2/16, and 3/6).  He has completed 2 cycles of lenalidomide with his most recent dose on 3/24 (2 weeks on, 2 weeks off). Last ICD shock prior to starting lenalidomide was on 7/25.  On 2/11, had one 41 J shock; on 2/12, had ATP x 1; on 2/14, had ATP x 1, 41 J shock; on 2/15, ATP x 1; on 2/15, ATP x 1, 41 J shock; and on 3/6, ATP x 1, 41 J shock. Per patient, he is anticipated to be on lenalidomide for around 6 months.  Electrolyte checks have shown lower Mg levels.  He was on magnesium oxide 1200 mg TID from 1/23-1/30 and is now on magnesium oxide 1260 mg BID.  Dr. Cota has been following the patient's labs.    Denies having any prodromal symptoms prior to his ICD shocks.  Denies chest pain.  When on chemotherapy, he has dyspnea on exertion and overall weakness, which are his previous responses to chemotherapy.  Denies orthopnea and lower extremity swelling.  Denies nausea, emesis, and any bleeding.    Past Medical History:   Diagnosis Date     Atrial fibrillation (H)      Other premature beats       VF (ventricular fibrillation) (H)        Past Surgical History:   Procedure Laterality Date     IMPLANT AUTOMATIC IMPLANTABLE CARDIOVERTER DEFIBRILLATOR         No family history on file.    Social History     Tobacco Use     Smoking status: Former Smoker     Packs/day: 1.00     Years: 25.00     Pack years: 25.00     Types: Cigarettes     Last attempt to quit: 10/15/1995     Years since quittin.4     Smokeless tobacco: Never Used   Substance Use Topics     Alcohol use: No         Current Outpatient Medications   Medication Sig     albuterol (PROAIR HFA, PROVENTIL HFA, VENTOLIN HFA) 108 (90 BASE) MCG/ACT inhaler Inhale 2 puffs into the lungs every 6 hours as needed      baclofen (LIORESAL) 10 MG tablet Take 10 mg by mouth 3 times daily as needed prn     Calcium Carbonate-Vitamin D (CALCIUM 500 + D PO) Take 2 tablets by mouth daily.     dexamethasone (DECADRON) 4 MG tablet Take 20 mg by mouth once a week Once a week with food on Days 1,8,15..     diclofenac (VOLTAREN) 75 MG EC tablet Take 75 mg by mouth 2 times daily as needed 1 tab prn     fentaNYL (DURAGESIC) 25 mcg/hr patch 72 hr Place 1 patch onto the skin every 72 hours     furosemide (LASIX) 20 MG tablet Take 20 mg by mouth daily      LENalidomide (REVLIMID) 10 MG CAPS capsule CHEMO Take 1 capsule (10 mg) by mouth daily Take on Days 1 through 14 of 28-day cycle.     lisinopril (PRINIVIL,ZESTRIL) 5 MG tablet Take 5 mg by mouth daily      Magnesium Oxide 420 MG TABS Take 1,260 mg by mouth 2 times daily     metoprolol succinate (TOPROL-XL) 200 MG 24 hr tablet Take 1 tablet (200 mg) by mouth daily     nitroGLYCERIN (NITROSTAT) 0.4 MG SL tablet Place 0.4 mg under the tongue every 5 minutes as needed Reported on 2017     omeprazole (PRILOSEC) 20 MG capsule Take 20 mg by mouth daily      oxycodone (ROXICODONE) 5 MG immediate release tablet Take 1-2 tablets by mouth every 6 hours as needed. For pain.      simvastatin (ZOCOR) 40 MG tablet Take 40 mg by mouth At  "Bedtime      tiotropium (SPIRIVA) 18 MCG inhalation capsule Inhale 1 capsule (18 mcg) into the lungs daily     TRAZODONE HCL PO Take 50 mg by mouth nightly as needed      warfarin (COUMADIN) 5 MG tablet Take 7.5 mg by mouth See Admin Instructions Take 1.5 tab by mojth Monday and Friday and 1 tab Sun Tues WED , Thur and Sat     amoxicillin (AMOXIL) 500 MG tablet Take 4 tabs one hour before dental appointment (Patient not taking: Reported on 1/22/2019)     No current facility-administered medications for this visit.          ROS:   10 point ROS negative other than what is discussed above.    EXAM:   /64 (BP Location: Right arm, Patient Position: Chair, Cuff Size: Adult Regular)   Pulse 65   Ht 1.778 m (5' 10\")   Wt 89.1 kg (196 lb 8 oz)   SpO2 97%   BMI 28.19 kg/m      GENERAL: Alert, oriented, NAD  HEENT:  NC/AT. Sclerae white.  MMM, no oral lesions  NECK: No JVD   HEART: IRIR, normal S1 and S2, no murmurs   LUNGS:  Clear to auscultation bilaterally, no wheezes, rales, or rhonchi  ABDOMEN: Soft, nontender, nondistended, bowel sounds present, no hepatojugular reflux  EXTREMITIES: No lower extremity edema.    Device interrogation:  Patient seen in Clinic for evaluation and iterative programming of a [employer] Single lead ICD per MD orders. Patient has an appointment to see Dr. Jean-Baptiste today. Normal ICD function. 10 episodes recorded as NSVT of the 10 there were 3 available EGM for review. 2 EGM show an irregular VS suggesting AF with RVR. 1 episode shows NSVT lasting 1 second @ 266 bpm. 3 episodes recorded as VT that appear to be one continuous episode lasting approx 2 min 20 seconds. 1 Available EGM for review shows the episode starting out as an Irregular VS that regularizes to a rate of 193 bpm possibly indicating VT. Unabale to see the end of the episode. Pt had brought a diary of symptoms noting some chest heaviness on 3/21 and 3//25 and none of the dates correlated with anything that was in " the log book. Intrinsic rhythm = Irregular VS @ ~ 60 bpm, pt is on coumadin.  = 2%.  Estimated battery longevity to ROSEMARIE = 3 years. Lead trends appear stable. Patient reports that he is feeling ok and denies specific complaints. Plan for patient to send a remote transmission in 3 months and return to clinic in 6 months.Single lead ICDI have reviewed and interpreted the device interrogation, settings, programming and nurse's summary.  The device is functioning within normal device parameters.   I agree with the current findings, assessment and plan.      Assessment and Plan:   Erasmo Pearce is a 68 year old male with a history of permanent atrial fibrillation, ischemic cardiomyopathy (LVEF 30-35% in 2017) status post ICD in 12/2009 initially for primary prevention, history of multiple appropriate shocks for VF (typically while undergoing chemotherapy), and relapsed multiple myeloma who presents for follow-up.    VF/VT, multiple appropriate ICD shocks  Relapsed multiple myeloma  Patient typically receives appropriate ICD shocks while undergoing chemotherapy, possibly due to electrolyte imbalances.  He is currently on metoprolol succinate 200 mg.  He is not currently on an antiarrhythmic.  He has a low DLCO which precludes him from amiodarone and he previously did not tolerate sotalol in the past due to prolonged QTc and low blood pressure.  - Start mexiletine 150 mg twice a day.  Discussed possible side effects with the patient.  - Continue metoprolol succinate 200 mg daily  - Obtain transthoracic echocardiogram to evaluate for possible worsening of cardiac function which may predispose patient to recurrent episodes of VT or VF and for possible worsening of cardiac function in the setting of multiple ICD shocks.  - Dr. Cota to continue to monitor electrolytes. Recommending keeping K>4, Mg>2    Permanent AF  Continue metoprolol succinate 200 mg and warfarin. CHADS-VASc 3 (age, CHF, CAD).    ICM, LVEF  "30-35% in 2017  NYHA class II, stage C. Is euvolemic today.  - Continue Toprol succinate 20 mg, lisinopril 5 mg, and Lasix 20 mg     RTC in 2 months.      Patient seen and discussed with Dr. Jean-Baptiste.      Robyn Ventura MD, PhD  Cardiology Fellow  [phone]    ELECTROPHYSIOLOGY STAFF  Patient seen and examined by me.  History and physical examination discussed with Dr. Ventura whose note reflects our joint assessment and recommendation/plans.  Erasmo Pearce returns for follow-up. We last saw him in November 2018. He is a 68-year-old gentleman with multiple myeloma, permanent atrial fibrillation, ischemic cardiomyopathy with an ejection fraction of approximately 35% who had an implantable cardioverter-defibrillator placed in December 2009 for primary prevention with subsequent appropriate shocks. He is not currently on antiarrhythmic drugs. He has had past difficulties in tolerating antiarrhythmic agents (low DLCO precluding amiodarone, hypotension on sotalol) and he has had a tendency to have ventricular fibrillation during chemotherapy, possibly because his electrolytes are more labile. His heart rate histogram during atrial fibrillation has been generally excellent although he has had a few fast episodes that fall into the VT monitor zone, and may have caused two shocks on July 20. He is currently taking metoprolol succinate 200 mg daily for rate control during AF. He is on lisinopril 5 mg daily (decreased, in the past, to make \"BP room\" for a higher dose of metoprolol). Since we last saw him, he has seen Dr. Raygoza (1/22/2019) and has resumed chemotherapy (2/3/2019).  His VT has, again, increased.  Remote check 12/10/2018 showed one episode for VT terminated by ATP. He had had 56 episodes of NSVT the preceding month.   He resumed chemotherapy 2/3/2019.   Remote check 2/15/2019 showd 373 NSVT the preceding 2 months, particularly starting 2/8, including 3 that required therapy:   2/11 41 J   2/12 " terminated with ATP   2/14 terminated with ATP but he received a committed shock   Between 2/15 and 2/17 he had 26 episodes of VT and 25 episodes of NSVT. He had an episode of VT 2/15/2018 that was converted with a 41 J shock after ATPx1 had failed. On 2/77/2019 he had an episode of VT terminated by ATP.     He is continuing on chemotherapy (Revlimid, 14 days on, 14 days off).  He has completed two rounds of active therapy, he is in the second two weeks of being off drug.  He had 4 shocks associated with his first two weeks of chemo, and none during the second active two weeks.  It is anticipated that he will be on chemo until sometime this summer.  With four episodes of VT/ VF in the first two weeks of chemo I think we should try an antiarrhythmic (in spite of having had no shocks during the second month.  We will try mexiletine 150 mg q 12 hours.  We will arrange follow-up in 2 months to assess his response. We will also check an echo to see if there has been any deterioration in his LV function.  It was a pleasure seeing Erasmo Pearce today.  Narinder Jean-Baptiste        CC  Patient Care Team:  Cong Mcdonald as PCP - General  [facility] Hospital[facility]ian MD as PCP - Internal Medicine  Rojas Raygoza MD as BMT Physician  Yaz Jeong NP as Nurse Practitioner (Nurse Practitioner)  Morgan, Eligio as Family Medicine Physician  Merry Mas RN as Continuity Care Coordinator (Transplant)  Aleida Angeles RN as Nurse Coordinator  NARINDER JEAN-BAPTISTE

## 2019-03-26 NOTE — NURSING NOTE
Cardiac Testing: Patient given instructions regarding  echocardiogram . Discussed purpose, preparation, procedure and when to expect results reported back to the patient. Patient demonstrated understanding of this information and agreed to call with further questions or concerns.  Med Reconcile: Reviewed and verified all current medications with the patient. The updated medication list was printed and given to the patient.  New Medication: Mexiletine 150 mg po bid.  Patient was educated regarding newly prescribed medication, including discussion of  the indication, administration, side effects, and when to report to MD or RN. Patient demonstrated understanding of this information and agreed to call with further questions or concerns.  Return Appointment: 2 months with Dr. Jean-Baptiste.  Patient given instructions regarding scheduling next clinic visit. Patient demonstrated understanding of this information and agreed to call with further questions or concerns.  Patient stated he understood all health information given and agreed to call with further questions or concerns.

## 2019-03-26 NOTE — PATIENT INSTRUCTIONS
You were seen today in the Cardiovascular Clinic at the [facility].      Cardiology Providers you saw during your visit:   Dr. Jean-Baptiste    Diagnosis:   Permanent atrial fibrillation    Results:  None today    Recommendations:   Mexiletine 150 mg twice per day.  As close to every 12 hours as possible     Echo at your convenience.  Today if possible.  If not can be done at [facility].    Follow-up:   May with Dr. Jean-Baptiste      For emergencies call [phone].    For any scheduling needs, please call [phone].     Thank you for your visit today!     Please call if you have any questions or concerns.  Jonatan Echavarria RN

## 2019-03-26 NOTE — PROGRESS NOTES
Cardiac Electrophysiology Clinic       HPI:   Erasmo Pearce is a 68 year old male with a history of permanent atrial fibrillation, ischemic cardiomyopathy (LVEF 30-35% in 2017) status post [employer] ICD in 12/2009 initially for primary prevention, history of multiple appropriate shocks for VF (typically while undergoing chemotherapy), and relapsed multiple myeloma who presents for follow-up.    He was last seen in clinic on 11/13/18.  He was started on lenalidomide on 2/10/19.  Since initiating lenalidomide, he has received 4 appropriate ICD shocks (on 2/11, 2/14, 2/16, and 3/6).  He has completed 2 cycles of lenalidomide with his most recent dose on 3/24 (2 weeks on, 2 weeks off). Last ICD shock prior to starting lenalidomide was on 7/25.  On 2/11, had one 41 J shock; on 2/12, had ATP x 1; on 2/14, had ATP x 1, 41 J shock; on 2/15, ATP x 1; on 2/15, ATP x 1, 41 J shock; and on 3/6, ATP x 1, 41 J shock. Per patient, he is anticipated to be on lenalidomide for around 6 months.  Electrolyte checks have shown lower Mg levels.  He was on magnesium oxide 1200 mg TID from 1/23-1/30 and is now on magnesium oxide 1260 mg BID.  Dr. Cota has been following the patient's labs.    Denies having any prodromal symptoms prior to his ICD shocks.  Denies chest pain.  When on chemotherapy, he has dyspnea on exertion and overall weakness, which are his previous responses to chemotherapy.  Denies orthopnea and lower extremity swelling.  Denies nausea, emesis, and any bleeding.    Past Medical History:   Diagnosis Date     Atrial fibrillation (H)      Other premature beats      VF (ventricular fibrillation) (H)        Past Surgical History:   Procedure Laterality Date     IMPLANT AUTOMATIC IMPLANTABLE CARDIOVERTER DEFIBRILLATOR         No family history on file.    Social History     Tobacco Use     Smoking status: Former Smoker     Packs/day: 1.00     Years: 25.00     Pack years: 25.00     Types: Cigarettes      Last attempt to quit: 10/15/1995     Years since quittin.4     Smokeless tobacco: Never Used   Substance Use Topics     Alcohol use: No         Current Outpatient Medications   Medication Sig     albuterol (PROAIR HFA, PROVENTIL HFA, VENTOLIN HFA) 108 (90 BASE) MCG/ACT inhaler Inhale 2 puffs into the lungs every 6 hours as needed      baclofen (LIORESAL) 10 MG tablet Take 10 mg by mouth 3 times daily as needed prn     Calcium Carbonate-Vitamin D (CALCIUM 500 + D PO) Take 2 tablets by mouth daily.     dexamethasone (DECADRON) 4 MG tablet Take 20 mg by mouth once a week Once a week with food on Days 1,8,15..     diclofenac (VOLTAREN) 75 MG EC tablet Take 75 mg by mouth 2 times daily as needed 1 tab prn     fentaNYL (DURAGESIC) 25 mcg/hr patch 72 hr Place 1 patch onto the skin every 72 hours     furosemide (LASIX) 20 MG tablet Take 20 mg by mouth daily      LENalidomide (REVLIMID) 10 MG CAPS capsule CHEMO Take 1 capsule (10 mg) by mouth daily Take on Days 1 through 14 of 28-day cycle.     lisinopril (PRINIVIL,ZESTRIL) 5 MG tablet Take 5 mg by mouth daily      Magnesium Oxide 420 MG TABS Take 1,260 mg by mouth 2 times daily     metoprolol succinate (TOPROL-XL) 200 MG 24 hr tablet Take 1 tablet (200 mg) by mouth daily     nitroGLYCERIN (NITROSTAT) 0.4 MG SL tablet Place 0.4 mg under the tongue every 5 minutes as needed Reported on 2017     omeprazole (PRILOSEC) 20 MG capsule Take 20 mg by mouth daily      oxycodone (ROXICODONE) 5 MG immediate release tablet Take 1-2 tablets by mouth every 6 hours as needed. For pain.      simvastatin (ZOCOR) 40 MG tablet Take 40 mg by mouth At Bedtime      tiotropium (SPIRIVA) 18 MCG inhalation capsule Inhale 1 capsule (18 mcg) into the lungs daily     TRAZODONE HCL PO Take 50 mg by mouth nightly as needed      warfarin (COUMADIN) 5 MG tablet Take 7.5 mg by mouth See Admin Instructions Take 1.5 tab by moj and Friday and 1 tab Sun  , Thur and Sat      "amoxicillin (AMOXIL) 500 MG tablet Take 4 tabs one hour before dental appointment (Patient not taking: Reported on 1/22/2019)     No current facility-administered medications for this visit.          ROS:   10 point ROS negative other than what is discussed above.    EXAM:   /64 (BP Location: Right arm, Patient Position: Chair, Cuff Size: Adult Regular)   Pulse 65   Ht 1.778 m (5' 10\")   Wt 89.1 kg (196 lb 8 oz)   SpO2 97%   BMI 28.19 kg/m     GENERAL: Alert, oriented, NAD  HEENT:  NC/AT. Sclerae white.  MMM, no oral lesions  NECK: No JVD   HEART: IRIR, normal S1 and S2, no murmurs   LUNGS:  Clear to auscultation bilaterally, no wheezes, rales, or rhonchi  ABDOMEN: Soft, nontender, nondistended, bowel sounds present, no hepatojugular reflux  EXTREMITIES: No lower extremity edema.    Device interrogation:  Patient seen in Clinic for evaluation and iterative programming of a [employer] Single lead ICD per MD orders. Patient has an appointment to see Dr. Jean-Baptiste today. Normal ICD function. 10 episodes recorded as NSVT of the 10 there were 3 available EGM for review. 2 EGM show an irregular VS suggesting AF with RVR. 1 episode shows NSVT lasting 1 second @ 266 bpm. 3 episodes recorded as VT that appear to be one continuous episode lasting approx 2 min 20 seconds. 1 Available EGM for review shows the episode starting out as an Irregular VS that regularizes to a rate of 193 bpm possibly indicating VT. Unabale to see the end of the episode. Pt had brought a diary of symptoms noting some chest heaviness on 3/21 and 3//25 and none of the dates correlated with anything that was in the log book. Intrinsic rhythm = Irregular VS @ ~ 60 bpm, pt is on coumadin.  = 2%.  Estimated battery longevity to ROSEMARIE = 3 years. Lead trends appear stable. Patient reports that he is feeling ok and denies specific complaints. Plan for patient to send a remote transmission in 3 months and return to clinic in 6 months.Single " lead ICDI have reviewed and interpreted the device interrogation, settings, programming and nurse's summary.  The device is functioning within normal device parameters.   I agree with the current findings, assessment and plan.      Assessment and Plan:   Erasmo Pearce is a 68 year old male with a history of permanent atrial fibrillation, ischemic cardiomyopathy (LVEF 30-35% in 2017) status post ICD in 12/2009 initially for primary prevention, history of multiple appropriate shocks for VF (typically while undergoing chemotherapy), and relapsed multiple myeloma who presents for follow-up.    VF/VT, multiple appropriate ICD shocks  Relapsed multiple myeloma  Patient typically receives appropriate ICD shocks while undergoing chemotherapy, possibly due to electrolyte imbalances.  He is currently on metoprolol succinate 200 mg.  He is not currently on an antiarrhythmic.  He has a low DLCO which precludes him from amiodarone and he previously did not tolerate sotalol in the past due to prolonged QTc and low blood pressure.  - Start mexiletine 150 mg twice a day.  Discussed possible side effects with the patient.  - Continue metoprolol succinate 200 mg daily  - Obtain transthoracic echocardiogram to evaluate for possible worsening of cardiac function which may predispose patient to recurrent episodes of VT or VF and for possible worsening of cardiac function in the setting of multiple ICD shocks.  - Dr. Cota to continue to monitor electrolytes. Recommending keeping K>4, Mg>2    Permanent AF  Continue metoprolol succinate 200 mg and warfarin. CHADS-VASc 3 (age, CHF, CAD).    ICM, LVEF 30-35% in 2017  NYHA class II, stage C. Is euvolemic today.  - Continue Toprol succinate 20 mg, lisinopril 5 mg, and Lasix 20 mg     RTC in 2 months.      Patient seen and discussed with Dr. Jean-Baptiste.      Robyn Ventura MD, PhD  Cardiology Fellow  [phone]    ELECTROPHYSIOLOGY STAFF  Patient seen and examined by me.  History  "and physical examination discussed with Dr. Ventura whose note reflects our joint assessment and recommendation/plans.  Erasmo Pearce returns for follow-up. We last saw him in November 2018. He is a 68-year-old gentleman with multiple myeloma, permanent atrial fibrillation, ischemic cardiomyopathy with an ejection fraction of approximately 35% who had an implantable cardioverter-defibrillator placed in December 2009 for primary prevention with subsequent appropriate shocks. He is not currently on antiarrhythmic drugs. He has had past difficulties in tolerating antiarrhythmic agents (low DLCO precluding amiodarone, hypotension on sotalol) and he has had a tendency to have ventricular fibrillation during chemotherapy, possibly because his electrolytes are more labile. His heart rate histogram during atrial fibrillation has been generally excellent although he has had a few fast episodes that fall into the VT monitor zone, and may have caused two shocks on July 20. He is currently taking metoprolol succinate 200 mg daily for rate control during AF. He is on lisinopril 5 mg daily (decreased, in the past, to make \"BP room\" for a higher dose of metoprolol). Since we last saw him, he has seen Dr. Raygoza (1/22/2019) and has resumed chemotherapy (2/3/2019).  His VT has, again, increased.  Remote check 12/10/2018 showed one episode for VT terminated by ATP. He had had 56 episodes of NSVT the preceding month.   He resumed chemotherapy 2/3/2019.   Remote check 2/15/2019 showd 373 NSVT the preceding 2 months, particularly starting 2/8, including 3 that required therapy:   2/11 41 J   2/12 terminated with ATP   2/14 terminated with ATP but he received a committed shock   Between 2/15 and 2/17 he had 26 episodes of VT and 25 episodes of NSVT. He had an episode of VT 2/15/2018 that was converted with a 41 J shock after ATPx1 had failed. On 2/77/2019 he had an episode of VT terminated by ATP.     He is continuing on " chemotherapy (Revlimid, 14 days on, 14 days off).  He has completed two rounds of active therapy, he is in the second two weeks of being off drug.  He had 4 shocks associated with his first two weeks of chemo, and none during the second active two weeks.  It is anticipated that he will be on chemo until sometime this summer.  With four episodes of VT/ VF in the first two weeks of chemo I think we should try an antiarrhythmic (in spite of having had no shocks during the second month.  We will try mexiletine 150 mg q 12 hours.  We will arrange follow-up in 2 months to assess his response. We will also check an echo to see if there has been any deterioration in his LV function.  It was a pleasure seeing Erasmo Scottryandorina today.  Narinder GRAHAM  Patient Care Team:  Cong Mcdonald as PCP - General  GinaWillian crandall MD as PCP - Internal Medicine  Rojas Raygoza MD as BMT Physician  Cong Mcdonald as Referring Physician  Yaz Jeong NP as Nurse Practitioner (Nurse Practitioner)  Eligio Mora as Family Medicine Physician  Merry Mas RN as Continuity Care Coordinator (Transplant)  Aleida Angeles RN as Nurse Coordinator  NARINDER LENZ

## 2019-03-26 NOTE — PATIENT INSTRUCTIONS
It was a pleasure to see you in clinic today. Please do not hesitate to call with any questions or concerns. You are scheduled for a remote ICD transmission on 6/26/19. This will happen automatically in the night. We will call in 1-2 business days to discuss the results with you. We look forward to seeing you in clinic at your next device check in 6 months    Nataliia Sherman, RN  Electrophysiology Nurse Clinician  [facility]  During business hours call:  [phone]  After business hours please call: [phone]- select option #4 and ask for job code [id number].

## 2019-04-03 ENCOUNTER — TRANSFERRED RECORDS (OUTPATIENT)
Dept: HEALTH INFORMATION MANAGEMENT | Facility: CLINIC | Age: 69
End: 2019-04-03

## 2019-04-03 LAB
CREAT SERPL-MCNC: 1.11 MG/DL (ref 0.72–1.25)
GFR SERPL CREATININE-BSD FRML MDRD: >60 ML/MIN/1.73M2
GLUCOSE SERPL-MCNC: 147 MG/DL (ref 70–100)
POTASSIUM SERPL-SCNC: 4.3 MMOL/L (ref 3.5–5.1)

## 2019-04-04 ENCOUNTER — ANCILLARY PROCEDURE (OUTPATIENT)
Dept: CARDIOLOGY | Facility: CLINIC | Age: 69
End: 2019-04-04
Attending: INTERNAL MEDICINE
Payer: MEDICARE

## 2019-04-04 DIAGNOSIS — I42.9 SECONDARY CARDIOMYOPATHY (H): ICD-10-CM

## 2019-04-17 ENCOUNTER — TRANSFERRED RECORDS (OUTPATIENT)
Dept: HEALTH INFORMATION MANAGEMENT | Facility: CLINIC | Age: 69
End: 2019-04-17

## 2019-04-17 LAB
CREAT SERPL-MCNC: 1.15 MG/DL (ref 0.72–1.25)
GFR SERPL CREATININE-BSD FRML MDRD: >60 ML/MIN/1.73M2
GLUCOSE SERPL-MCNC: 122 MG/DL (ref 70–100)
POTASSIUM SERPL-SCNC: 4.8 MMOL/L (ref 3.5–5.1)

## 2019-04-18 ENCOUNTER — TELEPHONE (OUTPATIENT)
Dept: ONCOLOGY | Facility: CLINIC | Age: 69
End: 2019-04-18

## 2019-04-18 NOTE — TELEPHONE ENCOUNTER
"Oral Chemotherapy Monitoring Program    Primary Oncologist: Dr. Raygoza  Primary Oncology Clinic: [facility]  Cancer Diagnosis: Myeloma    Therapy History:  Started Revlimid 2/3/19 - Take 10mg (1 capsule) by mouth daily with water on Days 1-14 per 28 day cycle.    Drug Interaction Assessment: No new known drug interactions    Lab Monitoring Plan  CBC and CMP every 2 weeks. Lab results from [facility] are viewable in Care Everywhere.  Subjective/Objective:  Erasmo Pearce is a 68 year old male contacted by phone for a follow-up visit for oral chemotherapy.  Jonh is tolerating Revlimid therapy well, with some controlled adverse effects. He says \"I am as strong as I can expect\". Jonh notes moderate muscle cramps due to hypomagnesemia fairly often (could not say how often) and says the severity varies each cycle. Jonh's magnesium is chronically low (currently 1.4 mg/dL), and he takes three magnesium oxide 420mg tablets every morning. When Jonh has taken more than this, he gets diarrhea. He says he has struggled with diarrhea for 13 years, and that Revlimid helps prevent his diarrhea. He notes a couple instances of constipation during his \"on\" weeks which he said resolved with one tablet of senna. Jonh notes hoarseness in his voice during his \"on\" days of Revlimid therapy. He says this is not bothersome and resolves on his \"off\" days. Jonh denies rash, swelling, nausea, and vomiting.    Of note, Jonh says he struggles with getting adequate hydration. He says he probably gets less than 40 ounces of fluids per day, and that he forgets to drink more fluids. We explored potential strategies to help him remember, including phone reminders and paper notes in places he will see in the house. Jonh said he thinks paper reminders might help him and he will try to use this strategy and drink more fluids. Dehydration may be contributing to his muscle cramps as well.    Jonh is adherent to his " "medications and has not missed any doses. He will be in [place] with his wife from 4/23-4/30 and his daughter will receive his next shipment of medication (she has been in the past as well). He will start his next cycle 5/5.    Jonh notes memory problems, and says this has been an issue prior to the start of Revlimid.      ORAL CHEMOTHERAPY 1/23/2019 2/12/2019 3/7/2019 4/18/2019   Drug Name Revlimid (Lenalidomide) Revlimid (Lenalidomide) Revlimid (Lenalidomide) Revlimid (Lenalidomide)   Current Dosage 10mg 10mg 10mg 10mg   Current Schedule Daily Daily Daily Daily   Cycle Details 2 weeks on 2 weeks off 2 weeks on 2 weeks off 2 weeks on 2 weeks off 2 weeks on 2 weeks off   Start Date of Last Cycle - 2/3/2019 3/3/2019 4/7/2019   Planned next cycle start date - 3/3/2019 3/31/2019 5/5/2019   Doses missed in last 2 weeks - 0 0 0   Adherence Assessment - Adherent Adherent Adherent   Adverse Effects - No AE identified during assessment No AE identified during assessment;Other (see note for details) Myalgias   Myalgias - - - Grade 1   Pharmacist Intervention(myalgias) - - - Yes   Intervention(s) - - - Patient education   Pharmacist intervention? - - Yes -   Intervention(s) - - Patient education -   Home BPs - - not needed not needed   Any new drug interactions? No No No No   Is the dose as ordered appropriate for the patient? Yes Yes Yes Yes       Vitals:  BP:   BP Readings from Last 1 Encounters:   03/26/19 100/64     Wt Readings from Last 1 Encounters:   03/26/19 89.1 kg (196 lb 8 oz)     Estimated body surface area is 2.1 meters squared as calculated from the following:    Height as of 3/26/19: 1.778 m (5' 10\").    Weight as of 3/26/19: 89.1 kg (196 lb 8 oz).    Labs:  No results found for NA within last 30 days.     _  Result Component Current Result Ref Range   Potassium 3.8 (3/20/2019) 3.5 - 5.1 mmol/L     No results found for CA within last 30 days.     No results found for Mag within last 30 days.     No results " found for Phos within last 30 days.     No results found for ALBUMIN within last 30 days.     No results found for BUN within last 30 days.     _  Result Component Current Result Ref Range   Creatinine 1.11 (3/20/2019) 0.72 - 1.25 mg/dL       _  Result Component Current Result Ref Range   AST 20 (3/20/2019) 5 - 41 U/L     No results found for ALT within last 30 days.     No results found for BILITOTAL within last 30 days.       No results found for WBC within last 30 days.     No results found for HGB within last 30 days.     No results found for PLT within last 30 days.     No results found for ANC within last 30 days.       Next labs are scheduled for 5/7    Assessment:  Jonh is tolerating Revlimid therapy well with minimal side effects.  He has had a couple instances of constipation which he has treated effectively with just one dose of senna.  He continues to have muscle cramps and low magnesium.     Plan:  Continue Revlimid therapy as planned.   Increase hydration as able.    Follow-Up:  5/7 review labs  5/14 review Dr. Raygoza appointment note    Refill Due:  Prior to 5/5 cycle start  Daughter Anny receives delivery    Trupti Loredo  Pharmacy Intern   [facility]  Oral Chemotherapy Monitoring Program  [phone]

## 2019-05-02 ENCOUNTER — CARE COORDINATION (OUTPATIENT)
Dept: TRANSPLANT | Facility: CLINIC | Age: 69
End: 2019-05-02

## 2019-05-02 LAB
MDC_IDC_LEAD_IMPLANT_DT: NORMAL
MDC_IDC_LEAD_LOCATION: NORMAL
MDC_IDC_LEAD_LOCATION_DETAIL_1: NORMAL
MDC_IDC_LEAD_MFG: NORMAL
MDC_IDC_LEAD_MODEL: NORMAL
MDC_IDC_LEAD_POLARITY_TYPE: NORMAL
MDC_IDC_LEAD_SERIAL: NORMAL
MDC_IDC_MSMT_BATTERY_DTM: NORMAL
MDC_IDC_MSMT_BATTERY_REMAINING_LONGEVITY: 36 MO
MDC_IDC_MSMT_BATTERY_STATUS: NORMAL
MDC_IDC_MSMT_CAP_CHARGE_DTM: NORMAL
MDC_IDC_MSMT_CAP_CHARGE_ENERGY: 41 J
MDC_IDC_MSMT_CAP_CHARGE_TIME: 9.19
MDC_IDC_MSMT_CAP_CHARGE_TIME: 9.19
MDC_IDC_MSMT_CAP_CHARGE_TYPE: NORMAL
MDC_IDC_MSMT_CAP_CHARGE_TYPE: NORMAL
MDC_IDC_MSMT_LEADCHNL_RV_IMPEDANCE_VALUE: 385 OHM
MDC_IDC_MSMT_LEADCHNL_RV_PACING_THRESHOLD_AMPLITUDE: 0.8 V
MDC_IDC_MSMT_LEADCHNL_RV_PACING_THRESHOLD_PULSEWIDTH: 0.5 MS
MDC_IDC_MSMT_LEADCHNL_RV_SENSING_INTR_AMPL: 19.7 MV
MDC_IDC_PG_IMPLANT_DTM: NORMAL
MDC_IDC_PG_MFG: NORMAL
MDC_IDC_PG_MODEL: NORMAL
MDC_IDC_PG_SERIAL: NORMAL
MDC_IDC_PG_TYPE: NORMAL
MDC_IDC_SESS_CLINIC_NAME: NORMAL
MDC_IDC_SESS_DTM: NORMAL
MDC_IDC_SESS_TYPE: NORMAL
MDC_IDC_SET_BRADY_HYSTRATE: NORMAL
MDC_IDC_SET_BRADY_LOWRATE: 40 {BEATS}/MIN
MDC_IDC_SET_BRADY_MODE: NORMAL
MDC_IDC_SET_LEADCHNL_RV_PACING_AMPLITUDE: 2.4 V
MDC_IDC_SET_LEADCHNL_RV_PACING_ANODE_ELECTRODE_1: NORMAL
MDC_IDC_SET_LEADCHNL_RV_PACING_ANODE_LOCATION_1: NORMAL
MDC_IDC_SET_LEADCHNL_RV_PACING_CAPTURE_MODE: NORMAL
MDC_IDC_SET_LEADCHNL_RV_PACING_CATHODE_ELECTRODE_1: NORMAL
MDC_IDC_SET_LEADCHNL_RV_PACING_CATHODE_LOCATION_1: NORMAL
MDC_IDC_SET_LEADCHNL_RV_PACING_POLARITY: NORMAL
MDC_IDC_SET_LEADCHNL_RV_PACING_PULSEWIDTH: 0.5 MS
MDC_IDC_SET_LEADCHNL_RV_SENSING_ADAPTATION_MODE: NORMAL
MDC_IDC_SET_LEADCHNL_RV_SENSING_ANODE_ELECTRODE_1: NORMAL
MDC_IDC_SET_LEADCHNL_RV_SENSING_ANODE_LOCATION_1: NORMAL
MDC_IDC_SET_LEADCHNL_RV_SENSING_CATHODE_ELECTRODE_1: NORMAL
MDC_IDC_SET_LEADCHNL_RV_SENSING_CATHODE_LOCATION_1: NORMAL
MDC_IDC_SET_LEADCHNL_RV_SENSING_POLARITY: NORMAL
MDC_IDC_SET_LEADCHNL_RV_SENSING_SENSITIVITY: 0.6 MV
MDC_IDC_SET_ZONE_DETECTION_INTERVAL: 286 MS
MDC_IDC_SET_ZONE_DETECTION_INTERVAL: 375 MS
MDC_IDC_SET_ZONE_TYPE: NORMAL
MDC_IDC_SET_ZONE_VENDOR_TYPE: NORMAL
MDC_IDC_STAT_BRADY_RV_PERCENT_PACED: 2 %
MDC_IDC_STAT_EPISODE_RECENT_COUNT: 59
MDC_IDC_STAT_EPISODE_RECENT_COUNT: 69
MDC_IDC_STAT_EPISODE_RECENT_COUNT: 778
MDC_IDC_STAT_EPISODE_RECENT_COUNT_DTM_END: NORMAL
MDC_IDC_STAT_EPISODE_RECENT_COUNT_DTM_START: NORMAL
MDC_IDC_STAT_EPISODE_TOTAL_COUNT: 4249
MDC_IDC_STAT_EPISODE_TOTAL_COUNT: 4293
MDC_IDC_STAT_EPISODE_TOTAL_COUNT: NORMAL
MDC_IDC_STAT_EPISODE_TOTAL_COUNT_DTM_END: NORMAL
MDC_IDC_STAT_EPISODE_TYPE: NORMAL
MDC_IDC_STAT_EPISODE_VENDOR_TYPE: NORMAL
MDC_IDC_STAT_TACHYTHERAPY_ATP_DELIVERED_RECENT: 7
MDC_IDC_STAT_TACHYTHERAPY_ATP_DELIVERED_TOTAL: 23
MDC_IDC_STAT_TACHYTHERAPY_RECENT_DTM_END: NORMAL
MDC_IDC_STAT_TACHYTHERAPY_RECENT_DTM_START: NORMAL
MDC_IDC_STAT_TACHYTHERAPY_SHOCKS_ABORTED_RECENT: 6
MDC_IDC_STAT_TACHYTHERAPY_SHOCKS_ABORTED_TOTAL: 23
MDC_IDC_STAT_TACHYTHERAPY_SHOCKS_DELIVERED_RECENT: 4
MDC_IDC_STAT_TACHYTHERAPY_SHOCKS_DELIVERED_TOTAL: 22
MDC_IDC_STAT_TACHYTHERAPY_TOTAL_DTM_END: NORMAL

## 2019-05-02 NOTE — PROGRESS NOTES
Labs drawn locally on 5/2 show a Mg+ level of 1.0 Spoke with patient and learned that he has been taking only 1260mg (3 tabs) once a day rather than twice a day as in his medication list and as Dr. Jean-Baptiste understood him to be on when he saw him on 3/26. Jonh states that he has been taking the once a day dosing at least since April 1st. Per Dr. Raygoza, patient was advised to go back on 1260 twice a day. Patient verbalized back accurate understanding of the twice a day dosing. He will have labs, including Mg+, drawn locally again in two weeks. Patient reports that he has watery diarrhea for the two weeks per 28 day cycle that he is off Revlimid, which is likely a major contributor to the low MG+ levels.

## 2019-05-04 DIAGNOSIS — C90.02 MULTIPLE MYELOMA IN RELAPSE (H): ICD-10-CM

## 2019-05-07 ENCOUNTER — TELEPHONE (OUTPATIENT)
Dept: ONCOLOGY | Facility: CLINIC | Age: 69
End: 2019-05-07

## 2019-05-07 ENCOUNTER — OFFICE VISIT (OUTPATIENT)
Dept: CARDIOLOGY | Facility: CLINIC | Age: 69
End: 2019-05-07
Attending: INTERNAL MEDICINE
Payer: MEDICARE

## 2019-05-07 VITALS
HEART RATE: 54 BPM | SYSTOLIC BLOOD PRESSURE: 132 MMHG | OXYGEN SATURATION: 98 % | WEIGHT: 200.6 LBS | DIASTOLIC BLOOD PRESSURE: 88 MMHG | HEIGHT: 70 IN | BODY MASS INDEX: 28.72 KG/M2

## 2019-05-07 DIAGNOSIS — I49.01 VENTRICULAR FIBRILLATION (H): Primary | ICD-10-CM

## 2019-05-07 DIAGNOSIS — I42.9 SECONDARY CARDIOMYOPATHY (H): ICD-10-CM

## 2019-05-07 DIAGNOSIS — C90.01 MULTIPLE MYELOMA IN REMISSION (H): ICD-10-CM

## 2019-05-07 DIAGNOSIS — I48.20 CHRONIC ATRIAL FIBRILLATION (H): ICD-10-CM

## 2019-05-07 DIAGNOSIS — I42.0 DILATED CARDIOMYOPATHY (H): ICD-10-CM

## 2019-05-07 DIAGNOSIS — Z95.810 AUTOMATIC IMPLANTABLE CARDIOVERTER-DEFIBRILLATOR IN SITU: ICD-10-CM

## 2019-05-07 DIAGNOSIS — C90.02 MULTIPLE MYELOMA IN RELAPSE (H): ICD-10-CM

## 2019-05-07 DIAGNOSIS — I48.21 PERMANENT ATRIAL FIBRILLATION (H): ICD-10-CM

## 2019-05-07 LAB
ALBUMIN SERPL-MCNC: 3.3 G/DL (ref 3.4–5)
ALP SERPL-CCNC: 87 U/L (ref 40–150)
ALT SERPL W P-5'-P-CCNC: 25 U/L (ref 0–70)
ANION GAP SERPL CALCULATED.3IONS-SCNC: 5 MMOL/L (ref 3–14)
AST SERPL W P-5'-P-CCNC: 15 U/L (ref 0–45)
BASOPHILS # BLD AUTO: 0 10E9/L (ref 0–0.2)
BASOPHILS NFR BLD AUTO: 0.1 %
BILIRUB SERPL-MCNC: 0.4 MG/DL (ref 0.2–1.3)
BUN SERPL-MCNC: 24 MG/DL (ref 7–30)
CALCIUM SERPL-MCNC: 8.8 MG/DL (ref 8.5–10.1)
CHLORIDE SERPL-SCNC: 103 MMOL/L (ref 94–109)
CO2 SERPL-SCNC: 30 MMOL/L (ref 20–32)
COLLECT DURATION TIME UR: 24 H
COLLECT DURATION TIME UR: 24 H
CREAT 24H UR-MRATE: 1.16 G/(24.H) (ref 1–2)
CREAT SERPL-MCNC: 0.96 MG/DL (ref 0.66–1.25)
CREAT UR-MCNC: 39 MG/DL
DIFFERENTIAL METHOD BLD: ABNORMAL
EOSINOPHIL # BLD AUTO: 0 10E9/L (ref 0–0.7)
EOSINOPHIL NFR BLD AUTO: 0.4 %
ERYTHROCYTE [DISTWIDTH] IN BLOOD BY AUTOMATED COUNT: 15.8 % (ref 10–15)
GFR SERPL CREATININE-BSD FRML MDRD: 81 ML/MIN/{1.73_M2}
GLUCOSE SERPL-MCNC: 83 MG/DL (ref 70–99)
HCT VFR BLD AUTO: 30 % (ref 40–53)
HGB BLD-MCNC: 9.7 G/DL (ref 13.3–17.7)
IMM GRANULOCYTES # BLD: 0.1 10E9/L (ref 0–0.4)
IMM GRANULOCYTES NFR BLD: 0.7 %
INR PPP: 3 (ref 0.86–1.14)
LDH SERPL L TO P-CCNC: 215 U/L (ref 85–227)
LYMPHOCYTES # BLD AUTO: 1.2 10E9/L (ref 0.8–5.3)
LYMPHOCYTES NFR BLD AUTO: 17.4 %
MAGNESIUM SERPL-MCNC: 1.4 MG/DL (ref 1.6–2.3)
MCH RBC QN AUTO: 35.1 PG (ref 26.5–33)
MCHC RBC AUTO-ENTMCNC: 32.3 G/DL (ref 31.5–36.5)
MCV RBC AUTO: 109 FL (ref 78–100)
MONOCYTES # BLD AUTO: 1 10E9/L (ref 0–1.3)
MONOCYTES NFR BLD AUTO: 15 %
NEUTROPHILS # BLD AUTO: 4.4 10E9/L (ref 1.6–8.3)
NEUTROPHILS NFR BLD AUTO: 66.4 %
NRBC # BLD AUTO: 0 10*3/UL
NRBC BLD AUTO-RTO: 0 /100
PLATELET # BLD AUTO: 177 10E9/L (ref 150–450)
POTASSIUM SERPL-SCNC: 3.6 MMOL/L (ref 3.4–5.3)
PROT 24H UR-MRATE: 0.72 G/(24.H) (ref 0.04–0.23)
PROT SERPL-MCNC: 6.4 G/DL (ref 6.8–8.8)
PROT UR-MCNC: 0.24 G/L
PROT/CREAT 24H UR: 0.62 G/G CR (ref 0–0.2)
RBC # BLD AUTO: 2.76 10E12/L (ref 4.4–5.9)
SODIUM SERPL-SCNC: 139 MMOL/L (ref 133–144)
SPECIMEN VOL UR: 2980 ML
SPECIMEN VOL UR: 2980 ML
WBC # BLD AUTO: 6.7 10E9/L (ref 4–11)

## 2019-05-07 PROCEDURE — 86335 IMMUNFIX E-PHORSIS/URINE/CSF: CPT | Performed by: INTERNAL MEDICINE

## 2019-05-07 PROCEDURE — 83883 ASSAY NEPHELOMETRY NOT SPEC: CPT | Performed by: INTERNAL MEDICINE

## 2019-05-07 PROCEDURE — 99213 OFFICE O/P EST LOW 20 MIN: CPT | Mod: GC | Performed by: INTERNAL MEDICINE

## 2019-05-07 PROCEDURE — G0463 HOSPITAL OUTPT CLINIC VISIT: HCPCS | Mod: ZF

## 2019-05-07 PROCEDURE — 84166 PROTEIN E-PHORESIS/URINE/CSF: CPT | Performed by: INTERNAL MEDICINE

## 2019-05-07 ASSESSMENT — PAIN SCALES - GENERAL: PAINLEVEL: NO PAIN (0)

## 2019-05-07 ASSESSMENT — MIFFLIN-ST. JEOR: SCORE: 1691.17

## 2019-05-07 NOTE — NURSING NOTE
Med Reconcile: Reviewed and verified all current medications with the patient. The updated medication list was printed and given to the patient.  Return Appointment: Patient given instructions regarding scheduling next clinic visit. Patient demonstrated understanding of this information and agreed to call with further questions or concerns.  Patient stated he understood all health information given and agreed to call with further questions or concerns.    Tiff Phan LPN

## 2019-05-07 NOTE — LETTER
5/7/2019      RE: Erasmo Pearce  [address]  [address] [facility] [address]       Dear Colleague,    Thank you for the opportunity to participate in the care of your patient, Erasmo Pearce, at the [facility]. Please see a copy of my visit note below.         Cardiac Electrophysiology Clinic       HPI:   Erasmo Pearce is a 68 year old male with a history of permanent atrial fibrillation, ischemic cardiomyopathy (LVEF 30-35% in 2017) status post [employer] ICD in 12/2009 initially for primary prevention, history of multiple appropriate shocks for VF (typically while undergoing chemotherapy), and relapsed multiple myeloma who presents for follow-up. Was last seen in clinic on 3/26/19.     He was started on lenalidomide on 2/10/19.  Since initiating lenalidomide, he has received 4 appropriate ICD shocks (on 2/11, 2/14, 2/16, and 3/6).  Last ICD shock prior to starting lenalidomide was on 7/25.  On 2/11, had one 41 J shock; on 2/12, had ATP x 1; on 2/14, had ATP x 1, 41 J shock; on 2/15, ATP x 1; on 2/15, ATP x 1, 41 J shock; and on 3/6, ATP x 1, 41 J shock. During our last visit, mexiletine 150 mg BID was started.    Since our last visit, he has not had any ICD shocks. He started on cycle 4 of lenalidomide on 5/5.  Since starting mexiletine, he notes that he feels more tired but that this has not had a significant effect on his quality of life.  He denies having increased confusion since starting mexiletine.  At baseline, his wife notes that he has some confusion. At this time, he would like to continue mexiletine.  Denies chest pain.  He has chronic dyspnea on exertion but his dyspnea has not worsened.  He continues to be physically active; he mows the lawn and is able to walk a fourth of a mile.  He was in [place] 1 week ago.  While in [place], he had an episode of diarrhea, nausea, and emesis that lasted for less than 24  hours.  Denies abdominal pain.  Has chronic loose stools (up to 5-6 bowel movements per day) during the weeks that he is not on chemotherapy.  His magnesium level was 1 on 2019.  His oral magnesium was subsequently increased in dose.  He is currently taking magnesium oxide 1260 mg twice daily.  Mg 1.4, K 3.6 today.  Endorses having sweats.  No hematochezia, melena, hematuria, or any other bleeding.  10 point review of systems otherwise negative.      Past Medical History:   Diagnosis Date     Atrial fibrillation (H)      Other premature beats      VF (ventricular fibrillation) (H)        Past Surgical History:   Procedure Laterality Date     IMPLANT AUTOMATIC IMPLANTABLE CARDIOVERTER DEFIBRILLATOR         No family history on file.    Social History     Tobacco Use     Smoking status: Former Smoker     Packs/day: 1.00     Years: 25.00     Pack years: 25.00     Types: Cigarettes     Last attempt to quit: 10/15/1995     Years since quittin.5     Smokeless tobacco: Never Used   Substance Use Topics     Alcohol use: No         Current Outpatient Medications   Medication Sig     albuterol (PROAIR HFA, PROVENTIL HFA, VENTOLIN HFA) 108 (90 BASE) MCG/ACT inhaler Inhale 2 puffs into the lungs every 6 hours as needed      amoxicillin (AMOXIL) 500 MG tablet Take 4 tabs one hour before dental appointment     baclofen (LIORESAL) 10 MG tablet Take 10 mg by mouth 3 times daily as needed prn     Calcium Carbonate-Vitamin D (CALCIUM 500 + D PO) Take 2 tablets by mouth daily.     dexamethasone (DECADRON) 4 MG tablet Take 20 mg by mouth once a week Once a week with food on Days 1,8,15..     diclofenac (VOLTAREN) 75 MG EC tablet Take 75 mg by mouth 2 times daily as needed 1 tab prn     fentaNYL (DURAGESIC) 25 mcg/hr patch 72 hr Place 1 patch onto the skin every 72 hours     furosemide (LASIX) 20 MG tablet Take 20 mg by mouth daily      LENalidomide (REVLIMID) 10 MG CAPS capsule CHEMO Take 1 capsule (10 mg) by mouth daily Take  "on Days 1 through 14 of 28-day cycle.     lisinopril (PRINIVIL,ZESTRIL) 5 MG tablet Take 5 mg by mouth daily      Magnesium Oxide 420 MG TABS Take 1,260 mg by mouth 2 times daily     metoprolol succinate (TOPROL-XL) 200 MG 24 hr tablet Take 1 tablet (200 mg) by mouth daily     mexiletine (MEXITIL) 150 MG capsule Take 1 capsule (150 mg) by mouth 2 times daily As close to 12 hours apart as possible     nitroGLYCERIN (NITROSTAT) 0.4 MG SL tablet Place 0.4 mg under the tongue every 5 minutes as needed Reported on 5/16/2017     omeprazole (PRILOSEC) 20 MG capsule Take 20 mg by mouth daily      oxycodone (ROXICODONE) 5 MG immediate release tablet Take 1-2 tablets by mouth every 6 hours as needed. For pain.      simvastatin (ZOCOR) 40 MG tablet Take 40 mg by mouth At Bedtime      tiotropium (SPIRIVA) 18 MCG inhalation capsule Inhale 1 capsule (18 mcg) into the lungs daily     TRAZODONE HCL PO Take 50 mg by mouth nightly as needed      warfarin (COUMADIN) 5 MG tablet Take 7.5 mg by mouth See Admin Instructions Take 1.5 tab by mojth Monday and Friday and 1 tab Sun Tues WED , Thur and Sat     No current facility-administered medications for this visit.          ROS:   10 point ROS negative other than what is discussed above.    EXAM:   /88 (BP Location: Right arm, Patient Position: Chair, Cuff Size: Adult Large)   Pulse 54   Ht 1.778 m (5' 10\")   Wt 91 kg (200 lb 9.6 oz)   SpO2 98%   BMI 28.78 kg/m      GENERAL: Alert, oriented, NAD  HEENT:  NC/AT. Sclerae white.  MMM, no oral lesions  NECK: JVD ~8 cm, has TR  HEART: RRR, normal S1 and S2, 2/6 MAGGY at LLSB and apex  LUNGS:  Clear to auscultation bilaterally, no wheezes, rales, or rhonchi  ABDOMEN: Soft, nontender, nondistended, bowel sounds present, no ascites.  EXTREMITIES: Trace nonpitting bilateral lower extremity edema.    Labs:    Recent Results (from the past 24 hour(s))   Urinalysis collection volume & duration    Collection Time: 05/07/19  3:00 AM   Result " Value Ref Range    Volume in mL 2,980 mL    Duration in hours 24.0 h   Comprehensive metabolic panel    Collection Time: 05/07/19  9:21 AM   Result Value Ref Range    Sodium 139 133 - 144 mmol/L    Potassium 3.6 3.4 - 5.3 mmol/L    Chloride 103 94 - 109 mmol/L    Carbon Dioxide 30 20 - 32 mmol/L    Anion Gap 5 3 - 14 mmol/L    Glucose 83 70 - 99 mg/dL    Urea Nitrogen 24 7 - 30 mg/dL    Creatinine 0.96 0.66 - 1.25 mg/dL    GFR Estimate 81 >60 mL/min/[1.73_m2]    GFR Estimate If Black >90 >60 mL/min/[1.73_m2]    Calcium 8.8 8.5 - 10.1 mg/dL    Bilirubin Total 0.4 0.2 - 1.3 mg/dL    Albumin 3.3 (L) 3.4 - 5.0 g/dL    Protein Total 6.4 (L) 6.8 - 8.8 g/dL    Alkaline Phosphatase 87 40 - 150 U/L    ALT 25 0 - 70 U/L    AST 15 0 - 45 U/L   CBC with platelets differential    Collection Time: 05/07/19  9:21 AM   Result Value Ref Range    WBC 6.7 4.0 - 11.0 10e9/L    RBC Count 2.76 (L) 4.4 - 5.9 10e12/L    Hemoglobin 9.7 (L) 13.3 - 17.7 g/dL    Hematocrit 30.0 (L) 40.0 - 53.0 %     (H) 78 - 100 fl    MCH 35.1 (H) 26.5 - 33.0 pg    MCHC 32.3 31.5 - 36.5 g/dL    RDW 15.8 (H) 10.0 - 15.0 %    Platelet Count 177 150 - 450 10e9/L    Diff Method Automated Method     % Neutrophils 66.4 %    % Lymphocytes 17.4 %    % Monocytes 15.0 %    % Eosinophils 0.4 %    % Basophils 0.1 %    % Immature Granulocytes 0.7 %    Nucleated RBCs 0 0 /100    Absolute Neutrophil 4.4 1.6 - 8.3 10e9/L    Absolute Lymphocytes 1.2 0.8 - 5.3 10e9/L    Absolute Monocytes 1.0 0.0 - 1.3 10e9/L    Absolute Eosinophils 0.0 0.0 - 0.7 10e9/L    Absolute Basophils 0.0 0.0 - 0.2 10e9/L    Abs Immature Granulocytes 0.1 0 - 0.4 10e9/L    Absolute Nucleated RBC 0.0    Magnesium    Collection Time: 05/07/19  9:21 AM   Result Value Ref Range    Magnesium 1.4 (L) 1.6 - 2.3 mg/dL   INR    Collection Time: 05/07/19  9:21 AM   Result Value Ref Range    INR 3.00 (H) 0.86 - 1.14   Lactate Dehydrogenase    Collection Time: 05/07/19  9:21 AM   Result Value Ref Range     Lactate Dehydrogenase 215 85 - 227 U/L           Echocardiogram 4/4/19:  Interpretation Summary  Mildly (EF 40-45%) reduced left ventricular function is present. Global  hypokinesis with basal-mid inferolateral and inferior akinesis.  Global right ventricular function is moderately reduced.  Moderate mitral insufficiency is present with multi directional jet suggesting  involvement of the commissures.     This study was compared with the study from 2/7/17 and LVEF is improved and MR  is worse .          Assessment and Plan:   Erasmo Pearce is a 68 year old male with a history of permanent atrial fibrillation, ischemic cardiomyopathy (LVEF 30-35% in 2017) status post [employer] ICD in 12/2009 initially for primary prevention, history of multiple appropriate shocks for VF (typically while undergoing chemotherapy), and relapsed multiple myeloma who presents for follow-up.     VF/VT, multiple appropriate ICD shocks  Relapsed multiple myeloma  Patient typically receives appropriate ICD shocks while undergoing chemotherapy, possibly due to electrolyte imbalances.  He has a low DLCO which precludes him from amiodarone and he previously did not tolerate sotalol in the past due to prolonged QTc and low blood pressure.  He has not receiving any additional shocks since starting mexiletine.  - Continue mexiletine 150 mg twice a day and metoprolol succinate 200 mg daily. Discussed with patient that since his ICD shocks typically occur when taking chemotherapy, it may be considered in the future to discontinue mexiletine when he is not on chemotherapy. [facility] also [facility]  - Dr. Cota to continue to monitor electrolytes. Recommending keeping K>4, Mg>2     Permanent AF  Continue metoprolol succinate 200 mg and warfarin. CHADS-VASc 3 (age, CHF, CAD).     ICM, LVEF 40-45% in 4/2019  NYHA class II, stage C. Is euvolemic to mildly hypervolemic today. He has held  "his lasix for the last few days  - Continue  metoprol succinate 20 mg, lisinopril 5 mg, and Lasix 20 mg     RTC in 1 month. He can cancel that appointment if he does not have any issues. If he cancels, he should follow-up with Dr. Duarte in the future.    Patient seen and discussed with Dr. Jean-Baptiste.      Robyn Ventura MD, PhD  Cardiology Fellow  [phone]    ELECTROPHYSIOLOGY STAFF  Patient seen and examined by me.  History and physical examination discussed with Dr. Ventura whose note reflects our joint assessment and recommendation/plans.  Erasmo Pearce returns for follow-up. We last saw him in March 2019. He is a 68-year-old gentleman with multiple myeloma, permanent atrial fibrillation, ischemic cardiomyopathy with an ejection fraction of approximately 35% who had an implantable cardioverter-defibrillator placed in December 2009 for primary prevention with subsequent appropriate shocks.  He has had a tendency to have ventricular fibrillation during chemotherapy, possibly because his electrolytes are more labile. His heart rate histogram during atrial fibrillation has been generally excellent. We have been seeing him lately because he resumed chemotherapy in early February and has had an increase in VT and VT therapies. At his last visit we documented a 41 J shock on 2/11, VT terminated by ATP 2/12, VT terminated by ATP but followed by a committed shock 2/14, 41 J shock 2/15 and VT terminated ATP 2/17.     With four episodes of VT/ VF in the first two weeks of chemo we elected to add mexiletine 150 mg q 12 hours (in spite of having had no shocks during the second month). He also takes metoprolol succinate 200 mg daily for rate control during AF. He is on lisinopril 5 mg daily (decreased, in the past, to make \"BP room\" for a higher dose of metoprolol). We obtained an echo 4/4/2019 that showed mildly decreased LV function, EF 40-45%. There was moderate MR. Compared to the study of 2/17/2017 the LVEF was " improved and the MR was worse (EF was 30-35%, MR was mild).  At this time Mr. Pearce has just started his fourth round of chemotherapy.  He has had no shocks since his first round.  He seems to be tolerating low dose mexiletine.  We will make no change in his medications.  He asked if he could ever stop mexiletine.  He could do so at any time if he wishes.  I suggest continuing it during chemotherapy as long as he is tolerating the drug and he is not getting shocks.  I explained that I will be retiring at the end of June.  We will arrange follow-up in June in case he has problems, otherwise he can call to cancel the appointment. We will arrange long-term follow-up with Dr. Duarte.  It was a pleasure seeing Erasmo Pearce today and it has been a privilege to participate in his medical care.  Narinder Jean-Baptiste            CC  Patient Care Team:  Cong Mcdonald as PCP - General  GinaWillian crandall MD as PCP - Internal Medicine  [facility]fe [facility] as BMT Physician  Cong Mcdonald as Referring Physician  Yaz Jeong NP as Nurse Practitioner (Nurse Practitioner)  Clinic, [facility] as Family Medicine Physician  Merry Mas RN as Continuity Care Coordinator (Transplant)  Aleida Angeles RN as Nurse Coordinator  NARINDER JEAN-BAPTISTE        Please do not hesitate to contact me if you have any questions/concerns.     Sincerely,     Narinder Jean-Baptiste MD

## 2019-05-07 NOTE — NURSING NOTE
Chief Complaint   Patient presents with     Follow Up     1 month return     Vitals were taken and medications were reconciled  Vidya Jay MA    9:38 AM

## 2019-05-07 NOTE — ORAL ONC MGMT
Oral Chemotherapy Monitoring Program     Placed call to patient in follow up of Revlimid therapy. Lab results from today reviewed and there are no concerning abnormalities at this time. Follow up office visit is planned for 5/14/2019.     Left message to please call back with any interim questions or concerns. No patient or drug names were mentioned.     Favio CarvajalD  [facility]  [phone]  May 7, 2019

## 2019-05-07 NOTE — PROGRESS NOTES
Cardiac Electrophysiology Clinic       HPI:   Erasmo Pearce is a 68 year old male with a history of permanent atrial fibrillation, ischemic cardiomyopathy (LVEF 30-35% in 2017) status post [employer] [place] [employer] ICD in 12/2009 initially for primary prevention, history of multiple appropriate shocks for VF (typically while undergoing chemotherapy), and relapsed multiple myeloma who presents for follow-up. Was last seen in clinic on 3/26/19.     He was started on lenalidomide on 2/10/19.  Since initiating lenalidomide, he has received 4 appropriate ICD shocks (on 2/11, 2/14, 2/16, and 3/6).  Last ICD shock prior to starting lenalidomide was on 7/25.  On 2/11, had one 41 J shock; on 2/12, had ATP x 1; on 2/14, had ATP x 1, 41 J shock; on 2/15, ATP x 1; on 2/15, ATP x 1, 41 J shock; and on 3/6, ATP x 1, 41 J shock. During our last visit, mexiletine 150 mg BID was started.    Since our last visit, he has not had any ICD shocks. He started on cycle 4 of lenalidomide on 5/5.  Since starting mexiletine, he notes that he feels more tired but that this has not had a significant effect on his quality of life.  He denies having increased confusion since starting mexiletine.  At baseline, his wife notes that he has some confusion. At this time, he would like to continue mexiletine.  Denies chest pain.  He has chronic dyspnea on exertion but his dyspnea has not worsened.  He continues to be physically active; he mows the lawn and is able to walk a fourth of a mile.  He was in [place] 1 week ago.  While in [place], he had an episode of diarrhea, nausea, and emesis that lasted for less than 24 hours.  Denies abdominal pain.  Has chronic loose stools (up to 5-6 bowel movements per day) during the weeks that he is not on chemotherapy.  His magnesium level was 1 on 5/2/2019.  His oral magnesium was subsequently increased in dose.  He is currently taking magnesium oxide 1260 mg twice daily.  Mg 1.4, K 3.6 today.  Endorses  having sweats.  No hematochezia, melena, hematuria, or any other bleeding.  10 point review of systems otherwise negative.      Past Medical History:   Diagnosis Date     Atrial fibrillation (H)      Other premature beats      VF (ventricular fibrillation) (H)        Past Surgical History:   Procedure Laterality Date     IMPLANT AUTOMATIC IMPLANTABLE CARDIOVERTER DEFIBRILLATOR         No family history on file.    Social History     Tobacco Use     Smoking status: Former Smoker     Packs/day: 1.00     Years: 25.00     Pack years: 25.00     Types: Cigarettes     Last attempt to quit: 10/15/1995     Years since quittin.5     Smokeless tobacco: Never Used   Substance Use Topics     Alcohol use: No         Current Outpatient Medications   Medication Sig     albuterol (PROAIR HFA, PROVENTIL HFA, VENTOLIN HFA) 108 (90 BASE) MCG/ACT inhaler Inhale 2 puffs into the lungs every 6 hours as needed      amoxicillin (AMOXIL) 500 MG tablet Take 4 tabs one hour before dental appointment     baclofen (LIORESAL) 10 MG tablet Take 10 mg by mouth 3 times daily as needed prn     Calcium Carbonate-Vitamin D (CALCIUM 500 + D PO) Take 2 tablets by mouth daily.     dexamethasone (DECADRON) 4 MG tablet Take 20 mg by mouth once a week Once a week with food on Days 1,8,15..     diclofenac (VOLTAREN) 75 MG EC tablet Take 75 mg by mouth 2 times daily as needed 1 tab prn     fentaNYL (DURAGESIC) 25 mcg/hr patch 72 hr Place 1 patch onto the skin every 72 hours     furosemide (LASIX) 20 MG tablet Take 20 mg by mouth daily      LENalidomide (REVLIMID) 10 MG CAPS capsule CHEMO Take 1 capsule (10 mg) by mouth daily Take on Days 1 through 14 of 28-day cycle.     lisinopril (PRINIVIL,ZESTRIL) 5 MG tablet Take 5 mg by mouth daily      Magnesium Oxide 420 MG TABS Take 1,260 mg by mouth 2 times daily     metoprolol succinate (TOPROL-XL) 200 MG 24 hr tablet Take 1 tablet (200 mg) by mouth daily     mexiletine (MEXITIL) 150 MG capsule Take 1 capsule  "(150 mg) by mouth 2 times daily As close to 12 hours apart as possible     nitroGLYCERIN (NITROSTAT) 0.4 MG SL tablet Place 0.4 mg under the tongue every 5 minutes as needed Reported on 5/16/2017     omeprazole (PRILOSEC) 20 MG capsule Take 20 mg by mouth daily      oxycodone (ROXICODONE) 5 MG immediate release tablet Take 1-2 tablets by mouth every 6 hours as needed. For pain.      simvastatin (ZOCOR) 40 MG tablet Take 40 mg by mouth At Bedtime      tiotropium (SPIRIVA) 18 MCG inhalation capsule Inhale 1 capsule (18 mcg) into the lungs daily     TRAZODONE HCL PO Take 50 mg by mouth nightly as needed      warfarin (COUMADIN) 5 MG tablet Take 7.5 mg by mouth See Admin Instructions Take 1.5 tab by mojth Monday and Friday and 1 tab Sun Tues WED , Thur and Sat     No current facility-administered medications for this visit.          ROS:   10 point ROS negative other than what is discussed above.    EXAM:   /88 (BP Location: Right arm, Patient Position: Chair, Cuff Size: Adult Large)   Pulse 54   Ht 1.778 m (5' 10\")   Wt 91 kg (200 lb 9.6 oz)   SpO2 98%   BMI 28.78 kg/m     GENERAL: Alert, oriented, NAD  HEENT:  NC/AT. Sclerae white.  MMM, no oral lesions  NECK: JVD ~8 cm, has TR  HEART: RRR, normal S1 and S2, 2/6 MAGGY at LLSB and apex  LUNGS:  Clear to auscultation bilaterally, no wheezes, rales, or rhonchi  ABDOMEN: Soft, nontender, nondistended, bowel sounds present, no ascites.  EXTREMITIES: Trace nonpitting bilateral lower extremity edema.    Labs:    Recent Results (from the past 24 hour(s))   Urinalysis collection volume & duration    Collection Time: 05/07/19  3:00 AM   Result Value Ref Range    Volume in mL 2,980 mL    Duration in hours 24.0 h   Comprehensive metabolic panel    Collection Time: 05/07/19  9:21 AM   Result Value Ref Range    Sodium 139 133 - 144 mmol/L    Potassium 3.6 3.4 - 5.3 mmol/L    Chloride 103 94 - 109 mmol/L    Carbon Dioxide 30 20 - 32 mmol/L    Anion Gap 5 3 - 14 mmol/L    " Glucose 83 70 - 99 mg/dL    Urea Nitrogen 24 7 - 30 mg/dL    Creatinine 0.96 0.66 - 1.25 mg/dL    GFR Estimate 81 >60 mL/min/[1.73_m2]    GFR Estimate If Black >90 >60 mL/min/[1.73_m2]    Calcium 8.8 8.5 - 10.1 mg/dL    Bilirubin Total 0.4 0.2 - 1.3 mg/dL    Albumin 3.3 (L) 3.4 - 5.0 g/dL    Protein Total 6.4 (L) 6.8 - 8.8 g/dL    Alkaline Phosphatase 87 40 - 150 U/L    ALT 25 0 - 70 U/L    AST 15 0 - 45 U/L   CBC with platelets differential    Collection Time: 05/07/19  9:21 AM   Result Value Ref Range    WBC 6.7 4.0 - 11.0 10e9/L    RBC Count 2.76 (L) 4.4 - 5.9 10e12/L    Hemoglobin 9.7 (L) 13.3 - 17.7 g/dL    Hematocrit 30.0 (L) 40.0 - 53.0 %     (H) 78 - 100 fl    MCH 35.1 (H) 26.5 - 33.0 pg    MCHC 32.3 31.5 - 36.5 g/dL    RDW 15.8 (H) 10.0 - 15.0 %    Platelet Count 177 150 - 450 10e9/L    Diff Method Automated Method     % Neutrophils 66.4 %    % Lymphocytes 17.4 %    % Monocytes 15.0 %    % Eosinophils 0.4 %    % Basophils 0.1 %    % Immature Granulocytes 0.7 %    Nucleated RBCs 0 0 /100    Absolute Neutrophil 4.4 1.6 - 8.3 10e9/L    Absolute Lymphocytes 1.2 0.8 - 5.3 10e9/L    Absolute Monocytes 1.0 0.0 - 1.3 10e9/L    Absolute Eosinophils 0.0 0.0 - 0.7 10e9/L    Absolute Basophils 0.0 0.0 - 0.2 10e9/L    Abs Immature Granulocytes 0.1 0 - 0.4 10e9/L    Absolute Nucleated RBC 0.0    Magnesium    Collection Time: 05/07/19  9:21 AM   Result Value Ref Range    Magnesium 1.4 (L) 1.6 - 2.3 mg/dL   INR    Collection Time: 05/07/19  9:21 AM   Result Value Ref Range    INR 3.00 (H) 0.86 - 1.14   Lactate Dehydrogenase    Collection Time: 05/07/19  9:21 AM   Result Value Ref Range    Lactate Dehydrogenase 215 85 - 227 U/L           Echocardiogram 4/4/19:  Interpretation Summary  Mildly (EF 40-45%) reduced left ventricular function is present. Global  hypokinesis with basal-mid inferolateral and inferior akinesis.  Global right ventricular function is moderately reduced.  Moderate mitral insufficiency is present  with multi directional jet suggesting  involvement of the commissures.     This study was compared with the study from 2/7/17 and LVEF is improved and MR  is worse .          Assessment and Plan:   Erasmo Pearce is a 68 year old male with a history of permanent atrial fibrillation, ischemic cardiomyopathy (LVEF 30-35% in 2017) status post [employer] ICD in 12/2009 initially for primary prevention, history of multiple appropriate shocks for VF (typically while undergoing chemotherapy), and relapsed multiple myeloma who presents for follow-up.     VF/VT, multiple appropriate ICD shocks  Relapsed multiple myeloma  Patient typically receives appropriate ICD shocks while undergoing chemotherapy, possibly due to electrolyte imbalances.  He has a low DLCO which precludes him from amiodarone and he previously did not tolerate sotalol in the past due to prolonged QTc and low blood pressure.  He has not receiving any additional shocks since starting mexiletine.  - Continue mexiletine 150 mg twice a day and metoprolol succinate 200 mg daily. Discussed with patient that since his ICD shocks typically occur when taking chemotherapy, it may be considered in the future to discontinue mexiletine when he is not on chemotherapy. [facility] also [facility] required [facility]  - Dr. Cota to continue to monitor electrolytes. Recommending keeping K>4, Mg>2     Permanent AF  Continue metoprolol succinate 200 mg and warfarin. CHADS-VASc 3 (age, CHF, CAD).     ICM, LVEF 40-45% in 4/2019  NYHA class II, stage C. Is euvolemic to mildly hypervolemic today. He has held his lasix for the last few days  - Continue metoprol succinate 20 mg, lisinopril 5 mg, and Lasix 20 mg     RTC in 1 month. He can cancel that appointment if he does not have any issues. If he cancels, he should follow-up with Dr. Duarte in the future.    Patient seen and discussed with Dr. Jean-Baptiste.      Robyn Ventura MD,  "PhD  Cardiology Fellow  [phone]    ELECTROPHYSIOLOGY STAFF  Patient seen and examined by me.  History and physical examination discussed with Dr. Ventura whose note reflects our joint assessment and recommendation/plans.  Erasmo Pearce returns for follow-up. We last saw him in March 2019. He is a 68-year-old gentleman with multiple myeloma, permanent atrial fibrillation, ischemic cardiomyopathy with an ejection fraction of approximately 35% who had an implantable cardioverter-defibrillator placed in December 2009 for primary prevention with subsequent appropriate shocks.  He has had a tendency to have ventricular fibrillation during chemotherapy, possibly because his electrolytes are more labile. His heart rate histogram during atrial fibrillation has been generally excellent. We have been seeing him lately because he resumed chemotherapy in early February and has had an increase in VT and VT therapies. At his last visit we documented a 41 J shock on 2/11, VT terminated by ATP 2/12, VT terminated by ATP but followed by a committed shock 2/14, 41 J shock 2/15 and VT terminated ATP 2/17.     With four episodes of VT/ VF in the first two weeks of chemo we elected to add mexiletine 150 mg q 12 hours (in spite of having had no shocks during the second month). He also takes metoprolol succinate 200 mg daily for rate control during AF. He is on lisinopril 5 mg daily (decreased, in the past, to make \"BP room\" for a higher dose of metoprolol). We obtained an echo 4/4/2019 that showed mildly decreased LV function, EF 40-45%. There was moderate MR. Compared to the study of 2/17/2017 the LVEF was improved and the MR was worse (EF was 30-35%, MR was mild).  At this time Mr. Pearce has just started his fourth round of chemotherapy.  He has had no shocks since his first round.  He seems to be tolerating low dose mexiletine.  We will make no change in his medications.  He asked if he could ever stop mexiletine.  He " could do so at any time if he wishes.  I suggest continuing it during chemotherapy as long as he is tolerating the drug and he is not getting shocks.  I explained that I will be retiring at the end of June.  We will arrange follow-up in June in case he has problems, otherwise he can call to cancel the appointment. We will arrange long-term follow-up with Dr. Duarte.  It was a pleasure seeing Erasmo Pearce today and it has been a privilege to participate in his medical care.  Narinder Jean-Baptiste            CC  Patient Care Team:  Cong Mcdonald as PCP - General  GinaWillian MD as PCP - Internal Medicine  [facility] Hospital[facility] as BMT Physician  Cong Mcdonald as Referring Physician  Yaz Jeong NP as Nurse Practitioner (Nurse Practitioner)  Morgan, Eligio as Family Medicine Physician  Merry Mas RN as Continuity Care Coordinator (Transplant)  Aleida Angeles RN as Nurse Coordinator  NARINDER JEAN-BAPTISTE

## 2019-05-07 NOTE — PATIENT INSTRUCTIONS
Patient Instructions:  It was a pleasure to see you in the cardiology clinic today.      If you have any questions, you can reach my nurse at [phone].  Press Option #1 for the [facility], and then press Option #3 for nursing.    We are encouraging the use of [employer] to communicate with your HealthCare Provider    Medication Changes: None.    Studies Ordered: None.    The results from today include: None.    Please follow up: With Dr. Jean-Baptiste in one month.    Sincerely,    Chuy Jean-Baptiste MD     If you have an urgent need after hours (8:00 am to 4:30 pm) please call [phone] and ask for the cardiology fellow on call.

## 2019-05-08 LAB
ALBUMIN MFR UR ELPH: 18.7 %
ALPHA1 GLOB MFR UR ELPH: 8.7 %
ALPHA2 GLOB MFR UR ELPH: 6.5 %
B-GLOBULIN MFR UR ELPH: 13.9 %
GAMMA GLOB MFR UR ELPH: 52.2 %
KAPPA LC UR-MCNC: 0.84 MG/DL (ref 0.33–1.94)
KAPPA LC/LAMBDA SER: 0.02 {RATIO} (ref 0.26–1.65)
LAMBDA LC SERPL-MCNC: 54.25 MG/DL (ref 0.57–2.63)
M PROTEIN MFR UR ELPH: 43.5 %
PROT ELPH PNL UR ELPH: NORMAL
PROT PATTERN UR ELPH-IMP: ABNORMAL

## 2019-05-13 ENCOUNTER — MEDICAL CORRESPONDENCE (OUTPATIENT)
Dept: TRANSPLANT | Facility: CLINIC | Age: 69
End: 2019-05-13

## 2019-05-14 ENCOUNTER — ONCOLOGY VISIT (OUTPATIENT)
Dept: TRANSPLANT | Facility: CLINIC | Age: 69
End: 2019-05-14
Attending: INTERNAL MEDICINE
Payer: MEDICARE

## 2019-05-14 VITALS
HEIGHT: 70 IN | OXYGEN SATURATION: 97 % | SYSTOLIC BLOOD PRESSURE: 104 MMHG | WEIGHT: 201.2 LBS | HEART RATE: 57 BPM | DIASTOLIC BLOOD PRESSURE: 57 MMHG | TEMPERATURE: 97.7 F | RESPIRATION RATE: 16 BRPM | BODY MASS INDEX: 28.8 KG/M2

## 2019-05-14 DIAGNOSIS — C90.02 MULTIPLE MYELOMA IN RELAPSE (H): Primary | ICD-10-CM

## 2019-05-14 PROCEDURE — G0463 HOSPITAL OUTPT CLINIC VISIT: HCPCS | Mod: ZF

## 2019-05-14 ASSESSMENT — MIFFLIN-ST. JEOR: SCORE: 1693.89

## 2019-05-14 ASSESSMENT — PAIN SCALES - GENERAL: PAINLEVEL: NO PAIN (0)

## 2019-05-14 NOTE — PROGRESS NOTES
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  Jonh had been on Revlimid 10 mg days 1-14 and dexamethasone 20 mg days 1, 8 and 15 of a Day 28 day cylcel starting in February 2019 Jonh has  had many defibrillator shocks  While on revlimid~ 6  X. Dr Jean-Baptiste start susanne mexelitine which worked well and no shock wayne  He continues to have chronic pain and continues on long-acting opiates.He continues on coumadin to maintain INR  Between 2-3.  Has had low magnesium 1.0 and now taking Mg Ox 420  6 tabs per day.    PAST MEDICAL HISTORY, SOCIAL HISTORY, FAMILY HISTORY:  See my note from 01/2019.      REVIEW OF SYSTEMS:  A 12 point review of systems done is negative as in HPI.      PHYSICAL EXAMINATION:   GENERAL:  He is an alert gentleman in no acute distress.   VITAL SIGNS:  Stable. /57 (BP Location: Right arm, Patient Position: Sitting, Cuff Size: Adult Regular)   Pulse 57   Temp 97.7  F (36.5  C) (Oral)   Resp 16   Ht 1.778 m (5' 10\")   Wt 91.3 kg (201 lb 3.2 oz)   SpO2 97%   BMI 28.87 kg/m      HEENT:  Normocephalic.  EOM okay.  No scleral icterus.  Mouth without lesion.   RESPIRATORY:  Clear to percussion and auscultation.   CARDIAC:  Irregular regular rhythm.  Cardioverter in the left upper chest wall.   ABDOMEN:  Without splenomegaly or tenderness.   EXTREMITIES:  Without edema.   NEUROLOGIC:  No tremor.     ASSESSMENT:   1.  Multiple myeloma in relapse.   2.  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   3.  Cardiomyopathy.   4.  Atrial fibrillation.   5.  History of deep vein thrombosis, on warfarin.   6.  Chronic back pain.   7.  Aortic aneurysm.   8.  History of plasmacytoma of the clivus, status post radiation.   9.  History of " ventricular tachycardia with implantable defibrillator in place.  10 Diarrhea     11. Hypomagnesemia     Jonh's lambda free light chains are to  54 and 24 urine down to 52% of the protein. Will continu  revlimid at this time but at a lower dose 10mg/d Days 1-14 in a 28 day cycle and a decrease in dexamethasone to 20mg Day 1,8, and 15 every 28 days . Will montior ICD and stay on anti arrhythmics mexelitine.He will get labs every 2 weeks, light chains every 4 weeks and I will see him in 4 months with repeat labs.       Rojas Raygoza MD  [phone]

## 2019-05-14 NOTE — NURSING NOTE
"Oncology Rooming Note    May 14, 2019 11:08 AM   Erasmo Pearce is a 68 year old male who presents for:    Chief Complaint   Patient presents with     RECHECK     Return: Multiple myeloma      Initial Vitals: /57 (BP Location: Right arm, Patient Position: Sitting, Cuff Size: Adult Regular)   Pulse 57   Temp 97.7  F (36.5  C) (Oral)   Resp 16   Ht 1.778 m (5' 10\")   Wt 91.3 kg (201 lb 3.2 oz)   SpO2 97%   BMI 28.87 kg/m   Estimated body mass index is 28.87 kg/m  as calculated from the following:    Height as of this encounter: 1.778 m (5' 10\").    Weight as of this encounter: 91.3 kg (201 lb 3.2 oz). Body surface area is 2.12 meters squared.  No Pain (0) Comment: Data Unavailable   No LMP for male patient.  Allergies reviewed: Yes  Medications reviewed: Yes    Medications: Medication refills not needed today.  Pharmacy name entered into [facility]:    [facility] - [place] - [address] [facility] - [facility] - [place], [place] - [address] [place] [address] SPECIALTY PHARMACY, [facility], [address]    Clinical concerns: N/A    Thu Ambrocio CMA              "

## 2019-05-15 ENCOUNTER — TRANSFERRED RECORDS (OUTPATIENT)
Dept: HEALTH INFORMATION MANAGEMENT | Facility: CLINIC | Age: 69
End: 2019-05-15

## 2019-05-15 LAB
KAPPA FREE LT CHAIN: 1.85 MG/DL
KAPPA/LAMBDA FLC RATIO - HISTORICAL: 0.03 (ref 0.26–1.65)
LAMBDA FREE LT CHAIN: 54.4 MG/DL (ref 0.57–2.63)

## 2019-05-29 ENCOUNTER — TELEPHONE (OUTPATIENT)
Dept: ONCOLOGY | Facility: CLINIC | Age: 69
End: 2019-05-29

## 2019-05-29 NOTE — ORAL ONC MGMT
"Oral Chemotherapy Monitoring Program    Primary Oncologist: Dr. Raygoza  Primary Oncology Clinic: [facility]  Cancer Diagnosis: Multiple Myeloma    Therapy History:  C1D1=2/3/19  Lenalidomide, 1 capsule (10mg) daily, 14 on/14 off. A1N3=37/05 (current)     Drug Interaction Assessment: No new medications or medication changes.     Lab Monitoring Plan  CBC and CMP every 2 weeks. Patient recieves labs at [facility].  Subjective/Objective:  Erasmo Pearce is a 68 year old male contacted by phone for a follow-up visit for oral chemotherapy. Erasmo has recently been discharged from the hospital following a bowl obstruction. He reports that Dr. Raygoza has allowed him to take an additional week off of therapy and will now resume on 06/09/19. His proteins were a little below LLN (Total 5.1g/dL, Albumin 3.2g/dL), but this is likely due to his fluid only diet in the hospital. Erasmo said he will be on a \"light diet\" for the next week, and back to \"normal\" diet the following week. He reports that his appetite is \"fine\". His hemoglobin has been trending down and is now 8.9g/dL, Jaret has been notified. Erasmo is tolerating his lenalidomide therapy well, and says he is \"comfortable\". He denies rash, nausea, and vomiting.     ORAL CHEMOTHERAPY 1/23/2019 2/12/2019 3/7/2019 4/18/2019 5/29/2019   Drug Name Revlimid (Lenalidomide) Revlimid (Lenalidomide) Revlimid (Lenalidomide) Revlimid (Lenalidomide) Revlimid (Lenalidomide)   Current Dosage 10mg 10mg 10mg 10mg 10mg   Current Schedule Daily Daily Daily Daily Daily   Cycle Details 2 weeks on 2 weeks off 2 weeks on 2 weeks off 2 weeks on 2 weeks off 2 weeks on 2 weeks off 2 weeks on 2 weeks off   Start Date of Last Cycle - 2/3/2019 3/3/2019 4/7/2019 5/12/2019   Planned next cycle start date - 3/3/2019 3/31/2019 5/5/2019 6/9/2019   Doses missed in last 2 weeks - 0 0 0 0   Adherence Assessment - Adherent Adherent Adherent Adherent   Adverse Effects - " "No AE identified during assessment No AE identified during assessment;Other (see note for details) Myalgias No AE identified during assessment   Myalgias - - - Grade 1 -   Pharmacist Intervention(myalgias) - - - Yes -   Intervention(s) - - - Patient education -   Pharmacist intervention? - - Yes - -   Intervention(s) - - Patient education - -   Home BPs - - not needed not needed -   Any new drug interactions? No No No No No   Is the dose as ordered appropriate for the patient? Yes Yes Yes Yes Yes       Last PHQ-2 Score on record: No flowsheet data found.      Vitals:  BP:   BP Readings from Last 1 Encounters:   05/14/19 104/57     Wt Readings from Last 1 Encounters:   05/14/19 91.3 kg (201 lb 3.2 oz)     Estimated body surface area is 2.12 meters squared as calculated from the following:    Height as of 5/14/19: 1.778 m (5' 10\").    Weight as of 5/14/19: 91.3 kg (201 lb 3.2 oz).    Labs:  _  Result Component Current Result Ref Range   Sodium 139 (5/7/2019) 133 - 144 mmol/L     _  Result Component Current Result Ref Range   Potassium 3.6 (5/7/2019) 3.4 - 5.3 mmol/L     _  Result Component Current Result Ref Range   Calcium 8.8 (5/7/2019) 8.5 - 10.1 mg/dL     _  Result Component Current Result Ref Range   Magnesium 1.4 (L) (5/7/2019) 1.6 - 2.3 mg/dL     No results found for Phos within last 30 days.     _  Result Component Current Result Ref Range   Albumin 3.3 (L) (5/7/2019) 3.4 - 5.0 g/dL     _  Result Component Current Result Ref Range   Urea Nitrogen 24 (5/7/2019) 7 - 30 mg/dL     _  Result Component Current Result Ref Range   Creatinine 0.96 (5/7/2019) 0.66 - 1.25 mg/dL       _  Result Component Current Result Ref Range   AST 15 (5/7/2019) 0 - 45 U/L     _  Result Component Current Result Ref Range   ALT 25 (5/7/2019) 0 - 70 U/L     _  Result Component Current Result Ref Range   Bilirubin Total 0.4 (5/7/2019) 0.2 - 1.3 mg/dL       _  Result Component Current Result Ref Range   WBC 6.7 (5/7/2019) 4.0 - 11.0 10e9/L "     _  Result Component Current Result Ref Range   Hemoglobin 9.7 (L) (5/7/2019) 13.3 - 17.7 g/dL     _  Result Component Current Result Ref Range   Platelet Count 177 (5/7/2019) 150 - 450 10e9/L     _  Result Component Current Result Ref Range   Absolute Neutrophil 4.4 (5/7/2019) 1.6 - 8.3 10e9/L     Assessment/Plan:  Multiple Myeloma: Patient is tolerating well, continue with current therapy.   Anemia: Patients hemoglobin is trending down, continue therapy, but monitor labs closely.     Follow-Up:  Review labs 6/12/19    Refill Due:  6/09/19    [facility] Intern  Oral Chemotherapy Monitoring Program  [phone]

## 2019-05-30 NOTE — ORAL ONC MGMT
Oral Chemotherapy Monitoring Program     Placed call to patient in follow up of Revlimid therapy.     Erasmo informed me that his local physician ordered a CBC on 06/03/19. He acknowledged that he will get labs on this day, and we will make a determination of when to restart Revlimid based on the results.    Pierre Robbins Pharmacy Intern  [facility]   [phone]   5/30/2018

## 2019-06-04 ENCOUNTER — TELEPHONE (OUTPATIENT)
Dept: ONCOLOGY | Facility: CLINIC | Age: 69
End: 2019-06-04

## 2019-06-04 LAB — ALT SERPL-CCNC: 18 U/L (ref 8–45)

## 2019-06-04 NOTE — ORAL ONC MGMT
Oral Chemotherapy Monitoring Program     Placed call to patient in follow up of Revlimid therapy. Jonh is in the hospital today and Dr. Raygoza is aware. Confirmed the plan with Jonh: Dr. Carrington would like him to hold Revlimid till further notice. Jonh expressed understanding and agreement with this plan. Jonh does have a month's supply of Revlimid on hand and will not start taking it until he receives instructions from Dr. Raygoza. Jonh thanked me for the call and care.     Favio Diop PharmD  [facility]  [phone]  June 4, 2019

## 2019-06-10 LAB — INR PPP: 2.3 (ref 0.8–1.2)

## 2019-06-11 ENCOUNTER — OFFICE VISIT (OUTPATIENT)
Dept: CARDIOLOGY | Facility: CLINIC | Age: 69
End: 2019-06-11
Attending: INTERNAL MEDICINE
Payer: MEDICARE

## 2019-06-11 VITALS
OXYGEN SATURATION: 93 % | HEART RATE: 64 BPM | HEIGHT: 70 IN | RESPIRATION RATE: 16 BRPM | DIASTOLIC BLOOD PRESSURE: 67 MMHG | SYSTOLIC BLOOD PRESSURE: 110 MMHG | WEIGHT: 196 LBS | BODY MASS INDEX: 28.06 KG/M2

## 2019-06-11 DIAGNOSIS — I48.21 PERMANENT ATRIAL FIBRILLATION (H): ICD-10-CM

## 2019-06-11 DIAGNOSIS — I42.9 SECONDARY CARDIOMYOPATHY (H): ICD-10-CM

## 2019-06-11 PROCEDURE — G0463 HOSPITAL OUTPT CLINIC VISIT: HCPCS | Mod: ZF

## 2019-06-11 PROCEDURE — 99214 OFFICE O/P EST MOD 30 MIN: CPT | Mod: GC | Performed by: INTERNAL MEDICINE

## 2019-06-11 ASSESSMENT — MIFFLIN-ST. JEOR: SCORE: 1670.3

## 2019-06-11 ASSESSMENT — PAIN SCALES - GENERAL: PAINLEVEL: NO PAIN (0)

## 2019-06-11 NOTE — LETTER
6/11/2019      RE: Erasmo Pearce  [address]  [address] [facility] [address]       Dear Colleague,    Thank you for the opportunity to participate in the care of your patient, Erasmo Pearce, at the [facility] at [facility]. Please see a copy of my visit note below.     Cardiac Electrophysiology Clinic       HPI:   Erasmo Pearce is a 68 year old male with a history of permanent atrial fibrillation, ischemic cardiomyopathy (LVEF 30-35% in 2017) status post [employer] ICD in 12/2009 initially for primary prevention, history of multiple appropriate shocks for VF (typically while undergoing chemotherapy), and relapsed multiple myeloma who presents for follow-up. Was last seen in clinic on 5/7/19.     He was started on lenalidomide on 2/10/19.  Since initiating lenalidomide, he has received 4 appropriate ICD shocks (on 2/11, 2/14, 2/16, and 3/6).  Last ICD shock prior to starting lenalidomide was on 7/25.  On 2/11, had one 41 J shock; on 2/12, had ATP x 1; on 2/14, had ATP x 1, 41 J shock; on 2/15, ATP x 1; on 2/15, ATP x 1, 41 J shock; and on 3/6, ATP x 1, 41 J shock. During  his visit on 3/26/19, mexiletine 150 mg BID was started.  Since starting mexiletine, patient has not had any shocks.    Since our last visit, patient has had multiple hospitalizations.  He was hospitalized for enterocolitis, ileus, and bowel obstruction requiring NG tube placement. He was most recently hospitalized from 6/3-6/5/19 with diarrhea, orthostasis, and significant MANN secondary to dehydration with creatinine at 4.84 on presentation.  C diff was negative.  During the hospitalization, patient was given IV fluids and Lasix and lisinopril were held.  Patient notes that he is currently taking Lasix 20 mg every 2 to 3 days for intermittent lower extremity edema.  His chemotherapy has been held for the last couple weeks and he is planning on resuming his  chemotherapy in a couple days.  Since his last hospitalization, patient notes feeling well.  Patient has been increasing his fluid intake.  Denies chest pain, shortness breath, palpitations, syncope, lightheadedness, abdominal pain, nausea, emesis, hematochezia, melena, or any other bleeding.  Patient continues to have diarrhea with 3-5 bowel movements per day.  Patient notes that when he is on lenalidomide, his stools are more firm and less frequent.  Patient has not noticed that he has increased frequency of diarrhea when he takes p.o. magnesium.  Patient has had chronic diarrhea for years, and his diarrhea started prior to initiating chemotherapy for multiple myeloma years ago.  He has a history of multiple bowel obstructions.         Past Medical History:   Diagnosis Date     Atrial fibrillation (H)      Other premature beats      VF (ventricular fibrillation) (H)        Past Surgical History:   Procedure Laterality Date     IMPLANT AUTOMATIC IMPLANTABLE CARDIOVERTER DEFIBRILLATOR         No family history on file.    Social History     Tobacco Use     Smoking status: Former Smoker     Packs/day: 1.00     Years: 25.00     Pack years: 25.00     Types: Cigarettes     Last attempt to quit: 10/15/1995     Years since quittin.6     Smokeless tobacco: Never Used   Substance Use Topics     Alcohol use: No         Current Outpatient Medications   Medication Sig     albuterol (PROAIR HFA, PROVENTIL HFA, VENTOLIN HFA) 108 (90 BASE) MCG/ACT inhaler Inhale 2 puffs into the lungs every 6 hours as needed      amoxicillin (AMOXIL) 500 MG tablet Take 4 tabs one hour before dental appointment     baclofen (LIORESAL) 10 MG tablet Take 10 mg by mouth 3 times daily as needed prn     Calcium Carbonate-Vitamin D (CALCIUM 500 + D PO) Take 2 tablets by mouth daily.     dexamethasone (DECADRON) 4 MG tablet Take 20 mg by mouth once a week Once a week with food on Days 1,8,15..     diclofenac (VOLTAREN) 75 MG EC tablet Take 75 mg by  "mouth 2 times daily as needed 1 tab prn     fentaNYL (DURAGESIC) 25 mcg/hr patch 72 hr Place 1 patch onto the skin every 72 hours     furosemide (LASIX) 20 MG tablet Take 20 mg by mouth daily      LENalidomide (REVLIMID) 10 MG CAPS capsule CHEMO Take 1 capsule (10 mg) by mouth daily Take on Days 1 through 14 of 28-day cycle.     Magnesium Oxide 420 MG TABS Take 1,260 mg by mouth 2 times daily     metoprolol succinate (TOPROL-XL) 200 MG 24 hr tablet Take 1 tablet (200 mg) by mouth daily     mexiletine (MEXITIL) 150 MG capsule Take 1 capsule (150 mg) by mouth 2 times daily As close to 12 hours apart as possible     nitroGLYCERIN (NITROSTAT) 0.4 MG SL tablet Place 0.4 mg under the tongue every 5 minutes as needed Reported on 5/16/2017     omeprazole (PRILOSEC) 20 MG capsule Take 20 mg by mouth daily      oxycodone (ROXICODONE) 5 MG immediate release tablet Take 1-2 tablets by mouth every 6 hours as needed. For pain.      simvastatin (ZOCOR) 40 MG tablet Take 40 mg by mouth At Bedtime      tiotropium (SPIRIVA) 18 MCG inhalation capsule Inhale 1 capsule (18 mcg) into the lungs daily     TRAZODONE HCL PO Take 50 mg by mouth nightly as needed      warfarin (COUMADIN) 5 MG tablet Take 7.5 mg by mouth See Admin Instructions Take 1.5 tab by mojth Monday and Friday and 1 tab Sun Tues WED , Thur and Sat     lisinopril (PRINIVIL,ZESTRIL) 5 MG tablet Take 5 mg by mouth daily      No current facility-administered medications for this visit.          ROS:   10 point ROS negative other than what is discussed above    EXAM:   /67 (BP Location: Right arm, Patient Position: Chair, Cuff Size: Adult Regular)   Pulse 64   Resp 16   Ht 1.778 m (5' 10\")   Wt 88.9 kg (196 lb)   SpO2 93%   BMI 28.12 kg/m      GENERAL: Alert, oriented, NAD  HEENT:  NC/AT. Sclerae white.  MMM, no oral lesions  NECK: No adenopathy. No JVD   HEART: IRIR, normal S1 and S2, 2/6 MAGGY at LLSB and apex   LUNGS:  Clear to auscultation bilaterally, no " wheezes, rales, or rhonchi  ABDOMEN: Soft, nontender, nondistended, bowel sounds present, no ascites.  EXTREMITIES: Trace nonpitting bilateral lower extremity edema.      Echocardiogram 4/4/19:  Mildly (EF 40-45%) reduced left ventricular function is present. Global  hypokinesis with basal-mid inferolateral and inferior akinesis.  Global right ventricular function is moderately reduced.  Moderate mitral insufficiency is present with multi directional jet suggesting  involvement of the commissures.     This study was compared with the study from 2/7/17 and LVEF is improved and MR  is worse .        Assessment and Plan:   Erasmo Pearce is a 68 year old male with a history of permanent atrial fibrillation, ischemic cardiomyopathy (LVEF 30-35% in 2017) status post [employer] ICD in 12/2009 initially for primary prevention, history of multiple appropriate shocks for VF (typically while undergoing chemotherapy), and relapsed multiple myeloma who presents for follow-up.    History of VF/VT, history multiple appropriate ICD shocks  Relapsed multiple myeloma  Patient typically receives appropriate ICD shocks while undergoing chemotherapy, possibly due to electrolyte imbalances.  He has a low DLCO which precludes him from amiodarone and he previously did not tolerate sotalol in the past due to prolonged QTc and low blood pressure.  He has not receiving any additional shocks since starting mexiletine.  - Continue mexiletine 150 mg twice a day and metoprolol succinate 200 mg daily. Previously discussed with patient that since his ICD shocks typically occur when taking chemotherapy, it may be considered in the future to discontinue mexiletine when he is not on chemotherapy.   - Dr. Raygoza is monitoring electrolytes. Recommending keeping K>4, Mg>2  - Recommend patient discuss with PCP and Dr. Raygoza whether to switch from PO Mg supplementation which may worsen diarrhea to intermittent IV  supplementation    Permanent AF  Continue metoprolol succinate 200 mg and warfarin. CHADS-VASc 3 (age, CHF, CAD).     ICM, LVEF 40-45% in 4/2019  Recent MANN due to dehydration/diarrhea  NYHA class II, stage C. Is euvolemic today.   - Continue metoprol succinate 200 mg  - Will continue to hold lisinopril due to recent MANN. Will have PCP follow-up BMP and determine when lisinopril can be resumed  - OK for patient to take lasix 20 mg every 2-3 days due to recent MANN    RTC to see Dr. Duarte in 3 months.    Patient seen and discussed with Dr. Jean-Baptiste.      Robyn Ventura MD, PhD  Cardiology Fellow  [phone]    ELECTROPHYSIOLOGY STAFF  Patient seen and examined by me.  History and physical examination discussed with Dr. Ventura whose note reflects our joint assessment and recommendation/plans.  Erasmo Pearce returns for follow-up. We last saw him in May 2019. He is a 68-year-old gentleman with multiple myeloma, permanent atrial fibrillation, ischemic cardiomyopathy with an ejection fraction of approximately 35% who had an implantable cardioverter-defibrillator placed in December 2009 for primary prevention with subsequent appropriate shocks. He has had a tendency to have ventricular fibrillation during chemotherapy, possibly because his electrolytes are more labile. His heart rate histogram during atrial fibrillation has been generally excellent. We have been seeing him lately because he resumed chemotherapy in early February and has had an increase in VT and VT therapies. At his last visit we documented a 41 J shock on 2/11, VT terminated by ATP 2/12, VT terminated by ATP but followed by a committed shock 2/14, 41 J shock 2/15 and VT terminated ATP 2/17.     With four episodes of VT/ VF in the first two weeks of chemo we elected to add mexiletine 150 mg q 12 hours (in spite of having had no shocks during the second month). He also takes metoprolol succinate 200 mg daily for rate control during AF. He is on  "lisinopril 5 mg daily (decreased, in the past, to make \"BP room\" for a higher dose of metoprolol). We obtained an echo 4/4/2019 that showed mildly decreased LV function, EF 40-45%. There was moderate MR. Compared to the study of 2/17/2017 the LVEF was improved and the MR was worse (EF was 30-35%, MR was mild).     Since we last saw him he has been hospitalized three times.  He was hospitalized for enterocolitis and bowel obstruction; diarrhea and dehydration; and acute kidney injury, presumably due to preceding hypovolemia.  He feels fairly well today although he continues to have diarrhea.  He is now taking oral Mg preps due to electrolyte imbalance although he has had chronic diarrhea that has preceded Mg supplements.  He may tolerate IV supplements better although this would be very inconvenient.  Fortunately, and perhaps surprisingly, his ventricular arrhythmias have been quiescent recently.  He is currently not taking chemotherapy although he may be restarting chemo later this month.  We will continue metoprolol, mexiletine and warfarin as scheduled, he will continue to hold lisinopril until his renal function is felt to be stabilized, and he is currently taking Lasix at a reduced dose as needed.  He and his wife are aware that I am retiring this month.  We have arranged follow-up with Dr. Duarte in 3 months.  It was a pleasure seeing Erasmo Pearce today and it has been a privilege to participate in his medical care.  Narinder Jean-Baptiste        CC  Patient Care Team:  Cong Mcdonald as PCP - General  Willian Fuentes MD as PCP - Internal Medicine  Rojas Raygoza MD as BMT Physician  Cong Mcdonald as Referring Physician  Yaz Jeong NP as Nurse Practitioner (Nurse Practitioner)  Cl[facility], [facility] as Family Medicine Physician  Merry Mas RN as Continuity Care Coordinator (Transplant)  Aleida Angeles, RN as Nurse Coordinator  NARINDER JEAN-BAPTISTE    "

## 2019-06-11 NOTE — PROGRESS NOTES
Cardiac Electrophysiology Clinic       HPI:   Erasmo Pearce is a 68 year old male with a history of permanent atrial fibrillation, ischemic cardiomyopathy (LVEF 30-35% in 2017) status post [employer] ICD in 12/2009 initially for primary prevention, history of multiple appropriate shocks for VF (typically while undergoing chemotherapy), and relapsed multiple myeloma who presents for follow-up. Was last seen in clinic on 5/7/19.     He was started on lenalidomide on 2/10/19.  Since initiating lenalidomide, he has received 4 appropriate ICD shocks (on 2/11, 2/14, 2/16, and 3/6).  Last ICD shock prior to starting lenalidomide was on 7/25.  On 2/11, had one 41 J shock; on 2/12, had ATP x 1; on 2/14, had ATP x 1, 41 J shock; on 2/15, ATP x 1; on 2/15, ATP x 1, 41 J shock; and on 3/6, ATP x 1, 41 J shock. During his visit on 3/26/19, mexiletine 150 mg BID was started.  Since starting mexiletine, patient has not had any shocks.    Since our last visit, patient has had multiple hospitalizations.  He was hospitalized for enterocolitis, ileus, and bowel obstruction requiring NG tube placement. He was most recently hospitalized from 6/3-6/5/19 with diarrhea, orthostasis, and significant MANN secondary to dehydration with creatinine at 4.84 on presentation.  C diff was negative.  During the hospitalization, patient was given IV fluids and Lasix and lisinopril were held.  Patient notes that he is currently taking Lasix 20 mg every 2 to 3 days for intermittent lower extremity edema.  His chemotherapy has been held for the last couple weeks and he is planning on resuming his chemotherapy in a couple days.  Since his last hospitalization, patient notes feeling well.  Patient has been increasing his fluid intake.  Denies chest pain, shortness breath, palpitations, syncope, lightheadedness, abdominal pain, nausea, emesis, hematochezia, melena, or any other bleeding.  Patient continues to have diarrhea with 3-5 bowel  movements per day.  Patient notes that when he is on lenalidomide, his stools are more firm and less frequent.  Patient has not noticed that he has increased frequency of diarrhea when he takes p.o. magnesium.  Patient has had chronic diarrhea for years, and his diarrhea started prior to initiating chemotherapy for multiple myeloma years ago.  He has a history of multiple bowel obstructions.         Past Medical History:   Diagnosis Date     Atrial fibrillation (H)      Other premature beats      VF (ventricular fibrillation) (H)        Past Surgical History:   Procedure Laterality Date     IMPLANT AUTOMATIC IMPLANTABLE CARDIOVERTER DEFIBRILLATOR         No family history on file.    Social History     Tobacco Use     Smoking status: Former Smoker     Packs/day: 1.00     Years: 25.00     Pack years: 25.00     Types: Cigarettes     Last attempt to quit: 10/15/1995     Years since quittin.6     Smokeless tobacco: Never Used   Substance Use Topics     Alcohol use: No         Current Outpatient Medications   Medication Sig     albuterol (PROAIR HFA, PROVENTIL HFA, VENTOLIN HFA) 108 (90 BASE) MCG/ACT inhaler Inhale 2 puffs into the lungs every 6 hours as needed      amoxicillin (AMOXIL) 500 MG tablet Take 4 tabs one hour before dental appointment     baclofen (LIORESAL) 10 MG tablet Take 10 mg by mouth 3 times daily as needed prn     Calcium Carbonate-Vitamin D (CALCIUM 500 + D PO) Take 2 tablets by mouth daily.     dexamethasone (DECADRON) 4 MG tablet Take 20 mg by mouth once a week Once a week with food on Days 1,8,15..     diclofenac (VOLTAREN) 75 MG EC tablet Take 75 mg by mouth 2 times daily as needed 1 tab prn     fentaNYL (DURAGESIC) 25 mcg/hr patch 72 hr Place 1 patch onto the skin every 72 hours     furosemide (LASIX) 20 MG tablet Take 20 mg by mouth daily      LENalidomide (REVLIMID) 10 MG CAPS capsule CHEMO Take 1 capsule (10 mg) by mouth daily Take on Days 1 through 14 of 28-day cycle.     Magnesium  "Oxide 420 MG TABS Take 1,260 mg by mouth 2 times daily     metoprolol succinate (TOPROL-XL) 200 MG 24 hr tablet Take 1 tablet (200 mg) by mouth daily     mexiletine (MEXITIL) 150 MG capsule Take 1 capsule (150 mg) by mouth 2 times daily As close to 12 hours apart as possible     nitroGLYCERIN (NITROSTAT) 0.4 MG SL tablet Place 0.4 mg under the tongue every 5 minutes as needed Reported on 5/16/2017     omeprazole (PRILOSEC) 20 MG capsule Take 20 mg by mouth daily      oxycodone (ROXICODONE) 5 MG immediate release tablet Take 1-2 tablets by mouth every 6 hours as needed. For pain.      simvastatin (ZOCOR) 40 MG tablet Take 40 mg by mouth At Bedtime      tiotropium (SPIRIVA) 18 MCG inhalation capsule Inhale 1 capsule (18 mcg) into the lungs daily     TRAZODONE HCL PO Take 50 mg by mouth nightly as needed      warfarin (COUMADIN) 5 MG tablet Take 7.5 mg by mouth See Admin Instructions Take 1.5 tab by mojth Monday and Friday and 1 tab Sun Tues WED , Thur and Sat     lisinopril (PRINIVIL,ZESTRIL) 5 MG tablet Take 5 mg by mouth daily      No current facility-administered medications for this visit.          ROS:   10 point ROS negative other than what is discussed above    EXAM:   /67 (BP Location: Right arm, Patient Position: Chair, Cuff Size: Adult Regular)   Pulse 64   Resp 16   Ht 1.778 m (5' 10\")   Wt 88.9 kg (196 lb)   SpO2 93%   BMI 28.12 kg/m     GENERAL: Alert, oriented, NAD  HEENT:  NC/AT. Sclerae white.  MMM, no oral lesions  NECK: No adenopathy. No JVD   HEART: IRIR, normal S1 and S2, 2/6 MAGGY at LLSB and apex   LUNGS:  Clear to auscultation bilaterally, no wheezes, rales, or rhonchi  ABDOMEN: Soft, nontender, nondistended, bowel sounds present, no ascites.  EXTREMITIES: Trace nonpitting bilateral lower extremity edema.      Echocardiogram 4/4/19:  Mildly (EF 40-45%) reduced left ventricular function is present. Global  hypokinesis with basal-mid inferolateral and inferior akinesis.  Global right " ventricular function is moderately reduced.  Moderate mitral insufficiency is present with multi directional jet suggesting  involvement of the commissures.     This study was compared with the study from 2/7/17 and LVEF is improved and MR  is worse .        Assessment and Plan:   Erasmo Pearce is a 68 year old male with a history of permanent atrial fibrillation, ischemic cardiomyopathy (LVEF 30-35% in 2017) status post [employer] ICD in 12/2009 initially for primary prevention, history of multiple appropriate shocks for VF (typically while undergoing chemotherapy), and relapsed multiple myeloma who presents for follow-up.    History of VF/VT, history multiple appropriate ICD shocks  Relapsed multiple myeloma  Patient typically receives appropriate ICD shocks while undergoing chemotherapy, possibly due to electrolyte imbalances.  He has a low DLCO which precludes him from amiodarone and he previously did not tolerate sotalol in the past due to prolonged QTc and low blood pressure.  He has not receiving any additional shocks since starting mexiletine.  - Continue mexiletine 150 mg twice a day and metoprolol succinate 200 mg daily. Previously discussed with patient that since his ICD shocks typically occur when taking chemotherapy, it may be considered in the future to discontinue mexiletine when he is not on chemotherapy.   - Dr. Raygoza is monitoring electrolytes. Recommending keeping K>4, Mg>2  - Recommend patient discuss with PCP and Dr. Raygoza whether to switch from PO Mg supplementation which may worsen diarrhea to intermittent IV supplementation    Permanent AF  Continue metoprolol succinate 200 mg and warfarin. CHADS-VASc 3 (age, CHF, CAD).     ICM, LVEF 40-45% in 4/2019  Recent MANN due to dehydration/diarrhea  NYHA class II, stage C. Is euvolemic today.   - Continue metoprol succinate 200 mg  - Will continue to hold lisinopril due to recent MANN. Will have PCP follow-up BMP and  "determine when lisinopril can be resumed  - OK for patient to take lasix 20 mg every 2-3 days due to recent MANN    RTC to see Dr. Duarte in 3 months.    Patient seen and discussed with Dr. Jean-Baptiste.      Robyn Ventura MD, PhD  Cardiology Fellow  [phone]    ELECTROPHYSIOLOGY STAFF  Patient seen and examined by me.  History and physical examination discussed with Dr. Ventura whose note reflects our joint assessment and recommendation/plans.  Erasmo Pearce returns for follow-up. We last saw him in May 2019. He is a 68-year-old gentleman with multiple myeloma, permanent atrial fibrillation, ischemic cardiomyopathy with an ejection fraction of approximately 35% who had an implantable cardioverter-defibrillator placed in December 2009 for primary prevention with subsequent appropriate shocks. He has had a tendency to have ventricular fibrillation during chemotherapy, possibly because his electrolytes are more labile. His heart rate histogram during atrial fibrillation has been generally excellent. We have been seeing him lately because he resumed chemotherapy in early February and has had an increase in VT and VT therapies. At his last visit we documented a 41 J shock on 2/11, VT terminated by ATP 2/12, VT terminated by ATP but followed by a committed shock 2/14, 41 J shock 2/15 and VT terminated ATP 2/17.     With four episodes of VT/ VF in the first two weeks of chemo we elected to add mexiletine 150 mg q 12 hours (in spite of having had no shocks during the second month). He also takes metoprolol succinate 200 mg daily for rate control during AF. He is on lisinopril 5 mg daily (decreased, in the past, to make \"BP room\" for a higher dose of metoprolol). We obtained an echo 4/4/2019 that showed mildly decreased LV function, EF 40-45%. There was moderate MR. Compared to the study of 2/17/2017 the LVEF was improved and the MR was worse (EF was 30-35%, MR was mild).     Since we last saw him he has been " hospitalized three times.  He was hospitalized for enterocolitis and bowel obstruction; diarrhea and dehydration; and acute kidney injury, presumably due to preceding hypovolemia.  He feels fairly well today although he continues to have diarrhea.  He is now taking oral Mg preps due to electrolyte imbalance although he has had chronic diarrhea that has preceded Mg supplements.  He may tolerate IV supplements better although this would be very inconvenient.  Fortunately, and perhaps surprisingly, his ventricular arrhythmias have been quiescent recently.  He is currently not taking chemotherapy although he may be restarting chemo later this month.  We will continue metoprolol, mexiletine and warfarin as scheduled, he will continue to hold lisinopril until his renal function is felt to be stabilized, and he is currently taking Lasix at a reduced dose as needed.  He and his wife are aware that I am retiring this month.  We have arranged follow-up with Dr. Duarte in 3 months.  It was a pleasure seeing Erasmo Pearce today and it has been a privilege to participate in his medical care.  Narinder Jean-Baptiste        CC  Patient Care Team:  Cong Mcdonald as PCP - General  Willian Fuentes MD as PCP - Internal Medicine  Rojas Raygoza MD as BMT Physician  Cong Mcdonald as Referring Physician  Yaz Jeong NP as Nurse Practitioner (Nurse Practitioner)  Eligio Mora as Family Medicine Physician  Merry Mas RN as Continuity Care Coordinator (Transplant)  Aleida Angeles RN as Nurse Coordinator  NARINDER JEAN-BAPTISTE

## 2019-06-11 NOTE — NURSING NOTE
Chief Complaint   Patient presents with     RECHECK     1 month follow up for arrhythmia      Tricia Solorzano CMA  Pt was due for PHQ2. Pt completed. Updated health maintenance. See Screenings.

## 2019-06-11 NOTE — PATIENT INSTRUCTIONS
You were seen today in the Cardiovascular Clinic at the [facility].      Cardiology Providers you saw during your visit:   Dr. Jean-Baptiste    Diagnosis:    Permanent atrial fibrillation (H)    Secondary cardiomyopathy (H)    Ventricular fibrillation    Results:  None today    Recommendations:    1. Discuss with your primary care provider and Dr. Raygoza whether to switch from magnesium oxide pill to intermittent intravenous magnesium.  2. Continue to not take lisinopril. Discuss with Dr. Mcdonald when to resume lisinopril.  3. OK to take Lasix every 2-3 days if needed.    Follow-up: In 3 months with Dr. Duarte      For emergencies call [phone].    For any scheduling needs, please call [phone].     Thank you for your visit today!     Please call if you have any questions or concerns.  Jonatan Echavarria RN

## 2019-06-11 NOTE — NURSING NOTE
Med Reconcile: Reviewed and verified all current medications with the patient. The updated medication list was printed and given to the patient.  Return Appointment: 3 months with Dr. Duarte.  Patient given instructions regarding scheduling next clinic visit. Patient demonstrated understanding of this information and agreed to call with further questions or concerns.  Medication Change: patient will remain off of lisinopril.  Dr. Mcdonald to restart.  Patient will discuss with Dr. Mcdonald and Jaret possibility of changing over to IV magnesium.  Patient was educated regarding prescribed medication change, including discussion of the indication, administration, side effects, and when to report to MD or RN. Patient demonstrated understanding of this information and agreed to call with further questions or concerns.  Patient stated he understood all health information given and agreed to call with further questions or concerns.

## 2019-06-12 ENCOUNTER — TRANSFERRED RECORDS (OUTPATIENT)
Dept: HEALTH INFORMATION MANAGEMENT | Facility: CLINIC | Age: 69
End: 2019-06-12

## 2019-06-12 ENCOUNTER — CARE COORDINATION (OUTPATIENT)
Dept: TRANSPLANT | Facility: CLINIC | Age: 69
End: 2019-06-12

## 2019-06-12 LAB
AST SERPL-CCNC: 23 U/L (ref 5–41)
CREAT SERPL-MCNC: 1.14 MG/DL (ref 0.72–1.25)
GFR SERPL CREATININE-BSD FRML MDRD: >60 ML/MIN/1.73M2
GLUCOSE SERPL-MCNC: 116 MG/DL (ref 70–100)
POTASSIUM SERPL-SCNC: 4.4 MMOL/L (ref 3.5–5.1)

## 2019-06-12 NOTE — PROGRESS NOTES
Labs drawn locally at [facility] this morning show a magnesium level of 0.9mg/dl. This was reported to  who would like the patient to increase his oral Mg dose to 1260 mg three times a day from 1260 mg twice a day. Patient was called who conveyed back accurate understanding of the increased dose. Patient was instructed to call back if diarrhea worsens or becomes unmanageable on the increased dose.

## 2019-06-20 DIAGNOSIS — E83.42 HYPOMAGNESEMIA: ICD-10-CM

## 2019-06-20 RX ORDER — MAGNESIUM OXIDE 420 MG/1
TABLET ORAL
Qty: 270 TABLET | Refills: 3 | Status: SHIPPED | OUTPATIENT
Start: 2019-06-20 | End: 2019-10-25

## 2019-06-26 ENCOUNTER — ANCILLARY PROCEDURE (OUTPATIENT)
Dept: CARDIOLOGY | Facility: CLINIC | Age: 69
End: 2019-06-26
Attending: INTERNAL MEDICINE
Payer: MEDICARE

## 2019-06-26 ENCOUNTER — TRANSFERRED RECORDS (OUTPATIENT)
Dept: HEALTH INFORMATION MANAGEMENT | Facility: CLINIC | Age: 69
End: 2019-06-26

## 2019-06-26 ENCOUNTER — TELEPHONE (OUTPATIENT)
Dept: ONCOLOGY | Facility: CLINIC | Age: 69
End: 2019-06-26

## 2019-06-26 DIAGNOSIS — I48.91 AF (ATRIAL FIBRILLATION) (H): ICD-10-CM

## 2019-06-26 LAB
CREAT SERPL-MCNC: 1.16 MG/DL (ref 0.72–1.25)
GFR SERPL CREATININE-BSD FRML MDRD: >60 ML/MIN/1.73M(2)
GLUCOSE SERPL-MCNC: 100 MG/DL (ref 70–100)
POTASSIUM SERPL-SCNC: 3.9 MMOL/L (ref 3.5–5.1)

## 2019-06-26 PROCEDURE — 93295 DEV INTERROG REMOTE 1/2/MLT: CPT | Performed by: INTERNAL MEDICINE

## 2019-06-26 PROCEDURE — 93296 REM INTERROG EVL PM/IDS: CPT | Mod: ZF

## 2019-06-26 NOTE — TELEPHONE ENCOUNTER
Critical lab value called to Triage from [facility].  Mag is 0.8   Messaged to Dr Raygoza, Yuly Beard, RNCC and oral chemo team    Paged to Dr Raygoza. MD is aware.

## 2019-06-27 ENCOUNTER — CARE COORDINATION (OUTPATIENT)
Dept: TRANSPLANT | Facility: CLINIC | Age: 69
End: 2019-06-27

## 2019-06-27 DIAGNOSIS — C90.00 MULTIPLE MYELOMA, REMISSION STATUS UNSPECIFIED (H): Primary | ICD-10-CM

## 2019-06-27 NOTE — PROGRESS NOTES
[facility] showed a Magnesium level of 0.8. This was reported to Dr. Raygoza. I spoke with patient and he has been taking mg+ 3 pills three times a day. He reports that while he is on the Revlimid he has formed stools. He comes off the Revlimid on Saturday and anticipates diarrhea within a couple of days after that until he starts Revlimid back up. He says that it s been generally manageable during previous cycles.      Patient also reports that his right leg and foot remain swollen since early June and is not improving with Lasix. His weight has gone from 204lbs to 197lbs in the last four days. He is able to get his  soft sided sneaker on his foot. The foot/lower leg is least swollen in the morning though not without obvious swelling and becomes more swollen as the day goes on.     Per Dr. Raygoza, Patient is to continue on the Mg+ 3 pills three times a day. Patient was provided with an appointment with Dr. Raygoza on 7/9 for follow up. Patient verbalized back clear  understanding of the plan and agrees to call if there are any further concerns.

## 2019-07-09 ENCOUNTER — ONCOLOGY VISIT (OUTPATIENT)
Dept: TRANSPLANT | Facility: CLINIC | Age: 69
End: 2019-07-09
Attending: INTERNAL MEDICINE
Payer: MEDICARE

## 2019-07-09 VITALS
WEIGHT: 205.03 LBS | HEART RATE: 68 BPM | TEMPERATURE: 97.7 F | HEIGHT: 70 IN | SYSTOLIC BLOOD PRESSURE: 115 MMHG | OXYGEN SATURATION: 97 % | DIASTOLIC BLOOD PRESSURE: 71 MMHG | RESPIRATION RATE: 16 BRPM | BODY MASS INDEX: 29.35 KG/M2

## 2019-07-09 DIAGNOSIS — C90.00 MULTIPLE MYELOMA, REMISSION STATUS UNSPECIFIED (H): ICD-10-CM

## 2019-07-09 LAB
ALBUMIN SERPL-MCNC: 3.4 G/DL (ref 3.4–5)
ALP SERPL-CCNC: 122 U/L (ref 40–150)
ALT SERPL W P-5'-P-CCNC: 26 U/L (ref 0–70)
ANION GAP SERPL CALCULATED.3IONS-SCNC: 4 MMOL/L (ref 3–14)
AST SERPL W P-5'-P-CCNC: 18 U/L (ref 0–45)
B2 MICROGLOB SERPL-MCNC: 2.8 MG/L
BASOPHILS # BLD AUTO: 0.1 10E9/L (ref 0–0.2)
BASOPHILS NFR BLD AUTO: 1.4 %
BILIRUB SERPL-MCNC: 0.7 MG/DL (ref 0.2–1.3)
BUN SERPL-MCNC: 20 MG/DL (ref 7–30)
CALCIUM SERPL-MCNC: 8.1 MG/DL (ref 8.5–10.1)
CHLORIDE SERPL-SCNC: 106 MMOL/L (ref 94–109)
CO2 SERPL-SCNC: 30 MMOL/L (ref 20–32)
CREAT SERPL-MCNC: 1.12 MG/DL (ref 0.66–1.25)
DIFFERENTIAL METHOD BLD: ABNORMAL
EOSINOPHIL # BLD AUTO: 0.1 10E9/L (ref 0–0.7)
EOSINOPHIL NFR BLD AUTO: 2.9 %
ERYTHROCYTE [DISTWIDTH] IN BLOOD BY AUTOMATED COUNT: 15.9 % (ref 10–15)
GFR SERPL CREATININE-BSD FRML MDRD: 67 ML/MIN/{1.73_M2}
GLUCOSE SERPL-MCNC: 117 MG/DL (ref 70–99)
HCT VFR BLD AUTO: 28.1 % (ref 40–53)
HGB BLD-MCNC: 8.9 G/DL (ref 13.3–17.7)
IGA SERPL-MCNC: 274 MG/DL (ref 70–380)
IGG SERPL-MCNC: 429 MG/DL (ref 695–1620)
IGM SERPL-MCNC: 64 MG/DL (ref 60–265)
IMM GRANULOCYTES # BLD: 0 10E9/L (ref 0–0.4)
IMM GRANULOCYTES NFR BLD: 0.5 %
KAPPA LC UR-MCNC: NORMAL MG/DL (ref 0.33–1.94)
KAPPA LC/LAMBDA SER: NORMAL {RATIO} (ref 0.26–1.65)
LAMBDA LC SERPL-MCNC: NORMAL MG/DL (ref 0.57–2.63)
LDH SERPL L TO P-CCNC: 198 U/L (ref 85–227)
LYMPHOCYTES # BLD AUTO: 1 10E9/L (ref 0.8–5.3)
LYMPHOCYTES NFR BLD AUTO: 22.8 %
MAGNESIUM SERPL-MCNC: 1.3 MG/DL (ref 1.6–2.3)
MCH RBC QN AUTO: 35 PG (ref 26.5–33)
MCHC RBC AUTO-ENTMCNC: 31.7 G/DL (ref 31.5–36.5)
MCV RBC AUTO: 111 FL (ref 78–100)
MONOCYTES # BLD AUTO: 0.8 10E9/L (ref 0–1.3)
MONOCYTES NFR BLD AUTO: 18 %
NEUTROPHILS # BLD AUTO: 2.3 10E9/L (ref 1.6–8.3)
NEUTROPHILS NFR BLD AUTO: 54.4 %
NRBC # BLD AUTO: 0 10*3/UL
NRBC BLD AUTO-RTO: 0 /100
PLATELET # BLD AUTO: 118 10E9/L (ref 150–450)
POTASSIUM SERPL-SCNC: 4.7 MMOL/L (ref 3.4–5.3)
PROT SERPL-MCNC: 6.3 G/DL (ref 6.8–8.8)
RBC # BLD AUTO: 2.54 10E12/L (ref 4.4–5.9)
SODIUM SERPL-SCNC: 140 MMOL/L (ref 133–144)
WBC # BLD AUTO: 4.2 10E9/L (ref 4–11)

## 2019-07-09 PROCEDURE — 82784 ASSAY IGA/IGD/IGG/IGM EACH: CPT | Performed by: INTERNAL MEDICINE

## 2019-07-09 PROCEDURE — 83883 ASSAY NEPHELOMETRY NOT SPEC: CPT | Performed by: INTERNAL MEDICINE

## 2019-07-09 PROCEDURE — 80053 COMPREHEN METABOLIC PANEL: CPT | Performed by: INTERNAL MEDICINE

## 2019-07-09 PROCEDURE — 83615 LACTATE (LD) (LDH) ENZYME: CPT | Performed by: INTERNAL MEDICINE

## 2019-07-09 PROCEDURE — 36415 COLL VENOUS BLD VENIPUNCTURE: CPT

## 2019-07-09 PROCEDURE — 83735 ASSAY OF MAGNESIUM: CPT | Performed by: INTERNAL MEDICINE

## 2019-07-09 PROCEDURE — G0463 HOSPITAL OUTPT CLINIC VISIT: HCPCS | Mod: ZF

## 2019-07-09 PROCEDURE — 84165 PROTEIN E-PHORESIS SERUM: CPT | Performed by: INTERNAL MEDICINE

## 2019-07-09 PROCEDURE — 84156 ASSAY OF PROTEIN URINE: CPT | Performed by: INTERNAL MEDICINE

## 2019-07-09 PROCEDURE — 82232 ASSAY OF BETA-2 PROTEIN: CPT | Performed by: INTERNAL MEDICINE

## 2019-07-09 PROCEDURE — 85025 COMPLETE CBC W/AUTO DIFF WBC: CPT | Performed by: INTERNAL MEDICINE

## 2019-07-09 PROCEDURE — 00000402 ZZHCL STATISTIC TOTAL PROTEIN: Performed by: INTERNAL MEDICINE

## 2019-07-09 ASSESSMENT — MIFFLIN-ST. JEOR: SCORE: 1711.25

## 2019-07-09 ASSESSMENT — PAIN SCALES - GENERAL: PAINLEVEL: NO PAIN (0)

## 2019-07-09 NOTE — PROGRESS NOTES
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  Jonh had been on Revlimid 10 mg days 1-14 and dexamethasone 20 mg days 1, 8 and 15 of a Day 28 day cycle starting in February 2019 Jonh was hospitalized on May 23 for enterocolitis for 3 days. Subsequently he devleop 6 h after discharge  Had abdominal distension an d nausea  and pain. Found to have SBO, treat with NG tube suction.Cdiff was negative He spent 5 days in hospital. . Was readmitted with acute renal failure on Sonya 3 with Cr 4.8 due to volume depletion.Given IV fluids and within 3 days Cr fell to 1.1! Has had low magnesium 1.0 and now taking Mg Ox 420  9 tabs per day.Started Revlimid on June 16 this week now off.Has been taking Dex 20mg onDay 1,8 and 15 of each cycle.  Has had solid BM for past month. Still has back pain. Relieved with fenatnyl patch. Able to do yardwork and golfing Has had leg swelling on right more than left Has no been taking lasix 40mg regularly Off lisinopril since June 5. No DVT eval done and remains on coumadin with INR therapeutic Has been on Mexitel and wonders if it is causing leg swelling       PAST MEDICAL HISTORY, SOCIAL HISTORY, FAMILY HISTORY:  See my note from 05/2019.      REVIEW OF SYSTEMS:  A 12 point review of systems done is negative as in HPI.      PHYSICAL EXAMINATION:   GENERAL:  He is an alert gentleman in no acute distress.   VITAL SIGNS:  Stable. /71 (BP Location: Left arm, Patient Position: Sitting, Cuff Size: Adult Regular)   Pulse 68   Temp 97.7  F (36.5  C) (Oral)   Resp 16   Ht 1.778 m (5' 10\")   Wt 93 kg (205 lb 0.4 oz)   SpO2 97%   BMI 29.42 kg/m      HEENT:  Normocephalic.  EOM okay.  No scleral icterus.  Mouth without lesion.   RESPIRATORY:  Clear to " percussion and auscultation.   CARDIAC:  Irregular regular rhythm.  1/6 MAGGY LSB  HJR+ Cardioverter in the left upper chest wall.   ABDOMEN:  Without splenomegaly or tenderness.   EXTREMITIES:  +4 right lower lege edema +3 left lower leg maryann a  NEUROLOGIC:  No tremor.     ASSESSMENT:   1.  Multiple myeloma in relapse.   2.  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   3.  Cardiomyopathy. With CHF  4.  Atrial fibrillation.   5.  History of deep vein thrombosis, on warfarin.   6.  Chronic back pain.   7.  Aortic aneurysm.   8.  History of plasmacytoma of the clivus, status post radiation.   9.  History of ventricular tachycardia with implantable defibrillator in place.  10 Diarrhea     11. Hypomagnesemia  12.Recent SBO  13. Recent ARF  14. Leg Edema     INTERVAL HISTORY:  Jonh leija has had a rough couple of months.  The enteritis followed by dehydration, acute renal failure and then subsequently it seems as though he has developed heart failure, right-sided, with marked edema of his lower extremities.  He has been off regular Lasix as well as regular lisinopril.  I am going to put him back on Lasix 20 mg a day as well as place him on lisinopril 5 mg.   Should weigh himself daily. He needs to follow up with Dr. Duarte who will be his new cardiologist since Dr. Jean-Baptiste retired.  He has not had any ventricular tachycardia at least despite this and all in all he has not had that much dyspnea on exertion or orthopnea and is feeling pretty well.  I think he should wear support hose and keep his legs elevated.  I am going to ask Dr. Mcdonald to do an ultrasound just to be sure that there is not DVTs that are contributing to the leg edema, although he is on full dose warfarin and should continue on the warfarin.  In terms of his myeloma, we really have not made a dent with the Revlimid 10 mg 14 days of every 28.  I am going to add bortezomib, Velcade 1.3 mg/m2 subq on days 1, 8 and  15.  I willstart next week..  He will receive that here at the [facility] as he does not have an oncologist at [facility].  For now, I will keep him on the 10 mg of Revlimid days 1 through 14 of a 28-day cycle and dexamethasone 20 mg on days 1, 8 and 15.  Jonh seems to agree with this plan.  His magnesium is a little bit better, although he is taking 9 tablets a day.  I am not exactly sure why he has so much magnesium wasting.  Some of it was related to diarrhea and he says that his stools have been a little bit better.  I will see him again in 2 months' time.        Rojas Raygoza MD  [phone]  Results for DARRYL ZAPATA (MRN [id number]) as of 7/11/2019 09:17   Ref. Range 7/9/2019 09:24   Sodium Latest Ref Range: 133 - 144 mmol/L 140   Potassium Latest Ref Range: 3.4 - 5.3 mmol/L 4.7   Chloride Latest Ref Range: 94 - 109 mmol/L 106   Carbon Dioxide Latest Ref Range: 20 - 32 mmol/L 30   Urea Nitrogen Latest Ref Range: 7 - 30 mg/dL 20   Creatinine Latest Ref Range: 0.66 - 1.25 mg/dL 1.12   GFR Estimate Latest Ref Range: >60 mL/min/1.73_m2 67   GFR Estimate If Black Latest Ref Range: >60 mL/min/1.73_m2 78   Calcium Latest Ref Range: 8.5 - 10.1 mg/dL 8.1 (L)   Anion Gap Latest Ref Range: 3 - 14 mmol/L 4   Magnesium Latest Ref Range: 1.6 - 2.3 mg/dL 1.3 (L)   Albumin Latest Ref Range: 3.4 - 5.0 g/dL 3.4   Protein Total Latest Ref Range: 6.8 - 8.8 g/dL 6.3 (L)   Bilirubin Total Latest Ref Range: 0.2 - 1.3 mg/dL 0.7   Alkaline Phosphatase Latest Ref Range: 40 - 150 U/L 122   ALT Latest Ref Range: 0 - 70 U/L 26   AST Latest Ref Range: 0 - 45 U/L 18   Beta-2-Microglobulin Latest Ref Range: <2.3 mg/L 2.8 (H)   KAPPA/LAMBDA FREE LIGHT CHAINS QUANTITATIVE  Unknown Rpt   Lactate Dehydrogenase Latest Ref Range: 85 - 227 U/L 198   Glucose Latest Ref Range: 70 - 99 mg/dL 117 (H)   WBC Latest Ref Range: 4.0 - 11.0 10e9/L 4.2   Hemoglobin Latest Ref Range: 13.3 - 17.7 g/dL 8.9 (L)   Hematocrit Latest Ref Range: 40.0 - 53.0 %  28.1 (L)   Platelet Count Latest Ref Range: 150 - 450 10e9/L 118 (L)   RBC Count Latest Ref Range: 4.4 - 5.9 10e12/L 2.54 (L)   MCV Latest Ref Range: 78 - 100 fl 111 (H)   MCH Latest Ref Range: 26.5 - 33.0 pg 35.0 (H)   MCHC Latest Ref Range: 31.5 - 36.5 g/dL 31.7   RDW Latest Ref Range: 10.0 - 15.0 % 15.9 (H)   Diff Method Unknown Automated Method   % Neutrophils Latest Units: % 54.4   % Lymphocytes Latest Units: % 22.8   % Monocytes Latest Units: % 18.0   % Eosinophils Latest Units: % 2.9   % Basophils Latest Units: % 1.4   % Immature Granulocytes Latest Units: % 0.5   Nucleated RBCs Latest Ref Range: 0 /100 0   Absolute Neutrophil Latest Ref Range: 1.6 - 8.3 10e9/L 2.3   Absolute Lymphocytes Latest Ref Range: 0.8 - 5.3 10e9/L 1.0   Absolute Monocytes Latest Ref Range: 0.0 - 1.3 10e9/L 0.8   Absolute Eosinophils Latest Ref Range: 0.0 - 0.7 10e9/L 0.1   Absolute Basophils Latest Ref Range: 0.0 - 0.2 10e9/L 0.1   Abs Immature Granulocytes Latest Ref Range: 0 - 0.4 10e9/L 0.0   Absolute Nucleated RBC Unknown 0.0   Albumin Fraction Latest Ref Range: 3.7 - 5.1 g/dL 3.4 (L)   Alpha 1 Fraction Latest Ref Range: 0.2 - 0.4 g/dL 0.4   Alpha 2 Fraction Latest Ref Range: 0.5 - 0.9 g/dL 0.8   Beta Fraction Latest Ref Range: 0.6 - 1.0 g/dL 0.7   ELP Interpretation: Unknown Small monoclonal ...   Gamma Fraction Latest Ref Range: 0.7 - 1.6 g/dL 0.6 (L)   IGA Latest Ref Range: 70 - 380 mg/dL 274   IGG Latest Ref Range: 695 - 1,620 mg/dL 429 (L)   IGM Latest Ref Range: 60 - 265 mg/dL 64   [place] Free Lt Chain Latest Ref Range: 0.33 - 1.94 mg/dL Specimen sent to reference laboratory for testing.   Kappa Lambda Ratio Latest Ref Range: 0.26 - 1.65  Specimen sent to reference laboratory for testing.   Lambda Free Lt Chain Latest Ref Range: 0.57 - 2.63 mg/dL Specimen sent to reference laboratory for testing.   Monoclonal Peak Latest Ref Range: 0.0 g/dL 0.2 (H)

## 2019-07-09 NOTE — NURSING NOTE
"Oncology Rooming Note    July 9, 2019 10:19 AM   Erasmo Pearce is a 68 year old male who presents for:    Chief Complaint   Patient presents with     RECHECK     RTN- MM      Initial Vitals: /71 (BP Location: Left arm, Patient Position: Sitting, Cuff Size: Adult Regular)   Pulse 68   Temp 97.7  F (36.5  C) (Oral)   Resp 16   Ht 1.778 m (5' 10\")   Wt 93 kg (205 lb 0.4 oz)   SpO2 97%   BMI 29.42 kg/m   Estimated body mass index is 29.42 kg/m  as calculated from the following:    Height as of this encounter: 1.778 m (5' 10\").    Weight as of this encounter: 93 kg (205 lb 0.4 oz). Body surface area is 2.14 meters squared.  No Pain (0) Comment: Data Unavailable   No LMP for male patient.  Allergies reviewed: Yes  Medications reviewed: Yes    Medications: MEDICATION REFILLS NEEDED TODAY. Provider was notified.  Pharmacy name entered into [employer]:    [facility] [place] [place] - [address] [facility] [place], [place] - [address] [place] [address]  [facility] [place] [place] - [address] [place] [address]    Clinical concerns: [facility]       Tricia Barajas CMA              "

## 2019-07-09 NOTE — NURSING NOTE
Chief Complaint   Patient presents with     Blood Draw     labs only     Vitals done, labs drawn by venipuncture.  See doc flow sheets for details.  Elena Luna CMA

## 2019-07-10 ENCOUNTER — TRANSFERRED RECORDS (OUTPATIENT)
Dept: HEALTH INFORMATION MANAGEMENT | Facility: CLINIC | Age: 69
End: 2019-07-10

## 2019-07-10 ENCOUNTER — TELEPHONE (OUTPATIENT)
Dept: PHARMACY | Facility: CLINIC | Age: 69
End: 2019-07-10

## 2019-07-10 LAB
CREAT SERPL-MCNC: 1.2 MG/DL (ref 0.72–1.25)
GFR SERPL CREATININE-BSD FRML MDRD: >60 ML/MIN/1.73M(2)
GLUCOSE SERPL-MCNC: 137 MG/DL (ref 70–100)
POTASSIUM SERPL-SCNC: 4.2 MMOL/L (ref 3.5–5.1)

## 2019-07-10 NOTE — TELEPHONE ENCOUNTER
Oral Chemotherapy Monitoring Program   Medication: Revlimid  Rx: 10mg PO daily on days 1 through 14 of 28 day cycle   Auth #: [id number]   Risk Category: Adult Male  Routine survey questions reviewed.   Rx to be Escribed to [facility]      [facility]  Oncology Clinic Liaison  [address] [place] [address], [address]  [place] [address]  Ph: [phone]/Fax: [phone]

## 2019-07-11 DIAGNOSIS — C90.00 MULTIPLE MYELOMA, REMISSION STATUS UNSPECIFIED (H): Primary | ICD-10-CM

## 2019-07-11 DIAGNOSIS — E83.42 HYPOMAGNESEMIA: ICD-10-CM

## 2019-07-11 LAB
ALBUMIN SERPL ELPH-MCNC: 3.4 G/DL (ref 3.7–5.1)
ALPHA1 GLOB SERPL ELPH-MCNC: 0.4 G/DL (ref 0.2–0.4)
ALPHA2 GLOB SERPL ELPH-MCNC: 0.8 G/DL (ref 0.5–0.9)
B-GLOBULIN SERPL ELPH-MCNC: 0.7 G/DL (ref 0.6–1)
GAMMA GLOB SERPL ELPH-MCNC: 0.6 G/DL (ref 0.7–1.6)
M PROTEIN SERPL ELPH-MCNC: 0.2 G/DL
PROT PATTERN SERPL ELPH-IMP: ABNORMAL

## 2019-07-11 RX ORDER — EPINEPHRINE 1 MG/ML
0.3 INJECTION, SOLUTION INTRAMUSCULAR; SUBCUTANEOUS EVERY 5 MIN PRN
Status: CANCELLED | OUTPATIENT
Start: 2019-07-16

## 2019-07-11 RX ORDER — SODIUM CHLORIDE 9 MG/ML
1000 INJECTION, SOLUTION INTRAVENOUS CONTINUOUS PRN
Status: CANCELLED
Start: 2019-07-16

## 2019-07-11 RX ORDER — METHYLPREDNISOLONE SODIUM SUCCINATE 125 MG/2ML
125 INJECTION, POWDER, LYOPHILIZED, FOR SOLUTION INTRAMUSCULAR; INTRAVENOUS
Status: CANCELLED
Start: 2019-07-16

## 2019-07-11 RX ORDER — EPINEPHRINE 0.3 MG/.3ML
0.3 INJECTION SUBCUTANEOUS EVERY 5 MIN PRN
Status: CANCELLED | OUTPATIENT
Start: 2019-07-16

## 2019-07-11 RX ORDER — NALOXONE HYDROCHLORIDE 0.4 MG/ML
.1-.4 INJECTION, SOLUTION INTRAMUSCULAR; INTRAVENOUS; SUBCUTANEOUS
Status: CANCELLED | OUTPATIENT
Start: 2019-07-16

## 2019-07-11 RX ORDER — ALBUTEROL SULFATE 90 UG/1
1-2 AEROSOL, METERED RESPIRATORY (INHALATION)
Status: CANCELLED
Start: 2019-07-16

## 2019-07-11 RX ORDER — MEPERIDINE HYDROCHLORIDE 25 MG/ML
25 INJECTION INTRAMUSCULAR; INTRAVENOUS; SUBCUTANEOUS EVERY 30 MIN PRN
Status: CANCELLED | OUTPATIENT
Start: 2019-07-16

## 2019-07-11 RX ORDER — ALBUTEROL SULFATE 0.83 MG/ML
2.5 SOLUTION RESPIRATORY (INHALATION)
Status: CANCELLED | OUTPATIENT
Start: 2019-07-16

## 2019-07-11 RX ORDER — DIPHENHYDRAMINE HYDROCHLORIDE 50 MG/ML
50 INJECTION INTRAMUSCULAR; INTRAVENOUS
Status: CANCELLED
Start: 2019-07-16

## 2019-07-12 LAB
KAPPA LC FREE SER-MCNC: 3.11 MG/DL (ref 0.33–1.94)
KAPPA LC FREE/LAMBDA FREE SER NEPH: 0.04 {RATIO} (ref 0.26–1.65)
LAMBDA LC FREE SERPL-MCNC: 72.7 MG/DL (ref 0.57–2.63)

## 2019-07-16 ENCOUNTER — APPOINTMENT (OUTPATIENT)
Dept: LAB | Facility: CLINIC | Age: 69
End: 2019-07-16
Attending: INTERNAL MEDICINE
Payer: MEDICARE

## 2019-07-16 ENCOUNTER — INFUSION THERAPY VISIT (OUTPATIENT)
Dept: TRANSPLANT | Facility: CLINIC | Age: 69
End: 2019-07-16
Attending: INTERNAL MEDICINE
Payer: MEDICARE

## 2019-07-16 VITALS
RESPIRATION RATE: 16 BRPM | BODY MASS INDEX: 30.31 KG/M2 | TEMPERATURE: 97.7 F | DIASTOLIC BLOOD PRESSURE: 78 MMHG | OXYGEN SATURATION: 98 % | HEART RATE: 87 BPM | WEIGHT: 211.7 LBS | SYSTOLIC BLOOD PRESSURE: 125 MMHG | HEIGHT: 70 IN

## 2019-07-16 DIAGNOSIS — C90.02 MULTIPLE MYELOMA IN RELAPSE (H): Primary | ICD-10-CM

## 2019-07-16 DIAGNOSIS — E83.42 HYPOMAGNESEMIA: ICD-10-CM

## 2019-07-16 DIAGNOSIS — C90.00 MULTIPLE MYELOMA, REMISSION STATUS UNSPECIFIED (H): ICD-10-CM

## 2019-07-16 LAB
ALBUMIN SERPL-MCNC: 3.4 G/DL (ref 3.4–5)
ALP SERPL-CCNC: 136 U/L (ref 40–150)
ALT SERPL W P-5'-P-CCNC: 22 U/L (ref 0–70)
ANION GAP SERPL CALCULATED.3IONS-SCNC: 5 MMOL/L (ref 3–14)
AST SERPL W P-5'-P-CCNC: 20 U/L (ref 0–45)
BASOPHILS # BLD AUTO: 0.1 10E9/L (ref 0–0.2)
BASOPHILS NFR BLD AUTO: 1 %
BILIRUB SERPL-MCNC: 0.8 MG/DL (ref 0.2–1.3)
BUN SERPL-MCNC: 20 MG/DL (ref 7–30)
CALCIUM SERPL-MCNC: 8.8 MG/DL (ref 8.5–10.1)
CHLORIDE SERPL-SCNC: 106 MMOL/L (ref 94–109)
CO2 SERPL-SCNC: 27 MMOL/L (ref 20–32)
CREAT SERPL-MCNC: 1.09 MG/DL (ref 0.66–1.25)
DIFFERENTIAL METHOD BLD: ABNORMAL
EOSINOPHIL # BLD AUTO: 0 10E9/L (ref 0–0.7)
EOSINOPHIL NFR BLD AUTO: 0.4 %
ERYTHROCYTE [DISTWIDTH] IN BLOOD BY AUTOMATED COUNT: 16.3 % (ref 10–15)
GFR SERPL CREATININE-BSD FRML MDRD: 69 ML/MIN/{1.73_M2}
GLUCOSE SERPL-MCNC: 140 MG/DL (ref 70–99)
HCT VFR BLD AUTO: 29 % (ref 40–53)
HGB BLD-MCNC: 9.4 G/DL (ref 13.3–17.7)
IMM GRANULOCYTES # BLD: 0 10E9/L (ref 0–0.4)
IMM GRANULOCYTES NFR BLD: 0.4 %
LYMPHOCYTES # BLD AUTO: 0.4 10E9/L (ref 0.8–5.3)
LYMPHOCYTES NFR BLD AUTO: 7.6 %
MAGNESIUM SERPL-MCNC: 1.5 MG/DL (ref 1.6–2.3)
MCH RBC QN AUTO: 36 PG (ref 26.5–33)
MCHC RBC AUTO-ENTMCNC: 32.4 G/DL (ref 31.5–36.5)
MCV RBC AUTO: 111 FL (ref 78–100)
MONOCYTES # BLD AUTO: 0.1 10E9/L (ref 0–1.3)
MONOCYTES NFR BLD AUTO: 2.3 %
NEUTROPHILS # BLD AUTO: 4.6 10E9/L (ref 1.6–8.3)
NEUTROPHILS NFR BLD AUTO: 88.3 %
NRBC # BLD AUTO: 0 10*3/UL
NRBC BLD AUTO-RTO: 0 /100
PLATELET # BLD AUTO: 167 10E9/L (ref 150–450)
POTASSIUM SERPL-SCNC: 4.3 MMOL/L (ref 3.4–5.3)
PROT SERPL-MCNC: 6.4 G/DL (ref 6.8–8.8)
RBC # BLD AUTO: 2.61 10E12/L (ref 4.4–5.9)
SODIUM SERPL-SCNC: 138 MMOL/L (ref 133–144)
WBC # BLD AUTO: 5.2 10E9/L (ref 4–11)

## 2019-07-16 PROCEDURE — 96401 CHEMO ANTI-NEOPL SQ/IM: CPT

## 2019-07-16 PROCEDURE — 25000128 H RX IP 250 OP 636: Mod: ZF | Performed by: INTERNAL MEDICINE

## 2019-07-16 PROCEDURE — 83735 ASSAY OF MAGNESIUM: CPT | Performed by: INTERNAL MEDICINE

## 2019-07-16 PROCEDURE — 85025 COMPLETE CBC W/AUTO DIFF WBC: CPT | Performed by: INTERNAL MEDICINE

## 2019-07-16 PROCEDURE — 80053 COMPREHEN METABOLIC PANEL: CPT | Performed by: INTERNAL MEDICINE

## 2019-07-16 RX ORDER — ACYCLOVIR 400 MG/1
400 TABLET ORAL 2 TIMES DAILY
Qty: 60 TABLET | Refills: 4 | Status: SHIPPED | OUTPATIENT
Start: 2019-07-16 | End: 2019-07-16

## 2019-07-16 RX ORDER — ACYCLOVIR 400 MG/1
400 TABLET ORAL 2 TIMES DAILY
Qty: 60 TABLET | Refills: 4 | Status: SHIPPED | OUTPATIENT
Start: 2019-07-16 | End: 2019-09-10

## 2019-07-16 RX ORDER — ONDANSETRON 4 MG/1
4 TABLET, ORALLY DISINTEGRATING ORAL EVERY 6 HOURS PRN
COMMUNITY

## 2019-07-16 RX ADMIN — BORTEZOMIB 2.5 MG: 3.5 INJECTION, POWDER, LYOPHILIZED, FOR SOLUTION INTRAVENOUS; SUBCUTANEOUS at 12:50

## 2019-07-16 ASSESSMENT — PAIN SCALES - GENERAL: PAINLEVEL: NO PAIN (0)

## 2019-07-16 ASSESSMENT — MIFFLIN-ST. JEOR: SCORE: 1741.51

## 2019-07-16 NOTE — NURSING NOTE
Chief Complaint   Patient presents with     Blood Draw     Labs drawn via  by RN in lab. VS taken.      Labs drawn via venipuncture. Vital signs taken. Checked into next appointment.   Laura Almaraz RN

## 2019-07-16 NOTE — PROGRESS NOTES
Infusion Nursing Note:  Erasmo Pearce presents today for Velcade.  Patient seen by provider today: No   present during visit today: Not Applicable.    Note: Pt given first dose of subcutaneous Velcade.  Teaching regarding side effects reviewed and handout given.  Pt and wife verbalized understanding.  Pt has supply of Revlimid and Dexamethasone at home.  Acyclovir sent to [facility] and pt/wife aware of need to pick this medication up.  Pt tolerated injection without incident.  Pancytopenic precautions also reviewed and handout given.  Pt and wife verbalized understanding.    Intravenous Access:  No Intravenous access/labs at this visit.    Treatment Conditions:  Not Applicable.      Post Infusion Assessment:  Patient tolerated injection without incident.  Patient observed for 30 minutes post Velcade per protocol.       Discharge Plan:   Prescription refills given for Acyclovir.  Discharge instructions reviewed with: Patient and Family.  Patient and/or family verbalized understanding of discharge instructions and all questions answered.  Copy of AVS reviewed with patient and/or family.  Patient will return for next appointment.  Patient discharged in stable condition accompanied by: wife.  Departure Mode: Ambulatory.    Maru Reese RN

## 2019-07-16 NOTE — PATIENT INSTRUCTIONS
Patient Education     Understanding Anemia (During Chemotherapy)     Normal blood is about 36% to 40% red blood cells and has 12-14 g/dl of hemoglobin. Anemic blood has fewer circulating red blood cells.     Chemotherapy can decrease the number of red blood cells in your body. When you have too few of these cells, it's called anemia. Anemia can have many symptoms. Talk to your healthcare provider if you have any of the symptoms listed here. You may need treatment.  What is anemia?  Anemia is when your blood does not have enough red blood cells in it. Red blood cells make up 36% to 40% of normal blood. If you have anemia, your red blood cell count (hematocrit) is below normal. The blood then can t carry as much oxygen. As a result, your body can t work as well.  Symptoms to watch for    Trouble concentrating    Ongoing tiredness    Feeling short of breath    Fast heartbeat    Unable to get or keep an erection (impotence)    Feeling dizzy or lightheaded    Constant feeling of being cold  Date Last Reviewed: 6/1/2018 2000-2018 [employer]. [address] [place] [address]. All rights reserved. This information is not intended as a substitute for professional medical care. Always follow your healthcare professional's instructions.           Patient Education        Patient Education     Preventing Bleeding During Chemotherapy    Chemotherapy can make your blood less able to clot. This is because it reduces the number of clotting cells (platelets) in your blood. As a result, your risk of bruising and bleeding increases. To help prevent problem bleeding, use the tips on this handout.  Know what to expect  Some types of chemotherapy cause more bleeding problems than others. Your risk of bleeding increases over the course of treatment. Your risk is greatest during the period in each treatment cycle when your platelet count is lowest. This is called the kar. Talk with your healthcare provider  about your kar. Then take extra care to prevent bleeding at that time.  Preventing bleeding and bruising    When brushing your teeth, use a soft toothbrush. If flossing or using a dental water jet causes bleeding, stop until your platelet count increases.    Talk to your dentist about postponing teeth cleanings or dental work.    Shave with an electric razor, not a straight razor.    Ask your healthcare provider which medicines you should stop taking. Anti-inflammatory medicines such as aspirin, naproxen or ibuprofen make it harder for blood to clot.    Eat a high-fiber diet to prevent constipation. Straining at stool can cause bleeding in your anus.    Don't do contact sports or other activities likely to cause bruising.    Do not use tampons, suppositories, or enemas.  When to see your healthcare provider  Contact your healthcare provider right away if you have:    Blood in your urine or stool    Easy bruising     Small red spots under your skin    Bleeding that won t stop, such as from gums or nose    Menstrual flow that is heavy or lasts longer than normal    Vomiting    Vision changes    Frequent headaches  Date Last Reviewed: 6/1/2018 2000-2018 [employer]. [address] [place] [address]. All rights reserved. This information is not intended as a substitute for professional medical care. Always follow your healthcare professional's instructions.           Patient Education     Preventing Bleeding During Chemotherapy    Chemotherapy can make your blood less able to clot. This is because it reduces the number of clotting cells (platelets) in your blood. As a result, your risk of bruising and bleeding increases. To help prevent problem bleeding, use the tips on this handout.  Know what to expect  Some types of chemotherapy cause more bleeding problems than others. Your risk of bleeding increases over the course of treatment. Your risk is greatest during the period in each treatment  cycle when your platelet count is lowest. This is called the kar. Talk with your healthcare provider about your kar. Then take extra care to prevent bleeding at that time.  Preventing bleeding and bruising    When brushing your teeth, use a soft toothbrush. If flossing or using a dental water jet causes bleeding, stop until your platelet count increases.    Talk to your dentist about postponing teeth cleanings or dental work.    Shave with an electric razor, not a straight razor.    Ask your healthcare provider which medicines you should stop taking. Anti-inflammatory medicines such as aspirin, naproxen or ibuprofen make it harder for blood to clot.    Eat a high-fiber diet to prevent constipation. Straining at stool can cause bleeding in your anus.    Don't do contact sports or other activities likely to cause bruising.    Do not use tampons, suppositories, or enemas.  When to see your healthcare provider  Contact your healthcare provider right away if you have:    Blood in your urine or stool    Easy bruising     Small red spots under your skin    Bleeding that won t stop, such as from gums or nose    Menstrual flow that is heavy or lasts longer than normal    Vomiting    Vision changes    Frequent headaches  Date Last Reviewed: 6/1/2018 2000-2018 [employer]. [address] [place] [address], [place] [address]. All rights reserved. This information is not intended as a substitute for professional medical care. Always follow your healthcare professional's instructions.

## 2019-07-20 DIAGNOSIS — C90.02 MULTIPLE MYELOMA IN RELAPSE (H): ICD-10-CM

## 2019-07-20 LAB
MDC_IDC_EPISODE_DTM: NORMAL
MDC_IDC_EPISODE_DURATION: 115 S
MDC_IDC_EPISODE_DURATION: 141 S
MDC_IDC_EPISODE_DURATION: 17 S
MDC_IDC_EPISODE_DURATION: 173 S
MDC_IDC_EPISODE_DURATION: 18 S
MDC_IDC_EPISODE_DURATION: 18 S
MDC_IDC_EPISODE_DURATION: 19 S
MDC_IDC_EPISODE_DURATION: 20 S
MDC_IDC_EPISODE_DURATION: 23 S
MDC_IDC_EPISODE_DURATION: 24 S
MDC_IDC_EPISODE_DURATION: 28 S
MDC_IDC_EPISODE_DURATION: 33 S
MDC_IDC_EPISODE_DURATION: 37 S
MDC_IDC_EPISODE_DURATION: 40 S
MDC_IDC_EPISODE_DURATION: 47 S
MDC_IDC_EPISODE_DURATION: 49 S
MDC_IDC_EPISODE_DURATION: 50 S
MDC_IDC_EPISODE_DURATION: 52 S
MDC_IDC_EPISODE_DURATION: 55 S
MDC_IDC_EPISODE_DURATION: 6 S
MDC_IDC_EPISODE_DURATION: 6 S
MDC_IDC_EPISODE_DURATION: 65 S
MDC_IDC_EPISODE_DURATION: 67 S
MDC_IDC_EPISODE_DURATION: 75 S
MDC_IDC_EPISODE_DURATION: 9 S
MDC_IDC_EPISODE_DURATION: 9 S
MDC_IDC_EPISODE_ID: NORMAL
MDC_IDC_EPISODE_TYPE: NORMAL
MDC_IDC_EPISODE_VENDOR_TYPE: NORMAL
MDC_IDC_LEAD_IMPLANT_DT: NORMAL
MDC_IDC_LEAD_LOCATION: NORMAL
MDC_IDC_LEAD_LOCATION_DETAIL_1: NORMAL
MDC_IDC_LEAD_MFG: NORMAL
MDC_IDC_LEAD_MODEL: NORMAL
MDC_IDC_LEAD_POLARITY_TYPE: NORMAL
MDC_IDC_LEAD_SERIAL: NORMAL
MDC_IDC_MSMT_BATTERY_DTM: NORMAL
MDC_IDC_MSMT_BATTERY_REMAINING_LONGEVITY: 30 MO
MDC_IDC_MSMT_BATTERY_REMAINING_PERCENTAGE: 35 %
MDC_IDC_MSMT_BATTERY_STATUS: NORMAL
MDC_IDC_MSMT_CAP_CHARGE_DTM: NORMAL
MDC_IDC_MSMT_CAP_CHARGE_DTM: NORMAL
MDC_IDC_MSMT_CAP_CHARGE_ENERGY: 41 J
MDC_IDC_MSMT_CAP_CHARGE_TIME: 9.2 S
MDC_IDC_MSMT_CAP_CHARGE_TIME: 9.6 S
MDC_IDC_MSMT_CAP_CHARGE_TYPE: NORMAL
MDC_IDC_MSMT_CAP_CHARGE_TYPE: NORMAL
MDC_IDC_MSMT_LEADCHNL_RV_IMPEDANCE_VALUE: 343 OHM
MDC_IDC_MSMT_LEADCHNL_RV_PACING_THRESHOLD_AMPLITUDE: 0.8 V
MDC_IDC_MSMT_LEADCHNL_RV_PACING_THRESHOLD_PULSEWIDTH: 0.5 MS
MDC_IDC_PG_IMPLANT_DTM: NORMAL
MDC_IDC_PG_MFG: NORMAL
MDC_IDC_PG_MODEL: NORMAL
MDC_IDC_PG_SERIAL: NORMAL
MDC_IDC_PG_TYPE: NORMAL
MDC_IDC_SESS_CLINIC_NAME: NORMAL
MDC_IDC_SESS_DTM: NORMAL
MDC_IDC_SESS_TYPE: NORMAL
MDC_IDC_SET_BRADY_LOWRATE: 40 {BEATS}/MIN
MDC_IDC_SET_BRADY_MODE: NORMAL
MDC_IDC_SET_LEADCHNL_RV_PACING_AMPLITUDE: 2.4 V
MDC_IDC_SET_LEADCHNL_RV_PACING_POLARITY: NORMAL
MDC_IDC_SET_LEADCHNL_RV_PACING_PULSEWIDTH: 0.5 MS
MDC_IDC_SET_LEADCHNL_RV_SENSING_ADAPTATION_MODE: NORMAL
MDC_IDC_SET_LEADCHNL_RV_SENSING_POLARITY: NORMAL
MDC_IDC_SET_LEADCHNL_RV_SENSING_SENSITIVITY: 0.6 MV
MDC_IDC_SET_ZONE_DETECTION_INTERVAL: 286 MS
MDC_IDC_SET_ZONE_DETECTION_INTERVAL: 375 MS
MDC_IDC_SET_ZONE_TYPE: NORMAL
MDC_IDC_SET_ZONE_TYPE: NORMAL
MDC_IDC_SET_ZONE_VENDOR_TYPE: NORMAL
MDC_IDC_SET_ZONE_VENDOR_TYPE: NORMAL
MDC_IDC_STAT_BRADY_DTM_END: NORMAL
MDC_IDC_STAT_BRADY_DTM_START: NORMAL
MDC_IDC_STAT_BRADY_RV_PERCENT_PACED: 1 %
MDC_IDC_STAT_EPISODE_RECENT_COUNT: 0
MDC_IDC_STAT_EPISODE_RECENT_COUNT: 1
MDC_IDC_STAT_EPISODE_RECENT_COUNT: 723
MDC_IDC_STAT_EPISODE_RECENT_COUNT_DTM_END: NORMAL
MDC_IDC_STAT_EPISODE_RECENT_COUNT_DTM_START: NORMAL
MDC_IDC_STAT_EPISODE_TYPE: NORMAL
MDC_IDC_STAT_EPISODE_VENDOR_TYPE: NORMAL
MDC_IDC_STAT_TACHYTHERAPY_ATP_DELIVERED_RECENT: 0
MDC_IDC_STAT_TACHYTHERAPY_ATP_DELIVERED_TOTAL: 23
MDC_IDC_STAT_TACHYTHERAPY_RECENT_DTM_END: NORMAL
MDC_IDC_STAT_TACHYTHERAPY_RECENT_DTM_START: NORMAL
MDC_IDC_STAT_TACHYTHERAPY_SHOCKS_ABORTED_RECENT: 0
MDC_IDC_STAT_TACHYTHERAPY_SHOCKS_ABORTED_TOTAL: 23
MDC_IDC_STAT_TACHYTHERAPY_SHOCKS_DELIVERED_RECENT: 0
MDC_IDC_STAT_TACHYTHERAPY_SHOCKS_DELIVERED_TOTAL: 22
MDC_IDC_STAT_TACHYTHERAPY_TOTAL_DTM_END: NORMAL
MDC_IDC_STAT_TACHYTHERAPY_TOTAL_DTM_START: NORMAL

## 2019-07-20 RX ORDER — DIPHENHYDRAMINE HYDROCHLORIDE 50 MG/ML
50 INJECTION INTRAMUSCULAR; INTRAVENOUS
Status: CANCELLED
Start: 2019-07-30

## 2019-07-20 RX ORDER — SODIUM CHLORIDE 9 MG/ML
1000 INJECTION, SOLUTION INTRAVENOUS CONTINUOUS PRN
Status: CANCELLED
Start: 2019-07-23

## 2019-07-20 RX ORDER — METHYLPREDNISOLONE SODIUM SUCCINATE 125 MG/2ML
125 INJECTION, POWDER, LYOPHILIZED, FOR SOLUTION INTRAMUSCULAR; INTRAVENOUS
Status: CANCELLED
Start: 2019-07-23

## 2019-07-20 RX ORDER — DIPHENHYDRAMINE HYDROCHLORIDE 50 MG/ML
50 INJECTION INTRAMUSCULAR; INTRAVENOUS
Status: CANCELLED
Start: 2019-07-23

## 2019-07-20 RX ORDER — ALBUTEROL SULFATE 0.83 MG/ML
2.5 SOLUTION RESPIRATORY (INHALATION)
Status: CANCELLED | OUTPATIENT
Start: 2019-07-30

## 2019-07-20 RX ORDER — ALBUTEROL SULFATE 0.83 MG/ML
2.5 SOLUTION RESPIRATORY (INHALATION)
Status: CANCELLED | OUTPATIENT
Start: 2019-07-23

## 2019-07-20 RX ORDER — MEPERIDINE HYDROCHLORIDE 25 MG/ML
25 INJECTION INTRAMUSCULAR; INTRAVENOUS; SUBCUTANEOUS EVERY 30 MIN PRN
Status: CANCELLED | OUTPATIENT
Start: 2019-07-23

## 2019-07-20 RX ORDER — METHYLPREDNISOLONE SODIUM SUCCINATE 125 MG/2ML
125 INJECTION, POWDER, LYOPHILIZED, FOR SOLUTION INTRAMUSCULAR; INTRAVENOUS
Status: CANCELLED
Start: 2019-07-30

## 2019-07-20 RX ORDER — ALBUTEROL SULFATE 90 UG/1
1-2 AEROSOL, METERED RESPIRATORY (INHALATION)
Status: CANCELLED
Start: 2019-07-23

## 2019-07-20 RX ORDER — SODIUM CHLORIDE 9 MG/ML
1000 INJECTION, SOLUTION INTRAVENOUS CONTINUOUS PRN
Status: CANCELLED
Start: 2019-07-30

## 2019-07-20 RX ORDER — NALOXONE HYDROCHLORIDE 0.4 MG/ML
.1-.4 INJECTION, SOLUTION INTRAMUSCULAR; INTRAVENOUS; SUBCUTANEOUS
Status: CANCELLED | OUTPATIENT
Start: 2019-07-30

## 2019-07-20 RX ORDER — EPINEPHRINE 1 MG/ML
0.3 INJECTION, SOLUTION INTRAMUSCULAR; SUBCUTANEOUS EVERY 5 MIN PRN
Status: CANCELLED | OUTPATIENT
Start: 2019-07-30

## 2019-07-20 RX ORDER — EPINEPHRINE 1 MG/ML
0.3 INJECTION, SOLUTION INTRAMUSCULAR; SUBCUTANEOUS EVERY 5 MIN PRN
Status: CANCELLED | OUTPATIENT
Start: 2019-07-23

## 2019-07-20 RX ORDER — MEPERIDINE HYDROCHLORIDE 25 MG/ML
25 INJECTION INTRAMUSCULAR; INTRAVENOUS; SUBCUTANEOUS EVERY 30 MIN PRN
Status: CANCELLED | OUTPATIENT
Start: 2019-07-30

## 2019-07-20 RX ORDER — EPINEPHRINE 0.3 MG/.3ML
0.3 INJECTION SUBCUTANEOUS EVERY 5 MIN PRN
Status: CANCELLED | OUTPATIENT
Start: 2019-07-23

## 2019-07-20 RX ORDER — EPINEPHRINE 0.3 MG/.3ML
0.3 INJECTION SUBCUTANEOUS EVERY 5 MIN PRN
Status: CANCELLED | OUTPATIENT
Start: 2019-07-30

## 2019-07-20 RX ORDER — ALBUTEROL SULFATE 90 UG/1
1-2 AEROSOL, METERED RESPIRATORY (INHALATION)
Status: CANCELLED
Start: 2019-07-30

## 2019-07-20 RX ORDER — NALOXONE HYDROCHLORIDE 0.4 MG/ML
.1-.4 INJECTION, SOLUTION INTRAMUSCULAR; INTRAVENOUS; SUBCUTANEOUS
Status: CANCELLED | OUTPATIENT
Start: 2019-07-23

## 2019-07-22 ENCOUNTER — OFFICE VISIT (OUTPATIENT)
Dept: CARDIOLOGY | Facility: CLINIC | Age: 69
End: 2019-07-22
Attending: INTERNAL MEDICINE
Payer: MEDICARE

## 2019-07-22 VITALS
SYSTOLIC BLOOD PRESSURE: 113 MMHG | OXYGEN SATURATION: 97 % | HEART RATE: 60 BPM | BODY MASS INDEX: 30.19 KG/M2 | HEIGHT: 70 IN | WEIGHT: 210.9 LBS | DIASTOLIC BLOOD PRESSURE: 72 MMHG

## 2019-07-22 DIAGNOSIS — R06.09 DOE (DYSPNEA ON EXERTION): Primary | ICD-10-CM

## 2019-07-22 DIAGNOSIS — I48.21 PERMANENT ATRIAL FIBRILLATION (H): ICD-10-CM

## 2019-07-22 DIAGNOSIS — Z85.79 PERSONAL HISTORY OF MULTIPLE MYELOMA: ICD-10-CM

## 2019-07-22 DIAGNOSIS — I42.9 SECONDARY CARDIOMYOPATHY (H): ICD-10-CM

## 2019-07-22 DIAGNOSIS — I50.22 CHRONIC SYSTOLIC HEART FAILURE (H): ICD-10-CM

## 2019-07-22 DIAGNOSIS — I25.5 ISCHEMIC CARDIOMYOPATHY: ICD-10-CM

## 2019-07-22 LAB — INTERPRETATION ECG - MUSE: NORMAL

## 2019-07-22 PROCEDURE — G0463 HOSPITAL OUTPT CLINIC VISIT: HCPCS | Mod: 25,ZF

## 2019-07-22 PROCEDURE — 93010 ELECTROCARDIOGRAM REPORT: CPT | Mod: ZP | Performed by: INTERNAL MEDICINE

## 2019-07-22 PROCEDURE — 99214 OFFICE O/P EST MOD 30 MIN: CPT | Mod: ZP | Performed by: INTERNAL MEDICINE

## 2019-07-22 PROCEDURE — 93005 ELECTROCARDIOGRAM TRACING: CPT | Mod: ZF

## 2019-07-22 RX ORDER — FUROSEMIDE 40 MG
40 TABLET ORAL DAILY
Qty: 90 TABLET | Refills: 3 | Status: ON HOLD | OUTPATIENT
Start: 2019-07-22 | End: 2019-08-30

## 2019-07-22 ASSESSMENT — MIFFLIN-ST. JEOR: SCORE: 1737.89

## 2019-07-22 ASSESSMENT — PAIN SCALES - GENERAL: PAINLEVEL: NO PAIN (0)

## 2019-07-22 NOTE — PROGRESS NOTES
"     [facility]       HPI (July 22, 2019): Patient previously seen by Dr. Jean-Baptiste who saw him last on 11Jun2019 and noted:    \"Erasmo Pearce is a 68 year old male with a history of permanent atrial fibrillation, ischemic cardiomyopathy (LVEF 30-35% in 2017) status post [employer] ICD in 12/2009 initially for primary prevention, history of multiple appropriate shocks for VF (typically while undergoing chemotherapy), and relapsed multiple myeloma who presents for follow-up. Was last seen in clinic on 5/7/19.     He was started on lenalidomide on 2/10/19.  Since initiating lenalidomide, he has received 4 appropriate ICD shocks (on 2/11, 2/14, 2/16, and 3/6).  Last ICD shock prior to starting lenalidomide was on 7/25.  On 2/11, had one 41 J shock; on 2/12, had ATP x 1; on 2/14, had ATP x 1, 41 J shock; on 2/15, ATP x 1; on 2/15, ATP x 1, 41 J shock; and on 3/6, ATP x 1, 41 J shock. During his visit on 3/26/19, mexiletine 150 mg BID was started.  Since starting mexiletine, patient has not had any shocks.    Since our last visit, patient has had multiple hospitalizations.  He was hospitalized for enterocolitis, ileus, and bowel obstruction requiring NG tube placement. He was most recently hospitalized from 6/3-6/5/19 with diarrhea, orthostasis, and significant MANN secondary to dehydration with creatinine at 4.84 on presentation.  C diff was negative.  During the hospitalization, patient was given IV fluids and Lasix and lisinopril were held.  Patient notes that he is currently taking Lasix 20 mg every 2 to 3 days for intermittent lower extremity edema.  His chemotherapy has been held for the last couple weeks and he is planning on resuming his chemotherapy in a couple days.  Since his last hospitalization, patient notes feeling well.  Patient has been increasing his fluid intake.  Denies chest pain, shortness breath, palpitations, syncope, lightheadedness, abdominal pain, nausea, " "emesis, hematochezia, melena, or any other bleeding.  Patient continues to have diarrhea with 3-5 bowel movements per day.  Patient notes that when he is on lenalidomide, his stools are more firm and less frequent.  Patient has not noticed that he has increased frequency of diarrhea when he takes p.o. magnesium.  Patient has had chronic diarrhea for years, and his diarrhea started prior to initiating chemotherapy for multiple myeloma years ago.  He has a history of multiple bowel obstructions.\"     In July he was seen in BMT clinic and complaining of new peripheral edema. His furosemide and lisinopril had been on hold due to MANN. They were restarted on Jul. A venous ultrasound showed no DVT. His last device check (remote) in  showed no VT. \"Non-sustained VT\" was reported but this is atrial fibrillation with rapid ventricular rates (personal review).     Today, he reports the edema is only a little better. He is using the compression stockings pretty consistently. He is wondering if he can resume his previous dose of furosemide 40 mg a day.    He has had some lightheadedness. This seems worse after chemo and with standing. He has noticed increased fatigue. He has had no chest pain or discomfort.     He reports no stents; cath in  but I cannot locate results.     Past Medical History:   Diagnosis Date     Atrial fibrillation (H)      Other premature beats      VF (ventricular fibrillation) (H)        Past Surgical History:   Procedure Laterality Date     IMPLANT AUTOMATIC IMPLANTABLE CARDIOVERTER DEFIBRILLATOR       FH: no coronary artery disease, one sister with atrial fibrillation     Social History     Tobacco Use     Smoking status: Former Smoker     Packs/day: 1.00     Years: 25.00     Pack years: 25.00     Types: Cigarettes     Last attempt to quit: 10/15/1995     Years since quittin.7     Smokeless tobacco: Never Used   Substance Use Topics     Alcohol use: No         Current Outpatient " Medications   Medication Sig     acyclovir (ZOVIRAX) 400 MG tablet Take 1 tablet (400 mg) by mouth 2 times daily     albuterol (PROAIR HFA, PROVENTIL HFA, VENTOLIN HFA) 108 (90 BASE) MCG/ACT inhaler Inhale 2 puffs into the lungs every 6 hours as needed      amoxicillin (AMOXIL) 500 MG tablet Take 4 tabs one hour before dental appointment     baclofen (LIORESAL) 10 MG tablet Take 10 mg by mouth 3 times daily as needed prn     Calcium Carbonate-Vitamin D (CALCIUM 500 + D PO) Take 2 tablets by mouth daily.     dexamethasone (DECADRON) 4 MG tablet Take 20 mg by mouth once a week Once a week with food on Days 1,8,15..     diclofenac (VOLTAREN) 75 MG EC tablet Take 75 mg by mouth 2 times daily as needed 1 tab prn     fentaNYL (DURAGESIC) 25 mcg/hr patch 72 hr Place 1 patch onto the skin every 72 hours     furosemide (LASIX) 20 MG tablet Take 20 mg by mouth daily      LENalidomide (REVLIMID) 10 MG CAPS capsule CHEMO Take 1 capsule (10 mg) by mouth daily Take on Days 1 through 14 of 28-day cycle.     lisinopril (PRINIVIL,ZESTRIL) 5 MG tablet Take 5 mg by mouth daily      Magnesium Oxide 420 MG TABS Take 3 tabs (1260 mg) three times a day.     metoprolol succinate (TOPROL-XL) 200 MG 24 hr tablet Take 1 tablet (200 mg) by mouth daily     mexiletine (MEXITIL) 150 MG capsule Take 1 capsule (150 mg) by mouth 2 times daily As close to 12 hours apart as possible     nitroGLYCERIN (NITROSTAT) 0.4 MG SL tablet Place 0.4 mg under the tongue every 5 minutes as needed Reported on 5/16/2017     omeprazole (PRILOSEC) 20 MG capsule Take 20 mg by mouth daily      ondansetron (ZOFRAN-ODT) 4 MG ODT tab Take 4 mg by mouth every 6 hours as needed for nausea     oxycodone (ROXICODONE) 5 MG immediate release tablet Take 1-2 tablets by mouth every 6 hours as needed. For pain.      simvastatin (ZOCOR) 40 MG tablet Take 40 mg by mouth At Bedtime      tiotropium (SPIRIVA) 18 MCG inhalation capsule Inhale 1 capsule (18 mcg) into the lungs daily      "TRAZODONE HCL PO Take 50 mg by mouth nightly as needed      warfarin (COUMADIN) 5 MG tablet Take 7.5 mg by mouth See Admin Instructions Take 1.5 tab by mojth Monday and Friday and 1 tab Sun Tues WED , Thur and Sat     No current facility-administered medications for this visit.          ROS:   10 point ROS negative other than what is discussed above  July 22, 2019/woa    EXAM:   /72 (BP Location: Right arm, Patient Position: Chair, Cuff Size: Adult Large)   Pulse 60   Ht 1.778 m (5' 10\")   Wt 95.7 kg (210 lb 14.4 oz)   SpO2 97%   BMI 30.26 kg/m     GENERAL: Alert, oriented, NAD  HEENT:  NC/AT. Sclerae white.  MMM, no oral lesions  NECK: No adenopathy. Neck veins at 7-8 cm with + HJR  HEART: IRIR, normal S1 and S2, 2/6 MAGGY at LLSB and apex   LUNGS:  Clear to auscultation bilaterally, no wheezes, rales, or rhonchi  ABDOMEN: Soft, nontender, nondistended, bowel sounds present, no ascites.  EXTREMITIES: Trace nonpitting bilateral lower extremity edema.    Laboratory and diagnostic data reviewed July 22, 2019:    I reviewed the ECG obtained today.  It shows atrial fibrillation with a single PVC.  Rate is controlled at 69 bpm.  Nonspecific T wave changes noted.  Inferior Q waves are noted consistent with a prior inferior wall infarction.    Results for DARRYL ZAPATA (MRN [id number]) as of 7/22/2019 07:45   Ref. Range 7/16/2019 10:47   Magnesium Latest Ref Range: 1.6 - 2.3 mg/dL 1.5 (L)       Results for DARRYL ZAPATA (MRN [id number]) as of 7/22/2019 07:04   Ref. Range 7/9/2019 09:24 7/16/2019 10:47   Sodium Latest Ref Range: 133 - 144 mmol/L 140 138   Potassium Latest Ref Range: 3.4 - 5.3 mmol/L 4.7 4.3   Chloride Latest Ref Range: 94 - 109 mmol/L 106 106   Carbon Dioxide Latest Ref Range: 20 - 32 mmol/L 30 27   Urea Nitrogen Latest Ref Range: 7 - 30 mg/dL 20 20   Creatinine Latest Ref Range: 0.66 - 1.25 mg/dL 1.12 1.09   GFR Estimate Latest Ref Range: >60 mL/min/1.73_m2 67 69   GFR Estimate " If Black Latest Ref Range: >60 mL/min/1.73_m2 78 80     Results for DARRYL ZAPATA (MRN [id number]) as of 7/22/2019 07:04   Ref. Range 7/16/2019 10:47   WBC Latest Ref Range: 4.0 - 11.0 10e9/L 5.2   Hemoglobin Latest Ref Range: 13.3 - 17.7 g/dL 9.4 (L)   Hematocrit Latest Ref Range: 40.0 - 53.0 % 29.0 (L)   Platelet Count Latest Ref Range: 150 - 450 10e9/L 167       Exam Date Exam Time Exam Date Exam Time Accession # Performing Department Results    6/26/19  8:55 AM 6/26/19  8:56 AM [id number] [facility]    Narrative & Impression     Scheduled [employer], single lead ICD, remote transmission received and reviewed. Device transmission sent per MD orders. His presenting rhythm is an irregular ventricular rhythm ~70 bpm. 741 episodes recorded as NSVT. EGMs show irregular ventricular rhythms suggesting AF with RVR. Normal device function. = 1%. Lead trends appear stable. Battery estimates 2.5 years to [place]. Patient notified of interrogation results. Patient reports that he is having some leg issues and is seeing Dr. Duarte 7/20/19. Plan for patient to send another transmission in 3 months as scheduled.  Joanna RNRemote ICD transmissionI have reviewed and interpreted the device interrogation, settings, programming and nurse's summary. The device is functioning within normal device parameters. I agree with the current findings, assessment and plan.         EXAM:  US DOP VEIN LEG SABRINA    INDICATION:  Leg swelling or pain, DVT suspected,Other specified soft tissue disorders    COMPARISON:  None    TECHNIQUE:  Ultrasound examination of venous structures was performed from the groin to the  ankle using real time, spectral, and color Doppler technique.  Both  augmentation and compression maneuvers were performed.  Spectral analysis  performed.    FINDINGS:  Both common femoral, femoral and popliteal veins are well visualized and are  normally compressible with normal color and spectral  "Doppler waveform which  augments with calf compression.  Normal color Doppler signal is seen in the  paired calf veins.    IMPRESSION:  1. No evidence of acute deep venous thrombosis.      Echocardiogram 4/4/19:  Mildly (EF 40-45%) reduced left ventricular function is present. Global  hypokinesis with basal-mid inferolateral and inferior akinesis.  Global right ventricular function is moderately reduced.  Moderate mitral insufficiency is present with multi directional jet suggesting  involvement of the commissures.     This study was compared with the study from 2/7/17 and LVEF is improved and MR  is worse .        Assessment and Plan:     1) History of VF/VT, history multiple appropriate ICD shocks    He has not receiving any additional shocks since starting mexiletine.    - Continue mexiletine 150 mg twice a day and metoprolol succinate 200 mg daily. Previously discussed with patient that since his ICD shocks typically occur when taking chemotherapy, it may be considered in the future to discontinue mexiletine when he is not on chemotherapy.   - Dr. Raygoza is monitoring electrolytes. Recommending keeping K>4, Mg>2      His device check in June showed no ventricular arrhythmia therapies.  He did have detection of \"nonsustained ventricular tachycardia\" but these episodes were A. fib with rapid ventricular rates.    His magnesium is still a little bit low despite aggressive oral replacement.  We will check with Jaret to see if this could be supplemented with IV magnesium at the time of his chemotherapy.    2. Permanent AF    Continue metoprolol succinate 200 mg and warfarin. CHADS-VASc 3 (age, CHF, CAD).     3. ICM, LVEF 40-45% in 4/2019 - ACE inhibitor resumed    4. Peripheral edema - some improvement with resumption of furosemide at half prior dose. Renal function stable.     - increase furosemide to 40 mg daily    Patient warned about increased orthostatic dizziness.      - he will have labs done here " prior to chemo    5. Hypomagnesia - consider IV replacement    6. Increased dyspnea on exertion - multi-factoral. No chest pain/discomfort but he reports no history of chest pain despite prior MI.     - execise echocardiogram stress test      RTC to see Dr. Duarte in 3 months with device check.    Portions of this note were dictated using speech recognition software. The note has been proofread but errors in the text may have been overlooked. Please contact me if there are any concerns regarding the accuracy of the dictation.     I appreciate the chance to help with Mr. Pearce's care. Please let me know if you have any questions or concerns.

## 2019-07-22 NOTE — NURSING NOTE
Chief Complaint   Patient presents with     Follow Up     Previous [place] patient. 3 month follow-up per Jerilyn. Followed by BMT.     Vitals were taken and medications were reconciled. EKG was performed.    Vidya Jay MA    6:44 AM

## 2019-07-22 NOTE — LETTER
"7/22/2019      RE: Erasmo Pearce  [address]  [address] [facility] [address]       Dear Colleague,    Thank you for the opportunity to participate in the care of your patient, Erasmo Pearce, at the [facility] at [facility]. Please see a copy of my visit note below.     Cardiac Electrophysiology Clinic       HPI (July 22, 2019): Patient previously seen by Dr. Jean-Baptiste who saw him last on 11Jun2019 and noted:    \"Erasmo Pearce is a 68 year old male with a history of permanent atrial fibrillation, ischemic cardiomyopathy (LVEF 30-35% in 2017) status post [employer] ICD in 12/2009 initially for primary prevention, history of multiple appropriate shocks for VF (typically while undergoing chemotherapy), and relapsed multiple myeloma who presents for follow-up. Was last seen in clinic on 5/7/19.     He was started on lenalidomide on 2/10/19.  Since initiating lenalidomide, he has received 4 appropriate ICD shocks (on 2/11, 2/14, 2/16, and 3/6).  Last ICD shock prior to starting lenalidomide was on 7/25.  On 2/11, had one 41 J shock; on 2/12, had ATP x 1; on 2/14, had ATP x 1, 41 J shock; on 2/15, ATP x 1; on 2/15, ATP x 1, 41 J shock; and on 3/6, ATP x 1, 41 J shock. During his visit on 3/26/19, mexiletine 150 mg BID was started.  Since starting mexiletine, patient has not had any shocks.    Since our last visit, patient has had multiple hospitalizations.  He was hospitalized for enterocolitis, ileus, and bowel obstruction requiring NG tube placement. He was most recently hospitalized from 6/3-6/5/19 with diarrhea, orthostasis, and significant MANN secondary to dehydration with creatinine at 4.84 on presentation.  C diff was negative.  During the hospitalization, patient was given IV fluids and Lasix and lisinopril were held.  Patient notes that he is currently taking Lasix 20 mg every 2 to 3 days for intermittent lower extremity edema.  " "His chemotherapy has been held for the last couple weeks and he is planning on resuming his chemotherapy in a couple days.  Since his last hospitalization, patient notes feeling well.  Patient has been increasing his fluid intake.  Denies chest pain, shortness breath, palpitations, syncope, lightheadedness, abdominal pain, nausea, emesis, hematochezia, melena, or any other bleeding.  Patient continues to have diarrhea with 3-5 bowel movements per day.  Patient notes that when he is on lenalidomide, his stools are more firm and less frequent.  Patient has not noticed that he has increased frequency of diarrhea when he takes p.o. magnesium.  Patient has had chronic diarrhea for years, and his diarrhea started prior to initiating chemotherapy for multiple myeloma years ago.  He has a history of multiple bowel obstructions.\"     In July he was seen in BMT clinic and complaining of new peripheral edema. His furosemide and lisinopril had been on hold due to MANN. They were restarted on 07Jul. A venous ultrasound showed no DVT. His last device check (remote) in June showed no VT. \"Non-sustained VT\" was reported but this is atrial fibrillation with rapid ventricular rates (personal review).     Today, he reports the edema is only a little better. He is using the compression stockings pretty consistently. He is wondering if he can resume his previous dose of furosemide 40 mg a day.    He has had some lightheadedness. This seems worse after chemo and with standing. He has noticed increased fatigue. He has had no chest pain or discomfort.     He reports no stents; cath in 2006 but I cannot locate results.     Past Medical History:   Diagnosis Date     Atrial fibrillation (H)      Other premature beats      VF (ventricular fibrillation) (H)        Past Surgical History:   Procedure Laterality Date     IMPLANT AUTOMATIC IMPLANTABLE CARDIOVERTER DEFIBRILLATOR       FH: no coronary artery disease, one sister with atrial " fibrillation     Social History     Tobacco Use     Smoking status: Former Smoker     Packs/day: 1.00     Years: 25.00     Pack years: 25.00     Types: Cigarettes     Last attempt to quit: 10/15/1995     Years since quittin.7     Smokeless tobacco: Never Used   Substance Use Topics     Alcohol use: No         Current Outpatient Medications   Medication Sig     acyclovir (ZOVIRAX) 400 MG tablet Take 1 tablet (400 mg) by mouth 2 times daily     albuterol (PROAIR HFA, PROVENTIL HFA, VENTOLIN HFA) 108 (90 BASE) MCG/ACT inhaler Inhale 2 puffs into the lungs every 6 hours as needed      amoxicillin (AMOXIL) 500 MG tablet Take 4 tabs one hour before dental appointment     baclofen (LIORESAL) 10 MG tablet Take 10 mg by mouth 3 times daily as needed prn     Calcium Carbonate-Vitamin D (CALCIUM 500 + D PO) Take 2 tablets by mouth daily.     dexamethasone (DECADRON) 4 MG tablet Take 20 mg by mouth once a week Once a week with food on Days 1,8,15..     diclofenac (VOLTAREN) 75 MG EC tablet Take 75 mg by mouth 2 times daily as needed 1 tab prn     fentaNYL (DURAGESIC) 25 mcg/hr patch 72 hr Place 1 patch onto the skin every 72 hours     furosemide (LASIX) 20 MG tablet Take 20 mg by mouth daily      LENalidomide (REVLIMID) 10 MG CAPS capsule CHEMO Take 1 capsule (10 mg) by mouth daily Take on Days 1 through 14 of 28-day cycle.     lisinopril (PRINIVIL,ZESTRIL) 5 MG tablet Take 5 mg by mouth daily      Magnesium Oxide 420 MG TABS Take 3 tabs (1260 mg) three times a day.     metoprolol succinate (TOPROL-XL) 200 MG 24 hr tablet Take 1 tablet (200 mg) by mouth daily     mexiletine (MEXITIL) 150 MG capsule Take 1 capsule (150 mg) by mouth 2 times daily As close to 12 hours apart as possible     nitroGLYCERIN (NITROSTAT) 0.4 MG SL tablet Place 0.4 mg under the tongue every 5 minutes as needed Reported on 2017     omeprazole (PRILOSEC) 20 MG capsule Take 20 mg by mouth daily      ondansetron (ZOFRAN-ODT) 4 MG ODT tab Take 4 mg  "by mouth every 6 hours as needed for nausea     oxycodone (ROXICODONE) 5 MG immediate release tablet Take 1-2 tablets by mouth every 6 hours as needed. For pain.      simvastatin (ZOCOR) 40 MG tablet Take 40 mg by mouth At Bedtime      tiotropium (SPIRIVA) 18 MCG inhalation capsule Inhale 1 capsule (18 mcg) into the lungs daily     TRAZODONE HCL PO Take 50 mg by mouth nightly as needed      warfarin (COUMADIN) 5 MG tablet Take 7.5 mg by mouth See Admin Instructions Take 1.5 tab by mojth Monday and Friday and 1 tab Sun Tues WED , Thur and Sat     No current facility-administered medications for this visit.          ROS:   10 point ROS negative other than what is discussed above  July 22, 2019/woa    EXAM:   /72 (BP Location: Right arm, Patient Position: Chair, Cuff Size: Adult Large)   Pulse 60   Ht 1.778 m (5' 10\")   Wt 95.7 kg (210 lb 14.4 oz)   SpO2 97%   BMI 30.26 kg/m      GENERAL: Alert, oriented, NAD  HEENT:  NC/AT. Sclerae white.  MMM, no oral lesions  NECK: No adenopathy. Neck veins at 7-8 cm with + HJR  HEART: IRIR, normal S1 and S2, 2/6 MAGGY at LLSB and apex   LUNGS:  Clear to auscultation bilaterally, no wheezes, rales, or rhonchi  ABDOMEN: Soft, nontender, nondistended, bowel sounds present, no ascites.  EXTREMITIES: Trace nonpitting bilateral lower extremity edema.    Laboratory and diagnostic data reviewed July 22, 2019:    I reviewed the ECG obtained today.  It shows atrial fibrillation with a single PVC.  Rate is controlled at 69 bpm.  Nonspecific T wave changes noted.  Inferior Q waves are noted consistent with a prior inferior wall infarction.    Results for DARRYL ZAPATA (MRN [id number]) as of 7/22/2019 07:45   Ref. Range 7/16/2019 10:47   Magnesium Latest Ref Range: 1.6 - 2.3 mg/dL 1.5 (L)       Results for DARRYL ZAPATA (MRN [id number]) as of 7/22/2019 07:04   Ref. Range 7/9/2019 09:24 7/16/2019 10:47   Sodium Latest Ref Range: 133 - 144 mmol/L 140 138   Potassium " Latest Ref Range: 3.4 - 5.3 mmol/L 4.7 4.3   Chloride Latest Ref Range: 94 - 109 mmol/L 106 106   Carbon Dioxide Latest Ref Range: 20 - 32 mmol/L 30 27   Urea Nitrogen Latest Ref Range: 7 - 30 mg/dL 20 20   Creatinine Latest Ref Range: 0.66 - 1.25 mg/dL 1.12 1.09   GFR Estimate Latest Ref Range: >60 mL/min/1.73_m2 67 69   GFR Estimate If Black Latest Ref Range: >60 mL/min/1.73_m2 78 80     Results for DARRYL ZAPATA (MRN [id number]) as of 7/22/2019 07:04   Ref. Range 7/16/2019 10:47   WBC Latest Ref Range: 4.0 - 11.0 10e9/L 5.2   Hemoglobin Latest Ref Range: 13.3 - 17.7 g/dL 9.4 (L)   Hematocrit Latest Ref Range: 40.0 - 53.0 % 29.0 (L)   Platelet Count Latest Ref Range: 150 - 450 10e9/L 167       Exam Date Exam Time Exam Date Exam Time Accession # Performing Department Results    6/26/19  8:55 AM 6/26/19  8:56 AM [id number] [facility]    Narrative & Impression     Scheduled [employer], single lead ICD, remote transmission received and reviewed. Device transmission sent per MD orders. His presenting rhythm is an irregular ventricular rhythm ~70 bpm. 741 episodes recorded as NSVT. EGMs show irregular ventricular rhythms suggesting AF with RVR. Normal device function. = 1%. Lead trends appear stable. Battery estimates 2.5 years to ROSEMARIE. Patient notified of interrogation results. Patient reports that he is having some leg issues and is seeing Dr. Duarte 7/20/19. Plan for patient to send another transmission in 3 months as scheduled.  JOHN MarshallRemote ICD transmissionI have reviewed and interpreted the device interrogation, settings, programming and nurse's summary. The device is functioning within normal device parameters. I agree with the current findings, assessment and plan.         EXAM:  US DOP VEIN LEG SABRINA    INDICATION:  Leg swelling or pain, DVT suspected,Other specified soft tissue disorders    COMPARISON:  None    TECHNIQUE:  Ultrasound examination of venous structures  "was performed from the groin to the  ankle using real time, spectral, and color Doppler technique.  Both  augmentation and compression maneuvers were performed.  Spectral analysis  performed.    FINDINGS:  Both common femoral, femoral and popliteal veins are well visualized and are  normally compressible with normal color and spectral Doppler waveform which  augments with calf compression.  Normal color Doppler signal is seen in the  paired calf veins.    IMPRESSION:  1. No evidence of acute deep venous thrombosis.      Echocardiogram 4/4/19:  Mildly (EF 40-45%) reduced left ventricular function is present. Global  hypokinesis with basal-mid inferolateral and inferior akinesis.  Global right ventricular function is moderately reduced.  Moderate mitral insufficiency is present with multi directional jet suggesting  involvement of the commissures.     This study was compared with the study from 2/7/17 and LVEF is improved and MR  is worse .        Assessment and Plan:     1) History of VF/VT, history multiple appropriate ICD shocks    He has not receiving any additional shocks since starting mexiletine.    - Continue mexiletine 150 mg twice a day and metoprolol succinate 200 mg daily. Previously discussed with patient that since his ICD shocks typically occur when taking chemotherapy, it may be considered in the future to discontinue mexiletine when he is not on chemotherapy.   - Dr. Raygoza is monitoring electrolytes. Recommending keeping K>4, Mg>2      His device check in June showed no ventricular arrhythmia therapies.  He did have detection of \"nonsustained ventricular tachycardia\" but these episodes were A. fib with rapid ventricular rates.    His magnesium is still a little bit low despite aggressive oral replacement.  We will check with Jaret to see if this could be supplemented with IV magnesium at the time of his chemotherapy.    2. Permanent AF    Continue metoprolol succinate 200 mg and warfarin. " CHADS-VASc 3 (age, CHF, CAD).     3. ICM, LVEF 40-45% in 4/2019  - ACE inhibitor resumed    4. Peripheral edema - some improvement with resumption of furosemide at half prior dose. Renal function stable.     - increase furosemide to 40 mg daily    Patient warned about increased orthostatic dizziness.      - he will have labs done here prior to chemo    5. Hypomagnesia - consider IV replacement    6. Increased dyspnea on exertion - multi-factoral. No chest pain/discomfort but he reports no history of chest pain despite prior MI.     - execise echocardiogram stress test      RTC to see Dr. Duarte in 3 months with device check.    Portions of this note were dictated using speech recognition software. The note has been proofread but errors in the text may have been overlooked. Please contact me if there are any concerns regarding the accuracy of the dictation.     I appreciate the chance to help with Mr. Pearce's care. Please let me know if you have any questions or concerns.        Please do not hesitate to contact me if you have any questions/concerns.     Sincerely,     Ja Duarte MD

## 2019-07-23 ENCOUNTER — APPOINTMENT (OUTPATIENT)
Dept: LAB | Facility: CLINIC | Age: 69
End: 2019-07-23
Attending: INTERNAL MEDICINE
Payer: MEDICARE

## 2019-07-23 ENCOUNTER — INFUSION THERAPY VISIT (OUTPATIENT)
Dept: TRANSPLANT | Facility: CLINIC | Age: 69
End: 2019-07-23
Attending: INTERNAL MEDICINE
Payer: MEDICARE

## 2019-07-23 ENCOUNTER — TELEPHONE (OUTPATIENT)
Dept: ONCOLOGY | Facility: CLINIC | Age: 69
End: 2019-07-23

## 2019-07-23 VITALS
WEIGHT: 209.22 LBS | RESPIRATION RATE: 16 BRPM | BODY MASS INDEX: 30.02 KG/M2 | SYSTOLIC BLOOD PRESSURE: 103 MMHG | HEART RATE: 79 BPM | TEMPERATURE: 97.9 F | DIASTOLIC BLOOD PRESSURE: 51 MMHG | OXYGEN SATURATION: 95 %

## 2019-07-23 DIAGNOSIS — C90.02 MULTIPLE MYELOMA IN RELAPSE (H): Primary | ICD-10-CM

## 2019-07-23 DIAGNOSIS — E83.42 HYPOMAGNESEMIA: ICD-10-CM

## 2019-07-23 DIAGNOSIS — C90.00 MULTIPLE MYELOMA, REMISSION STATUS UNSPECIFIED (H): ICD-10-CM

## 2019-07-23 LAB
ALBUMIN SERPL-MCNC: 3.4 G/DL (ref 3.4–5)
ALP SERPL-CCNC: 125 U/L (ref 40–150)
ALT SERPL W P-5'-P-CCNC: 29 U/L (ref 0–70)
ANION GAP SERPL CALCULATED.3IONS-SCNC: 5 MMOL/L (ref 3–14)
AST SERPL W P-5'-P-CCNC: 18 U/L (ref 0–45)
BASOPHILS # BLD AUTO: 0 10E9/L (ref 0–0.2)
BASOPHILS NFR BLD AUTO: 0.2 %
BILIRUB SERPL-MCNC: 1.2 MG/DL (ref 0.2–1.3)
BUN SERPL-MCNC: 18 MG/DL (ref 7–30)
CALCIUM SERPL-MCNC: 8.6 MG/DL (ref 8.5–10.1)
CHLORIDE SERPL-SCNC: 106 MMOL/L (ref 94–109)
CO2 SERPL-SCNC: 28 MMOL/L (ref 20–32)
CREAT SERPL-MCNC: 1.18 MG/DL (ref 0.66–1.25)
DIFFERENTIAL METHOD BLD: ABNORMAL
EOSINOPHIL # BLD AUTO: 0.1 10E9/L (ref 0–0.7)
EOSINOPHIL NFR BLD AUTO: 1.7 %
ERYTHROCYTE [DISTWIDTH] IN BLOOD BY AUTOMATED COUNT: 17.2 % (ref 10–15)
GFR SERPL CREATININE-BSD FRML MDRD: 63 ML/MIN/{1.73_M2}
GLUCOSE SERPL-MCNC: 188 MG/DL (ref 70–99)
HCG UR QL: NEGATIVE
HCT VFR BLD AUTO: 30.3 % (ref 40–53)
HGB BLD-MCNC: 9.5 G/DL (ref 13.3–17.7)
IMM GRANULOCYTES # BLD: 0 10E9/L (ref 0–0.4)
IMM GRANULOCYTES NFR BLD: 0.5 %
LYMPHOCYTES # BLD AUTO: 0.5 10E9/L (ref 0.8–5.3)
LYMPHOCYTES NFR BLD AUTO: 8.8 %
MAGNESIUM SERPL-MCNC: 1.4 MG/DL (ref 1.6–2.3)
MCH RBC QN AUTO: 35.4 PG (ref 26.5–33)
MCHC RBC AUTO-ENTMCNC: 31.4 G/DL (ref 31.5–36.5)
MCV RBC AUTO: 113 FL (ref 78–100)
MONOCYTES # BLD AUTO: 0.1 10E9/L (ref 0–1.3)
MONOCYTES NFR BLD AUTO: 2.4 %
NEUTROPHILS # BLD AUTO: 5.1 10E9/L (ref 1.6–8.3)
NEUTROPHILS NFR BLD AUTO: 86.4 %
NRBC # BLD AUTO: 0 10*3/UL
NRBC BLD AUTO-RTO: 0 /100
PLATELET # BLD AUTO: 133 10E9/L (ref 150–450)
POTASSIUM SERPL-SCNC: 4.8 MMOL/L (ref 3.4–5.3)
PROT SERPL-MCNC: 6.4 G/DL (ref 6.8–8.8)
RBC # BLD AUTO: 2.68 10E12/L (ref 4.4–5.9)
SODIUM SERPL-SCNC: 139 MMOL/L (ref 133–144)
WBC # BLD AUTO: 5.9 10E9/L (ref 4–11)

## 2019-07-23 PROCEDURE — 81025 URINE PREGNANCY TEST: CPT | Performed by: INTERNAL MEDICINE

## 2019-07-23 PROCEDURE — 36415 COLL VENOUS BLD VENIPUNCTURE: CPT

## 2019-07-23 PROCEDURE — 85025 COMPLETE CBC W/AUTO DIFF WBC: CPT | Performed by: INTERNAL MEDICINE

## 2019-07-23 PROCEDURE — 25000128 H RX IP 250 OP 636: Mod: ZF | Performed by: INTERNAL MEDICINE

## 2019-07-23 PROCEDURE — 84703 CHORIONIC GONADOTROPIN ASSAY: CPT | Performed by: INTERNAL MEDICINE

## 2019-07-23 PROCEDURE — 83735 ASSAY OF MAGNESIUM: CPT | Performed by: INTERNAL MEDICINE

## 2019-07-23 PROCEDURE — 96401 CHEMO ANTI-NEOPL SQ/IM: CPT

## 2019-07-23 PROCEDURE — 80053 COMPREHEN METABOLIC PANEL: CPT | Performed by: INTERNAL MEDICINE

## 2019-07-23 RX ADMIN — BORTEZOMIB 2.5 MG: 3.5 INJECTION, POWDER, LYOPHILIZED, FOR SOLUTION INTRAVENOUS; SUBCUTANEOUS at 12:10

## 2019-07-23 ASSESSMENT — PAIN SCALES - GENERAL: PAINLEVEL: NO PAIN (0)

## 2019-07-23 NOTE — PROGRESS NOTES
Infusion Nursing Note:  Erasmo Pearce presents today for scheduled chemo injection.    Patient seen by provider today: No   present during visit today: Not Applicable.    Note: Labs were monitored.  Lab parameters for today's treatment were met.  Patient assessment was completed and unremarkable except: Patient has a defibrillator for cardiomyopathy.  He has shortness of breath with exertion.  Patient has +2 edema in bilateral lower legs (today left greater than right.)  He is currently taking Lasix and Coumadin.  Patient's blood glucose is elevated today, however, he had just eaten a muffin.  He has a reddened area on his lower right abdomen from his last Velcade injection.        Treatment Conditions:  Patient received scheduled Velcade injection in his lower left abdomen.      Post Infusion Assessment:  Patient tolerated injection without incident.       Discharge Plan:   Patient was ambulatory and discharged in stable condition accompanied by: wife.    IRENE WEBER RN

## 2019-07-23 NOTE — ORAL ONC MGMT
Oral Chemotherapy Monitoring Program     Primary Oncologist: Dr. Raygoza  Primary Oncology Clinic: [facility]  Cancer Diagnosis: Multiple Myeloma     Therapy History:  C1D1=2/3/19  Lenalidomide, 1 capsule (10mg) daily, 14 on/14 off. I4M6=01/05 (current)      Drug Interaction Assessment: No new medications or medication changes.      Lab Monitoring Plan  CBC and CMP every 2 weeks. Patient recieves labs at [facility].    Subjective/Objective:  Erasmo Pearce is a 68 year old male seen in clinic for a follow-up visit for oral chemotherapy. Spoke with Jonh and his wife, Carla. They confirmed correct dosing and started last cycle of revlimid on 7/16. He started on velcade on 7/16 as well. Noticed for fatigue and dizziness for 1-2 days after velcade. Denies any other side effects, missed doses, or medication changes.     ORAL CHEMOTHERAPY 1/23/2019 2/12/2019 3/7/2019 4/18/2019 5/29/2019 6/4/2019 7/23/2019   Drug Name Revlimid (Lenalidomide) Revlimid (Lenalidomide) Revlimid (Lenalidomide) Revlimid (Lenalidomide) Revlimid (Lenalidomide) Revlimid (Lenalidomide) Revlimid (Lenalidomide)   Current Dosage 10mg 10mg 10mg 10mg 10mg 10mg 10mg   Current Schedule Daily Daily Daily Daily Daily Daily Daily   Cycle Details 2 weeks on 2 weeks off 2 weeks on 2 weeks off 2 weeks on 2 weeks off 2 weeks on 2 weeks off 2 weeks on 2 weeks off Drug on Hold 2 weeks on 2 weeks off   Start Date of Last Cycle - 2/3/2019 3/3/2019 4/7/2019 5/12/2019 - 7/16/2019   Planned next cycle start date - 3/3/2019 3/31/2019 5/5/2019 6/9/2019 - 8/13/2019   Doses missed in last 2 weeks - 0 0 0 0 - 0   Adherence Assessment - Adherent Adherent Adherent Adherent - Adherent   Adverse Effects - No AE identified during assessment No AE identified during assessment;Other (see note for details) Myalgias No AE identified during assessment - No AE identified during assessment   Myalgias - - - Grade 1 - - -   Pharmacist Intervention(myalgias) - - -  "Yes - - -   Intervention(s) - - - Patient education - - -   Pharmacist intervention? - - Yes - - - -   Intervention(s) - - Patient education - - - -   Home BPs - - not needed not needed - - -   Any new drug interactions? No No No No No - No   Is the dose as ordered appropriate for the patient? Yes Yes Yes Yes Yes - Yes   Has the patient been assessed within the past 6 months for depression? - - - - - - Yes   Has the patient missed any days of school, work, or other routine activity? - - - - - - No       Vitals:  BP:   BP Readings from Last 1 Encounters:   07/23/19 103/51     Wt Readings from Last 1 Encounters:   07/23/19 94.9 kg (209 lb 3.5 oz)     Estimated body surface area is 2.16 meters squared as calculated from the following:    Height as of 7/22/19: 1.778 m (5' 10\").    Weight as of an earlier encounter on 7/23/19: 94.9 kg (209 lb 3.5 oz).    Labs:  _  Result Component Current Result Ref Range   Sodium 139 (7/23/2019) 133 - 144 mmol/L     _  Result Component Current Result Ref Range   Potassium 4.8 (7/23/2019) 3.4 - 5.3 mmol/L     _  Result Component Current Result Ref Range   Calcium 8.6 (7/23/2019) 8.5 - 10.1 mg/dL     _  Result Component Current Result Ref Range   Magnesium 1.4 (L) (7/23/2019) 1.6 - 2.3 mg/dL     No results found for Phos within last 30 days.     _  Result Component Current Result Ref Range   Albumin 3.4 (7/23/2019) 3.4 - 5.0 g/dL     _  Result Component Current Result Ref Range   Urea Nitrogen 18 (7/23/2019) 7 - 30 mg/dL     _  Result Component Current Result Ref Range   Creatinine 1.18 (7/23/2019) 0.66 - 1.25 mg/dL       _  Result Component Current Result Ref Range   AST 18 (7/23/2019) 0 - 45 U/L     _  Result Component Current Result Ref Range   ALT 29 (7/23/2019) 0 - 70 U/L     _  Result Component Current Result Ref Range   Bilirubin Total 1.2 (7/23/2019) 0.2 - 1.3 mg/dL       _  Result Component Current Result Ref Range   WBC 5.9 (7/23/2019) 4.0 - 11.0 10e9/L     _  Result Component " Current Result Ref Range   Hemoglobin 9.5 (L) (7/23/2019) 13.3 - 17.7 g/dL     _  Result Component Current Result Ref Range   Platelet Count 133 (L) (7/23/2019) 150 - 450 10e9/L     _  Result Component Current Result Ref Range   Absolute Neutrophil 5.1 (7/23/2019) 1.6 - 8.3 10e9/L       Assessment:  Jonh continues to tolerate revlimid well. Increased fatigue and dizziness with first velcade injection. Recommended to continue monitoring.     Labs: reviewed and stable with previous values. No abnormalities that warrant a change.     Plan:  Continue revlimid 10mg daily for 2 weeks on, 2 weeks off. Next cycle to start on 8/13    Follow-Up:  8/7: review labs from outside facility    Refill Due:  Due by 8/13, will send rx to PRINCE Frye PharmD, MS  Hematology/Oncology Clinical Pharmacist  [facility]  [facility]  [phone]

## 2019-07-23 NOTE — NURSING NOTE
"Oncology Rooming Note    July 23, 2019 12:01 PM   Erasmo Pearce is a 68 year old male who presents for:    Chief Complaint   Patient presents with     Blood Draw     Labs drawn by RN in Lab via Right Arm VPT.      Infusion     scheduled chemo injection for multiple myeloma     Initial Vitals: /51   Pulse 79   Temp 97.9  F (36.6  C) (Oral)   Resp 16   Wt 94.9 kg (209 lb 3.5 oz)   SpO2 95%   BMI 30.02 kg/m   Estimated body mass index is 30.02 kg/m  as calculated from the following:    Height as of 7/22/19: 1.778 m (5' 10\").    Weight as of this encounter: 94.9 kg (209 lb 3.5 oz). Body surface area is 2.16 meters squared.  No Pain (0) Comment: Data Unavailable   No LMP for male patient.  Allergies reviewed: Yes  Medications reviewed: Yes    Medications: Medication refills not needed today.  Pharmacy name entered into [facility]:    [facility] [place] [facility] [address] [facility] - [place], [place] - [address] [place] [address]  [facility] [place] [place] - [address] [facility]          IRENE WEBER RN              "

## 2019-07-23 NOTE — NURSING NOTE
Chief Complaint   Patient presents with     Blood Draw     Labs drawn by RN in Lab via Right Arm VPT.      Liberty Ramirez RN

## 2019-07-30 ENCOUNTER — APPOINTMENT (OUTPATIENT)
Dept: LAB | Facility: CLINIC | Age: 69
End: 2019-07-30
Attending: INTERNAL MEDICINE
Payer: MEDICARE

## 2019-07-30 ENCOUNTER — ANCILLARY PROCEDURE (OUTPATIENT)
Dept: CARDIOLOGY | Facility: CLINIC | Age: 69
End: 2019-07-30
Attending: INTERNAL MEDICINE
Payer: MEDICARE

## 2019-07-30 ENCOUNTER — INFUSION THERAPY VISIT (OUTPATIENT)
Dept: TRANSPLANT | Facility: CLINIC | Age: 69
End: 2019-07-30
Attending: INTERNAL MEDICINE
Payer: MEDICARE

## 2019-07-30 VITALS
HEART RATE: 82 BPM | OXYGEN SATURATION: 99 % | BODY MASS INDEX: 29.17 KG/M2 | SYSTOLIC BLOOD PRESSURE: 127 MMHG | DIASTOLIC BLOOD PRESSURE: 69 MMHG | TEMPERATURE: 97.7 F | RESPIRATION RATE: 18 BRPM | WEIGHT: 203.26 LBS

## 2019-07-30 DIAGNOSIS — E83.42 HYPOMAGNESEMIA: ICD-10-CM

## 2019-07-30 DIAGNOSIS — C90.00 MULTIPLE MYELOMA, REMISSION STATUS UNSPECIFIED (H): ICD-10-CM

## 2019-07-30 DIAGNOSIS — I25.5 ISCHEMIC CARDIOMYOPATHY: ICD-10-CM

## 2019-07-30 DIAGNOSIS — C90.02 MULTIPLE MYELOMA IN RELAPSE (H): Primary | ICD-10-CM

## 2019-07-30 DIAGNOSIS — R06.09 DOE (DYSPNEA ON EXERTION): ICD-10-CM

## 2019-07-30 LAB
ALBUMIN SERPL-MCNC: 3.4 G/DL (ref 3.4–5)
ALP SERPL-CCNC: 139 U/L (ref 40–150)
ALT SERPL W P-5'-P-CCNC: 34 U/L (ref 0–70)
ANION GAP SERPL CALCULATED.3IONS-SCNC: 4 MMOL/L (ref 3–14)
AST SERPL W P-5'-P-CCNC: 25 U/L (ref 0–45)
BASOPHILS # BLD AUTO: 0 10E9/L (ref 0–0.2)
BASOPHILS NFR BLD AUTO: 0.2 %
BILIRUB SERPL-MCNC: 1.9 MG/DL (ref 0.2–1.3)
BUN SERPL-MCNC: 20 MG/DL (ref 7–30)
CALCIUM SERPL-MCNC: 8.5 MG/DL (ref 8.5–10.1)
CHLORIDE SERPL-SCNC: 107 MMOL/L (ref 94–109)
CO2 SERPL-SCNC: 28 MMOL/L (ref 20–32)
CREAT SERPL-MCNC: 1.08 MG/DL (ref 0.66–1.25)
DIFFERENTIAL METHOD BLD: ABNORMAL
EOSINOPHIL # BLD AUTO: 0.3 10E9/L (ref 0–0.7)
EOSINOPHIL NFR BLD AUTO: 5.1 %
ERYTHROCYTE [DISTWIDTH] IN BLOOD BY AUTOMATED COUNT: 16.1 % (ref 10–15)
GFR SERPL CREATININE-BSD FRML MDRD: 70 ML/MIN/{1.73_M2}
GLUCOSE SERPL-MCNC: 100 MG/DL (ref 70–99)
HCT VFR BLD AUTO: 28.6 % (ref 40–53)
HGB BLD-MCNC: 9.1 G/DL (ref 13.3–17.7)
IMM GRANULOCYTES # BLD: 0 10E9/L (ref 0–0.4)
IMM GRANULOCYTES NFR BLD: 0.2 %
LYMPHOCYTES # BLD AUTO: 0.8 10E9/L (ref 0.8–5.3)
LYMPHOCYTES NFR BLD AUTO: 16.8 %
MAGNESIUM SERPL-MCNC: 1.7 MG/DL (ref 1.6–2.3)
MCH RBC QN AUTO: 35.8 PG (ref 26.5–33)
MCHC RBC AUTO-ENTMCNC: 31.8 G/DL (ref 31.5–36.5)
MCV RBC AUTO: 113 FL (ref 78–100)
MONOCYTES # BLD AUTO: 0.7 10E9/L (ref 0–1.3)
MONOCYTES NFR BLD AUTO: 14 %
NEUTROPHILS # BLD AUTO: 3.1 10E9/L (ref 1.6–8.3)
NEUTROPHILS NFR BLD AUTO: 63.7 %
NRBC # BLD AUTO: 0 10*3/UL
NRBC BLD AUTO-RTO: 0 /100
PLATELET # BLD AUTO: 55 10E9/L (ref 150–450)
POTASSIUM SERPL-SCNC: 4.3 MMOL/L (ref 3.4–5.3)
PROT SERPL-MCNC: 6.2 G/DL (ref 6.8–8.8)
RBC # BLD AUTO: 2.54 10E12/L (ref 4.4–5.9)
SODIUM SERPL-SCNC: 138 MMOL/L (ref 133–144)
WBC # BLD AUTO: 4.9 10E9/L (ref 4–11)

## 2019-07-30 PROCEDURE — 83735 ASSAY OF MAGNESIUM: CPT | Performed by: INTERNAL MEDICINE

## 2019-07-30 PROCEDURE — 36415 COLL VENOUS BLD VENIPUNCTURE: CPT

## 2019-07-30 PROCEDURE — 85025 COMPLETE CBC W/AUTO DIFF WBC: CPT | Performed by: INTERNAL MEDICINE

## 2019-07-30 PROCEDURE — 96401 CHEMO ANTI-NEOPL SQ/IM: CPT

## 2019-07-30 PROCEDURE — 80053 COMPREHEN METABOLIC PANEL: CPT | Performed by: INTERNAL MEDICINE

## 2019-07-30 PROCEDURE — 25000128 H RX IP 250 OP 636: Mod: ZF | Performed by: INTERNAL MEDICINE

## 2019-07-30 RX ADMIN — Medication 5 ML: at 09:52

## 2019-07-30 RX ADMIN — BORTEZOMIB 2.5 MG: 3.5 INJECTION, POWDER, LYOPHILIZED, FOR SOLUTION INTRAVENOUS; SUBCUTANEOUS at 12:05

## 2019-07-30 ASSESSMENT — PAIN SCALES - GENERAL: PAINLEVEL: NO PAIN (0)

## 2019-07-30 NOTE — NURSING NOTE
Chief Complaint   Patient presents with     Infusion     Velcade injection, hx MM s/p transplant.

## 2019-07-30 NOTE — PROGRESS NOTES
Infusion Nursing Note:  Erasmo Pearce presents today for velcade.    Patient seen by provider today: No   present during visit today: Not Applicable.    Note: Patient arrives for velcade injection.  Lab parameters monitored and not met, platelet count only 55 today (parameter: 75).  Discussed with Dr. Raygoza who gave a verbal order to go ahead with today's injection despite not meeting parameter.  Administered subcutaneous right abdomen, tolerated well.      Intravenous Access:  Lab draw site left antecub, 23G butterfly.    Treatment Conditions:  Lab Results   Component Value Date    HGB 9.1 07/30/2019     Lab Results   Component Value Date    WBC 4.9 07/30/2019      Lab Results   Component Value Date    ANEU 3.1 07/30/2019     Lab Results   Component Value Date    PLT 55 07/30/2019      Results reviewed, labs did NOT meet treatment parameters: see above.      Post Infusion Assessment:  Patient tolerated injection without incident.       Discharge Plan:   Patient discharged in stable condition accompanied by: wife.  Departure Mode: Ambulatory.  Patient and wife aware of follow up appointments.    Tawny Aldridge RN

## 2019-08-05 DIAGNOSIS — C90.02 MULTIPLE MYELOMA IN RELAPSE (H): ICD-10-CM

## 2019-08-05 RX ORDER — SODIUM CHLORIDE 9 MG/ML
1000 INJECTION, SOLUTION INTRAVENOUS CONTINUOUS PRN
Status: CANCELLED
Start: 2019-08-20

## 2019-08-05 RX ORDER — MEPERIDINE HYDROCHLORIDE 25 MG/ML
25 INJECTION INTRAMUSCULAR; INTRAVENOUS; SUBCUTANEOUS EVERY 30 MIN PRN
Status: CANCELLED | OUTPATIENT
Start: 2019-09-03

## 2019-08-05 RX ORDER — ALBUTEROL SULFATE 0.83 MG/ML
2.5 SOLUTION RESPIRATORY (INHALATION)
Status: CANCELLED | OUTPATIENT
Start: 2019-08-13

## 2019-08-05 RX ORDER — ALBUTEROL SULFATE 90 UG/1
1-2 AEROSOL, METERED RESPIRATORY (INHALATION)
Status: CANCELLED
Start: 2019-08-13

## 2019-08-05 RX ORDER — NALOXONE HYDROCHLORIDE 0.4 MG/ML
.1-.4 INJECTION, SOLUTION INTRAMUSCULAR; INTRAVENOUS; SUBCUTANEOUS
Status: CANCELLED | OUTPATIENT
Start: 2019-08-20

## 2019-08-05 RX ORDER — MEPERIDINE HYDROCHLORIDE 25 MG/ML
25 INJECTION INTRAMUSCULAR; INTRAVENOUS; SUBCUTANEOUS EVERY 30 MIN PRN
Status: CANCELLED | OUTPATIENT
Start: 2019-08-13

## 2019-08-05 RX ORDER — SODIUM CHLORIDE 9 MG/ML
1000 INJECTION, SOLUTION INTRAVENOUS CONTINUOUS PRN
Status: CANCELLED
Start: 2019-08-13

## 2019-08-05 RX ORDER — EPINEPHRINE 1 MG/ML
0.3 INJECTION, SOLUTION INTRAMUSCULAR; SUBCUTANEOUS EVERY 5 MIN PRN
Status: CANCELLED | OUTPATIENT
Start: 2019-08-13

## 2019-08-05 RX ORDER — ALBUTEROL SULFATE 90 UG/1
1-2 AEROSOL, METERED RESPIRATORY (INHALATION)
Status: CANCELLED
Start: 2019-08-20

## 2019-08-05 RX ORDER — EPINEPHRINE 1 MG/ML
0.3 INJECTION, SOLUTION INTRAMUSCULAR; SUBCUTANEOUS EVERY 5 MIN PRN
Status: CANCELLED | OUTPATIENT
Start: 2019-08-20

## 2019-08-05 RX ORDER — EPINEPHRINE 0.3 MG/.3ML
0.3 INJECTION SUBCUTANEOUS EVERY 5 MIN PRN
Status: CANCELLED | OUTPATIENT
Start: 2019-08-13

## 2019-08-05 RX ORDER — ALBUTEROL SULFATE 0.83 MG/ML
2.5 SOLUTION RESPIRATORY (INHALATION)
Status: CANCELLED | OUTPATIENT
Start: 2019-09-03

## 2019-08-05 RX ORDER — METHYLPREDNISOLONE SODIUM SUCCINATE 125 MG/2ML
125 INJECTION, POWDER, LYOPHILIZED, FOR SOLUTION INTRAMUSCULAR; INTRAVENOUS
Status: CANCELLED
Start: 2019-08-13

## 2019-08-05 RX ORDER — EPINEPHRINE 0.3 MG/.3ML
0.3 INJECTION SUBCUTANEOUS EVERY 5 MIN PRN
Status: CANCELLED | OUTPATIENT
Start: 2019-09-03

## 2019-08-05 RX ORDER — EPINEPHRINE 1 MG/ML
0.3 INJECTION, SOLUTION INTRAMUSCULAR; SUBCUTANEOUS EVERY 5 MIN PRN
Status: CANCELLED | OUTPATIENT
Start: 2019-09-03

## 2019-08-05 RX ORDER — DIPHENHYDRAMINE HYDROCHLORIDE 50 MG/ML
50 INJECTION INTRAMUSCULAR; INTRAVENOUS
Status: CANCELLED
Start: 2019-08-13

## 2019-08-05 RX ORDER — ALBUTEROL SULFATE 0.83 MG/ML
2.5 SOLUTION RESPIRATORY (INHALATION)
Status: CANCELLED | OUTPATIENT
Start: 2019-08-20

## 2019-08-05 RX ORDER — DIPHENHYDRAMINE HYDROCHLORIDE 50 MG/ML
50 INJECTION INTRAMUSCULAR; INTRAVENOUS
Status: CANCELLED
Start: 2019-08-20

## 2019-08-05 RX ORDER — NALOXONE HYDROCHLORIDE 0.4 MG/ML
.1-.4 INJECTION, SOLUTION INTRAMUSCULAR; INTRAVENOUS; SUBCUTANEOUS
Status: CANCELLED | OUTPATIENT
Start: 2019-09-03

## 2019-08-05 RX ORDER — SODIUM CHLORIDE 9 MG/ML
1000 INJECTION, SOLUTION INTRAVENOUS CONTINUOUS PRN
Status: CANCELLED
Start: 2019-09-03

## 2019-08-05 RX ORDER — METHYLPREDNISOLONE SODIUM SUCCINATE 125 MG/2ML
125 INJECTION, POWDER, LYOPHILIZED, FOR SOLUTION INTRAMUSCULAR; INTRAVENOUS
Status: CANCELLED
Start: 2019-08-20

## 2019-08-05 RX ORDER — EPINEPHRINE 0.3 MG/.3ML
0.3 INJECTION SUBCUTANEOUS EVERY 5 MIN PRN
Status: CANCELLED | OUTPATIENT
Start: 2019-08-20

## 2019-08-05 RX ORDER — ALBUTEROL SULFATE 90 UG/1
1-2 AEROSOL, METERED RESPIRATORY (INHALATION)
Status: CANCELLED
Start: 2019-09-03

## 2019-08-05 RX ORDER — MEPERIDINE HYDROCHLORIDE 25 MG/ML
25 INJECTION INTRAMUSCULAR; INTRAVENOUS; SUBCUTANEOUS EVERY 30 MIN PRN
Status: CANCELLED | OUTPATIENT
Start: 2019-08-20

## 2019-08-05 RX ORDER — DIPHENHYDRAMINE HYDROCHLORIDE 50 MG/ML
50 INJECTION INTRAMUSCULAR; INTRAVENOUS
Status: CANCELLED
Start: 2019-09-03

## 2019-08-05 RX ORDER — NALOXONE HYDROCHLORIDE 0.4 MG/ML
.1-.4 INJECTION, SOLUTION INTRAMUSCULAR; INTRAVENOUS; SUBCUTANEOUS
Status: CANCELLED | OUTPATIENT
Start: 2019-08-13

## 2019-08-05 RX ORDER — METHYLPREDNISOLONE SODIUM SUCCINATE 125 MG/2ML
125 INJECTION, POWDER, LYOPHILIZED, FOR SOLUTION INTRAMUSCULAR; INTRAVENOUS
Status: CANCELLED
Start: 2019-09-03

## 2019-08-06 ENCOUNTER — TELEPHONE (OUTPATIENT)
Dept: PHARMACY | Facility: CLINIC | Age: 69
End: 2019-08-06

## 2019-08-06 DIAGNOSIS — C90.02 MULTIPLE MYELOMA IN RELAPSE (H): Primary | ICD-10-CM

## 2019-08-06 RX ORDER — DEXAMETHASONE 4 MG/1
20 TABLET ORAL WEEKLY
Qty: 15 TABLET | Refills: 0 | Status: SHIPPED | OUTPATIENT
Start: 2019-08-06 | End: 2019-09-10

## 2019-08-06 RX ORDER — LENALIDOMIDE 10 MG/1
10 CAPSULE ORAL DAILY
Qty: 14 CAPSULE | Refills: 0 | Status: SHIPPED | OUTPATIENT
Start: 2019-08-06 | End: 2019-09-10

## 2019-08-06 NOTE — TELEPHONE ENCOUNTER
Oral Chemotherapy Monitoring Program   Medication: Revlimid  Rx: 10mg PO daily on days 1 through 14 of 28 day cycle   Auth #: [id number]   Risk Category: Adult Male  Routine survey questions reviewed.   [facility]  Oncology Clinic Liaison  [address] [place] [address], [address]  [place] [address]  Ph: [phone]/Fax: [phone]

## 2019-08-12 ENCOUNTER — CARE COORDINATION (OUTPATIENT)
Dept: CARDIOLOGY | Facility: CLINIC | Age: 69
End: 2019-08-12

## 2019-08-12 DIAGNOSIS — I25.5 ISCHEMIC CARDIOMYOPATHY: Primary | ICD-10-CM

## 2019-08-12 RX ORDER — SODIUM CHLORIDE 9 MG/ML
INJECTION, SOLUTION INTRAVENOUS CONTINUOUS
Status: CANCELLED | OUTPATIENT
Start: 2019-08-12

## 2019-08-12 RX ORDER — LIDOCAINE 40 MG/G
CREAM TOPICAL
Status: CANCELLED | OUTPATIENT
Start: 2019-08-12

## 2019-08-12 RX ORDER — POTASSIUM CHLORIDE 1500 MG/1
40 TABLET, EXTENDED RELEASE ORAL
Status: CANCELLED | OUTPATIENT
Start: 2019-08-12

## 2019-08-12 RX ORDER — POTASSIUM CHLORIDE 1500 MG/1
20 TABLET, EXTENDED RELEASE ORAL
Status: CANCELLED | OUTPATIENT
Start: 2019-08-12

## 2019-08-12 NOTE — LETTER
August 12, 2019      TO: Erasmo Pearce  [address]  [address]  [place] [address]         Dear Erasmo,    Here is the information about your upcoming procedure.    You are scheduled for a Coronary Angiogram and Right Heart Catheterization at the [facility], [address] [place] [address], [place] [address].     Please arrive to the [facility] Waiting Room on Thursday, August 29th at 11:00 AM.      When you arrive you will be escorted back to the pre procedure area called 2A. Here they will insert an IV, draw labs, and obtain a short medical history. Please bring an updated list of your current medications.    A provider will come and talk with you about the procedure and obtain consent.    A nurse from the Cardiac Catheterization Lab will then escort you to the procedure area. You will be receiving sedation during the procedure so you will need someone to drive you to and from the hospital.    After the procedure you will recover for a short period (2 - 6 hours). You will be discharged with instructions for post procedure care. However, depending on what the angiogram shows you may have to have stents placed and this might require an overnight stay. We ask that you bring a small bag of necessities for your comfort if you would need to stay overnight. DO NOT BRING ANY VALUABLES!      Pre procedure instructions:  1. Make arrangements to have a  as you will not be allowed to drive following the procedure. Someone should stay with you the night after your procedure.  2.  Do not eat any solid food or milk products for 8 hours prior to the exam. You may drink clear liquids until 2 hours prior to the exam. Clear liquids include the following: water, Jell-O, clear broth, apple juice or any non-carbonated drink that you can see through (no pop!).   3. Do not drink alcohol or smoke 24 hours prior to test.    4. Medications to Hold:     Hold your warfarin 5 days prior (stop  taking it August 24th) and hold the day of the procedure. You can resume warfarin 12 hours post-procedure. Get your INR checked 3 days after your procedure.    I have messaged your INR clinic so they are aware of these changes.    Hold your Lasix the morning of the procedure.    5. You may take your other morning medications as prescribed with a sip of water. If you currently take an aspirin, continue taking your same dose. If not, take a 325 mg aspirin the day before and the day of your procedure.     If you have further questions, please utilize [employer] to contact us.   Zayra Lyle RN  Electrophysiology Nurse Coordinator.  [phone]    If your question concerns the schedule/appointment times, contact:  [facility]   [phone]

## 2019-08-12 NOTE — PROGRESS NOTES
Date: 8/12/2019    Time of Call: 2:57 PM     Diagnosis:  Worsening EF (28%), Abnormal Stress Echocardiogram     [ TORB ] Ordering provider: Ja Duarte MD  Order: Coronary Angiogram & Right Heart Catheterization, Follow-up Appointment with HF Physician     Order received by: Zayra Lyle RN      Follow-up/additional notes: Called patient and reviewed reasoning for procedures. Patient and wife in agreement to proceed. Case requests for RHC, Coronary Angiogram placed. Connected with Priori Data in Cath Lab - Procedure scheduled August 29th with 11:00 Check in.    Patient to hold warfarin 5 days prior. Hold lasix day of. Message sent to patient's coumadin clinic updating on patient's planned procedure and instructions.    Pre-procedure education, date, time, location, and medications to hold reviewed with patient and wife. Agreed to call with any questions.    Zayra Lyle, MARTHAN, RN, PHN  Electrophysiology Nurse Coordinator

## 2019-08-13 ENCOUNTER — CARE COORDINATION (OUTPATIENT)
Dept: CARDIOLOGY | Facility: CLINIC | Age: 69
End: 2019-08-13

## 2019-08-13 ENCOUNTER — INFUSION THERAPY VISIT (OUTPATIENT)
Dept: TRANSPLANT | Facility: CLINIC | Age: 69
End: 2019-08-13
Attending: INTERNAL MEDICINE
Payer: MEDICARE

## 2019-08-13 VITALS
DIASTOLIC BLOOD PRESSURE: 64 MMHG | OXYGEN SATURATION: 96 % | BODY MASS INDEX: 30.22 KG/M2 | RESPIRATION RATE: 16 BRPM | TEMPERATURE: 97.7 F | SYSTOLIC BLOOD PRESSURE: 123 MMHG | HEART RATE: 84 BPM | WEIGHT: 211.1 LBS | HEIGHT: 70 IN

## 2019-08-13 DIAGNOSIS — C90.00 MULTIPLE MYELOMA, REMISSION STATUS UNSPECIFIED (H): Primary | ICD-10-CM

## 2019-08-13 DIAGNOSIS — C90.02 MULTIPLE MYELOMA IN RELAPSE (H): Primary | ICD-10-CM

## 2019-08-13 DIAGNOSIS — C90.00 MULTIPLE MYELOMA, REMISSION STATUS UNSPECIFIED (H): ICD-10-CM

## 2019-08-13 DIAGNOSIS — E83.42 HYPOMAGNESEMIA: ICD-10-CM

## 2019-08-13 LAB
ALBUMIN SERPL-MCNC: 3.5 G/DL (ref 3.4–5)
ALP SERPL-CCNC: 166 U/L (ref 40–150)
ALT SERPL W P-5'-P-CCNC: 28 U/L (ref 0–70)
ANION GAP SERPL CALCULATED.3IONS-SCNC: 6 MMOL/L (ref 3–14)
AST SERPL W P-5'-P-CCNC: 16 U/L (ref 0–45)
BASOPHILS # BLD AUTO: 0 10E9/L (ref 0–0.2)
BASOPHILS NFR BLD AUTO: 0.6 %
BILIRUB SERPL-MCNC: 0.8 MG/DL (ref 0.2–1.3)
BLD PROD TYP BPU: NORMAL
BLD UNIT ID BPU: 0
BLOOD PRODUCT CODE: NORMAL
BPU ID: NORMAL
BUN SERPL-MCNC: 21 MG/DL (ref 7–30)
CALCIUM SERPL-MCNC: 8.9 MG/DL (ref 8.5–10.1)
CHLORIDE SERPL-SCNC: 110 MMOL/L (ref 94–109)
CO2 SERPL-SCNC: 26 MMOL/L (ref 20–32)
CREAT SERPL-MCNC: 1.03 MG/DL (ref 0.66–1.25)
DIFFERENTIAL METHOD BLD: ABNORMAL
EOSINOPHIL # BLD AUTO: 0 10E9/L (ref 0–0.7)
EOSINOPHIL NFR BLD AUTO: 0 %
ERYTHROCYTE [DISTWIDTH] IN BLOOD BY AUTOMATED COUNT: 16.4 % (ref 10–15)
GFR SERPL CREATININE-BSD FRML MDRD: 74 ML/MIN/{1.73_M2}
GLUCOSE SERPL-MCNC: 195 MG/DL (ref 70–99)
HCT VFR BLD AUTO: 29.2 % (ref 40–53)
HGB BLD-MCNC: 9.4 G/DL (ref 13.3–17.7)
IGA SERPL-MCNC: 256 MG/DL (ref 70–380)
IGG SERPL-MCNC: 549 MG/DL (ref 695–1620)
IGM SERPL-MCNC: 27 MG/DL (ref 60–265)
IMM GRANULOCYTES # BLD: 0 10E9/L (ref 0–0.4)
IMM GRANULOCYTES NFR BLD: 0.2 %
LDH SERPL L TO P-CCNC: 199 U/L (ref 85–227)
LYMPHOCYTES # BLD AUTO: 0.3 10E9/L (ref 0.8–5.3)
LYMPHOCYTES NFR BLD AUTO: 7.1 %
MAGNESIUM SERPL-MCNC: 1.8 MG/DL (ref 1.6–2.3)
MCH RBC QN AUTO: 36.6 PG (ref 26.5–33)
MCHC RBC AUTO-ENTMCNC: 32.2 G/DL (ref 31.5–36.5)
MCV RBC AUTO: 114 FL (ref 78–100)
MONOCYTES # BLD AUTO: 0.1 10E9/L (ref 0–1.3)
MONOCYTES NFR BLD AUTO: 1.3 %
NEUTROPHILS # BLD AUTO: 4.2 10E9/L (ref 1.6–8.3)
NEUTROPHILS NFR BLD AUTO: 90.8 %
NRBC # BLD AUTO: 0 10*3/UL
NRBC BLD AUTO-RTO: 0 /100
PLATELET # BLD AUTO: 139 10E9/L (ref 150–450)
POTASSIUM SERPL-SCNC: 4 MMOL/L (ref 3.4–5.3)
PROT SERPL-MCNC: 6.7 G/DL (ref 6.8–8.8)
RBC # BLD AUTO: 2.57 10E12/L (ref 4.4–5.9)
SODIUM SERPL-SCNC: 142 MMOL/L (ref 133–144)
TRANSFUSION STATUS PATIENT QL: NORMAL
TRANSFUSION STATUS PATIENT QL: NORMAL
WBC # BLD AUTO: 4.7 10E9/L (ref 4–11)

## 2019-08-13 PROCEDURE — 82784 ASSAY IGA/IGD/IGG/IGM EACH: CPT | Performed by: INTERNAL MEDICINE

## 2019-08-13 PROCEDURE — 80053 COMPREHEN METABOLIC PANEL: CPT | Performed by: INTERNAL MEDICINE

## 2019-08-13 PROCEDURE — 83735 ASSAY OF MAGNESIUM: CPT | Performed by: INTERNAL MEDICINE

## 2019-08-13 PROCEDURE — 84166 PROTEIN E-PHORESIS/URINE/CSF: CPT | Performed by: INTERNAL MEDICINE

## 2019-08-13 PROCEDURE — 96401 CHEMO ANTI-NEOPL SQ/IM: CPT

## 2019-08-13 PROCEDURE — 83883 ASSAY NEPHELOMETRY NOT SPEC: CPT | Performed by: INTERNAL MEDICINE

## 2019-08-13 PROCEDURE — 83615 LACTATE (LD) (LDH) ENZYME: CPT | Performed by: INTERNAL MEDICINE

## 2019-08-13 PROCEDURE — 86335 IMMUNFIX E-PHORSIS/URINE/CSF: CPT | Performed by: INTERNAL MEDICINE

## 2019-08-13 PROCEDURE — 84156 ASSAY OF PROTEIN URINE: CPT | Performed by: INTERNAL MEDICINE

## 2019-08-13 PROCEDURE — 85025 COMPLETE CBC W/AUTO DIFF WBC: CPT | Performed by: INTERNAL MEDICINE

## 2019-08-13 PROCEDURE — 82232 ASSAY OF BETA-2 PROTEIN: CPT | Performed by: INTERNAL MEDICINE

## 2019-08-13 PROCEDURE — 00000402 ZZHCL STATISTIC TOTAL PROTEIN: Performed by: INTERNAL MEDICINE

## 2019-08-13 PROCEDURE — 84165 PROTEIN E-PHORESIS SERUM: CPT | Performed by: INTERNAL MEDICINE

## 2019-08-13 PROCEDURE — 25000128 H RX IP 250 OP 636: Mod: JW,ZF | Performed by: INTERNAL MEDICINE

## 2019-08-13 PROCEDURE — 36415 COLL VENOUS BLD VENIPUNCTURE: CPT

## 2019-08-13 RX ADMIN — BORTEZOMIB 2.5 MG: 3.5 INJECTION, POWDER, LYOPHILIZED, FOR SOLUTION INTRAVENOUS; SUBCUTANEOUS at 12:26

## 2019-08-13 ASSESSMENT — MIFFLIN-ST. JEOR: SCORE: 1733.79

## 2019-08-13 ASSESSMENT — PAIN SCALES - GENERAL: PAINLEVEL: NO PAIN (0)

## 2019-08-13 NOTE — NURSING NOTE
Chief Complaint   Patient presents with     Blood Draw     Labs drawn via  by RN in lab. VS taken. Patient checked in for next appt.     Labs collected from venipuncture by RN. Vitals taken. Checked in for appointment. Urine specimen collected.    Catalina Huff RN

## 2019-08-13 NOTE — PROGRESS NOTES
Left message for Joleen at patient's coumadin clinic. Discussed in VM the following plan: Patient to have RHC/Angiogram on 8/29. Stop warfarin 5 days prior (8/24). Continue next day post-procedure (8/30). Check INR 3-4 days after (9/1) to maintain INR 2-3. Discussed in VM that I have educated patient and wife regarding instructions. Told to call back with questions.    Zayra Lyle, BSN, RN, PHN  Electrophysiology Nurse Coordinator

## 2019-08-13 NOTE — PROGRESS NOTES
Infusion Nursing Note:  Erasmo Pearce presents today for Velcade.    Patient seen by provider today: No   present during visit today: Not Applicable.    Note: Pt received subcutaneous Velcade.  Tolerated without incident.  Pt reports taking his po dexamethason and Revlimid as ordered.    Intravenous Access:  No Intravenous access/labs at this visit.    Treatment Conditions:  Lab Results   Component Value Date    HGB 9.4 08/13/2019     Lab Results   Component Value Date    WBC 4.7 08/13/2019      Lab Results   Component Value Date    ANEU 4.2 08/13/2019     Lab Results   Component Value Date     08/13/2019      Lab Results   Component Value Date     08/13/2019                   Lab Results   Component Value Date    POTASSIUM 4.0 08/13/2019           Lab Results   Component Value Date    MAG 1.8 08/13/2019            Lab Results   Component Value Date    CR 1.03 08/13/2019                   Lab Results   Component Value Date    EILEEN 8.9 08/13/2019                Lab Results   Component Value Date    BILITOTAL 0.8 08/13/2019           Lab Results   Component Value Date    ALBUMIN 3.5 08/13/2019                    Lab Results   Component Value Date    ALT 28 08/13/2019           Lab Results   Component Value Date    AST 16 08/13/2019       Results reviewed, labs MET treatment parameters, ok to proceed with treatment.      Post Infusion Assessment:  Patient tolerated injection without incident.       Discharge Plan:   Discharge instructions reviewed with: Patient and Family.  Patient and/or family verbalized understanding of discharge instructions and all questions answered.  Patient discharged in stable condition accompanied by: wife.  Departure Mode: Ambulatory.    Maru Reese RN

## 2019-08-14 LAB
ALBUMIN SERPL ELPH-MCNC: 3.6 G/DL (ref 3.7–5.1)
ALPHA1 GLOB SERPL ELPH-MCNC: 0.4 G/DL (ref 0.2–0.4)
ALPHA2 GLOB SERPL ELPH-MCNC: 0.8 G/DL (ref 0.5–0.9)
B-GLOBULIN SERPL ELPH-MCNC: 0.8 G/DL (ref 0.6–1)
B2 MICROGLOB SERPL-MCNC: 2.5 MG/L
GAMMA GLOB SERPL ELPH-MCNC: 0.6 G/DL (ref 0.7–1.6)
KAPPA LC UR-MCNC: 2.95 MG/DL (ref 0.33–1.94)
KAPPA LC/LAMBDA SER: 0.45 {RATIO} (ref 0.26–1.65)
LAMBDA LC SERPL-MCNC: 6.5 MG/DL (ref 0.57–2.63)
M PROTEIN SERPL ELPH-MCNC: 0.1 G/DL
PROT ELPH PNL UR ELPH: NORMAL
PROT PATTERN SERPL ELPH-IMP: ABNORMAL
PROT PATTERN UR ELPH-IMP: NORMAL

## 2019-08-19 PROBLEM — I51.89 OTHER ILL-DEFINED HEART DISEASES: Status: ACTIVE | Noted: 2019-08-12

## 2019-08-20 ENCOUNTER — APPOINTMENT (OUTPATIENT)
Dept: LAB | Facility: CLINIC | Age: 69
End: 2019-08-20
Attending: INTERNAL MEDICINE
Payer: MEDICARE

## 2019-08-20 ENCOUNTER — INFUSION THERAPY VISIT (OUTPATIENT)
Dept: TRANSPLANT | Facility: CLINIC | Age: 69
End: 2019-08-20
Attending: INTERNAL MEDICINE
Payer: MEDICARE

## 2019-08-20 ENCOUNTER — TELEPHONE (OUTPATIENT)
Dept: ONCOLOGY | Facility: CLINIC | Age: 69
End: 2019-08-20

## 2019-08-20 VITALS
TEMPERATURE: 97.8 F | OXYGEN SATURATION: 92 % | SYSTOLIC BLOOD PRESSURE: 106 MMHG | BODY MASS INDEX: 30.63 KG/M2 | WEIGHT: 213.5 LBS | HEART RATE: 87 BPM | RESPIRATION RATE: 16 BRPM | DIASTOLIC BLOOD PRESSURE: 59 MMHG

## 2019-08-20 DIAGNOSIS — C90.00 MULTIPLE MYELOMA, REMISSION STATUS UNSPECIFIED (H): ICD-10-CM

## 2019-08-20 DIAGNOSIS — E83.42 HYPOMAGNESEMIA: ICD-10-CM

## 2019-08-20 DIAGNOSIS — C90.02 MULTIPLE MYELOMA IN RELAPSE (H): Primary | ICD-10-CM

## 2019-08-20 LAB
ALBUMIN SERPL-MCNC: 3.6 G/DL (ref 3.4–5)
ALP SERPL-CCNC: 165 U/L (ref 40–150)
ALT SERPL W P-5'-P-CCNC: 32 U/L (ref 0–70)
ANION GAP SERPL CALCULATED.3IONS-SCNC: 5 MMOL/L (ref 3–14)
AST SERPL W P-5'-P-CCNC: 23 U/L (ref 0–45)
BASOPHILS # BLD AUTO: 0 10E9/L (ref 0–0.2)
BASOPHILS NFR BLD AUTO: 0.3 %
BILIRUB SERPL-MCNC: 1.7 MG/DL (ref 0.2–1.3)
BUN SERPL-MCNC: 24 MG/DL (ref 7–30)
CALCIUM SERPL-MCNC: 8.3 MG/DL (ref 8.5–10.1)
CHLORIDE SERPL-SCNC: 107 MMOL/L (ref 94–109)
CO2 SERPL-SCNC: 27 MMOL/L (ref 20–32)
CREAT SERPL-MCNC: 1.21 MG/DL (ref 0.66–1.25)
DIFFERENTIAL METHOD BLD: ABNORMAL
EOSINOPHIL # BLD AUTO: 0.1 10E9/L (ref 0–0.7)
EOSINOPHIL NFR BLD AUTO: 1.8 %
ERYTHROCYTE [DISTWIDTH] IN BLOOD BY AUTOMATED COUNT: 16.6 % (ref 10–15)
GFR SERPL CREATININE-BSD FRML MDRD: 61 ML/MIN/{1.73_M2}
GLUCOSE SERPL-MCNC: 142 MG/DL (ref 70–99)
HCT VFR BLD AUTO: 31.3 % (ref 40–53)
HGB BLD-MCNC: 9.9 G/DL (ref 13.3–17.7)
IMM GRANULOCYTES # BLD: 0.1 10E9/L (ref 0–0.4)
IMM GRANULOCYTES NFR BLD: 0.8 %
LYMPHOCYTES # BLD AUTO: 0.6 10E9/L (ref 0.8–5.3)
LYMPHOCYTES NFR BLD AUTO: 9.1 %
MAGNESIUM SERPL-MCNC: 1.7 MG/DL (ref 1.6–2.3)
MCH RBC QN AUTO: 36.3 PG (ref 26.5–33)
MCHC RBC AUTO-ENTMCNC: 31.6 G/DL (ref 31.5–36.5)
MCV RBC AUTO: 115 FL (ref 78–100)
MONOCYTES # BLD AUTO: 0.4 10E9/L (ref 0–1.3)
MONOCYTES NFR BLD AUTO: 6.3 %
NEUTROPHILS # BLD AUTO: 5 10E9/L (ref 1.6–8.3)
NEUTROPHILS NFR BLD AUTO: 81.7 %
NRBC # BLD AUTO: 0 10*3/UL
NRBC BLD AUTO-RTO: 0 /100
PLATELET # BLD AUTO: 86 10E9/L (ref 150–450)
POTASSIUM SERPL-SCNC: 4 MMOL/L (ref 3.4–5.3)
PROT SERPL-MCNC: 6.5 G/DL (ref 6.8–8.8)
RBC # BLD AUTO: 2.73 10E12/L (ref 4.4–5.9)
SODIUM SERPL-SCNC: 140 MMOL/L (ref 133–144)
WBC # BLD AUTO: 6.2 10E9/L (ref 4–11)

## 2019-08-20 PROCEDURE — 36415 COLL VENOUS BLD VENIPUNCTURE: CPT

## 2019-08-20 PROCEDURE — 96401 CHEMO ANTI-NEOPL SQ/IM: CPT

## 2019-08-20 PROCEDURE — 83735 ASSAY OF MAGNESIUM: CPT | Performed by: INTERNAL MEDICINE

## 2019-08-20 PROCEDURE — 85025 COMPLETE CBC W/AUTO DIFF WBC: CPT | Performed by: INTERNAL MEDICINE

## 2019-08-20 PROCEDURE — 80053 COMPREHEN METABOLIC PANEL: CPT | Performed by: INTERNAL MEDICINE

## 2019-08-20 PROCEDURE — 25000128 H RX IP 250 OP 636: Mod: JW,ZF | Performed by: INTERNAL MEDICINE

## 2019-08-20 RX ORDER — POTASSIUM CHLORIDE 750 MG/1
20 TABLET, EXTENDED RELEASE ORAL DAILY
Qty: 90 TABLET | Refills: 1 | Status: SHIPPED | OUTPATIENT
Start: 2019-08-20 | End: 2019-12-12

## 2019-08-20 RX ADMIN — BORTEZOMIB 2.5 MG: 3.5 INJECTION, POWDER, LYOPHILIZED, FOR SOLUTION INTRAVENOUS; SUBCUTANEOUS at 11:57

## 2019-08-20 ASSESSMENT — PAIN SCALES - GENERAL: PAINLEVEL: NO PAIN (0)

## 2019-08-20 NOTE — ORAL ONC MGMT
Oral Chemotherapy Monitoring Program    Primary Oncologist: Dr. Raygoza  Primary Oncology Clinic: [facility]  Cancer Diagnosis: Multiple Myeloma    Therapy History:  C1D1: 2/3/2019    Revlimid 10 mg (1 X 10 mg) by mouth on days 1-14 of 28 day cycle. C7D1: 8/13/19    Drug Interaction Assessment: No new known drug interactions    Lab Monitoring Plan  CBC and CMP every 2 weeks  Subjective/Objective:  Erasmo Pearce is a 69 year old male contacted by phone for a follow-up visit for oral chemotherapy. Jonh said he started cycle 7 of Revlimid on 8/13, and has missed no doses. He takes one capsule (10 mg) of Revlimid by mouth daily on days 1-14 of his 28 day cycle. He says he has about 7 capsules left of his Revlimid. He said he has not noticed any changes in symptoms recently. He is doing well on Revlimid and didn't note any side effects. He specifically denied rash, nausea, or changes in bowel movements.    ORAL CHEMOTHERAPY 2/12/2019 3/7/2019 4/18/2019 5/29/2019 6/4/2019 7/23/2019 8/20/2019   Drug Name Revlimid (Lenalidomide) Revlimid (Lenalidomide) Revlimid (Lenalidomide) Revlimid (Lenalidomide) Revlimid (Lenalidomide) Revlimid (Lenalidomide) Revlimid (Lenalidomide)   Current Dosage 10mg 10mg 10mg 10mg 10mg 10mg 10mg   Current Schedule Daily Daily Daily Daily Daily Daily Daily   Cycle Details 2 weeks on 2 weeks off 2 weeks on 2 weeks off 2 weeks on 2 weeks off 2 weeks on 2 weeks off Drug on Hold 2 weeks on 2 weeks off 2 weeks on 2 weeks off   Start Date of Last Cycle 2/3/2019 3/3/2019 4/7/2019 5/12/2019 - 7/16/2019 8/13/2019   Planned next cycle start date 3/3/2019 3/31/2019 5/5/2019 6/9/2019 - 8/13/2019 9/10/2019   Doses missed in last 2 weeks 0 0 0 0 - 0 0   Adherence Assessment Adherent Adherent Adherent Adherent - Adherent Adherent   Adverse Effects No AE identified during assessment No AE identified during assessment;Other (see note for details) Myalgias No AE identified during assessment -  "No AE identified during assessment No AE identified during assessment   Myalgias - - Grade 1 - - - -   Pharmacist Intervention(myalgias) - - Yes - - - -   Intervention(s) - - Patient education - - - -   Pharmacist intervention? - Yes - - - - -   Intervention(s) - Patient education - - - - -   Home BPs - not needed not needed - - - -   Any new drug interactions? No No No No - No No   Is the dose as ordered appropriate for the patient? Yes Yes Yes Yes - Yes Yes   Has the patient been assessed within the past 6 months for depression? - - - - - Yes -   Has the patient missed any days of school, work, or other routine activity? - - - - - No -     Vitals:  BP:   BP Readings from Last 1 Encounters:   08/20/19 106/59     Wt Readings from Last 1 Encounters:   08/20/19 96.8 kg (213 lb 8 oz)     Estimated body surface area is 2.19 meters squared as calculated from the following:    Height as of 8/13/19: 1.778 m (5' 10\").    Weight as of an earlier encounter on 8/20/19: 96.8 kg (213 lb 8 oz).    Labs:  _  Result Component Current Result Ref Range   Sodium 140 (8/20/2019) 133 - 144 mmol/L     _  Result Component Current Result Ref Range   Potassium 4.0 (8/20/2019) 3.4 - 5.3 mmol/L     _  Result Component Current Result Ref Range   Calcium 8.3 (L) (8/20/2019) 8.5 - 10.1 mg/dL     _  Result Component Current Result Ref Range   Magnesium 1.7 (8/20/2019) 1.6 - 2.3 mg/dL     No results found for Phos within last 30 days.     _  Result Component Current Result Ref Range   Albumin 3.6 (8/20/2019) 3.4 - 5.0 g/dL     _  Result Component Current Result Ref Range   Urea Nitrogen 24 (8/20/2019) 7 - 30 mg/dL     _  Result Component Current Result Ref Range   Creatinine 1.21 (8/20/2019) 0.66 - 1.25 mg/dL       _  Result Component Current Result Ref Range   AST 23 (8/20/2019) 0 - 45 U/L     _  Result Component Current Result Ref Range   ALT 32 (8/20/2019) 0 - 70 U/L     _  Result Component Current Result Ref Range   Bilirubin Total 1.7 (H) " (8/20/2019) 0.2 - 1.3 mg/dL       _  Result Component Current Result Ref Range   WBC 6.2 (8/20/2019) 4.0 - 11.0 10e9/L     _  Result Component Current Result Ref Range   Hemoglobin 9.9 (L) (8/20/2019) 13.3 - 17.7 g/dL     _  Result Component Current Result Ref Range   Platelet Count 86 (L) (8/20/2019) 150 - 450 10e9/L     _  Result Component Current Result Ref Range   Absolute Neutrophil 5.0 (8/20/2019) 1.6 - 8.3 10e9/L       Assessment:  Jonh is tolerating Revlimid well    Plan:  Continue Revlimid 10 mg by mouth on days 1-14 of 28 day cycle    Follow-Up:  -Review labs on 9/3  -Review [facility] appt on 9/10    Refill Due:  Before 9/10    Jeannine Figueredo  Pharmacy Intern  [facility]  [phone]

## 2019-08-20 NOTE — NURSING NOTE
Chief Complaint   Patient presents with     Blood Draw     Labs drawn via  by RN in lab. VS taken.      Infusion     Velcade injection, hx MM s/p transplant.

## 2019-08-20 NOTE — PROGRESS NOTES
Infusion Nursing Note:  Erasmo Pearce presents today for scheduled chemo.    Patient seen by provider today: No, Dr. Raygoza touched base with patient re: potassium and BLE edema but not an actual provider visit.   present during visit today: Not Applicable.    Note: Patient arrives for day 8, cycle 4 velcade.  Lab parameters monitored and met.  Weight is up today with increased BLE edema, pharmacy aware and discussed with Dr. Raygoza who recommended continuing with prescribed dosage rather than increasing.  Administered subcutaneous RLQ and patient tolerated well.  No further replacement needs today.    Intravenous Access:  Labs drawn without difficulty.    Treatment Conditions:  Lab Results   Component Value Date    HGB 9.9 08/20/2019     Lab Results   Component Value Date    WBC 6.2 08/20/2019      Lab Results   Component Value Date    ANEU 5.0 08/20/2019     Lab Results   Component Value Date    PLT 86 08/20/2019      Results reviewed, labs MET treatment parameters, ok to proceed with treatment.      Post Infusion Assessment:  Patient tolerated injection without incident.       Discharge Plan:   Patient discharged in stable condition accompanied by: wife.  Departure Mode: Ambulatory.  Patient and wife aware of follow up appointments.    Tawny Aldridge RN, BMTCN

## 2019-08-27 ENCOUNTER — INFUSION THERAPY VISIT (OUTPATIENT)
Dept: TRANSPLANT | Facility: CLINIC | Age: 69
DRG: 287 | End: 2019-08-27
Attending: INTERNAL MEDICINE
Payer: MEDICARE

## 2019-08-27 ENCOUNTER — APPOINTMENT (OUTPATIENT)
Dept: LAB | Facility: CLINIC | Age: 69
DRG: 287 | End: 2019-08-27
Attending: INTERNAL MEDICINE
Payer: MEDICARE

## 2019-08-27 VITALS
SYSTOLIC BLOOD PRESSURE: 104 MMHG | TEMPERATURE: 98.2 F | DIASTOLIC BLOOD PRESSURE: 62 MMHG | BODY MASS INDEX: 29.95 KG/M2 | RESPIRATION RATE: 16 BRPM | WEIGHT: 209.2 LBS | HEART RATE: 75 BPM | OXYGEN SATURATION: 94 % | HEIGHT: 70 IN

## 2019-08-27 DIAGNOSIS — C90.02 MULTIPLE MYELOMA IN RELAPSE (H): ICD-10-CM

## 2019-08-27 DIAGNOSIS — C90.00 MULTIPLE MYELOMA, REMISSION STATUS UNSPECIFIED (H): ICD-10-CM

## 2019-08-27 DIAGNOSIS — E83.42 HYPOMAGNESEMIA: ICD-10-CM

## 2019-08-27 LAB
ALBUMIN SERPL-MCNC: 3.4 G/DL (ref 3.4–5)
ALP SERPL-CCNC: 152 U/L (ref 40–150)
ALT SERPL W P-5'-P-CCNC: 35 U/L (ref 0–70)
ANION GAP SERPL CALCULATED.3IONS-SCNC: 6 MMOL/L (ref 3–14)
AST SERPL W P-5'-P-CCNC: 18 U/L (ref 0–45)
BASOPHILS # BLD AUTO: 0 10E9/L (ref 0–0.2)
BASOPHILS NFR BLD AUTO: 0 %
BILIRUB SERPL-MCNC: 1.7 MG/DL (ref 0.2–1.3)
BUN SERPL-MCNC: 31 MG/DL (ref 7–30)
CALCIUM SERPL-MCNC: 8.5 MG/DL (ref 8.5–10.1)
CHLORIDE SERPL-SCNC: 106 MMOL/L (ref 94–109)
CO2 SERPL-SCNC: 28 MMOL/L (ref 20–32)
CREAT SERPL-MCNC: 1.3 MG/DL (ref 0.66–1.25)
DIFFERENTIAL METHOD BLD: ABNORMAL
EOSINOPHIL # BLD AUTO: 0 10E9/L (ref 0–0.7)
EOSINOPHIL NFR BLD AUTO: 0.5 %
ERYTHROCYTE [DISTWIDTH] IN BLOOD BY AUTOMATED COUNT: 15.9 % (ref 10–15)
GFR SERPL CREATININE-BSD FRML MDRD: 56 ML/MIN/{1.73_M2}
GLUCOSE SERPL-MCNC: 176 MG/DL (ref 70–99)
HCT VFR BLD AUTO: 28.7 % (ref 40–53)
HGB BLD-MCNC: 9 G/DL (ref 13.3–17.7)
IMM GRANULOCYTES # BLD: 0.1 10E9/L (ref 0–0.4)
IMM GRANULOCYTES NFR BLD: 0.8 %
LYMPHOCYTES # BLD AUTO: 0.4 10E9/L (ref 0.8–5.3)
LYMPHOCYTES NFR BLD AUTO: 6.3 %
MAGNESIUM SERPL-MCNC: 1.8 MG/DL (ref 1.6–2.3)
MCH RBC QN AUTO: 35.9 PG (ref 26.5–33)
MCHC RBC AUTO-ENTMCNC: 31.4 G/DL (ref 31.5–36.5)
MCV RBC AUTO: 114 FL (ref 78–100)
MONOCYTES # BLD AUTO: 0.3 10E9/L (ref 0–1.3)
MONOCYTES NFR BLD AUTO: 4.1 %
NEUTROPHILS # BLD AUTO: 5.6 10E9/L (ref 1.6–8.3)
NEUTROPHILS NFR BLD AUTO: 88.3 %
NRBC # BLD AUTO: 0 10*3/UL
NRBC BLD AUTO-RTO: 0 /100
PLATELET # BLD AUTO: 39 10E9/L (ref 150–450)
PLATELET # BLD EST: ABNORMAL 10*3/UL
POTASSIUM SERPL-SCNC: 4 MMOL/L (ref 3.4–5.3)
PROT SERPL-MCNC: 6.6 G/DL (ref 6.8–8.8)
RBC # BLD AUTO: 2.51 10E12/L (ref 4.4–5.9)
SODIUM SERPL-SCNC: 140 MMOL/L (ref 133–144)
WBC # BLD AUTO: 6.4 10E9/L (ref 4–11)

## 2019-08-27 PROCEDURE — 80053 COMPREHEN METABOLIC PANEL: CPT | Performed by: INTERNAL MEDICINE

## 2019-08-27 PROCEDURE — 84703 CHORIONIC GONADOTROPIN ASSAY: CPT | Performed by: INTERNAL MEDICINE

## 2019-08-27 PROCEDURE — 85025 COMPLETE CBC W/AUTO DIFF WBC: CPT | Performed by: INTERNAL MEDICINE

## 2019-08-27 PROCEDURE — 36415 COLL VENOUS BLD VENIPUNCTURE: CPT

## 2019-08-27 PROCEDURE — 83735 ASSAY OF MAGNESIUM: CPT | Performed by: INTERNAL MEDICINE

## 2019-08-27 ASSESSMENT — MIFFLIN-ST. JEOR: SCORE: 1725.17

## 2019-08-27 ASSESSMENT — PAIN SCALES - GENERAL: PAINLEVEL: NO PAIN (0)

## 2019-08-29 ENCOUNTER — HOSPITAL ENCOUNTER (INPATIENT)
Facility: CLINIC | Age: 69
LOS: 1 days | Discharge: HOME | DRG: 287 | End: 2019-08-30
Attending: INTERNAL MEDICINE | Admitting: INTERNAL MEDICINE
Payer: MEDICARE

## 2019-08-29 ENCOUNTER — APPOINTMENT (OUTPATIENT)
Dept: MEDSURG UNIT | Facility: CLINIC | Age: 69
DRG: 287 | End: 2019-08-29
Attending: INTERNAL MEDICINE
Payer: MEDICARE

## 2019-08-29 ENCOUNTER — APPOINTMENT (OUTPATIENT)
Dept: LAB | Facility: CLINIC | Age: 69
DRG: 287 | End: 2019-08-29
Attending: INTERNAL MEDICINE
Payer: MEDICARE

## 2019-08-29 DIAGNOSIS — C90.00 MULTIPLE MYELOMA, REMISSION STATUS UNSPECIFIED (H): ICD-10-CM

## 2019-08-29 DIAGNOSIS — Z92.29 HISTORY OF LONG-TERM USE OF MULTIPLE PRESCRIPTION DRUGS: ICD-10-CM

## 2019-08-29 DIAGNOSIS — I25.5 ISCHEMIC CARDIOMYOPATHY: ICD-10-CM

## 2019-08-29 DIAGNOSIS — Z86.2 PERSONAL HISTORY OF DISEASES OF BLOOD AND BLOOD-FORMING ORGANS: ICD-10-CM

## 2019-08-29 DIAGNOSIS — I42.0 DILATED CARDIOMYOPATHY (H): ICD-10-CM

## 2019-08-29 DIAGNOSIS — I50.22 CHRONIC SYSTOLIC HEART FAILURE (H): ICD-10-CM

## 2019-08-29 DIAGNOSIS — Z95.810 IMPLANTABLE CARDIOVERTER-DEFIBRILLATOR (ICD) IN SITU: ICD-10-CM

## 2019-08-29 DIAGNOSIS — C90.00 MULTIPLE MYELOMA (H): ICD-10-CM

## 2019-08-29 DIAGNOSIS — N28.9 RENAL INSUFFICIENCY: ICD-10-CM

## 2019-08-29 DIAGNOSIS — I51.89 OTHER ILL-DEFINED HEART DISEASES: ICD-10-CM

## 2019-08-29 DIAGNOSIS — I49.01 VENTRICULAR FIBRILLATION (H): ICD-10-CM

## 2019-08-29 DIAGNOSIS — E83.42 HYPOMAGNESEMIA: ICD-10-CM

## 2019-08-29 PROBLEM — I50.23 ACUTE ON CHRONIC SYSTOLIC HEART FAILURE (H): Status: ACTIVE | Noted: 2019-08-29

## 2019-08-29 LAB
ANION GAP SERPL CALCULATED.3IONS-SCNC: 6 MMOL/L (ref 3–14)
BUN SERPL-MCNC: 29 MG/DL (ref 7–30)
CALCIUM SERPL-MCNC: 8.3 MG/DL (ref 8.5–10.1)
CHLORIDE SERPL-SCNC: 106 MMOL/L (ref 94–109)
CO2 SERPL-SCNC: 26 MMOL/L (ref 20–32)
CREAT SERPL-MCNC: 1.07 MG/DL (ref 0.66–1.25)
ERYTHROCYTE [DISTWIDTH] IN BLOOD BY AUTOMATED COUNT: 16.1 % (ref 10–15)
GFR SERPL CREATININE-BSD FRML MDRD: 71 ML/MIN/{1.73_M2}
GLUCOSE BLDC GLUCOMTR-MCNC: 110 MG/DL (ref 70–99)
GLUCOSE BLDC GLUCOMTR-MCNC: 83 MG/DL (ref 70–99)
GLUCOSE SERPL-MCNC: 102 MG/DL (ref 70–99)
HBA1C MFR BLD: 6.3 % (ref 0–5.6)
HCT VFR BLD AUTO: 29.7 % (ref 40–53)
HGB BLD-MCNC: 9.2 G/DL (ref 13.3–17.7)
INR PPP: 1.8 (ref 0.86–1.14)
MCH RBC QN AUTO: 35.7 PG (ref 26.5–33)
MCHC RBC AUTO-ENTMCNC: 31 G/DL (ref 31.5–36.5)
MCV RBC AUTO: 115 FL (ref 78–100)
PLATELET # BLD AUTO: 49 10E9/L (ref 150–450)
POTASSIUM SERPL-SCNC: 3.8 MMOL/L (ref 3.4–5.3)
RBC # BLD AUTO: 2.58 10E12/L (ref 4.4–5.9)
SODIUM SERPL-SCNC: 139 MMOL/L (ref 133–144)
WBC # BLD AUTO: 7.1 10E9/L (ref 4–11)

## 2019-08-29 PROCEDURE — 25000132 ZZH RX MED GY IP 250 OP 250 PS 637: Mod: GY | Performed by: INTERNAL MEDICINE

## 2019-08-29 PROCEDURE — 00000146 ZZHCL STATISTIC GLUCOSE BY METER IP

## 2019-08-29 PROCEDURE — 36415 COLL VENOUS BLD VENIPUNCTURE: CPT | Performed by: INTERNAL MEDICINE

## 2019-08-29 PROCEDURE — 93463 DRUG ADMIN & HEMODYNMIC MEAS: CPT | Performed by: INTERNAL MEDICINE

## 2019-08-29 PROCEDURE — 21400000 ZZH R&B CCU UMMC

## 2019-08-29 PROCEDURE — 93460 R&L HRT ART/VENTRICLE ANGIO: CPT | Mod: 26 | Performed by: INTERNAL MEDICINE

## 2019-08-29 PROCEDURE — 25000132 ZZH RX MED GY IP 250 OP 250 PS 637: Mod: GY | Performed by: STUDENT IN AN ORGANIZED HEALTH CARE EDUCATION/TRAINING PROGRAM

## 2019-08-29 PROCEDURE — 93005 ELECTROCARDIOGRAM TRACING: CPT

## 2019-08-29 PROCEDURE — 27210794 ZZH OR GENERAL SUPPLY STERILE: Performed by: INTERNAL MEDICINE

## 2019-08-29 PROCEDURE — 93456 R HRT CORONARY ARTERY ANGIO: CPT | Performed by: INTERNAL MEDICINE

## 2019-08-29 PROCEDURE — 99153 MOD SED SAME PHYS/QHP EA: CPT | Performed by: INTERNAL MEDICINE

## 2019-08-29 PROCEDURE — B2111ZZ FLUOROSCOPY OF MULTIPLE CORONARY ARTERIES USING LOW OSMOLAR CONTRAST: ICD-10-PCS | Performed by: INTERNAL MEDICINE

## 2019-08-29 PROCEDURE — 83036 HEMOGLOBIN GLYCOSYLATED A1C: CPT | Performed by: INTERNAL MEDICINE

## 2019-08-29 PROCEDURE — 25000125 ZZHC RX 250: Performed by: INTERNAL MEDICINE

## 2019-08-29 PROCEDURE — 93452 LEFT HRT CATH W/VENTRCLGRPHY: CPT | Performed by: INTERNAL MEDICINE

## 2019-08-29 PROCEDURE — 25800030 ZZH RX IP 258 OP 636: Performed by: INTERNAL MEDICINE

## 2019-08-29 PROCEDURE — C1887 CATHETER, GUIDING: HCPCS | Performed by: INTERNAL MEDICINE

## 2019-08-29 PROCEDURE — 85027 COMPLETE CBC AUTOMATED: CPT | Performed by: INTERNAL MEDICINE

## 2019-08-29 PROCEDURE — C1894 INTRO/SHEATH, NON-LASER: HCPCS | Performed by: INTERNAL MEDICINE

## 2019-08-29 PROCEDURE — 99152 MOD SED SAME PHYS/QHP 5/>YRS: CPT | Performed by: INTERNAL MEDICINE

## 2019-08-29 PROCEDURE — 93010 ELECTROCARDIOGRAM REPORT: CPT | Performed by: INTERNAL MEDICINE

## 2019-08-29 PROCEDURE — C1769 GUIDE WIRE: HCPCS | Performed by: INTERNAL MEDICINE

## 2019-08-29 PROCEDURE — 93460 R&L HRT ART/VENTRICLE ANGIO: CPT | Performed by: INTERNAL MEDICINE

## 2019-08-29 PROCEDURE — 40000065 ZZH STATISTIC EKG NON-CHARGEABLE

## 2019-08-29 PROCEDURE — 85610 PROTHROMBIN TIME: CPT | Performed by: INTERNAL MEDICINE

## 2019-08-29 PROCEDURE — 25000128 H RX IP 250 OP 636: Performed by: INTERNAL MEDICINE

## 2019-08-29 PROCEDURE — 80048 BASIC METABOLIC PNL TOTAL CA: CPT | Performed by: INTERNAL MEDICINE

## 2019-08-29 PROCEDURE — 4A023N8 MEASUREMENT OF CARDIAC SAMPLING AND PRESSURE, BILATERAL, PERCUTANEOUS APPROACH: ICD-10-PCS | Performed by: INTERNAL MEDICINE

## 2019-08-29 PROCEDURE — 25000132 ZZH RX MED GY IP 250 OP 250 PS 637: Mod: GY | Performed by: NURSE PRACTITIONER

## 2019-08-29 PROCEDURE — B2151ZZ FLUOROSCOPY OF LEFT HEART USING LOW OSMOLAR CONTRAST: ICD-10-PCS | Performed by: INTERNAL MEDICINE

## 2019-08-29 PROCEDURE — 40000172 ZZH STATISTIC PROCEDURE PREP ONLY

## 2019-08-29 RX ORDER — POTASSIUM CHLORIDE 29.8 MG/ML
20 INJECTION INTRAVENOUS
Status: DISCONTINUED | OUTPATIENT
Start: 2019-08-29 | End: 2019-08-30 | Stop reason: HOSPADM

## 2019-08-29 RX ORDER — ARGATROBAN 1 MG/ML
150 INJECTION, SOLUTION INTRAVENOUS
Status: DISCONTINUED | OUTPATIENT
Start: 2019-08-29 | End: 2019-08-29 | Stop reason: HOSPADM

## 2019-08-29 RX ORDER — METOPROLOL SUCCINATE 100 MG/1
200 TABLET, EXTENDED RELEASE ORAL DAILY
Status: DISCONTINUED | OUTPATIENT
Start: 2019-08-30 | End: 2019-08-30 | Stop reason: HOSPADM

## 2019-08-29 RX ORDER — NITROGLYCERIN 20 MG/100ML
.07-2 INJECTION INTRAVENOUS CONTINUOUS PRN
Status: DISCONTINUED | OUTPATIENT
Start: 2019-08-29 | End: 2019-08-29 | Stop reason: HOSPADM

## 2019-08-29 RX ORDER — LIDOCAINE 40 MG/G
CREAM TOPICAL
Status: DISCONTINUED | OUTPATIENT
Start: 2019-08-29 | End: 2019-08-29 | Stop reason: HOSPADM

## 2019-08-29 RX ORDER — LISINOPRIL 5 MG/1
5 TABLET ORAL DAILY
Status: DISCONTINUED | OUTPATIENT
Start: 2019-08-30 | End: 2019-08-30

## 2019-08-29 RX ORDER — POTASSIUM CHLORIDE 1.5 G/1.58G
20-40 POWDER, FOR SOLUTION ORAL
Status: DISCONTINUED | OUTPATIENT
Start: 2019-08-29 | End: 2019-08-30 | Stop reason: HOSPADM

## 2019-08-29 RX ORDER — ARGATROBAN 1 MG/ML
350 INJECTION, SOLUTION INTRAVENOUS
Status: DISCONTINUED | OUTPATIENT
Start: 2019-08-29 | End: 2019-08-29 | Stop reason: HOSPADM

## 2019-08-29 RX ORDER — FENTANYL CITRATE 50 UG/ML
25-50 INJECTION, SOLUTION INTRAMUSCULAR; INTRAVENOUS
Status: ACTIVE | OUTPATIENT
Start: 2019-08-29 | End: 2019-08-30

## 2019-08-29 RX ORDER — WARFARIN SODIUM 5 MG/1
5 TABLET ORAL
Status: COMPLETED | OUTPATIENT
Start: 2019-08-29 | End: 2019-08-29

## 2019-08-29 RX ORDER — DOBUTAMINE HYDROCHLORIDE 200 MG/100ML
2-20 INJECTION INTRAVENOUS CONTINUOUS PRN
Status: DISCONTINUED | OUTPATIENT
Start: 2019-08-29 | End: 2019-08-29 | Stop reason: HOSPADM

## 2019-08-29 RX ORDER — ALBUTEROL SULFATE 90 UG/1
2 AEROSOL, METERED RESPIRATORY (INHALATION) EVERY 6 HOURS PRN
Status: DISCONTINUED | OUTPATIENT
Start: 2019-08-29 | End: 2019-08-30 | Stop reason: HOSPADM

## 2019-08-29 RX ORDER — OXYCODONE HYDROCHLORIDE 5 MG/1
5-10 TABLET ORAL EVERY 6 HOURS PRN
Status: DISCONTINUED | OUTPATIENT
Start: 2019-08-29 | End: 2019-08-30 | Stop reason: HOSPADM

## 2019-08-29 RX ORDER — TRAZODONE HYDROCHLORIDE 50 MG/1
50 TABLET, FILM COATED ORAL
Status: DISCONTINUED | OUTPATIENT
Start: 2019-08-29 | End: 2019-08-30 | Stop reason: HOSPADM

## 2019-08-29 RX ORDER — TIROFIBAN HYDROCHLORIDE 50 UG/ML
0.15 INJECTION INTRAVENOUS CONTINUOUS PRN
Status: DISCONTINUED | OUTPATIENT
Start: 2019-08-29 | End: 2019-08-29 | Stop reason: HOSPADM

## 2019-08-29 RX ORDER — SODIUM CHLORIDE 9 MG/ML
INJECTION, SOLUTION INTRAVENOUS CONTINUOUS
Status: DISCONTINUED | OUTPATIENT
Start: 2019-08-29 | End: 2019-08-29 | Stop reason: HOSPADM

## 2019-08-29 RX ORDER — FENTANYL CITRATE 50 UG/ML
INJECTION, SOLUTION INTRAMUSCULAR; INTRAVENOUS
Status: DISCONTINUED | OUTPATIENT
Start: 2019-08-29 | End: 2019-08-29 | Stop reason: HOSPADM

## 2019-08-29 RX ORDER — MEXILETINE HYDROCHLORIDE 150 MG/1
150 CAPSULE ORAL 2 TIMES DAILY
Status: DISCONTINUED | OUTPATIENT
Start: 2019-08-29 | End: 2019-08-30 | Stop reason: HOSPADM

## 2019-08-29 RX ORDER — ACETAMINOPHEN 650 MG/1
650 SUPPOSITORY RECTAL EVERY 4 HOURS PRN
Status: DISCONTINUED | OUTPATIENT
Start: 2019-08-29 | End: 2019-08-30 | Stop reason: HOSPADM

## 2019-08-29 RX ORDER — POTASSIUM CHLORIDE 750 MG/1
20 TABLET, EXTENDED RELEASE ORAL
Status: COMPLETED | OUTPATIENT
Start: 2019-08-29 | End: 2019-08-29

## 2019-08-29 RX ORDER — NITROGLYCERIN 0.4 MG/1
0.4 TABLET SUBLINGUAL EVERY 5 MIN PRN
Status: DISCONTINUED | OUTPATIENT
Start: 2019-08-29 | End: 2019-08-30 | Stop reason: HOSPADM

## 2019-08-29 RX ORDER — ONDANSETRON 2 MG/ML
4 INJECTION INTRAMUSCULAR; INTRAVENOUS EVERY 6 HOURS PRN
Status: DISCONTINUED | OUTPATIENT
Start: 2019-08-29 | End: 2019-08-30 | Stop reason: HOSPADM

## 2019-08-29 RX ORDER — FENTANYL 25 UG/1
25 PATCH TRANSDERMAL
Status: DISCONTINUED | OUTPATIENT
Start: 2019-08-30 | End: 2019-08-30 | Stop reason: HOSPADM

## 2019-08-29 RX ORDER — NOREPINEPHRINE BITARTRATE 0.06 MG/ML
.03-.4 INJECTION, SOLUTION INTRAVENOUS CONTINUOUS PRN
Status: DISCONTINUED | OUTPATIENT
Start: 2019-08-29 | End: 2019-08-29 | Stop reason: HOSPADM

## 2019-08-29 RX ORDER — ALUMINA, MAGNESIA, AND SIMETHICONE 2400; 2400; 240 MG/30ML; MG/30ML; MG/30ML
30 SUSPENSION ORAL EVERY 4 HOURS PRN
Status: DISCONTINUED | OUTPATIENT
Start: 2019-08-29 | End: 2019-08-30 | Stop reason: HOSPADM

## 2019-08-29 RX ORDER — ACETAMINOPHEN 325 MG/1
650 TABLET ORAL EVERY 4 HOURS PRN
Status: DISCONTINUED | OUTPATIENT
Start: 2019-08-29 | End: 2019-08-30 | Stop reason: HOSPADM

## 2019-08-29 RX ORDER — FUROSEMIDE 10 MG/ML
INJECTION INTRAMUSCULAR; INTRAVENOUS
Status: DISCONTINUED | OUTPATIENT
Start: 2019-08-29 | End: 2019-08-29 | Stop reason: HOSPADM

## 2019-08-29 RX ORDER — POTASSIUM CHLORIDE 7.45 MG/ML
10 INJECTION INTRAVENOUS
Status: DISCONTINUED | OUTPATIENT
Start: 2019-08-29 | End: 2019-08-30 | Stop reason: HOSPADM

## 2019-08-29 RX ORDER — FLUMAZENIL 0.1 MG/ML
0.2 INJECTION, SOLUTION INTRAVENOUS
Status: ACTIVE | OUTPATIENT
Start: 2019-08-29 | End: 2019-08-30

## 2019-08-29 RX ORDER — NITROGLYCERIN 5 MG/ML
VIAL (ML) INTRAVENOUS
Status: DISCONTINUED | OUTPATIENT
Start: 2019-08-29 | End: 2019-08-29 | Stop reason: HOSPADM

## 2019-08-29 RX ORDER — ONDANSETRON 4 MG/1
4 TABLET, ORALLY DISINTEGRATING ORAL EVERY 6 HOURS PRN
Status: DISCONTINUED | OUTPATIENT
Start: 2019-08-29 | End: 2019-08-30 | Stop reason: HOSPADM

## 2019-08-29 RX ORDER — NICOTINE POLACRILEX 4 MG
15-30 LOZENGE BUCCAL
Status: DISCONTINUED | OUTPATIENT
Start: 2019-08-29 | End: 2019-08-30 | Stop reason: HOSPADM

## 2019-08-29 RX ORDER — POTASSIUM CL/LIDO/0.9 % NACL 10MEQ/0.1L
10 INTRAVENOUS SOLUTION, PIGGYBACK (ML) INTRAVENOUS
Status: DISCONTINUED | OUTPATIENT
Start: 2019-08-29 | End: 2019-08-30 | Stop reason: HOSPADM

## 2019-08-29 RX ORDER — MAGNESIUM SULFATE HEPTAHYDRATE 40 MG/ML
4 INJECTION, SOLUTION INTRAVENOUS EVERY 4 HOURS PRN
Status: DISCONTINUED | OUTPATIENT
Start: 2019-08-29 | End: 2019-08-30 | Stop reason: HOSPADM

## 2019-08-29 RX ORDER — NALOXONE HYDROCHLORIDE 0.4 MG/ML
.1-.4 INJECTION, SOLUTION INTRAMUSCULAR; INTRAVENOUS; SUBCUTANEOUS
Status: DISCONTINUED | OUTPATIENT
Start: 2019-08-29 | End: 2019-08-30 | Stop reason: HOSPADM

## 2019-08-29 RX ORDER — DOPAMINE HYDROCHLORIDE 160 MG/100ML
2-20 INJECTION, SOLUTION INTRAVENOUS CONTINUOUS PRN
Status: DISCONTINUED | OUTPATIENT
Start: 2019-08-29 | End: 2019-08-29 | Stop reason: HOSPADM

## 2019-08-29 RX ORDER — NALOXONE HYDROCHLORIDE 0.4 MG/ML
.2-.4 INJECTION, SOLUTION INTRAMUSCULAR; INTRAVENOUS; SUBCUTANEOUS
Status: ACTIVE | OUTPATIENT
Start: 2019-08-29 | End: 2019-08-30

## 2019-08-29 RX ORDER — LIDOCAINE 40 MG/G
CREAM TOPICAL
Status: DISCONTINUED | OUTPATIENT
Start: 2019-08-29 | End: 2019-08-30 | Stop reason: HOSPADM

## 2019-08-29 RX ORDER — EPTIFIBATIDE 2 MG/ML
2 INJECTION, SOLUTION INTRAVENOUS CONTINUOUS PRN
Status: DISCONTINUED | OUTPATIENT
Start: 2019-08-29 | End: 2019-08-29 | Stop reason: HOSPADM

## 2019-08-29 RX ORDER — BACLOFEN 10 MG/1
10 TABLET ORAL 3 TIMES DAILY PRN
Status: DISCONTINUED | OUTPATIENT
Start: 2019-08-29 | End: 2019-08-30 | Stop reason: HOSPADM

## 2019-08-29 RX ORDER — POTASSIUM CHLORIDE 750 MG/1
20-40 TABLET, EXTENDED RELEASE ORAL
Status: DISCONTINUED | OUTPATIENT
Start: 2019-08-29 | End: 2019-08-30 | Stop reason: HOSPADM

## 2019-08-29 RX ORDER — ATROPINE SULFATE 0.1 MG/ML
0.5 INJECTION INTRAVENOUS EVERY 5 MIN PRN
Status: ACTIVE | OUTPATIENT
Start: 2019-08-29 | End: 2019-08-30

## 2019-08-29 RX ORDER — EPTIFIBATIDE 2 MG/ML
180 INJECTION, SOLUTION INTRAVENOUS EVERY 10 MIN PRN
Status: DISCONTINUED | OUTPATIENT
Start: 2019-08-29 | End: 2019-08-29 | Stop reason: HOSPADM

## 2019-08-29 RX ORDER — SIMVASTATIN 40 MG
40 TABLET ORAL AT BEDTIME
Status: DISCONTINUED | OUTPATIENT
Start: 2019-08-29 | End: 2019-08-30 | Stop reason: HOSPADM

## 2019-08-29 RX ORDER — DEXTROSE MONOHYDRATE 25 G/50ML
25-50 INJECTION, SOLUTION INTRAVENOUS
Status: DISCONTINUED | OUTPATIENT
Start: 2019-08-29 | End: 2019-08-30 | Stop reason: HOSPADM

## 2019-08-29 RX ORDER — POTASSIUM CHLORIDE 750 MG/1
40 TABLET, EXTENDED RELEASE ORAL
Status: DISCONTINUED | OUTPATIENT
Start: 2019-08-29 | End: 2019-08-29 | Stop reason: HOSPADM

## 2019-08-29 RX ADMIN — POTASSIUM CHLORIDE 20 MEQ: 10 TABLET, EXTENDED RELEASE ORAL at 18:13

## 2019-08-29 RX ADMIN — MEXILETINE HYDROCHLORIDE 150 MG: 150 CAPSULE ORAL at 20:30

## 2019-08-29 RX ADMIN — POTASSIUM CHLORIDE 20 MEQ: 750 TABLET, EXTENDED RELEASE ORAL at 13:15

## 2019-08-29 RX ADMIN — SODIUM CHLORIDE: 9 INJECTION, SOLUTION INTRAVENOUS at 12:31

## 2019-08-29 RX ADMIN — OXYCODONE HYDROCHLORIDE 10 MG: 5 TABLET ORAL at 21:52

## 2019-08-29 RX ADMIN — SIMVASTATIN 40 MG: 40 TABLET, FILM COATED ORAL at 21:52

## 2019-08-29 RX ADMIN — WARFARIN SODIUM 5 MG: 5 TABLET ORAL at 17:44

## 2019-08-29 RX ADMIN — GUAIFENESIN 10 ML: 100 SOLUTION ORAL at 22:37

## 2019-08-29 ASSESSMENT — PAIN DESCRIPTION - DESCRIPTORS: DESCRIPTORS: ACHING

## 2019-08-29 ASSESSMENT — ACTIVITIES OF DAILY LIVING (ADL): ADLS_ACUITY_SCORE: 11

## 2019-08-29 NOTE — Clinical Note
dry, intact, no bleeding and no hematoma. 7Fr RIJV sheath removed, manual pressure held until hemostasis. 6Fr RRA sheath removed, TR band in place.

## 2019-08-29 NOTE — PRE-PROCEDURE
GENERAL PRE-PROCEDURE:   Procedure:  Coronary angiogram; right heart catheterization    Verbal consent obtained?: Yes    Written consent obtained?: Yes    Risks and benefits: Risks, benefits and alternatives were discussed    Consent given by:  Parent  Patient states understanding of procedure being performed: Yes    Patient's understanding of procedure matches consent: Yes    Procedure consent matches procedure scheduled: Yes    Expected level of sedation:  Moderate  Appropriately NPO:  Yes  ASA Class:  Class 2- mild systemic disease, no acute problems, no functional limitations  Mallampati  :  Grade 3- soft palate visible, posterior pharyngeal wall not visible  Lungs:  Lungs clear with good breath sounds bilaterally  Heart:  Normal heart sounds and rate  History & Physical reviewed:  History and physical reviewed and no updates needed  Statement of review:  I have reviewed the lab findings, diagnostic data, medications, and the plan for sedation    The procedural details of the coronary angiogram were discussed in detail with the patient.  Risks and benefits were discussed; discussion included possible allergy to contrast dye in addition to:    Risks:  - Radiation exposure (X-ray)    (100.0% of patients)  - Bleeding (femoral, retroperitoneal)   (up to 7.0% of patients)  - Renal injury/PER     (~3.0% of patients)  - Transient/Permenant arrythmia  (~3.0% of patients)  - Dissection (coronaries, great vessels)  (1-3.0% of patients)  - Inefection      (<1.0% of patients)  - Emergent open heart surgery  (<0.1% of patients)  - CVA (ischemic)    (~0.07% of patinets)  - MI       (~0.1% of patinets)  - Death     (<0.1% of patients)    Benefit: greater understanding of their coronary anatomy.    Patient expressed understanding and agrees to move forward with the procedure at this point in time.  All questions were answered.    Neha De Jesus PA-C

## 2019-08-29 NOTE — H&P
History and Physical: Cardiology Service    Erasmo Pearce MRN# [id number]   YOB: 1950 Age: 69 year old       Admission Date: 8/29/2019    Chief Complaint: KANG    HPI: Erasmo Pearce is a 69 y.o.M with a PMHx of permanent atrial fibrillation (on Coumadin), ischemic cardiomyopathy s/p ICD (2009), history of VT/VF with appropriate ICD shocks, relapsed MM (with history of BMT- currently on chemotherapy) who is seen today after outpatient coronary angiogram and RHC found to have PCWP of 30.     Erasmo reports over last several months has had increasing KANG. Patient reports he was hospitalized in June with SBO, received large amounts of IV fluids while in the hospital, and feels that his breathing hasn't been back to baseline since. He was seen in EP Clinic with Dr. Duarte in July, with increased weight, edema, fatigue, and lightheadedness.  Patient underwent stress testing, which found decreased EF (PBZL10-70% from previous 40-45%).  Decision was made to go a coronary angiogram and RHC to assess worsening cardiomyopathy.  Coronary angiogram with no intervention.  RHC with elevated PWCP, 30.  Patient s/p 60 mg IV Lasix in cath lab.    At time of interview patient denies chest pain, SOB, lightheadedness, dizziness.    Past Medical History:   Diagnosis Date     Chronic systolic heart failure (H)      Ischemic cardiomyopathy      Other premature beats      Permanent atrial fibrillation (H)      Personal history of multiple myeloma      VF (ventricular fibrillation) (H)        Past Surgical History:   Procedure Laterality Date     IMPLANT AUTOMATIC IMPLANTABLE CARDIOVERTER DEFIBRILLATOR           No current facility-administered medications on file prior to encounter.   Current Outpatient Medications on File Prior to Encounter:  acyclovir (ZOVIRAX) 400 MG tablet Take 1 tablet (400 mg) by mouth 2 times daily   albuterol (PROAIR HFA, PROVENTIL HFA, VENTOLIN HFA) 108 (90 BASE) MCG/ACT inhaler  Inhale 2 puffs into the lungs every 6 hours as needed    amoxicillin (AMOXIL) 500 MG tablet Take 4 tabs one hour before dental appointment   baclofen (LIORESAL) 10 MG tablet Take 10 mg by mouth 3 times daily as needed prn   Calcium Carbonate-Vitamin D (CALCIUM 500 + D PO) Take 2 tablets by mouth daily.   dexamethasone (DECADRON) 4 MG tablet Take 5 tablets (20 mg) by mouth once a week Once a week with food on Days 1,8,15.   dexamethasone (DECADRON) 4 MG tablet Take 20 mg by mouth once a week Once a week with food on Days 1,8,15..   diclofenac (VOLTAREN) 75 MG EC tablet Take 75 mg by mouth 2 times daily as needed 1 tab prn   fentaNYL (DURAGESIC) 25 mcg/hr patch 72 hr Place 1 patch onto the skin every 72 hours   furosemide (LASIX) 40 MG tablet Take 1 tablet (40 mg) by mouth daily (Patient taking differently: Take 60 mg by mouth daily )   LENalidomide (REVLIMID) 10 MG CAPS capsule CHEMO Take 1 capsule (10 mg) by mouth daily Take on Days 1 through 14 of 28-day cycle.   LENalidomide (REVLIMID) 10 MG CAPS capsule CHEMO Take 1 capsule (10 mg) by mouth daily Take on Days 1 through 14 of 28-day cycle.   lisinopril (PRINIVIL,ZESTRIL) 5 MG tablet Take 5 mg by mouth daily    Magnesium Oxide 420 MG TABS Take 3 tabs (1260 mg) three times a day.   metoprolol succinate (TOPROL-XL) 200 MG 24 hr tablet Take 1 tablet (200 mg) by mouth daily   mexiletine (MEXITIL) 150 MG capsule Take 1 capsule (150 mg) by mouth 2 times daily As close to 12 hours apart as possible   omeprazole (PRILOSEC) 20 MG capsule Take 20 mg by mouth daily    ondansetron (ZOFRAN-ODT) 4 MG ODT tab Take 4 mg by mouth every 6 hours as needed for nausea   oxycodone (ROXICODONE) 5 MG immediate release tablet Take 1-2 tablets by mouth every 6 hours as needed. For pain.    simvastatin (ZOCOR) 40 MG tablet Take 40 mg by mouth At Bedtime    tiotropium (SPIRIVA) 18 MCG inhalation capsule Inhale 1 capsule (18 mcg) into the lungs daily   TRAZODONE HCL PO Take 50 mg by mouth  nightly as needed    nitroGLYCERIN (NITROSTAT) 0.4 MG SL tablet Place 0.4 mg under the tongue every 5 minutes as needed Reported on 2017   warfarin (COUMADIN) 5 MG tablet Take 7.5 mg by mouth See Admin Instructions Take 1.5 tab by mojth Monday and Friday and 1 tab Sun  , Thur and Sat       No family history on file.    Social History     Tobacco Use     Smoking status: Former Smoker     Packs/day: 1.00     Years: 25.00     Pack years: 25.00     Types: Cigarettes     Last attempt to quit: 10/15/1995     Years since quittin.8     Smokeless tobacco: Never Used   Substance Use Topics     Alcohol use: No       Allergies   Allergen Reactions     Nkda [No Known Drug Allergies]          ROS:   CONSTITUTIONAL:No report of fevers or chills  RESPIRATORY: No cough, wheezing, SOB, or hemoptysis  CARDIOVASCULAR: see HPI  MUSCULO-SKELETAL: No joint pain/swelling, no muscle pain  NEURO: No paresthesias, syncope, pre-syncope, lightheadness, dizziness or vertigo  ENDOCRINE: No temperature intolerance, no skin/hair changes  PSYCHIATRIC: No change in mood, feeling down/anxious, no change in sleep or appetite  GI: no melena or hematochezia, no change in bowel habits  : no hematuria or dysuria, no hesitancy, dribbling or incontinence  HEME: no easy bruising or bleeding, no history of anemia, no history of blood clots  SKIN: no rashes or sores, no unusual itching      Physical Examination:  Vitals: /79 (BP Location: Right arm, Cuff Size: Adult Regular)   Temp 97.9  F (36.6  C) (Oral)   Resp 16   SpO2 99%   BMI= There is no height or weight on file to calculate BMI.    GENERAL APPEARANCE: healthy, alert and no distress  HEENT: no icterus, no xanthelasmas, normal pupil size and reaction, normal palate, mucosa moist  NECK: JVP elevated 8cm, brisk carotid upstroke bilaterally  CHEST: lungs clear to auscultation without rales, rhonchi or wheezes, no use of accessory muscles, no retractions  CARDIOVASCULAR:  irregularly irregular rhythm, normal S1 and S2, no S3 or S4 and no murmur, click or rub.  ABDOMEN: soft, non tender, without hepatosplenomegaly, no masses palpable, bowel sounds normal  EXTREMITIES: warm, 3+ BLE edema, DP/PT pulses 2+ bilaterally, no clubbing or cyanosis   NEURO: alert and oriented to person/place/time, normal speech, gait and affect  SKIN: no ecchymoses, no rashes. RRA site CDI, soft, non-tender to touch, no bruising, no signs of hematoma. RIJ CDI.       Laboratory:  CMP  Recent Labs   Lab 08/29/19  1114 08/27/19  1134    140   POTASSIUM 3.8 4.0   CHLORIDE 106 106   CO2 26 28   ANIONGAP 6 6   * 176*   BUN 29 31*   CR 1.07 1.30*   GFRESTIMATED 71 56*   GFRESTBLACK 82 65   EILEEN 8.3* 8.5   MAG  --  1.8   PROTTOTAL  --  6.6*   ALBUMIN  --  3.4   BILITOTAL  --  1.7*   ALKPHOS  --  152*   AST  --  18   ALT  --  35     CBC  Recent Labs   Lab 08/29/19  1114 08/27/19  1134   WBC 7.1 6.4   RBC 2.58* 2.51*   HGB 9.2* 9.0*   HCT 29.7* 28.7*   * 114*   MCH 35.7* 35.9*   MCHC 31.0* 31.4*   RDW 16.1* 15.9*   PLT 49* 39*       Lab Results   Component Value Date    TROPONIN <0.04 09/15/2006    TROPONIN <0.04 06/04/2006    TROPONIN <0.04 06/03/2006         EKG 8/29/19: Afib, HR 81      Stress Echocardiogram 7/30/19  Interpretation Summary     Submaximal stress test, achieved 74% of the age predicted maximal heart rate.  Limiting symptom fatigue.     Normal blood pressure response to exercise.  No angina symptoms with exercise.  Baseline rhythm is atrial fibrillation. No ECG evidence of ischemia. There is  no chronotropic incompetence.  Severely reduced LV function with EF of 28% at rest; with exercise left  ventricular cavity size and LVEF did not change significantly.  Akinesis of the basal-mid inferior/inferoseptal/inferolateral segments  present. No new stress induced regional wall motion abnormalities evident.  Less than average functional capacity for age. Exercise time ~2  minutes.     Biventricular dysfunction with moderate mitral and tricuspid regurgitation and  evidence of hypervolemia noted on screening 2D and Doppler examination.    Echocardiogram 4/4/19:  Interpretation Summary  Mildly (EF 40-45%) reduced left ventricular function is present. Global  hypokinesis with basal-mid inferolateral and inferior akinesis.  Global right ventricular function is moderately reduced.  Moderate mitral insufficiency is present with multi directional jet suggesting  involvement of the commissures.     This study was compared with the study from 2/7/17 and LVEF is improved and MR  is worse .    Coronary angiogram 8/29/19:  Pre-procedure Diagnosis     heart conditionabnormal stress testblood vessel condition    Post-procedure Diagnosis     heart failure        Conclusion     Right sided filling pressures are severely elevated.    Moderately elevated pulmonary artery hypertension.    Left sided filling pressures are severely elevated.    Left ventricular filling pressures are severely elevated .    Reduced cardiac output level.    Heparin was held during the procedure due to thrombocytopenia    Nipride was started and titrate to 1mcg/kg/min. PCWP decreased from 30 mmHg to 24 mmHg. mPAP decreased from 40 mmHg to 30 mmHg. After pressures decreased team proceed with angiogram.    Mild LAD and LCx disease    Subtotal proximal RCA occlusion    No intervention performed    No mitral or aortic valve gradient    Patient given 60mg IV Lasix prior to leaving the cath lab.           Coronary Findings   Diagnostic   Dominance: Left   Left Main   The vessel was visualized by selective angiography. Left main is long. There was 30% diffuse vessel disease.   Left Anterior Descending   The left anterior descending has discrete 40% lesion at the ostial D1 and 40% proximal LAD creating a bifurcation lesion. Serial disease in the mid and distal LAD.   Left Circumflex   The left circumflex was visualized with 4 OMs.  25% discrete lesion in proximal left circumflex. OM1 and OM2 are small in size. OM3 is moderate in size.   Right Coronary Artery   RCA was injected and visualized. There is tapering stenosis to 60% to the RV margin. Beyond the 1st RV margin there is recannulation with subtotal occlusion vs  that measures [id number] mm. Distal RCA with bridging collaterals from the right as well as collaterals from the LAD.     Intervention   No interventions have been documented.     Hemodynamics     Right Heart Pressures   Right sided filling pressures are severely elevated.Left sided filling pressures are severely elevated. Moderately elevated pulmonary artery hypertension.Left ventricular filling pressures are severely elevated .Reduced cardiac output level.   Left Ventriculography     Mitral Valve No mitral valve gradient   Aortic Valve No aortic valve gradient   Pressures Phase: Baseline      Time Systolic Diastolic Mean A Wave V Wave EDP Max dp/dt HR   RA Pressures  1:58 PM   23 mmHg    71161 mmHg    26 mmHg      77 bpm      RV Pressures  1:59 PM 54 mmHg        24 mmHg     84 bpm      PA Pressures  2:00 PM 56 mmHg    30 mmHg    40 mmHg        82 bpm      PCW Pressures  2:01 PM   31 mmHg    33 mmHg    43 mmHg      67 bpm      Pressures Phase: IV Nipride      Time Systolic Diastolic Mean A Wave V Wave EDP Max dp/dt HR   PA Pressures  2:29 PM 34 mmHg    19 mmHg    23 mmHg        72 bpm        2:32 PM 40 mmHg    22 mmHg    25 mmHg        80 bpm      AO Pressures  2:34  mmHg    58 mmHg    81 mmHg        52 bpm        2:38  mmHg    79 mmHg    92 mmHg        78 bpm      LV Pressures  2:32  mmHg    6 mmHg       24 mmHg     80 bpm        2:33  mmHg    6 mmHg       21 mmHg     86 bpm        2:33  mmHg    5 mmHg       21 mmHg     83 bpm        2:34  mmHg    5 mmHg       23 mmHg     83 bpm        Blood Flow Results Phase: Baseline    Time Results Indexed Values   QP  1:35 PM 4 L/min    1.88  L/min/m2      QS  1:35 PM 3.35 L/min    1.57 L/min/m2        Blood Oximetry Phase: Baseline    Time Hb SAT(%) PO2 Content PA Sat   PA  1:35 PM  42.9 %      42.9 %      Art  1:35 PM  100 %     12.92 mL/dL       Cardiac Output Phase: Baseline      Time TDCO TDCI Carlos C.O. Carlos C.I. Carlos HR   Cardiac Output Results  1:35 PM 4.2 L/min    1.97 L/min/m2    3.35 L/min    1.57 L/min/m2         2:04 PM 4.2 L/min            Resistance Results Phase: Baseline    Time PVR SVR PVR-I SVR-I TPR TVR TPR-I TVR-I PVR/SVR TPR/TVR   Resistance Results (Metric)  1:35 .79 dsc-5     383.02 dsc-5/m2     799.08 dsc-5     1702.33 dsc-5/m2         Resistance Results (Wood)    1:35 PM 2.25 MEANS     4.79 MEANS/m2     9.99 MEANS     21.28 MEANS/m2         Stoke Volume Results Phase: Baseline    Time RVSW LVSW RVSW-I LVSW-I   Stroke Work Results  1:35 PM 11.29 gm*m     5.3 gm*m/m2           Assessment and plan:   Erasmo Pearce is a 69 y.o.M with a PMHx of permanent atrial fibrillation (on Coumadin), ischemic cardiomyopathy s/p ICD (2009), history of VT/VF with appropriate ICD shocks, relapsed MM (with history of BMT- currently on chemotherapy) who is seen today after outpatient coronary angiogram and RHC found to have PCWP of 30.     #Acute on Chronic Systolic Heart Failure s/p ICD  ##History of Ischemic Cardiomyopathy  ##HLD  Patient recently seen in cards clinic after stress echo found drop in EF (25-30% from previous 40-45%).  Patient also with increasing KAGN, fatigue, edema, and some complaints of lightheadedness.  Patient recently put on PO Lasix 40 mg at home, decreased from 60 mg 2/2 MANN.  EDW: around 198#  Patient scheduled for outpatient RHC/Cors today.  Coronary angiogram with possible RCA  with collaterals. No intervention needed on angiogram.  RHC with PCWP of 30.  Patient s/p 60 mg IV Lasix in ED.    - Admit to telemetry  - Strict I/O  - Daily weights  - Diuresis: s/p 60 mg IV Lasix in cath lab; will monitor UOP, possibly give  additional IV Lasix this evening  - Daily CBC, BMP  - Beta Blocker: Continue PTA Toprol 200 mg daily  - ACEi: Continue PTA Lisinopril 5 mg (patient recently restarted back on Lisinopril after recent MANN, now resolved)  - Continue PTA Warfarin  - Continue PTA Zocor  - Monitor lytes and replace per protocol    # Permanent Atrial Fibrillation  # History of VT/VF with appropriate ICD shocks  Per EP note 7/19, patient not receiving any additional shocks since starting Mexiletine, patient confirms this as well.    - Continue PTA Mexletine 150 mg BID  - Continue PTA Metoprolol  mg daily  - Resume PTA Warfarin (previously held for Angio)  - Pharmacy consult to dose Warfarin  - INR goal 2-3    #T2DM  Latest HgA1C 6.1 6/26/19.  Patient not on any medications at home  - Monitor POCT Glucose  - medium dose sliding scale insulin  - hypoglycemia protocol    #Hypertension  BP currently stable 110-120/70's   - Continue PTA Lisinopril as listed above    Chronic Conditions:   # Multiple Myeloma with mets to brain and bone in relapse s/p BMT, currently on chemo- monitor daily CBC, spoke to inpatient BMT team, no current concerns, will continue to follow with Dr. Raygoza as OP  #Chronic back pain: PTA Fentanyl patch, Oxycodone,Tylenol   #History of DVT: continue PTA Warfarin  #Hypomagnesia: Monitor BMP with lytes replacement per protocol as above  # Bird's esophagus, History of recent SBO, ileus, colitis- continue PTA Prilosec  #Insomnia: Continue PTA Trazodone  # Anemia of Chronic Disease: monitor daily CBC    FEN: 2g Sodium diet, 2L fluid restriction  Code status: full  Prophylaxis:  PTA coumadin, prilosec  Isolation: None  Disposition: Admit to inpatient for IV Diuresis. Possible discharge in 2-3 days pending volume status    Patient seen and discussed with Dr. Jorge Chery, APRN, CNP  [facility]  [phone]

## 2019-08-29 NOTE — Clinical Note
Potential access sites were evaluated for patency using ultrasound.   The right radial artery was selected. Access was obtained under with Sonosite guidance using a standard 18 guage needle with direct visualization of needle entry.

## 2019-08-29 NOTE — PHARMACY-ANTICOAGULATION SERVICE
Clinical Pharmacy - Warfarin Dosing Consult     Pharmacy has been consulted to manage this patient s warfarin therapy.  Indication: Atrial Fibrillation  Therapy Goal: INR 2-3  Warfarin Prior to Admission: Yes  Warfarin PTA Regimen: 5mg daily    INR   Date Value Ref Range Status   08/29/2019 1.80 (H) 0.86 - 1.14 Final   06/10/2019 2.3 (H) 0.8 - 1.2 Final       Recommend warfarin 5 mg today.  Pharmacy will monitor Erasmo ALEXEY Balbinaon daily and order warfarin doses to achieve specified goal.      Please contact pharmacy as soon as possible if the warfarin needs to be held for a procedure or if the warfarin goals change.

## 2019-08-30 VITALS
SYSTOLIC BLOOD PRESSURE: 90 MMHG | WEIGHT: 197.5 LBS | BODY MASS INDEX: 28.34 KG/M2 | DIASTOLIC BLOOD PRESSURE: 56 MMHG | OXYGEN SATURATION: 100 % | RESPIRATION RATE: 16 BRPM | TEMPERATURE: 97.7 F

## 2019-08-30 DIAGNOSIS — C90.00 MULTIPLE MYELOMA, REMISSION STATUS UNSPECIFIED (H): Primary | ICD-10-CM

## 2019-08-30 LAB
ANION GAP SERPL CALCULATED.3IONS-SCNC: 5 MMOL/L (ref 3–14)
BUN SERPL-MCNC: 25 MG/DL (ref 7–30)
CALCIUM SERPL-MCNC: 7.9 MG/DL (ref 8.5–10.1)
CHLORIDE SERPL-SCNC: 106 MMOL/L (ref 94–109)
CO2 SERPL-SCNC: 27 MMOL/L (ref 20–32)
CREAT SERPL-MCNC: 1.09 MG/DL (ref 0.66–1.25)
ERYTHROCYTE [DISTWIDTH] IN BLOOD BY AUTOMATED COUNT: 15.9 % (ref 10–15)
GFR SERPL CREATININE-BSD FRML MDRD: 69 ML/MIN/{1.73_M2}
GLUCOSE BLDC GLUCOMTR-MCNC: 91 MG/DL (ref 70–99)
GLUCOSE BLDC GLUCOMTR-MCNC: 94 MG/DL (ref 70–99)
GLUCOSE SERPL-MCNC: 94 MG/DL (ref 70–99)
HCT VFR BLD AUTO: 27.8 % (ref 40–53)
HGB BLD-MCNC: 8.7 G/DL (ref 13.3–17.7)
INR PPP: 1.6 (ref 0.86–1.14)
MCH RBC QN AUTO: 35.5 PG (ref 26.5–33)
MCHC RBC AUTO-ENTMCNC: 31.3 G/DL (ref 31.5–36.5)
MCV RBC AUTO: 114 FL (ref 78–100)
NT-PROBNP SERPL-MCNC: 6374 PG/ML (ref 0–900)
PLATELET # BLD AUTO: 44 10E9/L (ref 150–450)
POTASSIUM SERPL-SCNC: 3.9 MMOL/L (ref 3.4–5.3)
RBC # BLD AUTO: 2.45 10E12/L (ref 4.4–5.9)
SODIUM SERPL-SCNC: 138 MMOL/L (ref 133–144)
WBC # BLD AUTO: 5.5 10E9/L (ref 4–11)

## 2019-08-30 PROCEDURE — 00000146 ZZHCL STATISTIC GLUCOSE BY METER IP

## 2019-08-30 PROCEDURE — 25000128 H RX IP 250 OP 636: Performed by: NURSE PRACTITIONER

## 2019-08-30 PROCEDURE — 25000132 ZZH RX MED GY IP 250 OP 250 PS 637: Mod: GY | Performed by: NURSE PRACTITIONER

## 2019-08-30 PROCEDURE — 85027 COMPLETE CBC AUTOMATED: CPT | Performed by: NURSE PRACTITIONER

## 2019-08-30 PROCEDURE — 36415 COLL VENOUS BLD VENIPUNCTURE: CPT | Performed by: NURSE PRACTITIONER

## 2019-08-30 PROCEDURE — 99223 1ST HOSP IP/OBS HIGH 75: CPT | Mod: AI | Performed by: INTERNAL MEDICINE

## 2019-08-30 PROCEDURE — 25000132 ZZH RX MED GY IP 250 OP 250 PS 637: Mod: GY | Performed by: STUDENT IN AN ORGANIZED HEALTH CARE EDUCATION/TRAINING PROGRAM

## 2019-08-30 PROCEDURE — 83880 ASSAY OF NATRIURETIC PEPTIDE: CPT | Performed by: NURSE PRACTITIONER

## 2019-08-30 PROCEDURE — 80048 BASIC METABOLIC PNL TOTAL CA: CPT | Performed by: NURSE PRACTITIONER

## 2019-08-30 PROCEDURE — 85610 PROTHROMBIN TIME: CPT | Performed by: NURSE PRACTITIONER

## 2019-08-30 RX ORDER — FUROSEMIDE 40 MG
40 TABLET ORAL 2 TIMES DAILY
Qty: 90 TABLET | Refills: 3 | Status: CANCELLED | OUTPATIENT
Start: 2019-08-30

## 2019-08-30 RX ORDER — LISINOPRIL 2.5 MG/1
2.5 TABLET ORAL ONCE
Status: DISCONTINUED | OUTPATIENT
Start: 2019-08-30 | End: 2019-08-30

## 2019-08-30 RX ORDER — LISINOPRIL 10 MG/1
10 TABLET ORAL DAILY
Qty: 90 TABLET | Refills: 3 | Status: CANCELLED | OUTPATIENT
Start: 2019-08-31

## 2019-08-30 RX ORDER — FUROSEMIDE 10 MG/ML
40 INJECTION INTRAMUSCULAR; INTRAVENOUS DAILY
Status: DISCONTINUED | OUTPATIENT
Start: 2019-08-30 | End: 2019-08-30

## 2019-08-30 RX ORDER — LISINOPRIL 10 MG/1
10 TABLET ORAL DAILY
Status: DISCONTINUED | OUTPATIENT
Start: 2019-08-31 | End: 2019-08-30 | Stop reason: HOSPADM

## 2019-08-30 RX ORDER — WARFARIN SODIUM 7.5 MG/1
7.5 TABLET ORAL
Status: DISCONTINUED | OUTPATIENT
Start: 2019-08-30 | End: 2019-08-30 | Stop reason: HOSPADM

## 2019-08-30 RX ORDER — FUROSEMIDE 40 MG
40 TABLET ORAL
Status: DISCONTINUED | OUTPATIENT
Start: 2019-08-30 | End: 2019-08-30 | Stop reason: HOSPADM

## 2019-08-30 RX ORDER — LISINOPRIL 5 MG/1
5 TABLET ORAL ONCE
Status: DISCONTINUED | OUTPATIENT
Start: 2019-08-30 | End: 2019-08-30 | Stop reason: HOSPADM

## 2019-08-30 RX ORDER — FUROSEMIDE 40 MG
40 TABLET ORAL 2 TIMES DAILY
Qty: 90 TABLET | Refills: 3 | Status: SHIPPED | OUTPATIENT
Start: 2019-08-30 | End: 2019-09-03

## 2019-08-30 RX ADMIN — FENTANYL 1 PATCH: 25 PATCH, EXTENDED RELEASE TRANSDERMAL at 08:49

## 2019-08-30 RX ADMIN — MEXILETINE HYDROCHLORIDE 150 MG: 150 CAPSULE ORAL at 08:48

## 2019-08-30 RX ADMIN — LISINOPRIL 5 MG: 5 TABLET ORAL at 08:49

## 2019-08-30 RX ADMIN — POTASSIUM CHLORIDE 20 MEQ: 10 TABLET, EXTENDED RELEASE ORAL at 08:54

## 2019-08-30 RX ADMIN — OMEPRAZOLE 20 MG: 20 CAPSULE, DELAYED RELEASE ORAL at 08:49

## 2019-08-30 RX ADMIN — FUROSEMIDE 40 MG: 10 INJECTION, SOLUTION INTRAVENOUS at 08:54

## 2019-08-30 RX ADMIN — GUAIFENESIN 10 ML: 100 SOLUTION ORAL at 02:02

## 2019-08-30 RX ADMIN — METOPROLOL SUCCINATE 200 MG: 100 TABLET, EXTENDED RELEASE ORAL at 08:48

## 2019-08-30 RX ADMIN — OXYCODONE HYDROCHLORIDE 5 MG: 5 TABLET ORAL at 08:54

## 2019-08-30 RX ADMIN — UMECLIDINIUM 1 PUFF: 62.5 AEROSOL, POWDER ORAL at 08:54

## 2019-08-30 ASSESSMENT — ACTIVITIES OF DAILY LIVING (ADL)
ADLS_ACUITY_SCORE: 11

## 2019-08-30 NOTE — SIGNIFICANT EVENT
SPIRITUAL HEALTH SERVICES Significant Event  [employer] Sacrament of ANOINTING  [facility] ([place] 6C    PATIENT ANOINTED by Father Rojas Lind 8/30/2019 per request of the pt.    PLAN: Spiritual Health Services remains available for patient's ongoing support.    Rojas Lind M.Div.     Pager [phone]

## 2019-08-30 NOTE — PLAN OF CARE
AVSS, afebrile. Pt is stable and discharged home at 1530 via wheelchair. Reviewed discharge instructions with pt and wife, verbalized understanding. No new meds to . PIV removed.

## 2019-08-30 NOTE — PLAN OF CARE
D-Admitted to 6C from cath lab s/p RHC and angiogram. PCWP 30. Admitted for diuresing. PMH includes permanent atrial fibrillation, ICM, ICD, VT/VF with ICD shocks, Multiple myeloma with BMT. R wrist ecchymotic. Chronic LBP related to bone mets. See flow sheets for vs and assessments.  I-Received 60 mg IV lasix in the cath lab at 1450. 2L FR. Fentanyl patch and oxycodone for back pain.  A-Pt has voided 3,795 ml since receiving lasix.  P-Continue with current poc. Monitor labs. Strict I&O.

## 2019-08-30 NOTE — DISCHARGE INSTRUCTIONS
Going home after a Coronary Angiogram    PROCEDURE SITE:   Radial (Wrist)  It is normal to have soreness and small bruising at the puncture site as well as mild tingling in your hand for up to 3 days. You may shower but please do not use a hot tub, bath tub or pool for 2 days. Do not apply any lotion or powder near the site for 3 days. For 3 days do not use your affected hand/arm to support your weight (like rising up out of a chair) or lifting >5 pounds.    MEDICATIONS:   1. If you are on Metformin (Glucophage) or any medications that contain this, you should not take it for at least 48 hours (2 days) from the time of your procedure. If you have baseline kidney problems, you may be instructed to also have a lab test drawn in 2-3 days before getting the okay to restart it.  2. If you have not been continued on other medications you were previously taking at home it is probably for reason though please discuss your concerns with any of your doctors.     DIET:  We recommend a diet low in saturated fat, trans fat and cholesterol. In addition it will be helpful to be cautious of sodium intake and carbohydrates. Try to increase the amount of lean meats you eat like fish and chicken, but avoid frying; and reduce the amount of red meat you eat. Eat more fresh fruits and vegetables and try to avoid canned and processed food. Please reference the handouts you received for more specific information.      OTHER INFORMATION:  1. If you are a smoker, quitting smoking will be one of the most important things you can do for yourself. There are nicotine replacement options or medications they might be able to be prescribed. Please discuss this with your doctors. Consider calling the QuitPlan at [phone] ([phone]) as they can offer ongoing support after discharge.    CALL YOUR DOCTOR IF:  -You have a large or growing lump/bump around the procedure site  -The site is red, swollen, hot, tender or has drainage  -You have hives, a  rash or unusual itching  -You have increasing or worsening shortness of breath or chest pain    FOLLOW UP:  We prefer you to follow up with your primary care provider within one week. You will be arranged to see the cardiologist (heart doctor) in clinic in about two weeks.    Should you need to contact us:  Cardiology clinic for scheduling or triage nurse questions/concerns:  [phone]

## 2019-08-30 NOTE — CONSULTS
"69 year old male presenting with SOB. CORE consult requested. Jonh is currently admitted with HF    Jonh was provided with a CHF book.  I reviewed the importance of daily weights, 2 gm sodium diet, 2L fluid restriction and compliance with medications upon hospital discharge.  CORE contact phone numbers provided and patient is encouraged to call with any questions or concerns, including any weight gain or loss of 2 or more pounds in 24 hours or 5 or more pounds in 1 week.      Appointment made in CORE clinic on  at 0830 and with Cardio-Oncology Clinic with Dr. Prince on  at 0800.  I will follow-up with the patient at that time, sooner if needed.  Thank you for the consult.    Omayra Serra RN  Cardiology Care Coordinator - [facility]  Questions and [phone]  First press #1 for the [place] and then press #3 for \"Medical Advise\" to reach us Cardiology Nurses.     "

## 2019-08-30 NOTE — PROGRESS NOTES
SPIRITUAL HEALTH SERVICES  SPIRITUAL ASSESSMENT Progress Note  [facility] ([place]) 6C     REFERRAL SOURCE: Initial unit  visit with pt and spouse, per request for hospital  visit as noted in initial nursing assessment and consult order.    I confirmed that pt is [employer]. I oriented pt and spouse to Spiritual Health Services, including availability of sacraments. Pt would appreciate receiving sacrament of anointing from  .    PLAN: will refer to Father Rojas Lind for anointing.    Bryant Thomas) Brian Cam M.Div., [facility]  Staff   Pager [phone]

## 2019-08-30 NOTE — PLAN OF CARE
D:  Patient was admitted after outpatient CPI and RHC showing increased PCWP.    I: Patient is up ad mark anthony and on room air and denies pain.    Changed:n/a  Running:n/a  PRN:n/a    A: A&0x4 with AVSS and Afib rhythm.  R-wrist bruised without hematoma and R-neck with bandage.  C/O coughing and took robitussin with relief.  Urinating adequately.     P: Continue to monitor Pt status and report changes to treatment team.

## 2019-08-30 NOTE — DISCHARGE SUMMARY
[address] [address]  p: [phone]    Discharge Summary: Cardiology Service    Erasmo Pearce MRN# [id number]   YOB: 1950 Age: 69 year old       Admission Date: 8/29/2019  Discharge Date: 08/30/19      Discharge Diagnoses:  1. Acute on Chronic Systolic Heart Failure (EF 25-30%)  2. Permanent Atrial Fibrillation  3. History of VT/VF, s/p ICD  4. T2DM  5. Hypertension  6. Multiple Myeloma with mets to brain and bone s/p BMT, currently on chemo  7. Chronic back pain  8. Hyperlipidemia  9. History of DVT  10. Hypomagnesia  11. Bird's esophagus, history of recent SBO, ileus, colitis  12. Insomnia  13. Anemia of Chronic Disease    Pertinent Procedures:  1. Coronary Angiogram/RHC 8/29    Consults:  N/A    Imaging with results:  Echocardiogram 7/30/19:  Interpretation Summary     Submaximal stress test, achieved 74% of the age predicted maximal heart rate.  Limiting symptom fatigue.     Normal blood pressure response to exercise.  No angina symptoms with exercise.  Baseline rhythm is atrial fibrillation. No ECG evidence of ischemia. There is no chronotropic incompetence. Severely reduced LV function with EF of 28% at rest; with exercise left ventricular cavity size and LVEF did not change significantly. Akinesis of the basal-mid inferior/inferoseptal/inferolateral segments present. No new stress induced regional wall motion abnormalities evident. Less than average functional capacity for age. Exercise time ~2 minutes.     Biventricular dysfunction with moderate mitral and tricuspid regurgitation and evidence of hypervolemia noted on screening 2D and Doppler examination.    Echocardiogram 4/4/19:  Interpretation Summary  Mildly (EF 40-45%) reduced left ventricular function is present. Global  hypokinesis with basal-mid inferolateral and inferior akinesis.  Global right ventricular function is moderately reduced.  Moderate mitral  insufficiency is present with multi directional jet suggesting  involvement of the commissures.     This study was compared with the study from 2/7/17 and LVEF is improved and MR  is worse .    Coronary Angiogram 8/29/19:  Pre-procedure Diagnosis   heart conditionabnormal stress testblood vessel condition    Post-procedure Diagnosis     heart failure        Conclusion     Right sided filling pressures are severely elevated.    Moderately elevated pulmonary artery hypertension.    Left sided filling pressures are severely elevated.    Left ventricular filling pressures are severely elevated .    Reduced cardiac output level.    Heparin was held during the procedure due to thrombocytopenia    Nipride was started and titrate to 1mcg/kg/min. PCWP decreased from 30 mmHg to 24 mmHg. mPAP decreased from 40 mmHg to 30 mmHg. After pressures decreased team proceed with angiogram.    Mild LAD and LCx disease    Subtotal proximal RCA occlusion    No intervention performed    No mitral or aortic valve gradient    Patient given 60mg IV Lasix prior to leaving the cath lab.         Coronary Findings   Diagnostic   Dominance: Left   Left Main   The vessel was visualized by selective angiography. Left main is long. There was 30% diffuse vessel disease.   Left Anterior Descending   The left anterior descending has discrete 40% lesion at the ostial D1 and 40% proximal LAD creating a bifurcation lesion. Serial disease in the mid and distal LAD.   Left Circumflex   The left circumflex was visualized with 4 OMs. 25% discrete lesion in proximal left circumflex. OM1 and OM2 are small in size. OM3 is moderate in size.   Right Coronary Artery   RCA was injected and visualized. There is tapering stenosis to 60% to the RV margin. Beyond the 1st RV margin there is recannulation with subtotal occlusion vs  that measures [id number] mm. Distal RCA with bridging collaterals from the right as well as collaterals from the LAD.     Intervention    No interventions have been documented.     Hemodynamics   Right Heart Pressures   Right sided filling pressures are severely elevated.Left sided filling pressures are severely elevated. Moderately elevated pulmonary artery hypertension.Left ventricular filling pressures are severely elevated .Reduced cardiac output level.     Left Ventriculography   Mitral Valve No mitral valve gradient   Aortic Valve No aortic valve gradient     Pressures Phase: Baseline    Time Systolic Diastolic Mean A Wave V Wave EDP Max dp/dt HR   RA Pressures  1:58 PM   23 mmHg    16825 mmHg    26 mmHg      77 bpm      RV Pressures  1:59 PM 54 mmHg        24 mmHg     84 bpm      PA Pressures  2:00 PM 56 mmHg    30 mmHg    40 mmHg        82 bpm      PCW Pressures  2:01 PM   31 mmHg    33 mmHg    43 mmHg      67 bpm      Pressures Phase: IV Nipride    Time Systolic Diastolic Mean A Wave V Wave EDP Max dp/dt HR   PA Pressures  2:29 PM 34 mmHg    19 mmHg    23 mmHg        72 bpm        2:32 PM 40 mmHg    22 mmHg    25 mmHg        80 bpm      AO Pressures  2:34  mmHg    58 mmHg    81 mmHg        52 bpm        2:38  mmHg    79 mmHg    92 mmHg        78 bpm      LV Pressures  2:32  mmHg    6 mmHg       24 mmHg     80 bpm        2:33  mmHg    6 mmHg       21 mmHg     86 bpm        2:33  mmHg    5 mmHg       21 mmHg     83 bpm        2:34  mmHg    5 mmHg       23 mmHg     83 bpm        Blood Flow Results Phase: Baseline    Time Results Indexed Values   QP  1:35 PM 4 L/min    1.88 L/min/m2      QS  1:35 PM 3.35 L/min    1.57 L/min/m2        Blood Oximetry Phase: Baseline    Time Hb SAT(%) PO2 Content PA Sat   PA  1:35 PM  42.9 %      42.9 %      Art  1:35 PM  100 %     12.92 mL/dL         Cardiac Output Phase: Baseline    Time TDCO TDCI Carlos C.O. Carlos C.I. Carlos HR   Cardiac Output Results  1:35 PM 4.2 L/min    1.97 L/min/m2    3.35 L/min    1.57 L/min/m2         2:04 PM 4.2 L/min            Resistance Results Phase:  "Baseline    Time PVR SVR PVR-I SVR-I TPR TVR TPR-I TVR-I PVR/SVR TPR/TVR   Resistance Results (Metric)  1:35 .79 dsc-5     383.02 dsc-5/m2     799.08 dsc-5     1702.33 dsc-5/m2         Resistance Results (Wood)    1:35 PM 2.25 MEANS     4.79 MEANS/m2     9.99 MEANS     21.28 MEANS/m2         Stoke Volume Results Phase: Baseline    Time RVSW LVSW RVSW-I LVSW-I   Stroke Work Results  1:35 PM 11.29 gm*m     5.3 gm*m/m2           Other imaging studies:  EKG 12 Lead 8/29/19: Afib HR 81      Brief HPI:  Erasmo Pearce is a 69 y.o.M with a PMHx of permanent atrial fibrillation (on Coumadin), ischemic cardiomyopathy s/p ICD (2009), history of VT/VF with appropriate ICD shocks, relapsed MM (with history of BMT- currently on chemotherapy) who is seen after outpatient coronary angiogram and RHC found to have PCWP of 30, admitted for IV diuresis.     Hospital Course by Diagnosis:  #Acute on Chronic Systolic Heart Failure s/p ICD  ##History of Ischemic Cardiomyopathy  ##HLD  Patient recently seen in cards clinic after stress echo found drop in EF (25-30% from previous 40-45%).  Patient also with increasing KANG, fatigue, edema, and some complaints of lightheadedness.  Patient recently put on PO Lasix 40 mg at home, decreased from 60 mg 2/2 MANN.  EDW: around 198#  Patient scheduled for outpatient RHC/Cors 8/29. Coronary angiogram with possible RCA  with collaterals. No intervention needed on angiogram.  RHC with PCWP of 30.  Patient s/p IV Lasix, now net negative 5L, weight below EDW.  Patient reporting no SOB with activity, \"feeling better than has in months.\" Has mild edema to BLE, otherwise appears euvolemic on exam. Cr remains stable.     - Diuresis: increase Lasix to 40 mg BID  - Beta Blocker: Continue PTA Toprol 200 mg daily  - ACEi: Continue PTA Lisinopril 5 mg  (BP dropped to 80s/50s with 10 mg dose; can increase as OP if BP allows)  - Continue PTA Warfarin  - Continue PTA Zocor  - BMP in 1 week  - Follow up with  " Niki 9/9   - CORE Consulted to establish care  - Daily weights, patient educated to call CORE clinic with weight increases of 2 pounds in a day or 5 pounds in one week     # Permanent Atrial Fibrillation  # History of VT/VF with appropriate ICD shocks  Per EP note 7/19, patient not receiving any additional shocks since starting Mexiletine, patient confirms this as well.    - Continue PTA Mexletine 150 mg BID  - Continue PTA Metoprolol  mg daily  - Resume PTA Warfarin (previously held for Angio)  - INR Goal 2-3     #T2DM  Latest HgA1C 6.1 6/26/19.  Patient not on any medications at home  - Monitor Glucose at home as PTA  - Follow up with PCP for OP Management     #Hypertension  BP currently stable 110-120/70's   - Increase Lisinopril to 10 mg daily as listed above     Chronic Conditions:   # Multiple Myeloma with mets to brain and bone in relapse s/p BMT, currently on chemo- monitor daily CBC, spoke to inpatient BMT team, no current concerns, will continue to follow with Dr. Raygoza as OP  #Chronic back pain: PTA Fentanyl patch, Oxycodone,Tylenol   #History of DVT: continue PTA Warfarin  #Hypomagnesia: Monitor BMP with lytes replacement per protocol as above  # Bird's esophagus, History of recent SBO, ileus, colitis- continue PTA Prilosec  #Insomnia: Continue PTA Trazodone  # Anemia of Chronic Disease: monitor daily CBC      Condition on discharge  Temp:  [97.3  F (36.3  C)-98.1  F (36.7  C)] 97.4  F (36.3  C)  Heart Rate:  [58-83] 78  Resp:  [8-21] 16  BP: ()/(54-94) 118/73  SpO2:  [94 %-100 %] 100 %  General: Alert, interactive, NAD  Eyes: sclera anicteric, EOMI  Neck: JVP flat, carotid 2+ bilaterally  Cardiovascular: irregular rate and rhythm, normal S1 and S2, no murmurs, gallops, or rubs  Resp: clear to auscultation bilaterally, no rales, wheezes, or rhonchi  GI: Soft, nontender, nondistended. +BS.  No HSM or masses, no rebound or guarding.  Extremities: 1+ edema BLE, no cyanosis or  clubbing, dorsalis pedis and posterior tibialis pulses 2+ bilaterally  Skin: Warm and dry, no jaundice or rash, Right radial access incision site CDI, soft, nontender, moderate bruising, no signs of hematoma, no bruit  Neuro: CN 2-12 intact, moves all extremities equally  Psych: Alert & oriented x 3      Medication Changes:  MODIFY Lasix 40 mg BID    Discharge medications:   Current Discharge Medication List      CONTINUE these medications which have CHANGED    Details   furosemide (LASIX) 40 MG tablet Take 1 tablet (40 mg) by mouth 2 times daily  Qty: 90 tablet, Refills: 3    Associated Diagnoses: Ischemic cardiomyopathy; Chronic systolic heart failure (H)         CONTINUE these medications which have NOT CHANGED    Details   acyclovir (ZOVIRAX) 400 MG tablet Take 1 tablet (400 mg) by mouth 2 times daily  Qty: 60 tablet, Refills: 4    Associated Diagnoses: Multiple myeloma in relapse (H)      albuterol (PROAIR HFA, PROVENTIL HFA, VENTOLIN HFA) 108 (90 BASE) MCG/ACT inhaler Inhale 2 puffs into the lungs every 6 hours as needed       amoxicillin (AMOXIL) 500 MG tablet Take 4 tabs one hour before dental appointment  Qty: 4 tablet, Refills: 5    Associated Diagnoses: Multiple myeloma, without mention of having achieved remission      baclofen (LIORESAL) 10 MG tablet Take 10 mg by mouth 3 times daily as needed prn      Calcium Carbonate-Vitamin D (CALCIUM 500 + D PO) Take 2 tablets by mouth daily.      !! dexamethasone (DECADRON) 4 MG tablet Take 5 tablets (20 mg) by mouth once a week Once a week with food on Days 1,8,15.  Qty: 15 tablet, Refills: 0    Associated Diagnoses: Multiple myeloma in relapse (H)      !! dexamethasone (DECADRON) 4 MG tablet Take 20 mg by mouth once a week Once a week with food on Days 1,8,15..  Qty: 15 tablet, Refills: 0    Associated Diagnoses: Multiple myeloma in relapse (H)      diclofenac (VOLTAREN) 75 MG EC tablet Take 75 mg by mouth 2 times daily as needed 1 tab prn      fentaNYL  (DURAGESIC) 25 mcg/hr patch 72 hr Place 1 patch onto the skin every 72 hours      !! LENalidomide (REVLIMID) 10 MG CAPS capsule CHEMO Take 1 capsule (10 mg) by mouth daily Take on Days 1 through 14 of 28-day cycle.  Qty: 14 capsule, Refills: 0    Comments: Adult male.  Associated Diagnoses: Multiple myeloma in relapse (H)      !! LENalidomide (REVLIMID) 10 MG CAPS capsule CHEMO Take 1 capsule (10 mg) by mouth daily Take on Days 1 through 14 of 28-day cycle.  Qty: 14 capsule, Refills: 0    Associated Diagnoses: Multiple myeloma in relapse (H)      lisinopril (PRINIVIL,ZESTRIL) 5 MG tablet Take 5 mg by mouth daily     Associated Diagnoses: Multiple myeloma, without mention of having achieved remission; Hypomagnesemia; Ventricular fibrillation (H); Anemia of other chronic disease; Renal insufficiency; Dilated cardiomyopathy (H); Automatic implantable cardiac defibrillator in situ; Personal history of diseases of blood and blood-forming organs; History of long-term use of multiple prescription drugs      Magnesium Oxide 420 MG TABS Take 3 tabs (1260 mg) three times a day.  Qty: 270 tablet, Refills: 3    Associated Diagnoses: Hypomagnesemia      metoprolol succinate (TOPROL-XL) 200 MG 24 hr tablet Take 1 tablet (200 mg) by mouth daily  Qty: 90 tablet, Refills: 3    Associated Diagnoses: Atrial fibrillation, unspecified type (H); Paroxysmal ventricular tachycardia (H); Permanent atrial fibrillation (H)      mexiletine (MEXITIL) 150 MG capsule Take 1 capsule (150 mg) by mouth 2 times daily As close to 12 hours apart as possible  Qty: 180 capsule, Refills: 3    Associated Diagnoses: Permanent atrial fibrillation (H)      omeprazole (PRILOSEC) 20 MG capsule Take 20 mg by mouth daily   Qty: 90 capsule, Refills: 3    Associated Diagnoses: GERD (gastroesophageal reflux disease)      ondansetron (ZOFRAN-ODT) 4 MG ODT tab Take 4 mg by mouth every 6 hours as needed for nausea      oxycodone (ROXICODONE) 5 MG immediate release  tablet Take 1-2 tablets by mouth every 6 hours as needed. For pain.       potassium chloride ER (K-DUR/KLOR-CON M) 10 MEQ CR tablet Take 2 tablets (20 mEq) by mouth daily  Qty: 90 tablet, Refills: 1    Associated Diagnoses: Multiple myeloma in relapse (H)      simvastatin (ZOCOR) 40 MG tablet Take 40 mg by mouth At Bedtime   Qty: 30 tablet      tiotropium (SPIRIVA) 18 MCG inhalation capsule Inhale 1 capsule (18 mcg) into the lungs daily  Qty: 30 capsule, Refills: 3    Associated Diagnoses: SOB (shortness of breath)      TRAZODONE HCL PO Take 50 mg by mouth nightly as needed       nitroGLYCERIN (NITROSTAT) 0.4 MG SL tablet Place 0.4 mg under the tongue every 5 minutes as needed Reported on 5/16/2017      warfarin (COUMADIN) 5 MG tablet Take 7.5 mg by mouth See Admin Instructions Take 1.5 tab by mojth Monday and Friday and 1 tab Sun Tues WED , Thur and Sat       !! - Potential duplicate medications found. Please discuss with provider.          Labs or imaging requiring follow-up after discharge:  BMP 1 week      Follow-up:  - PCP in 7-10 days for post hospitalization visit  - Dr. Prince 9/9 to establish care in Cardiology/CORE clinic  - Dr. Raygoza 9/3 as previously arranged    Code status:  Full    Patient Care Team:  Cong Mcdonald as PCP - General  Willian Fuentes MD as PCP - Internal Medicine  Rojas Raygoza MD as BMT Physician  Cong Mcdonald as Referring Physician  Yaz Jeong NP as Nurse Practitioner (Nurse Practitioner)  [facility] as Family Medicine Physician  Merry Mas, RN as Continuity Care Coordinator (Transplant)  Zayra Lyle, RN as Specialty Care Coordinator (Cardiology)  Ja Duarte MD as MD (Clinical Cardiac Electrophysiology)    Emi Chery, APRN, CNP  [facility] Cardiology  [phone]

## 2019-08-31 LAB — INTERPRETATION ECG - MUSE: NORMAL

## 2019-09-03 ENCOUNTER — ONCOLOGY VISIT (OUTPATIENT)
Dept: TRANSPLANT | Facility: CLINIC | Age: 69
End: 2019-09-03
Attending: PHYSICIAN ASSISTANT
Payer: MEDICARE

## 2019-09-03 ENCOUNTER — APPOINTMENT (OUTPATIENT)
Dept: LAB | Facility: CLINIC | Age: 69
End: 2019-09-03
Attending: INTERNAL MEDICINE
Payer: MEDICARE

## 2019-09-03 VITALS
DIASTOLIC BLOOD PRESSURE: 65 MMHG | RESPIRATION RATE: 16 BRPM | WEIGHT: 195.6 LBS | OXYGEN SATURATION: 94 % | TEMPERATURE: 97.8 F | HEART RATE: 61 BPM | BODY MASS INDEX: 28.07 KG/M2 | SYSTOLIC BLOOD PRESSURE: 119 MMHG

## 2019-09-03 DIAGNOSIS — C90.00 MULTIPLE MYELOMA, REMISSION STATUS UNSPECIFIED (H): ICD-10-CM

## 2019-09-03 DIAGNOSIS — I50.22 CHRONIC SYSTOLIC HEART FAILURE (H): ICD-10-CM

## 2019-09-03 DIAGNOSIS — I25.5 ISCHEMIC CARDIOMYOPATHY: ICD-10-CM

## 2019-09-03 DIAGNOSIS — E83.42 HYPOMAGNESEMIA: ICD-10-CM

## 2019-09-03 LAB
ALBUMIN SERPL-MCNC: 3.4 G/DL (ref 3.4–5)
ALP SERPL-CCNC: 164 U/L (ref 40–150)
ALT SERPL W P-5'-P-CCNC: 37 U/L (ref 0–70)
ANION GAP SERPL CALCULATED.3IONS-SCNC: 4 MMOL/L (ref 3–14)
AST SERPL W P-5'-P-CCNC: 30 U/L (ref 0–45)
BASOPHILS # BLD AUTO: 0.1 10E9/L (ref 0–0.2)
BASOPHILS NFR BLD AUTO: 1.4 %
BILIRUB SERPL-MCNC: 0.8 MG/DL (ref 0.2–1.3)
BUN SERPL-MCNC: 25 MG/DL (ref 7–30)
CALCIUM SERPL-MCNC: 8.9 MG/DL (ref 8.5–10.1)
CHLORIDE SERPL-SCNC: 107 MMOL/L (ref 94–109)
CO2 SERPL-SCNC: 28 MMOL/L (ref 20–32)
COLLECT DURATION TIME UR: 24 H
CREAT 24H UR-MRATE: 1 G/(24.H) (ref 1–2)
CREAT SERPL-MCNC: 1.18 MG/DL (ref 0.66–1.25)
CREAT UR-MCNC: 36 MG/DL
DIFFERENTIAL METHOD BLD: ABNORMAL
EOSINOPHIL # BLD AUTO: 0.2 10E9/L (ref 0–0.7)
EOSINOPHIL NFR BLD AUTO: 4.5 %
ERYTHROCYTE [DISTWIDTH] IN BLOOD BY AUTOMATED COUNT: 15.5 % (ref 10–15)
GFR SERPL CREATININE-BSD FRML MDRD: 63 ML/MIN/{1.73_M2}
GLUCOSE SERPL-MCNC: 105 MG/DL (ref 70–99)
HCT VFR BLD AUTO: 29.8 % (ref 40–53)
HGB BLD-MCNC: 9.5 G/DL (ref 13.3–17.7)
IMM GRANULOCYTES # BLD: 0 10E9/L (ref 0–0.4)
IMM GRANULOCYTES NFR BLD: 0.4 %
LYMPHOCYTES # BLD AUTO: 1.3 10E9/L (ref 0.8–5.3)
LYMPHOCYTES NFR BLD AUTO: 25.8 %
MAGNESIUM SERPL-MCNC: 1.6 MG/DL (ref 1.6–2.3)
MCH RBC QN AUTO: 36.3 PG (ref 26.5–33)
MCHC RBC AUTO-ENTMCNC: 31.9 G/DL (ref 31.5–36.5)
MCV RBC AUTO: 114 FL (ref 78–100)
MONOCYTES # BLD AUTO: 1 10E9/L (ref 0–1.3)
MONOCYTES NFR BLD AUTO: 19.8 %
NEUTROPHILS # BLD AUTO: 2.4 10E9/L (ref 1.6–8.3)
NEUTROPHILS NFR BLD AUTO: 48.1 %
NRBC # BLD AUTO: 0 10*3/UL
NRBC BLD AUTO-RTO: 0 /100
PLATELET # BLD AUTO: 101 10E9/L (ref 150–450)
PLATELET # BLD EST: ABNORMAL 10*3/UL
POTASSIUM SERPL-SCNC: 4.3 MMOL/L (ref 3.4–5.3)
PROT 24H UR-MRATE: 0.24 G/(24.H) (ref 0.04–0.23)
PROT SERPL-MCNC: 6.8 G/DL (ref 6.8–8.8)
PROT UR-MCNC: 0.09 G/L
PROT/CREAT 24H UR: 0.24 G/G CR (ref 0–0.2)
RBC # BLD AUTO: 2.62 10E12/L (ref 4.4–5.9)
RBC MORPH BLD: ABNORMAL
SODIUM SERPL-SCNC: 139 MMOL/L (ref 133–144)
SPECIMEN VOL UR: 2790 ML
WBC # BLD AUTO: 4.9 10E9/L (ref 4–11)

## 2019-09-03 PROCEDURE — 83735 ASSAY OF MAGNESIUM: CPT | Performed by: INTERNAL MEDICINE

## 2019-09-03 PROCEDURE — 36415 COLL VENOUS BLD VENIPUNCTURE: CPT

## 2019-09-03 PROCEDURE — 80053 COMPREHEN METABOLIC PANEL: CPT | Performed by: INTERNAL MEDICINE

## 2019-09-03 PROCEDURE — 85025 COMPLETE CBC W/AUTO DIFF WBC: CPT | Performed by: INTERNAL MEDICINE

## 2019-09-03 PROCEDURE — G0463 HOSPITAL OUTPT CLINIC VISIT: HCPCS | Mod: ZF

## 2019-09-03 RX ORDER — FUROSEMIDE 40 MG
40 TABLET ORAL 2 TIMES DAILY
Qty: 90 TABLET | Refills: 3 | Status: SHIPPED | OUTPATIENT
Start: 2019-09-03 | End: 2019-09-12

## 2019-09-03 ASSESSMENT — PAIN SCALES - GENERAL: PAINLEVEL: NO PAIN (0)

## 2019-09-03 NOTE — NURSING NOTE
"Oncology Rooming Note    September 3, 2019 11:29 AM   Erasmo Pearce is a 69 year old male who presents for:    Chief Complaint   Patient presents with     Blood Draw     labs drawn by venipuncture by rn.  vital signs taken.     RECHECK     pt here for recheck for MM     Initial Vitals: /65 (BP Location: Right arm, Patient Position: Sitting, Cuff Size: Adult Regular)   Pulse 61   Temp 97.8  F (36.6  C) (Oral)   Resp 16   Wt 88.7 kg (195 lb 9.6 oz)   SpO2 94%   BMI 28.07 kg/m   Estimated body mass index is 28.07 kg/m  as calculated from the following:    Height as of 8/27/19: 1.778 m (5' 10\").    Weight as of this encounter: 88.7 kg (195 lb 9.6 oz). Body surface area is 2.09 meters squared.  No Pain (0) Comment: Data Unavailable   No LMP for male patient.  Allergies reviewed: Yes  Medications reviewed: Yes    Medications: Medication refills not needed today.  Pharmacy name entered into [facility]:    [facility] - [place], [place] - [address] [facility] - [place], [place] - [address] [place] [address]  [facility] - [address]    Clinical concerns: none      ADEOLA RIVERA RN              "

## 2019-09-03 NOTE — NURSING NOTE
Chief Complaint   Patient presents with     Blood Draw     labs drawn by venipuncture by rn.  vital signs taken.     Labs drawn by venipuncture by RN in lab.  Vital signs taken.  Pt checked in to next appointment.  Arlet Monroy RN

## 2019-09-03 NOTE — PROGRESS NOTES
BMT Daily Progress Note   09/03/2019    Patient ID:  Erasmo Pearce is a 69 year old male with permanent afib,  Chronic systolic heart failure, hypertension, hyperlipidemia, T2DM, and relapsed multiple myeloma s/p BMT, currently on revlamid, velcade, and dex. Recently admitted for acute exacerbation of heart failure following angiogram, discharged 8/30.     INTERVAL  HISTORY     Patient returns for follow up after discharge from the hospital. He was admitted following coronary angiogram for acute heart failure exacerbation. He was diuresed with significant improvement in breathing and lower extremity swelling. Notes continuing to feel very well after discharge, no further swelling, cough, SOB, or chest discomfort. Has some chronic lightheadedness that pre-dates hospitalization and is no different today. Confirms being off Revlamid, velcade, and dex currently- is awaiting appointment with Dr. Raygoza next week. Chronic pain is stable. No further medical concerns. No fevers, chills, cough, congestion or infectious symptoms. No bleeding or rashes.     Review of Systems: 10 point ROS negative except as noted above.    Scheduled Medications    Current Outpatient Medications   Medication     acyclovir (ZOVIRAX) 400 MG tablet     albuterol (PROAIR HFA, PROVENTIL HFA, VENTOLIN HFA) 108 (90 BASE) MCG/ACT inhaler     amoxicillin (AMOXIL) 500 MG tablet     baclofen (LIORESAL) 10 MG tablet     Calcium Carbonate-Vitamin D (CALCIUM 500 + D PO)     dexamethasone (DECADRON) 4 MG tablet     dexamethasone (DECADRON) 4 MG tablet     diclofenac (VOLTAREN) 75 MG EC tablet     fentaNYL (DURAGESIC) 25 mcg/hr patch 72 hr     furosemide (LASIX) 40 MG tablet     LENalidomide (REVLIMID) 10 MG CAPS capsule CHEMO     LENalidomide (REVLIMID) 10 MG CAPS capsule CHEMO     lisinopril (PRINIVIL,ZESTRIL) 5 MG tablet     Magnesium Oxide 420 MG TABS     metoprolol succinate (TOPROL-XL) 200 MG 24 hr tablet     mexiletine (MEXITIL) 150 MG  capsule     nitroGLYCERIN (NITROSTAT) 0.4 MG SL tablet     omeprazole (PRILOSEC) 20 MG capsule     ondansetron (ZOFRAN-ODT) 4 MG ODT tab     oxycodone (ROXICODONE) 5 MG immediate release tablet     potassium chloride ER (K-DUR/KLOR-CON M) 10 MEQ CR tablet     simvastatin (ZOCOR) 40 MG tablet     tiotropium (SPIRIVA) 18 MCG inhalation capsule     TRAZODONE HCL PO     warfarin (COUMADIN) 5 MG tablet     No current facility-administered medications for this visit.        PHYSICAL EXAM     Wt Readings from Last 5 Encounters:   09/03/19 88.7 kg (195 lb 9.6 oz)   08/30/19 89.6 kg (197 lb 8 oz)   08/27/19 94.9 kg (209 lb 3.2 oz)   08/20/19 96.8 kg (213 lb 8 oz)   08/13/19 95.8 kg (211 lb 1.6 oz)         /65 (BP Location: Right arm, Patient Position: Sitting, Cuff Size: Adult Regular)   Pulse 61   Temp 97.8  F (36.6  C) (Oral)   Resp 16   Wt 88.7 kg (195 lb 9.6 oz)   SpO2 94%   BMI 28.07 kg/m         General: NAD   Eyes: KINA, sclera anicteric   Nose/Mouth/Throat: OP clear, buccal mucosa moist, no ulcerations   Lungs: CTA bilaterally  Cardiovascular: RRR, no M/R/G   Abdominal/Rectal: +BS, soft, NT, ND  Lymphatics: trace pedal edema, compression stockings on  Skin: no rashes or petechaie  Neuro: A&O   LABS AND IMAGING - PAST 24 HOURS     Lab Results   Component Value Date    WBC 4.9 09/03/2019    ANEU 2.4 09/03/2019    HGB 9.5 (L) 09/03/2019    HCT 29.8 (L) 09/03/2019     (L) 09/03/2019     09/03/2019    POTASSIUM 4.3 09/03/2019    CHLORIDE 107 09/03/2019    CO2 28 09/03/2019     (H) 09/03/2019    BUN 25 09/03/2019    CR 1.18 09/03/2019    MAG 1.6 09/03/2019    INR 1.60 (H) 08/30/2019         ASSESSMENT BY SYSTEMS       1. Heme/BMT: Keep hgb >8g/dL and plts >10k    # Multiple Myeloma with mets to brain and bone in relapse s/p BMT, currently on Revlamid/velcade/dex.   - On Revlimid 10mg day 1-14, Velcade and dex on days 1,8,15. Continue to hold these until he sees Dr. Raygoza next week, may  "need to consider alternative medication as Velcade may exacerbate heart failure.     #Hx of DVT: Remains on warfarin     2. Cardiology  #Acute on Chronic Systolic Heart Failure s/p ICD  #History of Ischemic Cardiomyopathy  #HLD  Patient recently seen in cards clinic after stress echo found drop in EF (25-30% from previous 40-45%).  Patient also with increasing KANG, fatigue, edema, and lghtheadedness.  Patient recently put on PO Lasix 40 mg at home, decreased from 60 mg 2/2 MANN.  EDW: around 198#  Patient scheduled for outpatient RHC/Cors 8/29. Coronary angiogram with possible RCA  with collaterals. No intervention needed on angiogram.  RHC with PCWP of 30.  Patient s/p IV Lasix, now net negative 5L, weight below EDW.  Patient reporting no SOB with activity, \"feeling better than has in months.\" Has mild edema to BLE, otherwise appears euvolemic on exam. Cr remains stable.     - Diuresis: continue Lasix to 40 mg BID  - Beta Blocker: Continue PTA Toprol 200 mg daily  - ACEi: Continue PTA Lisinopril 5 mg  (BP dropped to 80s/50s with 10 mg dose; can increase as OP if BP allows)  - Continue PTA Warfarin  - Continue PTA Zocor  - CORE Consulted to establish care 9/12  - Follow up with Dr. Prince 9/23   - Daily weights, patient educated to call CORE clinic with weight increases of 2 pounds in a day or 5 pounds in one week     # Permanent Atrial Fibrillation  # History of VT/VF with appropriate ICD shocks  Per EP note 7/19, patient not receiving any additional shocks since starting Mexiletine, patient confirms this as well.    - Continue PTA Mexletine 150 mg BID  - Continue PTA Metoprolol  mg daily  - Resume PTA Warfarin (previously held for Angio)  - INR Goal 2-3     #T2DM  Latest HgA1C 6.1 6/26/19.  Patient not on any medications at home  - Monitor Glucose at home   - Follow up with PCP for OP Management     #Hypertension  BP currently stable 110-120/70's   - Increase Lisinopril to 10 mg daily as listed above    3. " Renal:   #Hypomagnesia: Monitor BMP with lytes replacement per protocol as above    4. GI:   # Bird's esophagus, History of recent SBO, ileus, colitis. Symptoms resolved. Continue PTA Prilosec    6. Neuro:   #Chronic back pain: PTA Fentanyl patch, Oxycodone,Tylenol   #Insomnia: Continue PTA Trazodone    Ha Guerrero PA-C  [phone]

## 2019-09-04 ENCOUNTER — TELEPHONE (OUTPATIENT)
Dept: CARDIOLOGY | Facility: CLINIC | Age: 69
End: 2019-09-04

## 2019-09-04 NOTE — TELEPHONE ENCOUNTER
I called Erasmo Pearce this morning to follow-up with is wife post-hospitalization discharged from [facility].     Medication Review:  I reviewed all his discharge medications with his wife and she had no questions regarding his medications.     Follow-up appointment review:  I reviewed Erasmo Pearce follow-up appointments with her and she verbalized understanding. I reviewed the CORE clinic's phone number and to call with any increase of SOB, increased edema or swelling, and weight gain. Carla verbalizes understanding and agrees with plan of care.  Omayra Serra RN CORE Care Coordinator

## 2019-09-05 NOTE — UTILIZATION REVIEW
Admission Status; Secondary Review Determination      As part of the [facility] Utilization review plan, a self-audit is done on Medicare inpatient admission with less than 2 midnights stay. The 2014 IPPS Final Rule allows outpatient billing in the event that a hospital determines that an inpatient admission was not medically necessary under utilization review process.     (x) Outpatient status would be Appropriate- Short Stay- Post discharge review.    RATIONALE FOR DETERMINATION    Patient admitted for Rt heart catheterization and discharged after noting high PCWP.  1 dose of Laisix IV given.  Patient discharged the following day         Patient was admitted and discharge after one night stay. Record was sent by  for a PA review. Based on the  severity of illness, intensity of service provided, expected LOS and risk for adverse outcome make the care appropriate for further outpatient/observation; however, doesn't meet criteria for hospital inpatient admission.       The information on this document is developed by the utilization review team in order for the business office to ensure compliance.  This only denotes the appropriateness of proper admission status and does not reflect the quality of care rendered.       The definitions of Inpatient Status and Observation Status used in making the determination above are those provided in the CMS Coverage Manual, Chapter 1 and Chapter 6, section 70.4.

## 2019-09-09 DIAGNOSIS — C90.00 MULTIPLE MYELOMA, REMISSION STATUS UNSPECIFIED (H): Primary | ICD-10-CM

## 2019-09-10 ENCOUNTER — ONCOLOGY VISIT (OUTPATIENT)
Dept: TRANSPLANT | Facility: CLINIC | Age: 69
End: 2019-09-10
Attending: INTERNAL MEDICINE
Payer: MEDICARE

## 2019-09-10 ENCOUNTER — APPOINTMENT (OUTPATIENT)
Dept: LAB | Facility: CLINIC | Age: 69
End: 2019-09-10
Attending: INTERNAL MEDICINE
Payer: MEDICARE

## 2019-09-10 VITALS
TEMPERATURE: 97.3 F | SYSTOLIC BLOOD PRESSURE: 107 MMHG | HEART RATE: 73 BPM | DIASTOLIC BLOOD PRESSURE: 67 MMHG | BODY MASS INDEX: 28.21 KG/M2 | RESPIRATION RATE: 18 BRPM | WEIGHT: 196.6 LBS | OXYGEN SATURATION: 98 %

## 2019-09-10 DIAGNOSIS — C90.02 MULTIPLE MYELOMA IN RELAPSE (H): Primary | ICD-10-CM

## 2019-09-10 DIAGNOSIS — Z51.81 ANTICOAGULATION GOAL OF INR 2 TO 3: ICD-10-CM

## 2019-09-10 DIAGNOSIS — I50.22 CHRONIC SYSTOLIC HEART FAILURE (H): ICD-10-CM

## 2019-09-10 DIAGNOSIS — Z79.01 ANTICOAGULATION GOAL OF INR 2 TO 3: ICD-10-CM

## 2019-09-10 PROCEDURE — 36415 COLL VENOUS BLD VENIPUNCTURE: CPT

## 2019-09-10 PROCEDURE — G0463 HOSPITAL OUTPT CLINIC VISIT: HCPCS | Mod: ZF

## 2019-09-10 ASSESSMENT — PAIN SCALES - GENERAL: PAINLEVEL: NO PAIN (0)

## 2019-09-10 NOTE — NURSING NOTE
"Oncology Rooming Note    September 10, 2019 10:58 AM   Erasmo Pearce is a 69 year old male who presents for:    Chief Complaint   Patient presents with     Blood Draw     Right AC  X 1, labs drawn and sent, vitals completed, checked into next appointment.     RECHECK     Return: Multiple Myeloma      Initial Vitals: /67 (BP Location: Right arm, Patient Position: Sitting, Cuff Size: Adult Regular)   Pulse 73   Temp 97.3  F (36.3  C) (Oral)   Resp 18   Wt 89.2 kg (196 lb 9.6 oz)   SpO2 98%   BMI 28.21 kg/m   Estimated body mass index is 28.21 kg/m  as calculated from the following:    Height as of 8/27/19: 1.778 m (5' 10\").    Weight as of this encounter: 89.2 kg (196 lb 9.6 oz). Body surface area is 2.1 meters squared.  No Pain (0) Comment: Data Unavailable   No LMP for male patient.  Allergies reviewed: Yes  Medications reviewed: Yes    Medications: Medication refills not needed today.  Pharmacy name entered into [facility]:    [facility] - [place], [place] - [address] [facility] - [facility] - [place], [place] - [address] [facility]  [facility] - [address]    Clinical concerns: None       Tricia Barajas CMA              "

## 2019-09-10 NOTE — NURSING NOTE
Chief Complaint   Patient presents with     Blood Draw     Right AC  X 1, labs drawn and sent, vitals completed, checked into next appointment.   Sara Conner RN

## 2019-09-10 NOTE — PROGRESS NOTES
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  He was given more furosemide and change in his cardiac meds and improved and was discharged.  He is feeling well.  He has been on Velcade, Revlimid and dexamethasone.  At the end of August, his light chains had decreased on 08/13 to a lambda of 6.5 and a kappa of 2.95 for a ratio of 0.45.  He had a nice response.  I am worried that the Velcade may have been cardiotoxic.  He does have significant neuropathy as well.      PAST MEDICAL HISTORY, SOCIAL HISTORY AND FAMILY HISTORY:  See my note from 08/2019.      REVIEW OF SYSTEMS:  A 12 point review of systems done is negative as in HPI.      PHYSICAL EXAMINATION:   GENERAL:  He is an alert gentleman in no acute distress.   VITAL SIGNS:  Stable. /67 (BP Location: Right arm, Patient Position: Sitting, Cuff Size: Adult Regular)   Pulse 73   Temp 97.3  F (36.3  C) (Oral)   Resp 18   Wt 89.2 kg (196 lb 9.6 oz)   SpO2 98%   BMI 28.21 kg/m      HEENT:  Normocephalic.  EOM okay.  Mouth without lesion.   RESPIRATORY:  Clear to percussion and auscultation.   CARDIAC:  Irregularly irregular rhythm.  No S3, S4 or murmur.   ABDOMEN:  Soft without hepatosplenomegaly.   EXTREMITIES:  +1 edema.      ASSESSMENT:   1.  Multiple myeloma.   2.  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   3.  Cardiomyopathy with CHF.   4.  Atrial fibrillation.   5.  History of deep vein thrombosis, on warfarin.   6.  Chronic back pain.   7.  Aortic aneurysm.   8.  History of plasmacytoma of the clivus, status post radiation.   9.  History of ventricular tachycardia with implantable  defibrillator in place.   10.  Hypomagnesemia.        Jonh is really looking very good.  I would like to take a drug holiday for Jonh and stop the Velcade, Revlimid and dexamethasone.  We will repeat labs in a month.  I will see him just before Thanksgiving.  I think Velcade could be adding to some cardiotoxicity.  I am so glad the Heart Failure team is also following him regularly.  At this time, we will take him off all myeloma meds.  We will continue to follow his labs.  I am thinking that if we were to reinstitute therapy that I would use daratumumab along with Revlimid and dexamethasone, possibly after Drew.       Rojas Raygoza MD  [phone]    Results for DARRYL ZAPATA (MRN [id number]) as of 9/10/2019 14:28   Ref. Range 9/10/2019 10:15   Sodium Latest Ref Range: 133 - 144 mmol/L 140   Potassium Latest Ref Range: 3.4 - 5.3 mmol/L 4.2   Chloride Latest Ref Range: 94 - 109 mmol/L 107   Carbon Dioxide Latest Ref Range: 20 - 32 mmol/L 28   Urea Nitrogen Latest Ref Range: 7 - 30 mg/dL 28   Creatinine Latest Ref Range: 0.66 - 1.25 mg/dL 1.30 (H)   GFR Estimate Latest Ref Range: >60 mL/min/1.73_m2 56 (L)   GFR Estimate If Black Latest Ref Range: >60 mL/min/1.73_m2 65   Calcium Latest Ref Range: 8.5 - 10.1 mg/dL 8.8   Anion Gap Latest Ref Range: 3 - 14 mmol/L 5   Magnesium Latest Ref Range: 1.6 - 2.3 mg/dL 1.9   Albumin Latest Ref Range: 3.4 - 5.0 g/dL 3.3 (L)   Protein Total Latest Ref Range: 6.8 - 8.8 g/dL 6.6 (L)   Bilirubin Total Latest Ref Range: 0.2 - 1.3 mg/dL 0.5   Alkaline Phosphatase Latest Ref Range: 40 - 150 U/L 158 (H)   ALT Latest Ref Range: 0 - 70 U/L 23   AST Latest Ref Range: 0 - 45 U/L 17   CRP Inflammation Latest Ref Range: 0.0 - 8.0 mg/L 11.0 (H)   Glucose Latest Ref Range: 70 - 99 mg/dL 124 (H)   WBC Latest Ref Range: 4.0 - 11.0 10e9/L 4.5   Hemoglobin Latest Ref Range: 13.3 - 17.7 g/dL 9.1 (L)   Hematocrit Latest Ref Range: 40.0 - 53.0 % 29.0 (L)   Platelet Count Latest Ref  Range: 150 - 450 10e9/L 133 (L)   RBC Count Latest Ref Range: 4.4 - 5.9 10e12/L 2.50 (L)   MCV Latest Ref Range: 78 - 100 fl 116 (H)   MCH Latest Ref Range: 26.5 - 33.0 pg 36.4 (H)   MCHC Latest Ref Range: 31.5 - 36.5 g/dL 31.4 (L)   RDW Latest Ref Range: 10.0 - 15.0 % 15.1 (H)   Diff Method Unknown Automated Method   % Neutrophils Latest Units: % 72.1   % Lymphocytes Latest Units: % 15.8   % Monocytes Latest Units: % 8.5   % Eosinophils Latest Units: % 1.8   % Basophils Latest Units: % 1.6   % Immature Granulocytes Latest Units: % 0.2   Nucleated RBCs Latest Ref Range: 0 /100 0   Absolute Neutrophil Latest Ref Range: 1.6 - 8.3 10e9/L 3.2   Absolute Lymphocytes Latest Ref Range: 0.8 - 5.3 10e9/L 0.7 (L)   Absolute Monocytes Latest Ref Range: 0.0 - 1.3 10e9/L 0.4   Absolute Eosinophils Latest Ref Range: 0.0 - 0.7 10e9/L 0.1   Absolute Basophils Latest Ref Range: 0.0 - 0.2 10e9/L 0.1   Abs Immature Granulocytes Latest Ref Range: 0 - 0.4 10e9/L 0.0   Absolute Nucleated RBC Unknown 0.0   Albumin Fraction Latest Ref Range: 3.7 - 5.1 g/dL PENDING   Alpha 1 Fraction Latest Ref Range: 0.2 - 0.4 g/dL PENDING   Alpha 2 Fraction Latest Ref Range: 0.5 - 0.9 g/dL PENDING   Beta Fraction Latest Ref Range: 0.6 - 1.0 g/dL PENDING   ELP Interpretation: Unknown PENDING   Gamma Fraction Latest Ref Range: 0.7 - 1.6 g/dL PENDING   Kappa Free Lt Chain Latest Ref Range: 0.33 - 1.94 mg/dL 4.59 (H)   Kappa Lambda Ratio Latest Ref Range: 0.26 - 1.65  1.27   Lambda Free Lt Chain Latest Ref Range: 0.57 - 2.63 mg/dL 3.61 (H)   Monoclonal Peak Latest Ref Range: 0.0 g/dL PENDING       July 9 2019  Irreg Irreg rhythm  Cardiac cath was done with evidence of increase PCWP~40, Right coronary had collaterals no threatening stenotic lesion left cardiac

## 2019-09-11 DIAGNOSIS — I50.21 ACUTE SYSTOLIC HEART FAILURE (H): ICD-10-CM

## 2019-09-11 DIAGNOSIS — I25.5 ISCHEMIC CARDIOMYOPATHY: Primary | ICD-10-CM

## 2019-09-12 ENCOUNTER — OFFICE VISIT (OUTPATIENT)
Dept: CARDIOLOGY | Facility: CLINIC | Age: 69
End: 2019-09-12
Attending: NURSE PRACTITIONER
Payer: MEDICARE

## 2019-09-12 VITALS
HEART RATE: 75 BPM | OXYGEN SATURATION: 96 % | WEIGHT: 200.8 LBS | SYSTOLIC BLOOD PRESSURE: 111 MMHG | HEIGHT: 70 IN | BODY MASS INDEX: 28.75 KG/M2 | DIASTOLIC BLOOD PRESSURE: 71 MMHG

## 2019-09-12 DIAGNOSIS — I25.5 ISCHEMIC CARDIOMYOPATHY: ICD-10-CM

## 2019-09-12 DIAGNOSIS — I50.21 ACUTE SYSTOLIC HEART FAILURE (H): ICD-10-CM

## 2019-09-12 DIAGNOSIS — I50.22 CHRONIC SYSTOLIC HEART FAILURE (H): Primary | ICD-10-CM

## 2019-09-12 DIAGNOSIS — C90.02 MULTIPLE MYELOMA IN RELAPSE (H): Chronic | ICD-10-CM

## 2019-09-12 LAB
ANION GAP SERPL CALCULATED.3IONS-SCNC: 7 MMOL/L (ref 3–14)
BUN SERPL-MCNC: 34 MG/DL (ref 7–30)
CALCIUM SERPL-MCNC: 8.8 MG/DL (ref 8.5–10.1)
CHLORIDE SERPL-SCNC: 104 MMOL/L (ref 94–109)
CO2 SERPL-SCNC: 29 MMOL/L (ref 20–32)
CREAT SERPL-MCNC: 1.45 MG/DL (ref 0.66–1.25)
GFR SERPL CREATININE-BSD FRML MDRD: 49 ML/MIN/{1.73_M2}
GLUCOSE SERPL-MCNC: 102 MG/DL (ref 70–99)
NT-PROBNP SERPL-MCNC: 6008 PG/ML (ref 0–125)
POTASSIUM SERPL-SCNC: 4.4 MMOL/L (ref 3.4–5.3)
SODIUM SERPL-SCNC: 140 MMOL/L (ref 133–144)

## 2019-09-12 PROCEDURE — 83880 ASSAY OF NATRIURETIC PEPTIDE: CPT | Performed by: NURSE PRACTITIONER

## 2019-09-12 PROCEDURE — 99214 OFFICE O/P EST MOD 30 MIN: CPT | Mod: ZP | Performed by: NURSE PRACTITIONER

## 2019-09-12 PROCEDURE — 36415 COLL VENOUS BLD VENIPUNCTURE: CPT | Performed by: NURSE PRACTITIONER

## 2019-09-12 PROCEDURE — 80048 BASIC METABOLIC PNL TOTAL CA: CPT | Performed by: NURSE PRACTITIONER

## 2019-09-12 PROCEDURE — G0463 HOSPITAL OUTPT CLINIC VISIT: HCPCS | Mod: ZF

## 2019-09-12 RX ORDER — FUROSEMIDE 40 MG
TABLET ORAL
Qty: 90 TABLET | Refills: 3 | Status: SHIPPED | OUTPATIENT
Start: 2019-09-12 | End: 2019-09-23

## 2019-09-12 ASSESSMENT — MINNESOTA LIVING WITH HEART FAILURE QUESTIONNAIRE (MLHF)
MAKING YOU SHORT OF BREATH: 4
MAKING YOU WORRY: 0
COSTING YOU MONEY FOR MEDICAL CARE: 2
MAKING YOU FEEL DEPRESSED: 0
DIFFICULTY WITH RECREATIONAL PASTIMES, SPORTS, HOBBIES: 2
TIRED, FATIGUED OR LOW ON ENERGY: 3
WORKING AROUND THE HOUSE OR YARD DIFFICULT: 2
DIFFICULTY WORKING TO EARN A LIVING: 0
HAVING TO SIT OR LIE DOWN DURING THE DAY: 2
DIFFICULTY SOCIALIZING WITH FAMILY OR FRIENDS: 2
DIFFICULTY GOING AWAY FROM HOME: 2
FEELING LIKE A BURDEN TO FAMILY AND FRIENDS: 0
WALKING ABOUT OR CLIMBING STAIRS DIFFICULT: 1
TOTAL_SCORE: 31
DIFFICULTY SLEEPING WELL AT NIGHT: 1
LOSS OF SELF CONTROL IN YOUR LIFE: 0
SWELLING IN ANKLES OR LEGS: 4
EATING LESS FOODS YOU LIKE: 2
MAKING YOU STAY IN A HOSPITAL: 2
DIFFICULTY TO CONCENTRATE OR REMEMBERING THINGS: 0
DIFFICULTY WITH SEXUAL ACTIVITIES: 0
GIVING YOU SIDE EFFECTS FROM TREATMENTS: 2

## 2019-09-12 ASSESSMENT — PAIN SCALES - GENERAL: PAINLEVEL: NO PAIN (0)

## 2019-09-12 ASSESSMENT — MIFFLIN-ST. JEOR: SCORE: 1687.07

## 2019-09-12 NOTE — LETTER
9/12/2019      RE: Erasmo Pearce  [address]  [address] [facility] [address]       Dear Colleague,    Thank you for the opportunity to participate in the care of your patient, Erasmo Pearce, at the [facility] at [facility]. Please see a copy of my visit note below.      HPI: Erasmo is a 69 year old White male with a past medical history of permanent atrial fibrillation, ischemic cardiomyopathy (LVEF 30-35% in 2017) status post [employer] ICD in 12/2009 initially for primary prevention, history of multiple appropriate shocks for VF (typically while undergoing chemotherapy), and relapsed multiple myeloma     Patient is here with his wife for his initial CORE visit. He states he has SOB but with maximum exertion . He is fine with walking.  He had gone into the ED for and was admitted about a month ago. He says during that admission he was noted to have extra fluid in his lower legs. He was given IV diuretic and placed on oral diuretics daily.  He says this has made a huge difference and he doesn't have swelling any longer.  He takes 80 mg of Lasix daily.  He says his appetite is normal and his appetite is more affected when he has chemo.  He will not have anymore chemo until Thanksgiving or the beginning of the year. Weights at home within the last two weeks 191-194 lbs.     Denies SOB, KANG, PND, orthopnea, edema, weight gain, chest pain, palpitations, lightheadedness, dizziness, near syncopal/syncopal episodes. Erasmo has been following salt and fluid restrictions.      PAST MEDICAL HISTORY:  Past Medical History:   Diagnosis Date     Chronic systolic heart failure (H)      Ischemic cardiomyopathy      Other premature beats      Permanent atrial fibrillation (H)      Personal history of multiple myeloma      VF (ventricular fibrillation) (H)        FAMILY HISTORY:  No family history on file.    SOCIAL HISTORY:  Social History     Tobacco  Use     Smoking status: Former Smoker     Packs/day: 1.00     Years: 25.00     Pack years: 25.00     Types: Cigarettes     Last attempt to quit: 10/15/1995     Years since quittin.9     Smokeless tobacco: Never Used   Substance Use Topics     Alcohol use: No     Drug use: No           CURRENT MEDICATIONS:  Current Outpatient Medications   Medication Sig Dispense Refill     albuterol (PROAIR HFA, PROVENTIL HFA, VENTOLIN HFA) 108 (90 BASE) MCG/ACT inhaler Inhale 2 puffs into the lungs every 6 hours as needed        baclofen (LIORESAL) 10 MG tablet Take 10 mg by mouth 3 times daily as needed prn       Calcium Carbonate-Vitamin D (CALCIUM 500 + D PO) Take 2 tablets by mouth daily.       diclofenac (VOLTAREN) 75 MG EC tablet Take 75 mg by mouth 2 times daily as needed 1 tab prn       fentaNYL (DURAGESIC) 25 mcg/hr patch 72 hr Place 1 patch onto the skin every 72 hours       furosemide (LASIX) 40 MG tablet 40 mg in the am and 20 mg in the pm 90 tablet 3     lisinopril (PRINIVIL,ZESTRIL) 5 MG tablet Take 5 mg by mouth daily        Magnesium Oxide 420 MG TABS Take 3 tabs (1260 mg) three times a day. 270 tablet 3     metoprolol succinate (TOPROL-XL) 200 MG 24 hr tablet Take 1 tablet (200 mg) by mouth daily 90 tablet 3     mexiletine (MEXITIL) 150 MG capsule Take 1 capsule (150 mg) by mouth 2 times daily As close to 12 hours apart as possible 180 capsule 3     nitroGLYCERIN (NITROSTAT) 0.4 MG SL tablet Place 0.4 mg under the tongue every 5 minutes as needed Reported on 2017       omeprazole (PRILOSEC) 20 MG capsule Take 20 mg by mouth daily  90 capsule 3     ondansetron (ZOFRAN-ODT) 4 MG ODT tab Take 4 mg by mouth every 6 hours as needed for nausea       oxycodone (ROXICODONE) 5 MG immediate release tablet Take 1-2 tablets by mouth every 6 hours as needed. For pain.        potassium chloride ER (K-DUR/KLOR-CON M) 10 MEQ CR tablet Take 2 tablets (20 mEq) by mouth daily 90 tablet 1     simvastatin (ZOCOR) 40 MG tablet  "Take 40 mg by mouth At Bedtime  30 tablet      tiotropium (SPIRIVA) 18 MCG inhalation capsule Inhale 1 capsule (18 mcg) into the lungs daily 30 capsule 3     TRAZODONE HCL PO Take 50 mg by mouth nightly as needed        warfarin (COUMADIN) 5 MG tablet Take 7.5 mg by mouth See Admin Instructions Take 1.5 tab by mojth Monday and Friday and 1 tab Sun Tues WED , Thur and Sat       amoxicillin (AMOXIL) 500 MG tablet Take 4 tabs one hour before dental appointment (Patient not taking: Reported on 9/12/2019) 4 tablet 5       ROS:  Review Of Systems  Skin: negative  Eyes: negative  Ears/Nose/Throat: negative  Respiratory: No shortness of breath at rest. Some SOB on extreme exertion.   Cardiovascular: negative  Gastrointestinal: negative  Genitourinary: negative  Musculoskeletal: negative  Neurologic: negative  Psychiatric: negative  Hematologic- Multiple myeloma  Endocrine: negative      EXAM:  /71 (BP Location: Left arm, Patient Position: Chair, Cuff Size: Adult Regular)   Pulse 75   Ht 1.778 m (5' 10\")   Wt 91.1 kg (200 lb 12.8 oz)   SpO2 96%   BMI 28.81 kg/m     General: alert, articulate, and in no acute distress.  HEENT: normocephalic, atraumatic, anicteric sclera, EOMI, mucosa moist, no cyanosis.   Neck: neck supple.  No adenopathy, masses, or carotid bruits.  JVP not detected at 45 degrees  Heart: regular rhythm, normal S1/S2, no murmur, gallop, rub.  Precordium quiet with normal PMI.     Lungs: clear, no rales, ronchi, or wheezing.  No accessory muscle use, respirations unlabored.   Abdomen: soft, non-tender, bowel sounds present, no hepatomegaly  Extremities:no edema.   No cyanosis.   Neurological: alert and oriented x 3.  normal speech and affect, no gross motor deficits  Skin:  No ecchymoses, rashes, or clubbing.    Labs:  CBC RESULTS:  Lab Results   Component Value Date    WBC 4.5 09/10/2019    RBC 2.50 (L) 09/10/2019    HGB 9.1 (L) 09/10/2019    HCT 29.0 (L) 09/10/2019     (H) 09/10/2019    MCH " 36.4 (H) 09/10/2019    MCHC 31.4 (L) 09/10/2019    RDW 15.1 (H) 09/10/2019     (L) 09/10/2019       CMP RESULTS:  Lab Results   Component Value Date     09/12/2019    POTASSIUM 4.4 09/12/2019    CHLORIDE 104 09/12/2019    CO2 29 09/12/2019    ANIONGAP 7 09/12/2019     (H) 09/12/2019    BUN 34 (H) 09/12/2019    CR 1.45 (H) 09/12/2019    GFRESTIMATED 49 (L) 09/12/2019    GFRESTBLACK 57 (L) 09/12/2019    EILEEN 8.8 09/12/2019    BILITOTAL 0.5 09/10/2019    ALBUMIN 3.3 (L) 09/10/2019    ALKPHOS 158 (H) 09/10/2019    ALT 23 09/10/2019    AST 17 09/10/2019        INR RESULTS:  Lab Results   Component Value Date    INR 1.60 (H) 08/30/2019       No components found for: CK  Lab Results   Component Value Date    MAG 1.9 09/10/2019     Lab Results   Component Value Date    NTBNP 6,008 (H) 09/12/2019     @BRIEFLABR (dig)@    Most recent echocardiogram:  No results found for this or any previous visit (from the past 8760 hour(s)).      Assessment and Plan:    In summary,Erasmo  is a  69 year old male who is doing quite well. We will cut back on his diuretics based off exam and lab function.  CORE follow up to be determined after patient see's Advanced Heart Failure Specialist. Multiple myeloma is in remission per patient . Dr. Jaret mcdonald  1.  Chronic systolic heart failure secondary to ischemic cardiomyopathy.  Stage C  NYHA Class II  ACEi/ARB: yes  BB: yes  Aldosterone antagonist: deferred while other medical therapy is prioritized  SCD prophylaxis: ICD  Fluid status: hypovolemic- cut down on Lasix   Anticoagulation:   Antiplatelet:  ASA dose   Sleep apnea: not discussed  NSAID use:  Contraindicated.  Avoid use.  Remote Monitoring: none        2.  Follow-up and plan  Lasix 40 mg in am and 20 mg in pm.   Establish care with Dr. Niki donaldson - TRU at that visit.       Marichuy EUBANKS, CNP  [facility]

## 2019-09-12 NOTE — PROGRESS NOTES
HPI: Erasmo is a 69 year old White male with a past medical history of permanent atrial fibrillation, ischemic cardiomyopathy (LVEF 30-35% in 2017) status post [employer] ICD in 2009 initially for primary prevention, history of multiple appropriate shocks for VF (typically while undergoing chemotherapy), and relapsed multiple myeloma     Patient is here with his wife for his initial CORE visit. He states he has SOB but with maximum exertion . He is fine with walking.  He had gone into the ED for and was admitted about a month ago. He says during that admission he was noted to have extra fluid in his lower legs. He was given IV diuretic and placed on oral diuretics daily.  He says this has made a huge difference and he doesn't have swelling any longer.  He takes 80 mg of Lasix daily.  He says his appetite is normal and his appetite is more affected when he has chemo.  He will not have anymore chemo until  or the beginning of the year. Weights at home within the last two weeks 191-194 lbs.     Denies SOB, KANG, PND, orthopnea, edema, weight gain, chest pain, palpitations, lightheadedness, dizziness, near syncopal/syncopal episodes. Erasmo has been following salt and fluid restrictions.      PAST MEDICAL HISTORY:  Past Medical History:   Diagnosis Date     Chronic systolic heart failure (H)      Ischemic cardiomyopathy      Other premature beats      Permanent atrial fibrillation (H)      Personal history of multiple myeloma      VF (ventricular fibrillation) (H)        FAMILY HISTORY:  No family history on file.    SOCIAL HISTORY:  Social History     Tobacco Use     Smoking status: Former Smoker     Packs/day: 1.00     Years: 25.00     Pack years: 25.00     Types: Cigarettes     Last attempt to quit: 10/15/1995     Years since quittin.9     Smokeless tobacco: Never Used   Substance Use Topics     Alcohol use: No     Drug use: No           CURRENT MEDICATIONS:  Current Outpatient  Medications   Medication Sig Dispense Refill     albuterol (PROAIR HFA, PROVENTIL HFA, VENTOLIN HFA) 108 (90 BASE) MCG/ACT inhaler Inhale 2 puffs into the lungs every 6 hours as needed        baclofen (LIORESAL) 10 MG tablet Take 10 mg by mouth 3 times daily as needed prn       Calcium Carbonate-Vitamin D (CALCIUM 500 + D PO) Take 2 tablets by mouth daily.       diclofenac (VOLTAREN) 75 MG EC tablet Take 75 mg by mouth 2 times daily as needed 1 tab prn       fentaNYL (DURAGESIC) 25 mcg/hr patch 72 hr Place 1 patch onto the skin every 72 hours       furosemide (LASIX) 40 MG tablet 40 mg in the am and 20 mg in the pm 90 tablet 3     lisinopril (PRINIVIL,ZESTRIL) 5 MG tablet Take 5 mg by mouth daily        Magnesium Oxide 420 MG TABS Take 3 tabs (1260 mg) three times a day. 270 tablet 3     metoprolol succinate (TOPROL-XL) 200 MG 24 hr tablet Take 1 tablet (200 mg) by mouth daily 90 tablet 3     mexiletine (MEXITIL) 150 MG capsule Take 1 capsule (150 mg) by mouth 2 times daily As close to 12 hours apart as possible 180 capsule 3     nitroGLYCERIN (NITROSTAT) 0.4 MG SL tablet Place 0.4 mg under the tongue every 5 minutes as needed Reported on 5/16/2017       omeprazole (PRILOSEC) 20 MG capsule Take 20 mg by mouth daily  90 capsule 3     ondansetron (ZOFRAN-ODT) 4 MG ODT tab Take 4 mg by mouth every 6 hours as needed for nausea       oxycodone (ROXICODONE) 5 MG immediate release tablet Take 1-2 tablets by mouth every 6 hours as needed. For pain.        potassium chloride ER (K-DUR/KLOR-CON M) 10 MEQ CR tablet Take 2 tablets (20 mEq) by mouth daily 90 tablet 1     simvastatin (ZOCOR) 40 MG tablet Take 40 mg by mouth At Bedtime  30 tablet      tiotropium (SPIRIVA) 18 MCG inhalation capsule Inhale 1 capsule (18 mcg) into the lungs daily 30 capsule 3     TRAZODONE HCL PO Take 50 mg by mouth nightly as needed        warfarin (COUMADIN) 5 MG tablet Take 7.5 mg by mouth See Admin Instructions Take 1.5 tab by mojth Monday and  "Friday and 1 tab Sun Tues WED , Thur and Sat       amoxicillin (AMOXIL) 500 MG tablet Take 4 tabs one hour before dental appointment (Patient not taking: Reported on 9/12/2019) 4 tablet 5       ROS:  Review Of Systems  Skin: negative  Eyes: negative  Ears/Nose/Throat: negative  Respiratory: No shortness of breath at rest. Some SOB on extreme exertion.   Cardiovascular: negative  Gastrointestinal: negative  Genitourinary: negative  Musculoskeletal: negative  Neurologic: negative  Psychiatric: negative  Hematologic- Multiple myeloma  Endocrine: negative      EXAM:  /71 (BP Location: Left arm, Patient Position: Chair, Cuff Size: Adult Regular)   Pulse 75   Ht 1.778 m (5' 10\")   Wt 91.1 kg (200 lb 12.8 oz)   SpO2 96%   BMI 28.81 kg/m    General: alert, articulate, and in no acute distress.  HEENT: normocephalic, atraumatic, anicteric sclera, EOMI, mucosa moist, no cyanosis.   Neck: neck supple.  No adenopathy, masses, or carotid bruits.  JVP not detected at 45 degrees  Heart: regular rhythm, normal S1/S2, no murmur, gallop, rub.  Precordium quiet with normal PMI.     Lungs: clear, no rales, ronchi, or wheezing.  No accessory muscle use, respirations unlabored.   Abdomen: soft, non-tender, bowel sounds present, no hepatomegaly  Extremities:no edema.   No cyanosis.   Neurological: alert and oriented x 3.  normal speech and affect, no gross motor deficits  Skin:  No ecchymoses, rashes, or clubbing.    Labs:  CBC RESULTS:  Lab Results   Component Value Date    WBC 4.5 09/10/2019    RBC 2.50 (L) 09/10/2019    HGB 9.1 (L) 09/10/2019    HCT 29.0 (L) 09/10/2019     (H) 09/10/2019    MCH 36.4 (H) 09/10/2019    MCHC 31.4 (L) 09/10/2019    RDW 15.1 (H) 09/10/2019     (L) 09/10/2019       CMP RESULTS:  Lab Results   Component Value Date     09/12/2019    POTASSIUM 4.4 09/12/2019    CHLORIDE 104 09/12/2019    CO2 29 09/12/2019    ANIONGAP 7 09/12/2019     (H) 09/12/2019    BUN 34 (H) 09/12/2019 "    CR 1.45 (H) 09/12/2019    GFRESTIMATED 49 (L) 09/12/2019    GFRESTBLACK 57 (L) 09/12/2019    EILEEN 8.8 09/12/2019    BILITOTAL 0.5 09/10/2019    ALBUMIN 3.3 (L) 09/10/2019    ALKPHOS 158 (H) 09/10/2019    ALT 23 09/10/2019    AST 17 09/10/2019        INR RESULTS:  Lab Results   Component Value Date    INR 1.60 (H) 08/30/2019       No components found for: CK  Lab Results   Component Value Date    MAG 1.9 09/10/2019     Lab Results   Component Value Date    NTBNP 6,008 (H) 09/12/2019     @BRIEFLABR (dig)@    Most recent echocardiogram:  No results found for this or any previous visit (from the past 8760 hour(s)).      Assessment and Plan:    In summary,Erasmo  is a  69 year old male who is doing quite well. We will cut back on his diuretics based off exam and lab function.  CORE follow up to be determined after patient see's Advanced Heart Failure Specialist. Multiple myeloma is in remission per patient . Dr. Jaret mcdonald  1.  Chronic systolic heart failure secondary to ischemic cardiomyopathy.  Stage C  NYHA Class II  ACEi/ARB: yes  BB: yes  Aldosterone antagonist: deferred while other medical therapy is prioritized  SCD prophylaxis: ICD  Fluid status: hypovolemic- cut down on Lasix   Anticoagulation:   Antiplatelet:  ASA dose   Sleep apnea: not discussed  NSAID use:  Contraindicated.  Avoid use.  Remote Monitoring: none        2.  Follow-up and plan  Lasix 40 mg in am and 20 mg in pm.   Establish care with Dr. Prince - wagner donaldson - TRU at that visit.       Marichuy EUBANKS, CNP  [facility]

## 2019-09-12 NOTE — PATIENT INSTRUCTIONS
Take your medicines every day, as directed    Changes made today:  o Lasix 40 mg in the am and 20 mg in the pm     Monitor Your Weight and Symptoms    Contact us if you:      Gain 2 pounds in one day or 5 pounds in one week    Feel more short of breath    Notice more leg swelling    Feel lightheadeded   Change your lifestyle    Limit Salt or Sodium:    2000 mg  Limit Fluids:    2000 mL or approximately 64 ounces  Eat a Heart Healthy Diet    Low in saturated fats  Stay Active:    Aim to move at least 150 minutes every  week         To Contact us    During Business Hours:  [phone], option # 1 (University)  Then option # 4 (medical questions)     After hours, weekends or holidays:   [phone], Option #4  Ask to speak to the On-Call Cardiologist. Inform them you are a CORE/heart failure patient at the [facility].     Use [employer] allows you to communicate directly with your heart team through secure messaging.    IPWireless can be accessed any time on your phone, computer, or tablet.    If you need assistance, we'd be happy to help!         Keep your Heart Appointments:    Dr. Prince follow up 9/23/19

## 2019-09-12 NOTE — NURSING NOTE
Chief Complaint   Patient presents with     New Patient      New CORE/Hospital follow-up, 70 yo male, HFrEF, EF 30-35%, labs prior.      Vitals were taken and medications were reconciled.   Cynthia Jay  9:04 AM

## 2019-09-13 DIAGNOSIS — I50.22 CHRONIC SYSTOLIC HEART FAILURE (H): Primary | ICD-10-CM

## 2019-09-23 ENCOUNTER — OFFICE VISIT (OUTPATIENT)
Dept: CARDIOLOGY | Facility: CLINIC | Age: 69
End: 2019-09-23
Attending: INTERNAL MEDICINE
Payer: MEDICARE

## 2019-09-23 VITALS
SYSTOLIC BLOOD PRESSURE: 115 MMHG | WEIGHT: 204.7 LBS | HEIGHT: 70 IN | HEART RATE: 77 BPM | BODY MASS INDEX: 29.31 KG/M2 | DIASTOLIC BLOOD PRESSURE: 69 MMHG | OXYGEN SATURATION: 98 %

## 2019-09-23 DIAGNOSIS — I50.21 ACUTE SYSTOLIC HEART FAILURE (H): ICD-10-CM

## 2019-09-23 DIAGNOSIS — C90.02 MULTIPLE MYELOMA IN RELAPSE (H): ICD-10-CM

## 2019-09-23 DIAGNOSIS — I48.21 PERMANENT ATRIAL FIBRILLATION (H): ICD-10-CM

## 2019-09-23 DIAGNOSIS — I25.5 ISCHEMIC CARDIOMYOPATHY: Primary | ICD-10-CM

## 2019-09-23 PROCEDURE — 99214 OFFICE O/P EST MOD 30 MIN: CPT | Mod: GC | Performed by: INTERNAL MEDICINE

## 2019-09-23 PROCEDURE — 93010 ELECTROCARDIOGRAM REPORT: CPT | Mod: ZP | Performed by: INTERNAL MEDICINE

## 2019-09-23 PROCEDURE — G0463 HOSPITAL OUTPT CLINIC VISIT: HCPCS | Mod: 25,ZF

## 2019-09-23 PROCEDURE — 93005 ELECTROCARDIOGRAM TRACING: CPT | Mod: ZF

## 2019-09-23 RX ORDER — FUROSEMIDE 40 MG
40 TABLET ORAL 2 TIMES DAILY
Qty: 180 TABLET | Refills: 1 | Status: SHIPPED | OUTPATIENT
Start: 2019-09-23 | End: 2019-10-08

## 2019-09-23 RX ORDER — FUROSEMIDE 40 MG
40 TABLET ORAL 2 TIMES DAILY
Qty: 180 TABLET | Refills: 1 | Status: SHIPPED | OUTPATIENT
Start: 2019-09-23 | End: 2019-09-23

## 2019-09-23 ASSESSMENT — PAIN SCALES - GENERAL: PAINLEVEL: NO PAIN (0)

## 2019-09-23 ASSESSMENT — MIFFLIN-ST. JEOR: SCORE: 1704.76

## 2019-09-23 NOTE — LETTER
9/23/2019      RE: Erasmo Pearce  [address]  [address] [facility] [address]       Dear Colleague,    Thank you for the opportunity to participate in the care of your patient, Erasmo Pearce, at the [facility] CARE at [facility]. Please see a copy of my visit note below.    HPI:   68 y/o male with PMHx significant for MM s/p PBSCT in 2006, ICM/NICM (LVEF 30-35%, NYHA II Stage B) s/p ICD placement in 2009, episodic VT/VF treated by appropriate ICD shocks and afib (CHADVASC 3) on warfarin comes in to establish care.     Patient was diagnosed in 2006 with multiple myeloma and underwent chemotherapy and autologous PBSCT that same year. He has been monitored for the past few years and was noted to have increase in gamma protein presence in his urine. He was started on lenalinomide in 2017 due to relapse. He then developed anemia that was concerning for worsening MM vs medication side effect. Despite lenalidomide treatment, he did not have significant improvement and was therefore started on bortezomib in 07/2019.     Patient was then admitted about one month ago after RHC and coronary angiogram because of decompensated heart failure. He significantly improved after diuresing about 20 lbs and was discharged.     On follow up visit with oncology, it was decided to switch bortezomib since it might've been contributing to cardiac toxicity and causing volume overload. Plan is to start daratumumab after the fall.     His last ICD shock was in 03/06/19 while undergoing lenalidomide treatment. He was started on mexiletine few weeks afterwards and since then has not had any shocks.      Patient today does report some dyspnea on exertion and feels that he has gained weight. He denies recurrent fever, chills, night sweats, headaches, vision changes, sore throat, cough, chest pain, palpitations, abdominal pain, nausea, vomiting, diarrhea, constipation, hematochezia,  melena, dysuria, hematuria, joint pain, joint swelling, presyncope, syncope or new rash.       PAST MEDICAL HISTORY:  Past Medical History:   Diagnosis Date     Chronic systolic heart failure (H)      Ischemic cardiomyopathy      Other premature beats      Permanent atrial fibrillation (H)      Personal history of multiple myeloma      VF (ventricular fibrillation) (H)        CURRENT MEDICATIONS:  Current Outpatient Medications   Medication Sig Dispense Refill     albuterol (PROAIR HFA, PROVENTIL HFA, VENTOLIN HFA) 108 (90 BASE) MCG/ACT inhaler Inhale 2 puffs into the lungs every 6 hours as needed        amoxicillin (AMOXIL) 500 MG tablet Take 4 tabs one hour before dental appointment 4 tablet 5     baclofen (LIORESAL) 10 MG tablet Take 10 mg by mouth 3 times daily as needed prn       Calcium Carbonate-Vitamin D (CALCIUM 500 + D PO) Take 2 tablets by mouth daily.       diclofenac (VOLTAREN) 75 MG EC tablet Take 75 mg by mouth 2 times daily as needed 1 tab prn       fentaNYL (DURAGESIC) 25 mcg/hr patch 72 hr Place 1 patch onto the skin every 72 hours       furosemide (LASIX) 40 MG tablet 40 mg in the am and 20 mg in the pm 90 tablet 3     lisinopril (PRINIVIL,ZESTRIL) 5 MG tablet Take 5 mg by mouth daily        Magnesium Oxide 420 MG TABS Take 3 tabs (1260 mg) three times a day. 270 tablet 3     metoprolol succinate (TOPROL-XL) 200 MG 24 hr tablet Take 1 tablet (200 mg) by mouth daily 90 tablet 3     mexiletine (MEXITIL) 150 MG capsule Take 1 capsule (150 mg) by mouth 2 times daily As close to 12 hours apart as possible 180 capsule 3     nitroGLYCERIN (NITROSTAT) 0.4 MG SL tablet Place 0.4 mg under the tongue every 5 minutes as needed Reported on 5/16/2017       omeprazole (PRILOSEC) 20 MG capsule Take 20 mg by mouth daily  90 capsule 3     ondansetron (ZOFRAN-ODT) 4 MG ODT tab Take 4 mg by mouth every 6 hours as needed for nausea       oxycodone (ROXICODONE) 5 MG immediate release tablet Take 1-2 tablets by mouth  every 6 hours as needed. For pain.        potassium chloride ER (K-DUR/KLOR-CON M) 10 MEQ CR tablet Take 2 tablets (20 mEq) by mouth daily 90 tablet 1     simvastatin (ZOCOR) 40 MG tablet Take 40 mg by mouth At Bedtime  30 tablet      tiotropium (SPIRIVA) 18 MCG inhalation capsule Inhale 1 capsule (18 mcg) into the lungs daily 30 capsule 3     TRAZODONE HCL PO Take 50 mg by mouth nightly as needed        warfarin (COUMADIN) 5 MG tablet Take 7.5 mg by mouth See Admin Instructions Take 1.5 tab by mojth Monday and Friday and 1 tab Sun Tues WED , Thur and Sat         PAST SURGICAL HISTORY:  Past Surgical History:   Procedure Laterality Date     CV CORONARY ANGIOGRAM N/A 8/29/2019    Procedure: CV CORONARY ANGIOGRAM;  Surgeon: Giorgi Milan MD;  Location:  HE[facility] CARDIAC CATH LAB     CV LEFT HEART CATH N/A 8/29/2019    Procedure: Left Heart Cath;  Surgeon: Giorgi Milan MD;  Location:  HEART CARDIAC CATH LAB     CV RIGHT HEART CATH N/A 8/29/2019    Procedure: CV RIGHT HEART CATH;  Surgeon: Giorgi Milan MD;  [facility]     CV STRESS EXERCISE STUDY N/A 8/29/2019    Procedure: Stress Drug Study;  Surgeon: Giorgi Milan MD;  [facility]     IMPLANT AUTOMATIC IMPLANTABLE CARDIOVERTER DEFIBRILLATOR         ALLERGIES     Allergies   Allergen Reactions     Nkda [No Known Drug Allergies]        FAMILY HISTORY:  No family history on file.    SOCIAL HISTORY:  Social History     Socioeconomic History     Marital status:      Spouse name: None     Number of children: None     Years of education: None     Highest education level: None   Occupational History     None   Social Needs     Financial resource strain: None     Food insecurity:     Worry: None     Inability: None     Transportation needs:     Medical: None     Non-medical: None   Tobacco Use     Smoking status: Former Smoker     Packs/day: 1.00     Years: 25.00     Pack years: 25.00     Types:  "Cigarettes     Last attempt to quit: 10/15/1995     Years since quittin.9     Smokeless tobacco: Never Used   Substance and Sexual Activity     Alcohol use: No     Drug use: No     Sexual activity: None   Lifestyle     Physical activity:     Days per week: None     Minutes per session: None     Stress: None   Relationships     Social connections:     Talks on phone: None     Gets together: None     Attends [employer] service: None     Active member of club or organization: None     Attends meetings of clubs or organizations: None     Relationship status: None     Intimate partner violence:     Fear of current or ex partner: None     Emotionally abused: None     Physically abused: None     Forced sexual activity: None   Other Topics Concern     Parent/sibling w/ CABG, MI or angioplasty before 65F 55M? Not Asked   Social History Narrative     None       ROS:   Complete ROS negative unless stated in HPI.     EXAM:  /69 (BP Location: Right arm, Patient Position: Chair, Cuff Size: Adult Large)   Pulse 77   Ht 1.778 m (5' 10\")   Wt 92.9 kg (204 lb 11.2 oz)   SpO2 98%   BMI 29.37 kg/m     In general, the patient is a pleasant male in no apparent distress.    HEENT: NC/AT.  PERRLA.  EOMI.  Sclerae white, not injected.  Nares clear.  Pharynx without erythema or exudate.  Dentition intact.    Neck: No adenopathy.  No thyromegaly. Carotids +4/4 bilaterally without bruits.  + jugular venous distension, JVP ~ 8cm above clavicle  Heart: RRR. Normal S1, S2 splits physiologically. No murmur, rub, click, or gallop. The PMI is in the 5th ICS in the midclavicular line. There is no heave.    Lungs: CTA.  No ronchi, wheezes, rales.  No dullness to percussion.   Abdomen: Soft, nontender, nondistended. No organomegaly.  No bruits.   Extremities: No clubbing, cyanosis, +2 pitting edema above the knee  Neurologic: Alert and oriented to person/place/time, normal speech, gait and affect  Skin: No petechiae, purpura or " rash.    Labs:  LIPID RESULTS:  Lab Results   Component Value Date    CHOL 113 03/20/2019    HDL 42 03/20/2019    LDL 52 03/20/2019    TRIG 93 03/20/2019    CHOLHDLRATIO 3.3 12/16/2008       LIVER ENZYME RESULTS:  Lab Results   Component Value Date    AST 17 09/10/2019    ALT 23 09/10/2019       CBC RESULTS:  Lab Results   Component Value Date    WBC 4.5 09/10/2019    RBC 2.50 (L) 09/10/2019    HGB 9.1 (L) 09/10/2019    HCT 29.0 (L) 09/10/2019     (H) 09/10/2019    MCH 36.4 (H) 09/10/2019    MCHC 31.4 (L) 09/10/2019    RDW 15.1 (H) 09/10/2019     (L) 09/10/2019       BMP RESULTS:  Lab Results   Component Value Date     09/12/2019    POTASSIUM 4.4 09/12/2019    CHLORIDE 104 09/12/2019    CO2 29 09/12/2019    ANIONGAP 7 09/12/2019     (H) 09/12/2019    BUN 34 (H) 09/12/2019    CR 1.45 (H) 09/12/2019    GFRESTIMATED 49 (L) 09/12/2019    GFRESTBLACK 57 (L) 09/12/2019    EILEEN 8.8 09/12/2019        A1C RESULTS:  Lab Results   Component Value Date    A1C 6.3 (H) 08/29/2019       INR RESULTS:  Lab Results   Component Value Date    INR 1.60 (H) 08/30/2019    INR 1.80 (H) 08/29/2019       Cardiac data:          Stress echo 7.30.19    Submaximal stress test, achieved 74% of the age predicted maximal heart rate.  Limiting symptom fatigue.     Normal blood pressure response to exercise.  No angina symptoms with exercise.  Baseline rhythm is atrial fibrillation. No ECG evidence of ischemia. There is  no chronotropic incompetence.  Severely reduced LV function with EF of 28% at rest; with exercise left  ventricular cavity size and LVEF did not change significantly.  Akinesis of the basal-mid inferior/inferoseptal/inferolateral segments  present. No new stress induced regional wall motion abnormalities evident.  Less than average functional capacity for age. Exercise time ~2 minutes.     Biventricular dysfunction with moderate mitral and tricuspid regurgitation and  evidence of hypervolemia noted on  screening 2D and Doppler examination.       Aug 2019      Right sided filling pressures are severely elevated - mean RA 23, PCWP 31, mean PA 40     Moderately elevated pulmonary artery hypertension.    Left sided filling pressures are severely elevated.    Left ventricular filling pressures are severely elevated .    Reduced cardiac output level.    Heparin was held during the procedure due to thrombocytopenia    Nipride was started and titrate to 1mcg/kg/min. PCWP decreased from 30 mmHg to 24 mmHg. mPAP decreased from 40 mmHg to 30 mmHg. After pressures decreased team proceed with angiogram.    Mild LAD and LCx disease    Subtotal proximal RCA occlusion    No intervention performed    No mitral or aortic valve gradient      Assessment and Plan:     #ICM/NICM, LVEF 30-35% (NYHA II, Stage B)  #s/p ICD 2009  #Volume overload  #Weight gain  He was recently admitted for decompensated HF after RHC showing mean PA 23, PA 40 and PCWP 31. He was aggressively diuresed and reported symptom improvement. Recently his diuretics were reduced because of elevated Cr. Since then, he has noticed increasing weight and shortness of breath. JVP is elevated on exam. No crackles, but bilateral leg swelling is evident. Patient states he adheres to CHF diet and has been seen at CORE clinic.   - Increased diuretics to lasix 40mg BID and focus on volume optimization as patient to start new immunotherapy soon  - CORE clinic follow up   - Daily weights  - Low Na diet  - Continue lisinopril 5mg daily  - Continue metoprolol succinate 200mg daily  - BMP pending    #Hx of CAD  #Chronic RCA occlusion   Had angiogram in 08/12/19 that showed  of proximal RCA with bridging collaterals from distal LAD. Plan was to revascularized vessel but patient was in decompensated CHF.   - Will reassess benefit of revascularization once patient volume optimized    #Afib (CHADVASC 3) on warfarin  - Continue with warfarin  - Continue home BB    #Hx of VF  No  episodes of shock since being on mexiletine. He is seen by Dr. Mclaughlin in EP.   - Continue mexiletine per EP  - Continue BB at above dose    Patient evaluated and discussed with Dr. Prince.     Tank Briceno MD PhD  Cardiology Fellow    ATTENDING ATTESTATION:  This patient has been seen and examined by me September 23, 2019 with Tank Briceno MD, cardiology fellow. I have reviewed the vitals, laboratory and imaging data relevant to this patient's care. I have edited this note to reflect our joint assessment and plan, and discussed the plan with the patient.    69 year old with -   1. Acute on Chronic Systolic Heart Failure (EF 25-30%)  2. Permanent Atrial Fibrillation  3. Ischemic heart disease - Subtotal proximal RCA occlusion  4. History of VT/VF, s/p ICD  5. Hypertension  6. Relapsed multiple myeloma with mets to brain and bone s/p BMT, currently on chemo holiday  7. T2DM  8. Hyperlipidemia    Patient has had worsening heart failure symptoms since being on bortezomib.  Today, he denies any angina.  He notes mild worsening of the peripheral edema since the Lasix dose was reduced during his evaluation in the CORE clinic recently.  He has intermittently been increasing his dose to 40 mg twice daily.  He denies any orthopnea or PND.  On exam he is hypervolemic with elevated JVD, peripheral edema in spite of wearing support hose.  His lungs are clear.  He has a mitral regurgitation murmur on exam.  We discussed increasing Lasix to 40 mg twice daily and check his renal function in 1 week.  I would like to also get him started on a low-dose digoxin in an effort to optimize his heart failure regimen.  He will continue the lisinopril and beta-blocker that he is on along with mexiletine and simvastatin.    Plan to see him back in 2 weeks in the CORE clinic in 6 weeks with me.  In the event develops angina after he attains euvolemia, it maybe worthwhile to attempt  PCI of the  of the right coronary artery at that point.      Yoko Benson MD, MS  Staff Cardiologist  Pager: [phone]        CC  Patient Care Team:  Cong Mcdonald as PCP - General  [facility] - Internal Medicine  Rojas Raygoza MD as BMT Physician  Cong Mcdonald as Referring Physician  Yaz Jeong NP as Nurse Practitioner (Nurse Practitioner)  [facility] as Family Medicine Physician  Merry Mas RN as Continuity Care Coordinator (Transplant)  Zayra Lyle RN as Specialty Care Coordinator (Cardiology)  Ja Duarte MD as MD (Clinical Cardiac Electrophysiology)  Omayra Serra, RN as Specialty Care Coordinator (Cardiology)  YOKO BENSON    Please do not hesitate to contact me if you have any questions/concerns.     Sincerely,     Yoko Benson MD

## 2019-09-23 NOTE — LETTER
9/23/2019       RE: Erasmo Pearce  [address]  [address] [facility] [address]     Dear Colleague,    Thank you for referring your patient, Erasmo Pearce, to the [facility] at [facility]. Please see a copy of my visit note below.    HPI:   70 y/o male with PMHx significant for MM s/p PBSCT in 2006, ICM/NICM (LVEF 30-35%, NYHA II Stage B) s/p ICD placement in 2009, episodic VT/VF treated by appropriate ICD shocks and afib (CHADVASC 3) on warfarin comes in to establish care.     Patient was diagnosed in 2006 with multiple myeloma and underwent chemotherapy and autologous PBSCT that same year. He has been monitored for the past few years and was noted to have increase in gamma protein presence in his urine. He was started on lenalinomide in 2017 due to relapse. He then developed anemia that was concerning for worsening MM vs medication side effect. Despite lenalidomide treatment, he did not have significant improvement and was therefore started on bortezomib in 07/2019.     Patient was then admitted about one month ago after RHC and coronary angiogram because of decompensated heart failure. He significantly improved after diuresing about 20 lbs and was discharged.     On follow up visit with oncology, it was decided to switch bortezomib since it might've been contributing to cardiac toxicity and causing volume overload. Plan is to start daratumumab after the fall.     His last ICD shock was in 03/06/19 while undergoing lenalidomide treatment. He was started on mexiletine few weeks afterwards and since then has not had any shocks.      Patient today does report some dyspnea on exertion and feels that he has gained weight. He denies recurrent fever, chills, night sweats, headaches, vision changes, sore throat, cough, chest pain, palpitations, abdominal pain, nausea, vomiting, diarrhea, constipation, hematochezia, melena, dysuria, hematuria, joint pain,  joint swelling, presyncope, syncope or new rash.       PAST MEDICAL HISTORY:  Past Medical History:   Diagnosis Date     Chronic systolic heart failure (H)      Ischemic cardiomyopathy      Other premature beats      Permanent atrial fibrillation (H)      Personal history of multiple myeloma      VF (ventricular fibrillation) (H)        CURRENT MEDICATIONS:  Current Outpatient Medications   Medication Sig Dispense Refill     albuterol (PROAIR HFA, PROVENTIL HFA, VENTOLIN HFA) 108 (90 BASE) MCG/ACT inhaler Inhale 2 puffs into the lungs every 6 hours as needed        amoxicillin (AMOXIL) 500 MG tablet Take 4 tabs one hour before dental appointment 4 tablet 5     baclofen (LIORESAL) 10 MG tablet Take 10 mg by mouth 3 times daily as needed prn       Calcium Carbonate-Vitamin D (CALCIUM 500 + D PO) Take 2 tablets by mouth daily.       diclofenac (VOLTAREN) 75 MG EC tablet Take 75 mg by mouth 2 times daily as needed 1 tab prn       fentaNYL (DURAGESIC) 25 mcg/hr patch 72 hr Place 1 patch onto the skin every 72 hours       furosemide (LASIX) 40 MG tablet 40 mg in the am and 20 mg in the pm 90 tablet 3     lisinopril (PRINIVIL,ZESTRIL) 5 MG tablet Take 5 mg by mouth daily        Magnesium Oxide 420 MG TABS Take 3 tabs (1260 mg) three times a day. 270 tablet 3     metoprolol succinate (TOPROL-XL) 200 MG 24 hr tablet Take 1 tablet (200 mg) by mouth daily 90 tablet 3     mexiletine (MEXITIL) 150 MG capsule Take 1 capsule (150 mg) by mouth 2 times daily As close to 12 hours apart as possible 180 capsule 3     nitroGLYCERIN (NITROSTAT) 0.4 MG SL tablet Place 0.4 mg under the tongue every 5 minutes as needed Reported on 5/16/2017       omeprazole (PRILOSEC) 20 MG capsule Take 20 mg by mouth daily  90 capsule 3     ondansetron (ZOFRAN-ODT) 4 MG ODT tab Take 4 mg by mouth every 6 hours as needed for nausea       oxycodone (ROXICODONE) 5 MG immediate release tablet Take 1-2 tablets by mouth every 6 hours as needed. For pain.         potassium chloride ER (K-DUR/KLOR-CON M) 10 MEQ CR tablet Take 2 tablets (20 mEq) by mouth daily 90 tablet 1     simvastatin (ZOCOR) 40 MG tablet Take 40 mg by mouth At Bedtime  30 tablet      tiotropium (SPIRIVA) 18 MCG inhalation capsule Inhale 1 capsule (18 mcg) into the lungs daily 30 capsule 3     TRAZODONE HCL PO Take 50 mg by mouth nightly as needed        warfarin (COUMADIN) 5 MG tablet Take 7.5 mg by mouth See Admin Instructions Take 1.5 tab by mojth Monday and Friday and 1 tab Sun Tues WED , Thur and Sat         PAST SURGICAL HISTORY:  Past Surgical History:   Procedure Laterality Date     CV CORONARY ANGIOGRAM N/A 8/29/2019    Procedure: CV CORONARY ANGIOGRAM;  Surgeon: Giorgi Milan MD;  Location:  HEART CARDIAC CATH LAB     CV LEFT HEART CATH N/A 8/29/2019    Procedure: Left Heart Cath;  Surgeon: Giorgi Milan MD;  Location:  HEART CARDIAC CATH LAB     CV RIGHT HEART CATH N/A 8/29/2019    Procedure: CV RIGHT HEART CATH;  Surgeon: Giorgi Milan MD;  Location:  HEART CARDIAC CATH LAB     CV STRESS EXERCISE STUDY N/A 8/29/2019    Procedure: Stress Drug Study;  Surgeon: Giorgi Milan MD;  Location: [facility]     IMPLANT AUTOMATIC IMPLANTABLE CARDIOVERTER DEFIBRILLATOR         ALLERGIES     Allergies   Allergen Reactions     Nkda [No Known Drug Allergies]        FAMILY HISTORY:  No family history on file.    SOCIAL HISTORY:  Social History     Socioeconomic History     Marital status:      Spouse name: None     Number of children: None     Years of education: None     Highest education level: None   Occupational History     None   Social Needs     Financial resource strain: None     Food insecurity:     Worry: None     Inability: None     Transportation needs:     Medical: None     Non-medical: None   Tobacco Use     Smoking status: Former Smoker     Packs/day: 1.00     Years: 25.00     Pack years: 25.00     Types: Cigarettes     Last attempt to quit:  "10/15/1995     Years since quittin.9     Smokeless tobacco: Never Used   Substance and Sexual Activity     Alcohol use: No     Drug use: No     Sexual activity: None   Lifestyle     Physical activity:     Days per week: None     Minutes per session: None     Stress: None   Relationships     Social connections:     Talks on phone: None     Gets together: None     Attends Mosque service: None     Active member of club or organization: None     Attends meetings of clubs or organizations: None     Relationship status: None     Intimate partner violence:     Fear of current or ex partner: None     Emotionally abused: None     Physically abused: None     Forced sexual activity: None   Other Topics Concern     Parent/sibling w/ CABG, MI or angioplasty before 65F 55M? Not Asked   Social History Narrative     None       ROS:   Complete ROS negative unless stated in HPI.     EXAM:  /69 (BP Location: Right arm, Patient Position: Chair, Cuff Size: Adult Large)   Pulse 77   Ht 1.778 m (5' 10\")   Wt 92.9 kg (204 lb 11.2 oz)   SpO2 98%   BMI 29.37 kg/m     In general, the patient is a pleasant male in no apparent distress.    HEENT: NC/AT.  PERRLA.  EOMI.  Sclerae white, not injected.  Nares clear.  Pharynx without erythema or exudate.  Dentition intact.    Neck: No adenopathy.  No thyromegaly. Carotids +4/4 bilaterally without bruits.  + jugular venous distension, JVP ~ 8cm above clavicle  Heart: RRR. Normal S1, S2 splits physiologically. No murmur, rub, click, or gallop. The PMI is in the 5th ICS in the midclavicular line. There is no heave.    Lungs: CTA.  No ronchi, wheezes, rales.  No dullness to percussion.   Abdomen: Soft, nontender, nondistended. No organomegaly.  No bruits.   Extremities: No clubbing, cyanosis, +2 pitting edema above the knee  Neurologic: Alert and oriented to person/place/time, normal speech, gait and affect  Skin: No petechiae, purpura or rash.    Labs:  LIPID RESULTS:  Lab Results "   Component Value Date    CHOL 113 03/20/2019    HDL 42 03/20/2019    LDL 52 03/20/2019    TRIG 93 03/20/2019    CHOLHDLRATIO 3.3 12/16/2008       LIVER ENZYME RESULTS:  Lab Results   Component Value Date    AST 17 09/10/2019    ALT 23 09/10/2019       CBC RESULTS:  Lab Results   Component Value Date    WBC 4.5 09/10/2019    RBC 2.50 (L) 09/10/2019    HGB 9.1 (L) 09/10/2019    HCT 29.0 (L) 09/10/2019     (H) 09/10/2019    MCH 36.4 (H) 09/10/2019    MCHC 31.4 (L) 09/10/2019    RDW 15.1 (H) 09/10/2019     (L) 09/10/2019       BMP RESULTS:  Lab Results   Component Value Date     09/12/2019    POTASSIUM 4.4 09/12/2019    CHLORIDE 104 09/12/2019    CO2 29 09/12/2019    ANIONGAP 7 09/12/2019     (H) 09/12/2019    BUN 34 (H) 09/12/2019    CR 1.45 (H) 09/12/2019    GFRESTIMATED 49 (L) 09/12/2019    GFRESTBLACK 57 (L) 09/12/2019    EILEEN 8.8 09/12/2019        A1C RESULTS:  Lab Results   Component Value Date    A1C 6.3 (H) 08/29/2019       INR RESULTS:  Lab Results   Component Value Date    INR 1.60 (H) 08/30/2019    INR 1.80 (H) 08/29/2019       Cardiac data:          Stress echo 7.30.19    Submaximal stress test, achieved 74% of the age predicted maximal heart rate.  Limiting symptom fatigue.     Normal blood pressure response to exercise.  No angina symptoms with exercise.  Baseline rhythm is atrial fibrillation. No ECG evidence of ischemia. There is  no chronotropic incompetence.  Severely reduced LV function with EF of 28% at rest; with exercise left  ventricular cavity size and LVEF did not change significantly.  Akinesis of the basal-mid inferior/inferoseptal/inferolateral segments  present. No new stress induced regional wall motion abnormalities evident.  Less than average functional capacity for age. Exercise time ~2 minutes.     Biventricular dysfunction with moderate mitral and tricuspid regurgitation and  evidence of hypervolemia noted on screening 2D and Doppler examination.       Aug  2019      Right sided filling pressures are severely elevated - mean RA 23, PCWP 31, mean PA 40     Moderately elevated pulmonary artery hypertension.    Left sided filling pressures are severely elevated.    Left ventricular filling pressures are severely elevated .    Reduced cardiac output level.    Heparin was held during the procedure due to thrombocytopenia    Nipride was started and titrate to 1mcg/kg/min. PCWP decreased from 30 mmHg to 24 mmHg. mPAP decreased from 40 mmHg to 30 mmHg. After pressures decreased team proceed with angiogram.    Mild LAD and LCx disease    Subtotal proximal RCA occlusion    No intervention performed    No mitral or aortic valve gradient      Assessment and Plan:     #ICM/NICM, LVEF 30-35% (NYHA II, Stage B)  #s/p ICD 2009  #Volume overload  #Weight gain  He was recently admitted for decompensated HF after RHC showing mean PA 23, PA 40 and PCWP 31. He was aggressively diuresed and reported symptom improvement. Recently his diuretics were reduced because of elevated Cr. Since then, he has noticed increasing weight and shortness of breath. JVP is elevated on exam. No crackles, but bilateral leg swelling is evident. Patient states he adheres to CHF diet and has been seen [facility].   - Increased diuretics to lasix 40mg BID and focus on volume optimization as patient to start new immunotherapy soon  - [facility] follow up   - Daily weights  - Low Na diet  - Continue lisinopril 5mg daily  - Continue metoprolol succinate 200mg daily  - BMP pending    #Hx of CAD  #Chronic RCA occlusion   Had angiogram in 08/12/19 that showed  of proximal RCA with bridging collaterals from distal LAD. Plan was to revascularized vessel but patient was in decompensated CHF.   - Will reassess benefit of revascularization once patient volume optimized    #Afib (CHADVASC 3) on warfarin  - Continue with warfarin  - Continue home BB    #Hx of VF  No episodes of shock since being on mexiletine. He is  seen by Dr. Mclaughlin in EP.   - Continue mexiletine per EP  - Continue BB at above dose    Patient evaluated and discussed with Dr. Prince.     Tank Briceno MD PhD  Cardiology Fellow    ATTENDING ATTESTATION:  This patient has been seen and examined by me September 23, 2019 with Tank Briceno MD, cardiology fellow. I have reviewed the vitals, laboratory and imaging data relevant to this patient's care. I have edited this note to reflect our joint assessment and plan, and discussed the plan with the patient.    69 year old with -   1. Acute on Chronic Systolic Heart Failure (EF 25-30%)  2. Permanent Atrial Fibrillation  3. Ischemic heart disease - Subtotal proximal RCA occlusion  4. History of VT/VF, s/p ICD  5. Hypertension  6. Relapsed multiple myeloma with mets to brain and bone s/p BMT, currently on chemo holiday  7. T2DM  8. Hyperlipidemia    Patient has had worsening heart failure symptoms since being on bortezomib.  Today, he denies any angina.  He notes mild worsening of the peripheral edema since the Lasix dose was reduced during his evaluation in the [facility] recently.  He has intermittently been increasing his dose to 40 mg twice daily.  He denies any orthopnea or PND.  On exam he is hypervolemic with elevated JVD, peripheral edema in spite of wearing support hose.  His lungs are clear.  He has a mitral regurgitation murmur on exam.  We discussed increasing Lasix to 40 mg twice daily and check his renal function in 1 week.  I would like to also get him started on a low-dose digoxin in an effort to optimize his heart failure regimen.  He will continue the lisinopril and beta-blocker that he is on along with mexiletine and simvastatin.    Plan to see him back in 2 weeks in the [facility] in 6 weeks with me.  In the event develops angina after he attains euvolemia, it maybe worthwhile to attempt  PCI of the  of the right coronary artery at that point.     Yoko Prince MD, MS  Staff  Cardiologist  Pager: [phone]          Patient Care Team:  Cong Mcdonald as PCP - General  [facility] - [facility]  Rojas Raygoza MD as BMT Physician  Cong Mcdonald as Referring Physician  Yaz Jeong NP as Nurse Practitioner (Nurse Practitioner)  [facility], [place] as Family Medicine Physician  Merry Mas RN as Continuity Care Coordinator (Transplant)  Zayra Lyle RN as Specialty Care Coordinator (Cardiology)  aJ Duarte MD as MD (Clinical Cardiac Electrophysiology)  Omayra Serra, RN as Specialty Care Coordinator (Cardiology)  KIRILL BENSON

## 2019-09-23 NOTE — PATIENT INSTRUCTIONS
Patient Instructions:  It was a pleasure to see you in the cardiology clinic today.      If you have any questions, call  Omayra Serra RN, at [phone].  Press Option #1 for the [facility], and then press Option #4  We are encouraging the use of MyChart to communicate with your HealthCare Provider    Note the change medications: INCREASE lasix to 40 mg TWICE a day.   Stop the following medications: None    The results from today include: NA  Please follow up with Dr. Prince in 6 weeks and [facility] in 2 weeks.       If you have an urgent need after hours (8:00 am to 4:30 pm) please call [phone] and ask for the cardiology fellow on call.    
23-Sep-2019 16:24

## 2019-09-23 NOTE — PROGRESS NOTES
HPI:   68 y/o male with PMHx significant for MM s/p PBSCT in 2006, ICM/NICM (LVEF 30-35%, NYHA II Stage B) s/p ICD placement in 2009, episodic VT/VF treated by appropriate ICD shocks and afib (CHADVASC 3) on warfarin comes in to establish care.     Patient was diagnosed in 2006 with multiple myeloma and underwent chemotherapy and autologous PBSCT that same year. He has been monitored for the past few years and was noted to have increase in gamma protein presence in his urine. He was started on lenalinomide in 2017 due to relapse. He then developed anemia that was concerning for worsening MM vs medication side effect. Despite lenalidomide treatment, he did not have significant improvement and was therefore started on bortezomib in 07/2019.     Patient was then admitted about one month ago after RHC and coronary angiogram because of decompensated heart failure. He significantly improved after diuresing about 20 lbs and was discharged.     On follow up visit with oncology, it was decided to switch bortezomib since it might've been contributing to cardiac toxicity and causing volume overload. Plan is to start daratumumab after the fall.     His last ICD shock was in 03/06/19 while undergoing lenalidomide treatment. He was started on mexiletine few weeks afterwards and since then has not had any shocks.      Patient today does report some dyspnea on exertion and feels that he has gained weight. He denies recurrent fever, chills, night sweats, headaches, vision changes, sore throat, cough, chest pain, palpitations, abdominal pain, nausea, vomiting, diarrhea, constipation, hematochezia, melena, dysuria, hematuria, joint pain, joint swelling, presyncope, syncope or new rash.       PAST MEDICAL HISTORY:  Past Medical History:   Diagnosis Date     Chronic systolic heart failure (H)      Ischemic cardiomyopathy      Other premature beats      Permanent atrial fibrillation (H)      Personal history of multiple myeloma      VF  (ventricular fibrillation) (H)        CURRENT MEDICATIONS:  Current Outpatient Medications   Medication Sig Dispense Refill     albuterol (PROAIR HFA, PROVENTIL HFA, VENTOLIN HFA) 108 (90 BASE) MCG/ACT inhaler Inhale 2 puffs into the lungs every 6 hours as needed        amoxicillin (AMOXIL) 500 MG tablet Take 4 tabs one hour before dental appointment 4 tablet 5     baclofen (LIORESAL) 10 MG tablet Take 10 mg by mouth 3 times daily as needed prn       Calcium Carbonate-Vitamin D (CALCIUM 500 + D PO) Take 2 tablets by mouth daily.       diclofenac (VOLTAREN) 75 MG EC tablet Take 75 mg by mouth 2 times daily as needed 1 tab prn       fentaNYL (DURAGESIC) 25 mcg/hr patch 72 hr Place 1 patch onto the skin every 72 hours       furosemide (LASIX) 40 MG tablet 40 mg in the am and 20 mg in the pm 90 tablet 3     lisinopril (PRINIVIL,ZESTRIL) 5 MG tablet Take 5 mg by mouth daily        Magnesium Oxide 420 MG TABS Take 3 tabs (1260 mg) three times a day. 270 tablet 3     metoprolol succinate (TOPROL-XL) 200 MG 24 hr tablet Take 1 tablet (200 mg) by mouth daily 90 tablet 3     mexiletine (MEXITIL) 150 MG capsule Take 1 capsule (150 mg) by mouth 2 times daily As close to 12 hours apart as possible 180 capsule 3     nitroGLYCERIN (NITROSTAT) 0.4 MG SL tablet Place 0.4 mg under the tongue every 5 minutes as needed Reported on 5/16/2017       omeprazole (PRILOSEC) 20 MG capsule Take 20 mg by mouth daily  90 capsule 3     ondansetron (ZOFRAN-ODT) 4 MG ODT tab Take 4 mg by mouth every 6 hours as needed for nausea       oxycodone (ROXICODONE) 5 MG immediate release tablet Take 1-2 tablets by mouth every 6 hours as needed. For pain.        potassium chloride ER (K-DUR/KLOR-CON M) 10 MEQ CR tablet Take 2 tablets (20 mEq) by mouth daily 90 tablet 1     simvastatin (ZOCOR) 40 MG tablet Take 40 mg by mouth At Bedtime  30 tablet      tiotropium (SPIRIVA) 18 MCG inhalation capsule Inhale 1 capsule (18 mcg) into the lungs daily 30 capsule 3      TRAZODONE HCL PO Take 50 mg by mouth nightly as needed        warfarin (COUMADIN) 5 MG tablet Take 7.5 mg by mouth See Admin Instructions Take 1.5 tab by  and Friday and 1 tab Sun  , Thur and Sat         PAST SURGICAL HISTORY:  Past Surgical History:   Procedure Laterality Date     CV CORONARY ANGIOGRAM N/A 2019    Procedure: CV CORONARY ANGIOGRAM;  Surgeon: Giorgi Milan MD;  [facility]:  [facility]     CV LEFT HEART CATH N/A 2019    Procedure: Left Heart Cath;  Surgeon: Giorgi Milan MD;  Location:  [facility]     CV RIGHT HEART CATH N/A 2019    Procedure: CV RIGHT HEART CATH;  Surgeon: Giorgi Milan MD;  Location:  [facility]     CV STRESS EXERCISE STUDY N/A 2019    Procedure: Stress Drug Study;  Surgeon: Giorgi Milan MD;  Location:  [facility]     IMPLANT AUTOMATIC IMPLANTABLE CARDIOVERTER DEFIBRILLATOR         ALLERGIES     Allergies   Allergen Reactions     Nkda [No Known Drug Allergies]        FAMILY HISTORY:  No family history on file.    SOCIAL HISTORY:  Social History     Socioeconomic History     Marital status:      Spouse name: None     Number of children: None     Years of education: None     Highest education level: None   Occupational History     None   Social Needs     Financial resource strain: None     Food insecurity:     Worry: None     Inability: None     Transportation needs:     Medical: None     Non-medical: None   Tobacco Use     Smoking status: Former Smoker     Packs/day: 1.00     Years: 25.00     Pack years: 25.00     Types: Cigarettes     Last attempt to quit: 10/15/1995     Years since quittin.9     Smokeless tobacco: Never Used   Substance and Sexual Activity     Alcohol use: No     Drug use: No     Sexual activity: None   Lifestyle     Physical activity:     Days per week: None     Minutes per session: None     Stress: None   Relationships     Social  "connections:     Talks on phone: None     Gets together: None     Attends Jain service: None     Active member of club or organization: None     Attends meetings of clubs or organizations: None     Relationship status: None     Intimate partner violence:     Fear of current or ex partner: None     Emotionally abused: None     Physically abused: None     Forced sexual activity: None   Other Topics Concern     Parent/sibling w/ CABG, MI or angioplasty before 65F 55M? Not Asked   Social History Narrative     None       ROS:   Complete ROS negative unless stated in HPI.     EXAM:  /69 (BP Location: Right arm, Patient Position: Chair, Cuff Size: Adult Large)   Pulse 77   Ht 1.778 m (5' 10\")   Wt 92.9 kg (204 lb 11.2 oz)   SpO2 98%   BMI 29.37 kg/m    In general, the patient is a pleasant male in no apparent distress.    HEENT: NC/AT.  PERRLA.  EOMI.  Sclerae white, not injected.  Nares clear.  Pharynx without erythema or exudate.  Dentition intact.    Neck: No adenopathy.  No thyromegaly. Carotids +4/4 bilaterally without bruits.  + jugular venous distension, JVP ~ 8cm above clavicle  Heart: RRR. Normal S1, S2 splits physiologically. No murmur, rub, click, or gallop. The PMI is in the 5th ICS in the midclavicular line. There is no heave.    Lungs: CTA.  No ronchi, wheezes, rales.  No dullness to percussion.   Abdomen: Soft, nontender, nondistended. No organomegaly.  No bruits.   Extremities: No clubbing, cyanosis, +2 pitting edema above the knee  Neurologic: Alert and oriented to person/place/time, normal speech, gait and affect  Skin: No petechiae, purpura or rash.    Labs:  LIPID RESULTS:  Lab Results   Component Value Date    CHOL 113 03/20/2019    HDL 42 03/20/2019    LDL 52 03/20/2019    TRIG 93 03/20/2019    CHOLHDLRATIO 3.3 12/16/2008       LIVER ENZYME RESULTS:  Lab Results   Component Value Date    AST 17 09/10/2019    ALT 23 09/10/2019       CBC RESULTS:  Lab Results   Component Value Date    " WBC 4.5 09/10/2019    RBC 2.50 (L) 09/10/2019    HGB 9.1 (L) 09/10/2019    HCT 29.0 (L) 09/10/2019     (H) 09/10/2019    MCH 36.4 (H) 09/10/2019    MCHC 31.4 (L) 09/10/2019    RDW 15.1 (H) 09/10/2019     (L) 09/10/2019       BMP RESULTS:  Lab Results   Component Value Date     09/12/2019    POTASSIUM 4.4 09/12/2019    CHLORIDE 104 09/12/2019    CO2 29 09/12/2019    ANIONGAP 7 09/12/2019     (H) 09/12/2019    BUN 34 (H) 09/12/2019    CR 1.45 (H) 09/12/2019    GFRESTIMATED 49 (L) 09/12/2019    GFRESTBLACK 57 (L) 09/12/2019    EILEEN 8.8 09/12/2019        A1C RESULTS:  Lab Results   Component Value Date    A1C 6.3 (H) 08/29/2019       INR RESULTS:  Lab Results   Component Value Date    INR 1.60 (H) 08/30/2019    INR 1.80 (H) 08/29/2019       Cardiac data:          Stress echo 7.30.19    Submaximal stress test, achieved 74% of the age predicted maximal heart rate.  Limiting symptom fatigue.     Normal blood pressure response to exercise.  No angina symptoms with exercise.  Baseline rhythm is atrial fibrillation. No ECG evidence of ischemia. There is  no chronotropic incompetence.  Severely reduced LV function with EF of 28% at rest; with exercise left  ventricular cavity size and LVEF did not change significantly.  Akinesis of the basal-mid inferior/inferoseptal/inferolateral segments  present. No new stress induced regional wall motion abnormalities evident.  Less than average functional capacity for age. Exercise time ~2 minutes.     Biventricular dysfunction with moderate mitral and tricuspid regurgitation and  evidence of hypervolemia noted on screening 2D and Doppler examination.       Aug 2019      Right sided filling pressures are severely elevated - mean RA 23, PCWP 31, mean PA 40     Moderately elevated pulmonary artery hypertension.    Left sided filling pressures are severely elevated.    Left ventricular filling pressures are severely elevated .    Reduced cardiac output  level.    Heparin was held during the procedure due to thrombocytopenia    Nipride was started and titrate to 1mcg/kg/min. PCWP decreased from 30 mmHg to 24 mmHg. mPAP decreased from 40 mmHg to 30 mmHg. After pressures decreased team proceed with angiogram.    Mild LAD and LCx disease    Subtotal proximal RCA occlusion    No intervention performed    No mitral or aortic valve gradient      Assessment and Plan:     #ICM/NICM, LVEF 30-35% (NYHA II, Stage B)  #s/p ICD 2009  #Volume overload  #Weight gain  He was recently admitted for decompensated HF after RHC showing mean PA 23, PA 40 and PCWP 31. He was aggressively diuresed and reported symptom improvement. Recently his diuretics were reduced because of elevated Cr. Since then, he has noticed increasing weight and shortness of breath. JVP is elevated on exam. No crackles, but bilateral leg swelling is evident. Patient states he adheres to CHF diet and has been seen [facility].   - Increased diuretics to lasix 40mg BID and focus on volume optimization as patient to start new immunotherapy soon  - [facility] follow up   - Daily weights  - Low Na diet  - Continue lisinopril 5mg daily  - Continue metoprolol succinate 200mg daily  - BMP pending    #Hx of CAD  #Chronic RCA occlusion   Had angiogram in 08/12/19 that showed  of proximal RCA with bridging collaterals from distal LAD. Plan was to revascularized vessel but patient was in decompensated CHF.   - Will reassess benefit of revascularization once patient volume optimized    #Afib (CHADVASC 3) on warfarin  - Continue with warfarin  - Continue home BB    #Hx of VF  No episodes of shock since being on mexiletine. He is seen by Dr. Mclaughlin in EP.   - Continue mexiletine per EP  - Continue BB at above dose    Patient evaluated and discussed with Dr. Prince.     Tank Briceno MD PhD  Cardiology Fellow    ATTENDING ATTESTATION:  This patient has been seen and examined by me September 23, 2019 with Tank Briceno MD,  cardiology fellow. I have reviewed the vitals, laboratory and imaging data relevant to this patient's care. I have edited this note to reflect our joint assessment and plan, and discussed the plan with the patient.    69 year old with -   1. Acute on Chronic Systolic Heart Failure (EF 25-30%)  2. Permanent Atrial Fibrillation  3. Ischemic heart disease - Subtotal proximal RCA occlusion  4. History of VT/VF, s/p ICD  5. Hypertension  6. Relapsed multiple myeloma with mets to brain and bone s/p BMT, currently on chemo holiday  7. T2DM  8. Hyperlipidemia    Patient has had worsening heart failure symptoms since being on bortezomib.  Today, he denies any angina.  He notes mild worsening of the peripheral edema since the Lasix dose was reduced during his evaluation in the [facility] recently.  He has intermittently been increasing his dose to 40 mg twice daily.  He denies any orthopnea or PND.  On exam he is hypervolemic with elevated JVD, peripheral edema in spite of wearing support hose.  His lungs are clear.  He has a mitral regurgitation murmur on exam.  We discussed increasing Lasix to 40 mg twice daily and check his renal function in 1 week.  I would like to also get him started on a low-dose digoxin in an effort to optimize his heart failure regimen.  He will continue the lisinopril and beta-blocker that he is on along with mexiletine and simvastatin.    Plan to see him back in 2 weeks in the [facility] in 6 weeks with me.  In the event develops angina after he attains euvolemia, it maybe worthwhile to attempt  PCI of the  of the right coronary artery at that point.     Yoko Prince MD, MS  Staff Cardiologist  Pager: [phone]        CC  Patient Care Team:  Cong Mcdonald as PCP - General  Willian Fuentes MD as PCP - Internal Medicine  Rojas Raygoza MD as BMT Physician  Cong Mcdonald as Referring Physician  Yaz Jeong NP as Nurse Practitioner (Nurse Practitioner)  Clinic,  [facility] as Family Medicine Physician  Merry Mas, RN as Continuity Care Coordinator (Transplant)  Zayra Lyle, RN as Specialty Care Coordinator (Cardiology)  Ja Duarte MD as MD (Clinical Cardiac Electrophysiology)  Omayra Serra, RN as Specialty Care Coordinator (Cardiology)  KIRILL BENSON

## 2019-09-23 NOTE — NURSING NOTE
Vitals completed successfully and medication reconciled. EKG done.   Katlyn Benoit CMA  8:03 AM  Chief Complaint   Patient presents with     New Patient      New cardio/oncology, 68 yo White male, PMH of permanent afib, ICM (LVEF 30-35% in 2017) status post [employer] ICD in 12/2009 initially for primary prevention, history of multiple appropriate shocks for VF (typically while undergoing chemotherapy), and relapsed multiple myeloma

## 2019-09-24 LAB — INTERPRETATION ECG - MUSE: NORMAL

## 2019-09-25 ENCOUNTER — TRANSFERRED RECORDS (OUTPATIENT)
Dept: HEALTH INFORMATION MANAGEMENT | Facility: CLINIC | Age: 69
End: 2019-09-25

## 2019-09-30 ENCOUNTER — ANCILLARY PROCEDURE (OUTPATIENT)
Dept: CARDIOLOGY | Facility: CLINIC | Age: 69
End: 2019-09-30
Attending: INTERNAL MEDICINE
Payer: MEDICARE

## 2019-09-30 VITALS
BODY MASS INDEX: 27.92 KG/M2 | DIASTOLIC BLOOD PRESSURE: 66 MMHG | SYSTOLIC BLOOD PRESSURE: 119 MMHG | WEIGHT: 195 LBS | HEIGHT: 70 IN | HEART RATE: 87 BPM | OXYGEN SATURATION: 94 %

## 2019-09-30 DIAGNOSIS — I48.91 ATRIAL FIBRILLATION (H): ICD-10-CM

## 2019-09-30 DIAGNOSIS — I25.5 ISCHEMIC CARDIOMYOPATHY: ICD-10-CM

## 2019-09-30 DIAGNOSIS — Z95.810 AUTOMATIC IMPLANTABLE CARDIOVERTER-DEFIBRILLATOR IN SITU: Primary | Chronic | ICD-10-CM

## 2019-09-30 DIAGNOSIS — I50.22 CHRONIC SYSTOLIC HEART FAILURE (H): Primary | Chronic | ICD-10-CM

## 2019-09-30 DIAGNOSIS — Z95.810 AUTOMATIC IMPLANTABLE CARDIOVERTER-DEFIBRILLATOR IN SITU: Chronic | ICD-10-CM

## 2019-09-30 LAB
ANION GAP SERPL CALCULATED.3IONS-SCNC: 3 MMOL/L (ref 3–14)
BUN SERPL-MCNC: 23 MG/DL (ref 7–30)
CALCIUM SERPL-MCNC: 8.8 MG/DL (ref 8.5–10.1)
CHLORIDE SERPL-SCNC: 105 MMOL/L (ref 94–109)
CO2 SERPL-SCNC: 29 MMOL/L (ref 20–32)
CREAT SERPL-MCNC: 1.16 MG/DL (ref 0.66–1.25)
GFR SERPL CREATININE-BSD FRML MDRD: 64 ML/MIN/{1.73_M2}
GLUCOSE SERPL-MCNC: 85 MG/DL (ref 70–99)
POTASSIUM SERPL-SCNC: 4.9 MMOL/L (ref 3.4–5.3)
SODIUM SERPL-SCNC: 136 MMOL/L (ref 133–144)

## 2019-09-30 PROCEDURE — 80048 BASIC METABOLIC PNL TOTAL CA: CPT | Performed by: INTERNAL MEDICINE

## 2019-09-30 PROCEDURE — G0463 HOSPITAL OUTPT CLINIC VISIT: HCPCS

## 2019-09-30 PROCEDURE — 99214 OFFICE O/P EST MOD 30 MIN: CPT | Mod: GC | Performed by: INTERNAL MEDICINE

## 2019-09-30 PROCEDURE — 36415 COLL VENOUS BLD VENIPUNCTURE: CPT | Performed by: INTERNAL MEDICINE

## 2019-09-30 ASSESSMENT — PAIN SCALES - GENERAL: PAINLEVEL: NO PAIN (0)

## 2019-09-30 ASSESSMENT — MIFFLIN-ST. JEOR: SCORE: 1660.76

## 2019-09-30 NOTE — PROGRESS NOTES
"     Cardiac Electrophysiology Clinic       HPI (July 22, 2019): Patient previously seen by Dr. Jean-Baptiste who saw him last on 11Jun2019 and noted:    \"Erasmo Pearce is a 68 year old male with a history of permanent atrial fibrillation, ischemic cardiomyopathy (LVEF 30-35% in 2017) status post [employer] ICD in 12/2009 initially for primary prevention, history of multiple appropriate shocks for VF (typically while undergoing chemotherapy), and relapsed multiple myeloma who presents for follow-up. Was last seen in clinic on 5/7/19.     He was started on lenalidomide on 2/10/19.  Since initiating lenalidomide, he has received 4 appropriate ICD shocks (on 2/11, 2/14, 2/16, and 3/6).  Last ICD shock prior to starting lenalidomide was on 7/25.  On 2/11, had one 41 J shock; on 2/12, had ATP x 1; on 2/14, had ATP x 1, 41 J shock; on 2/15, ATP x 1; on 2/15, ATP x 1, 41 J shock; and on 3/6, ATP x 1, 41 J shock. During his visit on 3/26/19, mexiletine 150 mg BID was started.  Since starting mexiletine, patient has not had any shocks.    Since our last visit, patient has had multiple hospitalizations.  He was hospitalized for enterocolitis, ileus, and bowel obstruction requiring NG tube placement. He was most recently hospitalized from 6/3-6/5/19 with diarrhea, orthostasis, and significant MANN secondary to dehydration with creatinine at 4.84 on presentation.  C diff was negative.  During the hospitalization, patient was given IV fluids and Lasix and lisinopril were held.  Patient notes that he is currently taking Lasix 20 mg every 2 to 3 days for intermittent lower extremity edema.  His chemotherapy has been held for the last couple weeks and he is planning on resuming his chemotherapy in a couple days.  Since his last hospitalization, patient notes feeling well.  Patient has been increasing his fluid intake.  Denies chest pain, shortness breath, palpitations, syncope, lightheadedness, abdominal pain, nausea, " "emesis, hematochezia, melena, or any other bleeding.  Patient continues to have diarrhea with 3-5 bowel movements per day.  Patient notes that when he is on lenalidomide, his stools are more firm and less frequent.  Patient has not noticed that he has increased frequency of diarrhea when he takes p.o. magnesium.  Patient has had chronic diarrhea for years, and his diarrhea started prior to initiating chemotherapy for multiple myeloma years ago.  He has a history of multiple bowel obstructions.\"     7/22/19:  In July he was seen in BMT clinic and complaining of new peripheral edema. His furosemide and lisinopril had been on hold due to MANN. They were restarted on 07Jul. A venous ultrasound showed no DVT. His last device check (remote) in June showed no VT. \"Non-sustained VT\" was reported but this is atrial fibrillation with rapid ventricular rates (personal review).     Today, he reports the edema is only a little better. He is using the compression stockings pretty consistently. He is wondering if he can resume his previous dose of furosemide 40 mg a day.    He has had some lightheadedness. This seems worse after chemo and with standing. He has noticed increased fatigue. He has had no chest pain or discomfort.     He reports no stents; cath in 2006 but I cannot locate results.     9/30/2019:    Since last being seen, patient underwent exercise stress echocardiogram which was submaximal given patient achieved 74 percent of age-predicted maximal heart rate, limited by fatigue, and showing EF of 28%.  Patient subsequently underwent coronary Yaz Melvin and right heart catheterization.  Coronary angiogram showed  of proximal RCA and nonobstructive coronary artery disease.  Right heart cath was notable for RA pressure of 23, PA pressure of 56/30/40 and wedge pressure of 31.  Patient was subsequently admitted and diuresed.  Has since been home and notes stable weight at approximately 195 pounds.  Currently taking 40 " mg of p.o. Lasix twice daily.  Notes he has returned back to normal with regards to his exercise capacity and can walk up a flight of stairs in his home without issues.  Denies any chest pain, palpitations, lightheadedness or syncope.  Notes no shocks from ICD.  Per wife only question today is whether or not digoxin should be started which was recommended when last seen in cardiology clinic.  Plans to see his oncologist in November for consideration of additional chemotherapy for multiple myeloma.      Past Medical History:   Diagnosis Date     Chronic systolic heart failure (H)      Ischemic cardiomyopathy      Other premature beats      Permanent atrial fibrillation (H)      Personal history of multiple myeloma      VF (ventricular fibrillation) (H)        Past Surgical History:   Procedure Laterality Date     CV CORONARY ANGIOGRAM N/A 2019    Procedure: CV CORONARY ANGIOGRAM;  Surgeon: Giorgi Milan MD;  Location:  [facility]     CV LEFT HEART CATH N/A 2019    Procedure: Left Heart Cath;  Surgeon: Giorgi Milan MD;  Location:  [facility]     CV RIGHT HEART CATH N/A 2019    Procedure: CV RIGHT HEART CATH;  Surgeon: Giorgi Milan MD;  Location:  [facility]     CV STRESS EXERCISE STUDY N/A 2019    Procedure: Stress Drug Study;  Surgeon: Giorgi Milan MD;  Location:  HE[facility]ARD[facility] CATH LAB     IMPLANT AUTOMATIC IMPLANTABLE CARDIOVERTER DEFIBRILLATOR       FH: no coronary artery disease, one sister with atrial fibrillation     Social History     Tobacco Use     Smoking status: Former Smoker     Packs/day: 1.00     Years: 25.00     Pack years: 25.00     Types: Cigarettes     Last attempt to quit: 10/15/1995     Years since quittin.9     Smokeless tobacco: Never Used   Substance Use Topics     Alcohol use: No         Current Outpatient Medications   Medication Sig     albuterol (PROAIR HFA, PROVENTIL HFA, VENTOLIN HFA) 108 (90  BASE) MCG/ACT inhaler Inhale 2 puffs into the lungs every 6 hours as needed      amoxicillin (AMOXIL) 500 MG tablet Take 4 tabs one hour before dental appointment     baclofen (LIORESAL) 10 MG tablet Take 10 mg by mouth 3 times daily as needed prn     Calcium Carbonate-Vitamin D (CALCIUM 500 + D PO) Take 2 tablets by mouth daily.     diclofenac (VOLTAREN) 75 MG EC tablet Take 75 mg by mouth 2 times daily as needed 1 tab prn     fentaNYL (DURAGESIC) 25 mcg/hr patch 72 hr Place 1 patch onto the skin every 72 hours     furosemide (LASIX) 40 MG tablet Take 1 tablet (40 mg) by mouth 2 times daily     lisinopril (PRINIVIL,ZESTRIL) 5 MG tablet Take 5 mg by mouth daily      Magnesium Oxide 420 MG TABS Take 3 tabs (1260 mg) three times a day.     metoprolol succinate (TOPROL-XL) 200 MG 24 hr tablet Take 1 tablet (200 mg) by mouth daily     mexiletine (MEXITIL) 150 MG capsule Take 1 capsule (150 mg) by mouth 2 times daily As close to 12 hours apart as possible     nitroGLYCERIN (NITROSTAT) 0.4 MG SL tablet Place 0.4 mg under the tongue every 5 minutes as needed Reported on 5/16/2017     omeprazole (PRILOSEC) 20 MG capsule Take 20 mg by mouth daily      ondansetron (ZOFRAN-ODT) 4 MG ODT tab Take 4 mg by mouth every 6 hours as needed for nausea     oxycodone (ROXICODONE) 5 MG immediate release tablet Take 1-2 tablets by mouth every 6 hours as needed. For pain.      potassium chloride ER (K-DUR/KLOR-CON M) 10 MEQ CR tablet Take 2 tablets (20 mEq) by mouth daily     simvastatin (ZOCOR) 40 MG tablet Take 40 mg by mouth At Bedtime      tiotropium (SPIRIVA) 18 MCG inhalation capsule Inhale 1 capsule (18 mcg) into the lungs daily     TRAZODONE HCL PO Take 50 mg by mouth nightly as needed      warfarin (COUMADIN) 5 MG tablet Take 7.5 mg by mouth See Admin Instructions Take 1.5 tab by mojth Monday and Friday and 1 tab Sun Tues WED , Thur and Sat     No current facility-administered medications for this visit.          ROS:   10 point  "ROS negative other than what is discussed above  July 22, 2019/woa    EXAM:   /66 (BP Location: Left arm, Patient Position: Chair, Cuff Size: Adult Regular)   Pulse 87   Ht 1.778 m (5' 10\")   Wt 88.5 kg (195 lb)   SpO2 94%   BMI 27.98 kg/m     GENERAL: Alert, oriented, NAD  HEENT:  NC/AT. Sclerae white.  MMM, no oral lesions  NECK: No adenopathy. Neck veins at 8-9 cm with + HJR  HEART: IRIR, normal S1 and S2, 2/6 MAGGY at LLSB and apex   LUNGS:  Clear to auscultation bilaterally, no wheezes, rales, or rhonchi  ABDOMEN: Soft, nontender, nondistended, bowel sounds present, no ascites.  EXTREMITIES:  1+ nonpitting bilateral lower extremity edema.    Laboratory and diagnostic data reviewed July 22, 2019:    I reviewed the ECG obtained today.  It shows atrial fibrillation with a single PVC.  Rate is controlled at 69 bpm.  Nonspecific T wave changes noted.  Inferior Q waves are noted consistent with a prior inferior wall infarction.    Results for DARRYL ZAPATA (MRN [id number]) as of 7/22/2019 07:45   Ref. Range 7/16/2019 10:47   Magnesium Latest Ref Range: 1.6 - 2.3 mg/dL 1.5 (L)       Results for DARRYL ZAPATA (MRN [id number]) as of 7/22/2019 07:04   Ref. Range 7/9/2019 09:24 7/16/2019 10:47   Sodium Latest Ref Range: 133 - 144 mmol/L 140 138   Potassium Latest Ref Range: 3.4 - 5.3 mmol/L 4.7 4.3   Chloride Latest Ref Range: 94 - 109 mmol/L 106 106   Carbon Dioxide Latest Ref Range: 20 - 32 mmol/L 30 27   Urea Nitrogen Latest Ref Range: 7 - 30 mg/dL 20 20   Creatinine Latest Ref Range: 0.66 - 1.25 mg/dL 1.12 1.09   GFR Estimate Latest Ref Range: >60 mL/min/1.73_m2 67 69   GFR Estimate If Black Latest Ref Range: >60 mL/min/1.73_m2 78 80     Results for DARRYL ZAPATA (MRN [id number]) as of 7/22/2019 07:04   Ref. Range 7/16/2019 10:47   WBC Latest Ref Range: 4.0 - 11.0 10e9/L 5.2   Hemoglobin Latest Ref Range: 13.3 - 17.7 g/dL 9.4 (L)   Hematocrit Latest Ref Range: 40.0 - 53.0 % 29.0 (L) "   Platelet Count Latest Ref Range: 150 - 450 10e9/L 167       9/30/2019 ICD check   [place] [facility] [place] and iterative programming of a [employer], single lead ICD, per MD orders.  He also has an appointment with Dr. Reeder. Today his intrinsic rhythm is an irregular ventricular rhythm  bpm. Normal ICD function. Over the last 6 months 815 ventricular episodes recorded. All of the available EGMs suggest AF with RVR. Pt reports he is taking coumadin. = 1%. Lead trends appear stable. Pt reports that he is feeling well. Battery estimates 2.5 years to ROSEMARIE. Plan for patient to send a remote transmission in 3 months and return to clinic in 6 months. JOHN Marshall.  Single lead ICD.  I have reviewed and interpreted the device interrogation, settings, programming and nurse's summary. The device is functioning within normal device parameters. I agree with the current findings, assessment and plan.    EXAM:  US DOP VEIN LEG SABRINA    INDICATION:  Leg swelling or pain, DVT suspected,Other specified soft tissue disorders    COMPARISON:  None    TECHNIQUE:  Ultrasound examination of venous structures was performed from the groin to the  ankle using real time, spectral, and color Doppler technique.  Both  augmentation and compression maneuvers were performed.  Spectral analysis  performed.    FINDINGS:  Both common femoral, femoral and popliteal veins are well visualized and are  normally compressible with normal color and spectral Doppler waveform which  augments with calf compression.  Normal color Doppler signal is seen in the  paired calf veins.    IMPRESSION:  1. No evidence of acute deep venous thrombosis.      Echocardiogram 4/4/19:  Mildly (EF 40-45%) reduced left ventricular function is present. Global  hypokinesis with basal-mid inferolateral and inferior akinesis.  Global right ventricular function is moderately reduced.  Moderate mitral insufficiency is present with multi directional jet  suggesting  involvement of the commissures.     This study was compared with the study from 2/7/17 and LVEF is improved and MR  is worse .        Assessment and Plan:     1) History of VF/VT, history multiple appropriate ICD shocks  He has not receiving any additional shocks since starting mexiletine.  Device check reviewed, patient with several episodes of what appears to be A. fib with RVR and NSVT, which appears similar to previous device checks.    - Continue mexiletine 150 mg twice a day and metoprolol succinate 200 mg daily. Previously discussed with patient that since his ICD shocks typically occur when taking chemotherapy, it may be considered in the future to discontinue mexiletine when he is not on chemotherapy.     2. Permanent AF  -Continue metoprolol succinate 200 mg and warfarin. CHADS-VASc 3 (age, CHF, CAD)  - Agree with starting digoxin, which may have benefit for both atrial fibrillation and heart failure       3. ICM, LVEF 40-45% in 4/2019  - [facility] Aug '19: RA pressure of 23, PA pressure of 56/30/40 and PCWP 31  - Follows with core clinic  -Stable weights and improve dyspnea on exertion on 40 mg p.o. Lasix twice daily  -Continue PTA lisinopril    4. CAD with  of RCA  -Planning revascularization once volume status optimized     Staffed w/ Dr. Duarte.     I appreciate the chance to help with Mr. Pearce's care. Please let me know if you have any questions or concerns.    Attending: Patient seen and examined with Dr. Saenz. The history and physical findings are accurate as recorded. My additional findings, if any, have been incorporated into the body of the note. All labs, imaging studies, ECG and telemetry data have been reviewed personally. The assessment and recommendations outlined reflect our joint decision making.    Portions of the note were dictated using speech recognition software. The note has been reviewed but some errors may have been overlooked.    Ja Duarte MD

## 2019-09-30 NOTE — NURSING NOTE
Vitals completed successfully and medication reconciled.     Katlyn Benoit CMA  1:25 PM  Chief Complaint   Patient presents with     Follow Up     69yoM w/ AF, ischemic cardiomyopathy, s/p ICD 2009, hx inappropriate shocks presents for 2 month follow-up with device prior

## 2019-09-30 NOTE — LETTER
"9/30/2019      RE: Erasmo Pearce  [address]  [address] [facility] [address]       Dear Colleague,    Thank you for the opportunity to participate in the care of your patient, Erasmo Pearce, at the [facility]. Please see a copy of my visit note below.     Cardiac Electrophysiology Clinic       HPI (July 22, 2019): Patient previously seen by Dr. Jean-Baptiste who saw him last on 11Jun2019 and noted:    \"Erasmo Pearce is a 68 year old male with a history of permanent atrial fibrillation, ischemic cardiomyopathy (LVEF 30-35% in 2017) status post [place] ICD in 12/2009 initially for primary prevention, history of multiple appropriate shocks for VF (typically while undergoing chemotherapy), and relapsed multiple myeloma who presents for follow-up. Was last seen in clinic on 5/7/19.     He was started on lenalidomide on 2/10/19.  Since initiating lenalidomide, he has received 4 appropriate ICD shocks (on 2/11, 2/14, 2/16, and 3/6).  Last ICD shock prior to starting lenalidomide was on 7/25.  On 2/11, had one 41 J shock; on 2/12, had ATP x 1; on 2/14, had ATP x 1, 41 J shock; on 2/15, ATP x 1; on 2/15, ATP x 1, 41 J shock; and on 3/6, ATP x 1, 41 J shock. During his visit on 3/26/19, mexiletine 150 mg BID was started.  Since starting mexiletine, patient has not had any shocks.    Since our last visit, patient has had multiple hospitalizations.  He was hospitalized for enterocolitis, ileus, and bowel obstruction requiring NG tube placement. He was most recently hospitalized from 6/3-6/5/19 with diarrhea, orthostasis, and significant MANN secondary to dehydration with creatinine at 4.84 on presentation.  C diff was negative.  During the hospitalization, patient was given IV fluids and Lasix and lisinopril were held.  Patient notes that he is currently taking Lasix 20 mg every 2 to 3 days for intermittent lower extremity edema.  " "His chemotherapy has been held for the last couple weeks and he is planning on resuming his chemotherapy in a couple days.  Since his last hospitalization, patient notes feeling well.  Patient has been increasing his fluid intake.  Denies chest pain, shortness breath, palpitations, syncope, lightheadedness, abdominal pain, nausea, emesis, hematochezia, melena, or any other bleeding.  Patient continues to have diarrhea with 3-5 bowel movements per day.  Patient notes that when he is on lenalidomide, his stools are more firm and less frequent.  Patient has not noticed that he has increased frequency of diarrhea when he takes p.o. magnesium.  Patient has had chronic diarrhea for years, and his diarrhea started prior to initiating chemotherapy for multiple myeloma years ago.  He has a history of multiple bowel obstructions.\"     7/22/19:  In July he was seen in BMT clinic and complaining of new peripheral edema. His furosemide and lisinopril had been on hold due to MANN. They were restarted on 07Jul. A venous ultrasound showed no DVT. His last device check (remote) in June showed no VT. \"Non-sustained VT\" was reported but this is atrial fibrillation with rapid ventricular rates (personal review).     Today, he reports the edema is only a little better. He is using the compression stockings pretty consistently. He is wondering if he can resume his previous dose of furosemide 40 mg a day.    He has had some lightheadedness. This seems worse after chemo and with standing. He has noticed increased fatigue. He has had no chest pain or discomfort.     He reports no stents; cath in 2006 but I cannot locate results.     9/30/2019:    Since last being seen, patient underwent exercise stress echocardiogram which was submaximal given patient achieved 74 percent of age-predicted maximal heart rate, limited by fatigue, and showing EF of 28%.  Patient subsequently underwent coronary Yaz Melvin and right heart catheterization.  " Coronary angiogram showed  of proximal RCA and nonobstructive coronary artery disease.  Right heart cath was notable for RA pressure of 23, PA pressure of 56/30/40 and wedge pressure of 31.  Patient was subsequently admitted and diuresed.  Has since been home and notes stable weight at approximately 195 pounds.  Currently taking 40 mg of p.o. Lasix twice daily.  Notes he has returned back to normal with regards to his exercise capacity and can walk up a flight of stairs in his home without issues.  Denies any chest pain, palpitations, lightheadedness or syncope.  Notes no shocks from ICD.  Per wife only question today is whether or not digoxin should be started which was recommended when last seen in cardiology clinic.  Plans to see his oncologist in November for consideration of additional chemotherapy for multiple myeloma.      Past Medical History:   Diagnosis Date     Chronic systolic heart failure (H)      Ischemic cardiomyopathy      Other premature beats      Permanent atrial fibrillation (H)      Personal history of multiple myeloma      VF (ventricular fibrillation) (H)        Past Surgical History:   Procedure Laterality Date     CV CORONARY ANGIOGRAM N/A 8/29/2019    Procedure: CV CORONARY ANGIOGRAM;  Surgeon: Giorgi Milan MD;  Location: [facility] CARDIAC CATH LAB     CV LEFT HEART CATH N/A 8/29/2019    Procedure: Left Heart Cath;  Surgeon: Giorgi Milan MD;  Location:  HEART CARDIAC CATH LAB     CV RIGHT HEART CATH N/A 8/29/2019    Procedure: CV RIGHT HEART CATH;  Surgeon: Giorgi Milan MD;  Location: [facility] CARDIAC CATH LAB     CV STRESS EXERCISE STUDY N/A 8/29/2019    Procedure: Stress Drug Study;  Surgeon: Giorgi Milan MD;  Location: [facility] CARDIAC CATH LAB     IMPLANT AUTOMATIC IMPLANTABLE CARDIOVERTER DEFIBRILLATOR       FH: no coronary artery disease, one sister with atrial fibrillation     Social History     Tobacco Use     Smoking status: Former Smoker      Packs/day: 1.00     Years: 25.00     Pack years: 25.00     Types: Cigarettes     Last attempt to quit: 10/15/1995     Years since quittin.9     Smokeless tobacco: Never Used   Substance Use Topics     Alcohol use: No         Current Outpatient Medications   Medication Sig     albuterol (PROAIR HFA, PROVENTIL HFA, VENTOLIN HFA) 108 (90 BASE) MCG/ACT inhaler Inhale 2 puffs into the lungs every 6 hours as needed      amoxicillin (AMOXIL) 500 MG tablet Take 4 tabs one hour before dental appointment     baclofen (LIORESAL) 10 MG tablet Take 10 mg by mouth 3 times daily as needed prn     Calcium Carbonate-Vitamin D (CALCIUM 500 + D PO) Take 2 tablets by mouth daily.     diclofenac (VOLTAREN) 75 MG EC tablet Take 75 mg by mouth 2 times daily as needed 1 tab prn     fentaNYL (DURAGESIC) 25 mcg/hr patch 72 hr Place 1 patch onto the skin every 72 hours     furosemide (LASIX) 40 MG tablet Take 1 tablet (40 mg) by mouth 2 times daily     lisinopril (PRINIVIL,ZESTRIL) 5 MG tablet Take 5 mg by mouth daily      Magnesium Oxide 420 MG TABS Take 3 tabs (1260 mg) three times a day.     metoprolol succinate (TOPROL-XL) 200 MG 24 hr tablet Take 1 tablet (200 mg) by mouth daily     mexiletine (MEXITIL) 150 MG capsule Take 1 capsule (150 mg) by mouth 2 times daily As close to 12 hours apart as possible     nitroGLYCERIN (NITROSTAT) 0.4 MG SL tablet Place 0.4 mg under the tongue every 5 minutes as needed Reported on 2017     omeprazole (PRILOSEC) 20 MG capsule Take 20 mg by mouth daily      ondansetron (ZOFRAN-ODT) 4 MG ODT tab Take 4 mg by mouth every 6 hours as needed for nausea     oxycodone (ROXICODONE) 5 MG immediate release tablet Take 1-2 tablets by mouth every 6 hours as needed. For pain.      potassium chloride ER (K-DUR/KLOR-CON M) 10 MEQ CR tablet Take 2 tablets (20 mEq) by mouth daily     simvastatin (ZOCOR) 40 MG tablet Take 40 mg by mouth At Bedtime      tiotropium (SPIRIVA) 18 MCG inhalation capsule Inhale 1  "capsule (18 mcg) into the lungs daily     TRAZODONE HCL PO Take 50 mg by mouth nightly as needed      warfarin (COUMADIN) 5 MG tablet Take 7.5 mg by mouth See Admin Instructions Take 1.5 tab by mojth Monday and Friday and 1 tab Sun Tues WED , Thur and Sat     No current facility-administered medications for this visit.          ROS:   10 point ROS negative other than what is discussed above  July 22, 2019/woa    EXAM:   /66 (BP Location: Left arm, Patient Position: Chair, Cuff Size: Adult Regular)   Pulse 87   Ht 1.778 m (5' 10\")   Wt 88.5 kg (195 lb)   SpO2 94%   BMI 27.98 kg/m      GENERAL: Alert, oriented, NAD  HEENT:  NC/AT. Sclerae white.  MMM, no oral lesions  NECK: No adenopathy. Neck veins at 8-9 cm with + HJR  HEART: IRIR, normal S1 and S2, 2/6 MAGGY at LLSB and apex   LUNGS:  Clear to auscultation bilaterally, no wheezes, rales, or rhonchi  ABDOMEN: Soft, nontender, nondistended, bowel sounds present, no ascites.  EXTREMITIES:   1+ nonpitting bilateral lower extremity edema.    Laboratory and diagnostic data reviewed July 22, 2019:    I reviewed the ECG obtained today.  It shows atrial fibrillation with a single PVC.  Rate is controlled at 69 bpm.  Nonspecific T wave changes noted.  Inferior Q waves are noted consistent with a prior inferior wall infarction.    Results for DARRYL ZAPTAA (MRN [id number]) as of 7/22/2019 07:45   Ref. Range 7/16/2019 10:47   Magnesium Latest Ref Range: 1.6 - 2.3 mg/dL 1.5 (L)       Results for DARRYL ZAPATA (MRN [id number]) as of 7/22/2019 07:04   Ref. Range 7/9/2019 09:24 7/16/2019 10:47   Sodium Latest Ref Range: 133 - 144 mmol/L 140 138   Potassium Latest Ref Range: 3.4 - 5.3 mmol/L 4.7 4.3   Chloride Latest Ref Range: 94 - 109 mmol/L 106 106   Carbon Dioxide Latest Ref Range: 20 - 32 mmol/L 30 27   Urea Nitrogen Latest Ref Range: 7 - 30 mg/dL 20 20   Creatinine Latest Ref Range: 0.66 - 1.25 mg/dL 1.12 1.09   GFR Estimate Latest Ref Range: >60 " mL/min/1.73_m2 67 69   GFR Estimate If Black Latest Ref Range: >60 mL/min/1.73_m2 78 80     Results for DARRYL ZAPATA (MRN [id number]) as of 7/22/2019 07:04   Ref. Range 7/16/2019 10:47   WBC Latest Ref Range: 4.0 - 11.0 10e9/L 5.2   Hemoglobin Latest Ref Range: 13.3 - 17.7 g/dL 9.4 (L)   Hematocrit Latest Ref Range: 40.0 - 53.0 % 29.0 (L)   Platelet Count Latest Ref Range: 150 - 450 10e9/L 167       9/30/2019 ICD check   [place] [facility] [place] and iterative programming of a [employer], single lead ICD, per MD orders.  He also has an appointment with Dr. Reeder. Today his intrinsic rhythm is an irregular ventricular rhythm  bpm. Normal ICD function. Over the last 6 months 815 ventricular episodes recorded. All of the available EGMs suggest AF with RVR. Pt reports he is taking coumadin. = 1%. Lead trends appear stable. Pt reports that he is feeling well. Battery estimates 2.5 years to ROSEMARIE. Plan for patient to send a remote transmission in 3 months and return to clinic in 6 months. JOHN Marshall.  Single lead ICD.  I have reviewed and interpreted the device interrogation, settings, programming and nurse's summary. The device is functioning within normal device parameters. I agree with the current findings, assessment and plan.    EXAM:  US DOP VEIN LEG SABRINA    INDICATION:  Leg swelling or pain, DVT suspected,Other specified soft tissue disorders    COMPARISON:  None    TECHNIQUE:  Ultrasound examination of venous structures was performed from the groin to the  ankle using real time, spectral, and color Doppler technique.  Both  augmentation and compression maneuvers were performed.  Spectral analysis  performed.    FINDINGS:  Both common femoral, femoral and popliteal veins are well visualized and are  normally compressible with normal color and spectral Doppler waveform which  augments with calf compression.  Normal color Doppler signal is seen in the  paired calf  veins.    IMPRESSION:  1. No evidence of acute deep venous thrombosis.      Echocardiogram 4/4/19:  Mildly (EF 40-45%) reduced left ventricular function is present. Global  hypokinesis with basal-mid inferolateral and inferior akinesis.  Global right ventricular function is moderately reduced.  Moderate mitral insufficiency is present with multi directional jet suggesting  involvement of the commissures.     This study was compared with the study from 2/7/17 and LVEF is improved and MR  is worse .        Assessment and Plan:     1) History of VF/VT, history multiple appropriate ICD shocks  He has not receiving any additional shocks since starting mexiletine.  Device check reviewed, patient with several episodes of what appears to be A. fib with RVR and NSVT, which appears similar to previous device checks.    - Continue mexiletine 150 mg twice a day and metoprolol succinate 200 mg daily. Previously discussed with patient that since his ICD shocks typically occur when taking chemotherapy, it may be considered in the future to discontinue mexiletine when he is not on chemotherapy.     2. Permanent AF  -Continue metoprolol succinate 200 mg and warfarin. CHADS-VASc 3 (age, CHF, CAD)  - Agree with starting digoxin, which may have benefit for both atrial fibrillation and heart failure       3. ICM, LVEF 40-45% in 4/2019  - RHC Aug '19: RA pressure of 23, PA pressure of 56/30/40 and PCWP 31  - Follows with core clinic  -Stable weights and improve dyspnea on exertion on 40 mg p.o. Lasix twice daily  -Continue PTA lisinopril    4. CAD with  of RCA  -Planning revascularization once volume status optimized     Staffed w/ Dr. Duarte.     I appreciate the chance to help with Mr. Pearce's care. Please let me know if you have any questions or concerns.    Attending: Patient seen and examined with Dr. Saenz. The history and physical findings are accurate as recorded. My additional findings, if any, have been incorporated  into the body of the note. All labs, imaging studies, ECG and telemetry data have been reviewed personally. The assessment and recommendations outlined reflect our joint decision making.    Portions of the note were dictated using speech recognition software. The note has been reviewed but some errors may have been overlooked.    Ja Duarte MD

## 2019-10-01 ENCOUNTER — DOCUMENTATION ONLY (OUTPATIENT)
Dept: PHARMACY | Facility: CLINIC | Age: 69
End: 2019-10-01

## 2019-10-02 LAB
MDC_IDC_LEAD_IMPLANT_DT: NORMAL
MDC_IDC_LEAD_LOCATION: NORMAL
MDC_IDC_LEAD_LOCATION_DETAIL_1: NORMAL
MDC_IDC_LEAD_MFG: NORMAL
MDC_IDC_LEAD_MODEL: NORMAL
MDC_IDC_LEAD_POLARITY_TYPE: NORMAL
MDC_IDC_LEAD_SERIAL: NORMAL
MDC_IDC_MSMT_BATTERY_STATUS: NORMAL
MDC_IDC_MSMT_CAP_CHARGE_DTM: NORMAL
MDC_IDC_MSMT_CAP_CHARGE_ENERGY: 41 J
MDC_IDC_MSMT_CAP_CHARGE_TIME: 9.19
MDC_IDC_MSMT_CAP_CHARGE_TIME: 9.92
MDC_IDC_MSMT_CAP_CHARGE_TYPE: NORMAL
MDC_IDC_MSMT_CAP_CHARGE_TYPE: NORMAL
MDC_IDC_MSMT_LEADCHNL_RV_IMPEDANCE_VALUE: 344 OHM
MDC_IDC_MSMT_LEADCHNL_RV_PACING_THRESHOLD_AMPLITUDE: 1.2 V
MDC_IDC_MSMT_LEADCHNL_RV_PACING_THRESHOLD_PULSEWIDTH: 0.5 MS
MDC_IDC_MSMT_LEADCHNL_RV_SENSING_INTR_AMPL: 5 MV
MDC_IDC_PG_IMPLANT_DTM: NORMAL
MDC_IDC_PG_MFG: NORMAL
MDC_IDC_PG_MODEL: NORMAL
MDC_IDC_PG_SERIAL: NORMAL
MDC_IDC_PG_TYPE: NORMAL
MDC_IDC_SESS_CLINIC_NAME: NORMAL
MDC_IDC_SESS_DTM: NORMAL
MDC_IDC_SESS_TYPE: NORMAL
MDC_IDC_SET_BRADY_HYSTRATE: NORMAL
MDC_IDC_SET_BRADY_LOWRATE: 40 {BEATS}/MIN
MDC_IDC_SET_BRADY_MODE: NORMAL
MDC_IDC_SET_LEADCHNL_RV_PACING_AMPLITUDE: 2.4 V
MDC_IDC_SET_LEADCHNL_RV_PACING_ANODE_ELECTRODE_1: NORMAL
MDC_IDC_SET_LEADCHNL_RV_PACING_ANODE_LOCATION_1: NORMAL
MDC_IDC_SET_LEADCHNL_RV_PACING_CAPTURE_MODE: NORMAL
MDC_IDC_SET_LEADCHNL_RV_PACING_CATHODE_ELECTRODE_1: NORMAL
MDC_IDC_SET_LEADCHNL_RV_PACING_CATHODE_LOCATION_1: NORMAL
MDC_IDC_SET_LEADCHNL_RV_PACING_POLARITY: NORMAL
MDC_IDC_SET_LEADCHNL_RV_PACING_PULSEWIDTH: 0.5 MS
MDC_IDC_SET_LEADCHNL_RV_SENSING_ADAPTATION_MODE: NORMAL
MDC_IDC_SET_LEADCHNL_RV_SENSING_ANODE_ELECTRODE_1: NORMAL
MDC_IDC_SET_LEADCHNL_RV_SENSING_ANODE_LOCATION_1: NORMAL
MDC_IDC_SET_LEADCHNL_RV_SENSING_CATHODE_ELECTRODE_1: NORMAL
MDC_IDC_SET_LEADCHNL_RV_SENSING_CATHODE_LOCATION_1: NORMAL
MDC_IDC_SET_LEADCHNL_RV_SENSING_POLARITY: NORMAL
MDC_IDC_SET_LEADCHNL_RV_SENSING_SENSITIVITY: 0.6 MV
MDC_IDC_SET_ZONE_DETECTION_INTERVAL: 286 MS
MDC_IDC_SET_ZONE_DETECTION_INTERVAL: 375 MS
MDC_IDC_SET_ZONE_TYPE: NORMAL
MDC_IDC_SET_ZONE_VENDOR_TYPE: NORMAL
MDC_IDC_STAT_EPISODE_RECENT_COUNT: 17
MDC_IDC_STAT_EPISODE_RECENT_COUNT: 17
MDC_IDC_STAT_EPISODE_RECENT_COUNT: 797
MDC_IDC_STAT_EPISODE_RECENT_COUNT_DTM_END: NORMAL
MDC_IDC_STAT_EPISODE_RECENT_COUNT_DTM_START: NORMAL
MDC_IDC_STAT_EPISODE_TOTAL_COUNT: 4266
MDC_IDC_STAT_EPISODE_TOTAL_COUNT: 4310
MDC_IDC_STAT_EPISODE_TOTAL_COUNT: NORMAL
MDC_IDC_STAT_EPISODE_TOTAL_COUNT_DTM_END: NORMAL
MDC_IDC_STAT_EPISODE_TYPE: NORMAL
MDC_IDC_STAT_EPISODE_VENDOR_TYPE: NORMAL
MDC_IDC_STAT_TACHYTHERAPY_ATP_DELIVERED_RECENT: 0
MDC_IDC_STAT_TACHYTHERAPY_ATP_DELIVERED_TOTAL: 23
MDC_IDC_STAT_TACHYTHERAPY_RECENT_DTM_END: NORMAL
MDC_IDC_STAT_TACHYTHERAPY_RECENT_DTM_START: NORMAL
MDC_IDC_STAT_TACHYTHERAPY_SHOCKS_ABORTED_RECENT: 0
MDC_IDC_STAT_TACHYTHERAPY_SHOCKS_ABORTED_TOTAL: 23
MDC_IDC_STAT_TACHYTHERAPY_SHOCKS_DELIVERED_RECENT: 0
MDC_IDC_STAT_TACHYTHERAPY_SHOCKS_DELIVERED_TOTAL: 22
MDC_IDC_STAT_TACHYTHERAPY_TOTAL_DTM_END: NORMAL

## 2019-10-03 DIAGNOSIS — I50.22 CHRONIC SYSTOLIC HEART FAILURE (H): Primary | ICD-10-CM

## 2019-10-03 DIAGNOSIS — I50.22 CHRONIC SYSTOLIC HEART FAILURE (H): Primary | Chronic | ICD-10-CM

## 2019-10-03 RX ORDER — DIGOXIN 125 MCG
125 TABLET ORAL DAILY
Qty: 90 TABLET | Refills: 1 | Status: SHIPPED | OUTPATIENT
Start: 2019-10-03 | End: 2019-10-08

## 2019-10-04 ENCOUNTER — HEALTH MAINTENANCE LETTER (OUTPATIENT)
Age: 69
End: 2019-10-04

## 2019-10-08 ENCOUNTER — OFFICE VISIT (OUTPATIENT)
Dept: CARDIOLOGY | Facility: CLINIC | Age: 69
End: 2019-10-08
Attending: NURSE PRACTITIONER
Payer: MEDICARE

## 2019-10-08 VITALS
DIASTOLIC BLOOD PRESSURE: 66 MMHG | OXYGEN SATURATION: 98 % | HEIGHT: 70 IN | WEIGHT: 193.4 LBS | HEART RATE: 77 BPM | BODY MASS INDEX: 27.69 KG/M2 | SYSTOLIC BLOOD PRESSURE: 102 MMHG

## 2019-10-08 DIAGNOSIS — E83.42 HYPOMAGNESEMIA: ICD-10-CM

## 2019-10-08 DIAGNOSIS — C90.00 MULTIPLE MYELOMA, REMISSION STATUS UNSPECIFIED (H): ICD-10-CM

## 2019-10-08 DIAGNOSIS — Z86.2 PERSONAL HISTORY OF DISEASES OF BLOOD AND BLOOD-FORMING ORGANS: ICD-10-CM

## 2019-10-08 DIAGNOSIS — I50.22 CHRONIC SYSTOLIC HEART FAILURE (H): Chronic | ICD-10-CM

## 2019-10-08 DIAGNOSIS — C90.02 MULTIPLE MYELOMA IN RELAPSE (H): ICD-10-CM

## 2019-10-08 DIAGNOSIS — N28.9 RENAL INSUFFICIENCY: ICD-10-CM

## 2019-10-08 DIAGNOSIS — I42.0 DILATED CARDIOMYOPATHY (H): ICD-10-CM

## 2019-10-08 DIAGNOSIS — I25.5 ISCHEMIC CARDIOMYOPATHY: ICD-10-CM

## 2019-10-08 DIAGNOSIS — Z95.810 IMPLANTABLE CARDIOVERTER-DEFIBRILLATOR (ICD) IN SITU: ICD-10-CM

## 2019-10-08 DIAGNOSIS — I50.22 CHRONIC SYSTOLIC HEART FAILURE (H): ICD-10-CM

## 2019-10-08 DIAGNOSIS — Z92.29 HISTORY OF LONG-TERM USE OF MULTIPLE PRESCRIPTION DRUGS: ICD-10-CM

## 2019-10-08 DIAGNOSIS — I49.01 VENTRICULAR FIBRILLATION (H): ICD-10-CM

## 2019-10-08 LAB
ANION GAP SERPL CALCULATED.3IONS-SCNC: 4 MMOL/L (ref 3–14)
BUN SERPL-MCNC: 25 MG/DL (ref 7–30)
CALCIUM SERPL-MCNC: 9 MG/DL (ref 8.5–10.1)
CHLORIDE SERPL-SCNC: 104 MMOL/L (ref 94–109)
CO2 SERPL-SCNC: 29 MMOL/L (ref 20–32)
CREAT SERPL-MCNC: 1.2 MG/DL (ref 0.66–1.25)
GFR SERPL CREATININE-BSD FRML MDRD: 62 ML/MIN/{1.73_M2}
GLUCOSE SERPL-MCNC: 107 MG/DL (ref 70–99)
MAGNESIUM SERPL-MCNC: 1.7 MG/DL (ref 1.6–2.3)
POTASSIUM SERPL-SCNC: 4.8 MMOL/L (ref 3.4–5.3)
SODIUM SERPL-SCNC: 137 MMOL/L (ref 133–144)

## 2019-10-08 PROCEDURE — 80048 BASIC METABOLIC PNL TOTAL CA: CPT | Performed by: NURSE PRACTITIONER

## 2019-10-08 PROCEDURE — 83735 ASSAY OF MAGNESIUM: CPT | Performed by: NURSE PRACTITIONER

## 2019-10-08 PROCEDURE — 99215 OFFICE O/P EST HI 40 MIN: CPT | Mod: ZP | Performed by: NURSE PRACTITIONER

## 2019-10-08 PROCEDURE — G0463 HOSPITAL OUTPT CLINIC VISIT: HCPCS | Mod: ZF

## 2019-10-08 RX ORDER — DIGOXIN 125 MCG
125 TABLET ORAL DAILY
Qty: 90 TABLET | Refills: 3 | Status: SHIPPED | OUTPATIENT
Start: 2019-10-08 | End: 2019-10-21

## 2019-10-08 RX ORDER — LISINOPRIL 5 MG/1
5 TABLET ORAL 2 TIMES DAILY
Qty: 180 TABLET | Refills: 3 | Status: SHIPPED | OUTPATIENT
Start: 2019-10-08 | End: 2019-10-21

## 2019-10-08 RX ORDER — DIGOXIN 125 MCG
125 TABLET ORAL DAILY
Qty: 30 TABLET | Refills: 1 | Status: SHIPPED | OUTPATIENT
Start: 2019-10-08 | End: 2019-10-08

## 2019-10-08 RX ORDER — SPIRONOLACTONE 25 MG/1
12.5 TABLET ORAL DAILY
Qty: 45 TABLET | Refills: 3 | Status: SHIPPED | OUTPATIENT
Start: 2019-10-08 | End: 2019-10-21

## 2019-10-08 RX ORDER — SPIRONOLACTONE 25 MG/1
12.5 TABLET ORAL DAILY
Qty: 20 TABLET | Refills: 1 | Status: SHIPPED | OUTPATIENT
Start: 2019-10-08 | End: 2019-10-08

## 2019-10-08 RX ORDER — FUROSEMIDE 40 MG
20 TABLET ORAL DAILY
Qty: 180 TABLET | Refills: 1 | COMMUNITY
Start: 2019-10-08 | End: 2020-02-24 | Stop reason: DRUGHIGH

## 2019-10-08 ASSESSMENT — MIFFLIN-ST. JEOR: SCORE: 1653.51

## 2019-10-08 ASSESSMENT — PAIN SCALES - GENERAL: PAINLEVEL: NO PAIN (0)

## 2019-10-08 NOTE — PROGRESS NOTES
[facility] -- CORE Follow Up  October 8, 2019    HPI:   Mr. Erasmo Pearce is a 69yr old male with a history of chronic systolic heart failure secondary to ICM (LVEF 25-30%), permanent AFib, VT/VF s/p ICD with appropriate ICD shocks, DMII, HTN, multiple myeloma (s/p chemo and PBSCT in 2006, with relapse in 2017 and remains on treatment), HL, DVT, and Bird's esophagus (h/o SBO, ileus, and colitis who presents to C[facility] clinic for follow up.    He last saw the C[facility] clinic 9/12, where he was found to be hypovolemic so his lasix was decreased.  He then saw Dr. Prince 9/23, where he reported worsening fluid retention so his lasix was increased and he was started on low-dose digoxin for ongoing CHF management.  He then saw Dr. Duarte 9/30, where his device check appeared stable and no further changes were made.  He presents today for follow up.    Since his last visit, he states that he feels better overall.  He has had a slight URI, which is improving.  He has been active with his ADLs, noting that he remains busy with lawn work, woodworking, and snow removal.  He does not exercise formally outside of his housework/woodworking.  He does not walk much.  His weight is down ~10#, and he has been self-regulating his diuretics.  His goal weight is ~192-194#.  His breathing has been stable, and he denies worsening SOB, PND, and orthopnea.  He has been monitoring his salt and fluid intake.  He has been eating well, with no nausea or vomiting.  He has intermittent diarrhea, which he believes is secondary to the magnesium oxide.  He is lightheaded when he rises or bends too quickly.  He otherwise denies chest pain, palpitations, dizziness, headaches, acute vision changes, fevers, chills, cough, sore throat, and signs of bleeding.    PAST MEDICAL HISTORY:  Past Medical History:   Diagnosis Date     Chronic systolic heart failure (H)      Ischemic cardiomyopathy      Other premature beats      Permanent  atrial fibrillation      Personal history of multiple myeloma      VF (ventricular fibrillation) (H)        FAMILY HISTORY:  No family history on file.    SOCIAL HISTORY:  Social History     Socioeconomic History     Marital status:      Spouse name: None     Number of children: None     Years of education: None     Highest education level: None   Occupational History     None   Social Needs     Financial resource strain: None     Food insecurity:     Worry: None     Inability: None     Transportation needs:     Medical: None     Non-medical: None   Tobacco Use     Smoking status: Former Smoker     Packs/day: 1.00     Years: 25.00     Pack years: 25.00     Types: Cigarettes     Last attempt to quit: 10/15/1995     Years since quittin.9     Smokeless tobacco: Never Used   Substance and Sexual Activity     Alcohol use: No     Drug use: No     Sexual activity: None   Lifestyle     Physical activity:     Days per week: None     Minutes per session: None     Stress: None   Relationships     Social connections:     Talks on phone: None     Gets together: None     Attends [employer] service: None     Active member of club or organization: None     Attends meetings of clubs or organizations: None     Relationship status: None     Intimate partner violence:     Fear of current or ex partner: None     Emotionally abused: None     Physically abused: None     Forced sexual activity: None   Other Topics Concern     Parent/sibling w/ CABG, MI or angioplasty before 65F 55M? Not Asked   Social History Narrative     None       CURRENT MEDICATIONS:  Current Outpatient Medications   Medication Sig Dispense Refill     albuterol (PROAIR HFA, PROVENTIL HFA, VENTOLIN HFA) 108 (90 BASE) MCG/ACT inhaler Inhale 2 puffs into the lungs every 6 hours as needed        amoxicillin (AMOXIL) 500 MG tablet Take 4 tabs one hour before dental appointment 4 tablet 5     baclofen (LIORESAL) 10 MG tablet Take 10 mg by mouth 3 times daily as  needed prn       Calcium Carbonate-Vitamin D (CALCIUM 500 + D PO) Take 2 tablets by mouth daily.       diclofenac (VOLTAREN) 75 MG EC tablet Take 75 mg by mouth 2 times daily as needed 1 tab prn       digoxin (LANOXIN) 125 MCG tablet Take 1 tablet (125 mcg) by mouth daily 90 tablet 1     fentaNYL (DURAGESIC) 25 mcg/hr patch 72 hr Place 1 patch onto the skin every 72 hours       furosemide (LASIX) 40 MG tablet Take 1 tablet (40 mg) by mouth 2 times daily 180 tablet 1     lisinopril (PRINIVIL,ZESTRIL) 5 MG tablet Take 5 mg by mouth daily        Magnesium Oxide 420 MG TABS Take 3 tabs (1260 mg) three times a day. 270 tablet 3     metoprolol succinate (TOPROL-XL) 200 MG 24 hr tablet Take 1 tablet (200 mg) by mouth daily 90 tablet 3     mexiletine (MEXITIL) 150 MG capsule Take 1 capsule (150 mg) by mouth 2 times daily As close to 12 hours apart as possible 180 capsule 3     nitroGLYCERIN (NITROSTAT) 0.4 MG SL tablet Place 0.4 mg under the tongue every 5 minutes as needed Reported on 5/16/2017       omeprazole (PRILOSEC) 20 MG capsule Take 20 mg by mouth daily  90 capsule 3     ondansetron (ZOFRAN-ODT) 4 MG ODT tab Take 4 mg by mouth every 6 hours as needed for nausea       oxycodone (ROXICODONE) 5 MG immediate release tablet Take 1-2 tablets by mouth every 6 hours as needed. For pain.        potassium chloride ER (K-DUR/KLOR-CON M) 10 MEQ CR tablet Take 2 tablets (20 mEq) by mouth daily 90 tablet 1     simvastatin (ZOCOR) 40 MG tablet Take 40 mg by mouth At Bedtime  30 tablet      tiotropium (SPIRIVA) 18 MCG inhalation capsule Inhale 1 capsule (18 mcg) into the lungs daily 30 capsule 3     TRAZODONE HCL PO Take 50 mg by mouth nightly as needed        warfarin (COUMADIN) 5 MG tablet Take 7.5 mg by mouth See Admin Instructions Take 1.5 tab by mojth Monday and Friday and 1 tab Sun Tues WED , Thur and Sat         ROS:   Constitutional: No fever, chills, or sweats. Weight loss.   ENT: No visual disturbance, ear ache,  "epistaxis, sore throat.   Allergies/Immunologic: Negative.   Respiratory: As per HPI.  Cardiovascular: As per HPI.   GI: As per HPI.  : No urinary frequency, dysuria, or hematuria.   Integument: Negative.   Psychiatric: Negative.   Neuro: Negative.   Endocrinology: Negative.   Musculoskeletal: Negative.    EXAM:  /66 (BP Location: Right arm, Patient Position: Chair, Cuff Size: Adult Large)   Pulse 77   Ht 1.778 m (5' 10\")   Wt 87.7 kg (193 lb 6.4 oz)   SpO2 98%   BMI 27.75 kg/m    General: appears comfortable, alert and articulate  Head: normocephalic, atraumatic  Eyes: anicteric sclera, EOMI  Neck: no adenopathy  Orophyarynx: moist mucosa, no lesions, dentition intact  Heart: irregularly irregular, audible S1S2, grade 2/6 MAGGY at LLSB, JVD at clavicle when sitting upright  Lungs: clear, no rales or wheezing  Abdomen: soft, non-tender, bowel sounds present, no hepatosplenomegaly  Extremities: no clubbing, cyanosis or LE edema  Neurological: normal speech and affect, no gross motor deficits  Integument: no open lesions, rashes, or jaundice.    Labs:  CBC RESULTS:  Lab Results   Component Value Date    WBC 4.5 09/10/2019    RBC 2.50 (L) 09/10/2019    HGB 9.1 (L) 09/10/2019    HCT 29.0 (L) 09/10/2019     (H) 09/10/2019    MCH 36.4 (H) 09/10/2019    MCHC 31.4 (L) 09/10/2019    RDW 15.1 (H) 09/10/2019     (L) 09/10/2019       CMP RESULTS:  Lab Results   Component Value Date     10/08/2019    POTASSIUM 4.8 10/08/2019    CHLORIDE 104 10/08/2019    CO2 29 10/08/2019    ANIONGAP 4 10/08/2019     (H) 10/08/2019    BUN 25 10/08/2019    CR 1.20 10/08/2019    GFRESTIMATED 62 10/08/2019    GFRESTBLACK 71 10/08/2019    EILEEN 9.0 10/08/2019    BILITOTAL 0.5 09/10/2019    ALBUMIN 3.3 (L) 09/10/2019    ALKPHOS 158 (H) 09/10/2019    ALT 23 09/10/2019    AST 17 09/10/2019        INR RESULTS:  Lab Results   Component Value Date    INR 1.60 (H) 08/30/2019       Lab Results   Component Value Date    " MAG 1.7 10/08/2019     Lab Results   Component Value Date    NTBNPI 6,374 (H) 08/30/2019     Lab Results   Component Value Date    NTBNP 6,008 (H) 09/12/2019       Assessment and Plan:   Mr. Erasmo Pearce is a 69yr old male with a history of chronic systolic heart failure secondary to ICM (LVEF 25-30%), permanent AFib, VT/VF s/p ICD with appropriate ICD shocks, DMII, HTN, multiple myeloma (s/p chemo and PBSCT in 2006, with relapse in 2017 and remains on treatment), HL, DVT, and Bird's esophagus (h/o SBO, ileus, and colitis who presents to CORE clinic for follow up.    Labs today show stable electrolytes and kidney function.     Mr. Pearce appears well today.  His weight has dropped 10# overall, but has remained stable within his goal 192-194#.  He has been dosing his lasix independently according to his weight/fluid status.  Discussed that he should call us if he experiences weight swings, so we can coordinate appropriate lab work if we are adjusting his diuretics frequently.    After reviewing his weight/diuretic journal, advised that he start taking lasix 20mg twice daily.  Asked that he call if he experiences weight gain (3# overnight or 5# in one week).  Also discussed that he should decrease potassium to 20mEq once daily.  As his Mg is 1.7, and he gets diarrhea with mg supplements, advised that he increase his dietary sources of mg, as able.    As his BP remains stable, will increase his lisinopril to 5mg twice daily, and start spironolactone 12.5mg once daily.    He has not yet received his supply of digoxin.  Will plan to recheck a digoxin level 1-2 months after starting.    Will plan to repeat a BMP and Mg in 1 week to follow up these changes.      Chronic systolic heart failure secondary to ICM  Stage C, NYHA Class III  - ACEi/ARB/ARNi:  Increasing lisinopril to 5mg twice daily  - BB:  Metoprolol XL 200mg once daily  - Aldosterone antagonist:  Starting spironlactone 12.5mg once daily  - SCD  ppx:  ICD  - fluid status:  Euvolemic, discussed regularly dosing lasix at 20mg twice daily and instructed him to call with weight gain    Permanent AFib  Remains on warfarin and metoprolol, and will start digoxin when he receives his supply.    CAD with  of RCA  Plan to revascularize once stable from a fluid standpoint.    FOLLOW UP  Labs in one week  Dr. Prince in November        The above was reviewed with Mr. Pearce, who verbalized understanding and will call with further questions/concerns.    45 minutes spent face-to-face with patient, with >50% in counseling and/or coordination of care as described above.      Carley Huerta, MARLIN, FNP-BC, CHFN  Advanced Heart Failure Nurse Practitioner  [facility]  Patient Care Team:  Cong Mcdonald as PCP - General  GinaWillian crandall MD as PCP - Internal Medicine  Rojas Raygoza MD as BMT Physician  Cong Mcdonald as Referring Physician  Yaz Jeong NP as Nurse Practitioner (Nurse Practitioner)  [facility] as Family Medicine Physician  Merry Mas RN as Continuity Care Coordinator (Transplant)  Zayra Lyle RN as Specialty Care Coordinator (Cardiology)  Ja Duarte MD as MD (Clinical Cardiac Electrophysiology)  Omayra Serra, RN as Specialty Care Coordinator (Cardiology)  EVE MORA

## 2019-10-08 NOTE — NURSING NOTE
Diet: Patient instructed regarding a heart failure healthy diet, including discussion of reduced fat and 2000 mg daily sodium restriction, daily weights, medication purpose and compliance, fluid restrictions and resources for patient and family to access for assistance with heart failure management.       Labs: Patient was given results of the laboratory testing obtained today and patient was instructed about when to return for the next laboratory testing. Repeat BMP in 1 week at local clinic ([facility]). Order faxed.    Med Reconcile: Reviewed and verified all current medications with the patient. The updated medication list was printed and given to the patient. INCREASE lisinopril to 5mg BID. Lasix 20mg BID. Decrease POtassium to 20meq daily. STart spironolactone 12.5mg daily.    Return Appointment: Patient given instructions regarding scheduling next clinic visit. RTC to see Niki as scheduled.    Patient stated he understood all health information given and agreed to call with further questions or concerns.     Patient will consider cardiac rehab; he will call clinic if he decides to pursue this.    Diana Davis RN

## 2019-10-08 NOTE — PATIENT INSTRUCTIONS
Take your medicines every day, as directed    Changes made today:  o Change lasix to 20mg twice daily  o Increase lisinopril to 5mg twice daily  o Start spironolactone 12.5mg once daily  o Decrease potassium to 20mEq once daily  o Repeat labs in 1 week  o We will plan to do a digoxin level in the next 1-2 months  o Return to clinic to see Niki as scheduled in November  o Please call with any weight gain -- 3# overnight or 5# in 1 week --- please call before taking extra lasix  o Please consider cardiac rehab, and call us for a referral if you decide to proceed with it   Monitor Your Weight and Symptoms    Contact us if you:      Gain 2 pounds in one day or 5 pounds in one week    Feel more short of breath    Notice more leg swelling    Feel lightheadeded   Change your lifestyle    Limit Salt or Sodium:    2000 mg  Limit Fluids:    2000 mL or approximately 64 ounces  Eat a Heart Healthy Diet    Low in saturated fats  Stay Active:    Aim to move at least 150 minutes every  week         To Contact us    During Business Hours:  [phone], option # 1 (University)  Then option # 4 (medical questions)     After hours, weekends or holidays:   [phone], Option #4  Ask to speak to the On-Call Cardiologist. Inform them you are a CORE/heart failure patient at the [facility].     Use People and Pages allows you to communicate directly with your heart team through secure messaging.    [employer] can be accessed any time on your phone, computer, or tablet.    If you need assistance, we'd be happy to help!         Keep your Heart Appointments:    Dr Prince as scheduled

## 2019-10-08 NOTE — NURSING NOTE
Chief Complaint   Patient presents with     Blood Draw     labs drawn via vpt by rn      Blood drawn via vpt by RN in lab. Pt advised to go to the 3rd floor to check into next appointment.   Dottie Lorenzo RN

## 2019-10-08 NOTE — NURSING NOTE
Vitals completed successfully and medication reconciled.     Katlyn Benoit, ARVIN  1:00 PM  Chief Complaint   Patient presents with     Hypertension     2 week core

## 2019-10-08 NOTE — LETTER
10/8/2019      RE: Erasmo Pearce  [address]  [address] [facility] [address]       Dear Colleague,    Thank you for the opportunity to participate in the care of your patient, Erasmo Pearce, at the [facility] at [facility]. Please see a copy of my visit note below.    Advanced Heart Failure Clinic -- CORE Follow Up  October 8, 2019    HPI:   Mr. Erasmo Pearce is a 69yr old male with a history of chronic systolic heart failure secondary to ICM (LVEF 25-30%), permanent AFib, VT/VF s/p ICD with appropriate ICD shocks, DMII, HTN, multiple myeloma (s/p chemo and PBSCT in 2006, with relapse in 2017 and remains on treatment), HL, DVT, and Bird's esophagus (h/o SBO, ileus, and colitis who presents to CORE clinic for follow up.    He last saw the CORE clinic 9/12, where he was found to be hypovolemic so his lasix was decreased.  He then saw Dr. Prince 9/23, where he reported worsening fluid retention so his lasix was increased and he was started on low-dose digoxin for ongoing CHF management.  He then saw Dr. Duarte 9/30, where his device check appeared stable and no further changes were made.  He presents today for follow up.    Since his last visit, he states that he feels better overall.  He has had a slight URI, which is improving.  He has been active with his ADLs, noting that he remains busy with lawn work, woodworking, and snow removal.  He does not exercise formally outside of his housework/woodworking.  He does not walk much.  His weight is down ~10#, and he has been self-regulating his diuretics.  His goal weight is ~192-194#.  His breathing has been stable, and he denies worsening SOB, PND, and orthopnea.  He has been monitoring his salt and fluid intake.  He has been eating well, with no nausea or vomiting.  He has intermittent diarrhea, which he believes is secondary to the magnesium oxide.  He is lightheaded when he rises or bends  too quickly.  He otherwise denies chest pain, palpitations, dizziness, headaches, acute vision changes, fevers, chills, cough, sore throat, and signs of bleeding.    PAST MEDICAL HISTORY:  Past Medical History:   Diagnosis Date     Chronic systolic heart failure (H)      Ischemic cardiomyopathy      Other premature beats      Permanent atrial fibrillation      Personal history of multiple myeloma      VF (ventricular fibrillation) (H)        FAMILY HISTORY:  No family history on file.    SOCIAL HISTORY:  Social History     Socioeconomic History     Marital status:      Spouse name: None     Number of children: None     Years of education: None     Highest education level: None   Occupational History     None   Social Needs     Financial resource strain: None     Food insecurity:     Worry: None     Inability: None     Transportation needs:     Medical: None     Non-medical: None   Tobacco Use     Smoking status: Former Smoker     Packs/day: 1.00     Years: 25.00     Pack years: 25.00     Types: Cigarettes     Last attempt to quit: 10/15/1995     Years since quittin.9     Smokeless tobacco: Never Used   Substance and Sexual Activity     Alcohol use: No     Drug use: No     Sexual activity: None   Lifestyle     Physical activity:     Days per week: None     Minutes per session: None     Stress: None   Relationships     Social connections:     Talks on phone: None     Gets together: None     Attends Yarsani service: None     Active member of club or organization: None     Attends meetings of clubs or organizations: None     Relationship status: None     Intimate partner violence:     Fear of current or ex partner: None     Emotionally abused: None     Physically abused: None     Forced sexual activity: None   Other Topics Concern     Parent/sibling w/ CABG, MI or angioplasty before 65F 55M? Not Asked   Social History Narrative     None       CURRENT MEDICATIONS:  Current Outpatient Medications    Medication Sig Dispense Refill     albuterol (PROAIR HFA, PROVENTIL HFA, VENTOLIN HFA) 108 (90 BASE) MCG/ACT inhaler Inhale 2 puffs into the lungs every 6 hours as needed        amoxicillin (AMOXIL) 500 MG tablet Take 4 tabs one hour before dental appointment 4 tablet 5     baclofen (LIORESAL) 10 MG tablet Take 10 mg by mouth 3 times daily as needed prn       Calcium Carbonate-Vitamin D (CALCIUM 500 + D PO) Take 2 tablets by mouth daily.       diclofenac (VOLTAREN) 75 MG EC tablet Take 75 mg by mouth 2 times daily as needed 1 tab prn       digoxin (LANOXIN) 125 MCG tablet Take 1 tablet (125 mcg) by mouth daily 90 tablet 1     fentaNYL (DURAGESIC) 25 mcg/hr patch 72 hr Place 1 patch onto the skin every 72 hours       furosemide (LASIX) 40 MG tablet Take 1 tablet (40 mg) by mouth 2 times daily 180 tablet 1     lisinopril (PRINIVIL,ZESTRIL) 5 MG tablet Take 5 mg by mouth daily        Magnesium Oxide 420 MG TABS Take 3 tabs (1260 mg) three times a day. 270 tablet 3     metoprolol succinate (TOPROL-XL) 200 MG 24 hr tablet Take 1 tablet (200 mg) by mouth daily 90 tablet 3     mexiletine (MEXITIL) 150 MG capsule Take 1 capsule (150 mg) by mouth 2 times daily As close to 12 hours apart as possible 180 capsule 3     nitroGLYCERIN (NITROSTAT) 0.4 MG SL tablet Place 0.4 mg under the tongue every 5 minutes as needed Reported on 5/16/2017       omeprazole (PRILOSEC) 20 MG capsule Take 20 mg by mouth daily  90 capsule 3     ondansetron (ZOFRAN-ODT) 4 MG ODT tab Take 4 mg by mouth every 6 hours as needed for nausea       oxycodone (ROXICODONE) 5 MG immediate release tablet Take 1-2 tablets by mouth every 6 hours as needed. For pain.        potassium chloride ER (K-DUR/KLOR-CON M) 10 MEQ CR tablet Take 2 tablets (20 mEq) by mouth daily 90 tablet 1     simvastatin (ZOCOR) 40 MG tablet Take 40 mg by mouth At Bedtime  30 tablet      tiotropium (SPIRIVA) 18 MCG inhalation capsule Inhale 1 capsule (18 mcg) into the lungs daily 30  "capsule 3     TRAZODONE HCL PO Take 50 mg by mouth nightly as needed        warfarin (COUMADIN) 5 MG tablet Take 7.5 mg by mouth See Admin Instructions Take 1.5 tab by mojth Monday and Friday and 1 tab Sun Tues WED , Thur and Sat         ROS:   Constitutional: No fever, chills, or sweats. Weight loss.   ENT: No visual disturbance, ear ache, epistaxis, sore throat.   Allergies/Immunologic: Negative.   Respiratory: As per HPI.  Cardiovascular: As per HPI.   GI: As per HPI.  : No urinary frequency, dysuria, or hematuria.   Integument: Negative.   Psychiatric: Negative.   Neuro: Negative.   Endocrinology: Negative.   Musculoskeletal: Negative.    EXAM:  /66 (BP Location: Right arm, Patient Position: Chair, Cuff Size: Adult Large)   Pulse 77   Ht 1.778 m (5' 10\")   Wt 87.7 kg (193 lb 6.4 oz)   SpO2 98%   BMI 27.75 kg/m     General: appears comfortable, alert and articulate  Head: normocephalic, atraumatic  Eyes: anicteric sclera, EOMI  Neck: no adenopathy  Orophyarynx: moist mucosa, no lesions, dentition intact  Heart: irregularly irregular, audible S1S2, grade 2/6 MAGGY at LLSB, JVD at clavicle when sitting upright  Lungs: clear, no rales or wheezing  Abdomen: soft, non-tender, bowel sounds present, no hepatosplenomegaly  Extremities: no clubbing, cyanosis or LE edema  Neurological: normal speech and affect, no gross motor deficits  Integument: no open lesions, rashes, or jaundice.    Labs:  CBC RESULTS:  Lab Results   Component Value Date    WBC 4.5 09/10/2019    RBC 2.50 (L) 09/10/2019    HGB 9.1 (L) 09/10/2019    HCT 29.0 (L) 09/10/2019     (H) 09/10/2019    MCH 36.4 (H) 09/10/2019    MCHC 31.4 (L) 09/10/2019    RDW 15.1 (H) 09/10/2019     (L) 09/10/2019       CMP RESULTS:  Lab Results   Component Value Date     10/08/2019    POTASSIUM 4.8 10/08/2019    CHLORIDE 104 10/08/2019    CO2 29 10/08/2019    ANIONGAP 4 10/08/2019     (H) 10/08/2019    BUN 25 10/08/2019    CR 1.20 " 10/08/2019    GFRESTIMATED 62 10/08/2019    GFRESTBLACK 71 10/08/2019    EILEEN 9.0 10/08/2019    BILITOTAL 0.5 09/10/2019    ALBUMIN 3.3 (L) 09/10/2019    ALKPHOS 158 (H) 09/10/2019    ALT 23 09/10/2019    AST 17 09/10/2019        INR RESULTS:  Lab Results   Component Value Date    INR 1.60 (H) 08/30/2019       Lab Results   Component Value Date    MAG 1.7 10/08/2019     Lab Results   Component Value Date    NTBNPI 6,374 (H) 08/30/2019     Lab Results   Component Value Date    NTBNP 6,008 (H) 09/12/2019       Assessment and Plan:   Mr. Erasmo Pearce is a 69yr old male with a history of chronic systolic heart failure secondary to ICM (LVEF 25-30%), permanent AFib, VT/VF s/p ICD with appropriate ICD shocks, DMII, HTN, multiple myeloma (s/p chemo and PBSCT in 2006, with relapse in 2017 and remains on treatment), HL, DVT, and Bird's esophagus (h/o SBO, ileus, and colitis who presents to CORE clinic for follow up.    Labs today show stable electrolytes and kidney function.     Mr. Pearce appears well today.  His weight has dropped 10# overall, but has remained stable within his goal 192-194#.  He has been dosing his lasix independently according to his weight/fluid status.  Discussed that he should call us if he experiences weight swings, so we can coordinate appropriate lab work if we are adjusting his diuretics frequently.    After reviewing his weight/diuretic journal, advised that he start taking lasix 20mg twice daily.  Asked that he call if he experiences weight gain (3# overnight or 5# in one week).  Also discussed that he should decrease potassium to 20mEq once daily.  As his Mg is 1.7, and he gets diarrhea with mg supplements, advised that he increase his dietary sources of mg, as able.    As his BP remains stable, will increase his lisinopril to 5mg twice daily, and start spironolactone 12.5mg once daily.    He has not yet received his supply of digoxin.  Will plan to recheck a digoxin level 1-2  months after starting.    Will plan to repeat a BMP and Mg in 1 week to follow up these changes.      Chronic systolic heart failure secondary to ICM  Stage C, NYHA Class III  - ACEi/ARB/ARNi:  Increasing lisinopril to 5mg twice daily  - BB:  Metoprolol XL 200mg once daily  - Aldosterone antagonist:  Starting spironlactone 12.5mg once daily  - SCD ppx:  ICD  - fluid status:  Euvolemic, discussed regularly dosing lasix at 20mg twice daily and instructed him to call with weight gain    Permanent AFib  Remains on warfarin and metoprolol, and will start digoxin when he receives his supply.    CAD with  of RCA  Plan to revascularize once stable from a fluid standpoint.    FOLLOW UP  Labs in one week  Dr. Prince in November        The above was reviewed with Mr. Pearce, who verbalized understanding and will call with further questions/concerns.    45 minutes spent face-to-face with patient, with >50% in counseling and/or coordination of care as described above.      Carley Huerta DNP, FNP-BC, CHFN  Advanced Heart Failure Nurse Practitioner  [facility]  Patient Care Team:  Cong Mcdonald as PCP - General  GinaWillian MD as PCP - Internal Medicine  Rojas Raygoza MD as BMT Physician  Cong Mcdonald as Referring Physician  Yaz Jeong NP as Nurse Practitioner (Nurse Practitioner)  Morgan, Eligio as Family Medicine Physician  Merry Mas RN as Continuity Care Coordinator (Transplant)  Zayra Lyle, RN as Specialty Care Coordinator (Cardiology)  Ja Duarte MD as MD (Clinical Cardiac Electrophysiology)  Omayra Serra, RN as Specialty Care Coordinator (Cardiology)  EVE MORA

## 2019-10-15 ENCOUNTER — PATIENT OUTREACH (OUTPATIENT)
Dept: CARDIOLOGY | Facility: CLINIC | Age: 69
End: 2019-10-15

## 2019-10-15 ENCOUNTER — ANCILLARY PROCEDURE (OUTPATIENT)
Dept: CARDIOLOGY | Facility: CLINIC | Age: 69
End: 2019-10-15
Attending: INTERNAL MEDICINE
Payer: MEDICARE

## 2019-10-15 DIAGNOSIS — Z95.810 AUTOMATIC IMPLANTABLE CARDIOVERTER-DEFIBRILLATOR IN SITU: Chronic | ICD-10-CM

## 2019-10-15 LAB
ANION GAP SERPL CALCULATED.3IONS-SCNC: 12.9 MMOL/L
BUN SERPL-MCNC: 30 MG/DL
CALCIUM SERPL-MCNC: 9.8 MG/DL
CHLORIDE SERPLBLD-SCNC: 104 MMOL/L
CO2 SERPL-SCNC: 30 MMOL/L
CREAT SERPL-MCNC: 1.37 MG/DL
GFR SERPL CREATININE-BSD FRML MDRD: 53 ML/MIN/1.73M2
GLUCOSE SERPL-MCNC: 114 MG/DL (ref 70–99)
POTASSIUM SERPL-SCNC: 4.9 MMOL/L
SODIUM SERPL-SCNC: 142 MMOL/L

## 2019-10-15 PROCEDURE — 93296 REM INTERROG EVL PM/IDS: CPT | Mod: ZF

## 2019-10-15 PROCEDURE — 93295 DEV INTERROG REMOTE 1/2/MLT: CPT | Performed by: INTERNAL MEDICINE

## 2019-10-15 NOTE — PROGRESS NOTES
Called Jonh after receiving notice that he received an ICD shock last evening. He reports he is feeling no different today than he did yesterday. He is in [facility] doing some shopping and errands today. Reviewed lab results from [facility], summarized below for convenience due to system outages:  Na 142  K 4.9  Cl 104  Co2 30  Anion gap 12.9  Creatinine 1.37  BUN 30  Ca 9.8  Glucose 114  eFGR 53    Jonh tells me that he only reduced his potassium dose today, so his labs reflect that he was on potassium 40mEq daily until today. He has now reduced his dose to 20mEq as we instructed him to do in clinic last week. He apologizes for not decreasing this sooner.   He tells me that his weight has been 'reasonably stable' and holding steady around 187-188lb. He would like to gain some weight back and has been drinking ensure to support this. He reports his appetite is not as strong as it used to be so he has been making sure he gets enough ensure to supplement his diet.     He tells me he has had [facility] He got a short fill that we sent to his local pharmacy but [facility] not [facility]. I will follow up with the [facility] to ensure they receive spironolactone and are able to fill it for patient.   Called [facility] pharmacy. They did not receive prescription. Confirmed fax number of [phone] and refaxed prescription. Will follow up with them in 1-2 days to ensure it is received.    WIll route to provider for further review.

## 2019-10-17 NOTE — PROGRESS NOTES
Called [facility] pharmacy to verify they received spironolactone prescription. They confirmed that they received it and are actively working on filling it now. No further questions.

## 2019-10-21 ENCOUNTER — TELEPHONE (OUTPATIENT)
Dept: CARDIOLOGY | Facility: CLINIC | Age: 69
End: 2019-10-21

## 2019-10-21 ENCOUNTER — ANCILLARY PROCEDURE (OUTPATIENT)
Dept: CARDIOLOGY | Facility: CLINIC | Age: 69
End: 2019-10-21
Attending: INTERNAL MEDICINE
Payer: MEDICARE

## 2019-10-21 DIAGNOSIS — I49.01 VENTRICULAR FIBRILLATION (H): ICD-10-CM

## 2019-10-21 DIAGNOSIS — I42.0 DILATED CARDIOMYOPATHY (H): ICD-10-CM

## 2019-10-21 DIAGNOSIS — Z92.29 HISTORY OF LONG-TERM USE OF MULTIPLE PRESCRIPTION DRUGS: ICD-10-CM

## 2019-10-21 DIAGNOSIS — I25.5 ISCHEMIC CARDIOMYOPATHY: ICD-10-CM

## 2019-10-21 DIAGNOSIS — I48.91 ATRIAL FIBRILLATION (H): ICD-10-CM

## 2019-10-21 DIAGNOSIS — Z95.810 IMPLANTABLE CARDIOVERTER-DEFIBRILLATOR (ICD) IN SITU: ICD-10-CM

## 2019-10-21 DIAGNOSIS — Z86.2 PERSONAL HISTORY OF DISEASES OF BLOOD AND BLOOD-FORMING ORGANS: ICD-10-CM

## 2019-10-21 DIAGNOSIS — I50.22 CHRONIC SYSTOLIC HEART FAILURE (H): ICD-10-CM

## 2019-10-21 DIAGNOSIS — C90.00 MULTIPLE MYELOMA (H): ICD-10-CM

## 2019-10-21 DIAGNOSIS — E83.42 HYPOMAGNESEMIA: ICD-10-CM

## 2019-10-21 DIAGNOSIS — N28.9 RENAL INSUFFICIENCY: ICD-10-CM

## 2019-10-21 PROCEDURE — 93296 REM INTERROG EVL PM/IDS: CPT | Mod: ZF

## 2019-10-21 PROCEDURE — 99207 CARDIAC DEVICE CHECK - REMOTE: CPT | Mod: ZP | Performed by: INTERNAL MEDICINE

## 2019-10-21 RX ORDER — DIGOXIN 125 MCG
125 TABLET ORAL DAILY
Qty: 30 TABLET | Refills: 1 | Status: SHIPPED | OUTPATIENT
Start: 2019-10-21 | End: 2019-11-21

## 2019-10-21 RX ORDER — LISINOPRIL 5 MG/1
5 TABLET ORAL 2 TIMES DAILY
Qty: 60 TABLET | Refills: 1 | Status: SHIPPED | OUTPATIENT
Start: 2019-10-21 | End: 2022-01-01

## 2019-10-21 RX ORDER — SPIRONOLACTONE 25 MG/1
12.5 TABLET ORAL DAILY
Qty: 15 TABLET | Refills: 1 | Status: SHIPPED | OUTPATIENT
Start: 2019-10-21 | End: 2019-11-21

## 2019-10-21 RX ORDER — DIGOXIN 125 MCG
125 TABLET ORAL DAILY
Qty: 90 TABLET | Refills: 3 | Status: SHIPPED | OUTPATIENT
Start: 2019-10-21 | End: 2019-10-21

## 2019-10-21 NOTE — TELEPHONE ENCOUNTER
Health Call Center    Phone Message    May a detailed message be left on voicemail: yes    Reason for Call: Symptoms or Concerns     If patient has red-flag symptoms, warm transfer to triage line    Current symptom or concern: Device going off - 2 times in last 3 weeks    Symptoms have been present for:  3 week(s)    Has patient previously been seen for this? Yes    By : Carley Huerta NP    Date: 10/08/2019    Are there any new or worsening symptoms? No pt said he had another shock on 10/19/2019 around 12:20am. He said he woke up from it. Please call pt      Action Taken: Message routed to:  [facility]

## 2019-10-21 NOTE — TELEPHONE ENCOUNTER
Health Call Center    Phone Message    May a detailed message be left on voicemail: yes    Reason for Call: Medication Refill Request    Has the patient contacted the pharmacy for the refill? Yes   Name of medication being requested: 3 medication:   digoxin (LANOXIN) 125 MCG tablet  lisinopril (PRINIVIL/ZESTRIL) 5 MG tablet  spironolactone (ALDACTONE) 25 MG tablet  Provider who prescribed the medication: Carley Huerta  Pharmacy: [facility] [address] MAIN  Date medication is needed: 1 mos supply for all 3 sent to [place]         Also pt needs rx for Digoxin rx with [facility]. Please call pt when all is sent.         Action Taken: Message routed to:  [facility]

## 2019-10-21 NOTE — TELEPHONE ENCOUNTER
Refills sent per patient request. Called him to let him know.    He also reports he got an ICD shock over the weekend. Has sent a remote transmission. Wants to make sure that Dr Duarte is reviewing these. Assured him that he would.    He reports that his weight has been stable with only change in a couple pounds with weights ranging from 186-190lb. He reports he feels occasional light headedness. Reports he notices he feels anxious with the device shocks and not knowing what will happen. Let him know we would see what his device check shows and go from there. No further questions at this time.

## 2019-10-22 ENCOUNTER — EXTERNAL ORDER RESULTS (OUTPATIENT)
Dept: CARDIOLOGY | Facility: CLINIC | Age: 69
End: 2019-10-22

## 2019-10-22 ENCOUNTER — TRANSFERRED RECORDS (OUTPATIENT)
Dept: HEALTH INFORMATION MANAGEMENT | Facility: CLINIC | Age: 69
End: 2019-10-22

## 2019-10-22 ENCOUNTER — TELEPHONE (OUTPATIENT)
Dept: CARDIOLOGY | Facility: CLINIC | Age: 69
End: 2019-10-22

## 2019-10-22 LAB
ANION GAP SERPL CALCULATED.3IONS-SCNC: 14.1 MMOL/L
BUN SERPL-MCNC: 29 MG/DL
BUN/CREAT RATIO - HISTORICAL: 21.01
CALCIUM SERPL-MCNC: 9.4 MG/DL
CHLORIDE SERPLBLD-SCNC: 104 MMOL/L
CO2 SERPL-SCNC: 29 MMOL/L
CREAT SERPL-MCNC: 1.38 MG/DL
CREATININE CLEARANCE: 52.9
GFR SERPL CREATININE-BSD FRML MDRD: 52 ML/MIN/1.73M2
GLUCOSE SERPL-MCNC: 120 MG/DL (ref 70–99)
MAGNESIUM SERPL-MCNC: 1.5 MG/DL
MDC_IDC_EPISODE_DTM: NORMAL
MDC_IDC_EPISODE_DURATION: 12 S
MDC_IDC_EPISODE_DURATION: 49 S
MDC_IDC_EPISODE_ID: NORMAL
MDC_IDC_EPISODE_TYPE: NORMAL
MDC_IDC_EPISODE_VENDOR_TYPE: NORMAL
MDC_IDC_LEAD_IMPLANT_DT: NORMAL
MDC_IDC_LEAD_LOCATION: NORMAL
MDC_IDC_LEAD_LOCATION_DETAIL_1: NORMAL
MDC_IDC_LEAD_MFG: NORMAL
MDC_IDC_LEAD_MODEL: NORMAL
MDC_IDC_LEAD_POLARITY_TYPE: NORMAL
MDC_IDC_LEAD_SERIAL: NORMAL
MDC_IDC_MSMT_BATTERY_DTM: NORMAL
MDC_IDC_MSMT_BATTERY_REMAINING_LONGEVITY: 24 MO
MDC_IDC_MSMT_BATTERY_REMAINING_PERCENTAGE: 28 %
MDC_IDC_MSMT_BATTERY_STATUS: NORMAL
MDC_IDC_MSMT_CAP_CHARGE_DTM: NORMAL
MDC_IDC_MSMT_CAP_CHARGE_DTM: NORMAL
MDC_IDC_MSMT_CAP_CHARGE_ENERGY: 41 J
MDC_IDC_MSMT_CAP_CHARGE_TIME: 9.7 S
MDC_IDC_MSMT_CAP_CHARGE_TIME: 9.7 S
MDC_IDC_MSMT_CAP_CHARGE_TYPE: NORMAL
MDC_IDC_MSMT_CAP_CHARGE_TYPE: NORMAL
MDC_IDC_MSMT_LEADCHNL_RV_IMPEDANCE_VALUE: 351 OHM
MDC_IDC_MSMT_LEADCHNL_RV_SENSING_INTR_AMPL: 4.8 MV
MDC_IDC_PG_IMPLANT_DTM: NORMAL
MDC_IDC_PG_MFG: NORMAL
MDC_IDC_PG_MODEL: NORMAL
MDC_IDC_PG_SERIAL: NORMAL
MDC_IDC_PG_TYPE: NORMAL
MDC_IDC_SESS_CLINIC_NAME: NORMAL
MDC_IDC_SESS_DTM: NORMAL
MDC_IDC_SESS_TYPE: NORMAL
MDC_IDC_SET_BRADY_LOWRATE: 40 {BEATS}/MIN
MDC_IDC_SET_BRADY_MODE: NORMAL
MDC_IDC_SET_LEADCHNL_RV_PACING_AMPLITUDE: 2.4 V
MDC_IDC_SET_LEADCHNL_RV_PACING_POLARITY: NORMAL
MDC_IDC_SET_LEADCHNL_RV_PACING_PULSEWIDTH: 0.5 MS
MDC_IDC_SET_LEADCHNL_RV_SENSING_ADAPTATION_MODE: NORMAL
MDC_IDC_SET_LEADCHNL_RV_SENSING_POLARITY: NORMAL
MDC_IDC_SET_LEADCHNL_RV_SENSING_SENSITIVITY: 0.6 MV
MDC_IDC_SET_ZONE_DETECTION_INTERVAL: 286 MS
MDC_IDC_SET_ZONE_DETECTION_INTERVAL: 375 MS
MDC_IDC_SET_ZONE_TYPE: NORMAL
MDC_IDC_SET_ZONE_TYPE: NORMAL
MDC_IDC_SET_ZONE_VENDOR_TYPE: NORMAL
MDC_IDC_SET_ZONE_VENDOR_TYPE: NORMAL
MDC_IDC_STAT_BRADY_DTM_END: NORMAL
MDC_IDC_STAT_BRADY_DTM_START: NORMAL
MDC_IDC_STAT_BRADY_RV_PERCENT_PACED: 1 %
MDC_IDC_STAT_EPISODE_RECENT_COUNT: 0
MDC_IDC_STAT_EPISODE_RECENT_COUNT: 1
MDC_IDC_STAT_EPISODE_RECENT_COUNT: 1
MDC_IDC_STAT_EPISODE_RECENT_COUNT_DTM_END: NORMAL
MDC_IDC_STAT_EPISODE_RECENT_COUNT_DTM_START: NORMAL
MDC_IDC_STAT_EPISODE_TYPE: NORMAL
MDC_IDC_STAT_EPISODE_VENDOR_TYPE: NORMAL
MDC_IDC_STAT_TACHYTHERAPY_ATP_DELIVERED_RECENT: 1
MDC_IDC_STAT_TACHYTHERAPY_ATP_DELIVERED_TOTAL: 24
MDC_IDC_STAT_TACHYTHERAPY_RECENT_DTM_END: NORMAL
MDC_IDC_STAT_TACHYTHERAPY_RECENT_DTM_START: NORMAL
MDC_IDC_STAT_TACHYTHERAPY_SHOCKS_ABORTED_RECENT: 0
MDC_IDC_STAT_TACHYTHERAPY_SHOCKS_ABORTED_TOTAL: 23
MDC_IDC_STAT_TACHYTHERAPY_SHOCKS_DELIVERED_RECENT: 2
MDC_IDC_STAT_TACHYTHERAPY_SHOCKS_DELIVERED_TOTAL: 24
MDC_IDC_STAT_TACHYTHERAPY_TOTAL_DTM_END: NORMAL
MDC_IDC_STAT_TACHYTHERAPY_TOTAL_DTM_START: NORMAL
POTASSIUM SERPL-SCNC: 5.1 MMOL/L
SODIUM SERPL-SCNC: 142 MMOL/L

## 2019-10-22 NOTE — TELEPHONE ENCOUNTER
Discussed with keila Escoto NP    Date: 10/22/2019    Time of Call: 8:38 AM     Diagnosis:  Heart failure, icd shock     [ VORB ] Ordering provider: keila escoto np    Order: BMP, magnesium.     Order received by: Diana Davis Rn     Follow-up/additional notes: orders placed. Will call patient and have labs drawn at his local clinic.

## 2019-10-22 NOTE — TELEPHONE ENCOUNTER
Health Call Center    Phone Message    May a detailed message be left on voicemail: no    Reason for Call: Order(s): Other:   Reason for requested: Labs  Date needed: Asap, today, Pt is waiting at the lab at [facility]  Provider name: Carley Huerta NP  Comments: Please fax orders to [phone].      Action Taken: Message routed to:  [facility]

## 2019-10-22 NOTE — TELEPHONE ENCOUNTER
refaxed orders. Called [facility] to confirm they received orders. No other questions at this time.

## 2019-10-24 LAB
MDC_IDC_EPISODE_DTM: NORMAL
MDC_IDC_EPISODE_DURATION: 10 S
MDC_IDC_EPISODE_DURATION: 10 S
MDC_IDC_EPISODE_DURATION: 45 S
MDC_IDC_EPISODE_ID: NORMAL
MDC_IDC_EPISODE_TYPE: NORMAL
MDC_IDC_EPISODE_VENDOR_TYPE: NORMAL
MDC_IDC_LEAD_IMPLANT_DT: NORMAL
MDC_IDC_LEAD_LOCATION: NORMAL
MDC_IDC_LEAD_LOCATION_DETAIL_1: NORMAL
MDC_IDC_LEAD_MFG: NORMAL
MDC_IDC_LEAD_MODEL: NORMAL
MDC_IDC_LEAD_POLARITY_TYPE: NORMAL
MDC_IDC_LEAD_SERIAL: NORMAL
MDC_IDC_MSMT_BATTERY_DTM: NORMAL
MDC_IDC_MSMT_BATTERY_REMAINING_LONGEVITY: 30 MO
MDC_IDC_MSMT_BATTERY_REMAINING_PERCENTAGE: 31 %
MDC_IDC_MSMT_BATTERY_STATUS: NORMAL
MDC_IDC_MSMT_CAP_CHARGE_DTM: NORMAL
MDC_IDC_MSMT_CAP_CHARGE_DTM: NORMAL
MDC_IDC_MSMT_CAP_CHARGE_ENERGY: 41 J
MDC_IDC_MSMT_CAP_CHARGE_TIME: 9.9 S
MDC_IDC_MSMT_CAP_CHARGE_TIME: 9.9 S
MDC_IDC_MSMT_CAP_CHARGE_TYPE: NORMAL
MDC_IDC_MSMT_CAP_CHARGE_TYPE: NORMAL
MDC_IDC_MSMT_LEADCHNL_RV_IMPEDANCE_VALUE: 344 OHM
MDC_IDC_PG_IMPLANT_DTM: NORMAL
MDC_IDC_PG_MFG: NORMAL
MDC_IDC_PG_MODEL: NORMAL
MDC_IDC_PG_SERIAL: NORMAL
MDC_IDC_PG_TYPE: NORMAL
MDC_IDC_SESS_CLINIC_NAME: NORMAL
MDC_IDC_SESS_DTM: NORMAL
MDC_IDC_SESS_TYPE: NORMAL
MDC_IDC_SET_BRADY_LOWRATE: 40 {BEATS}/MIN
MDC_IDC_SET_BRADY_MODE: NORMAL
MDC_IDC_SET_LEADCHNL_RV_PACING_AMPLITUDE: 2.4 V
MDC_IDC_SET_LEADCHNL_RV_PACING_POLARITY: NORMAL
MDC_IDC_SET_LEADCHNL_RV_PACING_PULSEWIDTH: 0.5 MS
MDC_IDC_SET_LEADCHNL_RV_SENSING_ADAPTATION_MODE: NORMAL
MDC_IDC_SET_LEADCHNL_RV_SENSING_POLARITY: NORMAL
MDC_IDC_SET_LEADCHNL_RV_SENSING_SENSITIVITY: 0.6 MV
MDC_IDC_SET_ZONE_DETECTION_INTERVAL: 286 MS
MDC_IDC_SET_ZONE_DETECTION_INTERVAL: 375 MS
MDC_IDC_SET_ZONE_TYPE: NORMAL
MDC_IDC_SET_ZONE_TYPE: NORMAL
MDC_IDC_SET_ZONE_VENDOR_TYPE: NORMAL
MDC_IDC_SET_ZONE_VENDOR_TYPE: NORMAL
MDC_IDC_STAT_BRADY_DTM_END: NORMAL
MDC_IDC_STAT_BRADY_DTM_START: NORMAL
MDC_IDC_STAT_BRADY_RV_PERCENT_PACED: 1 %
MDC_IDC_STAT_EPISODE_RECENT_COUNT: 0
MDC_IDC_STAT_EPISODE_RECENT_COUNT: 1
MDC_IDC_STAT_EPISODE_RECENT_COUNT: 2
MDC_IDC_STAT_EPISODE_RECENT_COUNT_DTM_END: NORMAL
MDC_IDC_STAT_EPISODE_RECENT_COUNT_DTM_START: NORMAL
MDC_IDC_STAT_EPISODE_TYPE: NORMAL
MDC_IDC_STAT_EPISODE_VENDOR_TYPE: NORMAL
MDC_IDC_STAT_TACHYTHERAPY_ATP_DELIVERED_RECENT: 0
MDC_IDC_STAT_TACHYTHERAPY_ATP_DELIVERED_TOTAL: 23
MDC_IDC_STAT_TACHYTHERAPY_RECENT_DTM_END: NORMAL
MDC_IDC_STAT_TACHYTHERAPY_RECENT_DTM_START: NORMAL
MDC_IDC_STAT_TACHYTHERAPY_SHOCKS_ABORTED_RECENT: 0
MDC_IDC_STAT_TACHYTHERAPY_SHOCKS_ABORTED_TOTAL: 23
MDC_IDC_STAT_TACHYTHERAPY_SHOCKS_DELIVERED_RECENT: 1
MDC_IDC_STAT_TACHYTHERAPY_SHOCKS_DELIVERED_TOTAL: 23
MDC_IDC_STAT_TACHYTHERAPY_TOTAL_DTM_END: NORMAL
MDC_IDC_STAT_TACHYTHERAPY_TOTAL_DTM_START: NORMAL

## 2019-10-25 DIAGNOSIS — E83.42 HYPOMAGNESEMIA: ICD-10-CM

## 2019-10-25 RX ORDER — MAGNESIUM OXIDE 420 MG/1
TABLET ORAL
Qty: 810 TABLET | Refills: 3 | Status: SHIPPED | OUTPATIENT
Start: 2019-10-25

## 2019-11-12 DIAGNOSIS — Z79.01 ANTICOAGULATION GOAL OF INR 2 TO 3: ICD-10-CM

## 2019-11-12 DIAGNOSIS — I50.22 CHRONIC SYSTOLIC HEART FAILURE (H): ICD-10-CM

## 2019-11-12 DIAGNOSIS — C90.02 MULTIPLE MYELOMA IN RELAPSE (H): ICD-10-CM

## 2019-11-12 DIAGNOSIS — Z51.81 ANTICOAGULATION GOAL OF INR 2 TO 3: ICD-10-CM

## 2019-11-12 LAB
ALBUMIN SERPL-MCNC: 4 G/DL (ref 3.4–5)
ALP SERPL-CCNC: 166 U/L (ref 40–150)
ALT SERPL W P-5'-P-CCNC: 24 U/L (ref 0–70)
ANION GAP SERPL CALCULATED.3IONS-SCNC: 5 MMOL/L (ref 3–14)
AST SERPL W P-5'-P-CCNC: 21 U/L (ref 0–45)
BASOPHILS # BLD AUTO: 0 10E9/L (ref 0–0.2)
BASOPHILS NFR BLD AUTO: 0.6 %
BILIRUB SERPL-MCNC: 0.9 MG/DL (ref 0.2–1.3)
BUN SERPL-MCNC: 37 MG/DL (ref 7–30)
CALCIUM SERPL-MCNC: 9.5 MG/DL (ref 8.5–10.1)
CHLORIDE SERPL-SCNC: 102 MMOL/L (ref 94–109)
CO2 SERPL-SCNC: 30 MMOL/L (ref 20–32)
COLLECT DURATION TIME UR: 24 H
CREAT 24H UR-MRATE: 1.05 G/(24.H) (ref 1–2)
CREAT SERPL-MCNC: 1.29 MG/DL (ref 0.66–1.25)
CREAT UR-MCNC: 44 MG/DL
DIFFERENTIAL METHOD BLD: ABNORMAL
EOSINOPHIL # BLD AUTO: 0.2 10E9/L (ref 0–0.7)
EOSINOPHIL NFR BLD AUTO: 2.3 %
ERYTHROCYTE [DISTWIDTH] IN BLOOD BY AUTOMATED COUNT: 13.2 % (ref 10–15)
GFR SERPL CREATININE-BSD FRML MDRD: 56 ML/MIN/{1.73_M2}
GLUCOSE SERPL-MCNC: 114 MG/DL (ref 70–99)
HCT VFR BLD AUTO: 35.5 % (ref 40–53)
HGB BLD-MCNC: 11.5 G/DL (ref 13.3–17.7)
IMM GRANULOCYTES # BLD: 0 10E9/L (ref 0–0.4)
IMM GRANULOCYTES NFR BLD: 0.3 %
INR PPP: 2.19 (ref 0.86–1.14)
KAPPA LC UR-MCNC: 5.31 MG/DL (ref 0.33–1.94)
KAPPA LC/LAMBDA SER: 1.22 {RATIO} (ref 0.26–1.65)
LAMBDA LC SERPL-MCNC: 4.36 MG/DL (ref 0.57–2.63)
LYMPHOCYTES # BLD AUTO: 1.2 10E9/L (ref 0.8–5.3)
LYMPHOCYTES NFR BLD AUTO: 18.4 %
MCH RBC QN AUTO: 35.3 PG (ref 26.5–33)
MCHC RBC AUTO-ENTMCNC: 32.4 G/DL (ref 31.5–36.5)
MCV RBC AUTO: 109 FL (ref 78–100)
MONOCYTES # BLD AUTO: 0.8 10E9/L (ref 0–1.3)
MONOCYTES NFR BLD AUTO: 12.3 %
NEUTROPHILS # BLD AUTO: 4.3 10E9/L (ref 1.6–8.3)
NEUTROPHILS NFR BLD AUTO: 66.1 %
NRBC # BLD AUTO: 0 10*3/UL
NRBC BLD AUTO-RTO: 0 /100
PLATELET # BLD AUTO: 149 10E9/L (ref 150–450)
POTASSIUM SERPL-SCNC: 4.5 MMOL/L (ref 3.4–5.3)
PROT 24H UR-MRATE: 0.14 G/(24.H) (ref 0.04–0.23)
PROT SERPL-MCNC: 8 G/DL (ref 6.8–8.8)
PROT UR-MCNC: 0.06 G/L
PROT/CREAT 24H UR: 0.13 G/G CR (ref 0–0.2)
RBC # BLD AUTO: 3.26 10E12/L (ref 4.4–5.9)
SODIUM SERPL-SCNC: 136 MMOL/L (ref 133–144)
SPECIMEN VOL UR: 2390 ML
WBC # BLD AUTO: 6.6 10E9/L (ref 4–11)

## 2019-11-12 PROCEDURE — 00000402 ZZHCL STATISTIC TOTAL PROTEIN: Performed by: INTERNAL MEDICINE

## 2019-11-12 PROCEDURE — 83883 ASSAY NEPHELOMETRY NOT SPEC: CPT | Performed by: INTERNAL MEDICINE

## 2019-11-12 PROCEDURE — 86335 IMMUNFIX E-PHORSIS/URINE/CSF: CPT | Performed by: INTERNAL MEDICINE

## 2019-11-12 PROCEDURE — 82784 ASSAY IGA/IGD/IGG/IGM EACH: CPT | Performed by: INTERNAL MEDICINE

## 2019-11-12 PROCEDURE — 84166 PROTEIN E-PHORESIS/URINE/CSF: CPT | Performed by: INTERNAL MEDICINE

## 2019-11-12 PROCEDURE — 86334 IMMUNOFIX E-PHORESIS SERUM: CPT | Performed by: INTERNAL MEDICINE

## 2019-11-12 PROCEDURE — 84165 PROTEIN E-PHORESIS SERUM: CPT | Performed by: INTERNAL MEDICINE

## 2019-11-13 ENCOUNTER — TELEPHONE (OUTPATIENT)
Dept: CARDIOLOGY | Facility: CLINIC | Age: 69
End: 2019-11-13

## 2019-11-13 LAB
IGA SERPL-MCNC: 314 MG/DL (ref 70–380)
IGG SERPL-MCNC: 1220 MG/DL (ref 695–1620)
IGM SERPL-MCNC: 54 MG/DL (ref 60–265)
PROT PATTERN SERPL IFE-IMP: ABNORMAL

## 2019-11-13 NOTE — TELEPHONE ENCOUNTER
Health Call Center    Phone Message    May a detailed message be left on voicemail: yes    Reason for Call: Medication Question or concern regarding medication   Prescription Clarification  Name of Medication: digoxin (LANOXIN) 125 MCG tablet 30 tablet 1 10/21/2019  No  Sig - Route: Take 1 tablet (125 mcg) by mouth daily - Oral    spironolactone (ALDACTONE) 25 MG tablet 15 tablet 1 10/21/2019  No  Sig - Route: Take 0.5 tablets (12.5 mg) by mouth daily - Oral    Prescribing Provider: Dr. Yoko Prince   Pharmacy:   [address]        What on the order needs clarification? They have the records, just need the notes to go with the prescriptions.  [facility] requesting info:  Sylvia @ [phone] ext. [phone]  FAX: [phone]        Action Taken: Message routed to:  [facility] Cardiology

## 2019-11-14 LAB
ALBUMIN SERPL ELPH-MCNC: 4.3 G/DL (ref 3.7–5.1)
ALPHA1 GLOB SERPL ELPH-MCNC: 0.4 G/DL (ref 0.2–0.4)
ALPHA2 GLOB SERPL ELPH-MCNC: 0.8 G/DL (ref 0.5–0.9)
B-GLOBULIN SERPL ELPH-MCNC: 0.9 G/DL (ref 0.6–1)
GAMMA GLOB SERPL ELPH-MCNC: 1.2 G/DL (ref 0.7–1.6)
M PROTEIN SERPL ELPH-MCNC: 0 G/DL
PROT ELPH PNL UR ELPH: NORMAL
PROT PATTERN SERPL ELPH-IMP: NORMAL

## 2019-11-15 LAB — PROT PATTERN UR ELPH-IMP: NORMAL

## 2019-11-15 ASSESSMENT — ENCOUNTER SYMPTOMS
FEVER: 0
WEIGHT GAIN: 1
ALTERED TEMPERATURE REGULATION: 1
BACK PAIN: 1
BLOATING: 0
BLOATING: 0
DECREASED APPETITE: 1
FATIGUE: 1
CONSTIPATION: 1
HEADACHES: 0
CHILLS: 1
SINUS CONGESTION: 0
SORE THROAT: 0
LIGHT-HEADEDNESS: 1
BOWEL INCONTINENCE: 0
POLYPHAGIA: 0
HALLUCINATIONS: 1
HYPERTENSION: 0
DIZZINESS: 1
INCREASED ENERGY: 1
TROUBLE SWALLOWING: 0
TINGLING: 1
HEADACHES: 0
MUSCLE CRAMPS: 1
WEAKNESS: 1
SMELL DISTURBANCE: 0
ORTHOPNEA: 0
NIGHT SWEATS: 0
HOARSE VOICE: 1
PALPITATIONS: 1
HALLUCINATIONS: 1
STIFFNESS: 1
NUMBNESS: 1
MUSCLE CRAMPS: 1
SYNCOPE: 0
RECTAL PAIN: 0
LOSS OF CONSCIOUSNESS: 0
MYALGIAS: 1
NUMBNESS: 1
HEARTBURN: 1
JOINT SWELLING: 0
TASTE DISTURBANCE: 0
PARALYSIS: 0
DIARRHEA: 1
LEG PAIN: 0
NECK MASS: 0
TREMORS: 0
EXERCISE INTOLERANCE: 1
POLYDIPSIA: 0
RECTAL PAIN: 0
SINUS PAIN: 0
DIZZINESS: 1
ORTHOPNEA: 0
BOWEL INCONTINENCE: 0
TROUBLE SWALLOWING: 0
DIARRHEA: 1
EXERCISE INTOLERANCE: 1
NAUSEA: 1
WEAKNESS: 1
TINGLING: 1
MEMORY LOSS: 1
VOMITING: 0
ABDOMINAL PAIN: 0
BLOOD IN STOOL: 0
FEVER: 0
SEIZURES: 0
POLYPHAGIA: 0
BACK PAIN: 1
HYPERTENSION: 0
SORE THROAT: 0
WEIGHT LOSS: 1
NECK PAIN: 0
MUSCLE WEAKNESS: 1
SLEEP DISTURBANCES DUE TO BREATHING: 0
DISTURBANCES IN COORDINATION: 0
TASTE DISTURBANCE: 0
NECK PAIN: 0
HYPOTENSION: 1
POLYDIPSIA: 0
LIGHT-HEADEDNESS: 1
LOSS OF CONSCIOUSNESS: 0
INCREASED ENERGY: 1
JAUNDICE: 0
SYNCOPE: 0
SINUS CONGESTION: 0
MEMORY LOSS: 1
ABDOMINAL PAIN: 0
SLEEP DISTURBANCES DUE TO BREATHING: 0
VOMITING: 0
STIFFNESS: 1
FATIGUE: 1
MUSCLE WEAKNESS: 1
BLOOD IN STOOL: 0
JOINT SWELLING: 0
ALTERED TEMPERATURE REGULATION: 1
SMELL DISTURBANCE: 0
CHILLS: 1
ARTHRALGIAS: 0
HEARTBURN: 1
SINUS PAIN: 0
MYALGIAS: 1
JAUNDICE: 0
PALPITATIONS: 1
NAUSEA: 1
NECK MASS: 0
LEG PAIN: 0
CONSTIPATION: 1
DISTURBANCES IN COORDINATION: 0
HYPOTENSION: 1
NIGHT SWEATS: 0
PARALYSIS: 0
SEIZURES: 0
TREMORS: 0
DECREASED APPETITE: 1
ARTHRALGIAS: 0
WEIGHT GAIN: 1
HOARSE VOICE: 1
WEIGHT LOSS: 1

## 2019-11-18 ENCOUNTER — OFFICE VISIT (OUTPATIENT)
Dept: CARDIOLOGY | Facility: CLINIC | Age: 69
End: 2019-11-18
Attending: NURSE PRACTITIONER
Payer: MEDICARE

## 2019-11-18 ENCOUNTER — OFFICE VISIT (OUTPATIENT)
Dept: CARDIOLOGY | Facility: CLINIC | Age: 69
End: 2019-11-18
Attending: INTERNAL MEDICINE
Payer: MEDICARE

## 2019-11-18 VITALS
HEART RATE: 56 BPM | WEIGHT: 192 LBS | BODY MASS INDEX: 27.49 KG/M2 | OXYGEN SATURATION: 97 % | SYSTOLIC BLOOD PRESSURE: 95 MMHG | HEIGHT: 70 IN | DIASTOLIC BLOOD PRESSURE: 57 MMHG

## 2019-11-18 VITALS
BODY MASS INDEX: 27.49 KG/M2 | HEIGHT: 70 IN | HEART RATE: 56 BPM | WEIGHT: 192 LBS | SYSTOLIC BLOOD PRESSURE: 95 MMHG | DIASTOLIC BLOOD PRESSURE: 57 MMHG | OXYGEN SATURATION: 97 %

## 2019-11-18 DIAGNOSIS — N28.9 RENAL INSUFFICIENCY: ICD-10-CM

## 2019-11-18 DIAGNOSIS — C90.00 MULTIPLE MYELOMA, REMISSION STATUS UNSPECIFIED (H): ICD-10-CM

## 2019-11-18 DIAGNOSIS — I48.91 ATRIAL FIBRILLATION (H): ICD-10-CM

## 2019-11-18 DIAGNOSIS — E83.42 HYPOMAGNESEMIA: ICD-10-CM

## 2019-11-18 DIAGNOSIS — I47.29 PAROXYSMAL VENTRICULAR TACHYCARDIA (H): ICD-10-CM

## 2019-11-18 DIAGNOSIS — I25.5 ISCHEMIC CARDIOMYOPATHY: ICD-10-CM

## 2019-11-18 DIAGNOSIS — C90.00 MULTIPLE MYELOMA NOT HAVING ACHIEVED REMISSION (H): ICD-10-CM

## 2019-11-18 DIAGNOSIS — I48.0 PAROXYSMAL ATRIAL FIBRILLATION (H): ICD-10-CM

## 2019-11-18 DIAGNOSIS — I50.22 CHRONIC SYSTOLIC HEART FAILURE (H): Primary | ICD-10-CM

## 2019-11-18 DIAGNOSIS — Z95.810 IMPLANTABLE CARDIOVERTER-DEFIBRILLATOR (ICD) IN SITU: ICD-10-CM

## 2019-11-18 DIAGNOSIS — I47.29 PAROXYSMAL VENTRICULAR TACHYCARDIA (H): Primary | ICD-10-CM

## 2019-11-18 LAB
ALBUMIN SERPL-MCNC: 3.9 G/DL (ref 3.4–5)
ALP SERPL-CCNC: 151 U/L (ref 40–150)
ALT SERPL W P-5'-P-CCNC: 26 U/L (ref 0–70)
ANION GAP SERPL CALCULATED.3IONS-SCNC: 3 MMOL/L (ref 3–14)
AST SERPL W P-5'-P-CCNC: 23 U/L (ref 0–45)
BILIRUB SERPL-MCNC: 0.7 MG/DL (ref 0.2–1.3)
BUN SERPL-MCNC: 30 MG/DL (ref 7–30)
CALCIUM SERPL-MCNC: 8.9 MG/DL (ref 8.5–10.1)
CHLORIDE SERPL-SCNC: 105 MMOL/L (ref 94–109)
CO2 SERPL-SCNC: 31 MMOL/L (ref 20–32)
CREAT SERPL-MCNC: 1.29 MG/DL (ref 0.66–1.25)
DIGOXIN SERPL-MCNC: 0.9 UG/L (ref 0.5–2)
GFR SERPL CREATININE-BSD FRML MDRD: 56 ML/MIN/{1.73_M2}
GLUCOSE SERPL-MCNC: 109 MG/DL (ref 70–99)
MAGNESIUM SERPL-MCNC: 2 MG/DL (ref 1.6–2.3)
POTASSIUM SERPL-SCNC: 4.7 MMOL/L (ref 3.4–5.3)
PROT SERPL-MCNC: 7.6 G/DL (ref 6.8–8.8)
SODIUM SERPL-SCNC: 139 MMOL/L (ref 133–144)

## 2019-11-18 PROCEDURE — 99214 OFFICE O/P EST MOD 30 MIN: CPT | Mod: GC | Performed by: INTERNAL MEDICINE

## 2019-11-18 PROCEDURE — G0463 HOSPITAL OUTPT CLINIC VISIT: HCPCS

## 2019-11-18 PROCEDURE — 80053 COMPREHEN METABOLIC PANEL: CPT | Performed by: INTERNAL MEDICINE

## 2019-11-18 PROCEDURE — 99207 ZZC APP CREDIT; MD BILLING SHARED VISIT: CPT | Mod: ZP | Performed by: NURSE PRACTITIONER

## 2019-11-18 PROCEDURE — G0463 HOSPITAL OUTPT CLINIC VISIT: HCPCS | Mod: 25,ZF

## 2019-11-18 PROCEDURE — 36415 COLL VENOUS BLD VENIPUNCTURE: CPT | Performed by: INTERNAL MEDICINE

## 2019-11-18 PROCEDURE — 83735 ASSAY OF MAGNESIUM: CPT | Performed by: INTERNAL MEDICINE

## 2019-11-18 PROCEDURE — 80162 ASSAY OF DIGOXIN TOTAL: CPT | Performed by: INTERNAL MEDICINE

## 2019-11-18 RX ORDER — FUROSEMIDE 20 MG
20 TABLET ORAL DAILY
Qty: 90 TABLET | Refills: 3 | Status: SHIPPED | OUTPATIENT
Start: 2019-11-18

## 2019-11-18 RX ORDER — METOPROLOL SUCCINATE 100 MG/1
100 TABLET, EXTENDED RELEASE ORAL DAILY
Qty: 90 TABLET | Refills: 3 | Status: SHIPPED | OUTPATIENT
Start: 2019-11-18 | End: 2022-01-01

## 2019-11-18 ASSESSMENT — ENCOUNTER SYMPTOMS
LIGHT-HEADEDNESS: 1
PANIC: 0
LOSS OF CONSCIOUSNESS: 0
WEIGHT LOSS: 1
ALTERED TEMPERATURE REGULATION: 1
WEIGHT GAIN: 1
DIFFICULTY URINATING: 0
TREMORS: 0
WEAKNESS: 1
BLOOD IN STOOL: 0
NECK PAIN: 0
LEG PAIN: 0
PARALYSIS: 0
NERVOUS/ANXIOUS: 0
STIFFNESS: 1
ARTHRALGIAS: 0
BACK PAIN: 1
SKIN CHANGES: 0
SMELL DISTURBANCE: 0
HYPOTENSION: 1
INCREASED ENERGY: 1
HYPERTENSION: 0
ORTHOPNEA: 0
DIARRHEA: 1
NAIL CHANGES: 0
DEPRESSION: 0
HEMATURIA: 0
JOINT SWELLING: 0
SHORTNESS OF BREATH: 0
WHEEZING: 0
HALLUCINATIONS: 1
COUGH DISTURBING SLEEP: 0
RESPIRATORY PAIN: 0
POLYDIPSIA: 0
DOUBLE VISION: 0
DECREASED APPETITE: 1
CONSTIPATION: 1
INSOMNIA: 0
CHILLS: 1
SINUS CONGESTION: 0
DYSPNEA ON EXERTION: 0
FATIGUE: 1
SWOLLEN GLANDS: 0
POOR WOUND HEALING: 0
SINUS PAIN: 0
MYALGIAS: 1
FEVER: 0
PALPITATIONS: 1
EYE IRRITATION: 0
SNORES LOUDLY: 0
JAUNDICE: 0
TASTE DISTURBANCE: 0
NIGHT SWEATS: 0
SYNCOPE: 0
DECREASED CONCENTRATION: 0
BRUISES/BLEEDS EASILY: 0
EYE REDNESS: 0
DIZZINESS: 1
SEIZURES: 0
COUGH: 0
HEMOPTYSIS: 0
MUSCLE WEAKNESS: 1
MEMORY LOSS: 1
DYSURIA: 0
RECTAL PAIN: 0
NAUSEA: 1
MUSCLE CRAMPS: 1
NECK MASS: 0
EXERCISE INTOLERANCE: 1
VOMITING: 0
EYE WATERING: 0
POSTURAL DYSPNEA: 0
FLANK PAIN: 0
SORE THROAT: 0
BLOATING: 0
NUMBNESS: 1
POLYPHAGIA: 0
BOWEL INCONTINENCE: 0
DISTURBANCES IN COORDINATION: 0
TROUBLE SWALLOWING: 0
HOARSE VOICE: 1
HEADACHES: 0
EYE PAIN: 0
TINGLING: 1
SLEEP DISTURBANCES DUE TO BREATHING: 0
HEARTBURN: 1
SPUTUM PRODUCTION: 0
ABDOMINAL PAIN: 0

## 2019-11-18 ASSESSMENT — MIFFLIN-ST. JEOR
SCORE: 1647.16
SCORE: 1647.16

## 2019-11-18 ASSESSMENT — PAIN SCALES - GENERAL
PAINLEVEL: NO PAIN (0)
PAINLEVEL: NO PAIN (0)

## 2019-11-18 NOTE — PROGRESS NOTES
"    Heart Care - Clinical Cardiac Electrophysiology       HPI: Erasmo Pearce is a 69 year old male who presents for follow up of ICD, VT.  The patient has a past medical history significant for chronic systolic heart failure secondary to ICM (LVEF 25-30%), CAD with  of RCA, permanent AFib, VT/VF s/p ICD with appropriate ICD shocks, DMII, HTN, multiple myeloma (s/p chemo and PBSCT in 2006, with relapse in 2017 and remains on treatment), HL, DVT, and Bird's esophagus (h/o SBO, ileus, and colitis).  He was started on lenalidomide on 2/10/19.  Since initiating lenalidomide, he has received 4 appropriate ICD shocks (on 2/11, 2/14, 2/16, and 3/6).  On 2/11, had one 41 J shock; on 2/12, had ATP x 1; on 2/14, had ATP x 1, 41 J shock; on 2/15, ATP x 1; on 2/15, ATP x 1, 41 J shock; and on 3/6, ATP x 1, 41 J shock. During his visit on 3/26/19, mexiletine 150 mg BID was started. He remained shock free until 10/19/2019 41 J shock.    Reviewed current interval:  -- Echocardiogram 7/30/2019 shows Mildly (EF 40-45%) reduced left ventricular function is present. Global hypokinesis with basal-mid inferolateral and inferior akinesis. Global right ventricular function is moderately reduced. Moderate mitral insufficiency is present with multi directional jet suggesting involvement of the commissures. This study was compared with the study from 2/7/17 and LVEF is improved and MR is worse.  -- 10/15/2019 Remote Device check: \"[employer] single lead ICD. Normal ICD function. 1 episode recorded in the VF zone on 10/14/19 @ 1903 - 233 bpm, 45 seconds, 41 J ICD shock delivered. 2 NSVT episodes recorded - 8 beats, 163-169 bpm. Presenting EGM = irregular ventricular rhythm @ ~ 60 bpm.  = 1%. Estimated battery longevity to ROSEMARIE = 2.5 years. Lead trends appear stable.\"  -- 10/21/2019 Remote Device check: \"Patient reported to nurse that he thinks he had a shock while sleeping on 10/19/19.  Device RN spoke with patient who " "reports that he was sleeping and woke up after receiving a shock from his ICD. Requested that patient send a remote transmission today. Remote ICD transmission received and reviewed. Device transmission sent per MD orders. Patient has a [employer] single lead ICD. Normal ICD function. 1 episode of VT recorded on 10/19/19 @ 0019 - 201 bpm, 49 sec, ATP x 1 and 1 41 J ICD shock delivered with successful termination of arrhythmia. 1 NSVT episode recorded on 10/21/19 @ 1255 - 172 bpm, 12 sec, no stored EGM for review. Presenting EGM = irregular ventricular arrhythmia @ ~ 60-70 bpm.  = 1%. Estimated battery longevity to ROSEMARIE = 2 years. Dr. Duarte notified of interrogation results.\"  -- Presenting device check: \" [employer] Single lead ICD\" 'Normal ICD function. 2 NSVT and 1 VT episodes recorded since patient received a shock on 10/19/19. 1 EGM for review displays SB @ 40 bpm followed by  prior to the episode which lasts 5.5 seconds @ 214 bpm. Intrinsic rhythm = Regular VS @ 48 bpm.  = 5%. Estimated battery longevity to ROSEMARIE = 2 years. Lead trends appear stable.\"    Current Interval history:   Patient states that his medications were changes to add digoxin and spironolactone, as well as increase, lisinopril on October 8th.  He noted lightheaded episodes increased significantly with these medication changes. States that he did not feel either of his last 2 ICD shocks (10/14, 10/19). States that his pedal edema has improved. States he never misses doses of medication. Denies chest pain or pressure, syncope, dyspnea at rest or with exertion, palpitations, orthopnea, PND, or abdominal edema.    Current Outpatient Medications   Medication Sig Dispense Refill     albuterol (PROAIR HFA, PROVENTIL HFA, VENTOLIN HFA) 108 (90 BASE) MCG/ACT inhaler Inhale 2 puffs into the lungs every 6 hours as needed        amoxicillin (AMOXIL) 500 MG tablet Take 4 tabs one hour before dental appointment 4 tablet 5     baclofen " (LIORESAL) 10 MG tablet Take 10 mg by mouth 3 times daily as needed prn       Calcium Carbonate-Vitamin D (CALCIUM 500 + D PO) Take 2 tablets by mouth daily.       diclofenac (VOLTAREN) 75 MG EC tablet Take 75 mg by mouth 2 times daily as needed 1 tab prn       digoxin (LANOXIN) 125 MCG tablet Take 1 tablet (125 mcg) by mouth daily 30 tablet 1     fentaNYL (DURAGESIC) 25 mcg/hr patch 72 hr Place 1 patch onto the skin every 72 hours       furosemide (LASIX) 40 MG tablet Take 0.5 tablets (20 mg) by mouth 2 times daily 180 tablet 1     lisinopril (PRINIVIL/ZESTRIL) 5 MG tablet Take 1 tablet (5 mg) by mouth 2 times daily 60 tablet 1     Magnesium Oxide 420 MG TABS Take 3 tabs (1260 mg) three times a day. 810 tablet 3     metoprolol succinate (TOPROL-XL) 200 MG 24 hr tablet Take 1 tablet (200 mg) by mouth daily 90 tablet 3     mexiletine (MEXITIL) 150 MG capsule Take 1 capsule (150 mg) by mouth 2 times daily As close to 12 hours apart as possible 180 capsule 3     nitroGLYCERIN (NITROSTAT) 0.4 MG SL tablet Place 0.4 mg under the tongue every 5 minutes as needed Reported on 5/16/2017       omeprazole (PRILOSEC) 20 MG capsule Take 20 mg by mouth daily  90 capsule 3     ondansetron (ZOFRAN-ODT) 4 MG ODT tab Take 4 mg by mouth every 6 hours as needed for nausea       oxycodone (ROXICODONE) 5 MG immediate release tablet Take 1-2 tablets by mouth every 6 hours as needed. For pain.        potassium chloride ER (K-DUR/KLOR-CON M) 10 MEQ CR tablet Take 2 tablets (20 mEq) by mouth daily 90 tablet 1     simvastatin (ZOCOR) 40 MG tablet Take 40 mg by mouth At Bedtime  30 tablet      spironolactone (ALDACTONE) 25 MG tablet Take 0.5 tablets (12.5 mg) by mouth daily 15 tablet 1     tiotropium (SPIRIVA) 18 MCG inhalation capsule Inhale 1 capsule (18 mcg) into the lungs daily 30 capsule 3     TRAZODONE HCL PO Take 50 mg by mouth nightly as needed        warfarin (COUMADIN) 5 MG tablet Take 7.5 mg by mouth See Admin Instructions Take 1.5  "tab by  and Friday and 1 tab Sun  , Thur and Sat         Past Medical History:   Diagnosis Date     Chronic systolic heart failure (H)      Ischemic cardiomyopathy      Other premature beats      Permanent atrial fibrillation      Personal history of multiple myeloma      VF (ventricular fibrillation) (H)        Past Surgical History:   Procedure Laterality Date     CV CORONARY ANGIOGRAM N/A 2019    Procedure: CV CORONARY ANGIOGRAM;  Surgeon: Giorgi Milan MD;  [facility]: [facility]     CV LEFT HEART CATH N/A 2019    Procedure: Left Heart Cath;  Surgeon: Giorgi Milan MD;  [facility]: [facility]     CV RIGHT HEART CATH N/A 2019    Procedure: CV RIGHT HEART CATH;  Surgeon: Giorgi Milan MD;  [facility]     CV STRESS EXERCISE STUDY N/A 2019    Procedure: Stress Drug Study;  Surgeon: Giorgi Milan MD;  Location: [facility]     IMPLANT AUTOMATIC IMPLANTABLE CARDIOVERTER DEFIBRILLATOR         No family history on file.    Social History     Tobacco Use     Smoking status: Former Smoker     Packs/day: 1.00     Years: 25.00     Pack years: 25.00     Types: Cigarettes     Last attempt to quit: 10/15/1995     Years since quittin.1     Smokeless tobacco: Never Used   Substance Use Topics     Alcohol use: No       Allergies   Allergen Reactions     Nkda [No Known Drug Allergies]          ROS:   A complete review of systems was performed and is negative except as noted in the HPI.     Physical Examination:  Vitals: BP 95/57 (BP Location: Left arm, Patient Position: Chair, Cuff Size: Adult Regular)   Pulse 56   Ht 1.778 m (5' 10\")   Wt 87.1 kg (192 lb)   SpO2 97%   BMI 27.55 kg/m    BMI= Body mass index is 27.55 kg/m .    GENERAL APPEARANCE: healthy, alert, and no acute distress  HEENT: no icterus, no xanthelasmas, normal pupil size and reaction, no cyanosis.  NECK: no asymmetry, no " cervical bruits, no JVD   RESPIRATORY: lungs clear to auscultation - no rales, rhonchi or wheezes, no use of accessory muscles, no retractions, respirations are unlabored, normal respiratory rate  CARDIOVASCULAR: regular rhythm, 1/6 systolic murmur  GI:  no abdominal bruits, soft, non-tender  EXTREMITIES: no clubbing  NEURO: alert and oriented to person/place/time, normal speech, gait and affect  VASC: Radial and posterior tibialis pulses +2 and symmetric bilaterally. No pedal edema.   SKIN: no ecchymoses, no rashes    Assessment and recommendations:    # Cardiac device in situ: ICM (LVEF 25-30%) and VT/VF s/p ICD with appropriate ICD shocks. Last ICD shocks 10/14 and 10/19. Prior to that 3/2019  1. Normal device function.  2.  = 5%.   3. Keep scheduled follow ups with [facility]    # History of VF/VT, history multiple appropriate ICD shocks: Last ICD shock 10/19 while he was sleeping. Review of device EGMs show a pattern of slowing sinus to bradycardia prior to initiation of VTs. Programmed at VVI @ 40 bpm.   1. Continue mexiletine 150 mg twice a day and metoprolol succinate 200 mg daily. May need to increase mexiletine if episodes continue and do not improve after addressing vagal events.   2. Pattern of sinus slowing (vagal events) prior to VT episodes and patient has increased lightheadedness and hypotension with increased lisinopril and digoxin start.  Would recommend decreasing back to lisinopril 5 mg daily and will discuss with Dr. Prince/fellow.  Will get a digoxin level and BMP.     # Permanent atrial fibrillation: Discussed in detail with the patient management/treatment options for AF includin. Stroke Prophylaxis:  CHADSVASC=HF+, Vasc(CAD)+, age+  3, corresponding to a 3.2% annual stroke / systemic emolism event rate. indicating need for long term oral anticoagulation. Has been taking warfarin without bleeding problems. Continue warfarin.  2. Rate Control: Continue metoprolol as noted above  3.  Rhythm Control: permanent AF, utilizing a rate control strategy.    Follow up : Follow up as scheduled with Dr. Duarte or follow up sooner as needed for symptoms or problems.    Patient expresses understanding and agreement with the plan.    I appreciate the chance to help with Erasmo Pearce's care. Please let me know if you have any questions or concerns.    Cassidy EUBANKS, CNP

## 2019-11-18 NOTE — LETTER
11/18/2019      RE: Erasmo Pearce  [address]  [address] [facility] [address]       Dear Colleague,    Thank you for the opportunity to participate in the care of your patient, Erasmo Pearce, at the [facility] HEART CARE at [facility]. Please see a copy of my visit note below.    HPI:   68 y/o male with multiple myeloma s/p PBSCT in 2006, ICM/NICM (LVEF 30-35%, NYHA II Stage B) s/p ICD placement in 2009, episodic VT/VF treated by appropriate ICD shocks and afib (CHADVASC 3) on warfarin comes in for management of cardiomyopathy.     Patient was diagnosed in 2006 with multiple myeloma and underwent chemotherapy and autologous PBSCT that same year. He was started on lenalinomide in 2017 due to relapse. He was started on bortezomib in 07/2019 due to progressive disease.     Patient was admitted for decompensated heart failure in August 2019 (acute on chronic systolic heart failure, EF 25 to 30%) and improved after diuresing about 20 lbs and the bortezomib was stopped. His last ICD shock was in 03/06/19 while undergoing lenalidomide treatment. He was started on mexiletine few weeks afterwards and since then has not had any shocks.      Patient today feels great.  His heart failure regimen has been further escalated which now includes beta-blocker, ACE inhibitor, diuretic, Aldactone and digoxin.  He denies any chest pain or heart failure symptoms.  His weight is stable.    PAST MEDICAL HISTORY:  Past Medical History:   Diagnosis Date     Chronic systolic heart failure (H)      Ischemic cardiomyopathy      Other premature beats      Permanent atrial fibrillation      Personal history of multiple myeloma      VF (ventricular fibrillation) (H)      CURRENT MEDICATIONS:  Current Outpatient Medications   Medication Sig Dispense Refill     albuterol (PROAIR HFA, PROVENTIL HFA, VENTOLIN HFA) 108 (90 BASE) MCG/ACT inhaler Inhale 2 puffs into the lungs every 6 hours as  needed        amoxicillin (AMOXIL) 500 MG tablet Take 4 tabs one hour before dental appointment 4 tablet 5     baclofen (LIORESAL) 10 MG tablet Take 10 mg by mouth 3 times daily as needed prn       Calcium Carbonate-Vitamin D (CALCIUM 500 + D PO) Take 2 tablets by mouth daily.       diclofenac (VOLTAREN) 75 MG EC tablet Take 75 mg by mouth 2 times daily as needed 1 tab prn       digoxin (LANOXIN) 125 MCG tablet Take 1 tablet (125 mcg) by mouth daily 30 tablet 1     fentaNYL (DURAGESIC) 25 mcg/hr patch 72 hr Place 1 patch onto the skin every 72 hours       furosemide (LASIX) 40 MG tablet Take 0.5 tablets (20 mg) by mouth 2 times daily 180 tablet 1     lisinopril (PRINIVIL/ZESTRIL) 5 MG tablet Take 1 tablet (5 mg) by mouth 2 times daily 60 tablet 1     Magnesium Oxide 420 MG TABS Take 3 tabs (1260 mg) three times a day. 810 tablet 3     metoprolol succinate (TOPROL-XL) 200 MG 24 hr tablet Take 1 tablet (200 mg) by mouth daily 90 tablet 3     mexiletine (MEXITIL) 150 MG capsule Take 1 capsule (150 mg) by mouth 2 times daily As close to 12 hours apart as possible 180 capsule 3     nitroGLYCERIN (NITROSTAT) 0.4 MG SL tablet Place 0.4 mg under the tongue every 5 minutes as needed Reported on 5/16/2017       omeprazole (PRILOSEC) 20 MG capsule Take 20 mg by mouth daily  90 capsule 3     ondansetron (ZOFRAN-ODT) 4 MG ODT tab Take 4 mg by mouth every 6 hours as needed for nausea       oxycodone (ROXICODONE) 5 MG immediate release tablet Take 1-2 tablets by mouth every 6 hours as needed. For pain.        potassium chloride ER (K-DUR/KLOR-CON M) 10 MEQ CR tablet Take 2 tablets (20 mEq) by mouth daily 90 tablet 1     simvastatin (ZOCOR) 40 MG tablet Take 40 mg by mouth At Bedtime  30 tablet      spironolactone (ALDACTONE) 25 MG tablet Take 0.5 tablets (12.5 mg) by mouth daily 15 tablet 1     tiotropium (SPIRIVA) 18 MCG inhalation capsule Inhale 1 capsule (18 mcg) into the lungs daily 30 capsule 3     TRAZODONE HCL PO Take 50 mg  by mouth nightly as needed        warfarin (COUMADIN) 5 MG tablet Take 7.5 mg by mouth See Admin Instructions Take 1.5 tab by  and Friday and 1 tab Sun  , Thur and Sat       PAST SURGICAL HISTORY:  Past Surgical History:   Procedure Laterality Date     CV CORONARY ANGIOGRAM N/A 2019    Procedure: CV CORONARY ANGIOGRAM;  Surgeon: Giorgi Milan MD;  [facility]     CV LEFT HEART CATH N/A 2019    Procedure: Left Heart Cath;  Surgeon: Giorgi Milan MD;  [facility]     CV RIGHT HEART CATH N/A 2019    Procedure: CV RIGHT HEART CATH;  Surgeon: Giorgi Milan MD;  [facility]     CV STRESS EXERCISE STUDY N/A 2019    Procedure: Stress Drug Study;  Surgeon: Giorgi Milan MD;  [facility]     IMPLANT AUTOMATIC IMPLANTABLE CARDIOVERTER DEFIBRILLATOR       ALLERGIES  Allergies   Allergen Reactions     Nkda [No Known Drug Allergies]      FAMILY HISTORY:  No family history on file.    SOCIAL HISTORY:  Social History     Socioeconomic History     Marital status:      Spouse name: None     Number of children: None     Years of education: None     Highest education level: None   Occupational History     None   Social Needs     Financial resource strain: None     Food insecurity:     Worry: None     Inability: None     Transportation needs:     Medical: None     Non-medical: None   Tobacco Use     Smoking status: Former Smoker     Packs/day: 1.00     Years: 25.00     Pack years: 25.00     Types: Cigarettes     Last attempt to quit: 10/15/1995     Years since quittin.9     Smokeless tobacco: Never Used   Substance and Sexual Activity     Alcohol use: No     Drug use: No     Sexual activity: None   Lifestyle     Physical activity:     Days per week: None     Minutes per session: None     Stress: None   Relationships     Social connections:     Talks on phone: None      "Gets together: None     Attends Jain service: None     Active member of club or organization: None     Attends meetings of clubs or organizations: None     Relationship status: None     Intimate partner violence:     Fear of current or ex partner: None     Emotionally abused: None     Physically abused: None     Forced sexual activity: None   Other Topics Concern     Parent/sibling w/ CABG, MI or angioplasty before 65F 55M? Not Asked   Social History Narrative     None     ROS:   Complete ROS negative unless stated in HPI.     EXAM:  BP 95/57   Pulse 56   Ht 1.778 m (5' 10\")   Wt 87.1 kg (192 lb)   SpO2 97%   BMI 27.55 kg/m     In general, the patient is a pleasant male in no apparent distress.      HEENT: NC/AT.  PERRLA.  EOMI.  Sclerae white, not injected.    Neck: Carotids 2+ bilaterally without bruits.  No jugular venous distension.   Lymph: No cervical adenopathy. No thyromegaly.   Heart: RRR. Normal S1, S2. No murmur, rub, click, or gallop. There is no heave.    Lungs: Clear bilaterally.  No rhonchi, wheezes, rales.   GI: Soft, nontender, nondistended.   Extremities: No edema.  The pulses are 2+at the radial and DP bilaterally.  Neuro: grossly non focal.   Skin: no rashes.  Endocrine: no thyromegaly  Musculoskeletal: no joint swelling or tenderness, gait normal.  Psych: pleasant and conversant    Labs:  LIPID RESULTS:  Lab Results   Component Value Date    CHOL 113 03/20/2019    HDL 42 03/20/2019    LDL 52 03/20/2019    TRIG 93 03/20/2019    CHOLHDLRATIO 3.3 12/16/2008     LIVER ENZYME RESULTS:  Lab Results   Component Value Date    AST 21 11/12/2019    ALT 24 11/12/2019     CBC RESULTS:  Lab Results   Component Value Date    WBC 6.6 11/12/2019    RBC 3.26 (L) 11/12/2019    HGB 11.5 (L) 11/12/2019    HCT 35.5 (L) 11/12/2019     (H) 11/12/2019    MCH 35.3 (H) 11/12/2019    MCHC 32.4 11/12/2019    RDW 13.2 11/12/2019     (L) 11/12/2019     BMP RESULTS:  Lab Results   Component Value Date "     11/12/2019    POTASSIUM 4.5 11/12/2019    CHLORIDE 102 11/12/2019    CO2 30 11/12/2019    ANIONGAP 5 11/12/2019     (H) 11/12/2019    BUN 37 (H) 11/12/2019    CR 1.29 (H) 11/12/2019    GFRESTIMATED 56 (L) 11/12/2019    GFRESTBLACK 65 11/12/2019    EILEEN 9.5 11/12/2019      A1C RESULTS:  Lab Results   Component Value Date    A1C 6.3 (H) 08/29/2019     INR RESULTS:  Lab Results   Component Value Date    INR 2.19 (H) 11/12/2019    INR 1.60 (H) 08/30/2019     Cardiac data:  11/18/2019 device interrogation  [place] [facility] [place] and iterative programming of a [employer] Single lead ICD per MD orders. Patient has an appointment to see Dr. Prince and ROBERTO Falk NP today. Normal ICD function. 2 NSVT and 1 VT episodes recorded since patient received a shock on 10/19/19. 1 EGM for review displays SB @ 40 bpm followed by  prior to the episode which lasts 5.5 seconds @ 214 bpm. Intrinsic rhythm = Regular VS @ 48 bpm.  = 5%. Estimated battery longevity to ROSEMARIE = 2 years. Lead trends appear stable. Patient reports that he is feeling well since the last shock, but occasionally has some lightheadedness. Plan for patient to return to clinic 3/30/20 as scheduled.  JEAN Sherman RN, BSN.      Stress echo 7.30.19     Submaximal stress test, achieved 74% of the age predicted maximal heart rate.  Limiting symptom fatigue.     Normal blood pressure response to exercise.  No angina symptoms with exercise.  Baseline rhythm is atrial fibrillation. No ECG evidence of ischemia. There is  no chronotropic incompetence.  Severely reduced LV function with EF of 28% at rest; with exercise left  ventricular cavity size and LVEF did not change significantly.  Akinesis of the basal-mid inferior/inferoseptal/inferolateral segments  present. No new stress induced regional wall motion abnormalities evident.  Less than average functional capacity for age. Exercise time ~2 minutes.     Biventricular dysfunction with  moderate mitral and tricuspid regurgitation and  evidence of hypervolemia noted on screening 2D and Doppler examination.     Aug 2019       Right sided filling pressures are severely elevated - mean RA 23, PCWP 31, mean PA 40     Moderately elevated pulmonary artery hypertension.    Left sided filling pressures are severely elevated.    Left ventricular filling pressures are severely elevated .    Reduced cardiac output level.    Heparin was held during the procedure due to thrombocytopenia    Nipride was started and titrate to 1mcg/kg/min. PCWP decreased from 30 mmHg to 24 mmHg. mPAP decreased from 40 mmHg to 30 mmHg. After pressures decreased team proceed with angiogram.    Mild LAD and LCx disease    Subtotal proximal RCA occlusion    No intervention performed    No mitral or aortic valve gradient     Assessment and Plan   #ICM/NICM, LVEF 30-35% (NYHA II, Stage B)  #s/p ICD 2009  #Volume overload  #Weight gain  He was admitted two months for decompensated HF after RHC showing mean PA 23, PA 40 and PCWP 31. He was aggressively diuresed and reported symptom improvement. His weight has been stable since last seen in [facility] clinic and feels improved. Does not have signs of volume overload.   - Return to clinic in 3 months   - reduce outpatient furosemide to 20 mg/d  - Decrease metoprolol to 100mg daily  - Low Na diet  - Daily weights, -192lbs  - Continue lisinopril 5mg BID, will consider decreasing if symptomatic  - BMP pending     #Hx of VT/F  No episodes of shock since being on mexiletine. However, had recent episodes of imani and tachyarrhythmias. Was started on  digoxin and lisinopril was increased as well during [facility] visit few weeks ago.  - Continue mexiletine per EP  - Continue BB at above dose  - Will discuss with EP if discontinuing digoxin beneficial  - Digoxin levels pending     #Hx of CAD  #Chronic RCA occlusion   Had angiogram in 08/12/19 that showed  of proximal RCA with bridging collaterals from  distal LAD. Plan was to revascularized vessel but patient was in decompensated CHF.   - Will reassess benefit of revascularization as patient improving     #Afib (CHADVASC 3) on warfarin  - Continue with warfarin  - BB as above        Patient evaluated and discussed with Dr. Prince.      Tank Briceno MD PhD  Cardiology Fellow     Pt was seen and plan of care discussed with Dr. Prince.        ATTENDING ATTESTATION:  This patient has been seen and examined by me November 18, 2019 with Tank Briceno MD, cardiology fellow. I have reviewed the vitals, laboratory and imaging data relevant to this patient's care. I have edited this note to reflect our joint assessment and plan, and discussed the plan with the patient.  69 year old with -   1. Acute on Chronic Systolic Heart Failure (EF 25-30%)  2. Permanent Atrial Fibrillation on warfarin  3. Ischemic heart disease - Subtotal proximal RCA occlusion  4. History of VT/VF, s/p ICD  5. Hypertension  6. Relapsed multiple myeloma with mets to brain and bone s/p BMT, currently on chemo holiday  7. T2DM  8. Hyperlipidemia  Patient has had worsening heart failure symptoms since being on bortezomib.  Today, he denies any angina or heart failure symptoms. He denies any orthopnea or PND.  On exam he is eurvolemic, his lungs are clear.  He has a mitral regurgitation murmur on exam.  His blood pressure is on the lower side and he has been experiencing some intermittent dizziness.  So I will reduce  Lasix to 20 mg once daily and reduce metoprolol 200 mg once daily given his bradycardia on the on exam today and the device interrogation.  His renal function is stable.  Digoxin level is pending for now I will continue, low-dose digoxin which was started to optimize his heart failure regimen.  He will continue the lisinopril along with mexiletine and simvastatin.     Plan to see him back in 3 months     Yoko Prince MD, MS  Staff Cardiologist  Pager: [phone]    CC  Patient Care  "Team:  Cong Mcdonald as PCP - General  [facility] as PCP - Internal Medicine  Rojas Raygoza MD as BMT Physician  Cong Mcdonald as Referring Physician  Yaz Jeong NP as Nurse Practitioner (Nurse Practitioner)  [facility], [place] as Family Medicine Physician  Merry Mas RN as Continuity Care Coordinator (Transplant)  Zayra Lyle RN as Specialty Care Coordinator (Cardiology)  Ja Duarte MD as MD (Clinical Cardiac Electrophysiology)  Marichuy Shelley, [facility]AMPOS as Assigned PCP  Omayra Serra RN as Specialty Care Coordinator (Cardiology)  KIRILL BENSON        HPI:     70 y/o male with PMHx significant for MM s/p PBSCT in 2006, ICM/NICM (LVEF 30-35%, NYHA II Stage B) s/p ICD placement in 2009, episodic VT/VF treated by appropriate ICD shocks and afib (CHADVASC 3) on warfarin comes in as follow up.     His MM history is as follows. Briefly, he was diagnosed in 2006 with multiple myeloma and underwent chemotherapy and autologous PBSCT that same year. He has been monitored for the past few years and was noted to have increase in gamma protein presence in his urine. He was started on lenalinomide in 2017 due to relapse. He then developed anemia that was concerning for worsening MM vs medication side effect. Despite lenalidomide treatment, he did not have significant improvement and was therefore started on bortezomib in 07/2019. Bortezomib was stopped due to concerns of cardiotoxicity and more frequent arrhythmias on his device interrogation along with weight gain and volume overload.     Patient today does report with stable dyspnea on exertion. He reports he has lost about 8-10lbs and has been tolerating physical activity, more so than last visit.  However, at times he feels his heart might be \"going to fast\" and does not feel well for a few seconds. Has not felt any shocks. Otherwise denies recurrent fever, chills, night sweats, headaches, vision changes, " sore throat, cough, chest pain, abdominal pain, nausea, vomiting, diarrhea, constipation, hematochezia, melena, dysuria, hematuria, joint pain, joint swelling, presyncope, syncope or new rash.     Active Ambulatory Problems     Diagnosis Date Noted     Implanted cardioverter-defibrillator in situ- [employer], single chamber- NOT dependent 12/18/2009     Dilated cardiomyopathy (H) 01/10/2012     Renal insufficiency 05/29/2013     Anemia due to antineoplastic chemotherapy 06/11/2013     Anticoagulation goal of INR 2 to 3 07/12/2016     Multiple myeloma in relapse (H) 01/23/2018     Permanent atrial fibrillation      Ischemic cardiomyopathy      Chronic systolic heart failure (H)      Other ill-defined heart diseases 08/12/2019     Bird's esophagus 10/22/2008     Essential hypertension 07/08/2008     Hyperlipidemia 10/07/2008     Type 2 diabetes mellitus without complication (H) 11/09/2011     Acute on chronic systolic heart failure (H) 08/29/2019     Resolved Ambulatory Problems     Diagnosis Date Noted     Multiple myeloma in remission (H) 07/18/2011     Atrial fibrillation (H) 01/10/2012     Multiple myeloma (H) 01/10/2012     Status post chemotherapy 05/29/2013     Anemia of other chronic disease 07/03/2013     Ventricular fibrillation (H) 08/27/2013     Hypomagnesemia 02/26/2014     SOB (shortness of breath) 07/22/2014     VF (ventricular fibrillation) (H)      Personal history of multiple myeloma      Past Medical History:   Diagnosis Date     Other premature beats      Past Surgical History:   Procedure Laterality Date     CV CORONARY ANGIOGRAM N/A 8/29/2019    Procedure: CV CORONARY ANGIOGRAM;  Surgeon: Giorgi Milan MD;  Location:  HE[facility] CARDIAC CATH LAB     CV LEFT HEART CATH N/A 8/29/2019    Procedure: Left Heart Cath;  Surgeon: Giorgi Milan MD;  Location:  HEART CARDIAC CATH LAB     CV RIGHT HEART CATH N/A 8/29/2019    Procedure: CV RIGHT HEART CATH;  Surgeon: Giorgi Milan  MD;  Location:  HEART CARDIAC CATH LAB     CV STRESS EXERCISE STUDY N/A 8/29/2019    Procedure: Stress Drug Study;  Surgeon: Giorgi Milan MD;  Location:  HEART CARDIAC CATH LAB     IMPLANT AUTOMATIC IMPLANTABLE CARDIOVERTER DEFIBRILLATOR           Allergies   Allergen Reactions     Nkda [No Known Drug Allergies]      Current Outpatient Medications   Medication     albuterol (PROAIR HFA, PROVENTIL HFA, VENTOLIN HFA) 108 (90 BASE) MCG/ACT inhaler     amoxicillin (AMOXIL) 500 MG tablet     baclofen (LIORESAL) 10 MG tablet     Calcium Carbonate-Vitamin D (CALCIUM 500 + D PO)     diclofenac (VOLTAREN) 75 MG EC tablet     digoxin (LANOXIN) 125 MCG tablet     fentaNYL (DURAGESIC) 25 mcg/hr patch 72 hr     furosemide (LASIX) 20 MG tablet     furosemide (LASIX) 40 MG tablet     lisinopril (PRINIVIL/ZESTRIL) 5 MG tablet     Magnesium Oxide 420 MG TABS     metoprolol succinate (TOPROL-XL) 200 MG 24 hr tablet     metoprolol succinate ER (TOPROL-XL) 100 MG 24 hr tablet     mexiletine (MEXITIL) 150 MG capsule     nitroGLYCERIN (NITROSTAT) 0.4 MG SL tablet     omeprazole (PRILOSEC) 20 MG capsule     ondansetron (ZOFRAN-ODT) 4 MG ODT tab     oxycodone (ROXICODONE) 5 MG immediate release tablet     potassium chloride ER (K-DUR/KLOR-CON M) 10 MEQ CR tablet     simvastatin (ZOCOR) 40 MG tablet     spironolactone (ALDACTONE) 25 MG tablet     tiotropium (SPIRIVA) 18 MCG inhalation capsule     TRAZODONE HCL PO     warfarin (COUMADIN) 5 MG tablet     No current facility-administered medications for this visit.      No family history on file.  Social History     Socioeconomic History     Marital status:      Spouse name: Not on file     Number of children: Not on file     Years of education: Not on file     Highest education level: Not on file   Occupational History     Not on file   Social Needs     Financial resource strain: Not on file     Food insecurity:     Worry: Not on file     Inability: Not on file      Transportation needs:     Medical: Not on file     Non-medical: Not on file   Tobacco Use     Smoking status: Former Smoker     Packs/day: 1.00     Years: 25.00     Pack years: 25.00     Types: Cigarettes     Last attempt to quit: 10/15/1995     Years since quittin.1     Smokeless tobacco: Never Used   Substance and Sexual Activity     Alcohol use: No     Drug use: No     Sexual activity: Not on file   Lifestyle     Physical activity:     Days per week: Not on file     Minutes per session: Not on file     Stress: Not on file   Relationships     Social connections:     Talks on phone: Not on file     Gets together: Not on file     Attends Restorationist service: Not on file     Active member of club or organization: Not on file     Attends meetings of clubs or organizations: Not on file     Relationship status: Not on file     Intimate partner violence:     Fear of current or ex partner: Not on file     Emotionally abused: Not on file     Physically abused: Not on file     Forced sexual activity: Not on file   Other Topics Concern     Parent/sibling w/ CABG, MI or angioplasty before 65F 55M? Not Asked   Social History Narrative     Not on file          Review of Systems:     Review of Systems     Constitutional:  Positive for chills, weight loss, weight gain, fatigue, decreased appetite, recent stressors, hallucinations, increased energy and confused. Negative for fever, night sweats, height loss, post-operative complications, incisional pain and hyperactivity.   HENT:  Positive for hearing loss, hoarse voice, hearing aid, bleeding gums and dry mouth. Negative for ear pain, tinnitus, nosebleeds, trouble swallowing, mouth sores, sore throat, ear discharge, tooth pain, gum tenderness, taste disturbance, smell disturbance, sinus pain, sinus congestion and neck mass.    Eyes:  Negative for double vision, pain, redness, eye pain, decreased vision, eye watering, eye bulging, eye dryness, flashing lights, spots, floaters,  strabismus, tunnel vision, jaundice and eye irritation.   Respiratory:   Negative for cough, hemoptysis, sputum production, shortness of breath, wheezing, sleep disturbances due to breathing, snores loudly, respiratory pain, dyspnea on exertion, cough disturbing sleep and postural dyspnea.    Cardiovascular:  Positive for palpitations, hypotension, pacemaker, few scattered varicosities, light-headedness and exercise intolerance. Negative for chest pain, dyspnea on exertion, orthopnea, fingers/toes turn blue, hypertension, syncope, history of heart murmur, leg pain, sleep disturbances due to breathing and edema.   Gastrointestinal:  Positive for heartburn, nausea, diarrhea and constipation. Negative for vomiting, abdominal pain, blood in stool, melena, rectal pain, bloating, bowel incontinence, jaundice, coffee ground emesis and change in stool.   Genitourinary:  Positive for male genitourinary complaint and reduced libido. Negative for bladder incontinence, dysuria, urgency, hematuria, flank pain, difficulty urinating, nocturia, voiding less frequently, scrotal pain, ulcerations and penile discharge.   Musculoskeletal:  Positive for myalgias, back pain, stiffness, muscle cramps, bone pain and muscle weakness. Negative for joint swelling, arthralgias, neck pain and fracture.   Skin:  Negative for nail changes, itching, poor wound healing, rash, hair changes, skin changes, acne, warts, poor wound healing, scarring, flaky skin, Raynaud's phenomenon, sensitivity to sunlight and skin thickening.   Neurological:  Positive for dizziness, tingling, weakness, light-headedness, numbness and memory loss. Negative for tremors, seizures, loss of consciousness, headaches, disturbances in coordination, difficulty walking and paralysis.   Endo/Heme:  Negative for anemia, swollen glands and bruises/bleeds easily.   Psychiatric/Behavioral:  Positive for hallucinations and memory loss. Negative for depression, decreased concentration,  "mood swings and panic attacks.    Endocrine:  Positive for altered temperature regulation.Negative for polyphagia, polydipsia, unwanted hair growth and change in facial hair.            Physical Exam:   BP 95/57   Pulse 56   Ht 1.778 m (5' 10\")   Wt 87.1 kg (192 lb)   SpO2 97%   BMI 27.55 kg/m     Body mass index is 27.55 kg/m .  Vitals were reviewed       GENERAL APPEARANCE: healthy, alert and no distress     EYES: EOMI,  PERRL     HENT: ear canals and TM's normal and nose and mouth without ulcers or lesions     NECK: no elevated JVP or JVD     RESP: lungs clear to auscultation - no rales, rhonchi or wheezes     CV: regular rates and rhythm, normal S1 S2, no S3 or S4 and no murmur, click or rub     ABDOMEN:  soft, nontender, no HSM or masses and bowel sounds normal     MS: extremities normal- no gross deformities noted, no evidence of inflammation in joints, FROM in all extremities.     SKIN: no suspicious lesions or rashes     NEURO: Normal strength and tone, sensory exam grossly normal, mentation intact and speech normal     PSYCH: mentation appears normal. and affect normal/bright     LYMPHATICS: No cervical adenopathy      Results:     Lab Results   Component Value Date    WBC 6.6 11/12/2019     Lab Results   Component Value Date    RBC 3.26 11/12/2019     Lab Results   Component Value Date    HGB 11.5 11/12/2019     Lab Results   Component Value Date    HCT 35.5 11/12/2019     No components found for: MCT  Lab Results   Component Value Date     11/12/2019     Lab Results   Component Value Date    MCH 35.3 11/12/2019     Lab Results   Component Value Date    MCHC 32.4 11/12/2019     Lab Results   Component Value Date    RDW 13.2 11/12/2019     Lab Results   Component Value Date     11/12/2019     Last Comprehensive Metabolic Panel:  Sodium   Date Value Ref Range Status   11/12/2019 136 133 - 144 mmol/L Final     Potassium   Date Value Ref Range Status   11/12/2019 4.5 3.4 - 5.3 mmol/L Final "     Chloride   Date Value Ref Range Status   11/12/2019 102 94 - 109 mmol/L Final     Carbon Dioxide   Date Value Ref Range Status   11/12/2019 30 20 - 32 mmol/L Final     Anion Gap   Date Value Ref Range Status   11/12/2019 5 3 - 14 mmol/L Final     Glucose   Date Value Ref Range Status   11/12/2019 114 (H) 70 - 99 mg/dL Final     Urea Nitrogen   Date Value Ref Range Status   11/12/2019 37 (H) 7 - 30 mg/dL Final     Creatinine   Date Value Ref Range Status   11/12/2019 1.29 (H) 0.66 - 1.25 mg/dL Final     GFR Estimate   Date Value Ref Range Status   11/12/2019 56 (L) >60 mL/min/[1.73_m2] Final     Comment:     Non  GFR Calc  Starting 12/18/2018, serum creatinine based estimated GFR (eGFR) will be   calculated using the Chronic Kidney Disease Epidemiology Collaboration   (CKD-EPI) equation.       Calcium   Date Value Ref Range Status   11/12/2019 9.5 8.5 - 10.1 mg/dL Final     11/18/2019 device interrogation  Patient seen in [facility] [place] for evaluation and iterative programming of a [employer] Single lead ICD per MD orders. Patient has an appointment to see Dr. Prince and ROBERTO Falk NP today. Normal ICD function. 2 NSVT and 1 VT episodes recorded since patient received a shock on 10/19/19. 1 EGM for review displays SB @ 40 bpm followed by  prior to the episode which lasts 5.5 seconds @ 214 bpm. Intrinsic rhythm = Regular VS @ 48 bpm.  = 5%. Estimated battery longevity to ROSEMARIE = 2 years. Lead trends appear stable. Patient reports that he is feeling well since the last shock, but occasionally has some lightheadedness. Plan for patient to return to clinic 3/30/20 as scheduled.  JEAN Sherman RN, BSN.      Stress echo 7.30.19     Submaximal stress test, achieved 74% of the age predicted maximal heart rate.  Limiting symptom fatigue.     Normal blood pressure response to exercise.  No angina symptoms with exercise.  Baseline rhythm is atrial fibrillation. No ECG evidence of ischemia. There is  no  chronotropic incompetence.  Severely reduced LV function with EF of 28% at rest; with exercise left  ventricular cavity size and LVEF did not change significantly.  Akinesis of the basal-mid inferior/inferoseptal/inferolateral segments  present. No new stress induced regional wall motion abnormalities evident.  Less than average functional capacity for age. Exercise time ~2 minutes.     Biventricular dysfunction with moderate mitral and tricuspid regurgitation and  evidence of hypervolemia noted on screening 2D and Doppler examination.        Aug 2019       Right sided filling pressures are severely elevated - mean RA 23, PCWP 31, mean PA 40     Moderately elevated pulmonary artery hypertension.    Left sided filling pressures are severely elevated.    Left ventricular filling pressures are severely elevated .    Reduced cardiac output level.    Heparin was held during the procedure due to thrombocytopenia    Nipride was started and titrate to 1mcg/kg/min. PCWP decreased from 30 mmHg to 24 mmHg. mPAP decreased from 40 mmHg to 30 mmHg. After pressures decreased team proceed with angiogram.    Mild LAD and LCx disease    Subtotal proximal RCA occlusion    No intervention performed    No mitral or aortic valve gradient    Assessment and Plan   #ICM/NICM, LVEF 30-35% (NYHA II, Stage B)  #s/p ICD 2009  #Volume overload  #Weight gain  He was admitted two months for decompensated HF after RHC showing mean PA 23, PA 40 and PCWP 31. He was aggressively diuresed and reported symptom improvement. His weight has been stable since last seen in [facility] clinic and feels improved. Does not have signs of volume overload.   - Return to clinic in 3 months   - Continue outpatient furosemide  - Decrease metoprolol to 100mg daily  - Low Na diet  - Daily weights, -192lbs  - Continue lisinopril 5mg BID, will consider decreasing if symptomatic  - BMP pending    #Hx of VT/F  No episodes of shock since being on mexiletine. However, had  recent episodes of imani and tachyarrhythmias. Was started on  digoxin and lisinopril was increased as well during [facility] visit few weeks ago.  - Continue mexiletine per EP  - Continue BB at above dose  - Will discuss with EP if discontinuing digoxin beneficial  - Digoxin levels pending     #Hx of CAD  #Chronic RCA occlusion   Had angiogram in 08/12/19 that showed  of proximal RCA with bridging collaterals from distal LAD. Plan was to revascularized vessel but patient was in decompensated CHF.   - Will reassess benefit of revascularization as patient improving     #Afib (CHADVASC 3) on warfarin  - Continue with warfarin  - BB as above        Patient evaluated and discussed with Dr. Prince.      Tank Briceno MD PhD  Cardiology Fellow    Pt was seen and plan of care discussed with Dr. Prince.         HPI:     70 y/o male with PMHx significant for MM s/p PBSCT in 2006, ICM/NICM (LVEF 30-35%, NYHA II Stage B) s/p ICD placement in 2009, episodic VT/VF treated by appropriate ICD shocks and afib (CHADVASC 3) on warfarin comes in as follow up.     His MM history is as follows. Briefly, he was diagnosed in 2006 with multiple myeloma and underwent chemotherapy and autologous PBSCT that same year. He has been monitored for the past few years and was noted to have increase in gamma protein presence in his urine. He was started on lenalinomide in 2017 due to relapse. He then developed anemia that was concerning for worsening MM vs medication side effect. Despite lenalidomide treatment, he did not have significant improvement and was therefore started on bortezomib in 07/2019. Bortezomib was stopped due to concerns of cardiotoxicity and more frequent arrhythmias on his device interrogation along with weight gain and volume overload.     Patient today does report with stable dyspnea on exertion. He reports he has lost about 8-10lbs and has been tolerating physical activity, more so than last visit.  However, at times he feels  "his heart might be \"going to fast\" and does not feel well for a few seconds. Has not felt any shocks. Otherwise denies recurrent fever, chills, night sweats, headaches, vision changes, sore throat, cough, chest pain, abdominal pain, nausea, vomiting, diarrhea, constipation, hematochezia, melena, dysuria, hematuria, joint pain, joint swelling, presyncope, syncope or new rash.     Active Ambulatory Problems     Diagnosis Date Noted     Implanted cardioverter-defibrillator in situ- [employer], single chamber- NOT dependent 12/18/2009     Dilated cardiomyopathy (H) 01/10/2012     Renal insufficiency 05/29/2013     Anemia due to antineoplastic chemotherapy 06/11/2013     Anticoagulation goal of INR 2 to 3 07/12/2016     Multiple myeloma in relapse (H) 01/23/2018     Permanent atrial fibrillation      Ischemic cardiomyopathy      Chronic systolic heart failure (H)      Other ill-defined heart diseases 08/12/2019     Bird's esophagus 10/22/2008     Essential hypertension 07/08/2008     Hyperlipidemia 10/07/2008     Type 2 diabetes mellitus without complication (H) 11/09/2011     Acute on chronic systolic heart failure (H) 08/29/2019     Resolved Ambulatory Problems     Diagnosis Date Noted     Multiple myeloma in remission (H) 07/18/2011     Atrial fibrillation (H) 01/10/2012     Multiple myeloma (H) 01/10/2012     Status post chemotherapy 05/29/2013     Anemia of other chronic disease 07/03/2013     Ventricular fibrillation (H) 08/27/2013     Hypomagnesemia 02/26/2014     SOB (shortness of breath) 07/22/2014     VF (ventricular fibrillation) (H)      Personal history of multiple myeloma      Past Medical History:   Diagnosis Date     Other premature beats      Past Surgical History:   Procedure Laterality Date     CV CORONARY ANGIOGRAM N/A 8/29/2019    Procedure: CV CORONARY ANGIOGRAM;  Surgeon: Giorgi Milan MD;  Location:  [facility]     CV LEFT HEART CATH N/A 8/29/2019    Procedure: Left " Heart Cath;  Surgeon: Giorgi Milan MD;  Location:  [facility]     CV RIGHT HEART CATH N/A 8/29/2019    Procedure: CV RIGHT HEART CATH;  Surgeon: Giorgi Milan MD;  Location:  [facility]ATH LAB     CV STRESS EXERCISE STUDY N/A 8/29/2019    Procedure: Stress Drug Study;  Surgeon: Giorgi Milan MD;  Location:  [facility] CATH LAB     IMPLANT AUTOMATIC IMPLANTABLE CARDIOVERTER DEFIBRILLATOR           Allergies   Allergen Reactions     Nkda [No Known Drug Allergies]      Current Outpatient Medications   Medication     albuterol (PROAIR HFA, PROVENTIL HFA, VENTOLIN HFA) 108 (90 BASE) MCG/ACT inhaler     amoxicillin (AMOXIL) 500 MG tablet     baclofen (LIORESAL) 10 MG tablet     Calcium Carbonate-Vitamin D (CALCIUM 500 + D PO)     diclofenac (VOLTAREN) 75 MG EC tablet     digoxin (LANOXIN) 125 MCG tablet     fentaNYL (DURAGESIC) 25 mcg/hr patch 72 hr     furosemide (LASIX) 20 MG tablet     furosemide (LASIX) 40 MG tablet     lisinopril (PRINIVIL/ZESTRIL) 5 MG tablet     Magnesium Oxide 420 MG TABS     metoprolol succinate (TOPROL-XL) 200 MG 24 hr tablet     metoprolol succinate ER (TOPROL-XL) 100 MG 24 hr tablet     mexiletine (MEXITIL) 150 MG capsule     nitroGLYCERIN (NITROSTAT) 0.4 MG SL tablet     omeprazole (PRILOSEC) 20 MG capsule     ondansetron (ZOFRAN-ODT) 4 MG ODT tab     oxycodone (ROXICODONE) 5 MG immediate release tablet     potassium chloride ER (K-DUR/KLOR-CON M) 10 MEQ CR tablet     simvastatin (ZOCOR) 40 MG tablet     spironolactone (ALDACTONE) 25 MG tablet     tiotropium (SPIRIVA) 18 MCG inhalation capsule     TRAZODONE HCL PO     warfarin (COUMADIN) 5 MG tablet     No current facility-administered medications for this visit.      No family history on file.  Social History     Socioeconomic History     Marital status:      Spouse name: Not on file     Number of children: Not on file     Years of education: Not on file     Highest education level: Not on  file   Occupational History     Not on file   Social Needs     Financial resource strain: Not on file     Food insecurity:     Worry: Not on file     Inability: Not on file     Transportation needs:     Medical: Not on file     Non-medical: Not on file   Tobacco Use     Smoking status: Former Smoker     Packs/day: 1.00     Years: 25.00     Pack years: 25.00     Types: Cigarettes     Last attempt to quit: 10/15/1995     Years since quittin.1     Smokeless tobacco: Never Used   Substance and Sexual Activity     Alcohol use: No     Drug use: No     Sexual activity: Not on file   Lifestyle     Physical activity:     Days per week: Not on file     Minutes per session: Not on file     Stress: Not on file   Relationships     Social connections:     Talks on phone: Not on file     Gets together: Not on file     Attends Confucianist service: Not on file     Active member of club or organization: Not on file     Attends meetings of clubs or organizations: Not on file     Relationship status: Not on file     Intimate partner violence:     Fear of current or ex partner: Not on file     Emotionally abused: Not on file     Physically abused: Not on file     Forced sexual activity: Not on file   Other Topics Concern     Parent/sibling w/ CABG, MI or angioplasty before 65F 55M? Not Asked   Social History Narrative     Not on file          Review of Systems:     Review of Systems     Constitutional:  Positive for chills, weight loss, weight gain, fatigue, decreased appetite, recent stressors, hallucinations, increased energy and confused. Negative for fever, night sweats, height loss, post-operative complications, incisional pain and hyperactivity.   HENT:  Positive for hearing loss, hoarse voice, hearing aid, bleeding gums and dry mouth. Negative for ear pain, tinnitus, nosebleeds, trouble swallowing, mouth sores, sore throat, ear discharge, tooth pain, gum tenderness, taste disturbance, smell disturbance, sinus pain, sinus  congestion and neck mass.    Eyes:  Negative for double vision, pain, redness, eye pain, decreased vision, eye watering, eye bulging, eye dryness, flashing lights, spots, floaters, strabismus, tunnel vision, jaundice and eye irritation.   Respiratory:   Negative for cough, hemoptysis, sputum production, shortness of breath, wheezing, sleep disturbances due to breathing, snores loudly, respiratory pain, dyspnea on exertion, cough disturbing sleep and postural dyspnea.    Cardiovascular:  Positive for palpitations, hypotension, pacemaker, few scattered varicosities, light-headedness and exercise intolerance. Negative for chest pain, dyspnea on exertion, orthopnea, fingers/toes turn blue, hypertension, syncope, history of heart murmur, leg pain, sleep disturbances due to breathing and edema.   Gastrointestinal:  Positive for heartburn, nausea, diarrhea and constipation. Negative for vomiting, abdominal pain, blood in stool, melena, rectal pain, bloating, bowel incontinence, jaundice, coffee ground emesis and change in stool.   Genitourinary:  Positive for male genitourinary complaint and reduced libido. Negative for bladder incontinence, dysuria, urgency, hematuria, flank pain, difficulty urinating, nocturia, voiding less frequently, scrotal pain, ulcerations and penile discharge.   Musculoskeletal:  Positive for myalgias, back pain, stiffness, muscle cramps, bone pain and muscle weakness. Negative for joint swelling, arthralgias, neck pain and fracture.   Skin:  Negative for nail changes, itching, poor wound healing, rash, hair changes, skin changes, acne, warts, poor wound healing, scarring, flaky skin, Raynaud's phenomenon, sensitivity to sunlight and skin thickening.   Neurological:  Positive for dizziness, tingling, weakness, light-headedness, numbness and memory loss. Negative for tremors, seizures, loss of consciousness, headaches, disturbances in coordination, difficulty walking and paralysis.   Endo/Heme:   "Negative for anemia, swollen glands and bruises/bleeds easily.   Psychiatric/Behavioral:  Positive for hallucinations and memory loss. Negative for depression, decreased concentration, mood swings and panic attacks.    Endocrine:  Positive for altered temperature regulation.Negative for polyphagia, polydipsia, unwanted hair growth and change in facial hair.            Physical Exam:   BP 95/57   Pulse 56   Ht 1.778 m (5' 10\")   Wt 87.1 kg (192 lb)   SpO2 97%   BMI 27.55 kg/m     Body mass index is 27.55 kg/m .  Vitals were reviewed       GENERAL APPEARANCE: healthy, alert and no distress     EYES: EOMI,  PERRL     HENT: ear canals and TM's normal and nose and mouth without ulcers or lesions     NECK: no elevated JVP or JVD     RESP: lungs clear to auscultation - no rales, rhonchi or wheezes     CV: regular rates and rhythm, normal S1 S2, no S3 or S4 and no murmur, click or rub     ABDOMEN:  soft, nontender, no HSM or masses and bowel sounds normal     MS: extremities normal- no gross deformities noted, no evidence of inflammation in joints, FROM in all extremities.     SKIN: no suspicious lesions or rashes     NEURO: Normal strength and tone, sensory exam grossly normal, mentation intact and speech normal     PSYCH: mentation appears normal. and affect normal/bright     LYMPHATICS: No cervical adenopathy      Results:     Lab Results   Component Value Date    WBC 6.6 11/12/2019     Lab Results   Component Value Date    RBC 3.26 11/12/2019     Lab Results   Component Value Date    HGB 11.5 11/12/2019     Lab Results   Component Value Date    HCT 35.5 11/12/2019     No components found for: MCT  Lab Results   Component Value Date     11/12/2019     Lab Results   Component Value Date    MCH 35.3 11/12/2019     Lab Results   Component Value Date    MCHC 32.4 11/12/2019     Lab Results   Component Value Date    RDW 13.2 11/12/2019     Lab Results   Component Value Date     11/12/2019     Last " Comprehensive Metabolic Panel:  Sodium   Date Value Ref Range Status   11/12/2019 136 133 - 144 mmol/L Final     Potassium   Date Value Ref Range Status   11/12/2019 4.5 3.4 - 5.3 mmol/L Final     Chloride   Date Value Ref Range Status   11/12/2019 102 94 - 109 mmol/L Final     Carbon Dioxide   Date Value Ref Range Status   11/12/2019 30 20 - 32 mmol/L Final     Anion Gap   Date Value Ref Range Status   11/12/2019 5 3 - 14 mmol/L Final     Glucose   Date Value Ref Range Status   11/12/2019 114 (H) 70 - 99 mg/dL Final     Urea Nitrogen   Date Value Ref Range Status   11/12/2019 37 (H) 7 - 30 mg/dL Final     Creatinine   Date Value Ref Range Status   11/12/2019 1.29 (H) 0.66 - 1.25 mg/dL Final     GFR Estimate   Date Value Ref Range Status   11/12/2019 56 (L) >60 mL/min/[1.73_m2] Final     Comment:     Non  GFR Calc  Starting 12/18/2018, serum creatinine based estimated GFR (eGFR) will be   calculated using the Chronic Kidney Disease Epidemiology Collaboration   (CKD-EPI) equation.       Calcium   Date Value Ref Range Status   11/12/2019 9.5 8.5 - 10.1 mg/dL Final     11/18/2019 device interrogation  Patient seen in [facility] [place] for evaluation and iterative programming of a [employer] Single lead ICD per MD orders. Patient has an appointment to see Dr. Prince and ROBERTO Falk NP today. Normal ICD function. 2 NSVT and 1 VT episodes recorded since patient received a shock on 10/19/19. 1 EGM for review displays SB @ 40 bpm followed by  prior to the episode which lasts 5.5 seconds @ 214 bpm. Intrinsic rhythm = Regular VS @ 48 bpm.  = 5%. Estimated battery longevity to ROSEMARIE = 2 years. Lead trends appear stable. Patient reports that he is feeling well since the last shock, but occasionally has some lightheadedness. Plan for patient to return to clinic 3/30/20 as scheduled.  JEAN Sherman RN, BSN.      Stress echo 7.30.19     Submaximal stress test, achieved 74% of the age predicted maximal heart  rate.  Limiting symptom fatigue.     Normal blood pressure response to exercise.  No angina symptoms with exercise.  Baseline rhythm is atrial fibrillation. No ECG evidence of ischemia. There is  no chronotropic incompetence.  Severely reduced LV function with EF of 28% at rest; with exercise left  ventricular cavity size and LVEF did not change significantly.  Akinesis of the basal-mid inferior/inferoseptal/inferolateral segments  present. No new stress induced regional wall motion abnormalities evident.  Less than average functional capacity for age. Exercise time ~2 minutes.     Biventricular dysfunction with moderate mitral and tricuspid regurgitation and  evidence of hypervolemia noted on screening 2D and Doppler examination.     Aug 2019       Right sided filling pressures are severely elevated - mean RA 23, PCWP 31, mean PA 40     Moderately elevated pulmonary artery hypertension.    Left sided filling pressures are severely elevated.    Left ventricular filling pressures are severely elevated .    Reduced cardiac output level.    Heparin was held during the procedure due to thrombocytopenia    Nipride was started and titrate to 1mcg/kg/min. PCWP decreased from 30 mmHg to 24 mmHg. mPAP decreased from 40 mmHg to 30 mmHg. After pressures decreased team proceed with angiogram.    Mild LAD and LCx disease    Subtotal proximal RCA occlusion    No intervention performed    No mitral or aortic valve gradient    Assessment and Plan   #ICM/NICM, LVEF 30-35% (NYHA II, Stage B)  #s/p ICD 2009  #Volume overload  #Weight gain  He was admitted two months for decompensated HF after RHC showing mean PA 23, PA 40 and PCWP 31. He was aggressively diuresed and reported symptom improvement. His weight has been stable since last seen in [facility] clinic and feels improved. Does not have signs of volume overload.   - Return to clinic in 3 months   - Continue outpatient furosemide  - Decrease metoprolol to 100mg daily  - Low Na diet  -  Daily weights, -192lbs  - Continue lisinopril 5mg BID, will consider decreasing if symptomatic  - BMP pending    #Hx of VT/F  No episodes of shock since being on mexiletine. However, had recent episodes of imani and tachyarrhythmias. Was started on  digoxin and lisinopril was increased as well during C[facility] visit few weeks ago.  - Continue mexiletine per EP  - Continue BB at above dose  - Will discuss with EP if discontinuing digoxin beneficial  - Digoxin levels pending     #Hx of CAD  #Chronic RCA occlusion   Had angiogram in 08/12/19 that showed  of proximal RCA with bridging collaterals from distal LAD. Plan was to revascularized vessel but patient was in decompensated CHF.   - Will reassess benefit of revascularization as patient improving     #Afib (CHADVASC 3) on warfarin  - Continue with warfarin  - BB as above        Patient evaluated and discussed with Dr. Prince.      Tank Briceno MD PhD  Cardiology Fellow    Pt was seen and plan of care discussed with Dr. Prince.

## 2019-11-18 NOTE — NURSING NOTE
Chief Complaint   Patient presents with     Follow Up     Return, 69yr old male with a history of chronic systolic heart failure secondary to ICM (LVEF 25-30%), permanent AFib, VT/VF s/p ICD with appropriate ICD shocks, DMII, HTN, multiple myeloma (s/p chemo and PBSCT in 2006, with relapse in 2017 and remains on treatment), HL, DVT, and Bird's esophagus (h/o SBO, ileus, and colitis who presents to [facility] clinic for follow up.     Vitals were taken and medications were reconciled.     Tammy Cole CMA    11:12 AM

## 2019-11-18 NOTE — PROGRESS NOTES
"          HPI:     70 y/o male with PMHx significant for MM s/p PBSCT in 2006, ICM/NICM (LVEF 30-35%, NYHA II Stage B) s/p ICD placement in 2009, episodic VT/VF treated by appropriate ICD shocks and afib (CHADVASC 3) on warfarin comes in as follow up.     His MM history is as follows. Briefly, he was diagnosed in 2006 with multiple myeloma and underwent chemotherapy and autologous PBSCT that same year. He has been monitored for the past few years and was noted to have increase in gamma protein presence in his urine. He was started on lenalinomide in 2017 due to relapse. He then developed anemia that was concerning for worsening MM vs medication side effect. Despite lenalidomide treatment, he did not have significant improvement and was therefore started on bortezomib in 07/2019. Bortezomib was stopped due to concerns of cardiotoxicity and more frequent arrhythmias on his device interrogation along with weight gain and volume overload.     Patient today does report with stable dyspnea on exertion. He reports he has lost about 8-10lbs and has been tolerating physical activity, more so than last visit.  However, at times he feels his heart might be \"going to fast\" and does not feel well for a few seconds. Has not felt any shocks. Otherwise denies recurrent fever, chills, night sweats, headaches, vision changes, sore throat, cough, chest pain, abdominal pain, nausea, vomiting, diarrhea, constipation, hematochezia, melena, dysuria, hematuria, joint pain, joint swelling, presyncope, syncope or new rash.     Active Ambulatory Problems     Diagnosis Date Noted     Implanted cardioverter-defibrillator in situ- [employer], single chamber- NOT dependent 12/18/2009     Dilated cardiomyopathy (H) 01/10/2012     Renal insufficiency 05/29/2013     Anemia due to antineoplastic chemotherapy 06/11/2013     Anticoagulation goal of INR 2 to 3 07/12/2016     Multiple myeloma in relapse (H) 01/23/2018     Permanent atrial " fibrillation      Ischemic cardiomyopathy      Chronic systolic heart failure (H)      Other ill-defined heart diseases 08/12/2019     Bird's esophagus 10/22/2008     Essential hypertension 07/08/2008     Hyperlipidemia 10/07/2008     Type 2 diabetes mellitus without complication (H) 11/09/2011     Acute on chronic systolic heart failure (H) 08/29/2019     Resolved Ambulatory Problems     Diagnosis Date Noted     Multiple myeloma in remission (H) 07/18/2011     Atrial fibrillation (H) 01/10/2012     Multiple myeloma (H) 01/10/2012     Status post chemotherapy 05/29/2013     Anemia of other chronic disease 07/03/2013     Ventricular fibrillation (H) 08/27/2013     Hypomagnesemia 02/26/2014     SOB (shortness of breath) 07/22/2014     VF (ventricular fibrillation) (H)      Personal history of multiple myeloma      Past Medical History:   Diagnosis Date     Other premature beats      Past Surgical History:   Procedure Laterality Date     CV CORONARY ANGIOGRAM N/A 8/29/2019    Procedure: CV CORONARY ANGIOGRAM;  Surgeon: Giorgi Milan MD;  Location:  [facility]     CV LEFT HEART CATH N/A 8/29/2019    Procedure: Left Heart Cath;  Surgeon: Giorgi Milan MD;  Location:  [facility]     CV RIGHT HEART CATH N/A 8/29/2019    Procedure: CV RIGHT HEART CATH;  Surgeon: Giorgi Milan MD;  Location:  [facility]     CV STRESS EXERCISE STUDY N/A 8/29/2019    Procedure: Stress Drug Study;  Surgeon: Giorgi Milan MD;  Location:  [facility]     IMPLANT AUTOMATIC IMPLANTABLE CARDIOVERTER DEFIBRILLATOR           Allergies   Allergen Reactions     Nkda [No Known Drug Allergies]      Current Outpatient Medications   Medication     albuterol (PROAIR HFA, PROVENTIL HFA, VENTOLIN HFA) 108 (90 BASE) MCG/ACT inhaler     amoxicillin (AMOXIL) 500 MG tablet     baclofen (LIORESAL) 10 MG tablet     Calcium Carbonate-Vitamin D (CALCIUM 500 + D PO)     diclofenac  (VOLTAREN) 75 MG EC tablet     digoxin (LANOXIN) 125 MCG tablet     fentaNYL (DURAGESIC) 25 mcg/hr patch 72 hr     furosemide (LASIX) 20 MG tablet     furosemide (LASIX) 40 MG tablet     lisinopril (PRINIVIL/ZESTRIL) 5 MG tablet     Magnesium Oxide 420 MG TABS     metoprolol succinate (TOPROL-XL) 200 MG 24 hr tablet     metoprolol succinate ER (TOPROL-XL) 100 MG 24 hr tablet     mexiletine (MEXITIL) 150 MG capsule     nitroGLYCERIN (NITROSTAT) 0.4 MG SL tablet     omeprazole (PRILOSEC) 20 MG capsule     ondansetron (ZOFRAN-ODT) 4 MG ODT tab     oxycodone (ROXICODONE) 5 MG immediate release tablet     potassium chloride ER (K-DUR/KLOR-CON M) 10 MEQ CR tablet     simvastatin (ZOCOR) 40 MG tablet     spironolactone (ALDACTONE) 25 MG tablet     tiotropium (SPIRIVA) 18 MCG inhalation capsule     TRAZODONE HCL PO     warfarin (COUMADIN) 5 MG tablet     No current facility-administered medications for this visit.      No family history on file.  Social History     Socioeconomic History     Marital status:      Spouse name: Not on file     Number of children: Not on file     Years of education: Not on file     Highest education level: Not on file   Occupational History     Not on file   Social Needs     Financial resource strain: Not on file     Food insecurity:     Worry: Not on file     Inability: Not on file     Transportation needs:     Medical: Not on file     Non-medical: Not on file   Tobacco Use     Smoking status: Former Smoker     Packs/day: 1.00     Years: 25.00     Pack years: 25.00     Types: Cigarettes     Last attempt to quit: 10/15/1995     Years since quittin.1     Smokeless tobacco: Never Used   Substance and Sexual Activity     Alcohol use: No     Drug use: No     Sexual activity: Not on file   Lifestyle     Physical activity:     Days per week: Not on file     Minutes per session: Not on file     Stress: Not on file   Relationships     Social connections:     Talks on phone: Not on file      Gets together: Not on file     Attends Spiritism service: Not on file     Active member of club or organization: Not on file     Attends meetings of clubs or organizations: Not on file     Relationship status: Not on file     Intimate partner violence:     Fear of current or ex partner: Not on file     Emotionally abused: Not on file     Physically abused: Not on file     Forced sexual activity: Not on file   Other Topics Concern     Parent/sibling w/ CABG, MI or angioplasty before 65F 55M? Not Asked   Social History Narrative     Not on file            Review of Systems:     Review of Systems     Constitutional:  Positive for chills, weight loss, weight gain, fatigue, decreased appetite, recent stressors, hallucinations, increased energy and confused. Negative for fever, night sweats, height loss, post-operative complications, incisional pain and hyperactivity.   HENT:  Positive for hearing loss, hoarse voice, hearing aid, bleeding gums and dry mouth. Negative for ear pain, tinnitus, nosebleeds, trouble swallowing, mouth sores, sore throat, ear discharge, tooth pain, gum tenderness, taste disturbance, smell disturbance, sinus pain, sinus congestion and neck mass.    Eyes:  Negative for double vision, pain, redness, eye pain, decreased vision, eye watering, eye bulging, eye dryness, flashing lights, spots, floaters, strabismus, tunnel vision, jaundice and eye irritation.   Respiratory:   Negative for cough, hemoptysis, sputum production, shortness of breath, wheezing, sleep disturbances due to breathing, snores loudly, respiratory pain, dyspnea on exertion, cough disturbing sleep and postural dyspnea.    Cardiovascular:  Positive for palpitations, hypotension, pacemaker, few scattered varicosities, light-headedness and exercise intolerance. Negative for chest pain, dyspnea on exertion, orthopnea, fingers/toes turn blue, hypertension, syncope, history of heart murmur, leg pain, sleep disturbances due to breathing and  "edema.   Gastrointestinal:  Positive for heartburn, nausea, diarrhea and constipation. Negative for vomiting, abdominal pain, blood in stool, melena, rectal pain, bloating, bowel incontinence, jaundice, coffee ground emesis and change in stool.   Genitourinary:  Positive for male genitourinary complaint and reduced libido. Negative for bladder incontinence, dysuria, urgency, hematuria, flank pain, difficulty urinating, nocturia, voiding less frequently, scrotal pain, ulcerations and penile discharge.   Musculoskeletal:  Positive for myalgias, back pain, stiffness, muscle cramps, bone pain and muscle weakness. Negative for joint swelling, arthralgias, neck pain and fracture.   Skin:  Negative for nail changes, itching, poor wound healing, rash, hair changes, skin changes, acne, warts, poor wound healing, scarring, flaky skin, Raynaud's phenomenon, sensitivity to sunlight and skin thickening.   Neurological:  Positive for dizziness, tingling, weakness, light-headedness, numbness and memory loss. Negative for tremors, seizures, loss of consciousness, headaches, disturbances in coordination, difficulty walking and paralysis.   Endo/Heme:  Negative for anemia, swollen glands and bruises/bleeds easily.   Psychiatric/Behavioral:  Positive for hallucinations and memory loss. Negative for depression, decreased concentration, mood swings and panic attacks.    Endocrine:  Positive for altered temperature regulation.Negative for polyphagia, polydipsia, unwanted hair growth and change in facial hair.            Physical Exam:   BP 95/57   Pulse 56   Ht 1.778 m (5' 10\")   Wt 87.1 kg (192 lb)   SpO2 97%   BMI 27.55 kg/m    Body mass index is 27.55 kg/m .  Vitals were reviewed       GENERAL APPEARANCE: healthy, alert and no distress     EYES: EOMI,  PERRL     HENT: ear canals and TM's normal and nose and mouth without ulcers or lesions     NECK: no elevated JVP or JVD     RESP: lungs clear to auscultation - no rales, rhonchi " or wheezes     CV: regular rates and rhythm, normal S1 S2, no S3 or S4 and no murmur, click or rub     ABDOMEN:  soft, nontender, no HSM or masses and bowel sounds normal     MS: extremities normal- no gross deformities noted, no evidence of inflammation in joints, FROM in all extremities.     SKIN: no suspicious lesions or rashes     NEURO: Normal strength and tone, sensory exam grossly normal, mentation intact and speech normal     PSYCH: mentation appears normal. and affect normal/bright     LYMPHATICS: No cervical adenopathy        Results:     Lab Results   Component Value Date    WBC 6.6 11/12/2019     Lab Results   Component Value Date    RBC 3.26 11/12/2019     Lab Results   Component Value Date    HGB 11.5 11/12/2019     Lab Results   Component Value Date    HCT 35.5 11/12/2019     No components found for: MCT  Lab Results   Component Value Date     11/12/2019     Lab Results   Component Value Date    MCH 35.3 11/12/2019     Lab Results   Component Value Date    MCHC 32.4 11/12/2019     Lab Results   Component Value Date    RDW 13.2 11/12/2019     Lab Results   Component Value Date     11/12/2019     Last Comprehensive Metabolic Panel:  Sodium   Date Value Ref Range Status   11/12/2019 136 133 - 144 mmol/L Final     Potassium   Date Value Ref Range Status   11/12/2019 4.5 3.4 - 5.3 mmol/L Final     Chloride   Date Value Ref Range Status   11/12/2019 102 94 - 109 mmol/L Final     Carbon Dioxide   Date Value Ref Range Status   11/12/2019 30 20 - 32 mmol/L Final     Anion Gap   Date Value Ref Range Status   11/12/2019 5 3 - 14 mmol/L Final     Glucose   Date Value Ref Range Status   11/12/2019 114 (H) 70 - 99 mg/dL Final     Urea Nitrogen   Date Value Ref Range Status   11/12/2019 37 (H) 7 - 30 mg/dL Final     Creatinine   Date Value Ref Range Status   11/12/2019 1.29 (H) 0.66 - 1.25 mg/dL Final     GFR Estimate   Date Value Ref Range Status   11/12/2019 56 (L) >60 mL/min/[1.73_m2] Final      Comment:     Non  GFR Calc  Starting 12/18/2018, serum creatinine based estimated GFR (eGFR) will be   calculated using the Chronic Kidney Disease Epidemiology Collaboration   (CKD-EPI) equation.       Calcium   Date Value Ref Range Status   11/12/2019 9.5 8.5 - 10.1 mg/dL Final     11/18/2019 device interrogation  Patient seen in [facility] for evaluation and iterative programming of a [employer] Single lead ICD per MD orders. Patient has an appointment to see Dr. Prince and ROBERTO Falk NP today. Normal ICD function. 2 NSVT and 1 VT episodes recorded since patient received a shock on 10/19/19. 1 EGM for review displays SB @ 40 bpm followed by  prior to the episode which lasts 5.5 seconds @ 214 bpm. Intrinsic rhythm = Regular VS @ 48 bpm.  = 5%. Estimated battery longevity to ROSEMARIE = 2 years. Lead trends appear stable. Patient reports that he is feeling well since the last shock, but occasionally has some lightheadedness. Plan for patient to return to clinic 3/30/20 as scheduled.  JEAN Sherman RN, BSN.      Stress echo 7.30.19     Submaximal stress test, achieved 74% of the age predicted maximal heart rate.  Limiting symptom fatigue.     Normal blood pressure response to exercise.  No angina symptoms with exercise.  Baseline rhythm is atrial fibrillation. No ECG evidence of ischemia. There is  no chronotropic incompetence.  Severely reduced LV function with EF of 28% at rest; with exercise left  ventricular cavity size and LVEF did not change significantly.  Akinesis of the basal-mid inferior/inferoseptal/inferolateral segments  present. No new stress induced regional wall motion abnormalities evident.  Less than average functional capacity for age. Exercise time ~2 minutes.     Biventricular dysfunction with moderate mitral and tricuspid regurgitation and  evidence of hypervolemia noted on screening 2D and Doppler examination.        Aug 2019       Right sided filling pressures are severely  elevated - mean RA 23, PCWP 31, mean PA 40     Moderately elevated pulmonary artery hypertension.    Left sided filling pressures are severely elevated.    Left ventricular filling pressures are severely elevated .    Reduced cardiac output level.    Heparin was held during the procedure due to thrombocytopenia    Nipride was started and titrate to 1mcg/kg/min. PCWP decreased from 30 mmHg to 24 mmHg. mPAP decreased from 40 mmHg to 30 mmHg. After pressures decreased team proceed with angiogram.    Mild LAD and LCx disease    Subtotal proximal RCA occlusion    No intervention performed    No mitral or aortic valve gradient    Assessment and Plan   #ICM/NICM, LVEF 30-35% (NYHA II, Stage B)  #s/p ICD 2009  #Volume overload  #Weight gain  He was admitted two months for decompensated HF after RHC showing mean PA 23, PA 40 and PCWP 31. He was aggressively diuresed and reported symptom improvement. His weight has been stable since last seen in [facility] clinic and feels improved. Does not have signs of volume overload.   - Return to clinic in 3 months   - Continue outpatient furosemide  - Decrease metoprolol to 100mg daily  - Low Na diet  - Daily weights, -192lbs  - Continue lisinopril 5mg BID, will consider decreasing if symptomatic  - BMP pending    #Hx of VT/F  No episodes of shock since being on mexiletine. However, had recent episodes of imani and tachyarrhythmias. Was started on  digoxin and lisinopril was increased as well during [facility] visit few weeks ago.  - Continue mexiletine per EP  - Continue BB at above dose  - Will discuss with EP if discontinuing digoxin beneficial  - Digoxin levels pending     #Hx of CAD  #Chronic RCA occlusion   Had angiogram in 08/12/19 that showed  of proximal RCA with bridging collaterals from distal LAD. Plan was to revascularized vessel but patient was in decompensated CHF.   - Will reassess benefit of revascularization as patient improving     #Afib (CHADVASC 3) on warfarin  -  Continue with warfarin  - BB as above        Patient evaluated and discussed with Dr. Prince.      Tank Briceno MD PhD  Cardiology Fellow    Pt was seen and plan of care discussed with Dr. Prince.

## 2019-11-18 NOTE — LETTER
"11/18/2019      RE: Erasmo Pearce  [address]  [address] [facility] [address]       Dear Colleague,    Thank you for the opportunity to participate in the care of your patient, Erasmo Pearce, at the [facility] at [facility]. Please see a copy of my visit note below.    Heart Care - Clinical Cardiac Electrophysiology     HPI: Erasmo Pearce is a 69 year old male who presents for follow up of ICD, VT.  The patient has a past medical history significant for chronic systolic heart failure secondary to ICM (LVEF 25-30%), CAD with  of RCA, permanent AFib, VT/VF s/p ICD with appropriate ICD shocks, DMII, HTN, multiple myeloma (s/p chemo and PBSCT in 2006, with relapse in 2017 and remains on treatment), HL, DVT, and Bird's esophagus (h/o SBO, ileus, and colitis).  He was started on lenalidomide on 2/10/19.  Since initiating lenalidomide, he has received 4 appropriate ICD shocks (on 2/11, 2/14, 2/16, and 3/6).  On 2/11, had one 41 J shock; on 2/12, had ATP x 1; on 2/14, had ATP x 1, 41 J shock; on 2/15, ATP x 1; on 2/15, ATP x 1, 41 J shock; and on 3/6, ATP x 1, 41 J shock. During his visit on 3/26/19, mexiletine 150 mg BID was started. He remained shock free until 10/19/2019 41 J shock.    Reviewed current interval:  -- Echocardiogram 7/30/2019 shows Mildly (EF 40-45%) reduced left ventricular function is present. Global hypokinesis with basal-mid inferolateral and inferior akinesis. Global right ventricular function is moderately reduced. Moderate mitral insufficiency is present with multi directional jet suggesting involvement of the commissures. This study was compared with the study from 2/7/17 and LVEF is improved and MR is worse.  -- 10/15/2019 Remote Device check: \"[employer] single lead ICD. Normal ICD function. 1 episode recorded in the VF zone on 10/14/19 @ 1903 - 233 bpm, 45 seconds, 41 J ICD shock delivered. 2 NSVT episodes " "recorded - 8 beats, 163-169 bpm. Presenting EGM = irregular ventricular rhythm @ ~ 60 bpm.  = 1%. Estimated battery longevity to ROSEMARIE = 2.5 years. Lead trends appear stable.\"  -- 10/21/2019 Remote Device check: \"Patient reported to nurse that he thinks he had a shock while sleeping on 10/19/19.  Device RN spoke with patient who reports that he was sleeping and woke up after receiving a shock from his ICD. Requested that patient send a remote transmission today. Remote ICD transmission received and reviewed. Device transmission sent per MD orders. Patient has a [employer] single lead ICD. Normal ICD function. 1 episode of VT recorded on 10/19/19 @ 0019 - 201 bpm, 49 sec, ATP x 1 and 1 41 J ICD shock delivered with successful termination of arrhythmia. 1 NSVT episode recorded on 10/21/19 @ 1255 - 172 bpm, 12 sec, no stored EGM for review. Presenting EGM = irregular ventricular arrhythmia @ ~ 60-70 bpm.  = 1%. Estimated battery longevity to ROSEMARIE = 2 years. Dr. Duarte notified of interrogation results.\"  -- Presenting device check: \" [employer] Single lead ICD\" 'Normal ICD function. 2 NSVT and 1 VT episodes recorded since patient received a shock on 10/19/19. 1 EGM for review displays SB @ 40 bpm followed by  prior to the episode which lasts 5.5 seconds @ 214 bpm. Intrinsic rhythm = Regular VS @ 48 bpm.  = 5%. Estimated battery longevity to ROSEMARIE = 2 years. Lead trends appear stable.\"    Current Interval history:   Patient states that his medications were changes to add digoxin and spironolactone, as well as increase, lisinopril on October 8th.  He noted lightheaded episodes increased significantly with these medication changes. States that he did not feel either of his last 2 ICD shocks (10/14, 10/19). States that his pedal edema has improved. States he never misses doses of medication. Denies chest pain or pressure, syncope, dyspnea at rest or with exertion, palpitations, orthopnea, PND, or " abdominal edema.    Current Outpatient Medications   Medication Sig Dispense Refill     albuterol (PROAIR HFA, PROVENTIL HFA, VENTOLIN HFA) 108 (90 BASE) MCG/ACT inhaler Inhale 2 puffs into the lungs every 6 hours as needed        amoxicillin (AMOXIL) 500 MG tablet Take 4 tabs one hour before dental appointment 4 tablet 5     baclofen (LIORESAL) 10 MG tablet Take 10 mg by mouth 3 times daily as needed prn       Calcium Carbonate-Vitamin D (CALCIUM 500 + D PO) Take 2 tablets by mouth daily.       diclofenac (VOLTAREN) 75 MG EC tablet Take 75 mg by mouth 2 times daily as needed 1 tab prn       digoxin (LANOXIN) 125 MCG tablet Take 1 tablet (125 mcg) by mouth daily 30 tablet 1     fentaNYL (DURAGESIC) 25 mcg/hr patch 72 hr Place 1 patch onto the skin every 72 hours       furosemide (LASIX) 40 MG tablet Take 0.5 tablets (20 mg) by mouth 2 times daily 180 tablet 1     lisinopril (PRINIVIL/ZESTRIL) 5 MG tablet Take 1 tablet (5 mg) by mouth 2 times daily 60 tablet 1     Magnesium Oxide 420 MG TABS Take 3 tabs (1260 mg) three times a day. 810 tablet 3     metoprolol succinate (TOPROL-XL) 200 MG 24 hr tablet Take 1 tablet (200 mg) by mouth daily 90 tablet 3     mexiletine (MEXITIL) 150 MG capsule Take 1 capsule (150 mg) by mouth 2 times daily As close to 12 hours apart as possible 180 capsule 3     nitroGLYCERIN (NITROSTAT) 0.4 MG SL tablet Place 0.4 mg under the tongue every 5 minutes as needed Reported on 5/16/2017       omeprazole (PRILOSEC) 20 MG capsule Take 20 mg by mouth daily  90 capsule 3     ondansetron (ZOFRAN-ODT) 4 MG ODT tab Take 4 mg by mouth every 6 hours as needed for nausea       oxycodone (ROXICODONE) 5 MG immediate release tablet Take 1-2 tablets by mouth every 6 hours as needed. For pain.        potassium chloride ER (K-DUR/KLOR-CON M) 10 MEQ CR tablet Take 2 tablets (20 mEq) by mouth daily 90 tablet 1     simvastatin (ZOCOR) 40 MG tablet Take 40 mg by mouth At Bedtime  30 tablet      spironolactone  (ALDACTONE) 25 MG tablet Take 0.5 tablets (12.5 mg) by mouth daily 15 tablet 1     tiotropium (SPIRIVA) 18 MCG inhalation capsule Inhale 1 capsule (18 mcg) into the lungs daily 30 capsule 3     TRAZODONE HCL PO Take 50 mg by mouth nightly as needed        warfarin (COUMADIN) 5 MG tablet Take 7.5 mg by mouth See Admin Instructions Take 1.5 tab by  and Friday and 1 tab Sun  , Thur and Sat       Past Medical History:   Diagnosis Date     Chronic systolic heart failure (H)      Ischemic cardiomyopathy      Other premature beats      Permanent atrial fibrillation      Personal history of multiple myeloma      VF (ventricular fibrillation) (H)      Past Surgical History:   Procedure Laterality Date     CV CORONARY ANGIOGRAM N/A 2019    Procedure: CV CORONARY ANGIOGRAM;  Surgeon: Giorgi Milan MD;  Location:  HEART CARDIAC CATH LAB     CV LEFT HEART CATH N/A 2019    Procedure: Left Heart Cath;  Surgeon: Giorgi Milan MD;  Location:  HEART CARDIAC CATH LAB     CV RIGHT HEART CATH N/A 2019    Procedure: CV RIGHT HEART CATH;  Surgeon: Giorgi Milan MD;  Location:  HEART CARDIAC CATH LAB     CV STRESS EXERCISE STUDY N/A 2019    Procedure: Stress Drug Study;  Surgeon: Giorgi Milan MD;  Location:  HEART CARDIAC CATH LAB     IMPLANT AUTOMATIC IMPLANTABLE CARDIOVERTER DEFIBRILLATOR       No family history on file.    Social History     Tobacco Use     Smoking status: Former Smoker     Packs/day: 1.00     Years: 25.00     Pack years: 25.00     Types: Cigarettes     Last attempt to quit: 10/15/1995     Years since quittin.1     Smokeless tobacco: Never Used   Substance Use Topics     Alcohol use: No     Allergies   Allergen Reactions     Nkda [No Known Drug Allergies]      ROS:   A complete review of systems was performed and is negative except as noted in the HPI.     Physical Examination:  Vitals: BP 95/57 (BP Location: Left arm, Patient Position: Chair,  "Cuff Size: Adult Regular)   Pulse 56   Ht 1.778 m (5' 10\")   Wt 87.1 kg (192 lb)   SpO2 97%   BMI 27.55 kg/m     BMI= Body mass index is 27.55 kg/m .    GENERAL APPEARANCE: healthy, alert, and no acute distress  HEENT: no icterus, no xanthelasmas, normal pupil size and reaction, no cyanosis.  NECK: no asymmetry, no cervical bruits, no JVD   RESPIRATORY: lungs clear to auscultation - no rales, rhonchi or wheezes, no use of accessory muscles, no retractions, respirations are unlabored, normal respiratory rate  CARDIOVASCULAR: regular rhythm, 1/6 systolic murmur  GI:  no abdominal bruits, soft, non-tender  EXTREMITIES: no clubbing  NEURO: alert and oriented to person/place/time, normal speech, gait and affect  VASC: Radial and posterior tibialis pulses +2 and symmetric bilaterally. No pedal edema.   SKIN: no ecchymoses, no rashes    Assessment and recommendations:    # Cardiac device in situ: ICM (LVEF 25-30%) and VT/VF s/p ICD with appropriate ICD shocks. Last ICD shocks 10/14 and 10/19. Prior to that 3/2019  1. Normal device function.  2.  = 5%.   3. Keep scheduled follow ups with [facility]    # History of VF/VT, history multiple appropriate ICD shocks: Last ICD shock 10/19 while he was sleeping. Review of device EGMs show a pattern of slowing sinus to bradycardia prior to initiation of VTs. Programmed at VVI @ 40 bpm.   1. Continue mexiletine 150 mg twice a day and metoprolol succinate 200 mg daily. May need to increase mexiletine if episodes continue and do not improve after addressing vagal events.   2. Pattern of sinus slowing (vagal events) prior to VT episodes and patient has increased lightheadedness and hypotension with increased lisinopril and digoxin start.  Would recommend decreasing back to lisinopril 5 mg daily and will discuss with Dr. Prince/fellow.  Will get a digoxin level and BMP.     # Permanent atrial fibrillation: Discussed in detail with the patient management/treatment options for AF " includin. Stroke Prophylaxis:  CHADSVASC=HF+, Vasc(CAD)+, age+  3, corresponding to a 3.2% annual stroke / systemic emolism event rate. indicating need for long term oral anticoagulation. Has been taking warfarin without bleeding problems. Continue warfarin.  2. Rate Control: Continue metoprolol as noted above  3. Rhythm Control: permanent AF, utilizing a rate control strategy.    Follow up : Follow up as scheduled with Dr. Duarte or follow up sooner as needed for symptoms or problems.    Patient expresses understanding and agreement with the plan.    I appreciate the chance to help with Erasmo Pearce's care. Please let me know if you have any questions or concerns.    Cassidy EUBANKS, CNP

## 2019-11-18 NOTE — PROGRESS NOTES
HPI:   70 y/o male with multiple myeloma s/p PBSCT in 2006, ICM/NICM (LVEF 30-35%, NYHA II Stage B) s/p ICD placement in 2009, episodic VT/VF treated by appropriate ICD shocks and afib (CHADVASC 3) on warfarin comes in for management of cardiomyopathy.     Patient was diagnosed in 2006 with multiple myeloma and underwent chemotherapy and autologous PBSCT that same year. He was started on lenalinomide in 2017 due to relapse. He was started on bortezomib in 07/2019 due to progressive disease.     Patient was admitted for decompensated heart failure in August 2019 (acute on chronic systolic heart failure, EF 25 to 30%) and improved after diuresing about 20 lbs and the bortezomib was stopped. His last ICD shock was in 03/06/19 while undergoing lenalidomide treatment. He was started on mexiletine few weeks afterwards and since then has not had any shocks.      Patient today feels great.  His heart failure regimen has been further escalated which now includes beta-blocker, ACE inhibitor, diuretic, Aldactone and digoxin.  He denies any chest pain or heart failure symptoms.  His weight is stable.      PAST MEDICAL HISTORY:  Past Medical History:   Diagnosis Date     Chronic systolic heart failure (H)      Ischemic cardiomyopathy      Other premature beats      Permanent atrial fibrillation      Personal history of multiple myeloma      VF (ventricular fibrillation) (H)        CURRENT MEDICATIONS:  Current Outpatient Medications   Medication Sig Dispense Refill     albuterol (PROAIR HFA, PROVENTIL HFA, VENTOLIN HFA) 108 (90 BASE) MCG/ACT inhaler Inhale 2 puffs into the lungs every 6 hours as needed        amoxicillin (AMOXIL) 500 MG tablet Take 4 tabs one hour before dental appointment 4 tablet 5     baclofen (LIORESAL) 10 MG tablet Take 10 mg by mouth 3 times daily as needed prn       Calcium Carbonate-Vitamin D (CALCIUM 500 + D PO) Take 2 tablets by mouth daily.       diclofenac (VOLTAREN) 75 MG EC tablet Take 75 mg by  mouth 2 times daily as needed 1 tab prn       digoxin (LANOXIN) 125 MCG tablet Take 1 tablet (125 mcg) by mouth daily 30 tablet 1     fentaNYL (DURAGESIC) 25 mcg/hr patch 72 hr Place 1 patch onto the skin every 72 hours       furosemide (LASIX) 40 MG tablet Take 0.5 tablets (20 mg) by mouth 2 times daily 180 tablet 1     lisinopril (PRINIVIL/ZESTRIL) 5 MG tablet Take 1 tablet (5 mg) by mouth 2 times daily 60 tablet 1     Magnesium Oxide 420 MG TABS Take 3 tabs (1260 mg) three times a day. 810 tablet 3     metoprolol succinate (TOPROL-XL) 200 MG 24 hr tablet Take 1 tablet (200 mg) by mouth daily 90 tablet 3     mexiletine (MEXITIL) 150 MG capsule Take 1 capsule (150 mg) by mouth 2 times daily As close to 12 hours apart as possible 180 capsule 3     nitroGLYCERIN (NITROSTAT) 0.4 MG SL tablet Place 0.4 mg under the tongue every 5 minutes as needed Reported on 5/16/2017       omeprazole (PRILOSEC) 20 MG capsule Take 20 mg by mouth daily  90 capsule 3     ondansetron (ZOFRAN-ODT) 4 MG ODT tab Take 4 mg by mouth every 6 hours as needed for nausea       oxycodone (ROXICODONE) 5 MG immediate release tablet Take 1-2 tablets by mouth every 6 hours as needed. For pain.        potassium chloride ER (K-DUR/KLOR-CON M) 10 MEQ CR tablet Take 2 tablets (20 mEq) by mouth daily 90 tablet 1     simvastatin (ZOCOR) 40 MG tablet Take 40 mg by mouth At Bedtime  30 tablet      spironolactone (ALDACTONE) 25 MG tablet Take 0.5 tablets (12.5 mg) by mouth daily 15 tablet 1     tiotropium (SPIRIVA) 18 MCG inhalation capsule Inhale 1 capsule (18 mcg) into the lungs daily 30 capsule 3     TRAZODONE HCL PO Take 50 mg by mouth nightly as needed        warfarin (COUMADIN) 5 MG tablet Take 7.5 mg by mouth See Admin Instructions Take 1.5 tab by mojth Monday and Friday and 1 tab Sun Tues WED , Thur and Sat         PAST SURGICAL HISTORY:  Past Surgical History:   Procedure Laterality Date     CV CORONARY ANGIOGRAM N/A 8/29/2019    Procedure: CV  CORONARY ANGIOGRAM;  Surgeon: Giorgi Milan MD;  [facility]     CV LEFT HEART CATH N/A 2019    Procedure: Left Heart Cath;  Surgeon: Giorgi Milan MD;  [facility]     CV RIGHT HEART CATH N/A 2019    Procedure: CV RIGHT HEART CATH;  Surgeon: Giorgi Milan MD;  [facility]     CV STRESS EXERCISE STUDY N/A 2019    Procedure: Stress Drug Study;  Surgeon: Giorgi Milan MD;  [facility]     IMPLANT AUTOMATIC IMPLANTABLE CARDIOVERTER DEFIBRILLATOR         ALLERGIES     Allergies   Allergen Reactions     Nkda [No Known Drug Allergies]        FAMILY HISTORY:  No family history on file.    SOCIAL HISTORY:  Social History     Socioeconomic History     Marital status:      Spouse name: None     Number of children: None     Years of education: None     Highest education level: None   Occupational History     None   Social Needs     Financial resource strain: None     Food insecurity:     Worry: None     Inability: None     Transportation needs:     Medical: None     Non-medical: None   Tobacco Use     Smoking status: Former Smoker     Packs/day: 1.00     Years: 25.00     Pack years: 25.00     Types: Cigarettes     Last attempt to quit: 10/15/1995     Years since quittin.9     Smokeless tobacco: Never Used   Substance and Sexual Activity     Alcohol use: No     Drug use: No     Sexual activity: None   Lifestyle     Physical activity:     Days per week: None     Minutes per session: None     Stress: None   Relationships     Social connections:     Talks on phone: None     Gets together: None     Attends Rastafarian service: None     Active member of club or organization: None     Attends meetings of clubs or organizations: None     Relationship status: None     Intimate partner violence:     Fear of current or ex partner: None     Emotionally abused: None     Physically abused: None      "Forced sexual activity: None   Other Topics Concern     Parent/sibling w/ CABG, MI or angioplasty before 65F 55M? Not Asked   Social History Narrative     None       ROS:   Complete ROS negative unless stated in HPI.     EXAM:  BP 95/57   Pulse 56   Ht 1.778 m (5' 10\")   Wt 87.1 kg (192 lb)   SpO2 97%   BMI 27.55 kg/m    In general, the patient is a pleasant male in no apparent distress.      HEENT: NC/AT.  PERRLA.  EOMI.  Sclerae white, not injected.    Neck: Carotids 2+ bilaterally without bruits.  No jugular venous distension.   Lymph: No cervical adenopathy. No thyromegaly.   Heart: RRR. Normal S1, S2. No murmur, rub, click, or gallop. There is no heave.    Lungs: Clear bilaterally.  No rhonchi, wheezes, rales.   GI: Soft, nontender, nondistended.   Extremities: No edema.  The pulses are 2+at the radial and DP bilaterally.  Neuro: grossly non focal.   Skin: no rashes.  Endocrine: no thyromegaly  Musculoskeletal: no joint swelling or tenderness, gait normal.  Psych: pleasant and conversant      Labs:  LIPID RESULTS:  Lab Results   Component Value Date    CHOL 113 03/20/2019    HDL 42 03/20/2019    LDL 52 03/20/2019    TRIG 93 03/20/2019    CHOLHDLRATIO 3.3 12/16/2008       LIVER ENZYME RESULTS:  Lab Results   Component Value Date    AST 21 11/12/2019    ALT 24 11/12/2019       CBC RESULTS:  Lab Results   Component Value Date    WBC 6.6 11/12/2019    RBC 3.26 (L) 11/12/2019    HGB 11.5 (L) 11/12/2019    HCT 35.5 (L) 11/12/2019     (H) 11/12/2019    MCH 35.3 (H) 11/12/2019    MCHC 32.4 11/12/2019    RDW 13.2 11/12/2019     (L) 11/12/2019       BMP RESULTS:  Lab Results   Component Value Date     11/12/2019    POTASSIUM 4.5 11/12/2019    CHLORIDE 102 11/12/2019    CO2 30 11/12/2019    ANIONGAP 5 11/12/2019     (H) 11/12/2019    BUN 37 (H) 11/12/2019    CR 1.29 (H) 11/12/2019    GFRESTIMATED 56 (L) 11/12/2019    GFRESTBLACK 65 11/12/2019    EILEEN 9.5 11/12/2019        A1C RESULTS:  Lab " Results   Component Value Date    A1C 6.3 (H) 08/29/2019       INR RESULTS:  Lab Results   Component Value Date    INR 2.19 (H) 11/12/2019    INR 1.60 (H) 08/30/2019       Cardiac data:      11/18/2019 device interrogation  [place] in [facility] [place] and iterative programming of a [employer] Single lead ICD per MD orders. Patient has an appointment to see Dr. Prince and ROBERTO Falk NP today. Normal ICD function. 2 NSVT and 1 VT episodes recorded since patient received a shock on 10/19/19. 1 EGM for review displays SB @ 40 bpm followed by  prior to the episode which lasts 5.5 seconds @ 214 bpm. Intrinsic rhythm = Regular VS @ 48 bpm.  = 5%. Estimated battery longevity to ROSEMARIE = 2 years. Lead trends appear stable. Patient reports that he is feeling well since the last shock, but occasionally has some lightheadedness. Plan for patient to return to clinic 3/30/20 as scheduled.  JEAN Sherman RN, BSN.      Stress echo 7.30.19     Submaximal stress test, achieved 74% of the age predicted maximal heart rate.  Limiting symptom fatigue.     Normal blood pressure response to exercise.  No angina symptoms with exercise.  Baseline rhythm is atrial fibrillation. No ECG evidence of ischemia. There is  no chronotropic incompetence.  Severely reduced LV function with EF of 28% at rest; with exercise left  ventricular cavity size and LVEF did not change significantly.  Akinesis of the basal-mid inferior/inferoseptal/inferolateral segments  present. No new stress induced regional wall motion abnormalities evident.  Less than average functional capacity for age. Exercise time ~2 minutes.     Biventricular dysfunction with moderate mitral and tricuspid regurgitation and  evidence of hypervolemia noted on screening 2D and Doppler examination.        Aug 2019       Right sided filling pressures are severely elevated - mean RA 23, PCWP 31, mean PA 40     Moderately elevated pulmonary artery hypertension.    Left sided  filling pressures are severely elevated.    Left ventricular filling pressures are severely elevated .    Reduced cardiac output level.    Heparin was held during the procedure due to thrombocytopenia    Nipride was started and titrate to 1mcg/kg/min. PCWP decreased from 30 mmHg to 24 mmHg. mPAP decreased from 40 mmHg to 30 mmHg. After pressures decreased team proceed with angiogram.    Mild LAD and LCx disease    Subtotal proximal RCA occlusion    No intervention performed    No mitral or aortic valve gradient     Assessment and Plan   #ICM/NICM, LVEF 30-35% (NYHA II, Stage B)  #s/p ICD 2009  #Volume overload  #Weight gain  He was admitted two months for decompensated HF after RHC showing mean PA 23, PA 40 and PCWP 31. He was aggressively diuresed and reported symptom improvement. His weight has been stable since last seen in [facility] clinic and feels improved. Does not have signs of volume overload.   - Return to clinic in 3 months   - reduce outpatient furosemide to 20 mg/d  - Decrease metoprolol to 100mg daily  - Low Na diet  - Daily weights, -192lbs  - Continue lisinopril 5mg BID, will consider decreasing if symptomatic  - BMP pending     #Hx of VT/F  No episodes of shock since being on mexiletine. However, had recent episodes of imani and tachyarrhythmias. Was started on  digoxin and lisinopril was increased as well during [facility] visit few weeks ago.  - Continue mexiletine per EP  - Continue BB at above dose  - Will discuss with EP if discontinuing digoxin beneficial  - Digoxin levels pending     #Hx of CAD  #Chronic RCA occlusion   Had angiogram in 08/12/19 that showed  of proximal RCA with bridging collaterals from distal LAD. Plan was to revascularized vessel but patient was in decompensated CHF.   - Will reassess benefit of revascularization as patient improving     #Afib (CHADVASC 3) on warfarin  - Continue with warfarin  - BB as above        Patient evaluated and discussed with Dr. Prince.      Tank  MD Kaity PhD  Cardiology Fellow     Pt was seen and plan of care discussed with Dr. Prince.        ATTENDING ATTESTATION:  This patient has been seen and examined by me November 18, 2019 with Tank Briceno MD, cardiology fellow. I have reviewed the vitals, laboratory and imaging data relevant to this patient's care. I have edited this note to reflect our joint assessment and plan, and discussed the plan with the patient.    69 year old with -   1. Acute on Chronic Systolic Heart Failure (EF 25-30%)  2. Permanent Atrial Fibrillation on warfarin  3. Ischemic heart disease - Subtotal proximal RCA occlusion  4. History of VT/VF, s/p ICD  5. Hypertension  6. Relapsed multiple myeloma with mets to brain and bone s/p BMT, currently on chemo holiday  7. T2DM  8. Hyperlipidemia    Patient has had worsening heart failure symptoms since being on bortezomib.  Today, he denies any angina or heart failure symptoms. He denies any orthopnea or PND.  On exam he is eurvolemic, his lungs are clear.  He has a mitral regurgitation murmur on exam.  His blood pressure is on the lower side and he has been experiencing some intermittent dizziness.  So I will reduce  Lasix to 20 mg once daily and reduce metoprolol 200 mg once daily given his bradycardia on the on exam today and the device interrogation.  His renal function is stable.  Digoxin level is pending for now I will continue, low-dose digoxin which was started to optimize his heart failure regimen.  He will continue the lisinopril along with mexiletine and simvastatin.     Plan to see him back in 3 months     Yoko Prince MD, MS  Staff Cardiologist  Pager: [phone]    CC  Patient Care Team:  Cong Mcdonald as PCP - General  Willian Fuentes MD as PCP - Internal Medicine  Rojas Raygoza MD as BMT Physician  Cong Mcdonald as Referring Physician  Yaz Jeong NP as Nurse Practitioner (Nurse Practitioner)  [facility]  Physician  Merry Mas, RN as Continuity Care Coordinator (Transplant)  Zayra Lyle, RN as Specialty Care Coordinator (Cardiology)  Ja Duarte MD as MD (Clinical Cardiac Electrophysiology)  Marichuy Shelley APRN CNP as Assigned PCP  Omayra Serra, RN as Specialty Care Coordinator (Cardiology)  KIRILL BENSON

## 2019-11-18 NOTE — PROGRESS NOTES
"          HPI:     68 y/o male with PMHx significant for MM s/p PBSCT in 2006, ICM/NICM (LVEF 30-35%, NYHA II Stage B) s/p ICD placement in 2009, episodic VT/VF treated by appropriate ICD shocks and afib (CHADVASC 3) on warfarin comes in as follow up.     His MM history is as follows. Briefly, he was diagnosed in 2006 with multiple myeloma and underwent chemotherapy and autologous PBSCT that same year. He has been monitored for the past few years and was noted to have increase in gamma protein presence in his urine. He was started on lenalinomide in 2017 due to relapse. He then developed anemia that was concerning for worsening MM vs medication side effect. Despite lenalidomide treatment, he did not have significant improvement and was therefore started on bortezomib in 07/2019. Bortezomib was stopped due to concerns of cardiotoxicity and more frequent arrhythmias on his device interrogation along with weight gain and volume overload.     Patient today does report with stable dyspnea on exertion. He reports he has lost about 8-10lbs and has been tolerating physical activity, more so than last visit.  However, at times he feels his heart might be \"going to fast\" and does not feel well for a few seconds. Has not felt any shocks. Otherwise denies recurrent fever, chills, night sweats, headaches, vision changes, sore throat, cough, chest pain, abdominal pain, nausea, vomiting, diarrhea, constipation, hematochezia, melena, dysuria, hematuria, joint pain, joint swelling, presyncope, syncope or new rash.     Active Ambulatory Problems     Diagnosis Date Noted    Implanted cardioverter-defibrillator in situ- [employer], single chamber- NOT dependent 12/18/2009    Dilated cardiomyopathy (H) 01/10/2012    Renal insufficiency 05/29/2013    Anemia due to antineoplastic chemotherapy 06/11/2013    Anticoagulation goal of INR 2 to 3 07/12/2016    Multiple myeloma in relapse (H) 01/23/2018    Permanent atrial fibrillation  "    Ischemic cardiomyopathy     Chronic systolic heart failure (H)     Other ill-defined heart diseases 08/12/2019    Bird's esophagus 10/22/2008    Essential hypertension 07/08/2008    Hyperlipidemia 10/07/2008    Type 2 diabetes mellitus without complication (H) 11/09/2011    Acute on chronic systolic heart failure (H) 08/29/2019     Resolved Ambulatory Problems     Diagnosis Date Noted    Multiple myeloma in remission (H) 07/18/2011    Atrial fibrillation (H) 01/10/2012    Multiple myeloma (H) 01/10/2012    Status post chemotherapy 05/29/2013    Anemia of other chronic disease 07/03/2013    Ventricular fibrillation (H) 08/27/2013    Hypomagnesemia 02/26/2014    SOB (shortness of breath) 07/22/2014    VF (ventricular fibrillation) (H)     Personal history of multiple myeloma      Past Medical History:   Diagnosis Date    Other premature beats      Past Surgical History:   Procedure Laterality Date    CV CORONARY ANGIOGRAM N/A 8/29/2019    Procedure: CV CORONARY ANGIOGRAM;  Surgeon: Giorgi Milan MD;  Location:  [facility]    CV LEFT HEART CATH N/A 8/29/2019    Procedure: Left Heart Cath;  Surgeon: Giorgi Milan MD;  Location:  [facility]    CV RIGHT HEART CATH N/A 8/29/2019    Procedure: CV RIGHT HEART CATH;  Surgeon: Giorgi Milan MD;  Location:  [facility]    CV STRESS EXERCISE STUDY N/A 8/29/2019    Procedure: Stress Drug Study;  Surgeon: Giorgi Milan MD;  Location:  [facility]    IMPLANT AUTOMATIC IMPLANTABLE CARDIOVERTER DEFIBRILLATOR           Allergies   Allergen Reactions    Nkda [No Known Drug Allergies]      Current Outpatient Medications   Medication    albuterol (PROAIR HFA, PROVENTIL HFA, VENTOLIN HFA) 108 (90 BASE) MCG/ACT inhaler    amoxicillin (AMOXIL) 500 MG tablet    baclofen (LIORESAL) 10 MG tablet    Calcium Carbonate-Vitamin D (CALCIUM 500 + D PO)    diclofenac (VOLTAREN) 75 MG EC tablet    digoxin (LANOXIN) 125  MCG tablet    fentaNYL (DURAGESIC) 25 mcg/hr patch 72 hr    furosemide (LASIX) 20 MG tablet    furosemide (LASIX) 40 MG tablet    lisinopril (PRINIVIL/ZESTRIL) 5 MG tablet    Magnesium Oxide 420 MG TABS    metoprolol succinate (TOPROL-XL) 200 MG 24 hr tablet    metoprolol succinate ER (TOPROL-XL) 100 MG 24 hr tablet    mexiletine (MEXITIL) 150 MG capsule    nitroGLYCERIN (NITROSTAT) 0.4 MG SL tablet    omeprazole (PRILOSEC) 20 MG capsule    ondansetron (ZOFRAN-ODT) 4 MG ODT tab    oxycodone (ROXICODONE) 5 MG immediate release tablet    potassium chloride ER (K-DUR/KLOR-CON M) 10 MEQ CR tablet    simvastatin (ZOCOR) 40 MG tablet    spironolactone (ALDACTONE) 25 MG tablet    tiotropium (SPIRIVA) 18 MCG inhalation capsule    TRAZODONE HCL PO    warfarin (COUMADIN) 5 MG tablet     No current facility-administered medications for this visit.      No family history on file.  Social History     Socioeconomic History    Marital status:      Spouse name: Not on file    Number of children: Not on file    Years of education: Not on file    Highest education level: Not on file   Occupational History    Not on file   Social Needs    Financial resource strain: Not on file    Food insecurity:     Worry: Not on file     Inability: Not on file    Transportation needs:     Medical: Not on file     Non-medical: Not on file   Tobacco Use    Smoking status: Former Smoker     Packs/day: 1.00     Years: 25.00     Pack years: 25.00     Types: Cigarettes     Last attempt to quit: 10/15/1995     Years since quittin.1    Smokeless tobacco: Never Used   Substance and Sexual Activity    Alcohol use: No    Drug use: No    Sexual activity: Not on file   Lifestyle    Physical activity:     Days per week: Not on file     Minutes per session: Not on file    Stress: Not on file   Relationships    Social connections:     Talks on phone: Not on file     Gets together: Not on file     Attends Oriental orthodox service: Not on file     Active member  of club or organization: Not on file     Attends meetings of clubs or organizations: Not on file     Relationship status: Not on file    Intimate partner violence:     Fear of current or ex partner: Not on file     Emotionally abused: Not on file     Physically abused: Not on file     Forced sexual activity: Not on file   Other Topics Concern    Parent/sibling w/ CABG, MI or angioplasty before 65F 55M? Not Asked   Social History Narrative    Not on file            Review of Systems:     Review of Systems     Constitutional:  Positive for chills, weight loss, weight gain, fatigue, decreased appetite, recent stressors, hallucinations, increased energy and confused. Negative for fever, night sweats, height loss, post-operative complications, incisional pain and hyperactivity.   HENT:  Positive for hearing loss, hoarse voice, hearing aid, bleeding gums and dry mouth. Negative for ear pain, tinnitus, nosebleeds, trouble swallowing, mouth sores, sore throat, ear discharge, tooth pain, gum tenderness, taste disturbance, smell disturbance, sinus pain, sinus congestion and neck mass.    Eyes:  Negative for double vision, pain, redness, eye pain, decreased vision, eye watering, eye bulging, eye dryness, flashing lights, spots, floaters, strabismus, tunnel vision, jaundice and eye irritation.   Respiratory:   Negative for cough, hemoptysis, sputum production, shortness of breath, wheezing, sleep disturbances due to breathing, snores loudly, respiratory pain, dyspnea on exertion, cough disturbing sleep and postural dyspnea.    Cardiovascular:  Positive for palpitations, hypotension, pacemaker, few scattered varicosities, light-headedness and exercise intolerance. Negative for chest pain, dyspnea on exertion, orthopnea, fingers/toes turn blue, hypertension, syncope, history of heart murmur, leg pain, sleep disturbances due to breathing and edema.   Gastrointestinal:  Positive for heartburn, nausea, diarrhea and constipation.  "Negative for vomiting, abdominal pain, blood in stool, melena, rectal pain, bloating, bowel incontinence, jaundice, coffee ground emesis and change in stool.   Genitourinary:  Positive for male genitourinary complaint and reduced libido. Negative for bladder incontinence, dysuria, urgency, hematuria, flank pain, difficulty urinating, nocturia, voiding less frequently, scrotal pain, ulcerations and penile discharge.   Musculoskeletal:  Positive for myalgias, back pain, stiffness, muscle cramps, bone pain and muscle weakness. Negative for joint swelling, arthralgias, neck pain and fracture.   Skin:  Negative for nail changes, itching, poor wound healing, rash, hair changes, skin changes, acne, warts, poor wound healing, scarring, flaky skin, Raynaud's phenomenon, sensitivity to sunlight and skin thickening.   Neurological:  Positive for dizziness, tingling, weakness, light-headedness, numbness and memory loss. Negative for tremors, seizures, loss of consciousness, headaches, disturbances in coordination, difficulty walking and paralysis.   Endo/Heme:  Negative for anemia, swollen glands and bruises/bleeds easily.   Psychiatric/Behavioral:  Positive for hallucinations and memory loss. Negative for depression, decreased concentration, mood swings and panic attacks.    Endocrine:  Positive for altered temperature regulation.Negative for polyphagia, polydipsia, unwanted hair growth and change in facial hair.            Physical Exam:   BP 95/57   Pulse 56   Ht 1.778 m (5' 10\")   Wt 87.1 kg (192 lb)   SpO2 97%   BMI 27.55 kg/m    Body mass index is 27.55 kg/m .  Vitals were reviewed       GENERAL APPEARANCE: healthy, alert and no distress     EYES: EOMI,  PERRL     HENT: ear canals and TM's normal and nose and mouth without ulcers or lesions     NECK: no elevated JVP or JVD     RESP: lungs clear to auscultation - no rales, rhonchi or wheezes     CV: regular rates and rhythm, normal S1 S2, no S3 or S4 and no murmur, " click or rub     ABDOMEN:  soft, nontender, no HSM or masses and bowel sounds normal     MS: extremities normal- no gross deformities noted, no evidence of inflammation in joints, FROM in all extremities.     SKIN: no suspicious lesions or rashes     NEURO: Normal strength and tone, sensory exam grossly normal, mentation intact and speech normal     PSYCH: mentation appears normal. and affect normal/bright     LYMPHATICS: No cervical adenopathy        Results:     Lab Results   Component Value Date    WBC 6.6 11/12/2019     Lab Results   Component Value Date    RBC 3.26 11/12/2019     Lab Results   Component Value Date    HGB 11.5 11/12/2019     Lab Results   Component Value Date    HCT 35.5 11/12/2019     No components found for: MCT  Lab Results   Component Value Date     11/12/2019     Lab Results   Component Value Date    MCH 35.3 11/12/2019     Lab Results   Component Value Date    MCHC 32.4 11/12/2019     Lab Results   Component Value Date    RDW 13.2 11/12/2019     Lab Results   Component Value Date     11/12/2019     Last Comprehensive Metabolic Panel:  Sodium   Date Value Ref Range Status   11/12/2019 136 133 - 144 mmol/L Final     Potassium   Date Value Ref Range Status   11/12/2019 4.5 3.4 - 5.3 mmol/L Final     Chloride   Date Value Ref Range Status   11/12/2019 102 94 - 109 mmol/L Final     Carbon Dioxide   Date Value Ref Range Status   11/12/2019 30 20 - 32 mmol/L Final     Anion Gap   Date Value Ref Range Status   11/12/2019 5 3 - 14 mmol/L Final     Glucose   Date Value Ref Range Status   11/12/2019 114 (H) 70 - 99 mg/dL Final     Urea Nitrogen   Date Value Ref Range Status   11/12/2019 37 (H) 7 - 30 mg/dL Final     Creatinine   Date Value Ref Range Status   11/12/2019 1.29 (H) 0.66 - 1.25 mg/dL Final     GFR Estimate   Date Value Ref Range Status   11/12/2019 56 (L) >60 mL/min/[1.73_m2] Final     Comment:     Non  GFR Calc  Starting 12/18/2018, serum creatinine based  estimated GFR (eGFR) will be   calculated using the Chronic Kidney Disease Epidemiology Collaboration   (CKD-EPI) equation.       Calcium   Date Value Ref Range Status   11/12/2019 9.5 8.5 - 10.1 mg/dL Final     11/18/2019 device interrogation  Patient seen in [facility] for evaluation and iterative programming of a [employer] Single lead ICD per MD orders. Patient has an appointment to see Dr. Prince and ROBERTO Falk NP today. Normal ICD function. 2 NSVT and 1 VT episodes recorded since patient received a shock on 10/19/19. 1 EGM for review displays SB @ 40 bpm followed by  prior to the episode which lasts 5.5 seconds @ 214 bpm. Intrinsic rhythm = Regular VS @ 48 bpm.  = 5%. Estimated battery longevity to ROSEMARIE = 2 years. Lead trends appear stable. Patient reports that he is feeling well since the last shock, but occasionally has some lightheadedness. Plan for patient to return to clinic 3/30/20 as scheduled.  JEAN Sherman RN, BSN.      Stress echo 7.30.19     Submaximal stress test, achieved 74% of the age predicted maximal heart rate.  Limiting symptom fatigue.     Normal blood pressure response to exercise.  No angina symptoms with exercise.  Baseline rhythm is atrial fibrillation. No ECG evidence of ischemia. There is  no chronotropic incompetence.  Severely reduced LV function with EF of 28% at rest; with exercise left  ventricular cavity size and LVEF did not change significantly.  Akinesis of the basal-mid inferior/inferoseptal/inferolateral segments  present. No new stress induced regional wall motion abnormalities evident.  Less than average functional capacity for age. Exercise time ~2 minutes.     Biventricular dysfunction with moderate mitral and tricuspid regurgitation and  evidence of hypervolemia noted on screening 2D and Doppler examination.        Aug 2019     Right sided filling pressures are severely elevated - mean RA 23, PCWP 31, mean PA 40   Moderately elevated pulmonary artery  hypertension.  Left sided filling pressures are severely elevated.  Left ventricular filling pressures are severely elevated .  Reduced cardiac output level.  Heparin was held during the procedure due to thrombocytopenia  Nipride was started and titrate to 1mcg/kg/min. PCWP decreased from 30 mmHg to 24 mmHg. mPAP decreased from 40 mmHg to 30 mmHg. After pressures decreased team proceed with angiogram.  Mild LAD and LCx disease  Subtotal proximal RCA occlusion  No intervention performed  No mitral or aortic valve gradient    Assessment and Plan   #ICM/NICM, LVEF 30-35% (NYHA II, Stage B)  #s/p ICD 2009  #Volume overload  #Weight gain  He was admitted two months for decompensated HF after RHC showing mean PA 23, PA 40 and PCWP 31. He was aggressively diuresed and reported symptom improvement. His weight has been stable since last seen in [facility] clinic and feels improved. Does not have signs of volume overload.   - Return to clinic in 3 months   - Decrease furosemide  - Decrease metoprolol to 100mg daily  - Low Na diet  - Daily weights, -192lbs  - Continue lisinopril 5mg BID, will consider decreasing if symptomatic  - BMP pending    #Hx of VT/F  No episodes of shock since being on mexiletine. However, had recent episodes of imani and tachyarrhythmias. Was started on  digoxin and lisinopril was increased as well during [facility] visit few weeks ago.  - Continue mexiletine per EP  - Continue BB at above dose  - Will discuss with EP if discontinuing digoxin beneficial  - Digoxin levels pending     #Hx of CAD  #Chronic RCA occlusion   Had angiogram in 08/12/19 that showed  of proximal RCA with bridging collaterals from distal LAD. Plan was to revascularized vessel but patient was in decompensated CHF.   - Will reassess benefit of revascularization as patient improving     #Afib (CHADVASC 3) on warfarin  - Continue with warfarin  - BB as above        Patient evaluated and discussed with Dr. Prince.      Tank Briceno MD  PhD  Cardiology Fellow    Pt was seen and plan of care discussed with Dr. Prince.

## 2019-11-18 NOTE — PATIENT INSTRUCTIONS
Patient Instructions:  It was a pleasure to see you in the cardiology clinic today.      If you have any questions, call  Omayra Serra RN, at [phone].  Press Option #1 for the [facility], and then press Option #4  We are encouraging the use of Wideot to communicate with your HealthCare Provider    Note the new or changes to your medications: DECREASE Metoprolol to 100 mg daily. DECREASE lasix to ONCE a day.     Please follow up with Dr. Prince in 3 months.       If you have an urgent need after hours (8:00 am to 4:30 pm) please call [phone] and ask for the cardiology fellow on call.

## 2019-11-18 NOTE — NURSING NOTE
Chief Complaint   Patient presents with     Follow Up     Return, 69yr old male with a history of chronic systolic heart failure secondary to ICM (LVEF 25-30%), permanent AFib, VT/VF s/p ICD with appropriate ICD shocks, DMII, HTN, multiple myeloma (s/p chemo and PBSCT in 2006, with relapse in 2017 and remains on treatment), HL, DVT, and Bird's esophagus (h/o SBO, ileus, and colitis who presents to [facility] clinic for follow up.     Vitals were taken and medications were reconciled.     Tammy Cole CMA    11:08 AM

## 2019-11-18 NOTE — PATIENT INSTRUCTIONS
It was a pleasure to see you in clinic today. Please do not hesitate to call with any questions or concerns. We look forward to seeing you in clinic at your next device check 3/30/20    Nataliia Sherman RN  Electrophysiology Nurse Clinician  [facility]  During business hours call:  [phone]  After business hours please call: [phone]- select option #4 and ask for job code [phone].

## 2019-11-19 ENCOUNTER — ONCOLOGY VISIT (OUTPATIENT)
Dept: TRANSPLANT | Facility: CLINIC | Age: 69
End: 2019-11-19
Attending: INTERNAL MEDICINE
Payer: MEDICARE

## 2019-11-19 VITALS
SYSTOLIC BLOOD PRESSURE: 100 MMHG | BODY MASS INDEX: 27.55 KG/M2 | DIASTOLIC BLOOD PRESSURE: 57 MMHG | HEART RATE: 56 BPM | TEMPERATURE: 97.5 F | WEIGHT: 192 LBS

## 2019-11-19 DIAGNOSIS — C90.00 MULTIPLE MYELOMA, REMISSION STATUS UNSPECIFIED (H): Primary | ICD-10-CM

## 2019-11-19 LAB
MDC_IDC_LEAD_IMPLANT_DT: NORMAL
MDC_IDC_LEAD_LOCATION: NORMAL
MDC_IDC_LEAD_LOCATION_DETAIL_1: NORMAL
MDC_IDC_LEAD_MFG: NORMAL
MDC_IDC_LEAD_MODEL: NORMAL
MDC_IDC_LEAD_POLARITY_TYPE: NORMAL
MDC_IDC_LEAD_SERIAL: NORMAL
MDC_IDC_MSMT_BATTERY_DTM: NORMAL
MDC_IDC_MSMT_BATTERY_REMAINING_LONGEVITY: 24 MO
MDC_IDC_MSMT_BATTERY_STATUS: NORMAL
MDC_IDC_MSMT_CAP_CHARGE_DTM: NORMAL
MDC_IDC_MSMT_CAP_CHARGE_ENERGY: 41 J
MDC_IDC_MSMT_CAP_CHARGE_TIME: 9.65 S
MDC_IDC_MSMT_CAP_CHARGE_TIME: 9.65 S
MDC_IDC_MSMT_CAP_CHARGE_TYPE: NORMAL
MDC_IDC_MSMT_CAP_CHARGE_TYPE: NORMAL
MDC_IDC_MSMT_LEADCHNL_RV_IMPEDANCE_VALUE: 374 OHM
MDC_IDC_MSMT_LEADCHNL_RV_IMPEDANCE_VALUE: 374 OHM
MDC_IDC_MSMT_LEADCHNL_RV_PACING_THRESHOLD_AMPLITUDE: 1.1 V
MDC_IDC_MSMT_LEADCHNL_RV_PACING_THRESHOLD_PULSEWIDTH: 0.5 MS
MDC_IDC_MSMT_LEADCHNL_RV_SENSING_INTR_AMPL: 5.8 MV
MDC_IDC_PG_IMPLANT_DTM: NORMAL
MDC_IDC_PG_MFG: NORMAL
MDC_IDC_PG_MODEL: NORMAL
MDC_IDC_PG_SERIAL: NORMAL
MDC_IDC_PG_TYPE: NORMAL
MDC_IDC_SESS_CLINIC_NAME: NORMAL
MDC_IDC_SESS_DTM: NORMAL
MDC_IDC_SESS_TYPE: NORMAL
MDC_IDC_SET_BRADY_HYSTRATE: NORMAL
MDC_IDC_SET_BRADY_LOWRATE: 40 {BEATS}/MIN
MDC_IDC_SET_BRADY_MODE: NORMAL
MDC_IDC_SET_LEADCHNL_RV_PACING_AMPLITUDE: 2.4 V
MDC_IDC_SET_LEADCHNL_RV_PACING_ANODE_ELECTRODE_1: NORMAL
MDC_IDC_SET_LEADCHNL_RV_PACING_ANODE_LOCATION_1: NORMAL
MDC_IDC_SET_LEADCHNL_RV_PACING_CAPTURE_MODE: NORMAL
MDC_IDC_SET_LEADCHNL_RV_PACING_CATHODE_ELECTRODE_1: NORMAL
MDC_IDC_SET_LEADCHNL_RV_PACING_CATHODE_LOCATION_1: NORMAL
MDC_IDC_SET_LEADCHNL_RV_PACING_POLARITY: NORMAL
MDC_IDC_SET_LEADCHNL_RV_PACING_PULSEWIDTH: 0.5 MS
MDC_IDC_SET_LEADCHNL_RV_SENSING_ADAPTATION_MODE: NORMAL
MDC_IDC_SET_LEADCHNL_RV_SENSING_ANODE_ELECTRODE_1: NORMAL
MDC_IDC_SET_LEADCHNL_RV_SENSING_ANODE_LOCATION_1: NORMAL
MDC_IDC_SET_LEADCHNL_RV_SENSING_CATHODE_ELECTRODE_1: NORMAL
MDC_IDC_SET_LEADCHNL_RV_SENSING_CATHODE_LOCATION_1: NORMAL
MDC_IDC_SET_LEADCHNL_RV_SENSING_POLARITY: NORMAL
MDC_IDC_SET_LEADCHNL_RV_SENSING_SENSITIVITY: 0.6 MV
MDC_IDC_SET_ZONE_DETECTION_INTERVAL: 286 MS
MDC_IDC_SET_ZONE_DETECTION_INTERVAL: 375 MS
MDC_IDC_SET_ZONE_TYPE: NORMAL
MDC_IDC_SET_ZONE_VENDOR_TYPE: NORMAL
MDC_IDC_STAT_BRADY_RV_PERCENT_PACED: 5 %
MDC_IDC_STAT_EPISODE_RECENT_COUNT: 0
MDC_IDC_STAT_EPISODE_RECENT_COUNT: 2
MDC_IDC_STAT_EPISODE_RECENT_COUNT: 4
MDC_IDC_STAT_EPISODE_RECENT_COUNT_DTM_END: NORMAL
MDC_IDC_STAT_EPISODE_RECENT_COUNT_DTM_START: NORMAL
MDC_IDC_STAT_EPISODE_TOTAL_COUNT: 4266
MDC_IDC_STAT_EPISODE_TOTAL_COUNT: 4312
MDC_IDC_STAT_EPISODE_TOTAL_COUNT: NORMAL
MDC_IDC_STAT_EPISODE_TOTAL_COUNT_DTM_END: NORMAL
MDC_IDC_STAT_EPISODE_TYPE: NORMAL
MDC_IDC_STAT_EPISODE_VENDOR_TYPE: NORMAL
MDC_IDC_STAT_TACHYTHERAPY_ATP_DELIVERED_RECENT: 1
MDC_IDC_STAT_TACHYTHERAPY_ATP_DELIVERED_TOTAL: 24
MDC_IDC_STAT_TACHYTHERAPY_RECENT_DTM_END: NORMAL
MDC_IDC_STAT_TACHYTHERAPY_RECENT_DTM_START: NORMAL
MDC_IDC_STAT_TACHYTHERAPY_SHOCKS_ABORTED_RECENT: 0
MDC_IDC_STAT_TACHYTHERAPY_SHOCKS_ABORTED_TOTAL: 23
MDC_IDC_STAT_TACHYTHERAPY_SHOCKS_DELIVERED_RECENT: 2
MDC_IDC_STAT_TACHYTHERAPY_SHOCKS_DELIVERED_TOTAL: 24
MDC_IDC_STAT_TACHYTHERAPY_TOTAL_DTM_END: NORMAL

## 2019-11-19 PROCEDURE — 90471 IMMUNIZATION ADMIN: CPT

## 2019-11-19 PROCEDURE — 25000128 H RX IP 250 OP 636: Mod: ZF | Performed by: INTERNAL MEDICINE

## 2019-11-19 PROCEDURE — G0463 HOSPITAL OUTPT CLINIC VISIT: HCPCS | Mod: 25

## 2019-11-19 PROCEDURE — 90715 TDAP VACCINE 7 YRS/> IM: CPT | Mod: ZF | Performed by: INTERNAL MEDICINE

## 2019-11-19 RX ADMIN — TETANUS TOXOID, REDUCED DIPHTHERIA TOXOID AND ACELLULAR PERTUSSIS VACCINE, ADSORBED 0.5 ML: 5; 2.5; 8; 8; 2.5 SUSPENSION INTRAMUSCULAR at 11:13

## 2019-11-19 NOTE — PROGRESS NOTES
[facility] VISIT       Jonh returns to the [facility] for evaluation of multiple myeloma, status post autologous double tandem peripheral blood stem cell transplant, coronary artery disease, heart failure and history of ventricular tachycardia with an ICD defibrillator He has been off Velcade, Revlimid and dexamethasone since August. His SOB has improved but has some light headedness since on digoxin, lisinopril spironolactone metoprolol and lasix. Seen by Dr Mcneal and decreased lasix to 20mg every day and metoprolol 100mg qd.Did have lwer BP prior to this. Had ICD  Trigger  Oct 14 and 19. Since then stable.Monitoring did show bradycardia.No  Fever no chills Still on the warfarin Taking 9 MgOx per day   Did get  A flu shot and pneumovax in October Has not had a tetanus shot in 10 years He did bring advanced directives updated to clinic which are notarized.    PAST MEDICAL HISTORY, SOCIAL HISTORY AND FAMILY HISTORY:  See my note from 09/2019.      REVIEW OF SYSTEMS:  A 12 point review of systems done is negative as in HPI.      PHYSICAL EXAMINATION:   GENERAL:  He is an alert gentleman in no acute distress.   VITAL SIGNS:  Stable.   HEENT:  Normocephalic.  EOM okay.  Mouth without lesion.   RESPIRATORY:  Clear to percussion and auscultation.   CARDIAC:  Irregularly irregular rhythm.  No S3, S4 or murmur.   ABDOMEN:  Soft without hepatosplenomegaly.   EXTREMITIES:  No edema.      ASSESSMENT:   1.  Multiple myeloma.   2.  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   3.  Cardiomyopathy with CHF.   4.  Atrial fibrillation.   5.  History of deep vein thrombosis, on warfarin.   6.  Chronic back pain.   7.  Aortic aneurysm.   8.  History of plasmacytoma of the clivus, status post radiation.   9.  History of ventricular tachycardia with implantable defibrillator in place.   10.  Hypomagnesemia.        Jonh is really looking very good.  I will continue drug  holiday for Jonh  He has no M spike and Lightchains are stable and only a small lambda chain urine. We will follow labs monthly.  .  I think Velcade could have  added to some cardiotoxicity.  I am so glad the Heart Failure team is also following him regularly. .  I am thinking that if we were to reinstitute therapy that I would use daratumumab along with Revlimid and dexamethasone, in 2020.Mg level ok.  Will give him a tetanus diphtheria shot today     RTC in 3 months    Rojas Raygoza MD  [phone]    Results for JONH ZAPATA (MRN [id number]) as of 11/19/2019 11:11   Ref. Range 11/12/2019 06:30 11/12/2019 09:39 11/18/2019 10:50 11/18/2019 13:14   Sodium Latest Ref Range: 133 - 144 mmol/L  136  139   Potassium Latest Ref Range: 3.4 - 5.3 mmol/L  4.5  4.7   Chloride Latest Ref Range: 94 - 109 mmol/L  102  105   Carbon Dioxide Latest Ref Range: 20 - 32 mmol/L  30  31   Urea Nitrogen Latest Ref Range: 7 - 30 mg/dL  37 (H)  30   Creatinine Latest Ref Range: 0.66 - 1.25 mg/dL  1.29 (H)  1.29 (H)   GFR Estimate Latest Ref Range: >60 mL/min/1.73_m2  56 (L)  56 (L)   GFR Estimate If Black Latest Ref Range: >60 mL/min/1.73_m2  65  65   Calcium Latest Ref Range: 8.5 - 10.1 mg/dL  9.5  8.9   Anion Gap Latest Ref Range: 3 - 14 mmol/L  5  3   Magnesium Latest Ref Range: 1.6 - 2.3 mg/dL    2.0   Albumin Latest Ref Range: 3.4 - 5.0 g/dL  4.0  3.9   Protein Total Latest Ref Range: 6.8 - 8.8 g/dL  8.0  7.6   Bilirubin Total Latest Ref Range: 0.2 - 1.3 mg/dL  0.9  0.7   Alkaline Phosphatase Latest Ref Range: 40 - 150 U/L  166 (H)  151 (H)   ALT Latest Ref Range: 0 - 70 U/L  24  26   AST Latest Ref Range: 0 - 45 U/L  21  23   Creatinine Urine Timed Latest Ref Range: 1.00 - 2.00  1.05      Creatinine Urine Latest Units: mg/dL 44      Protein Random Urine Latest Units: g/L 0.06      Protein Total Urine g/gr Creatinine Latest Ref Range: 0 - 0.2 g/g Cr 0.13      Protein Total 24 Hr Urine Latest Ref Range: 0.04 - 0.23  0.14       Glucose Latest Ref Range: 70 - 99 mg/dL  114 (H)  109 (H)   WBC Latest Ref Range: 4.0 - 11.0 10e9/L  6.6     Hemoglobin Latest Ref Range: 13.3 - 17.7 g/dL  11.5 (L)     Hematocrit Latest Ref Range: 40.0 - 53.0 %  35.5 (L)     Platelet Count Latest Ref Range: 150 - 450 10e9/L  149 (L)     RBC Count Latest Ref Range: 4.4 - 5.9 10e12/L  3.26 (L)     MCV Latest Ref Range: 78 - 100 fl  109 (H)     MCH Latest Ref Range: 26.5 - 33.0 pg  35.3 (H)     MCHC Latest Ref Range: 31.5 - 36.5 g/dL  32.4     RDW Latest Ref Range: 10.0 - 15.0 %  13.2     Diff Method Unknown  Automated Method     % Neutrophils Latest Units: %  66.1     % Lymphocytes Latest Units: %  18.4     % Monocytes Latest Units: %  12.3     % Eosinophils Latest Units: %  2.3     % Basophils Latest Units: %  0.6     % Immature Granulocytes Latest Units: %  0.3     Nucleated RBCs Latest Ref Range: 0 /100  0     Absolute Neutrophil Latest Ref Range: 1.6 - 8.3 10e9/L  4.3     Absolute Lymphocytes Latest Ref Range: 0.8 - 5.3 10e9/L  1.2     Absolute Monocytes Latest Ref Range: 0.0 - 1.3 10e9/L  0.8     Absolute Eosinophils Latest Ref Range: 0.0 - 0.7 10e9/L  0.2     Absolute Basophils Latest Ref Range: 0.0 - 0.2 10e9/L  0.0     Abs Immature Granulocytes Latest Ref Range: 0 - 0.4 10e9/L  0.0     Absolute Nucleated RBC Unknown  0.0     INR Latest Ref Range: 0.86 - 1.14   2.19 (H)     Digoxin Level Latest Ref Range: 0.5 - 2.0 ug/L    0.9   Albumin Fraction Latest Ref Range: 3.7 - 5.1 g/dL  4.3     Alpha 1 Fraction Latest Ref Range: 0.2 - 0.4 g/dL  0.4     Alpha 2 Fraction Latest Ref Range: 0.5 - 0.9 g/dL  0.8     Beta Fraction Latest Ref Range: 0.6 - 1.0 g/dL  0.9     ELP Interpretation: Unknown  Essentially cathy...     ELP Interpretation Urine Unknown Trace albumin and...      Gamma Fraction Latest Ref Range: 0.7 - 1.6 g/dL  1.2     IGA Latest Ref Range: 70 - 380 mg/dL  314     IGG Latest Ref Range: 695 - 1,620 mg/dL  1,220     IGM Latest Ref Range: 60 - 265 mg/dL   54 (L)     Immunofix ELP Urine Unknown (Note)      Immunofixation ELP Unknown  No monoclonal pro...     Kappa Free Lt Chain Latest Ref Range: 0.33 - 1.94 mg/dL  5.31 (H)     Kappa Lambda Ratio Latest Ref Range: 0.26 - 1.65   1.22     Lambda Free Lt Chain Latest Ref Range: 0.57 - 2.63 mg/dL  4.36 (H)     Monoclonal Peak Latest Ref Range: 0.0 g/dL  0.0     CARDIAC DEVICE CHECK - IN CLINIC Unknown   Attch      11/12/19 0630    Resulting lab: [facility]   Value: (Note)   Comment: Monoclonal free immunoglobulin light chain of lambda chain type.   Pathological significance requires clinical correlation. HARRIS Hutton M.D., Ph.D.([phone])

## 2019-11-21 DIAGNOSIS — I50.22 CHRONIC SYSTOLIC HEART FAILURE (H): ICD-10-CM

## 2019-11-21 DIAGNOSIS — I25.5 ISCHEMIC CARDIOMYOPATHY: ICD-10-CM

## 2019-11-21 RX ORDER — SPIRONOLACTONE 25 MG/1
12.5 TABLET ORAL DAILY
Qty: 15 TABLET | Refills: 1 | Status: SHIPPED | OUTPATIENT
Start: 2019-11-21 | End: 2022-01-01

## 2019-11-21 RX ORDER — DIGOXIN 125 MCG
125 TABLET ORAL DAILY
Qty: 30 TABLET | Refills: 1 | Status: SHIPPED | OUTPATIENT
Start: 2019-11-21

## 2019-11-22 ENCOUNTER — DOCUMENTATION ONLY (OUTPATIENT)
Dept: OTHER | Facility: CLINIC | Age: 69
End: 2019-11-22

## 2019-12-03 ENCOUNTER — TELEPHONE (OUTPATIENT)
Dept: CARDIOLOGY | Facility: CLINIC | Age: 69
End: 2019-12-03

## 2019-12-03 NOTE — TELEPHONE ENCOUNTER
Health Call Center    Phone Message    May a detailed message be left on voicemail: yes    Reason for Call: Medication Question or concern regarding medication   Prescription Clarification  Name of Medication: metoprolol succinate ER (TOPROL-XL) 100 MG 24 hr tablet  Prescribing Provider: Dr Prince   Pharmacy: N/A   What on the order needs clarification? Dosage was decreased - they want to confirm this is correct     Please call [place] from [facility] -  PCP office - Ph # [phone] Ext [phone] - okay to leave detailed vml msg on secured vml - Thanks!        Action Taken: Message routed to:  [facility] ([facility]): Cardiology

## 2019-12-03 NOTE — TELEPHONE ENCOUNTER
Returned call to [place], reached VM, LM detailed message per request. Omayra Serra, RN CORE Care Coordinator

## 2019-12-12 DIAGNOSIS — C90.02 MULTIPLE MYELOMA IN RELAPSE (H): ICD-10-CM

## 2019-12-12 RX ORDER — POTASSIUM CHLORIDE 750 MG/1
20 TABLET, EXTENDED RELEASE ORAL DAILY
Qty: 90 TABLET | Refills: 3 | Status: SHIPPED | OUTPATIENT
Start: 2019-12-12 | End: 2021-04-27

## 2019-12-16 RX ORDER — POTASSIUM CHLORIDE 750 MG/1
TABLET, EXTENDED RELEASE ORAL
Qty: 90 TABLET | Refills: 1 | Status: SHIPPED | OUTPATIENT
Start: 2019-12-16 | End: 2020-04-13

## 2019-12-18 ENCOUNTER — TRANSFERRED RECORDS (OUTPATIENT)
Dept: HEALTH INFORMATION MANAGEMENT | Facility: CLINIC | Age: 69
End: 2019-12-18

## 2019-12-18 LAB
CREAT SERPL-MCNC: 1.34 MG/DL (ref 0.72–1.25)
GFR SERPL CREATININE-BSD FRML MDRD: 54 ML/MIN/1.73M(2)
GLUCOSE SERPL-MCNC: 121 MG/DL (ref 70–100)
POTASSIUM SERPL-SCNC: 4.8 MMOL/L (ref 3.5–5.1)

## 2020-02-08 ENCOUNTER — HEALTH MAINTENANCE LETTER (OUTPATIENT)
Age: 70
End: 2020-02-08

## 2020-02-11 DIAGNOSIS — C90.02 MULTIPLE MYELOMA IN RELAPSE (H): Primary | ICD-10-CM

## 2020-02-11 DIAGNOSIS — C90.00 MULTIPLE MYELOMA, REMISSION STATUS UNSPECIFIED (H): ICD-10-CM

## 2020-02-11 LAB
ALBUMIN SERPL-MCNC: 3.9 G/DL (ref 3.4–5)
ALP SERPL-CCNC: 106 U/L (ref 40–150)
ALT SERPL W P-5'-P-CCNC: 22 U/L (ref 0–70)
ANION GAP SERPL CALCULATED.3IONS-SCNC: 5 MMOL/L (ref 3–14)
AST SERPL W P-5'-P-CCNC: 19 U/L (ref 0–45)
BASOPHILS # BLD AUTO: 0.1 10E9/L (ref 0–0.2)
BASOPHILS NFR BLD AUTO: 0.6 %
BILIRUB SERPL-MCNC: 0.8 MG/DL (ref 0.2–1.3)
BUN SERPL-MCNC: 23 MG/DL (ref 7–30)
CALCIUM SERPL-MCNC: 9.4 MG/DL (ref 8.5–10.1)
CHLORIDE SERPL-SCNC: 107 MMOL/L (ref 94–109)
CO2 SERPL-SCNC: 28 MMOL/L (ref 20–32)
COLLECT DURATION TIME UR: 24 H
CREAT 24H UR-MRATE: 1.01 G/(24.H) (ref 1–2)
CREAT SERPL-MCNC: 1.23 MG/DL (ref 0.66–1.25)
CREAT UR-MCNC: 73 MG/DL
CRP SERPL-MCNC: <2.9 MG/L (ref 0–8)
DIFFERENTIAL METHOD BLD: ABNORMAL
EOSINOPHIL # BLD AUTO: 0.1 10E9/L (ref 0–0.7)
EOSINOPHIL NFR BLD AUTO: 1.7 %
ERYTHROCYTE [DISTWIDTH] IN BLOOD BY AUTOMATED COUNT: 12.5 % (ref 10–15)
GFR SERPL CREATININE-BSD FRML MDRD: 60 ML/MIN/{1.73_M2}
GLUCOSE SERPL-MCNC: 115 MG/DL (ref 70–99)
HCT VFR BLD AUTO: 38.9 % (ref 40–53)
HGB BLD-MCNC: 12.8 G/DL (ref 13.3–17.7)
IGA SERPL-MCNC: 289 MG/DL (ref 84–499)
IGG SERPL-MCNC: 1075 MG/DL (ref 610–1616)
IGM SERPL-MCNC: 49 MG/DL (ref 35–242)
IMM GRANULOCYTES # BLD: 0 10E9/L (ref 0–0.4)
IMM GRANULOCYTES NFR BLD: 0.4 %
KAPPA LC UR-MCNC: 3.14 MG/DL (ref 0.33–1.94)
KAPPA LC/LAMBDA SER: 0.66 {RATIO} (ref 0.26–1.65)
LAMBDA LC SERPL-MCNC: 4.76 MG/DL (ref 0.57–2.63)
LDH SERPL L TO P-CCNC: 178 U/L (ref 85–227)
LYMPHOCYTES # BLD AUTO: 1.4 10E9/L (ref 0.8–5.3)
LYMPHOCYTES NFR BLD AUTO: 16.9 %
MCH RBC QN AUTO: 34.7 PG (ref 26.5–33)
MCHC RBC AUTO-ENTMCNC: 32.9 G/DL (ref 31.5–36.5)
MCV RBC AUTO: 105 FL (ref 78–100)
MONOCYTES # BLD AUTO: 0.9 10E9/L (ref 0–1.3)
MONOCYTES NFR BLD AUTO: 10.4 %
NEUTROPHILS # BLD AUTO: 5.7 10E9/L (ref 1.6–8.3)
NEUTROPHILS NFR BLD AUTO: 70 %
NRBC # BLD AUTO: 0 10*3/UL
NRBC BLD AUTO-RTO: 0 /100
PLATELET # BLD AUTO: 167 10E9/L (ref 150–450)
POTASSIUM SERPL-SCNC: 5.1 MMOL/L (ref 3.4–5.3)
PROT 24H UR-MRATE: 0.1 G/(24.H) (ref 0.04–0.23)
PROT SERPL-MCNC: 7.5 G/DL (ref 6.8–8.8)
PROT UR-MCNC: 0.07 G/L
PROT/CREAT 24H UR: 0.1 G/G CR (ref 0–0.2)
RBC # BLD AUTO: 3.69 10E12/L (ref 4.4–5.9)
SODIUM SERPL-SCNC: 140 MMOL/L (ref 133–144)
SPECIMEN VOL UR: 1390 ML
WBC # BLD AUTO: 8.2 10E9/L (ref 4–11)

## 2020-02-11 PROCEDURE — 00000402 ZZHCL STATISTIC TOTAL PROTEIN: Performed by: INTERNAL MEDICINE

## 2020-02-11 PROCEDURE — 81050 URINALYSIS VOLUME MEASURE: CPT | Performed by: INTERNAL MEDICINE

## 2020-02-11 PROCEDURE — 86335 IMMUNFIX E-PHORSIS/URINE/CSF: CPT | Performed by: INTERNAL MEDICINE

## 2020-02-11 PROCEDURE — 84166 PROTEIN E-PHORESIS/URINE/CSF: CPT | Performed by: INTERNAL MEDICINE

## 2020-02-11 PROCEDURE — 84156 ASSAY OF PROTEIN URINE: CPT | Performed by: INTERNAL MEDICINE

## 2020-02-11 PROCEDURE — 82784 ASSAY IGA/IGD/IGG/IGM EACH: CPT | Performed by: INTERNAL MEDICINE

## 2020-02-11 PROCEDURE — 84165 PROTEIN E-PHORESIS SERUM: CPT | Performed by: INTERNAL MEDICINE

## 2020-02-11 PROCEDURE — 83883 ASSAY NEPHELOMETRY NOT SPEC: CPT | Performed by: INTERNAL MEDICINE

## 2020-02-12 LAB
ALBUMIN SERPL ELPH-MCNC: 4.1 G/DL (ref 3.7–5.1)
ALPHA1 GLOB SERPL ELPH-MCNC: 0.3 G/DL (ref 0.2–0.4)
ALPHA2 GLOB SERPL ELPH-MCNC: 0.8 G/DL (ref 0.5–0.9)
B-GLOBULIN SERPL ELPH-MCNC: 0.8 G/DL (ref 0.6–1)
GAMMA GLOB SERPL ELPH-MCNC: 1.1 G/DL (ref 0.7–1.6)
M PROTEIN SERPL ELPH-MCNC: 0 G/DL
PROT ELPH PNL UR ELPH: NORMAL
PROT PATTERN SERPL ELPH-IMP: NORMAL
PROT PATTERN UR ELPH-IMP: NORMAL

## 2020-02-18 ENCOUNTER — CARE COORDINATION (OUTPATIENT)
Dept: TRANSPLANT | Facility: CLINIC | Age: 70
End: 2020-02-18

## 2020-02-18 ENCOUNTER — ONCOLOGY VISIT (OUTPATIENT)
Dept: TRANSPLANT | Facility: CLINIC | Age: 70
End: 2020-02-18
Attending: INTERNAL MEDICINE
Payer: MEDICARE

## 2020-02-18 VITALS
HEART RATE: 97 BPM | DIASTOLIC BLOOD PRESSURE: 64 MMHG | OXYGEN SATURATION: 97 % | TEMPERATURE: 97.7 F | RESPIRATION RATE: 16 BRPM | SYSTOLIC BLOOD PRESSURE: 115 MMHG | WEIGHT: 202.6 LBS | BODY MASS INDEX: 29.07 KG/M2

## 2020-02-18 DIAGNOSIS — C90.02 MULTIPLE MYELOMA IN RELAPSE (H): Primary | ICD-10-CM

## 2020-02-18 LAB
ANION GAP SERPL CALCULATED.3IONS-SCNC: 4 MMOL/L (ref 3–14)
BUN SERPL-MCNC: 26 MG/DL (ref 7–30)
CALCIUM SERPL-MCNC: 9 MG/DL (ref 8.5–10.1)
CHLORIDE SERPL-SCNC: 105 MMOL/L (ref 94–109)
CO2 SERPL-SCNC: 30 MMOL/L (ref 20–32)
CREAT SERPL-MCNC: 1.19 MG/DL (ref 0.66–1.25)
GFR SERPL CREATININE-BSD FRML MDRD: 62 ML/MIN/{1.73_M2}
GLUCOSE SERPL-MCNC: 104 MG/DL (ref 70–99)
MAGNESIUM SERPL-MCNC: 1.8 MG/DL (ref 1.6–2.3)
POTASSIUM SERPL-SCNC: 4.6 MMOL/L (ref 3.4–5.3)
SODIUM SERPL-SCNC: 139 MMOL/L (ref 133–144)

## 2020-02-18 PROCEDURE — 83735 ASSAY OF MAGNESIUM: CPT | Performed by: INTERNAL MEDICINE

## 2020-02-18 PROCEDURE — 80048 BASIC METABOLIC PNL TOTAL CA: CPT | Performed by: INTERNAL MEDICINE

## 2020-02-18 PROCEDURE — G0463 HOSPITAL OUTPT CLINIC VISIT: HCPCS

## 2020-02-18 PROCEDURE — 36415 COLL VENOUS BLD VENIPUNCTURE: CPT

## 2020-02-18 ASSESSMENT — PAIN SCALES - GENERAL: PAINLEVEL: MILD PAIN (3)

## 2020-02-18 NOTE — NURSING NOTE
"Oncology Rooming Note    February 18, 2020 11:16 AM   Erasmo Pearce is a 69 year old male who presents for:    Chief Complaint   Patient presents with     RECHECK     Pt is here for a rtn MM     Initial Vitals: Blood Pressure 115/64   Pulse 97   Temperature 97.7  F (36.5  C) (Oral)   Respiration 16   Weight 91.9 kg (202 lb 9.6 oz)   Oxygen Saturation 97%   Body Mass Index 29.07 kg/m   Estimated body mass index is 29.07 kg/m  as calculated from the following:    Height as of 11/18/19: 1.778 m (5' 10\").    Weight as of this encounter: 91.9 kg (202 lb 9.6 oz). Body surface area is 2.13 meters squared.  Mild Pain (3) Comment: Data Unavailable   No LMP for male patient.  Allergies reviewed: Yes  Medications reviewed: Yes    Medications: Medication refills not needed today.  Pharmacy name entered into Se[employer]:    [facility] - [place] - [address] [facility] - [place], [place] - [address] [place] [address]  [facility] [place] - [address] [place] [address]    Clinical concerns: none       Kina Barraza MA            "

## 2020-02-19 NOTE — PROGRESS NOTES
Patient had Magnesium of 1.8 today. Per Dr. Raygoza, patient could try reducing daily dose of 3 tabs (1260mg) three times a day to total daily dose of 7 tabs a day. Patient was called with this information and verbalized understanding. Mg level will be rechecked at next appointment.

## 2020-02-21 RX ORDER — FUROSEMIDE 40 MG
20 TABLET ORAL
COMMUNITY
End: 2020-02-24 | Stop reason: DRUGHIGH

## 2020-02-21 RX ORDER — METOPROLOL SUCCINATE 200 MG/1
100 TABLET, EXTENDED RELEASE ORAL
COMMUNITY
Start: 2020-02-07 | End: 2020-02-24 | Stop reason: DRUGHIGH

## 2020-02-21 RX ORDER — LISINOPRIL 5 MG/1
10 TABLET ORAL
COMMUNITY
End: 2020-02-24

## 2020-02-21 RX ORDER — ONDANSETRON 4 MG/1
4 TABLET, FILM COATED ORAL
COMMUNITY
Start: 2020-01-17 | End: 2021-01-16

## 2020-02-24 ENCOUNTER — OFFICE VISIT (OUTPATIENT)
Dept: CARDIOLOGY | Facility: CLINIC | Age: 70
End: 2020-02-24
Attending: INTERNAL MEDICINE
Payer: MEDICARE

## 2020-02-24 VITALS
DIASTOLIC BLOOD PRESSURE: 67 MMHG | BODY MASS INDEX: 28.77 KG/M2 | SYSTOLIC BLOOD PRESSURE: 131 MMHG | HEIGHT: 70 IN | WEIGHT: 201 LBS | HEART RATE: 58 BPM | OXYGEN SATURATION: 98 %

## 2020-02-24 DIAGNOSIS — Z95.810 IMPLANTABLE CARDIOVERTER-DEFIBRILLATOR (ICD) IN SITU: ICD-10-CM

## 2020-02-24 DIAGNOSIS — I50.22 CHRONIC SYSTOLIC HEART FAILURE (H): ICD-10-CM

## 2020-02-24 DIAGNOSIS — I25.5 ISCHEMIC CARDIOMYOPATHY: Primary | ICD-10-CM

## 2020-02-24 DIAGNOSIS — C90.00 MULTIPLE MYELOMA, REMISSION STATUS UNSPECIFIED (H): ICD-10-CM

## 2020-02-24 DIAGNOSIS — I47.29 PAROXYSMAL VENTRICULAR TACHYCARDIA (H): ICD-10-CM

## 2020-02-24 PROCEDURE — 99215 OFFICE O/P EST HI 40 MIN: CPT | Mod: GC | Performed by: INTERNAL MEDICINE

## 2020-02-24 PROCEDURE — 93005 ELECTROCARDIOGRAM TRACING: CPT | Mod: ZF

## 2020-02-24 PROCEDURE — 93010 ELECTROCARDIOGRAM REPORT: CPT | Mod: ZP | Performed by: INTERNAL MEDICINE

## 2020-02-24 PROCEDURE — G0463 HOSPITAL OUTPT CLINIC VISIT: HCPCS | Mod: 25,ZF

## 2020-02-24 ASSESSMENT — ENCOUNTER SYMPTOMS
SYNCOPE: 0
SINUS CONGESTION: 0
LIGHT-HEADEDNESS: 1
SLEEP DISTURBANCES DUE TO BREATHING: 0
ABDOMINAL PAIN: 0
JOINT SWELLING: 0
HYPOTENSION: 1
ORTHOPNEA: 0
SWOLLEN GLANDS: 0
HOARSE VOICE: 1
NECK MASS: 0
DIARRHEA: 1
HYPERTENSION: 0
TROUBLE SWALLOWING: 0
STIFFNESS: 1
VOMITING: 0
JAUNDICE: 0
BRUISES/BLEEDS EASILY: 1
BLOATING: 0
HEARTBURN: 1
EXERCISE INTOLERANCE: 1
MUSCLE CRAMPS: 1
MUSCLE WEAKNESS: 1
CONSTIPATION: 0
ARTHRALGIAS: 0
LEG PAIN: 0
MYALGIAS: 1
BACK PAIN: 1
RECTAL PAIN: 0
SINUS PAIN: 0
SMELL DISTURBANCE: 0
BLOOD IN STOOL: 0
NECK PAIN: 0
TASTE DISTURBANCE: 0
NAUSEA: 1
BOWEL INCONTINENCE: 0
PALPITATIONS: 1
SORE THROAT: 0

## 2020-02-24 ASSESSMENT — MIFFLIN-ST. JEOR: SCORE: 1687.98

## 2020-02-24 ASSESSMENT — PAIN SCALES - GENERAL: PAINLEVEL: NO PAIN (0)

## 2020-02-24 NOTE — LETTER
2/24/2020      RE: Erasmo Pearce  [address]  [address] [facility] [address]       Dear Colleague,    Thank you for the opportunity to participate in the care of your patient, Erasmo Pearce, at the [facility] at [facility]. Please see a copy of my visit note below.    HPI:     70 y/o male with multiple myeloma s/p PBSCT in 2006, ICM/NICM (LVEF 30-35%, NYHA II Stage B) s/p ICD placement in 2009, episodic VT/VF treated by appropriate ICD shocks and afib (CHADVASC 3) on warfarin comes in for management of cardiomyopathy.      Patient was diagnosed in 2006 with multiple myeloma and underwent chemotherapy and autologous PBSCT that same year. He was started on lenalinomide in 2017 due to relapse. He was started on bortezomib in 07/2019 due to progressive disease.      Patient was admitted for decompensated heart failure in August 2019 (acute on chronic systolic heart failure, EF 25 to 30%) and improved after diuresing about 20 lbs and the bortezomib was stopped. His last ICD shock was in 10/19/19 in the setting of sinus slowing.    Patient today feels great. He reports this past 6 months have been some of his best in the past couple of years.  He denies chest pain when walking. He denies orthopnea, pnd. His leg swelling is minimal.     PAST MEDICAL HISTORY:  Past Medical History:   Diagnosis Date     Chronic systolic heart failure (H)      Ischemic cardiomyopathy      Other premature beats      Permanent atrial fibrillation      Personal history of multiple myeloma      VF (ventricular fibrillation) (H)        CURRENT MEDICATIONS:  Current Outpatient Medications   Medication Sig Dispense Refill     albuterol (PROAIR HFA, PROVENTIL HFA, VENTOLIN HFA) 108 (90 BASE) MCG/ACT inhaler Inhale 2 puffs into the lungs every 6 hours as needed        amoxicillin (AMOXIL) 500 MG tablet Take 4 tabs one hour before dental appointment 4 tablet 5     baclofen (LIORESAL)  10 MG tablet Take 10 mg by mouth 3 times daily as needed prn       Calcium Carbonate-Vitamin D (CALCIUM 500 + D PO) Take 2 tablets by mouth daily.       diclofenac (VOLTAREN) 75 MG EC tablet Take 75 mg by mouth 2 times daily as needed 1 tab prn       digoxin (LANOXIN) 125 MCG tablet Take 1 tablet (125 mcg) by mouth daily 30 tablet 1     fentaNYL (DURAGESIC) 25 mcg/hr patch 72 hr Place 1 patch onto the skin every 72 hours       furosemide (LASIX) 20 MG tablet Take 1 tablet (20 mg) by mouth daily 90 tablet 3     furosemide (LASIX) 40 MG tablet Take 20 mg by mouth daily  180 tablet 1     furosemide 40 MG PO tablet Take 20 mg by mouth       lisinopril (PRINIVIL/ZESTRIL) 5 MG tablet Take 1 tablet (5 mg) by mouth 2 times daily 60 tablet 1     lisinopril 5 MG PO tablet Take 10 mg by mouth       Magnesium Oxide 420 MG TABS Take 3 tabs (1260 mg) three times a day. 810 tablet 3     metoprolol succinate ER (TOPROL-XL) 100 MG 24 hr tablet Take 1 tablet (100 mg) by mouth daily 90 tablet 3     metoprolol succinate  MG PO 24 hr tablet Take 100 mg by mouth       mexiletine (MEXITIL) 150 MG capsule Take 1 capsule (150 mg) by mouth 2 times daily As close to 12 hours apart as possible 180 capsule 3     nitroGLYCERIN (NITROSTAT) 0.4 MG SL tablet Place 0.4 mg under the tongue every 5 minutes as needed Reported on 5/16/2017       omeprazole (PRILOSEC) 20 MG capsule Take 20 mg by mouth daily  90 capsule 3     ondansetron (ZOFRAN-ODT) 4 MG ODT tab Take 4 mg by mouth every 6 hours as needed for nausea       ondansetron 4 MG PO tablet Take 4 mg by mouth       oxycodone (ROXICODONE) 5 MG immediate release tablet Take 1-2 tablets by mouth every 6 hours as needed. For pain.        potassium chloride ER (K-DUR/KLOR-CON M) 10 MEQ CR tablet Take 2 tablets (20 mEq) by mouth daily 90 tablet 3     potassium chloride ER (K-TAB/KLOR-CON) 10 MEQ CR tablet TAKE 2 TABLETS (20 MEQ) BY MOUTH DAILY 90 tablet 1     simvastatin (ZOCOR) 40 MG tablet Take  40 mg by mouth At Bedtime  30 tablet      spironolactone (ALDACTONE) 25 MG tablet Take 0.5 tablets (12.5 mg) by mouth daily 15 tablet 1     tiotropium (SPIRIVA) 18 MCG inhalation capsule Inhale 1 capsule (18 mcg) into the lungs daily 30 capsule 3     TRAZODONE HCL PO Take 50 mg by mouth nightly as needed        warfarin (COUMADIN) 5 MG tablet Take 7.5 mg by mouth See Admin Instructions Take 1.5 tab by  and Friday and 1 tab Sun  , Thur and Sat         PAST SURGICAL HISTORY:  Past Surgical History:   Procedure Laterality Date     CV CORONARY ANGIOGRAM N/A 2019    Procedure: CV CORONARY ANGIOGRAM;  Surgeon: Giorgi Milan MD;  Location:  HEART CARDIAC CATH LAB     CV LEFT HEART CATH N/A 2019    Procedure: Left Heart Cath;  Surgeon: Giorgi Milan MD;  Location: [facility]     CV RIGHT HEART CATH N/A 2019    Procedure: CV RIGHT HEART CATH;  Surgeon: Giorgi Milan MD;  [facility]     CV STRESS EXERCISE STUDY N/A 2019    Procedure: Stress Drug Study;  Surgeon: Giorgi Milan MD;  [facility]     IMPLANT AUTOMATIC IMPLANTABLE CARDIOVERTER DEFIBRILLATOR         ALLERGIES:     Allergies   Allergen Reactions     Nkda [No Known Drug Allergies]        FAMILY HISTORY:  - Premature coronary artery disease      SOCIAL HISTORY:  Social History     Tobacco Use     Smoking status: Former Smoker     Packs/day: 1.00     Years: 25.00     Pack years: 25.00     Types: Cigarettes     Last attempt to quit: 10/15/1995     Years since quittin.3     Smokeless tobacco: Never Used   Substance Use Topics     Alcohol use: No     Drug use: No       ROS:   Constitutional: No fever, chills, or sweats. Weight stable.   ENT: No visual disturbance, ear ache, epistaxis, sore throat.   Cardiovascular: As per HPI.   Respiratory: No cough, hemoptysis.    GI: No nausea, vomiting, hematemesis, melena, or hematochezia.   : No  "hematuria.   Integument: Negative.   Psychiatric: Negative.   Hematologic:  Easy bruising, no easy bleeding.  Neuro: Negative.   Endocrinology: No significant heat or cold intolerance   Musculoskeletal: No myalgia.    Exam:  /67 (BP Location: Left arm, Patient Position: Chair, Cuff Size: Adult Regular)   Pulse 58   Ht 1.778 m (5' 10\")   Wt 91.2 kg (201 lb)   SpO2 98%   BMI 28.84 kg/m     GENERAL APPEARANCE: healthy, alert and no distress  HEENT: no icterus  NECK: JVP not elevated  RESPIRATORY: lungs clear to auscultation   CARDIOVASCULAR: regular rhythm, normal S1 with physiologic split S2, no S3 or S4 and no murmur, click or rub, precordium quiet with normal PMI.  ABDOMEN: soft, non tender  EXTREMITIES: peripheral pulses normal, no edema, no bruits  NEURO: alert and oriented to person/place/time, normal speech, gait and affect  VASC: Radial pulses are normal in volumes and symmetric bilaterally. No bruits are heard.  SKIN: no ecchymoses, no rashes    Labs:  CBC RESULTS:   Lab Results   Component Value Date    WBC 8.2 02/11/2020    RBC 3.69 (L) 02/11/2020    HGB 12.8 (L) 02/11/2020    HCT 38.9 (L) 02/11/2020     (H) 02/11/2020    MCH 34.7 (H) 02/11/2020    MCHC 32.9 02/11/2020    RDW 12.5 02/11/2020     02/11/2020       BMP RESULTS:  Lab Results   Component Value Date     02/18/2020    POTASSIUM 4.6 02/18/2020    CHLORIDE 105 02/18/2020    CO2 30 02/18/2020    ANIONGAP 4 02/18/2020     (H) 02/18/2020    BUN 26 02/18/2020    CR 1.19 02/18/2020    GFRESTIMATED 62 02/18/2020    GFRESTBLACK 72 02/18/2020    EILEEN 9.0 02/18/2020        INR RESULTS:  Lab Results   Component Value Date    INR 2.19 (H) 11/12/2019    INR 1.60 (H) 08/30/2019    INR 1.80 (H) 08/29/2019    INR 2.3 (H) 06/10/2019       Cardiac testing :    EKG 2/24/2020 afib, slow ventricular response q waves on inferior leads       11/18/2019 device interrogation  Patient seen in [facility] for evaluation and iterative programming " of a [email or web address] Single lead ICD per MD orders. Patient has an appointment to see Dr. Prince and ROBERTO Falk NP today. Normal ICD function. 2 NSVT and 1 VT episodes recorded since patient received a shock on 10/19/19. 1 EGM for review displays SB @ 40 bpm followed by  prior to the episode which lasts 5.5 seconds @ 214 bpm. Intrinsic rhythm = Regular VS @ 48 bpm.  = 5%. Estimated battery longevity to ROSEMARIE = 2 years. Lead trends appear stable. Patient reports that he is feeling well since the last shock, but occasionally has some lightheadedness. Plan for patient to return to clinic 3/30/20 as scheduled.  JEAN Sherman RN, BSN.      Stress echo 7.30.19     Submaximal stress test, achieved 74% of the age predicted maximal heart rate.  Limiting symptom fatigue.     Normal blood pressure response to exercise.  No angina symptoms with exercise.  Baseline rhythm is atrial fibrillation. No ECG evidence of ischemia. There is  no chronotropic incompetence.  Severely reduced LV function with EF of 28% at rest; with exercise left  ventricular cavity size and LVEF did not change significantly.  Akinesis of the basal-mid inferior/inferoseptal/inferolateral segments  present. No new stress induced regional wall motion abnormalities evident.  Less than average functional capacity for age. Exercise time ~2 minutes.     Biventricular dysfunction with moderate mitral and tricuspid regurgitation and  evidence of hypervolemia noted on screening 2D and Doppler examination.        Aug 2019 RHC and coronary angiogram       Right sided filling pressures are severely elevated - mean RA 23, PCWP 31, mean PA 40     Moderately elevated pulmonary artery hypertension.    Left sided filling pressures are severely elevated.    Left ventricular filling pressures are severely elevated .    Reduced cardiac output level.    Heparin was held during the procedure due to thrombocytopenia    Nipride was started and titrate to 1mcg/kg/min. PCWP  decreased from 30 mmHg to 24 mmHg. mPAP decreased from 40 mmHg to 30 mmHg. After pressures decreased team proceed with angiogram.    Mild LAD and LCx disease    Subtotal proximal RCA occlusion    No intervention performed    No mitral or aortic valve gradient     Assessment and Plan  70 y/o male with multiple myeloma s/p PBSCT in 2006, ICM/NICM (LVEF 30-35%, NYHA II Stage B) s/p ICD placement in 2009, episodic VT/VF treated by appropriate ICD shocks and afib (CHADVASC 3) on warfarin comes in for management of cardiomyopathy. Today he appears euvolemic.     #ICM/NICM, LVEF 30-35% (NYHA II, Stage B)  #s/p ICD 2009  #Volume overload  #Weight gain  Does not have signs of volume overload.    -  Continue with furosemide to 20 mg/d  - Continue with metoprolol succ 100mg daily  - Low Na diet  - Daily weights, -192lbs  - Continue lisinopril 5mg BID    #Hx of VT/F  Last ICD shock in 10/2019  - Continue mexiletine per EP  - continue BB.      #Hx of CAD  #Chronic RCA occlusion   Had angiogram in 08/12/19 that showed  of proximal RCA with bridging collaterals from distal LAD. Plan was to revascularized vessel but patient was in decompensated CHF.   - Will reassess benefit of revascularization as patient improving     #Afib (CHADVASC 3) on warfarin  - Continue with warfarin  - BB as above     Alec Monreal MD  PGY 4 Cardiology fellow       ATTENDING ATTESTATION:  This patient has been seen and examined by me February 24, 2020 with  Alec Monreal MD, cardiology fellow. I have reviewed the vitals, laboratory and imaging data relevant to this patient's care. I have edited this note to reflect our joint assessment and plan, and discussed the plan with the patient.    Jonh is a 69 year old with -   1. Chronic Systolic Heart Failure (EF 25-30% in July 2019)  2. Permanent Atrial Fibrillation on warfarin  3. Ischemic heart disease - Subtotal proximal RCA occlusion and mild LAD and LCx disease August 2019  4.  History of VT/VF, s/p ICD  5. Hypertension   6. Relapsed multiple myeloma with mets to brain and bone s/p BMT, currently on chemo holiday  7. T2DM  8. Hyperlipidemia     Patient developed worsening heart failure symptoms since being on bortezomib.  Today, he denies any angina or heart failure symptoms. He denies any orthopnea or PND.  On exam he is eurvolemic, his lungs are clear.  He has a mitral regurgitation murmur on exam.  His blood pressure is in normal range and he is in atrial fibrillation with a slow ventricular response rate. This is unchanged compared to his testing in Nov 2019 and he has not had any recent ICD shocks. His renal function is stable.  Digoxin level was 0.9 in November 2019.  I will continue low-dose digoxin which was started to optimize his heart failure regimen.  He will continue the lisinopril along with mexiletine and simvastatin.     Plan to see him back in  6 months       Yoko Benson MD, MS  Staff Cardiologist  Pager: [phone]  Yoko Benson MD, MS  Staff Cardiologist  Pager: [phone]    CC  Patient Care Team:  Cong Mcdonald as PCP - General  GinaWillian crandall MD as PCP - Internal Medicine  [facility] Hospital[facility]oj[facility] bailon MD as BMT Physician  Cong Mcdonald as Referring Physician  Yaz Jeong NP as Nurse Practitioner (Nurse Practitioner)  [facility] as [facility] Physician  Merry Mas RN as Continuity Care Coordinator (Transplant)  Zayra Lyle RN as Specialty Care Coordinator (Cardiology)  Ja Duarte MD as MD (Clinical Cardiac Electrophysiology)  Marichuy Shelley APRN CNP as Assigned PCP  Omayra Serra RN as Specialty Care Coordinator (Cardiology)  YOKO BENSON

## 2020-02-24 NOTE — NURSING NOTE
Chief Complaint   Patient presents with     Follow Up     Return, 68 yo male, PMH: Acute on chronic systolic heart failure, Permanent atrial fibrillation, Ischemic cardiomyopathy, Chronic systolic heart failure, Dilated cardiomyopathy, Hyperlipidemia, Essential hypertension, Multiple myeloma in relapse (H)     Vitals were taken and medications were reconciled.     Tammy Cole CMA    10:56 AM

## 2020-02-24 NOTE — PATIENT INSTRUCTIONS
Patient Instructions:  It was a pleasure to see you in the cardiology clinic today.      If you have any questions, call  Omayra Serra RN, at [phone].  Press Option #1 for the [facility], and then press Option #4  We are encouraging the use of Letaot to communicate with your HealthCare Provider    Note the new or changes to your medications: None  Monitor your blood pressure at home and call if systolic blood pressure greater than 140 mmHg or less than 100 mmHg on a consistent basis.   Monitor heart rate and call if below 50 on a consistent basis.      Stop the following medications: None    The results from today include: None  Please follow up with Dr. Prince in 6 months      If you have an urgent need after hours (8:00 am to 4:30 pm) please call [phone] and ask for the cardiology fellow on call.

## 2020-02-24 NOTE — PROGRESS NOTES
HPI:     70 y/o male with multiple myeloma s/p PBSCT in 2006, ICM/NICM (LVEF 30-35%, NYHA II Stage B) s/p ICD placement in 2009, episodic VT/VF treated by appropriate ICD shocks and afib (CHADVASC 3) on warfarin comes in for management of cardiomyopathy.      Patient was diagnosed in 2006 with multiple myeloma and underwent chemotherapy and autologous PBSCT that same year. He was started on lenalinomide in 2017 due to relapse. He was started on bortezomib in 07/2019 due to progressive disease.      Patient was admitted for decompensated heart failure in August 2019 (acute on chronic systolic heart failure, EF 25 to 30%) and improved after diuresing about 20 lbs and the bortezomib was stopped. His last ICD shock was in 10/19/19 in the setting of sinus slowing.    Patient today feels great. He reports this past 6 months have been some of his best in the past couple of years. He denies chest pain when walking. He denies orthopnea, pnd. His leg swelling is minimal.     PAST MEDICAL HISTORY:  Past Medical History:   Diagnosis Date     Chronic systolic heart failure (H)      Ischemic cardiomyopathy      Other premature beats      Permanent atrial fibrillation      Personal history of multiple myeloma      VF (ventricular fibrillation) (H)        CURRENT MEDICATIONS:  Current Outpatient Medications   Medication Sig Dispense Refill     albuterol (PROAIR HFA, PROVENTIL HFA, VENTOLIN HFA) 108 (90 BASE) MCG/ACT inhaler Inhale 2 puffs into the lungs every 6 hours as needed        amoxicillin (AMOXIL) 500 MG tablet Take 4 tabs one hour before dental appointment 4 tablet 5     baclofen (LIORESAL) 10 MG tablet Take 10 mg by mouth 3 times daily as needed prn       Calcium Carbonate-Vitamin D (CALCIUM 500 + D PO) Take 2 tablets by mouth daily.       diclofenac (VOLTAREN) 75 MG EC tablet Take 75 mg by mouth 2 times daily as needed 1 tab prn       digoxin (LANOXIN) 125 MCG tablet Take 1 tablet (125 mcg) by mouth daily 30 tablet 1      fentaNYL (DURAGESIC) 25 mcg/hr patch 72 hr Place 1 patch onto the skin every 72 hours       furosemide (LASIX) 20 MG tablet Take 1 tablet (20 mg) by mouth daily 90 tablet 3     furosemide (LASIX) 40 MG tablet Take 20 mg by mouth daily  180 tablet 1     furosemide 40 MG PO tablet Take 20 mg by mouth       lisinopril (PRINIVIL/ZESTRIL) 5 MG tablet Take 1 tablet (5 mg) by mouth 2 times daily 60 tablet 1     lisinopril 5 MG PO tablet Take 10 mg by mouth       Magnesium Oxide 420 MG TABS Take 3 tabs (1260 mg) three times a day. 810 tablet 3     metoprolol succinate ER (TOPROL-XL) 100 MG 24 hr tablet Take 1 tablet (100 mg) by mouth daily 90 tablet 3     metoprolol succinate  MG PO 24 hr tablet Take 100 mg by mouth       mexiletine (MEXITIL) 150 MG capsule Take 1 capsule (150 mg) by mouth 2 times daily As close to 12 hours apart as possible 180 capsule 3     nitroGLYCERIN (NITROSTAT) 0.4 MG SL tablet Place 0.4 mg under the tongue every 5 minutes as needed Reported on 5/16/2017       omeprazole (PRILOSEC) 20 MG capsule Take 20 mg by mouth daily  90 capsule 3     ondansetron (ZOFRAN-ODT) 4 MG ODT tab Take 4 mg by mouth every 6 hours as needed for nausea       ondansetron 4 MG PO tablet Take 4 mg by mouth       oxycodone (ROXICODONE) 5 MG immediate release tablet Take 1-2 tablets by mouth every 6 hours as needed. For pain.        potassium chloride ER (K-DUR/KLOR-CON M) 10 MEQ CR tablet Take 2 tablets (20 mEq) by mouth daily 90 tablet 3     potassium chloride ER (K-TAB/KLOR-CON) 10 MEQ CR tablet TAKE 2 TABLETS (20 MEQ) BY MOUTH DAILY 90 tablet 1     simvastatin (ZOCOR) 40 MG tablet Take 40 mg by mouth At Bedtime  30 tablet      spironolactone (ALDACTONE) 25 MG tablet Take 0.5 tablets (12.5 mg) by mouth daily 15 tablet 1     tiotropium (SPIRIVA) 18 MCG inhalation capsule Inhale 1 capsule (18 mcg) into the lungs daily 30 capsule 3     TRAZODONE HCL PO Take 50 mg by mouth nightly as needed        warfarin (COUMADIN) 5 MG  tablet Take 7.5 mg by mouth See Admin Instructions Take 1.5 tab by mojth Monday and Friday and 1 tab Sun  , Thur and Sat         PAST SURGICAL HISTORY:  Past Surgical History:   Procedure Laterality Date     CV CORONARY ANGIOGRAM N/A 2019    Procedure: CV CORONARY ANGIOGRAM;  Surgeon: Giorgi Milan MD;  Location:  [facility]     CV LEFT HEART CATH N/A 2019    Procedure: Left Heart Cath;  Surgeon: Giorgi Milan MD;  Location:  [facility]     CV RIGHT HEART CATH N/A 2019    Procedure: CV RIGHT HEART CATH;  Surgeon: Giorgi Milan MD;  Location:  [facility]     CV STRESS EXERCISE STUDY N/A 2019    Procedure: Stress Drug Study;  Surgeon: Giorgi Milan MD;  Location:  [facility]ATH [facility]     IMPLANT AUTOMATIC IMPLANTABLE CARDIOVERTER DEFIBRILLATOR         ALLERGIES:     Allergies   Allergen Reactions     Nkda [No Known Drug Allergies]        FAMILY HISTORY:  - Premature coronary artery disease      SOCIAL HISTORY:  Social History     Tobacco Use     Smoking status: Former Smoker     Packs/day: 1.00     Years: 25.00     Pack years: 25.00     Types: Cigarettes     Last attempt to quit: 10/15/1995     Years since quittin.3     Smokeless tobacco: Never Used   Substance Use Topics     Alcohol use: No     Drug use: No       ROS:   Constitutional: No fever, chills, or sweats. Weight stable.   ENT: No visual disturbance, ear ache, epistaxis, sore throat.   Cardiovascular: As per HPI.   Respiratory: No cough, hemoptysis.    GI: No nausea, vomiting, hematemesis, melena, or hematochezia.   : No hematuria.   Integument: Negative.   Psychiatric: Negative.   Hematologic:  Easy bruising, no easy bleeding.  Neuro: Negative.   Endocrinology: No significant heat or cold intolerance   Musculoskeletal: No myalgia.    Exam:  /67 (BP Location: Left arm, Patient Position: Chair, Cuff Size: Adult Regular)   Pulse 58   Ht 1.778 m (5'  "10\")   Wt 91.2 kg (201 lb)   SpO2 98%   BMI 28.84 kg/m    GENERAL APPEARANCE: healthy, alert and no distress  HEENT: no icterus  NECK: JVP not elevated  RESPIRATORY: lungs clear to auscultation   CARDIOVASCULAR: regular rhythm, normal S1 with physiologic split S2, no S3 or S4 and no murmur, click or rub, precordium quiet with normal PMI.  ABDOMEN: soft, non tender  EXTREMITIES: peripheral pulses normal, no edema, no bruits  NEURO: alert and oriented to person/place/time, normal speech, gait and affect  VASC: Radial pulses are normal in volumes and symmetric bilaterally. No bruits are heard.  SKIN: no ecchymoses, no rashes    Labs:  CBC RESULTS:   Lab Results   Component Value Date    WBC 8.2 02/11/2020    RBC 3.69 (L) 02/11/2020    HGB 12.8 (L) 02/11/2020    HCT 38.9 (L) 02/11/2020     (H) 02/11/2020    MCH 34.7 (H) 02/11/2020    MCHC 32.9 02/11/2020    RDW 12.5 02/11/2020     02/11/2020       BMP RESULTS:  Lab Results   Component Value Date     02/18/2020    POTASSIUM 4.6 02/18/2020    CHLORIDE 105 02/18/2020    CO2 30 02/18/2020    ANIONGAP 4 02/18/2020     (H) 02/18/2020    BUN 26 02/18/2020    CR 1.19 02/18/2020    GFRESTIMATED 62 02/18/2020    GFRESTBLACK 72 02/18/2020    EILEEN 9.0 02/18/2020        INR RESULTS:  Lab Results   Component Value Date    INR 2.19 (H) 11/12/2019    INR 1.60 (H) 08/30/2019    INR 1.80 (H) 08/29/2019    INR 2.3 (H) 06/10/2019       Cardiac testing :    EKG 2/24/2020 afib, slow ventricular response q waves on inferior leads       11/18/2019 device interrogation  Patient seen in [facility] for evaluation and iterative programming of a [employer] Single lead ICD per MD orders. Patient has an appointment to see Dr. Prince and ROBERTO Falk NP today. Normal ICD function. 2 NSVT and 1 VT episodes recorded since patient received a shock on 10/19/19. 1 EGM for review displays SB @ 40 bpm followed by  prior to the episode which lasts 5.5 seconds @ 214 bpm. Intrinsic " rhythm = Regular VS @ 48 bpm.  = 5%. Estimated battery longevity to ROSEMARIE = 2 years. Lead trends appear stable. Patient reports that he is feeling well since the last shock, but occasionally has some lightheadedness. Plan for patient to return to clinic 3/30/20 as scheduled.  JEAN Sherman RN, BSN.      Stress echo 7.30.19     Submaximal stress test, achieved 74% of the age predicted maximal heart rate.  Limiting symptom fatigue.     Normal blood pressure response to exercise.  No angina symptoms with exercise.  Baseline rhythm is atrial fibrillation. No ECG evidence of ischemia. There is  no chronotropic incompetence.  Severely reduced LV function with EF of 28% at rest; with exercise left  ventricular cavity size and LVEF did not change significantly.  Akinesis of the basal-mid inferior/inferoseptal/inferolateral segments  present. No new stress induced regional wall motion abnormalities evident.  Less than average functional capacity for age. Exercise time ~2 minutes.     Biventricular dysfunction with moderate mitral and tricuspid regurgitation and  evidence of hypervolemia noted on screening 2D and Doppler examination.        Aug 2019 RHC and coronary angiogram       Right sided filling pressures are severely elevated - mean RA 23, PCWP 31, mean PA 40     Moderately elevated pulmonary artery hypertension.    Left sided filling pressures are severely elevated.    Left ventricular filling pressures are severely elevated .    Reduced cardiac output level.    Heparin was held during the procedure due to thrombocytopenia    Nipride was started and titrate to 1mcg/kg/min. PCWP decreased from 30 mmHg to 24 mmHg. mPAP decreased from 40 mmHg to 30 mmHg. After pressures decreased team proceed with angiogram.    Mild LAD and LCx disease    Subtotal proximal RCA occlusion    No intervention performed    No mitral or aortic valve gradient     Assessment and Plan  70 y/o male with multiple myeloma s/p PBSCT in 2006, ICM/NICM  (LVEF 30-35%, NYHA II Stage B) s/p ICD placement in 2009, episodic VT/VF treated by appropriate ICD shocks and afib (CHADVASC 3) on warfarin comes in for management of cardiomyopathy. Today he appears euvolemic.     #ICM/NICM, LVEF 30-35% (NYHA II, Stage B)  #s/p ICD 2009  #Volume overload  #Weight gain  Does not have signs of volume overload.    - Continue with furosemide to 20 mg/d  - Continue with metoprolol succ 100mg daily  - Low Na diet  - Daily weights, -192lbs  - Continue lisinopril 5mg BID    #Hx of VT/F  Last ICD shock in 10/2019  - Continue mexiletine per EP  - continue BB.      #Hx of CAD  #Chronic RCA occlusion   Had angiogram in 08/12/19 that showed  of proximal RCA with bridging collaterals from distal LAD. Plan was to revascularized vessel but patient was in decompensated CHF.   - Will reassess benefit of revascularization as patient improving     #Afib (CHADVASC 3) on warfarin  - Continue with warfarin  - BB as above     Alec Monreal MD  PGY 4 Cardiology fellow       ATTENDING ATTESTATION:  This patient has been seen and examined by me February 24, 2020 with  Alec Monreal MD, cardiology fellow. I have reviewed the vitals, laboratory and imaging data relevant to this patient's care. I have edited this note to reflect our joint assessment and plan, and discussed the plan with the patient.    Jonh is a 69 year old with -   1. Chronic Systolic Heart Failure (EF 25-30% in July 2019)  2. Permanent Atrial Fibrillation on warfarin  3. Ischemic heart disease - Subtotal proximal RCA occlusion and mild LAD and LCx disease August 2019  4. History of VT/VF, s/p ICD  5. Hypertension   6. Relapsed multiple myeloma with mets to brain and bone s/p BMT, currently on chemo holiday  7. T2DM  8. Hyperlipidemia     Patient developed worsening heart failure symptoms since being on bortezomib.  Today, he denies any angina or heart failure symptoms. He denies any orthopnea or PND.  On exam he is  eurvolemic, his lungs are clear.  He has a mitral regurgitation murmur on exam.  His blood pressure is in normal range and he is in atrial fibrillation with a slow ventricular response rate. This is unchanged compared to his testing in Nov 2019 and he has not had any recent ICD shocks. His renal function is stable.  Digoxin level was 0.9 in November 2019.  I will continue low-dose digoxin which was started to optimize his heart failure regimen.  He will continue the lisinopril along with mexiletine and simvastatin.     Plan to see him back in 6 months       Yoko Benson MD, MS  Staff Cardiologist  Pager: [phone]  Yoko Benson MD, MS  Staff Cardiologist  Pager: [phone]    CC  Patient Care Team:  Cong Mcdonald as PCP - General  [facility] - Internal Medicine  Rojas Raygoza MD as BMT Physician  Cong Mcdonald as Referring Physician  Yaz Jeong NP as Nurse Practitioner (Nurse Practitioner)  Eligio Mora as Family Medicine Physician  Merry Mas RN as Continuity Care Coordinator (Transplant)  Zayra Lyle RN as Specialty Care Coordinator (Cardiology)  Ja Duarte MD as MD (Clinical Cardiac Electrophysiology)  Marichuy Shelley APRN CNP as Assigned PCP  Omayra Serra, RN as Specialty Care Coordinator (Cardiology)  YOKO BENSON    Answers for HPI/ROS submitted by the patient on 2/24/2020   General Symptoms: No  Skin Symptoms: No  HENT Symptoms: Yes  EYE SYMPTOMS: No  HEART SYMPTOMS: Yes  LUNG SYMPTOMS: No  INTESTINAL SYMPTOMS: Yes  URINARY SYMPTOMS: No  REPRODUCTIVE SYMPTOMS: No  SKELETAL SYMPTOMS: Yes  BLOOD SYMPTOMS: Yes  NERVOUS SYSTEM SYMPTOMS: No  MENTAL HEALTH SYMPTOMS: No  Ear pain: No  Ear discharge: No  Hearing loss: Yes  Tinnitus: No  Nosebleeds: No  Congestion: No  Sinus pain: No  Trouble swallowing: No   Voice hoarseness: Yes  Mouth sores: No  Sore throat: No  Tooth pain: No  Gum tenderness: No  Bleeding gums:  No  Change in taste: No  Change in sense of smell: No  Dry mouth: No  Hearing aid used: Yes  Neck lump: No  Chest pain or pressure: No  Fast or irregular heartbeat: Yes  Pain in legs with walking: No  Trouble breathing while lying down: No  Fingers or toes appear blue: No  High blood pressure: No  Low blood pressure: Yes  Fainting: No  Murmurs: No  Pacemaker: No  Varicose veins: Yes  Edema or swelling: No  Wake up at night with shortness of breath: No  Light-headedness: Yes  Exercise intolerance: Yes  Heart burn or indigestion: Yes  Nausea: Yes  Vomiting: No  Abdominal pain: No  Bloating: No  Constipation: No  Diarrhea: Yes  Blood in stool: No  Black stools: No  Rectal or Anal pain: No  Fecal incontinence: No  Yellowing of skin or eyes: No  Vomit with blood: No  Change in stools: No  Back pain: Yes  Muscle aches: Yes  Neck pain: No  Swollen joints: No  Joint pain: No  Bone pain: Yes  Muscle cramps: Yes  Muscle weakness: Yes  Joint stiffness: Yes  Bone fracture: No  Anemia: No  Swollen glands: No  Easy bleeding or bruising: Yes

## 2020-02-25 LAB — INTERPRETATION ECG - MUSE: NORMAL

## 2020-03-05 ENCOUNTER — DOCUMENTATION ONLY (OUTPATIENT)
Dept: CARE COORDINATION | Facility: CLINIC | Age: 70
End: 2020-03-05

## 2020-04-02 ENCOUNTER — CARE COORDINATION (OUTPATIENT)
Dept: TRANSPLANT | Facility: CLINIC | Age: 70
End: 2020-04-02

## 2020-04-02 NOTE — PROGRESS NOTES
RE: Erasmo Pearce   52     Multiple Myeloma 203.00     [address]   [address] [place]  [address]  [phone] (H)  [phone] (M)     Verbal order from Dr. Rock Raygoza to Merry Mas RN     CBC d/plts, BMP with Mg++ to be drawn every two weeks.  Serum light chains and SPEP to be drawn monthly.    Please fax results to Dr. Raygoza at [phone].     [facility] [place], [place]  Lab phone: [phone]  Fax: [phone]       Please call Merry Mas RN BMT care coordinator at [phone] with any questions.           Dr.Gregory Jaret MD/frankie  [facility]   Blood and Marrow Transplant Program  [facility] [address] [place] [address]      [phone]

## 2020-04-13 DIAGNOSIS — C90.02 MULTIPLE MYELOMA IN RELAPSE (H): ICD-10-CM

## 2020-04-13 RX ORDER — POTASSIUM CHLORIDE 750 MG/1
TABLET, EXTENDED RELEASE ORAL
Qty: 90 TABLET | Refills: 1 | Status: SHIPPED | OUTPATIENT
Start: 2020-04-13 | End: 2020-08-26

## 2020-04-15 ENCOUNTER — TRANSFERRED RECORDS (OUTPATIENT)
Dept: HEALTH INFORMATION MANAGEMENT | Facility: CLINIC | Age: 70
End: 2020-04-15

## 2020-05-11 ENCOUNTER — TRANSFERRED RECORDS (OUTPATIENT)
Dept: HEALTH INFORMATION MANAGEMENT | Facility: CLINIC | Age: 70
End: 2020-05-11

## 2020-05-23 ENCOUNTER — MYC REFILL (OUTPATIENT)
Dept: CARDIOLOGY | Facility: CLINIC | Age: 70
End: 2020-05-23

## 2020-05-23 DIAGNOSIS — I48.21 PERMANENT ATRIAL FIBRILLATION (H): ICD-10-CM

## 2020-05-26 NOTE — TELEPHONE ENCOUNTER
mexiletine (MEXITIL) 150 MG capsule       Last Written Prescription Date:  3-26-19  Last Fill Quantity: 180,   # refills: 3  Last Office Visit : 2-  Future Office visit:  8-    Routing refill request to provider for review/approval because:  Not on protocol.  [facility] requires hard copy signed and faxed - no telephone orders or e scribe available.      Kathleen M Doege RN

## 2020-05-27 DIAGNOSIS — I48.21 PERMANENT ATRIAL FIBRILLATION (H): ICD-10-CM

## 2020-05-27 RX ORDER — MEXILETINE HYDROCHLORIDE 150 MG/1
150 CAPSULE ORAL 2 TIMES DAILY
Qty: 180 CAPSULE | Refills: 3 | Status: SHIPPED | OUTPATIENT
Start: 2020-05-27 | End: 2020-05-27

## 2020-05-27 RX ORDER — MEXILETINE HYDROCHLORIDE 150 MG/1
150 CAPSULE ORAL 2 TIMES DAILY
Qty: 180 CAPSULE | Refills: 3 | Status: SHIPPED | OUTPATIENT
Start: 2020-05-27 | End: 2020-05-28

## 2020-05-27 NOTE — PROGRESS NOTES
Unable to print hard copy of script at home to be fax[facility] to the [facility]. It appears the [facility] is exempt for federal regulations requiring the ability to use escripts. Someone physically at the clinic will need to print the script, scan it, email it to me, so I can print it, scan it, email it back to the clinic, so they can print and then fax it to the [facility].

## 2020-05-28 ENCOUNTER — CARE COORDINATION (OUTPATIENT)
Dept: TRANSPLANT | Facility: CLINIC | Age: 70
End: 2020-05-28

## 2020-05-28 ENCOUNTER — TELEPHONE (OUTPATIENT)
Dept: TRANSPLANT | Facility: CLINIC | Age: 70
End: 2020-05-28

## 2020-05-28 RX ORDER — MEXILETINE HYDROCHLORIDE 150 MG/1
150 CAPSULE ORAL 2 TIMES DAILY
Qty: 180 CAPSULE | Refills: 3 | Status: SHIPPED | OUTPATIENT
Start: 2020-05-28 | End: 2021-01-01

## 2020-05-28 NOTE — PROGRESS NOTES
In Dr. Isaacs's review of labs drawn on 5/27, he noted that the creatinine was higher than usual at 1.6 whereas usually runs around 1.3. In speaking to Carla (wife) and Jonh this morning,   Jonh reports feeling fantastic. The best he's felt in months! He has been doing projects like painting the porch and a lot of yard work. Denies ankle swelling or extra weight gain. He does report still having intermittent diarrhea and remembers having had it the night before his last lab draw on the 27th.  He was reminded to compensate with extra fluids when having a diarrhea episode. Carla says she reminds him to drink and will continue to do so.  He has labs to be drawn again on 6/2.    He is scheduled for an appointment with Dr. HILL on 6/9.

## 2020-06-02 DIAGNOSIS — C90.02 MULTIPLE MYELOMA IN RELAPSE (H): ICD-10-CM

## 2020-06-02 LAB
ALBUMIN SERPL-MCNC: 3.8 G/DL (ref 3.4–5)
ALP SERPL-CCNC: 105 U/L (ref 40–150)
ALT SERPL W P-5'-P-CCNC: 22 U/L (ref 0–70)
ANION GAP SERPL CALCULATED.3IONS-SCNC: 4 MMOL/L (ref 3–14)
AST SERPL W P-5'-P-CCNC: 17 U/L (ref 0–45)
BASOPHILS # BLD AUTO: 0 10E9/L (ref 0–0.2)
BASOPHILS NFR BLD AUTO: 0.7 %
BILIRUB SERPL-MCNC: 0.4 MG/DL (ref 0.2–1.3)
BUN SERPL-MCNC: 28 MG/DL (ref 7–30)
CALCIUM SERPL-MCNC: 9.1 MG/DL (ref 8.5–10.1)
CHLORIDE SERPL-SCNC: 107 MMOL/L (ref 94–109)
CO2 SERPL-SCNC: 28 MMOL/L (ref 20–32)
COLLECT DURATION TIME UR: 23 H
CREAT 24H UR-MRATE: 1.52 G/(24.H) (ref 1–2)
CREAT SERPL-MCNC: 1.39 MG/DL (ref 0.66–1.25)
CREAT UR-MCNC: 64 MG/DL
CRP SERPL-MCNC: 4 MG/L (ref 0–8)
DIFFERENTIAL METHOD BLD: ABNORMAL
EOSINOPHIL # BLD AUTO: 0.1 10E9/L (ref 0–0.7)
EOSINOPHIL NFR BLD AUTO: 1.7 %
ERYTHROCYTE [DISTWIDTH] IN BLOOD BY AUTOMATED COUNT: 13.2 % (ref 10–15)
ERYTHROCYTE [SEDIMENTATION RATE] IN BLOOD BY WESTERGREN METHOD: 19 MM/H (ref 0–20)
GFR SERPL CREATININE-BSD FRML MDRD: 51 ML/MIN/{1.73_M2}
GLUCOSE SERPL-MCNC: 109 MG/DL (ref 70–99)
HCT VFR BLD AUTO: 38.9 % (ref 40–53)
HGB BLD-MCNC: 12.2 G/DL (ref 13.3–17.7)
IMM GRANULOCYTES # BLD: 0 10E9/L (ref 0–0.4)
IMM GRANULOCYTES NFR BLD: 0.3 %
LYMPHOCYTES # BLD AUTO: 1.5 10E9/L (ref 0.8–5.3)
LYMPHOCYTES NFR BLD AUTO: 24.9 %
MAGNESIUM SERPL-MCNC: 2.1 MG/DL (ref 1.6–2.3)
MCH RBC QN AUTO: 34.1 PG (ref 26.5–33)
MCHC RBC AUTO-ENTMCNC: 31.4 G/DL (ref 31.5–36.5)
MCV RBC AUTO: 109 FL (ref 78–100)
MONOCYTES # BLD AUTO: 0.6 10E9/L (ref 0–1.3)
MONOCYTES NFR BLD AUTO: 10 %
NEUTROPHILS # BLD AUTO: 3.8 10E9/L (ref 1.6–8.3)
NEUTROPHILS NFR BLD AUTO: 62.4 %
NRBC # BLD AUTO: 0 10*3/UL
NRBC BLD AUTO-RTO: 0 /100
PLATELET # BLD AUTO: 165 10E9/L (ref 150–450)
POTASSIUM SERPL-SCNC: 5.3 MMOL/L (ref 3.4–5.3)
PROT 24H UR-MRATE: 0.2 G/(24.H) (ref 0.04–0.23)
PROT SERPL-MCNC: 7.4 G/DL (ref 6.8–8.8)
PROT UR-MCNC: 0.08 G/L
PROT/CREAT 24H UR: 0.13 G/G CR (ref 0–0.2)
RBC # BLD AUTO: 3.58 10E12/L (ref 4.4–5.9)
SODIUM SERPL-SCNC: 139 MMOL/L (ref 133–144)
SPECIMEN VOL UR: 2300 ML
WBC # BLD AUTO: 6 10E9/L (ref 4–11)

## 2020-06-02 PROCEDURE — 84166 PROTEIN E-PHORESIS/URINE/CSF: CPT | Performed by: INTERNAL MEDICINE

## 2020-06-02 PROCEDURE — 83883 ASSAY NEPHELOMETRY NOT SPEC: CPT | Performed by: INTERNAL MEDICINE

## 2020-06-02 PROCEDURE — 86335 IMMUNFIX E-PHORSIS/URINE/CSF: CPT | Performed by: INTERNAL MEDICINE

## 2020-06-02 PROCEDURE — 82784 ASSAY IGA/IGD/IGG/IGM EACH: CPT | Performed by: INTERNAL MEDICINE

## 2020-06-02 PROCEDURE — 00000402 ZZHCL STATISTIC TOTAL PROTEIN: Performed by: INTERNAL MEDICINE

## 2020-06-02 PROCEDURE — 84165 PROTEIN E-PHORESIS SERUM: CPT | Performed by: INTERNAL MEDICINE

## 2020-06-03 LAB
ALBUMIN SERPL ELPH-MCNC: 4.1 G/DL (ref 3.7–5.1)
ALPHA1 GLOB SERPL ELPH-MCNC: 0.4 G/DL (ref 0.2–0.4)
ALPHA2 GLOB SERPL ELPH-MCNC: 0.8 G/DL (ref 0.5–0.9)
B-GLOBULIN SERPL ELPH-MCNC: 0.8 G/DL (ref 0.6–1)
GAMMA GLOB SERPL ELPH-MCNC: 0.9 G/DL (ref 0.7–1.6)
IGA SERPL-MCNC: 256 MG/DL (ref 84–499)
IGG SERPL-MCNC: 916 MG/DL (ref 610–1616)
IGM SERPL-MCNC: 31 MG/DL (ref 35–242)
KAPPA LC UR-MCNC: 2.84 MG/DL (ref 0.33–1.94)
KAPPA LC/LAMBDA SER: 0.27 {RATIO} (ref 0.26–1.65)
LAMBDA LC SERPL-MCNC: 10.39 MG/DL (ref 0.57–2.63)
M PROTEIN SERPL ELPH-MCNC: 0 G/DL
PROT ELPH PNL UR ELPH: NORMAL
PROT PATTERN SERPL ELPH-IMP: NORMAL
PROT PATTERN UR ELPH-IMP: NORMAL

## 2020-06-09 ENCOUNTER — ONCOLOGY VISIT (OUTPATIENT)
Dept: TRANSPLANT | Facility: CLINIC | Age: 70
End: 2020-06-09
Attending: INTERNAL MEDICINE
Payer: MEDICARE

## 2020-06-09 VITALS
WEIGHT: 197.4 LBS | RESPIRATION RATE: 18 BRPM | SYSTOLIC BLOOD PRESSURE: 110 MMHG | DIASTOLIC BLOOD PRESSURE: 60 MMHG | TEMPERATURE: 96.9 F | OXYGEN SATURATION: 96 % | BODY MASS INDEX: 28.32 KG/M2 | HEART RATE: 50 BPM

## 2020-06-09 DIAGNOSIS — C90.00 MULTIPLE MYELOMA, REMISSION STATUS UNSPECIFIED (H): Primary | ICD-10-CM

## 2020-06-09 PROCEDURE — G0463 HOSPITAL OUTPT CLINIC VISIT: HCPCS

## 2020-06-09 ASSESSMENT — PAIN SCALES - GENERAL: PAINLEVEL: NO PAIN (0)

## 2020-06-09 NOTE — PROGRESS NOTES
B[facility]OW CLINIC VISIT       Jonh returns to the [facility] for evaluation of multiple myeloma, status post autologous double tandem peripheral blood stem cell transplant, coronary artery disease, heart failure and history of ventricular tachycardia with an ICD defibrillator He has been off Velcade, Revlimid and dexamethasone since August. His SOB has improved  since on digoxin, lisinopril spironolactone mexilitene, lasix 20mg every day and metoprolol 100mg qd.Has Had no ICD  Triggering  since last visit. No  Fever no chills no COVID sx Still on the warfarin He was Taking 9 MgOx per day which is causing loose stools Cr increased to 1.64 and K+ to 5.1 He decreased Mg to 7 pills per day He has some back pain and still takes fentanyl and oxycodone      PAST MEDICAL HISTORY, SOCIAL HISTORY AND FAMILY HISTORY:  See my note from 02/2020.      REVIEW OF SYSTEMS:  A 12 point review of systems done is negative as in HPI.      PHYSICAL EXAMINATION: /60   Pulse 50   Temp 96.9  F (36.1  C) (Tympanic)   Resp 18   Wt 89.5 kg (197 lb 6.4 oz)   SpO2 96%   BMI 28.32 kg/m      GENERAL:  He is an alert gentleman in no acute distress.   VITAL SIGNS:  Stable.   HEENT:  Normocephalic.  EOM okay.  Mouth without lesion.   RESPIRATORY:  Clear to percussion and auscultation.   CARDIAC:  Irregularly irregular rhythm.  No S3, S4 or murmur.   ABDOMEN:  Soft without hepatosplenomegaly.   EXTREMITIES:  No edema.     Results for JONH ZAPATA (MRN [id number]) as of 6/9/2020 10:48   Ref. Range 2/11/2020 03:15 2/11/2020 10:31 2/18/2020 11:52 2/24/2020 11:30 4/15/2020 00:00 5/11/2020 00:00 6/2/2020 04:15 6/2/2020 10:31   Sodium Latest Ref Range: 133 - 144 mmol/L  140 139     139   Potassium Latest Ref Range: 3.4 - 5.3 mmol/L  5.1 4.6     5.3   Chloride Latest Ref Range: 94 - 109 mmol/L  107 105     107   Carbon Dioxide Latest Ref Range: 20 - 32 mmol/L  28 30     28   Urea Nitrogen Latest Ref Range: 7 - 30 mg/dL   23 26     28   Creatinine Latest Ref Range: 0.66 - 1.25 mg/dL  1.23 1.19     1.39 (H)   GFR Estimate Latest Ref Range: >60 mL/min/1.73_m2  60 (L) 62     51 (L)   GFR Estimate If Black Latest Ref Range: >60 mL/min/1.73_m2  69 72     60 (L)   Calcium Latest Ref Range: 8.5 - 10.1 mg/dL  9.4 9.0     9.1   Anion Gap Latest Ref Range: 3 - 14 mmol/L  5 4     4   Magnesium Latest Ref Range: 1.6 - 2.3 mg/dL   1.8     2.1   Albumin Latest Ref Range: 3.4 - 5.0 g/dL  3.9      3.8   Protein Total Latest Ref Range: 6.8 - 8.8 g/dL  7.5      7.4   Bilirubin Total Latest Ref Range: 0.2 - 1.3 mg/dL  0.8      0.4   Alkaline Phosphatase Latest Ref Range: 40 - 150 U/L  106      105   ALT Latest Ref Range: 0 - 70 U/L  22      22   AST Latest Ref Range: 0 - 45 U/L  19      17   Creatinine Urine Timed Latest Ref Range: 1.00 - 2.00  1.01      1.52    Creatinine Urine Latest Units: mg/dL 73      64    CRP Inflammation Latest Ref Range: 0.0 - 8.0 mg/L  <2.9      4.0   Lactate Dehydrogenase Latest Ref Range: 85 - 227 U/L  178         Protein Random Urine Latest Units: g/L 0.07      0.08    Protein Total Urine g/gr Creatinine Latest Ref Range: 0 - 0.2 g/g Cr 0.10      0.13    Protein Total 24 Hr Urine Latest Ref Range: 0.04 - 0.23  0.10      0.20    Glucose Latest Ref Range: 70 - 99 mg/dL  115 (H) 104 (H)     109 (H)   WBC Latest Ref Range: 4.0 - 11.0 10e9/L  8.2      6.0   Hemoglobin Latest Ref Range: 13.3 - 17.7 g/dL  12.8 (L)      12.2 (L)   Hematocrit Latest Ref Range: 40.0 - 53.0 %  38.9 (L)      38.9 (L)   Platelet Count Latest Ref Range: 150 - 450 10e9/L  167      165   RBC Count Latest Ref Range: 4.4 - 5.9 10e12/L  3.69 (L)      3.58 (L)   MCV Latest Ref Range: 78 - 100 fl  105 (H)      109 (H)   MCH Latest Ref Range: 26.5 - 33.0 pg  34.7 (H)      34.1 (H)   MCHC Latest Ref Range: 31.5 - 36.5 g/dL  32.9      31.4 (L)   RDW Latest Ref Range: 10.0 - 15.0 %  12.5      13.2   Diff Method Unknown  Automated Method      Automated Method   %  Neutrophils Latest Units: %  70.0      62.4   % Lymphocytes Latest Units: %  16.9      24.9   % Monocytes Latest Units: %  10.4      10.0   % Eosinophils Latest Units: %  1.7      1.7   % Basophils Latest Units: %  0.6      0.7   % Immature Granulocytes Latest Units: %  0.4      0.3   Nucleated RBCs Latest Ref Range: 0 /100  0      0   Absolute Neutrophil Latest Ref Range: 1.6 - 8.3 10e9/L  5.7      3.8   Absolute Lymphocytes Latest Ref Range: 0.8 - 5.3 10e9/L  1.4      1.5   Absolute Monocytes Latest Ref Range: 0.0 - 1.3 10e9/L  0.9      0.6   Absolute Eosinophils Latest Ref Range: 0.0 - 0.7 10e9/L  0.1      0.1   Absolute Basophils Latest Ref Range: 0.0 - 0.2 10e9/L  0.1      0.0   Abs Immature Granulocytes Latest Ref Range: 0 - 0.4 10e9/L  0.0      0.0   Absolute Nucleated RBC Unknown  0.0      0.0   Sed Rate Latest Ref Range: 0 - 20 mm/h        19   Albumin Fraction Latest Ref Range: 3.7 - 5.1 g/dL  4.1      4.1   Alpha 1 Fraction Latest Ref Range: 0.2 - 0.4 g/dL  0.3      0.4   Alpha 2 Fraction Latest Ref Range: 0.5 - 0.9 g/dL  0.8      0.8   Beta Fraction Latest Ref Range: 0.6 - 1.0 g/dL  0.8      0.8   ELP Interpretation: Unknown  Essentially cathy...      Essentially cathy...   ELP Interpretation Urine Unknown Only trace albumi...      Only trace albumi...    Gamma Fraction Latest Ref Range: 0.7 - 1.6 g/dL  1.1      0.9   IGA Latest Ref Range: 84 - 499 mg/dL  289      256   IGG Latest Ref Range: 610 - 1,616 mg/dL  1,075      916   IGM Latest Ref Range: 35 - 242 mg/dL  49      31 (L)   Immunofix ELP Urine Unknown (Note)      (Note)    Kappa Free Lt Chain Latest Ref Range: 0.33 - 1.94 mg/dL  3.14 (H)      2.84 (H)   Kappa Lambda Ratio Latest Ref Range: 0.26 - 1.65   0.66      0.27   Lambda Free Lt Chain Latest Ref Range: 0.57 - 2.63 mg/dL  4.76 (H)      10.39 (H)   Monoclonal Peak Latest Ref Range: 0.0 g/dL  0.0      0.0   LAB RESULT - HIM SCAN Unknown     Attch Attch     EKG 12-LEAD, TRACING ONLY Unknown     Rpt         ASSESSMENT:   1.  Multiple myeloma.   2.  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   3.  Cardiomyopathy with CHF.   4.  Atrial fibrillation.   5.  History of deep vein thrombosis, on warfarin.   6.  Chronic back pain.   7.  Aortic aneurysm.   8.  History of plasmacytoma of the clivus, status post radiation.   9.  History of ventricular tachycardia with implantable defibrillator in place.   10.  Hypomagnesemia.     11  Hyperkalemia     Jonh is really looking very good.  I will continue drug holiday for Jonh  He has no M spike and Light chains ratio still in normal range The increase in lambda chains may be related to renal insufficiency. I will decrease MgOx to 6 pills per day because of loose stoolsand ask to change KCl to 20meq every other day.  That he keeps fluids up.We will follow labs monthly.  .  I think Velcade could have  added to some cardiotoxicity. Back pain worsening a bit worrisome May need to image with CT (MRI  Problematic with ICD) Will not change opiates      RTC in 4 months    Rojas Raygoza MD  [phone]

## 2020-06-09 NOTE — LETTER
6/9/2020         RE: Erasmo Zapata  [address]  [address] [facility] [address]        Dear Colleague,    Thank you for referring your patient, Erasmo Zapata, to the [facility]ANSPLANT. Please see a copy of my visit note below.    BONE MARROW CLINIC VISIT       Jonh returns to the [facility] Transplant Clinic for evaluation of multiple myeloma, status post autologous double tandem peripheral blood stem cell transplant, coronary artery disease, heart failure and history of ventricular tachycardia with an ICD defibrillator He has been off Velcade, Revlimid and dexamethasone since August. His SOB has improved  since on digoxin, lisinopril spironolactone mexilitene, lasix 20mg every day and metoprolol 100mg qd.Has Had no ICD  Triggering  since last visit. No  Fever no chills no COVID sx Still on the warfarin He was Taking 9 MgOx per day which is causing loose stools Cr increased to 1.64 and K+ to 5.1 He decreased Mg to 7 pills per day He has some back pain and still takes fentanyl and oxycodone      PAST MEDICAL HISTORY, SOCIAL HISTORY AND FAMILY HISTORY:  See my note from 02/2020.      REVIEW OF SYSTEMS:  A 12 point review of systems done is negative as in HPI.      PHYSICAL EXAMINATION: /60   Pulse 50   Temp 96.9  F (36.1  C) (Tympanic)   Resp 18   Wt 89.5 kg (197 lb 6.4 oz)   SpO2 96%   BMI 28.32 kg/m      GENERAL:  He is an alert gentleman in no acute distress.   VITAL SIGNS:  Stable.   HEENT:  Normocephalic.  EOM okay.  Mouth without lesion.   RESPIRATORY:  Clear to percussion and auscultation.   CARDIAC:  Irregularly irregular rhythm.  No S3, S4 or murmur.   ABDOMEN:  Soft without hepatosplenomegaly.   EXTREMITIES:  No edema.     Results for JONH ZAPATA (MRN [id number]) as of 6/9/2020 10:48   Ref. Range 2/11/2020 03:15 2/11/2020 10:31 2/18/2020 11:52 2/24/2020 11:30 4/15/2020 00:00 5/11/2020 00:00 6/2/2020 04:15 6/2/2020 10:31   Sodium Latest Ref  Range: 133 - 144 mmol/L  140 139     139   Potassium Latest Ref Range: 3.4 - 5.3 mmol/L  5.1 4.6     5.3   Chloride Latest Ref Range: 94 - 109 mmol/L  107 105     107   Carbon Dioxide Latest Ref Range: 20 - 32 mmol/L  28 30     28   Urea Nitrogen Latest Ref Range: 7 - 30 mg/dL  23 26     28   Creatinine Latest Ref Range: 0.66 - 1.25 mg/dL  1.23 1.19     1.39 (H)   GFR Estimate Latest Ref Range: >60 mL/min/1.73_m2  60 (L) 62     51 (L)   GFR Estimate If Black Latest Ref Range: >60 mL/min/1.73_m2  69 72     60 (L)   Calcium Latest Ref Range: 8.5 - 10.1 mg/dL  9.4 9.0     9.1   Anion Gap Latest Ref Range: 3 - 14 mmol/L  5 4     4   Magnesium Latest Ref Range: 1.6 - 2.3 mg/dL   1.8     2.1   Albumin Latest Ref Range: 3.4 - 5.0 g/dL  3.9      3.8   Protein Total Latest Ref Range: 6.8 - 8.8 g/dL  7.5      7.4   Bilirubin Total Latest Ref Range: 0.2 - 1.3 mg/dL  0.8      0.4   Alkaline Phosphatase Latest Ref Range: 40 - 150 U/L  106      105   ALT Latest Ref Range: 0 - 70 U/L  22      22   AST Latest Ref Range: 0 - 45 U/L  19      17   Creatinine Urine Timed Latest Ref Range: 1.00 - 2.00  1.01      1.52    Creatinine Urine Latest Units: mg/dL 73      64    CRP Inflammation Latest Ref Range: 0.0 - 8.0 mg/L  <2.9      4.0   Lactate Dehydrogenase Latest Ref Range: 85 - 227 U/L  178         Protein Random Urine Latest Units: g/L 0.07      0.08    Protein Total Urine g/gr Creatinine Latest Ref Range: 0 - 0.2 g/g Cr 0.10      0.13    Protein Total 24 Hr Urine Latest Ref Range: 0.04 - 0.23  0.10      0.20    Glucose Latest Ref Range: 70 - 99 mg/dL  115 (H) 104 (H)     109 (H)   WBC Latest Ref Range: 4.0 - 11.0 10e9/L  8.2      6.0   Hemoglobin Latest Ref Range: 13.3 - 17.7 g/dL  12.8 (L)      12.2 (L)   Hematocrit Latest Ref Range: 40.0 - 53.0 %  38.9 (L)      38.9 (L)   Platelet Count Latest Ref Range: 150 - 450 10e9/L  167      165   RBC Count Latest Ref Range: 4.4 - 5.9 10e12/L  3.69 (L)      3.58 (L)   MCV Latest Ref Range:  78 - 100 fl  105 (H)      109 (H)   MCH Latest Ref Range: 26.5 - 33.0 pg  34.7 (H)      34.1 (H)   MCHC Latest Ref Range: 31.5 - 36.5 g/dL  32.9      31.4 (L)   RDW Latest Ref Range: 10.0 - 15.0 %  12.5      13.2   Diff Method Unknown  Automated Method      Automated Method   % Neutrophils Latest Units: %  70.0      62.4   % Lymphocytes Latest Units: %  16.9      24.9   % Monocytes Latest Units: %  10.4      10.0   % Eosinophils Latest Units: %  1.7      1.7   % Basophils Latest Units: %  0.6      0.7   % Immature Granulocytes Latest Units: %  0.4      0.3   Nucleated RBCs Latest Ref Range: 0 /100  0      0   Absolute Neutrophil Latest Ref Range: 1.6 - 8.3 10e9/L  5.7      3.8   Absolute Lymphocytes Latest Ref Range: 0.8 - 5.3 10e9/L  1.4      1.5   Absolute Monocytes Latest Ref Range: 0.0 - 1.3 10e9/L  0.9      0.6   Absolute Eosinophils Latest Ref Range: 0.0 - 0.7 10e9/L  0.1      0.1   Absolute Basophils Latest Ref Range: 0.0 - 0.2 10e9/L  0.1      0.0   Abs Immature Granulocytes Latest Ref Range: 0 - 0.4 10e9/L  0.0      0.0   Absolute Nucleated RBC Unknown  0.0      0.0   Sed Rate Latest Ref Range: 0 - 20 mm/h        19   Albumin Fraction Latest Ref Range: 3.7 - 5.1 g/dL  4.1      4.1   Alpha 1 Fraction Latest Ref Range: 0.2 - 0.4 g/dL  0.3      0.4   Alpha 2 Fraction Latest Ref Range: 0.5 - 0.9 g/dL  0.8      0.8   Beta Fraction Latest Ref Range: 0.6 - 1.0 g/dL  0.8      0.8   ELP Interpretation: Unknown  Essentially cathy...      Essentially cathy...   ELP Interpretation Urine Unknown Only trace albumi...      Only trace albumi...    Gamma Fraction Latest Ref Range: 0.7 - 1.6 g/dL  1.1      0.9   IGA Latest Ref Range: 84 - 499 mg/dL  289      256   IGG Latest Ref Range: 610 - 1,616 mg/dL  1,075      916   IGM Latest Ref Range: 35 - 242 mg/dL  49      31 (L)   Immunofix ELP Urine Unknown (Note)      (Note)    Kappa Free Lt Chain Latest Ref Range: 0.33 - 1.94 mg/dL  3.14 (H)      2.84 (H)   Kappa Lambda Ratio  Latest Ref Range: 0.26 - 1.65   0.66      0.27   Lambda Free Lt Chain Latest Ref Range: 0.57 - 2.63 mg/dL  4.76 (H)      10.39 (H)   Monoclonal Peak Latest Ref Range: 0.0 g/dL  0.0      0.0   LAB RESULT - HIM SCAN Unknown     Attch Attch     EKG 12-LEAD, TRACING ONLY Unknown    Rpt         ASSESSMENT:   1.  Multiple myeloma.   2.  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   3.  Cardiomyopathy with CHF.   4.  Atrial fibrillation.   5.  History of deep vein thrombosis, on warfarin.   6.  Chronic back pain.   7.  Aortic aneurysm.   8.  History of plasmacytoma of the clivus, status post radiation.   9.  History of ventricular tachycardia with implantable defibrillator in place.   10.  Hypomagnesemia.     11  Hyperkalemia     Jonh is really looking very good.  I will continue drug holiday for Jonh  He has no M spike and Light chains ratio still in normal range The increase in lambda chains may be related to renal insufficiency. I will decrease MgOx to 6 pills per day because of loose stoolsand ask to change KCl to 20meq every other day.  That he keeps fluids up.We will follow labs monthly.  .  I think Velcade could have  added to some cardiotoxicity. Back pain worsening a bit worrisome May need to image with CT (MRI  Problematic with ICD) Will not change opiates      RTC in 4 months    Rojas Raygoza MD  [phone]

## 2020-06-30 ENCOUNTER — ANCILLARY PROCEDURE (OUTPATIENT)
Dept: CARDIOLOGY | Facility: CLINIC | Age: 70
End: 2020-06-30
Attending: INTERNAL MEDICINE
Payer: MEDICARE

## 2020-06-30 DIAGNOSIS — Z95.810 AUTOMATIC IMPLANTABLE CARDIOVERTER-DEFIBRILLATOR IN SITU: Chronic | ICD-10-CM

## 2020-06-30 DIAGNOSIS — I42.0 DILATED CARDIOMYOPATHY (H): Primary | Chronic | ICD-10-CM

## 2020-06-30 PROCEDURE — 93295 DEV INTERROG REMOTE 1/2/MLT: CPT | Performed by: INTERNAL MEDICINE

## 2020-06-30 PROCEDURE — 93296 REM INTERROG EVL PM/IDS: CPT | Mod: ZF

## 2020-07-05 LAB
MDC_IDC_EPISODE_DTM: NORMAL
MDC_IDC_EPISODE_DURATION: 10 S
MDC_IDC_EPISODE_DURATION: 110 S
MDC_IDC_EPISODE_DURATION: 12 S
MDC_IDC_EPISODE_DURATION: 12 S
MDC_IDC_EPISODE_DURATION: 17 S
MDC_IDC_EPISODE_DURATION: 18 S
MDC_IDC_EPISODE_DURATION: 18 S
MDC_IDC_EPISODE_DURATION: 22 S
MDC_IDC_EPISODE_DURATION: 26 S
MDC_IDC_EPISODE_DURATION: 27 S
MDC_IDC_EPISODE_DURATION: 29 S
MDC_IDC_EPISODE_DURATION: 29 S
MDC_IDC_EPISODE_DURATION: 30 S
MDC_IDC_EPISODE_DURATION: 38 S
MDC_IDC_EPISODE_DURATION: 4 S
MDC_IDC_EPISODE_DURATION: 47 S
MDC_IDC_EPISODE_DURATION: 47 S
MDC_IDC_EPISODE_DURATION: 49 S
MDC_IDC_EPISODE_DURATION: 49 S
MDC_IDC_EPISODE_DURATION: 5 S
MDC_IDC_EPISODE_DURATION: 6 S
MDC_IDC_EPISODE_DURATION: 64 S
MDC_IDC_EPISODE_DURATION: 71 S
MDC_IDC_EPISODE_DURATION: 75 S
MDC_IDC_EPISODE_DURATION: 89 S
MDC_IDC_EPISODE_ID: NORMAL
MDC_IDC_EPISODE_TYPE: NORMAL
MDC_IDC_EPISODE_VENDOR_TYPE: NORMAL
MDC_IDC_LEAD_IMPLANT_DT: NORMAL
MDC_IDC_LEAD_LOCATION: NORMAL
MDC_IDC_LEAD_LOCATION_DETAIL_1: NORMAL
MDC_IDC_LEAD_MFG: NORMAL
MDC_IDC_LEAD_MODEL: NORMAL
MDC_IDC_LEAD_POLARITY_TYPE: NORMAL
MDC_IDC_LEAD_SERIAL: NORMAL
MDC_IDC_MSMT_BATTERY_DTM: NORMAL
MDC_IDC_MSMT_BATTERY_REMAINING_LONGEVITY: 24 MO
MDC_IDC_MSMT_BATTERY_REMAINING_PERCENTAGE: 24 %
MDC_IDC_MSMT_BATTERY_STATUS: NORMAL
MDC_IDC_MSMT_CAP_CHARGE_DTM: NORMAL
MDC_IDC_MSMT_CAP_CHARGE_DTM: NORMAL
MDC_IDC_MSMT_CAP_CHARGE_ENERGY: 41 J
MDC_IDC_MSMT_CAP_CHARGE_TIME: 10.3 S
MDC_IDC_MSMT_CAP_CHARGE_TIME: 9.7 S
MDC_IDC_MSMT_CAP_CHARGE_TYPE: NORMAL
MDC_IDC_MSMT_CAP_CHARGE_TYPE: NORMAL
MDC_IDC_MSMT_LEADCHNL_RV_IMPEDANCE_VALUE: 361 OHM
MDC_IDC_PG_IMPLANT_DTM: NORMAL
MDC_IDC_PG_MFG: NORMAL
MDC_IDC_PG_MODEL: NORMAL
MDC_IDC_PG_SERIAL: NORMAL
MDC_IDC_PG_TYPE: NORMAL
MDC_IDC_SESS_CLINIC_NAME: NORMAL
MDC_IDC_SESS_DTM: NORMAL
MDC_IDC_SESS_TYPE: NORMAL
MDC_IDC_SET_BRADY_LOWRATE: 40 {BEATS}/MIN
MDC_IDC_SET_BRADY_MODE: NORMAL
MDC_IDC_SET_LEADCHNL_RV_PACING_AMPLITUDE: 2.4 V
MDC_IDC_SET_LEADCHNL_RV_PACING_POLARITY: NORMAL
MDC_IDC_SET_LEADCHNL_RV_PACING_PULSEWIDTH: 0.5 MS
MDC_IDC_SET_LEADCHNL_RV_SENSING_ADAPTATION_MODE: NORMAL
MDC_IDC_SET_LEADCHNL_RV_SENSING_POLARITY: NORMAL
MDC_IDC_SET_LEADCHNL_RV_SENSING_SENSITIVITY: 0.6 MV
MDC_IDC_SET_ZONE_DETECTION_INTERVAL: 286 MS
MDC_IDC_SET_ZONE_DETECTION_INTERVAL: 375 MS
MDC_IDC_SET_ZONE_TYPE: NORMAL
MDC_IDC_SET_ZONE_TYPE: NORMAL
MDC_IDC_SET_ZONE_VENDOR_TYPE: NORMAL
MDC_IDC_SET_ZONE_VENDOR_TYPE: NORMAL
MDC_IDC_STAT_BRADY_DTM_END: NORMAL
MDC_IDC_STAT_BRADY_DTM_START: NORMAL
MDC_IDC_STAT_BRADY_RV_PERCENT_PACED: 10 %
MDC_IDC_STAT_EPISODE_RECENT_COUNT: 0
MDC_IDC_STAT_EPISODE_RECENT_COUNT: 166
MDC_IDC_STAT_EPISODE_RECENT_COUNT: 3
MDC_IDC_STAT_EPISODE_RECENT_COUNT_DTM_END: NORMAL
MDC_IDC_STAT_EPISODE_RECENT_COUNT_DTM_START: NORMAL
MDC_IDC_STAT_EPISODE_TYPE: NORMAL
MDC_IDC_STAT_EPISODE_VENDOR_TYPE: NORMAL
MDC_IDC_STAT_TACHYTHERAPY_ATP_DELIVERED_RECENT: 0
MDC_IDC_STAT_TACHYTHERAPY_ATP_DELIVERED_TOTAL: 24
MDC_IDC_STAT_TACHYTHERAPY_RECENT_DTM_END: NORMAL
MDC_IDC_STAT_TACHYTHERAPY_RECENT_DTM_START: NORMAL
MDC_IDC_STAT_TACHYTHERAPY_SHOCKS_ABORTED_RECENT: 0
MDC_IDC_STAT_TACHYTHERAPY_SHOCKS_ABORTED_TOTAL: 23
MDC_IDC_STAT_TACHYTHERAPY_SHOCKS_DELIVERED_RECENT: 0
MDC_IDC_STAT_TACHYTHERAPY_SHOCKS_DELIVERED_TOTAL: 24
MDC_IDC_STAT_TACHYTHERAPY_TOTAL_DTM_END: NORMAL
MDC_IDC_STAT_TACHYTHERAPY_TOTAL_DTM_START: NORMAL

## 2020-07-08 ENCOUNTER — TRANSFERRED RECORDS (OUTPATIENT)
Dept: HEALTH INFORMATION MANAGEMENT | Facility: CLINIC | Age: 70
End: 2020-07-08

## 2020-07-12 ENCOUNTER — ANCILLARY PROCEDURE (OUTPATIENT)
Dept: CARDIOLOGY | Facility: CLINIC | Age: 70
End: 2020-07-12
Attending: INTERNAL MEDICINE
Payer: MEDICARE

## 2020-07-12 DIAGNOSIS — Z95.810 AUTOMATIC IMPLANTABLE CARDIOVERTER-DEFIBRILLATOR IN SITU: Chronic | ICD-10-CM

## 2020-07-12 DIAGNOSIS — I42.0 DILATED CARDIOMYOPATHY (H): Chronic | ICD-10-CM

## 2020-07-12 PROCEDURE — 93296 REM INTERROG EVL PM/IDS: CPT | Mod: ZF

## 2020-07-12 PROCEDURE — 99207 CARDIAC DEVICE CHECK - REMOTE: CPT | Performed by: INTERNAL MEDICINE

## 2020-07-21 LAB
MDC_IDC_EPISODE_DTM: NORMAL
MDC_IDC_EPISODE_DURATION: 17 S
MDC_IDC_EPISODE_DURATION: 21 S
MDC_IDC_EPISODE_ID: NORMAL
MDC_IDC_EPISODE_TYPE: NORMAL
MDC_IDC_EPISODE_VENDOR_TYPE: NORMAL
MDC_IDC_EPISODE_VENDOR_TYPE: NORMAL
MDC_IDC_LEAD_IMPLANT_DT: NORMAL
MDC_IDC_LEAD_LOCATION: NORMAL
MDC_IDC_LEAD_LOCATION_DETAIL_1: NORMAL
MDC_IDC_LEAD_MFG: NORMAL
MDC_IDC_LEAD_MODEL: NORMAL
MDC_IDC_LEAD_POLARITY_TYPE: NORMAL
MDC_IDC_LEAD_SERIAL: NORMAL
MDC_IDC_MSMT_BATTERY_DTM: NORMAL
MDC_IDC_MSMT_BATTERY_REMAINING_LONGEVITY: 18 MO
MDC_IDC_MSMT_BATTERY_REMAINING_PERCENTAGE: 24 %
MDC_IDC_MSMT_BATTERY_STATUS: NORMAL
MDC_IDC_MSMT_CAP_CHARGE_DTM: NORMAL
MDC_IDC_MSMT_CAP_CHARGE_DTM: NORMAL
MDC_IDC_MSMT_CAP_CHARGE_ENERGY: 41 J
MDC_IDC_MSMT_CAP_CHARGE_TIME: 10.3 S
MDC_IDC_MSMT_CAP_CHARGE_TIME: 9.7 S
MDC_IDC_MSMT_CAP_CHARGE_TYPE: NORMAL
MDC_IDC_MSMT_CAP_CHARGE_TYPE: NORMAL
MDC_IDC_MSMT_LEADCHNL_RV_IMPEDANCE_VALUE: 383 OHM
MDC_IDC_MSMT_LEADCHNL_RV_PACING_THRESHOLD_AMPLITUDE: 1.1 V
MDC_IDC_MSMT_LEADCHNL_RV_PACING_THRESHOLD_PULSEWIDTH: 0.5 MS
MDC_IDC_PG_IMPLANT_DTM: NORMAL
MDC_IDC_PG_MFG: NORMAL
MDC_IDC_PG_MODEL: NORMAL
MDC_IDC_PG_SERIAL: NORMAL
MDC_IDC_PG_TYPE: NORMAL
MDC_IDC_SESS_CLINIC_NAME: NORMAL
MDC_IDC_SESS_DTM: NORMAL
MDC_IDC_SESS_TYPE: NORMAL
MDC_IDC_SET_BRADY_LOWRATE: 40 {BEATS}/MIN
MDC_IDC_SET_BRADY_MODE: NORMAL
MDC_IDC_SET_LEADCHNL_RV_PACING_AMPLITUDE: 2.4 V
MDC_IDC_SET_LEADCHNL_RV_PACING_POLARITY: NORMAL
MDC_IDC_SET_LEADCHNL_RV_PACING_PULSEWIDTH: 0.5 MS
MDC_IDC_SET_LEADCHNL_RV_SENSING_ADAPTATION_MODE: NORMAL
MDC_IDC_SET_LEADCHNL_RV_SENSING_POLARITY: NORMAL
MDC_IDC_SET_LEADCHNL_RV_SENSING_SENSITIVITY: 0.6 MV
MDC_IDC_SET_ZONE_DETECTION_INTERVAL: 286 MS
MDC_IDC_SET_ZONE_DETECTION_INTERVAL: 375 MS
MDC_IDC_SET_ZONE_TYPE: NORMAL
MDC_IDC_SET_ZONE_TYPE: NORMAL
MDC_IDC_SET_ZONE_VENDOR_TYPE: NORMAL
MDC_IDC_SET_ZONE_VENDOR_TYPE: NORMAL
MDC_IDC_STAT_BRADY_DTM_END: NORMAL
MDC_IDC_STAT_BRADY_DTM_START: NORMAL
MDC_IDC_STAT_BRADY_RV_PERCENT_PACED: 11 %
MDC_IDC_STAT_EPISODE_RECENT_COUNT: 0
MDC_IDC_STAT_EPISODE_RECENT_COUNT: 2
MDC_IDC_STAT_EPISODE_RECENT_COUNT: 4
MDC_IDC_STAT_EPISODE_RECENT_COUNT_DTM_END: NORMAL
MDC_IDC_STAT_EPISODE_RECENT_COUNT_DTM_START: NORMAL
MDC_IDC_STAT_EPISODE_TYPE: NORMAL
MDC_IDC_STAT_EPISODE_VENDOR_TYPE: NORMAL
MDC_IDC_STAT_TACHYTHERAPY_ATP_DELIVERED_RECENT: 1
MDC_IDC_STAT_TACHYTHERAPY_ATP_DELIVERED_TOTAL: 25
MDC_IDC_STAT_TACHYTHERAPY_RECENT_DTM_END: NORMAL
MDC_IDC_STAT_TACHYTHERAPY_RECENT_DTM_START: NORMAL
MDC_IDC_STAT_TACHYTHERAPY_SHOCKS_ABORTED_RECENT: 1
MDC_IDC_STAT_TACHYTHERAPY_SHOCKS_ABORTED_TOTAL: 24
MDC_IDC_STAT_TACHYTHERAPY_SHOCKS_DELIVERED_RECENT: 0
MDC_IDC_STAT_TACHYTHERAPY_SHOCKS_DELIVERED_TOTAL: 24
MDC_IDC_STAT_TACHYTHERAPY_TOTAL_DTM_END: NORMAL
MDC_IDC_STAT_TACHYTHERAPY_TOTAL_DTM_START: NORMAL

## 2020-07-29 PROBLEM — I48.20 ATRIAL FIBRILLATION, CHRONIC (H): Status: ACTIVE | Noted: 2020-05-27

## 2020-07-29 PROBLEM — I71.9 AORTIC ANEURYSM (H): Status: ACTIVE | Noted: 2019-05-23

## 2020-07-29 PROBLEM — C90.00 MULTIPLE MYELOMA (H): Status: ACTIVE | Noted: 2018-01-23

## 2020-07-29 PROBLEM — H57.09 RELATIVE AFFERENT PUPILLARY DEFECT OF LEFT EYE: Status: ACTIVE | Noted: 2020-07-29

## 2020-07-29 PROBLEM — I50.22 CHRONIC SYSTOLIC HEART FAILURE (H): Status: ACTIVE | Noted: 2019-08-30

## 2020-07-29 PROBLEM — Z80.0 FAMILY HISTORY OF MALIGNANT NEOPLASM OF COLON: Status: ACTIVE | Noted: 2020-07-29

## 2020-07-29 PROBLEM — I50.9 CONGESTIVE HEART FAILURE (H): Status: ACTIVE | Noted: 2020-07-29

## 2020-07-29 PROBLEM — G89.4 CHRONIC PAIN DISORDER: Status: ACTIVE | Noted: 2020-07-29

## 2020-07-29 PROBLEM — F32.A DEPRESSION: Status: ACTIVE | Noted: 2020-07-29

## 2020-07-29 PROBLEM — H26.9 CATARACT OF BOTH EYES: Status: ACTIVE | Noted: 2020-07-29

## 2020-07-29 PROBLEM — Z94.84 HISTORY OF STEM CELL TRANSPLANT (H): Status: ACTIVE | Noted: 2020-07-29

## 2020-07-29 PROBLEM — N52.9 IMPOTENCE OF ORGANIC ORIGIN: Status: ACTIVE | Noted: 2020-07-29

## 2020-07-29 PROBLEM — R41.3 POOR SHORT-TERM MEMORY: Status: ACTIVE | Noted: 2020-07-29

## 2020-07-29 PROBLEM — Z79.01 CURRENT USE OF LONG TERM ANTICOAGULATION: Status: ACTIVE | Noted: 2020-07-29

## 2020-07-29 PROBLEM — Z95.0 PRESENCE OF CARDIAC PACEMAKER: Status: ACTIVE | Noted: 2020-07-29

## 2020-08-06 ENCOUNTER — TRANSFERRED RECORDS (OUTPATIENT)
Dept: HEALTH INFORMATION MANAGEMENT | Facility: CLINIC | Age: 70
End: 2020-08-06

## 2020-08-19 ENCOUNTER — TRANSFERRED RECORDS (OUTPATIENT)
Dept: HEALTH INFORMATION MANAGEMENT | Facility: CLINIC | Age: 70
End: 2020-08-19

## 2020-08-19 LAB
CREAT SERPL-MCNC: 1.12 MG/DL (ref 0.72–1.25)
GFR SERPL CREATININE-BSD FRML MDRD: >60 ML/MIN/1.73M2
GLUCOSE SERPL-MCNC: 130 MG/DL (ref 70–100)
POTASSIUM SERPL-SCNC: 4.4 MMOL/L (ref 3.5–5.1)

## 2020-08-25 ASSESSMENT — ENCOUNTER SYMPTOMS
RECTAL PAIN: 0
LEG PAIN: 0
BRUISES/BLEEDS EASILY: 1
NAUSEA: 0
DIARRHEA: 1
HYPOTENSION: 1
SWOLLEN GLANDS: 0
ORTHOPNEA: 0
EXERCISE INTOLERANCE: 0
HYPERTENSION: 0
PALPITATIONS: 1
HEARTBURN: 0
SLEEP DISTURBANCES DUE TO BREATHING: 0
SYNCOPE: 0
VOMITING: 0
BLOATING: 0
BLOOD IN STOOL: 0
LIGHT-HEADEDNESS: 1
JAUNDICE: 0
CONSTIPATION: 0
ABDOMINAL PAIN: 0
BOWEL INCONTINENCE: 0

## 2020-08-26 ENCOUNTER — VIRTUAL VISIT (OUTPATIENT)
Dept: CARDIOLOGY | Facility: CLINIC | Age: 70
End: 2020-08-26
Attending: INTERNAL MEDICINE
Payer: MEDICARE

## 2020-08-26 DIAGNOSIS — I48.21 PERMANENT ATRIAL FIBRILLATION (H): ICD-10-CM

## 2020-08-26 DIAGNOSIS — Z79.899 ENCOUNTER FOR MEDICATION MANAGEMENT: ICD-10-CM

## 2020-08-26 DIAGNOSIS — I25.5 ISCHEMIC CARDIOMYOPATHY: ICD-10-CM

## 2020-08-26 DIAGNOSIS — C90.00 MULTIPLE MYELOMA NOT HAVING ACHIEVED REMISSION (H): Primary | ICD-10-CM

## 2020-08-26 DIAGNOSIS — I50.22 CHRONIC SYSTOLIC HEART FAILURE (H): ICD-10-CM

## 2020-08-26 PROCEDURE — 99442 ZZC PHYSICIAN TELEPHONE EVALUATION 11-20 MIN: CPT | Mod: 95 | Performed by: INTERNAL MEDICINE

## 2020-08-26 NOTE — PROGRESS NOTES
"Erasmo Pearce is a 70 year old male who is being evaluated via a billable telephone visit.      The patient has been notified of following:     \"This telephone visit will be conducted via a call between you and your physician/provider. We have found that certain health care needs can be provided without the need for a physical exam.  This service lets us provide the care you need with a short phone conversation.  If a prescription is necessary we can send it directly to your pharmacy.  If lab work is needed we can place an order for that and you can then stop by our lab to have the test done at a later time.    If during the course of the call the physician/provider feels a telephone visit is not appropriate, you will not be charged for this service.\"    Patient has given verbal consent for Telephone visit?  Yes    What phone number would you like to be contacted at? [phone]    How would you like to obtain your AVS? Mail a copy    HPI:     70 y/o male with multiple myeloma s/p PBSCT in 2006, ICM/NICM (LVEF 30-35%, NYHA II Stage B) s/p ICD placement in 2009, episodic VT/VF treated by appropriate ICD shocks and afib (CHADVASC 3) on warfarin followed in Cardio-oncology clinic for cardiomyopathy.      Patient was diagnosed in 2006 with multiple myeloma and underwent chemotherapy and autologous PBSCT. He was started on lenalinomide in 2017 due to relapse and then bortezomib in 07/2019 due to progressive disease.      Patient was admitted for decompensated heart failure in August 2019 (acute on chronic systolic heart failure, EF 25 to 30%) and improved after diuresis and the bortezomib was stopped. He has been on a chemo holiday since and his counts are stable.    Patient today feels great. On 7/11 and 7/12/20 sensed ATP, no shocks. Device interrogation noted appropriate therapy. He denies chest pain when walking. He denies orthopnea, pnd. His leg swelling is minimal.     PAST MEDICAL HISTORY:  Past Medical " History:   Diagnosis Date     Chronic systolic heart failure (H)      Ischemic cardiomyopathy      Other premature beats      Permanent atrial fibrillation      Personal history of multiple myeloma      VF (ventricular fibrillation) (H)        CURRENT MEDICATIONS:  Current Outpatient Medications   Medication Sig Dispense Refill     albuterol (PROAIR HFA, PROVENTIL HFA, VENTOLIN HFA) 108 (90 BASE) MCG/ACT inhaler Inhale 2 puffs into the lungs every 6 hours as needed        amoxicillin (AMOXIL) 500 MG tablet Take 4 tabs one hour before dental appointment 4 tablet 5     baclofen (LIORESAL) 10 MG tablet Take 10 mg by mouth 3 times daily as needed prn       Calcium Carbonate-Vitamin D (CALCIUM 500 + D PO) Take 2 tablets by mouth daily.       diclofenac (VOLTAREN) 75 MG EC tablet Take 75 mg by mouth 2 times daily as needed 1 tab prn       digoxin (LANOXIN) 125 MCG tablet Take 1 tablet (125 mcg) by mouth daily 30 tablet 1     fentaNYL (DURAGESIC) 25 mcg/hr patch 72 hr Place 1 patch onto the skin every 72 hours       furosemide (LASIX) 20 MG tablet Take 1 tablet (20 mg) by mouth daily 90 tablet 3     lisinopril (PRINIVIL/ZESTRIL) 5 MG tablet Take 1 tablet (5 mg) by mouth 2 times daily 60 tablet 1     Magnesium Oxide 420 MG TABS Take 3 tabs (1260 mg) three times a day. 810 tablet 3     metoprolol succinate ER (TOPROL-XL) 100 MG 24 hr tablet Take 1 tablet (100 mg) by mouth daily 90 tablet 3     mexiletine (MEXITIL) 150 MG capsule Take 1 capsule (150 mg) by mouth 2 times daily As close to 12 hours apart as possible 180 capsule 3     nitroGLYCERIN (NITROSTAT) 0.4 MG SL tablet Place 0.4 mg under the tongue every 5 minutes as needed Reported on 5/16/2017       omeprazole (PRILOSEC) 20 MG capsule Take 20 mg by mouth daily  90 capsule 3     ondansetron (ZOFRAN-ODT) 4 MG ODT tab Take 4 mg by mouth every 6 hours as needed for nausea       ondansetron 4 MG PO tablet Take 4 mg by mouth       oxycodone (ROXICODONE) 5 MG immediate  release tablet Take 1-2 tablets by mouth every 6 hours as needed. For pain.        potassium chloride ER (K-DUR/KLOR-CON M) 10 MEQ CR tablet Take 2 tablets (20 mEq) by mouth daily 90 tablet 3     simvastatin (ZOCOR) 40 MG tablet Take 40 mg by mouth At Bedtime  30 tablet      spironolactone (ALDACTONE) 25 MG tablet Take 0.5 tablets (12.5 mg) by mouth daily 15 tablet 1     tiotropium (SPIRIVA) 18 MCG inhalation capsule Inhale 1 capsule (18 mcg) into the lungs daily 30 capsule 3     TRAZODONE HCL PO Take 50 mg by mouth nightly as needed        warfarin (COUMADIN) 5 MG tablet Take 7.5 mg by mouth See Admin Instructions Take 1.5 tab by mojth Monday and Friday and 1 tab Sun Tues WED , Thur and Sat       potassium chloride ER (K-TAB/KLOR-CON) 10 MEQ CR tablet TAKE 2 TABLETS (20 MEQ) BY MOUTH DAILY (Patient not taking: Reported on 6/9/2020) 90 tablet 1       PAST SURGICAL HISTORY:  Past Surgical History:   Procedure Laterality Date     CV CORONARY ANGIOGRAM N/A 8/29/2019    Procedure: CV CORONARY ANGIOGRAM;  Surgeon: Giorgi Milan MD;  Location:  HEART CARDIAC CATH LAB     CV LEFT HEART CATH N/A 8/29/2019    Procedure: Left Heart Cath;  Surgeon: Giorgi Milan MD;  Location:  HEART CARDIAC CATH LAB     CV RIGHT HEART CATH N/A 8/29/2019    Procedure: CV RIGHT HEART CATH;  Surgeon: Giorgi Milan MD;  Location:  HEART CARDIAC CATH LAB     CV STRESS EXERCISE STUDY N/A 8/29/2019    Procedure: Stress Drug Study;  Surgeon: Giorgi Milan MD;  Location:  HEART CARDIAC CATH LAB     IMPLANT AUTOMATIC IMPLANTABLE CARDIOVERTER DEFIBRILLATOR         ALLERGIES:     Allergies   Allergen Reactions     Nkda [No Known Drug Allergies]      Sotalol Other (See Comments)     Other reaction(s): Low blood pressure, Prolonged QT interval       FAMILY HISTORY:  - Premature coronary artery disease      SOCIAL HISTORY:  Social History     Tobacco Use     Smoking status: Former Smoker     Packs/day: 1.00     Years: 25.00      Pack years: 25.00     Types: Cigarettes     Last attempt to quit: 10/15/1995     Years since quittin.8     Smokeless tobacco: Never Used   Substance Use Topics     Alcohol use: No     Drug use: No       ROS:   ROS: A comprehensive 10-point review of system is negative except for those reported in the HPI.    Exam:  There were no vitals taken for this visit.    NEURO: alert and oriented to person/place/time, normal speech and affect      Labs:  CBC RESULTS:   Lab Results   Component Value Date    WBC 6.0 2020    RBC 3.58 (L) 2020    HGB 12.2 (L) 2020    HCT 38.9 (L) 2020     (H) 2020    MCH 34.1 (H) 2020    MCHC 31.4 (L) 2020    RDW 13.2 2020     2020       BMP RESULTS:  Lab Results   Component Value Date     2020    POTASSIUM 4.4 2020    CHLORIDE 107 2020    CO2 28 2020    ANIONGAP 4 2020     (H) 2020    BUN 28 2020    CR 1.12 2020    GFRESTIMATED >60 2020    GFRESTBLACK 60 (L) 2020    EILEEN 9.1 2020        INR RESULTS:  Lab Results   Component Value Date    INR 2.19 (H) 2019    INR 1.60 (H) 2019    INR 1.80 (H) 2019    INR 2.3 (H) 06/10/2019       Cardiac testing :    EKG 2020 afib, slow ventricular response q waves on inferior leads       2019 device interrogation  Patient seen in [facility] for evaluation and iterative programming of a [employer] Single lead ICD per MD orders. Patient has an appointment to see Dr. Prince and ROBERTO Falk NP today. Normal ICD function. 2 NSVT and 1 VT episodes recorded since patient received a shock on 10/19/19. 1 EGM for review displays SB @ 40 bpm followed by  prior to the episode which lasts 5.5 seconds @ 214 bpm. Intrinsic rhythm = Regular VS @ 48 bpm.  = 5%. Estimated battery longevity to ROSEMARIE = 2 years. Lead trends appear stable. Patient reports that he is feeling well since the last shock,  but occasionally has some lightheadedness. Plan for patient to return to clinic 3/30/20 as scheduled.  JEAN Sherman RN, BSN.      Stress echo 7.30.19     Submaximal stress test, achieved 74% of the age predicted maximal heart rate.  Limiting symptom fatigue.     Normal blood pressure response to exercise.  No angina symptoms with exercise.  Baseline rhythm is atrial fibrillation. No ECG evidence of ischemia. There is  no chronotropic incompetence.  Severely reduced LV function with EF of 28% at rest; with exercise left  ventricular cavity size and LVEF did not change significantly.  Akinesis of the basal-mid inferior/inferoseptal/inferolateral segments  present. No new stress induced regional wall motion abnormalities evident.  Less than average functional capacity for age. Exercise time ~2 minutes.     Biventricular dysfunction with moderate mitral and tricuspid regurgitation and  evidence of hypervolemia noted on screening 2D and Doppler examination.        Aug 2019 RHC and coronary angiogram       Right sided filling pressures are severely elevated - mean RA 23, PCWP 31, mean PA 40     Moderately elevated pulmonary artery hypertension.    Left sided filling pressures are severely elevated.    Left ventricular filling pressures are severely elevated .    Reduced cardiac output level.    Heparin was held during the procedure due to thrombocytopenia    Nipride was started and titrate to 1mcg/kg/min. PCWP decreased from 30 mmHg to 24 mmHg. mPAP decreased from 40 mmHg to 30 mmHg. After pressures decreased team proceed with angiogram.    Mild LAD and LCx disease    Subtotal proximal RCA occlusion    No intervention performed    No mitral or aortic valve gradient     Assessment and Plan  Jonh is a 70 year old with -   1. Chronic Systolic Heart Failure (EF 25-30% in July 2019)  2. Permanent Atrial Fibrillation on warfarin  3. Ischemic heart disease - Subtotal proximal RCA occlusion and mild LAD and LCx disease August  2019  4. History of VT/VF, s/p ICD  5. Hypertension   6. Relapsed multiple myeloma with mets to brain and bone s/p BMT, currently on chemo holiday  7. T2DM  8. Hyperlipidemia      Patient developed worsening heart failure symptoms since being on bortezomib.  Today, he denies any angina or heart failure symptoms. He denies any orthopnea or PND. His renal function is stable.  Digoxin level was 0.9 in November 2019.  I will continue low-dose digoxin which was started to optimize his heart failure regimen and check a level with his next blood draw.  He will continue the lisinopril along with mexiletine and simvastatin.     Plan to see him back in 6 months     Telephone call duration - 12 minutes    Yoko Benson MD, MS  Professor of Medicine  Cardiovascular division      CC  Patient Care Team:  Cong Mcdonald as PCP - General  GinaWillian MD as PCP - Internal Medicine  Rojas Raygoza MD as BMT Physician  Cong Mcdonald as Referring Physician  Yaz Jeong NP as Nurse Practitioner (Nurse Practitioner)  [facility] as Family Medicine Physician  Merry Mas RN as Continuity Care Coordinator (Transplant)  Zayra Lyle RN as Specialty Care Coordinator (Cardiology)  Ja Duarte MD as MD (Clinical Cardiac Electrophysiology)  Marichuy Shelley APRN CNP as Assigned PCP  Omayra Serra, RN as Specialty Care Coordinator (Cardiology)  YOKO BENSON

## 2020-08-26 NOTE — PATIENT INSTRUCTIONS
Patient Instructions:  It was a pleasure to see you in the cardiology clinic today.      If you have any questions, call  Omayra Serra RN, at [phone].  Press Option #1 for the [facility], and then press Option #4  We are encouraging the use of [employer] to communicate with your HealthCare Provider    Note the new medications: none  Stop the following medications: none    The results from today include: pending digoxin level with next lab draw in October 6 with Dr. Jaret hoffamn.  Please follow up with Dr. Yoko Prince in 6 months.      If you have an urgent need after hours (8:00 am to 4:30 pm) please call [phone] and ask for the cardiology fellow on call.

## 2020-08-26 NOTE — LETTER
8/26/2020      RE: Erasmo Pearce  [address]  [address] [facility] [address]       Dear Colleague,    Thank you for the opportunity to participate in the care of your patient, Erasmo Pearce, at the [facility] at [facility]. Please see a copy of my visit note below.    Erasmo Pearce is a 70 year old male who is being evaluated via a billable telephone visit.      HPI:     70 y/o male with multiple myeloma s/p PBSCT in 2006, ICM/NICM (LVEF 30-35%, NYHA II Stage B) s/p ICD placement in 2009, episodic VT/VF treated by appropriate ICD shocks and afib (CHADVASC 3) on warfarin followed in Cardio-oncology clinic for cardiomyopathy.      Patient was diagnosed in 2006 with multiple myeloma and underwent chemotherapy and autologous PBSCT. He was started on lenalinomide in 2017 due to relapse and then bortezomib in 07/2019 due to progressive disease.      Patient was admitted for decompensated heart failure in August 2019 (acute on chronic systolic heart failure, EF 25 to 30%) and improved after diuresis and the bortezomib was stopped. He has been on a chemo holiday since and his counts are stable.    Patient today feels great. On 7/11 and 7/12/20 sensed ATP, no shocks. Device interrogation noted appropriate therapy. He denies chest pain when walking. He denies orthopnea, pnd. His leg swelling is minimal.     PAST MEDICAL HISTORY:  Past Medical History:   Diagnosis Date     Chronic systolic heart failure (H)      Ischemic cardiomyopathy      Other premature beats      Permanent atrial fibrillation      Personal history of multiple myeloma      VF (ventricular fibrillation) (H)        CURRENT MEDICATIONS:  Current Outpatient Medications   Medication Sig Dispense Refill     albuterol (PROAIR HFA, PROVENTIL HFA, VENTOLIN HFA) 108 (90 BASE) MCG/ACT inhaler Inhale 2 puffs into the lungs every 6 hours as needed        amoxicillin (AMOXIL) 500 MG tablet Take  4 tabs one hour before dental appointment 4 tablet 5     baclofen (LIORESAL) 10 MG tablet Take 10 mg by mouth 3 times daily as needed prn       Calcium Carbonate-Vitamin D (CALCIUM 500 + D PO) Take 2 tablets by mouth daily.       diclofenac (VOLTAREN) 75 MG EC tablet Take 75 mg by mouth 2 times daily as needed 1 tab prn       digoxin (LANOXIN) 125 MCG tablet Take 1 tablet (125 mcg) by mouth daily 30 tablet 1     fentaNYL (DURAGESIC) 25 mcg/hr patch 72 hr Place 1 patch onto the skin every 72 hours       furosemide (LASIX) 20 MG tablet Take 1 tablet (20 mg) by mouth daily 90 tablet 3     lisinopril (PRINIVIL/ZESTRIL) 5 MG tablet Take 1 tablet (5 mg) by mouth 2 times daily 60 tablet 1     Magnesium Oxide 420 MG TABS Take 3 tabs (1260 mg) three times a day. 810 tablet 3     metoprolol succinate ER (TOPROL-XL) 100 MG 24 hr tablet Take 1 tablet (100 mg) by mouth daily 90 tablet 3     mexiletine (MEXITIL) 150 MG capsule Take 1 capsule (150 mg) by mouth 2 times daily As close to 12 hours apart as possible 180 capsule 3     nitroGLYCERIN (NITROSTAT) 0.4 MG SL tablet Place 0.4 mg under the tongue every 5 minutes as needed Reported on 5/16/2017       omeprazole (PRILOSEC) 20 MG capsule Take 20 mg by mouth daily  90 capsule 3     ondansetron (ZOFRAN-ODT) 4 MG ODT tab Take 4 mg by mouth every 6 hours as needed for nausea       ondansetron 4 MG PO tablet Take 4 mg by mouth       oxycodone (ROXICODONE) 5 MG immediate release tablet Take 1-2 tablets by mouth every 6 hours as needed. For pain.        potassium chloride ER (K-DUR/KLOR-CON M) 10 MEQ CR tablet Take 2 tablets (20 mEq) by mouth daily 90 tablet 3     simvastatin (ZOCOR) 40 MG tablet Take 40 mg by mouth At Bedtime  30 tablet      spironolactone (ALDACTONE) 25 MG tablet Take 0.5 tablets (12.5 mg) by mouth daily 15 tablet 1     tiotropium (SPIRIVA) 18 MCG inhalation capsule Inhale 1 capsule (18 mcg) into the lungs daily 30 capsule 3     TRAZODONE HCL PO Take 50 mg by mouth  nightly as needed        warfarin (COUMADIN) 5 MG tablet Take 7.5 mg by mouth See Admin Instructions Take 1.5 tab by  and Friday and 1 tab Sun  , Thur and Sat       potassium chloride ER (K-TAB/KLOR-CON) 10 MEQ CR tablet TAKE 2 TABLETS (20 MEQ) BY MOUTH DAILY (Patient not taking: Reported on 2020) 90 tablet 1       PAST SURGICAL HISTORY:  Past Surgical History:   Procedure Laterality Date     CV CORONARY ANGIOGRAM N/A 2019    Procedure: CV CORONARY ANGIOGRAM;  Surgeon: Giorgi Milan MD;  Location:  HEART CARDIAC CATH LAB     CV LEFT HEART CATH N/A 2019    Procedure: Left Heart Cath;  Surgeon: Giorgi Milan MD;  Location:  HEART CARDIAC CATH LAB     CV RIGHT HEART CATH N/A 2019    Procedure: CV RIGHT HEART CATH;  Surgeon: Giorgi Milan MD;  Location:  HEART CARDIAC CATH LAB     CV STRESS EXERCISE STUDY N/A 2019    Procedure: Stress Drug Study;  Surgeon: Giorgi Milan MD;  Location:  HEART CARDIAC CATH LAB     IMPLANT AUTOMATIC IMPLANTABLE CARDIOVERTER DEFIBRILLATOR         ALLERGIES:     Allergies   Allergen Reactions     Nkda [No Known Drug Allergies]      Sotalol Other (See Comments)     Other reaction(s): Low blood pressure, Prolonged QT interval       FAMILY HISTORY:  - Premature coronary artery disease      SOCIAL HISTORY:  Social History     Tobacco Use     Smoking status: Former Smoker     Packs/day: 1.00     Years: 25.00     Pack years: 25.00     Types: Cigarettes     Last attempt to quit: 10/15/1995     Years since quittin.8     Smokeless tobacco: Never Used   Substance Use Topics     Alcohol use: No     Drug use: No       ROS:   ROS: A comprehensive 10-point review of system is negative except for those reported in the HPI.    Exam:  There were no vitals taken for this visit.    NEURO: alert and oriented to person/place/time, normal speech and affect      Labs:  CBC RESULTS:   Lab Results   Component Value Date    WBC 6.0  06/02/2020    RBC 3.58 (L) 06/02/2020    HGB 12.2 (L) 06/02/2020    HCT 38.9 (L) 06/02/2020     (H) 06/02/2020    MCH 34.1 (H) 06/02/2020    MCHC 31.4 (L) 06/02/2020    RDW 13.2 06/02/2020     06/02/2020       BMP RESULTS:  Lab Results   Component Value Date     06/02/2020    POTASSIUM 4.4 08/19/2020    CHLORIDE 107 06/02/2020    CO2 28 06/02/2020    ANIONGAP 4 06/02/2020     (H) 08/19/2020    BUN 28 06/02/2020    CR 1.12 08/19/2020    GFRESTIMATED >60 08/19/2020    GFRESTBLACK 60 (L) 06/02/2020    EILEEN 9.1 06/02/2020        INR RESULTS:  Lab Results   Component Value Date    INR 2.19 (H) 11/12/2019    INR 1.60 (H) 08/30/2019    INR 1.80 (H) 08/29/2019    INR 2.3 (H) 06/10/2019       Cardiac testing :    EKG 2/24/2020 afib, slow ventricular response q waves on inferior leads       11/18/2019 device interrogation  Patient seen in [facility] for evaluation and iterative programming of a [employer] Single lead ICD per MD orders. Patient has an appointment to see Dr. Prince and ROBERTO Falk NP today. Normal ICD function. 2 NSVT and 1 VT episodes recorded since patient received a shock on 10/19/19. 1 EGM for review displays SB @ 40 bpm followed by  prior to the episode which lasts 5.5 seconds @ 214 bpm. Intrinsic rhythm = Regular VS @ 48 bpm.  = 5%. Estimated battery longevity to ROSEMARIE = 2 years. Lead trends appear stable. Patient reports that he is feeling well since the last shock, but occasionally has some lightheadedness. Plan for patient to return to clinic 3/30/20 as scheduled.  JEAN Sherman RN, BSN.      Stress echo 7.30.19     Submaximal stress test, achieved 74% of the age predicted maximal heart rate.  Limiting symptom fatigue.     Normal blood pressure response to exercise.  No angina symptoms with exercise.  Baseline rhythm is atrial fibrillation. No ECG evidence of ischemia. There is  no chronotropic incompetence.  Severely reduced LV function with EF of 28% at rest; with exercise  left  ventricular cavity size and LVEF did not change significantly.  Akinesis of the basal-mid inferior/inferoseptal/inferolateral segments  present. No new stress induced regional wall motion abnormalities evident.  Less than average functional capacity for age. Exercise time ~2 minutes.     Biventricular dysfunction with moderate mitral and tricuspid regurgitation and  evidence of hypervolemia noted on screening 2D and Doppler examination.        Aug 2019 RHC and coronary angiogram       Right sided filling pressures are severely elevated - mean RA 23, PCWP 31, mean PA 40     Moderately elevated pulmonary artery hypertension.    Left sided filling pressures are severely elevated.    Left ventricular filling pressures are severely elevated .    Reduced cardiac output level.    Heparin was held during the procedure due to thrombocytopenia    Nipride was started and titrate to 1mcg/kg/min. PCWP decreased from 30 mmHg to 24 mmHg. mPAP decreased from 40 mmHg to 30 mmHg. After pressures decreased team proceed with angiogram.    Mild LAD and LCx disease    Subtotal proximal RCA occlusion    No intervention performed    No mitral or aortic valve gradient     Assessment and Plan  Jonh is a 70 year old with -   1. Chronic Systolic Heart Failure (EF 25-30% in July 2019)  2. Permanent Atrial Fibrillation on warfarin  3. Ischemic heart disease - Subtotal proximal RCA occlusion and mild LAD and LCx disease August 2019  4. History of VT/VF, s/p ICD  5. Hypertension   6. Relapsed multiple myeloma with mets to brain and bone s/p BMT, currently on chemo holiday  7. T2DM  8. Hyperlipidemia      Patient developed worsening heart failure symptoms since being on bortezomib.  Today, he denies any angina or heart failure symptoms. He denies any orthopnea or PND. His renal function is stable.  Digoxin level was 0.9 in November 2019.  I will continue low-dose digoxin which was started to optimize his heart failure regimen and check a  level with his next blood draw.  He will continue the lisinopril along with mexiletine and simvastatin.     Plan to see him back in 6 months     Telephone call duration - 12 minutes    Yoko Benson MD, MS  Professor of Medicine  Cardiovascular division      CC  Patient Care Team:  Cong Mcdonald as PCP - General  [facility] MD as PCP - Internal Medicine  Rojas Raygoza MD as BMT Physician  Cong Mcdonald as Referring Physician  Yaz Jeong NP as Nurse Practitioner (Nurse Practitioner)  Eligio Mora as Family Medicine Physician  Merry Mas RN as Continuity Care Coordinator (Transplant)  Zayra Lyle RN as Specialty Care Coordinator (Cardiology)  Ja Duarte MD as MD (Clinical Cardiac Electrophysiology)  Marichuy Shelley APRN CNP as Assigned PCP  Omayra Serra, RN as Specialty Care Coordinator (Cardiology)  YOKO BENSON      Please do not hesitate to contact me if you have any questions/concerns.     Sincerely,     Yoko Benson MD

## 2020-10-06 DIAGNOSIS — Z79.899 ENCOUNTER FOR MEDICATION MANAGEMENT: ICD-10-CM

## 2020-10-06 DIAGNOSIS — C90.00 MULTIPLE MYELOMA, REMISSION STATUS UNSPECIFIED (H): ICD-10-CM

## 2020-10-06 LAB
ALBUMIN SERPL-MCNC: 3.8 G/DL (ref 3.4–5)
ALP SERPL-CCNC: 115 U/L (ref 40–150)
ALT SERPL W P-5'-P-CCNC: 23 U/L (ref 0–70)
ANION GAP SERPL CALCULATED.3IONS-SCNC: 5 MMOL/L (ref 3–14)
AST SERPL W P-5'-P-CCNC: 19 U/L (ref 0–45)
BASOPHILS # BLD AUTO: 0 10E9/L (ref 0–0.2)
BASOPHILS NFR BLD AUTO: 0.3 %
BILIRUB SERPL-MCNC: 1 MG/DL (ref 0.2–1.3)
BUN SERPL-MCNC: 33 MG/DL (ref 7–30)
CALCIUM SERPL-MCNC: 8.9 MG/DL (ref 8.5–10.1)
CHLORIDE SERPL-SCNC: 104 MMOL/L (ref 94–109)
CO2 SERPL-SCNC: 28 MMOL/L (ref 20–32)
COLLECT DURATION TIME UR: 24 H
CREAT 24H UR-MRATE: 1.17 G/(24.H) (ref 1–2)
CREAT SERPL-MCNC: 1.53 MG/DL (ref 0.66–1.25)
CREAT UR-MCNC: 117 MG/DL
DIFFERENTIAL METHOD BLD: ABNORMAL
DIGOXIN SERPL-MCNC: 0.8 UG/L (ref 0.5–2)
EOSINOPHIL # BLD AUTO: 0.1 10E9/L (ref 0–0.7)
EOSINOPHIL NFR BLD AUTO: 0.8 %
ERYTHROCYTE [DISTWIDTH] IN BLOOD BY AUTOMATED COUNT: 13.6 % (ref 10–15)
GFR SERPL CREATININE-BSD FRML MDRD: 46 ML/MIN/{1.73_M2}
GLUCOSE SERPL-MCNC: 106 MG/DL (ref 70–99)
HCT VFR BLD AUTO: 40.4 % (ref 40–53)
HGB BLD-MCNC: 13 G/DL (ref 13.3–17.7)
IGA SERPL-MCNC: 284 MG/DL (ref 84–499)
IGG SERPL-MCNC: 963 MG/DL (ref 610–1616)
IGM SERPL-MCNC: 35 MG/DL (ref 35–242)
IMM GRANULOCYTES # BLD: 0 10E9/L (ref 0–0.4)
IMM GRANULOCYTES NFR BLD: 0.3 %
KAPPA LC UR-MCNC: 3.34 MG/DL (ref 0.33–1.94)
KAPPA LC/LAMBDA SER: 0.15 {RATIO} (ref 0.26–1.65)
LAMBDA LC SERPL-MCNC: 21.74 MG/DL (ref 0.57–2.63)
LDH SERPL L TO P-CCNC: 180 U/L (ref 85–227)
LYMPHOCYTES # BLD AUTO: 1 10E9/L (ref 0.8–5.3)
LYMPHOCYTES NFR BLD AUTO: 11.3 %
MAGNESIUM SERPL-MCNC: 1.8 MG/DL (ref 1.6–2.3)
MCH RBC QN AUTO: 34.4 PG (ref 26.5–33)
MCHC RBC AUTO-ENTMCNC: 32.2 G/DL (ref 31.5–36.5)
MCV RBC AUTO: 107 FL (ref 78–100)
MONOCYTES # BLD AUTO: 0.9 10E9/L (ref 0–1.3)
MONOCYTES NFR BLD AUTO: 10 %
NEUTROPHILS # BLD AUTO: 6.9 10E9/L (ref 1.6–8.3)
NEUTROPHILS NFR BLD AUTO: 77.3 %
NRBC # BLD AUTO: 0 10*3/UL
NRBC BLD AUTO-RTO: 0 /100
PLATELET # BLD AUTO: 156 10E9/L (ref 150–450)
POTASSIUM SERPL-SCNC: 4.5 MMOL/L (ref 3.4–5.3)
PROT 24H UR-MRATE: 0.16 G/(24.H) (ref 0.04–0.23)
PROT SERPL-MCNC: 7.6 G/DL (ref 6.8–8.8)
PROT UR-MCNC: 0.16 G/L
PROT/CREAT 24H UR: 0.14 G/G CR (ref 0–0.2)
RBC # BLD AUTO: 3.78 10E12/L (ref 4.4–5.9)
SODIUM SERPL-SCNC: 138 MMOL/L (ref 133–144)
SPECIMEN VOL UR: 1000 ML
URATE SERPL-MCNC: 6.8 MG/DL (ref 3.5–7.2)
WBC # BLD AUTO: 9 10E9/L (ref 4–11)

## 2020-10-06 PROCEDURE — 83615 LACTATE (LD) (LDH) ENZYME: CPT | Performed by: PATHOLOGY

## 2020-10-06 PROCEDURE — 84166 PROTEIN E-PHORESIS/URINE/CSF: CPT | Mod: 90 | Performed by: PATHOLOGY

## 2020-10-06 PROCEDURE — 82784 ASSAY IGA/IGD/IGG/IGM EACH: CPT | Performed by: PATHOLOGY

## 2020-10-06 PROCEDURE — 84165 PROTEIN E-PHORESIS SERUM: CPT | Mod: 90 | Performed by: PATHOLOGY

## 2020-10-06 PROCEDURE — 83883 ASSAY NEPHELOMETRY NOT SPEC: CPT | Performed by: PATHOLOGY

## 2020-10-06 PROCEDURE — 80053 COMPREHEN METABOLIC PANEL: CPT | Performed by: PATHOLOGY

## 2020-10-06 PROCEDURE — 82570 ASSAY OF URINE CREATININE: CPT | Performed by: PATHOLOGY

## 2020-10-06 PROCEDURE — 85025 COMPLETE CBC W/AUTO DIFF WBC: CPT | Performed by: PATHOLOGY

## 2020-10-06 PROCEDURE — 80162 ASSAY OF DIGOXIN TOTAL: CPT | Performed by: PATHOLOGY

## 2020-10-06 PROCEDURE — 81050 URINALYSIS VOLUME MEASURE: CPT | Performed by: PATHOLOGY

## 2020-10-06 PROCEDURE — 84550 ASSAY OF BLOOD/URIC ACID: CPT | Performed by: PATHOLOGY

## 2020-10-06 PROCEDURE — 83735 ASSAY OF MAGNESIUM: CPT | Performed by: PATHOLOGY

## 2020-10-06 PROCEDURE — 99000 SPECIMEN HANDLING OFFICE-LAB: CPT | Performed by: PATHOLOGY

## 2020-10-06 PROCEDURE — 999N001036 HC STATISTIC TOTAL PROTEIN: Performed by: PATHOLOGY

## 2020-10-06 PROCEDURE — 36415 COLL VENOUS BLD VENIPUNCTURE: CPT | Performed by: PATHOLOGY

## 2020-10-06 PROCEDURE — 86335 IMMUNFIX E-PHORSIS/URINE/CSF: CPT | Mod: 90 | Performed by: PATHOLOGY

## 2020-10-07 LAB
ALBUMIN MFR UR ELPH: 23.5 %
ALBUMIN SERPL ELPH-MCNC: 4.6 G/DL (ref 3.7–5.1)
ALPHA1 GLOB MFR UR ELPH: 9.5 %
ALPHA1 GLOB SERPL ELPH-MCNC: 0.4 G/DL (ref 0.2–0.4)
ALPHA2 GLOB MFR UR ELPH: 8.2 %
ALPHA2 GLOB SERPL ELPH-MCNC: 0.9 G/DL (ref 0.5–0.9)
B-GLOBULIN MFR UR ELPH: 36.9 %
B-GLOBULIN SERPL ELPH-MCNC: 1 G/DL (ref 0.6–1)
GAMMA GLOB MFR UR ELPH: 21.9 %
GAMMA GLOB SERPL ELPH-MCNC: 1 G/DL (ref 0.7–1.6)
M PROTEIN MFR UR ELPH: 26.6 %
M PROTEIN SERPL ELPH-MCNC: 0.1 G/DL
PROT ELPH PNL UR ELPH: NORMAL
PROT PATTERN SERPL ELPH-IMP: ABNORMAL
PROT PATTERN UR ELPH-IMP: ABNORMAL

## 2020-10-12 ENCOUNTER — ANCILLARY PROCEDURE (OUTPATIENT)
Dept: CARDIOLOGY | Facility: CLINIC | Age: 70
End: 2020-10-12
Attending: INTERNAL MEDICINE
Payer: MEDICARE

## 2020-10-12 DIAGNOSIS — Z95.810 AUTOMATIC IMPLANTABLE CARDIOVERTER-DEFIBRILLATOR IN SITU: Chronic | ICD-10-CM

## 2020-10-12 DIAGNOSIS — Z95.810 AUTOMATIC IMPLANTABLE CARDIOVERTER-DEFIBRILLATOR IN SITU: ICD-10-CM

## 2020-10-12 PROCEDURE — 93296 REM INTERROG EVL PM/IDS: CPT

## 2020-10-12 PROCEDURE — 93295 DEV INTERROG REMOTE 1/2/MLT: CPT | Performed by: INTERNAL MEDICINE

## 2020-10-13 ENCOUNTER — ONCOLOGY VISIT (OUTPATIENT)
Dept: TRANSPLANT | Facility: CLINIC | Age: 70
End: 2020-10-13
Attending: INTERNAL MEDICINE
Payer: MEDICARE

## 2020-10-13 VITALS
DIASTOLIC BLOOD PRESSURE: 52 MMHG | OXYGEN SATURATION: 95 % | WEIGHT: 201.8 LBS | SYSTOLIC BLOOD PRESSURE: 95 MMHG | HEART RATE: 46 BPM | BODY MASS INDEX: 28.96 KG/M2 | TEMPERATURE: 98.1 F

## 2020-10-13 DIAGNOSIS — C90.00 MULTIPLE MYELOMA, REMISSION STATUS UNSPECIFIED (H): Primary | ICD-10-CM

## 2020-10-13 LAB
ALBUMIN SERPL-MCNC: 4 G/DL (ref 3.4–5)
ALP SERPL-CCNC: 106 U/L (ref 40–150)
ALT SERPL W P-5'-P-CCNC: 20 U/L (ref 0–70)
ANION GAP SERPL CALCULATED.3IONS-SCNC: 3 MMOL/L (ref 3–14)
AST SERPL W P-5'-P-CCNC: 17 U/L (ref 0–45)
BASOPHILS # BLD AUTO: 0.1 10E9/L (ref 0–0.2)
BASOPHILS NFR BLD AUTO: 0.6 %
BILIRUB SERPL-MCNC: 0.6 MG/DL (ref 0.2–1.3)
BUN SERPL-MCNC: 23 MG/DL (ref 7–30)
CALCIUM SERPL-MCNC: 8.9 MG/DL (ref 8.5–10.1)
CHLORIDE SERPL-SCNC: 107 MMOL/L (ref 94–109)
CO2 SERPL-SCNC: 28 MMOL/L (ref 20–32)
CREAT SERPL-MCNC: 1.17 MG/DL (ref 0.66–1.25)
DIFFERENTIAL METHOD BLD: ABNORMAL
EOSINOPHIL # BLD AUTO: 0.1 10E9/L (ref 0–0.7)
EOSINOPHIL NFR BLD AUTO: 1.1 %
ERYTHROCYTE [DISTWIDTH] IN BLOOD BY AUTOMATED COUNT: 13.2 % (ref 10–15)
GFR SERPL CREATININE-BSD FRML MDRD: 63 ML/MIN/{1.73_M2}
GLUCOSE SERPL-MCNC: 114 MG/DL (ref 70–99)
HCT VFR BLD AUTO: 40.1 % (ref 40–53)
HGB BLD-MCNC: 13 G/DL (ref 13.3–17.7)
IMM GRANULOCYTES # BLD: 0.1 10E9/L (ref 0–0.4)
IMM GRANULOCYTES NFR BLD: 0.6 %
LYMPHOCYTES # BLD AUTO: 1.6 10E9/L (ref 0.8–5.3)
LYMPHOCYTES NFR BLD AUTO: 20.4 %
MCH RBC QN AUTO: 33.9 PG (ref 26.5–33)
MCHC RBC AUTO-ENTMCNC: 32.4 G/DL (ref 31.5–36.5)
MCV RBC AUTO: 105 FL (ref 78–100)
MONOCYTES # BLD AUTO: 0.7 10E9/L (ref 0–1.3)
MONOCYTES NFR BLD AUTO: 8.3 %
NEUTROPHILS # BLD AUTO: 5.5 10E9/L (ref 1.6–8.3)
NEUTROPHILS NFR BLD AUTO: 69 %
NRBC # BLD AUTO: 0 10*3/UL
NRBC BLD AUTO-RTO: 0 /100
PLATELET # BLD AUTO: 199 10E9/L (ref 150–450)
POTASSIUM SERPL-SCNC: 4.7 MMOL/L (ref 3.4–5.3)
PROT SERPL-MCNC: 7.6 G/DL (ref 6.8–8.8)
RBC # BLD AUTO: 3.83 10E12/L (ref 4.4–5.9)
SODIUM SERPL-SCNC: 137 MMOL/L (ref 133–144)
WBC # BLD AUTO: 8 10E9/L (ref 4–11)

## 2020-10-13 PROCEDURE — G0463 HOSPITAL OUTPT CLINIC VISIT: HCPCS

## 2020-10-13 PROCEDURE — 85025 COMPLETE CBC W/AUTO DIFF WBC: CPT | Performed by: PATHOLOGY

## 2020-10-13 PROCEDURE — 36415 COLL VENOUS BLD VENIPUNCTURE: CPT

## 2020-10-13 PROCEDURE — 99214 OFFICE O/P EST MOD 30 MIN: CPT | Performed by: INTERNAL MEDICINE

## 2020-10-13 PROCEDURE — 83883 ASSAY NEPHELOMETRY NOT SPEC: CPT | Performed by: INTERNAL MEDICINE

## 2020-10-13 PROCEDURE — 80053 COMPREHEN METABOLIC PANEL: CPT | Performed by: PATHOLOGY

## 2020-10-13 ASSESSMENT — PAIN SCALES - GENERAL: PAINLEVEL: NO PAIN (0)

## 2020-10-13 NOTE — NURSING NOTE
Patient labs drawn in clinic via venipuncture. Patient tolerated well. See flow sheet.      Margaret Osorio, CMA

## 2020-10-13 NOTE — PROGRESS NOTES
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 His SOB has improved  since on digoxin, lisinopril spironolactone mexilitene, lasix 20mg every day and metoprolol 100mg qd.Has Had no ICD  triggering  since last visit. He di have a pacemaker issue once .  All in all he feels great. Las week he had severe diarrhea with multiple stools the day he had the blood drawn for the visit. This resolved.No  Fever no chills no COVID sx Still on the warfarin He was Taking 7 MgOx per day which is causing loose stools Cr increased to 1.51 last week and K+ to 5.1 He decreased Mg to 6 pills per day He has some back pain and still takes fentanyl and oxycodone      PAST MEDICAL HISTORY, SOCIAL HISTORY AND FAMILY HISTORY:  See my note from 06/2020.      REVIEW OF SYSTEMS:  A 12 point review of systems done is negative as in HPI.      PHYSICAL EXAMINATION: BP 95/52   Pulse (!) 46   Temp 98.1  F (36.7  C) (Oral)   Wt 91.5 kg (201 lb 12.8 oz)   SpO2 95%   BMI 28.96 kg/m      GENERAL:  He is an alert gentleman in no acute distress.   VITAL SIGNS:  Stable.   HEENT:  Normocephalic.  EOM okay.  Mouth without lesion.   RESPIRATORY:  Clear to percussion and auscultation.   CARDIAC:  Irregularly irregular rhythm.  No S3, S4 or murmur.   ABDOMEN:  Soft without hepatosplenomegaly.   EXTREMITIES:  No edema.     Results for JONH ZAPATA (MRN [id number]) as of 10/13/2020 11:01   Ref. Range 10/6/2020 05:00 10/6/2020 09:32   Sodium Latest Ref Range: 133 - 144 mmol/L  138   Potassium Latest Ref Range: 3.4 - 5.3 mmol/L  4.5   Chloride Latest Ref Range: 94 - 109 mmol/L  104   Carbon Dioxide Latest Ref Range: 20 - 32 mmol/L  28    Urea Nitrogen Latest Ref Range: 7 - 30 mg/dL  33 (H)   Creatinine Latest Ref Range: 0.66 - 1.25 mg/dL  1.53 (H)   GFR Estimate Latest Ref Range: >60 mL/min/1.73_m2  46 (L)   GFR Estimate If Black Latest Ref Range: >60 mL/min/1.73_m2  53 (L)   Calcium Latest Ref Range: 8.5 - 10.1 mg/dL  8.9   Anion Gap Latest Ref Range: 3 - 14 mmol/L  5   Magnesium Latest Ref Range: 1.6 - 2.3 mg/dL  1.8   Albumin Latest Ref Range: 3.4 - 5.0 g/dL  3.8   Protein Total Latest Ref Range: 6.8 - 8.8 g/dL  7.6   Bilirubin Total Latest Ref Range: 0.2 - 1.3 mg/dL  1.0   Alkaline Phosphatase Latest Ref Range: 40 - 150 U/L  115   ALT Latest Ref Range: 0 - 70 U/L  23   AST Latest Ref Range: 0 - 45 U/L  19   Creatinine Urine Timed Latest Ref Range: 1.00 - 2.00  1.17    Creatinine Urine Latest Units: mg/dL 117    Lactate Dehydrogenase Latest Ref Range: 85 - 227 U/L  180   Protein Random Urine Latest Units: g/L 0.16    Protein Total Urine g/gr Creatinine Latest Ref Range: 0 - 0.2 g/g Cr 0.14    Protein Total 24 Hr Urine Latest Ref Range: 0.04 - 0.23  0.16    Uric Acid Latest Ref Range: 3.5 - 7.2 mg/dL  6.8   Glucose Latest Ref Range: 70 - 99 mg/dL  106 (H)   WBC Latest Ref Range: 4.0 - 11.0 10e9/L  9.0   Hemoglobin Latest Ref Range: 13.3 - 17.7 g/dL  13.0 (L)   Hematocrit Latest Ref Range: 40.0 - 53.0 %  40.4   Platelet Count Latest Ref Range: 150 - 450 10e9/L  156   RBC Count Latest Ref Range: 4.4 - 5.9 10e12/L  3.78 (L)   MCV Latest Ref Range: 78 - 100 fl  107 (H)   MCH Latest Ref Range: 26.5 - 33.0 pg  34.4 (H)   MCHC Latest Ref Range: 31.5 - 36.5 g/dL  32.2   RDW Latest Ref Range: 10.0 - 15.0 %  13.6   Diff Method Unknown  Automated Method   % Neutrophils Latest Units: %  77.3   % Lymphocytes Latest Units: %  11.3   % Monocytes Latest Units: %  10.0   % Eosinophils Latest Units: %  0.8   % Basophils Latest Units: %  0.3   % Immature Granulocytes Latest Units: %  0.3   Nucleated RBCs Latest Ref Range: 0 /100  0   Absolute Neutrophil Latest Ref  Range: 1.6 - 8.3 10e9/L  6.9   Absolute Lymphocytes Latest Ref Range: 0.8 - 5.3 10e9/L  1.0   Absolute Monocytes Latest Ref Range: 0.0 - 1.3 10e9/L  0.9   Absolute Eosinophils Latest Ref Range: 0.0 - 0.7 10e9/L  0.1   Absolute Basophils Latest Ref Range: 0.0 - 0.2 10e9/L  0.0   Abs Immature Granulocytes Latest Ref Range: 0 - 0.4 10e9/L  0.0   Absolute Nucleated RBC Unknown  0.0   Digoxin Level Latest Ref Range: 0.5 - 2.0 ug/L  0.8   Albumin Fraction Latest Ref Range: 3.7 - 5.1 g/dL  4.6   Albumin Fraction Urine Latest Ref Range: 0 % 23.5 (H)    Alpha 1 Fraction Latest Ref Range: 0.2 - 0.4 g/dL  0.4   Alpha 1 Fraction Urine Latest Ref Range: 0 % 9.5 (H)    Alpha 2 Fraction Latest Ref Range: 0.5 - 0.9 g/dL  0.9   Alpha 2 Fraction Urine Latest Ref Range: 0 % 8.2 (H)    Beta Fraction Latest Ref Range: 0.6 - 1.0 g/dL  1.0   Beta Fraction Urine Latest Ref Range: 0 % 36.9 (H)    ELP Interpretation: Unknown  Very small monocl...   ELP Interpretation Urine Unknown Albumin and sever...    Gamma Fraction Latest Ref Range: 0.7 - 1.6 g/dL  1.0   Gamma Fraction Urine Latest Ref Range: 0 % 21.9 (H)    IGA Latest Ref Range: 84 - 499 mg/dL  284   IGG Latest Ref Range: 610 - 1,616 mg/dL  963   IGM Latest Ref Range: 35 - 242 mg/dL  35   Immunofix ELP Urine Unknown (Note)    Kappa Free Lt Chain Latest Ref Range: 0.33 - 1.94 mg/dL  3.34 (H)   Kappa Lambda Ratio Latest Ref Range: 0.26 - 1.65   0.15 (L)   Lambda Free Lt Chain Latest Ref Range: 0.57 - 2.63 mg/dL  21.74 (H)   Monoclonal Peak Latest Ref Range: 0.0 g/dL  0.1 (H)   Monoclonal Peak Urine Latest Ref Range: 0% % 26.6 (H)      1.  Multiple myeloma.   2.  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   3.  Cardiomyopathy with CHF.   4.  Atrial fibrillation.   5.  History of deep vein thrombosis, on warfarin.   6.  Chronic back pain.   7.  Aortic aneurysm.   8.  History of plasmacytoma of the clivus, status post radiation.   9.  History of ventricular  tachycardia with implantable defibrillator in place.   10.  Hypomagnesemia.     11  Hyperkalemia     Jonh is really looking very good.  I will continue drug holiday for Jonh  He has 0.1  M spike and Light chains ratio is lower. I wonder with the diarrhea and the dehydrations with elevated Cr did this cause his light chains to increase in serum and M spike. We will redraw labs today. I will decrease MgOx to 5 pills per day because of loose stoolsa.  He must  keep fluids up.We will follow labs monthly.        RTC in 3 months    Rojas Raygoza MD  [phone]

## 2020-10-13 NOTE — LETTER
10/13/2020         RE: Erasmo Pearce  [address]  [address]  [facility] [address]        Dear Colleague,    Thank you for referring your patient, Erasmo Pearce, to the [facility] [place]. Please see a copy of my visit note below.    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 His SOB has improved  since on digoxin, lisinopril spironolactone mexilitene, lasix 20mg every day and metoprolol 100mg qd.Has Had no ICD  triggering  since last visit. He di have a pacemaker issue once .  All in all he feels great. Las week he had severe diarrhea with multiple stools the day he had the blood drawn for the visit. This resolved.No  Fever no chills no COVID sx Still on the warfarin He was Taking 7 MgOx per day which is causing loose stools Cr increased to 1.51 last week and K+ to 5.1 He decreased Mg to 6 pills per day He has some back pain and still takes fentanyl and oxycodone      PAST MEDICAL HISTORY, SOCIAL HISTORY AND FAMILY HISTORY:  See my note from 06/2020.      REVIEW OF SYSTEMS:  A 12 point review of systems done is negative as in HPI.      PHYSICAL EXAMINATION: BP 95/52   Pulse (!) 46   Temp 98.1  F (36.7  C) (Oral)   Wt 91.5 kg (201 lb 12.8 oz)   SpO2 95%   BMI 28.96 kg/m      GENERAL:  He is an alert gentleman in no acute distress.   VITAL SIGNS:  Stable.   HEENT:  Normocephalic.  EOM okay.  Mouth without lesion.   RESPIRATORY:  Clear to percussion and auscultation.   CARDIAC:  Irregularly irregular rhythm.  No S3, S4 or murmur.   ABDOMEN:  Soft without hepatosplenomegaly.   EXTREMITIES:  No edema.     Results for  FRANDY ZAPATA (MRN [id number]) as of 10/13/2020 11:01   Ref. Range 10/6/2020 05:00 10/6/2020 09:32   Sodium Latest Ref Range: 133 - 144 mmol/L  138   Potassium Latest Ref Range: 3.4 - 5.3 mmol/L  4.5   Chloride Latest Ref Range: 94 - 109 mmol/L  104   Carbon Dioxide Latest Ref Range: 20 - 32 mmol/L  28   Urea Nitrogen Latest Ref Range: 7 - 30 mg/dL  33 (H)   Creatinine Latest Ref Range: 0.66 - 1.25 mg/dL  1.53 (H)   GFR Estimate Latest Ref Range: >60 mL/min/1.73_m2  46 (L)   GFR Estimate If Black Latest Ref Range: >60 mL/min/1.73_m2  53 (L)   Calcium Latest Ref Range: 8.5 - 10.1 mg/dL  8.9   Anion Gap Latest Ref Range: 3 - 14 mmol/L  5   Magnesium Latest Ref Range: 1.6 - 2.3 mg/dL  1.8   Albumin Latest Ref Range: 3.4 - 5.0 g/dL  3.8   Protein Total Latest Ref Range: 6.8 - 8.8 g/dL  7.6   Bilirubin Total Latest Ref Range: 0.2 - 1.3 mg/dL  1.0   Alkaline Phosphatase Latest Ref Range: 40 - 150 U/L  115   ALT Latest Ref Range: 0 - 70 U/L  23   AST Latest Ref Range: 0 - 45 U/L  19   Creatinine Urine Timed Latest Ref Range: 1.00 - 2.00  1.17    Creatinine Urine Latest Units: mg/dL 117    Lactate Dehydrogenase Latest Ref Range: 85 - 227 U/L  180   Protein Random Urine Latest Units: g/L 0.16    Protein Total Urine g/gr Creatinine Latest Ref Range: 0 - 0.2 g/g Cr 0.14    Protein Total 24 Hr Urine Latest Ref Range: 0.04 - 0.23  0.16    Uric Acid Latest Ref Range: 3.5 - 7.2 mg/dL  6.8   Glucose Latest Ref Range: 70 - 99 mg/dL  106 (H)   WBC Latest Ref Range: 4.0 - 11.0 10e9/L  9.0   Hemoglobin Latest Ref Range: 13.3 - 17.7 g/dL  13.0 (L)   Hematocrit Latest Ref Range: 40.0 - 53.0 %  40.4   Platelet Count Latest Ref Range: 150 - 450 10e9/L  156   RBC Count Latest Ref Range: 4.4 - 5.9 10e12/L  3.78 (L)   MCV Latest Ref Range: 78 - 100 fl  107 (H)   MCH Latest Ref Range: 26.5 - 33.0 pg  34.4 (H)   MCHC Latest Ref Range: 31.5 - 36.5 g/dL  32.2   RDW Latest Ref Range: 10.0 - 15.0 %  13.6   Diff Method Unknown  Automated  Method   % Neutrophils Latest Units: %  77.3   % Lymphocytes Latest Units: %  11.3   % Monocytes Latest Units: %  10.0   % Eosinophils Latest Units: %  0.8   % Basophils Latest Units: %  0.3   % Immature Granulocytes Latest Units: %  0.3   Nucleated RBCs Latest Ref Range: 0 /100  0   Absolute Neutrophil Latest Ref Range: 1.6 - 8.3 10e9/L  6.9   Absolute Lymphocytes Latest Ref Range: 0.8 - 5.3 10e9/L  1.0   Absolute Monocytes Latest Ref Range: 0.0 - 1.3 10e9/L  0.9   Absolute Eosinophils Latest Ref Range: 0.0 - 0.7 10e9/L  0.1   Absolute Basophils Latest Ref Range: 0.0 - 0.2 10e9/L  0.0   Abs Immature Granulocytes Latest Ref Range: 0 - 0.4 10e9/L  0.0   Absolute Nucleated RBC Unknown  0.0   Digoxin Level Latest Ref Range: 0.5 - 2.0 ug/L  0.8   Albumin Fraction Latest Ref Range: 3.7 - 5.1 g/dL  4.6   Albumin Fraction Urine Latest Ref Range: 0 % 23.5 (H)    Alpha 1 Fraction Latest Ref Range: 0.2 - 0.4 g/dL  0.4   Alpha 1 Fraction Urine Latest Ref Range: 0 % 9.5 (H)    Alpha 2 Fraction Latest Ref Range: 0.5 - 0.9 g/dL  0.9   Alpha 2 Fraction Urine Latest Ref Range: 0 % 8.2 (H)    Beta Fraction Latest Ref Range: 0.6 - 1.0 g/dL  1.0   Beta Fraction Urine Latest Ref Range: 0 % 36.9 (H)    ELP Interpretation: Unknown  Very small monocl...   ELP Interpretation Urine Unknown Albumin and sever...    Gamma Fraction Latest Ref Range: 0.7 - 1.6 g/dL  1.0   Gamma Fraction Urine Latest Ref Range: 0 % 21.9 (H)    IGA Latest Ref Range: 84 - 499 mg/dL  284   IGG Latest Ref Range: 610 - 1,616 mg/dL  963   IGM Latest Ref Range: 35 - 242 mg/dL  35   Immunofix ELP Urine Unknown (Note)    Kappa Free Lt Chain Latest Ref Range: 0.33 - 1.94 mg/dL  3.34 (H)   Kappa Lambda Ratio Latest Ref Range: 0.26 - 1.65   0.15 (L)   Lambda Free Lt Chain Latest Ref Range: 0.57 - 2.63 mg/dL  21.74 (H)   Monoclonal Peak Latest Ref Range: 0.0 g/dL  0.1 (H)   Monoclonal Peak Urine Latest Ref Range: 0% % 26.6 (H)      1.  Multiple myeloma.   2.  Status post  double tandem autologous peripheral blood stem cell transplant.   3.  Cardiomyopathy with CHF.   4.  Atrial fibrillation.   5.  History of deep vein thrombosis, on warfarin.   6.  Chronic back pain.   7.  Aortic aneurysm.   8.  History of plasmacytoma of the clivus, status post radiation.   9.  History of ventricular tachycardia with implantable defibrillator in place.   10.  Hypomagnesemia.     11  Hyperkalemia     Jonh is really looking very good.  I will continue drug holiday for Jonh  He has 0.1  M spike and Light chains ratio is lower. I wonder with the diarrhea and the dehydrations with elevated Cr did this cause his light chains to increase in serum and M spike. We will redraw labs today. I will decrease MgOx to 5 pills per day because of loose stoolsa.  He must  keep fluids up.We will follow labs monthly.        RTC in 3 months    Rojas Raygoza MD  [phone]

## 2020-10-13 NOTE — NURSING NOTE
"Oncology Rooming Note    October 13, 2020 9:48 AM   Erasmo Pearce is a 70 year old male who presents for:    Chief Complaint   Patient presents with     RECHECK     Multiple Myeloma      Initial Vitals: BP 95/52   Pulse (!) 46   Temp 98.1  F (36.7  C) (Oral)   Wt 91.5 kg (201 lb 12.8 oz)   SpO2 95%   BMI 28.96 kg/m   Estimated body mass index is 28.96 kg/m  as calculated from the following:    Height as of 2/24/20: 1.778 m (5' 10\").    Weight as of this encounter: 91.5 kg (201 lb 12.8 oz). Body surface area is 2.13 meters squared.  No Pain (0) Comment: Data Unavailable   No LMP for male patient.  Allergies reviewed: Yes  Medications reviewed: Yes    Medications: Medication refills not needed today.  Pharmacy name entered into [employer] System Integration:    [facility] - [place], [place] - [address] [facility] - [place], [place] - [address] [place] [address]  [facility] [address] [facility]    Clinical concerns: None       Tricia Barajas CMA              "

## 2020-10-14 LAB
KAPPA LC UR-MCNC: 3.28 MG/DL (ref 0.33–1.94)
KAPPA LC/LAMBDA SER: 0.17 {RATIO} (ref 0.26–1.65)
LAMBDA LC SERPL-MCNC: 19.28 MG/DL (ref 0.57–2.63)

## 2020-10-16 LAB
MDC_IDC_EPISODE_DTM: NORMAL
MDC_IDC_EPISODE_DURATION: 106 S
MDC_IDC_EPISODE_DURATION: 11 S
MDC_IDC_EPISODE_DURATION: 128 S
MDC_IDC_EPISODE_DURATION: 13 S
MDC_IDC_EPISODE_DURATION: 13 S
MDC_IDC_EPISODE_DURATION: 133 S
MDC_IDC_EPISODE_DURATION: 15 S
MDC_IDC_EPISODE_DURATION: 17 S
MDC_IDC_EPISODE_DURATION: 18 S
MDC_IDC_EPISODE_DURATION: 19 S
MDC_IDC_EPISODE_DURATION: 25 S
MDC_IDC_EPISODE_DURATION: 26 S
MDC_IDC_EPISODE_DURATION: 28 S
MDC_IDC_EPISODE_DURATION: 29 S
MDC_IDC_EPISODE_DURATION: 34 S
MDC_IDC_EPISODE_DURATION: 40 S
MDC_IDC_EPISODE_DURATION: 41 S
MDC_IDC_EPISODE_DURATION: 49 S
MDC_IDC_EPISODE_DURATION: 5 S
MDC_IDC_EPISODE_DURATION: 59 S
MDC_IDC_EPISODE_DURATION: 67 S
MDC_IDC_EPISODE_DURATION: 71 S
MDC_IDC_EPISODE_DURATION: 85 S
MDC_IDC_EPISODE_DURATION: 9 S
MDC_IDC_EPISODE_DURATION: 93 S
MDC_IDC_EPISODE_ID: NORMAL
MDC_IDC_EPISODE_TYPE: NORMAL
MDC_IDC_EPISODE_VENDOR_TYPE: NORMAL
MDC_IDC_LEAD_IMPLANT_DT: NORMAL
MDC_IDC_LEAD_LOCATION: NORMAL
MDC_IDC_LEAD_LOCATION_DETAIL_1: NORMAL
MDC_IDC_LEAD_MFG: NORMAL
MDC_IDC_LEAD_MODEL: NORMAL
MDC_IDC_LEAD_POLARITY_TYPE: NORMAL
MDC_IDC_LEAD_SERIAL: NORMAL
MDC_IDC_MSMT_BATTERY_DTM: NORMAL
MDC_IDC_MSMT_BATTERY_REMAINING_LONGEVITY: 18 MO
MDC_IDC_MSMT_BATTERY_REMAINING_PERCENTAGE: 21 %
MDC_IDC_MSMT_BATTERY_STATUS: NORMAL
MDC_IDC_MSMT_CAP_CHARGE_DTM: NORMAL
MDC_IDC_MSMT_CAP_CHARGE_DTM: NORMAL
MDC_IDC_MSMT_CAP_CHARGE_ENERGY: 41 J
MDC_IDC_MSMT_CAP_CHARGE_TIME: 10.2 S
MDC_IDC_MSMT_CAP_CHARGE_TIME: 9.7 S
MDC_IDC_MSMT_CAP_CHARGE_TYPE: NORMAL
MDC_IDC_MSMT_CAP_CHARGE_TYPE: NORMAL
MDC_IDC_MSMT_LEADCHNL_RV_IMPEDANCE_VALUE: 367 OHM
MDC_IDC_MSMT_LEADCHNL_RV_PACING_THRESHOLD_AMPLITUDE: 1.1 V
MDC_IDC_MSMT_LEADCHNL_RV_PACING_THRESHOLD_PULSEWIDTH: 0.5 MS
MDC_IDC_PG_IMPLANT_DTM: NORMAL
MDC_IDC_PG_MFG: NORMAL
MDC_IDC_PG_MODEL: NORMAL
MDC_IDC_PG_SERIAL: NORMAL
MDC_IDC_PG_TYPE: NORMAL
MDC_IDC_SESS_CLINIC_NAME: NORMAL
MDC_IDC_SESS_DTM: NORMAL
MDC_IDC_SESS_TYPE: NORMAL
MDC_IDC_SET_BRADY_LOWRATE: 40 {BEATS}/MIN
MDC_IDC_SET_BRADY_MODE: NORMAL
MDC_IDC_SET_LEADCHNL_RV_PACING_AMPLITUDE: 2.4 V
MDC_IDC_SET_LEADCHNL_RV_PACING_POLARITY: NORMAL
MDC_IDC_SET_LEADCHNL_RV_PACING_PULSEWIDTH: 0.5 MS
MDC_IDC_SET_LEADCHNL_RV_SENSING_ADAPTATION_MODE: NORMAL
MDC_IDC_SET_LEADCHNL_RV_SENSING_POLARITY: NORMAL
MDC_IDC_SET_LEADCHNL_RV_SENSING_SENSITIVITY: 0.6 MV
MDC_IDC_SET_ZONE_DETECTION_INTERVAL: 286 MS
MDC_IDC_SET_ZONE_DETECTION_INTERVAL: 375 MS
MDC_IDC_SET_ZONE_TYPE: NORMAL
MDC_IDC_SET_ZONE_TYPE: NORMAL
MDC_IDC_SET_ZONE_VENDOR_TYPE: NORMAL
MDC_IDC_SET_ZONE_VENDOR_TYPE: NORMAL
MDC_IDC_STAT_BRADY_DTM_END: NORMAL
MDC_IDC_STAT_BRADY_DTM_START: NORMAL
MDC_IDC_STAT_BRADY_RV_PERCENT_PACED: 11 %
MDC_IDC_STAT_EPISODE_RECENT_COUNT: 0
MDC_IDC_STAT_EPISODE_RECENT_COUNT: 0
MDC_IDC_STAT_EPISODE_RECENT_COUNT: 1
MDC_IDC_STAT_EPISODE_RECENT_COUNT: 356
MDC_IDC_STAT_EPISODE_RECENT_COUNT: 6
MDC_IDC_STAT_EPISODE_RECENT_COUNT_DTM_END: NORMAL
MDC_IDC_STAT_EPISODE_RECENT_COUNT_DTM_START: NORMAL
MDC_IDC_STAT_EPISODE_TYPE: NORMAL
MDC_IDC_STAT_EPISODE_VENDOR_TYPE: NORMAL
MDC_IDC_STAT_TACHYTHERAPY_ATP_DELIVERED_RECENT: 1
MDC_IDC_STAT_TACHYTHERAPY_ATP_DELIVERED_TOTAL: 25
MDC_IDC_STAT_TACHYTHERAPY_RECENT_DTM_END: NORMAL
MDC_IDC_STAT_TACHYTHERAPY_RECENT_DTM_START: NORMAL
MDC_IDC_STAT_TACHYTHERAPY_SHOCKS_ABORTED_RECENT: 1
MDC_IDC_STAT_TACHYTHERAPY_SHOCKS_ABORTED_TOTAL: 24
MDC_IDC_STAT_TACHYTHERAPY_SHOCKS_DELIVERED_RECENT: 0
MDC_IDC_STAT_TACHYTHERAPY_SHOCKS_DELIVERED_TOTAL: 24
MDC_IDC_STAT_TACHYTHERAPY_TOTAL_DTM_END: NORMAL
MDC_IDC_STAT_TACHYTHERAPY_TOTAL_DTM_START: NORMAL

## 2020-11-07 ENCOUNTER — HEALTH MAINTENANCE LETTER (OUTPATIENT)
Age: 70
End: 2020-11-07

## 2020-12-09 ENCOUNTER — TRANSFERRED RECORDS (OUTPATIENT)
Dept: HEALTH INFORMATION MANAGEMENT | Facility: CLINIC | Age: 70
End: 2020-12-09

## 2021-01-01 ENCOUNTER — INFUSION THERAPY VISIT (OUTPATIENT)
Dept: TRANSPLANT | Facility: CLINIC | Age: 71
End: 2021-01-01
Attending: PEDIATRICS
Payer: MEDICARE

## 2021-01-01 ENCOUNTER — MYC MEDICAL ADVICE (OUTPATIENT)
Dept: TRANSPLANT | Facility: CLINIC | Age: 71
End: 2021-01-01

## 2021-01-01 ENCOUNTER — DOCUMENTATION ONLY (OUTPATIENT)
Dept: ONCOLOGY | Facility: CLINIC | Age: 71
End: 2021-01-01
Payer: MEDICARE

## 2021-01-01 ENCOUNTER — DOCUMENTATION ONLY (OUTPATIENT)
Dept: ONCOLOGY | Facility: CLINIC | Age: 71
End: 2021-01-01

## 2021-01-01 ENCOUNTER — TELEPHONE (OUTPATIENT)
Dept: ONCOLOGY | Facility: CLINIC | Age: 71
End: 2021-01-01
Payer: MEDICARE

## 2021-01-01 ENCOUNTER — TELEPHONE (OUTPATIENT)
Dept: ONCOLOGY | Facility: CLINIC | Age: 71
End: 2021-01-01

## 2021-01-01 ENCOUNTER — INFUSION THERAPY VISIT (OUTPATIENT)
Dept: TRANSPLANT | Facility: CLINIC | Age: 71
End: 2021-01-01
Attending: INTERNAL MEDICINE
Payer: MEDICARE

## 2021-01-01 ENCOUNTER — TELEPHONE (OUTPATIENT)
Dept: CARDIOLOGY | Facility: CLINIC | Age: 71
End: 2021-01-01
Payer: MEDICARE

## 2021-01-01 ENCOUNTER — APPOINTMENT (OUTPATIENT)
Dept: LAB | Facility: CLINIC | Age: 71
End: 2021-01-01
Attending: INTERNAL MEDICINE
Payer: MEDICARE

## 2021-01-01 VITALS
SYSTOLIC BLOOD PRESSURE: 105 MMHG | OXYGEN SATURATION: 95 % | DIASTOLIC BLOOD PRESSURE: 54 MMHG | TEMPERATURE: 98.1 F | BODY MASS INDEX: 27.51 KG/M2 | WEIGHT: 188.9 LBS | HEART RATE: 50 BPM | RESPIRATION RATE: 16 BRPM

## 2021-01-01 VITALS
OXYGEN SATURATION: 93 % | TEMPERATURE: 97.9 F | BODY MASS INDEX: 26.88 KG/M2 | SYSTOLIC BLOOD PRESSURE: 125 MMHG | WEIGHT: 184.6 LBS | DIASTOLIC BLOOD PRESSURE: 47 MMHG | RESPIRATION RATE: 16 BRPM | HEART RATE: 88 BPM

## 2021-01-01 VITALS
DIASTOLIC BLOOD PRESSURE: 45 MMHG | BODY MASS INDEX: 27.05 KG/M2 | HEART RATE: 51 BPM | WEIGHT: 185.8 LBS | SYSTOLIC BLOOD PRESSURE: 93 MMHG | TEMPERATURE: 98.7 F | OXYGEN SATURATION: 95 % | RESPIRATION RATE: 14 BRPM

## 2021-01-01 VITALS
OXYGEN SATURATION: 95 % | BODY MASS INDEX: 27.96 KG/M2 | WEIGHT: 192 LBS | TEMPERATURE: 98.1 F | HEART RATE: 68 BPM | DIASTOLIC BLOOD PRESSURE: 55 MMHG | SYSTOLIC BLOOD PRESSURE: 91 MMHG | RESPIRATION RATE: 16 BRPM

## 2021-01-01 VITALS
SYSTOLIC BLOOD PRESSURE: 106 MMHG | WEIGHT: 193.3 LBS | BODY MASS INDEX: 28.15 KG/M2 | DIASTOLIC BLOOD PRESSURE: 55 MMHG | TEMPERATURE: 98.4 F | HEART RATE: 60 BPM | OXYGEN SATURATION: 94 % | RESPIRATION RATE: 16 BRPM

## 2021-01-01 DIAGNOSIS — C90.02 MULTIPLE MYELOMA IN RELAPSE (H): Primary | ICD-10-CM

## 2021-01-01 DIAGNOSIS — C90.02 MULTIPLE MYELOMA IN RELAPSE (H): ICD-10-CM

## 2021-01-01 DIAGNOSIS — Z79.01 ANTICOAGULATION GOAL OF INR 2 TO 3: ICD-10-CM

## 2021-01-01 DIAGNOSIS — Z94.84 HISTORY OF STEM CELL TRANSPLANT (H): ICD-10-CM

## 2021-01-01 DIAGNOSIS — E78.5 HYPERLIPIDEMIA: Primary | ICD-10-CM

## 2021-01-01 DIAGNOSIS — C90.00 MULTIPLE MYELOMA, REMISSION STATUS UNSPECIFIED (H): ICD-10-CM

## 2021-01-01 DIAGNOSIS — Z51.81 ANTICOAGULATION GOAL OF INR 2 TO 3: ICD-10-CM

## 2021-01-01 DIAGNOSIS — I48.21 PERMANENT ATRIAL FIBRILLATION (H): ICD-10-CM

## 2021-01-01 DIAGNOSIS — C90.00 MULTIPLE MYELOMA, REMISSION STATUS UNSPECIFIED (H): Primary | ICD-10-CM

## 2021-01-01 LAB
ABO/RH(D): ABNORMAL
ALBUMIN MFR UR ELPH: 38.7 %
ALBUMIN SERPL ELPH-MCNC: 3.4 G/DL (ref 3.7–5.1)
ALBUMIN SERPL-MCNC: 3 G/DL (ref 3.4–5)
ALBUMIN SERPL-MCNC: 3 G/DL (ref 3.4–5)
ALBUMIN SERPL-MCNC: 3.1 G/DL (ref 3.4–5)
ALBUMIN SERPL-MCNC: 3.2 G/DL (ref 3.4–5)
ALBUMIN SERPL-MCNC: 3.3 G/DL (ref 3.4–5)
ALP SERPL-CCNC: 112 U/L (ref 40–150)
ALP SERPL-CCNC: 113 U/L (ref 40–150)
ALP SERPL-CCNC: 114 U/L (ref 40–150)
ALP SERPL-CCNC: 122 U/L (ref 40–150)
ALP SERPL-CCNC: 136 U/L (ref 40–150)
ALPHA1 GLOB MFR UR ELPH: 9 %
ALPHA1 GLOB SERPL ELPH-MCNC: 0.5 G/DL (ref 0.2–0.4)
ALPHA2 GLOB MFR UR ELPH: 9.9 %
ALPHA2 GLOB SERPL ELPH-MCNC: 0.9 G/DL (ref 0.5–0.9)
ALT SERPL W P-5'-P-CCNC: 25 U/L (ref 0–70)
ALT SERPL W P-5'-P-CCNC: 26 U/L (ref 0–70)
ALT SERPL W P-5'-P-CCNC: 26 U/L (ref 0–70)
ALT SERPL W P-5'-P-CCNC: 34 U/L (ref 0–70)
ALT SERPL W P-5'-P-CCNC: 37 U/L (ref 0–70)
ANION GAP SERPL CALCULATED.3IONS-SCNC: 10 MMOL/L (ref 3–14)
ANION GAP SERPL CALCULATED.3IONS-SCNC: 7 MMOL/L (ref 3–14)
ANION GAP SERPL CALCULATED.3IONS-SCNC: 8 MMOL/L (ref 3–14)
ANION GAP SERPL CALCULATED.3IONS-SCNC: 9 MMOL/L (ref 3–14)
ANION GAP SERPL CALCULATED.3IONS-SCNC: 9 MMOL/L (ref 3–14)
ANTIBODY SCREEN, TUBE: ABNORMAL
ANTIBODY SCREEN, TUBE: NORMAL
ANTIBODY SCREEN: POSITIVE
ANTIBODY UNIDENTIFIED: NORMAL
AST SERPL W P-5'-P-CCNC: 14 U/L (ref 0–45)
AST SERPL W P-5'-P-CCNC: 15 U/L (ref 0–45)
AST SERPL W P-5'-P-CCNC: 15 U/L (ref 0–45)
AST SERPL W P-5'-P-CCNC: 18 U/L (ref 0–45)
AST SERPL W P-5'-P-CCNC: 19 U/L (ref 0–45)
B-GLOBULIN MFR UR ELPH: 9.6 %
B-GLOBULIN SERPL ELPH-MCNC: 0.7 G/DL (ref 0.6–1)
BASOPHILS # BLD AUTO: 0 10E3/UL (ref 0–0.2)
BASOPHILS # BLD AUTO: 0.1 10E3/UL (ref 0–0.2)
BASOPHILS NFR BLD AUTO: 0 %
BASOPHILS NFR BLD AUTO: 0 %
BASOPHILS NFR BLD AUTO: 1 %
BILIRUB SERPL-MCNC: 0.7 MG/DL (ref 0.2–1.3)
BILIRUB SERPL-MCNC: 1.1 MG/DL (ref 0.2–1.3)
BILIRUB SERPL-MCNC: 1.3 MG/DL (ref 0.2–1.3)
BILIRUB SERPL-MCNC: 1.9 MG/DL (ref 0.2–1.3)
BILIRUB SERPL-MCNC: 1.9 MG/DL (ref 0.2–1.3)
BLD PROD TYP BPU: NORMAL
BLOOD COMPONENT TYPE: NORMAL
BUN SERPL-MCNC: 22 MG/DL (ref 7–30)
BUN SERPL-MCNC: 23 MG/DL (ref 7–30)
BUN SERPL-MCNC: 27 MG/DL (ref 7–30)
BUN SERPL-MCNC: 31 MG/DL (ref 7–30)
BUN SERPL-MCNC: 32 MG/DL (ref 7–30)
CALCIUM SERPL-MCNC: 8.4 MG/DL (ref 8.5–10.1)
CALCIUM SERPL-MCNC: 8.4 MG/DL (ref 8.5–10.1)
CALCIUM SERPL-MCNC: 8.5 MG/DL (ref 8.5–10.1)
CALCIUM SERPL-MCNC: 8.6 MG/DL (ref 8.5–10.1)
CALCIUM SERPL-MCNC: 9 MG/DL (ref 8.5–10.1)
CHLORIDE BLD-SCNC: 103 MMOL/L (ref 94–109)
CHLORIDE BLD-SCNC: 105 MMOL/L (ref 94–109)
CHLORIDE BLD-SCNC: 105 MMOL/L (ref 94–109)
CHLORIDE BLD-SCNC: 106 MMOL/L (ref 94–109)
CHLORIDE BLD-SCNC: 108 MMOL/L (ref 94–109)
CO2 SERPL-SCNC: 22 MMOL/L (ref 20–32)
CO2 SERPL-SCNC: 23 MMOL/L (ref 20–32)
CO2 SERPL-SCNC: 23 MMOL/L (ref 20–32)
CO2 SERPL-SCNC: 25 MMOL/L (ref 20–32)
CO2 SERPL-SCNC: 26 MMOL/L (ref 20–32)
CODING SYSTEM: NORMAL
COLLECT DURATION TIME UR: 24 H
CREAT 24H UR-MRATE: 0.94 G/SPEC (ref 1–2)
CREAT SERPL-MCNC: 1.22 MG/DL (ref 0.66–1.25)
CREAT SERPL-MCNC: 1.23 MG/DL (ref 0.66–1.25)
CREAT SERPL-MCNC: 1.29 MG/DL (ref 0.66–1.25)
CREAT SERPL-MCNC: 1.35 MG/DL (ref 0.66–1.25)
CREAT SERPL-MCNC: 1.41 MG/DL (ref 0.66–1.25)
CREAT UR-MCNC: 66 MG/DL
EOSINOPHIL # BLD AUTO: 0 10E3/UL (ref 0–0.7)
EOSINOPHIL # BLD AUTO: 0 10E3/UL (ref 0–0.7)
EOSINOPHIL # BLD AUTO: 0.1 10E3/UL (ref 0–0.7)
EOSINOPHIL # BLD AUTO: 0.2 10E3/UL (ref 0–0.7)
EOSINOPHIL # BLD AUTO: 0.7 10E3/UL (ref 0–0.7)
EOSINOPHIL NFR BLD AUTO: 0 %
EOSINOPHIL NFR BLD AUTO: 0 %
EOSINOPHIL NFR BLD AUTO: 2 %
EOSINOPHIL NFR BLD AUTO: 25 %
EOSINOPHIL NFR BLD AUTO: 9 %
ERYTHROCYTE [DISTWIDTH] IN BLOOD BY AUTOMATED COUNT: 16.4 % (ref 10–15)
ERYTHROCYTE [DISTWIDTH] IN BLOOD BY AUTOMATED COUNT: 16.6 % (ref 10–15)
ERYTHROCYTE [DISTWIDTH] IN BLOOD BY AUTOMATED COUNT: 17.4 % (ref 10–15)
ERYTHROCYTE [DISTWIDTH] IN BLOOD BY AUTOMATED COUNT: 17.5 % (ref 10–15)
ERYTHROCYTE [DISTWIDTH] IN BLOOD BY AUTOMATED COUNT: 18.2 % (ref 10–15)
GAMMA GLOB MFR UR ELPH: 32.8 %
GAMMA GLOB SERPL ELPH-MCNC: 0.5 G/DL (ref 0.7–1.6)
GFR SERPL CREATININE-BSD FRML MDRD: 53 ML/MIN/1.73M2
GFR SERPL CREATININE-BSD FRML MDRD: 54 ML/MIN/1.73M2
GFR SERPL CREATININE-BSD FRML MDRD: 56 ML/MIN/1.73M2
GFR SERPL CREATININE-BSD FRML MDRD: 59 ML/MIN/1.73M2
GFR SERPL CREATININE-BSD FRML MDRD: 60 ML/MIN/1.73M2
GLUCOSE BLD-MCNC: 117 MG/DL (ref 70–99)
GLUCOSE BLD-MCNC: 134 MG/DL (ref 70–99)
GLUCOSE BLD-MCNC: 136 MG/DL (ref 70–99)
GLUCOSE BLD-MCNC: 175 MG/DL (ref 70–99)
GLUCOSE BLD-MCNC: 176 MG/DL (ref 70–99)
HCT VFR BLD AUTO: 27.2 % (ref 40–53)
HCT VFR BLD AUTO: 27.8 % (ref 40–53)
HCT VFR BLD AUTO: 28.8 % (ref 40–53)
HCT VFR BLD AUTO: 29.2 % (ref 40–53)
HCT VFR BLD AUTO: 29.9 % (ref 40–53)
HGB BLD-MCNC: 8.9 G/DL (ref 13.3–17.7)
HGB BLD-MCNC: 8.9 G/DL (ref 13.3–17.7)
HGB BLD-MCNC: 9.4 G/DL (ref 13.3–17.7)
HGB BLD-MCNC: 9.5 G/DL (ref 13.3–17.7)
HGB BLD-MCNC: 9.8 G/DL (ref 13.3–17.7)
IGA SERPL-MCNC: 153 MG/DL (ref 84–499)
IGG SERPL-MCNC: 428 MG/DL (ref 610–1616)
IGM SERPL-MCNC: 29 MG/DL (ref 35–242)
IMM GRANULOCYTES # BLD: 0 10E3/UL
IMM GRANULOCYTES # BLD: 0.1 10E3/UL
IMM GRANULOCYTES NFR BLD: 1 %
IMM GRANULOCYTES NFR BLD: 2 %
IMM GRANULOCYTES NFR BLD: 2 %
INR PPP: 1.88 (ref 0.85–1.15)
INR PPP: 2.04 (ref 0.85–1.15)
INR PPP: 2.31 (ref 0.85–1.15)
KAPPA LC FREE SER-MCNC: 1.17 MG/DL (ref 0.33–1.94)
KAPPA LC FREE/LAMBDA FREE SER NEPH: 0.04 {RATIO} (ref 0.26–1.65)
LAMBDA LC FREE SERPL-MCNC: 28.05 MG/DL (ref 0.57–2.63)
LDH SERPL L TO P-CCNC: 208 U/L (ref 85–227)
LYMPHOCYTES # BLD AUTO: 0.1 10E3/UL (ref 0.8–5.3)
LYMPHOCYTES # BLD AUTO: 0.1 10E3/UL (ref 0.8–5.3)
LYMPHOCYTES # BLD AUTO: 0.2 10E3/UL (ref 0.8–5.3)
LYMPHOCYTES # BLD AUTO: 0.2 10E3/UL (ref 0.8–5.3)
LYMPHOCYTES # BLD AUTO: 0.5 10E3/UL (ref 0.8–5.3)
LYMPHOCYTES NFR BLD AUTO: 1 %
LYMPHOCYTES NFR BLD AUTO: 2 %
LYMPHOCYTES NFR BLD AUTO: 3 %
LYMPHOCYTES NFR BLD AUTO: 8 %
LYMPHOCYTES NFR BLD AUTO: 9 %
M PROTEIN MFR UR ELPH: 17.9 %
M PROTEIN SERPL ELPH-MCNC: 0.1 G/DL
MAGNESIUM SERPL-MCNC: 1.2 MG/DL (ref 1.6–2.3)
MAGNESIUM SERPL-MCNC: 1.6 MG/DL (ref 1.6–2.3)
MAGNESIUM SERPL-MCNC: 1.7 MG/DL (ref 1.6–2.3)
MAGNESIUM SERPL-MCNC: 1.7 MG/DL (ref 1.6–2.3)
MCH RBC QN AUTO: 34.6 PG (ref 26.5–33)
MCH RBC QN AUTO: 34.8 PG (ref 26.5–33)
MCH RBC QN AUTO: 34.9 PG (ref 26.5–33)
MCH RBC QN AUTO: 35.2 PG (ref 26.5–33)
MCH RBC QN AUTO: 35.4 PG (ref 26.5–33)
MCHC RBC AUTO-ENTMCNC: 32 G/DL (ref 31.5–36.5)
MCHC RBC AUTO-ENTMCNC: 32.2 G/DL (ref 31.5–36.5)
MCHC RBC AUTO-ENTMCNC: 32.7 G/DL (ref 31.5–36.5)
MCHC RBC AUTO-ENTMCNC: 32.8 G/DL (ref 31.5–36.5)
MCHC RBC AUTO-ENTMCNC: 33 G/DL (ref 31.5–36.5)
MCV RBC AUTO: 106 FL (ref 78–100)
MCV RBC AUTO: 106 FL (ref 78–100)
MCV RBC AUTO: 108 FL (ref 78–100)
MCV RBC AUTO: 109 FL (ref 78–100)
MCV RBC AUTO: 109 FL (ref 78–100)
MONOCYTES # BLD AUTO: 0.2 10E3/UL (ref 0–1.3)
MONOCYTES # BLD AUTO: 0.3 10E3/UL (ref 0–1.3)
MONOCYTES # BLD AUTO: 0.7 10E3/UL (ref 0–1.3)
MONOCYTES NFR BLD AUTO: 10 %
MONOCYTES NFR BLD AUTO: 12 %
MONOCYTES NFR BLD AUTO: 3 %
MONOCYTES NFR BLD AUTO: 3 %
MONOCYTES NFR BLD AUTO: 8 %
NEUTROPHILS # BLD AUTO: 1.6 10E3/UL (ref 1.6–8.3)
NEUTROPHILS # BLD AUTO: 1.7 10E3/UL (ref 1.6–8.3)
NEUTROPHILS # BLD AUTO: 4.4 10E3/UL (ref 1.6–8.3)
NEUTROPHILS # BLD AUTO: 7.2 10E3/UL (ref 1.6–8.3)
NEUTROPHILS # BLD AUTO: 8.9 10E3/UL (ref 1.6–8.3)
NEUTROPHILS NFR BLD AUTO: 62 %
NEUTROPHILS NFR BLD AUTO: 71 %
NEUTROPHILS NFR BLD AUTO: 75 %
NEUTROPHILS NFR BLD AUTO: 93 %
NEUTROPHILS NFR BLD AUTO: 94 %
NRBC # BLD AUTO: 0 10E3/UL
NRBC BLD AUTO-RTO: 0 /100
PLATELET # BLD AUTO: 134 10E3/UL (ref 150–450)
PLATELET # BLD AUTO: 177 10E3/UL (ref 150–450)
PLATELET # BLD AUTO: 218 10E3/UL (ref 150–450)
PLATELET # BLD AUTO: 219 10E3/UL (ref 150–450)
PLATELET # BLD AUTO: 75 10E3/UL (ref 150–450)
POTASSIUM BLD-SCNC: 4.3 MMOL/L (ref 3.4–5.3)
POTASSIUM BLD-SCNC: 4.5 MMOL/L (ref 3.4–5.3)
POTASSIUM BLD-SCNC: 4.8 MMOL/L (ref 3.4–5.3)
POTASSIUM BLD-SCNC: 4.9 MMOL/L (ref 3.4–5.3)
POTASSIUM BLD-SCNC: 4.9 MMOL/L (ref 3.4–5.3)
PROT 24H UR-MRATE: 0.17 G/SPEC (ref 0.04–0.23)
PROT ELPH PNL UR ELPH: NORMAL
PROT PATTERN SERPL ELPH-IMP: ABNORMAL
PROT PATTERN UR ELPH-IMP: ABNORMAL
PROT SERPL-MCNC: 6 G/DL (ref 6.8–8.8)
PROT SERPL-MCNC: 6 G/DL (ref 6.8–8.8)
PROT SERPL-MCNC: 6.1 G/DL (ref 6.8–8.8)
PROT SERPL-MCNC: 6.3 G/DL (ref 6.8–8.8)
PROT SERPL-MCNC: 6.3 G/DL (ref 6.8–8.8)
PROT UR-MCNC: 0.12 G/L
PROT/CREAT 24H UR: 0.18 G/G CR (ref 0–0.2)
RBC # BLD AUTO: 2.55 10E6/UL (ref 4.4–5.9)
RBC # BLD AUTO: 2.57 10E6/UL (ref 4.4–5.9)
RBC # BLD AUTO: 2.67 10E6/UL (ref 4.4–5.9)
RBC # BLD AUTO: 2.73 10E6/UL (ref 4.4–5.9)
RBC # BLD AUTO: 2.77 10E6/UL (ref 4.4–5.9)
SODIUM SERPL-SCNC: 136 MMOL/L (ref 133–144)
SODIUM SERPL-SCNC: 137 MMOL/L (ref 133–144)
SODIUM SERPL-SCNC: 138 MMOL/L (ref 133–144)
SODIUM SERPL-SCNC: 139 MMOL/L (ref 133–144)
SODIUM SERPL-SCNC: 139 MMOL/L (ref 133–144)
SPECIMEN EXPIRATION DATE: ABNORMAL
SPECIMEN EXPIRATION DATE: ABNORMAL
SPECIMEN EXPIRATION DATE: NORMAL
SPECIMEN EXPIRATION DATE: NORMAL
SPECIMEN VOL UR: 1420 ML
TOTAL PROTEIN SERUM FOR ELP: 6 G/DL (ref 6.8–8.8)
UNIT ABO/RH: NORMAL
UNIT NUMBER: NORMAL
UNIT STATUS: NORMAL
UNIT TYPE ISBT: 6200
URATE SERPL-MCNC: 5.8 MG/DL (ref 3.5–7.2)
WBC # BLD AUTO: 2.2 10E3/UL (ref 4–11)
WBC # BLD AUTO: 2.7 10E3/UL (ref 4–11)
WBC # BLD AUTO: 5.8 10E3/UL (ref 4–11)
WBC # BLD AUTO: 7.7 10E3/UL (ref 4–11)
WBC # BLD AUTO: 9.6 10E3/UL (ref 4–11)

## 2021-01-01 PROCEDURE — 250N000013 HC RX MED GY IP 250 OP 250 PS 637: Performed by: INTERNAL MEDICINE

## 2021-01-01 PROCEDURE — 84155 ASSAY OF PROTEIN SERUM: CPT

## 2021-01-01 PROCEDURE — 85004 AUTOMATED DIFF WBC COUNT: CPT | Performed by: INTERNAL MEDICINE

## 2021-01-01 PROCEDURE — 80053 COMPREHEN METABOLIC PANEL: CPT

## 2021-01-01 PROCEDURE — 85610 PROTHROMBIN TIME: CPT

## 2021-01-01 PROCEDURE — 86850 RBC ANTIBODY SCREEN: CPT | Performed by: INTERNAL MEDICINE

## 2021-01-01 PROCEDURE — 96401 CHEMO ANTI-NEOPL SQ/IM: CPT

## 2021-01-01 PROCEDURE — 96367 TX/PROPH/DG ADDL SEQ IV INF: CPT

## 2021-01-01 PROCEDURE — 84165 PROTEIN E-PHORESIS SERUM: CPT | Mod: TC | Performed by: PATHOLOGY

## 2021-01-01 PROCEDURE — 250N000011 HC RX IP 250 OP 636: Performed by: INTERNAL MEDICINE

## 2021-01-01 PROCEDURE — 83735 ASSAY OF MAGNESIUM: CPT

## 2021-01-01 PROCEDURE — 83615 LACTATE (LD) (LDH) ENZYME: CPT

## 2021-01-01 PROCEDURE — 258N000003 HC RX IP 258 OP 636: Performed by: INTERNAL MEDICINE

## 2021-01-01 PROCEDURE — 90662 IIV NO PRSV INCREASED AG IM: CPT | Performed by: INTERNAL MEDICINE

## 2021-01-01 PROCEDURE — 99214 OFFICE O/P EST MOD 30 MIN: CPT | Performed by: INTERNAL MEDICINE

## 2021-01-01 PROCEDURE — 85025 COMPLETE CBC W/AUTO DIFF WBC: CPT | Performed by: INTERNAL MEDICINE

## 2021-01-01 PROCEDURE — G0463 HOSPITAL OUTPT CLINIC VISIT: HCPCS | Mod: 25

## 2021-01-01 PROCEDURE — 85025 COMPLETE CBC W/AUTO DIFF WBC: CPT

## 2021-01-01 PROCEDURE — G0008 ADMIN INFLUENZA VIRUS VAC: HCPCS | Performed by: INTERNAL MEDICINE

## 2021-01-01 PROCEDURE — 96409 CHEMO IV PUSH SNGL DRUG: CPT

## 2021-01-01 PROCEDURE — 84166 PROTEIN E-PHORESIS/URINE/CSF: CPT | Mod: 26 | Performed by: PATHOLOGY

## 2021-01-01 PROCEDURE — 86850 RBC ANTIBODY SCREEN: CPT

## 2021-01-01 PROCEDURE — 84156 ASSAY OF PROTEIN URINE: CPT

## 2021-01-01 PROCEDURE — 36415 COLL VENOUS BLD VENIPUNCTURE: CPT | Performed by: INTERNAL MEDICINE

## 2021-01-01 PROCEDURE — 86901 BLOOD TYPING SEROLOGIC RH(D): CPT

## 2021-01-01 PROCEDURE — 84550 ASSAY OF BLOOD/URIC ACID: CPT

## 2021-01-01 PROCEDURE — 96413 CHEMO IV INFUSION 1 HR: CPT

## 2021-01-01 PROCEDURE — 96375 TX/PRO/DX INJ NEW DRUG ADDON: CPT

## 2021-01-01 PROCEDURE — 84165 PROTEIN E-PHORESIS SERUM: CPT | Mod: 26 | Performed by: PATHOLOGY

## 2021-01-01 PROCEDURE — 82784 ASSAY IGA/IGD/IGG/IGM EACH: CPT

## 2021-01-01 PROCEDURE — 36415 COLL VENOUS BLD VENIPUNCTURE: CPT

## 2021-01-01 PROCEDURE — 80053 COMPREHEN METABOLIC PANEL: CPT | Performed by: INTERNAL MEDICINE

## 2021-01-01 PROCEDURE — 96361 HYDRATE IV INFUSION ADD-ON: CPT

## 2021-01-01 PROCEDURE — 86335 IMMUNFIX E-PHORSIS/URINE/CSF: CPT | Mod: 26 | Performed by: PATHOLOGY

## 2021-01-01 PROCEDURE — 84166 PROTEIN E-PHORESIS/URINE/CSF: CPT | Performed by: PATHOLOGY

## 2021-01-01 PROCEDURE — 83883 ASSAY NEPHELOMETRY NOT SPEC: CPT

## 2021-01-01 PROCEDURE — 81050 URINALYSIS VOLUME MEASURE: CPT

## 2021-01-01 PROCEDURE — 86335 IMMUNFIX E-PHORSIS/URINE/CSF: CPT | Performed by: INTERNAL MEDICINE

## 2021-01-01 PROCEDURE — 86900 BLOOD TYPING SEROLOGIC ABO: CPT | Performed by: INTERNAL MEDICINE

## 2021-01-01 RX ORDER — DIPHENHYDRAMINE HCL 25 MG
50 CAPSULE ORAL
Status: CANCELLED | OUTPATIENT
Start: 2022-01-01

## 2021-01-01 RX ORDER — NALOXONE HYDROCHLORIDE 0.4 MG/ML
0.2 INJECTION, SOLUTION INTRAMUSCULAR; INTRAVENOUS; SUBCUTANEOUS
Status: CANCELLED | OUTPATIENT
Start: 2021-01-01

## 2021-01-01 RX ORDER — HEPARIN SODIUM (PORCINE) LOCK FLUSH IV SOLN 100 UNIT/ML 100 UNIT/ML
5 SOLUTION INTRAVENOUS
Status: CANCELLED | OUTPATIENT
Start: 2022-01-01

## 2021-01-01 RX ORDER — ACETAMINOPHEN 325 MG/1
650 TABLET ORAL
Status: CANCELLED
Start: 2021-01-01

## 2021-01-01 RX ORDER — ACETAMINOPHEN 325 MG/1
650 TABLET ORAL
Status: COMPLETED | OUTPATIENT
Start: 2021-01-01 | End: 2021-01-01

## 2021-01-01 RX ORDER — ALBUTEROL SULFATE 90 UG/1
1-2 AEROSOL, METERED RESPIRATORY (INHALATION)
Status: CANCELLED
Start: 2022-01-01

## 2021-01-01 RX ORDER — HEPARIN SODIUM,PORCINE 10 UNIT/ML
5 VIAL (ML) INTRAVENOUS
Status: CANCELLED | OUTPATIENT
Start: 2022-01-01

## 2021-01-01 RX ORDER — DIPHENHYDRAMINE HCL 25 MG
50 CAPSULE ORAL
Status: COMPLETED | OUTPATIENT
Start: 2021-01-01 | End: 2021-01-01

## 2021-01-01 RX ORDER — DIPHENHYDRAMINE HCL 25 MG
50 CAPSULE ORAL
Status: CANCELLED
Start: 2022-01-01

## 2021-01-01 RX ORDER — DIPHENHYDRAMINE HCL 50 MG
50 CAPSULE ORAL
Status: CANCELLED
Start: 2021-01-01

## 2021-01-01 RX ORDER — MONTELUKAST SODIUM 10 MG/1
10 TABLET ORAL ONCE
Status: CANCELLED | OUTPATIENT
Start: 2021-01-01

## 2021-01-01 RX ORDER — NALOXONE HYDROCHLORIDE 0.4 MG/ML
0.2 INJECTION, SOLUTION INTRAMUSCULAR; INTRAVENOUS; SUBCUTANEOUS
Status: CANCELLED | OUTPATIENT
Start: 2022-01-01

## 2021-01-01 RX ORDER — SIMVASTATIN 40 MG
40 TABLET ORAL AT BEDTIME
Qty: 90 TABLET | Refills: 0 | Status: SHIPPED | OUTPATIENT
Start: 2021-01-01

## 2021-01-01 RX ORDER — MEPERIDINE HYDROCHLORIDE 25 MG/ML
25 INJECTION INTRAMUSCULAR; INTRAVENOUS; SUBCUTANEOUS EVERY 30 MIN PRN
Status: CANCELLED | OUTPATIENT
Start: 2022-01-01

## 2021-01-01 RX ORDER — EPINEPHRINE 1 MG/ML
0.3 INJECTION, SOLUTION INTRAMUSCULAR; SUBCUTANEOUS EVERY 5 MIN PRN
Status: CANCELLED | OUTPATIENT
Start: 2021-01-01

## 2021-01-01 RX ORDER — LORAZEPAM 2 MG/ML
0.5 INJECTION INTRAMUSCULAR EVERY 4 HOURS PRN
Status: CANCELLED | OUTPATIENT
Start: 2022-01-01

## 2021-01-01 RX ORDER — ALBUTEROL SULFATE 0.83 MG/ML
2.5 SOLUTION RESPIRATORY (INHALATION)
Status: CANCELLED | OUTPATIENT
Start: 2022-01-01

## 2021-01-01 RX ORDER — MEPERIDINE HYDROCHLORIDE 25 MG/ML
25 INJECTION INTRAMUSCULAR; INTRAVENOUS; SUBCUTANEOUS EVERY 30 MIN PRN
Status: CANCELLED | OUTPATIENT
Start: 2021-01-01

## 2021-01-01 RX ORDER — METHYLPREDNISOLONE SODIUM SUCCINATE 125 MG/2ML
125 INJECTION, POWDER, LYOPHILIZED, FOR SOLUTION INTRAMUSCULAR; INTRAVENOUS
Status: CANCELLED
Start: 2022-01-01

## 2021-01-01 RX ORDER — EPINEPHRINE 1 MG/ML
0.3 INJECTION, SOLUTION INTRAMUSCULAR; SUBCUTANEOUS EVERY 5 MIN PRN
Status: CANCELLED | OUTPATIENT
Start: 2022-01-01

## 2021-01-01 RX ORDER — DIPHENHYDRAMINE HYDROCHLORIDE 50 MG/ML
50 INJECTION INTRAMUSCULAR; INTRAVENOUS
Status: CANCELLED
Start: 2021-01-01

## 2021-01-01 RX ORDER — HEPARIN SODIUM (PORCINE) LOCK FLUSH IV SOLN 100 UNIT/ML 100 UNIT/ML
5 SOLUTION INTRAVENOUS
Status: CANCELLED | OUTPATIENT
Start: 2021-01-01

## 2021-01-01 RX ORDER — MONTELUKAST SODIUM 10 MG/1
10 TABLET ORAL ONCE
Status: DISCONTINUED | OUTPATIENT
Start: 2021-01-01 | End: 2021-01-01

## 2021-01-01 RX ORDER — MEXILETINE HYDROCHLORIDE 150 MG/1
150 CAPSULE ORAL 2 TIMES DAILY
Qty: 180 CAPSULE | Refills: 0 | Status: SHIPPED | OUTPATIENT
Start: 2021-01-01

## 2021-01-01 RX ORDER — HEPARIN SODIUM,PORCINE 10 UNIT/ML
5 VIAL (ML) INTRAVENOUS
Status: CANCELLED | OUTPATIENT
Start: 2021-01-01

## 2021-01-01 RX ORDER — DIPHENHYDRAMINE HYDROCHLORIDE 50 MG/ML
50 INJECTION INTRAMUSCULAR; INTRAVENOUS
Status: CANCELLED
Start: 2022-01-01

## 2021-01-01 RX ORDER — LORAZEPAM 2 MG/ML
0.5 INJECTION INTRAMUSCULAR EVERY 4 HOURS PRN
Status: CANCELLED | OUTPATIENT
Start: 2021-01-01

## 2021-01-01 RX ORDER — DIPHENHYDRAMINE HCL 25 MG
50 CAPSULE ORAL ONCE
Status: COMPLETED | OUTPATIENT
Start: 2021-01-01 | End: 2021-01-01

## 2021-01-01 RX ORDER — ACETAMINOPHEN 325 MG/1
650 TABLET ORAL
Status: CANCELLED
Start: 2022-01-01

## 2021-01-01 RX ORDER — MAGNESIUM SULFATE HEPTAHYDRATE 40 MG/ML
2 INJECTION, SOLUTION INTRAVENOUS ONCE
Status: COMPLETED | OUTPATIENT
Start: 2021-01-01 | End: 2021-01-01

## 2021-01-01 RX ORDER — ACETAMINOPHEN 325 MG/1
650 TABLET ORAL
Status: CANCELLED | OUTPATIENT
Start: 2022-01-01

## 2021-01-01 RX ORDER — ACETAMINOPHEN 325 MG/1
650 TABLET ORAL ONCE
Status: CANCELLED | OUTPATIENT
Start: 2021-01-01

## 2021-01-01 RX ORDER — METHYLPREDNISOLONE SODIUM SUCCINATE 125 MG/2ML
125 INJECTION, POWDER, LYOPHILIZED, FOR SOLUTION INTRAMUSCULAR; INTRAVENOUS
Status: CANCELLED
Start: 2021-01-01

## 2021-01-01 RX ORDER — ALBUTEROL SULFATE 0.83 MG/ML
2.5 SOLUTION RESPIRATORY (INHALATION)
Status: CANCELLED | OUTPATIENT
Start: 2021-01-01

## 2021-01-01 RX ORDER — ALBUTEROL SULFATE 90 UG/1
1-2 AEROSOL, METERED RESPIRATORY (INHALATION)
Status: CANCELLED
Start: 2021-01-01

## 2021-01-01 RX ORDER — MONTELUKAST SODIUM 10 MG/1
10 TABLET ORAL ONCE
Status: COMPLETED | OUTPATIENT
Start: 2021-01-01 | End: 2021-01-01

## 2021-01-01 RX ORDER — DIPHENHYDRAMINE HCL 25 MG
50 CAPSULE ORAL ONCE
Status: CANCELLED | OUTPATIENT
Start: 2021-01-01

## 2021-01-01 RX ORDER — ACETAMINOPHEN 325 MG/1
650 TABLET ORAL ONCE
Status: COMPLETED | OUTPATIENT
Start: 2021-01-01 | End: 2021-01-01

## 2021-01-01 RX ADMIN — DARATUMUMAB AND HYALURONIDASE-FIHJ (HUMAN RECOMBINANT) 1800 MG: 1800; 30000 INJECTION SUBCUTANEOUS at 10:27

## 2021-01-01 RX ADMIN — ACETAMINOPHEN 650 MG: 325 TABLET ORAL at 09:56

## 2021-01-01 RX ADMIN — DEXTROSE MONOHYDRATE 500 ML: 50 INJECTION, SOLUTION INTRAVENOUS at 10:21

## 2021-01-01 RX ADMIN — SODIUM CHLORIDE 1000 ML: 9 INJECTION, SOLUTION INTRAVENOUS at 12:52

## 2021-01-01 RX ADMIN — ACETAMINOPHEN 650 MG: 325 TABLET ORAL at 10:45

## 2021-01-01 RX ADMIN — ACETAMINOPHEN 650 MG: 325 TABLET ORAL at 10:27

## 2021-01-01 RX ADMIN — DARATUMUMAB AND HYALURONIDASE-FIHJ (HUMAN RECOMBINANT) 1800 MG: 1800; 30000 INJECTION SUBCUTANEOUS at 11:40

## 2021-01-01 RX ADMIN — DEXAMETHASONE SODIUM PHOSPHATE 20 MG: 10 INJECTION, SOLUTION INTRAMUSCULAR; INTRAVENOUS at 10:27

## 2021-01-01 RX ADMIN — DIPHENHYDRAMINE HYDROCHLORIDE 50 MG: 25 CAPSULE ORAL at 10:45

## 2021-01-01 RX ADMIN — DIPHENHYDRAMINE HYDROCHLORIDE 50 MG: 25 CAPSULE ORAL at 10:27

## 2021-01-01 RX ADMIN — DARATUMUMAB AND HYALURONIDASE-FIHJ (HUMAN RECOMBINANT) 1800 MG: 1800; 30000 INJECTION SUBCUTANEOUS at 11:00

## 2021-01-01 RX ADMIN — INFLUENZA A VIRUS A/VICTORIA/2570/2019 IVR-215 (H1N1) ANTIGEN (FORMALDEHYDE INACTIVATED), INFLUENZA A VIRUS A/TASMANIA/503/2020 IVR-221 (H3N2) ANTIGEN (FORMALDEHYDE INACTIVATED), INFLUENZA B VIRUS B/PHUKET/3073/2013 ANTIGEN (FORMALDEHYDE INACTIVATED), AND INFLUENZA B VIRUS B/WASHINGTON/02/2019 ANTIGEN (FORMALDEHYDE INACTIVATED) 0.7 ML: 60; 60; 60; 60 INJECTION, SUSPENSION INTRAMUSCULAR at 11:26

## 2021-01-01 RX ADMIN — DIPHENHYDRAMINE HYDROCHLORIDE 50 MG: 25 CAPSULE ORAL at 10:03

## 2021-01-01 RX ADMIN — MAGNESIUM SULFATE HEPTAHYDRATE 2 G: 40 INJECTION, SOLUTION INTRAVENOUS at 12:53

## 2021-01-01 RX ADMIN — DEXTROSE MONOHYDRATE 500 ML: 50 INJECTION, SOLUTION INTRAVENOUS at 10:22

## 2021-01-01 RX ADMIN — MONTELUKAST 10 MG: 10 TABLET, FILM COATED ORAL at 10:03

## 2021-01-01 RX ADMIN — DEXAMETHASONE SODIUM PHOSPHATE 20 MG: 10 INJECTION, SOLUTION INTRAMUSCULAR; INTRAVENOUS at 10:23

## 2021-01-01 RX ADMIN — DEXTROSE MONOHYDRATE 500 ML: 50 INJECTION, SOLUTION INTRAVENOUS at 10:20

## 2021-01-01 RX ADMIN — DEXAMETHASONE SODIUM PHOSPHATE 20 MG: 10 INJECTION, SOLUTION INTRAMUSCULAR; INTRAVENOUS at 10:45

## 2021-01-01 RX ADMIN — DARATUMUMAB AND HYALURONIDASE-FIHJ (HUMAN RECOMBINANT) 1800 MG: 1800; 30000 INJECTION SUBCUTANEOUS at 11:06

## 2021-01-01 RX ADMIN — CARFILZOMIB 20 MG: 10 INJECTION, POWDER, LYOPHILIZED, FOR SOLUTION INTRAVENOUS at 10:54

## 2021-01-01 RX ADMIN — CARFILZOMIB 20 MG: 10 INJECTION, POWDER, LYOPHILIZED, FOR SOLUTION INTRAVENOUS at 11:54

## 2021-01-01 RX ADMIN — DIPHENHYDRAMINE HYDROCHLORIDE 50 MG: 25 CAPSULE ORAL at 09:56

## 2021-01-01 RX ADMIN — DEXAMETHASONE SODIUM PHOSPHATE 20 MG: 10 INJECTION, SOLUTION INTRAMUSCULAR; INTRAVENOUS at 09:57

## 2021-01-01 RX ADMIN — DARATUMUMAB AND HYALURONIDASE-FIHJ (HUMAN RECOMBINANT) 1800 MG: 1800; 30000 INJECTION SUBCUTANEOUS at 10:33

## 2021-01-01 RX ADMIN — CARFILZOMIB 20 MG: 10 INJECTION, POWDER, LYOPHILIZED, FOR SOLUTION INTRAVENOUS at 10:58

## 2021-01-01 RX ADMIN — ACETAMINOPHEN 650 MG: 325 TABLET ORAL at 10:03

## 2021-01-01 RX ADMIN — CARFILZOMIB 20 MG: 10 INJECTION, POWDER, LYOPHILIZED, FOR SOLUTION INTRAVENOUS at 11:19

## 2021-01-01 ASSESSMENT — PAIN SCALES - GENERAL
PAINLEVEL: NO PAIN (0)

## 2021-01-06 ENCOUNTER — TRANSFERRED RECORDS (OUTPATIENT)
Dept: HEALTH INFORMATION MANAGEMENT | Facility: CLINIC | Age: 71
End: 2021-01-06

## 2021-01-06 LAB
CREAT SERPL-MCNC: 1.05 MG/DL (ref 0.72–1.25)
GFR SERPL CREATININE-BSD FRML MDRD: >60 ML/MIN/1.73M(2)
GLUCOSE SERPL-MCNC: 94 MG/DL (ref 70–100)
POTASSIUM SERPL-SCNC: 4.3 MMOL/L (ref 3.5–5.1)

## 2021-01-19 DIAGNOSIS — C90.00 MULTIPLE MYELOMA, REMISSION STATUS UNSPECIFIED (H): ICD-10-CM

## 2021-01-19 LAB
COLLECT DURATION TIME UR: 24 H
CREAT 24H UR-MRATE: 1.36 G/(24.H) (ref 1–2)
CREAT UR-MCNC: 76 MG/DL
PROT 24H UR-MRATE: 0.2 G/(24.H) (ref 0.04–0.23)
PROT UR-MCNC: 0.11 G/L
PROT/CREAT 24H UR: 0.15 G/G CR (ref 0–0.2)
SPECIMEN VOL UR: 1796 ML

## 2021-01-19 PROCEDURE — 81050 URINALYSIS VOLUME MEASURE: CPT | Performed by: PATHOLOGY

## 2021-01-19 PROCEDURE — 84166 PROTEIN E-PHORESIS/URINE/CSF: CPT | Performed by: PATHOLOGY

## 2021-01-19 PROCEDURE — 84156 ASSAY OF PROTEIN URINE: CPT | Performed by: PATHOLOGY

## 2021-01-20 ENCOUNTER — ANCILLARY PROCEDURE (OUTPATIENT)
Dept: CARDIOLOGY | Facility: CLINIC | Age: 71
End: 2021-01-20
Attending: INTERNAL MEDICINE
Payer: MEDICARE

## 2021-01-20 DIAGNOSIS — Z95.810 AUTOMATIC IMPLANTABLE CARDIOVERTER-DEFIBRILLATOR IN SITU: Chronic | ICD-10-CM

## 2021-01-20 LAB
ALBUMIN MFR UR ELPH: 22.2 %
ALPHA1 GLOB MFR UR ELPH: 6.8 %
ALPHA2 GLOB MFR UR ELPH: 4.4 %
B-GLOBULIN MFR UR ELPH: 6.5 %
GAMMA GLOB MFR UR ELPH: 60.1 %
M PROTEIN MFR UR ELPH: 36.7 %
PROT PATTERN UR ELPH-IMP: ABNORMAL

## 2021-01-20 PROCEDURE — 93296 REM INTERROG EVL PM/IDS: CPT

## 2021-01-20 PROCEDURE — 93295 DEV INTERROG REMOTE 1/2/MLT: CPT | Performed by: INTERNAL MEDICINE

## 2021-01-21 LAB
MDC_IDC_EPISODE_DTM: NORMAL
MDC_IDC_EPISODE_DURATION: 10 S
MDC_IDC_EPISODE_DURATION: 11 S
MDC_IDC_EPISODE_DURATION: 12 S
MDC_IDC_EPISODE_DURATION: 16 S
MDC_IDC_EPISODE_DURATION: 17 S
MDC_IDC_EPISODE_DURATION: 18 S
MDC_IDC_EPISODE_DURATION: 5 S
MDC_IDC_EPISODE_DURATION: 9 S
MDC_IDC_EPISODE_DURATION: 9 S
MDC_IDC_EPISODE_ID: NORMAL
MDC_IDC_EPISODE_TYPE: NORMAL
MDC_IDC_EPISODE_VENDOR_TYPE: NORMAL
MDC_IDC_EPISODE_VENDOR_TYPE: NORMAL
MDC_IDC_LEAD_IMPLANT_DT: NORMAL
MDC_IDC_LEAD_LOCATION: NORMAL
MDC_IDC_LEAD_LOCATION_DETAIL_1: NORMAL
MDC_IDC_LEAD_MFG: NORMAL
MDC_IDC_LEAD_MODEL: NORMAL
MDC_IDC_LEAD_POLARITY_TYPE: NORMAL
MDC_IDC_LEAD_SERIAL: NORMAL
MDC_IDC_MSMT_BATTERY_DTM: NORMAL
MDC_IDC_MSMT_BATTERY_REMAINING_LONGEVITY: 12 MO
MDC_IDC_MSMT_BATTERY_REMAINING_PERCENTAGE: 16 %
MDC_IDC_MSMT_BATTERY_STATUS: NORMAL
MDC_IDC_MSMT_CAP_CHARGE_DTM: NORMAL
MDC_IDC_MSMT_CAP_CHARGE_DTM: NORMAL
MDC_IDC_MSMT_CAP_CHARGE_ENERGY: 41 J
MDC_IDC_MSMT_CAP_CHARGE_TIME: 10.7 S
MDC_IDC_MSMT_CAP_CHARGE_TIME: 9.7 S
MDC_IDC_MSMT_CAP_CHARGE_TYPE: NORMAL
MDC_IDC_MSMT_CAP_CHARGE_TYPE: NORMAL
MDC_IDC_MSMT_LEADCHNL_RV_IMPEDANCE_VALUE: 369 OHM
MDC_IDC_MSMT_LEADCHNL_RV_PACING_THRESHOLD_AMPLITUDE: 1.1 V
MDC_IDC_MSMT_LEADCHNL_RV_PACING_THRESHOLD_PULSEWIDTH: 0.5 MS
MDC_IDC_PG_IMPLANT_DTM: NORMAL
MDC_IDC_PG_MFG: NORMAL
MDC_IDC_PG_MODEL: NORMAL
MDC_IDC_PG_SERIAL: NORMAL
MDC_IDC_PG_TYPE: NORMAL
MDC_IDC_SESS_CLINIC_NAME: NORMAL
MDC_IDC_SESS_DTM: NORMAL
MDC_IDC_SESS_TYPE: NORMAL
MDC_IDC_SET_BRADY_LOWRATE: 40 {BEATS}/MIN
MDC_IDC_SET_BRADY_MODE: NORMAL
MDC_IDC_SET_LEADCHNL_RV_PACING_AMPLITUDE: 2.4 V
MDC_IDC_SET_LEADCHNL_RV_PACING_POLARITY: NORMAL
MDC_IDC_SET_LEADCHNL_RV_PACING_PULSEWIDTH: 0.5 MS
MDC_IDC_SET_LEADCHNL_RV_SENSING_ADAPTATION_MODE: NORMAL
MDC_IDC_SET_LEADCHNL_RV_SENSING_POLARITY: NORMAL
MDC_IDC_SET_LEADCHNL_RV_SENSING_SENSITIVITY: 0.6 MV
MDC_IDC_SET_ZONE_DETECTION_INTERVAL: 286 MS
MDC_IDC_SET_ZONE_DETECTION_INTERVAL: 375 MS
MDC_IDC_SET_ZONE_TYPE: NORMAL
MDC_IDC_SET_ZONE_TYPE: NORMAL
MDC_IDC_SET_ZONE_VENDOR_TYPE: NORMAL
MDC_IDC_SET_ZONE_VENDOR_TYPE: NORMAL
MDC_IDC_STAT_BRADY_DTM_END: NORMAL
MDC_IDC_STAT_BRADY_DTM_START: NORMAL
MDC_IDC_STAT_BRADY_RV_PERCENT_PACED: 11 %
MDC_IDC_STAT_EPISODE_RECENT_COUNT: 0
MDC_IDC_STAT_EPISODE_RECENT_COUNT: 0
MDC_IDC_STAT_EPISODE_RECENT_COUNT: 1
MDC_IDC_STAT_EPISODE_RECENT_COUNT: 363
MDC_IDC_STAT_EPISODE_RECENT_COUNT: 8
MDC_IDC_STAT_EPISODE_RECENT_COUNT_DTM_END: NORMAL
MDC_IDC_STAT_EPISODE_RECENT_COUNT_DTM_START: NORMAL
MDC_IDC_STAT_EPISODE_TYPE: NORMAL
MDC_IDC_STAT_EPISODE_VENDOR_TYPE: NORMAL
MDC_IDC_STAT_TACHYTHERAPY_ATP_DELIVERED_RECENT: 1
MDC_IDC_STAT_TACHYTHERAPY_ATP_DELIVERED_TOTAL: 25
MDC_IDC_STAT_TACHYTHERAPY_RECENT_DTM_END: NORMAL
MDC_IDC_STAT_TACHYTHERAPY_RECENT_DTM_START: NORMAL
MDC_IDC_STAT_TACHYTHERAPY_SHOCKS_ABORTED_RECENT: 1
MDC_IDC_STAT_TACHYTHERAPY_SHOCKS_ABORTED_TOTAL: 24
MDC_IDC_STAT_TACHYTHERAPY_SHOCKS_DELIVERED_RECENT: 0
MDC_IDC_STAT_TACHYTHERAPY_SHOCKS_DELIVERED_TOTAL: 24
MDC_IDC_STAT_TACHYTHERAPY_TOTAL_DTM_END: NORMAL
MDC_IDC_STAT_TACHYTHERAPY_TOTAL_DTM_START: NORMAL

## 2021-01-26 ENCOUNTER — ONCOLOGY VISIT (OUTPATIENT)
Dept: TRANSPLANT | Facility: CLINIC | Age: 71
End: 2021-01-26
Attending: INTERNAL MEDICINE
Payer: MEDICARE

## 2021-01-26 VITALS
RESPIRATION RATE: 18 BRPM | OXYGEN SATURATION: 98 % | WEIGHT: 203.26 LBS | SYSTOLIC BLOOD PRESSURE: 120 MMHG | HEART RATE: 74 BPM | TEMPERATURE: 97.9 F | DIASTOLIC BLOOD PRESSURE: 56 MMHG | BODY MASS INDEX: 29.17 KG/M2

## 2021-01-26 DIAGNOSIS — Z79.01 ANTICOAGULATION GOAL OF INR 2 TO 3: ICD-10-CM

## 2021-01-26 DIAGNOSIS — E83.42 HYPOMAGNESEMIA: ICD-10-CM

## 2021-01-26 DIAGNOSIS — C90.00 MULTIPLE MYELOMA, REMISSION STATUS UNSPECIFIED (H): Primary | ICD-10-CM

## 2021-01-26 DIAGNOSIS — Z51.81 ANTICOAGULATION GOAL OF INR 2 TO 3: ICD-10-CM

## 2021-01-26 PROCEDURE — G0463 HOSPITAL OUTPT CLINIC VISIT: HCPCS

## 2021-01-26 PROCEDURE — 99214 OFFICE O/P EST MOD 30 MIN: CPT | Performed by: INTERNAL MEDICINE

## 2021-01-26 ASSESSMENT — PAIN SCALES - GENERAL: PAINLEVEL: NO PAIN (0)

## 2021-01-26 NOTE — PROGRESS NOTES
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 His SOB has improved  since on digoxin, lisinopril spironolactone mexilitene, lasix 20mg every day and metoprolol 100mg qd.Has Had no ICD  triggering  since last visit. He did have a pacemaker issue once .  All in all he feels great. No COVID sx no fever  PAST MEDICAL HISTORY, SOCIAL HISTORY AND FAMILY HISTORY:  See my note from 10/2020.      REVIEW OF SYSTEMS:  A 12 point review of systems done is negative as in HPI.      PHYSICAL EXAMINATION: /56   Pulse 74   Temp 97.9  F (36.6  C) (Oral)   Resp 18   Wt 92.2 kg (203 lb 4.2 oz)   SpO2 98%   BMI 29.17 kg/m      GENERAL:  He is an alert gentleman in no acute distress.   VITAL SIGNS:  Stable.   HEENT:  Normocephalic.  EOM okay.  Mouth without lesion.   RESPIRATORY:  Clear to percussion and auscultation.   CARDIAC:  Irregularly irregular rhythm.  No S3, S4 or murmur.   ABDOMEN:  Soft without hepatosplenomegaly.   EXTREMITIES:  No edema.   LABS  JAN 20 Hgb 13.9, WBC 8.2, Plat 212k, Mg 1.7 Cr 1.08 K 4.2 Lambda light chain 28.5 Kappa 2.57, ratio 0.09  Results for JONH ZAPATA (MRN [id number]) as of 1/26/2021 13:36   Ref. Range 1/19/2021 03:30   Creatinine Urine Timed Latest Ref Range: 1.00 - 2.00  1.36   Creatinine Urine Latest Units: mg/dL 76   Protein Random Urine Latest Units: g/L 0.11   Protein Total Urine g/gr Creatinine Latest Ref Range: 0 - 0.2 g/g Cr 0.15   Protein Total 24 Hr Urine Latest Ref Range: 0.04 - 0.23  0.20   Albumin Fraction Urine Latest Ref Range: 0 % 22.2 (H)   Alpha 1 Fraction Urine Latest Ref Range: 0 % 6.8 (H)   Alpha 2 Fraction Urine Latest Ref Range: 0 % 4.4 (H)    Beta Fraction Urine Latest Ref Range: 0 % 6.5 (H)   ELP Interpretation Urine Unknown Albumin and globu...   Gamma Fraction Urine Latest Ref Range: 0 % 60.1 (H)   Monoclonal Peak Urine Latest Ref Range: 0% % 36.7 (H)     1.  Multiple myeloma.   2.  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   3.  Cardiomyopathy with CHF.   4.  Atrial fibrillation.   5.  History of deep vein thrombosis, on warfarin.   6.  Chronic back pain.   7.  Aortic aneurysm.   8.  History of plasmacytoma of the clivus, status post radiation.   9.  History of ventricular tachycardia with implantable defibrillator in place.   10.  Hypomagnesemia.     11  Hyperkalemia     Jonh is really looking very good.  I will continue drug holiday for Jonh  He has 0.1  M spike and Light chains ratio is 0.09. I want him to get his COVID vaccine(schedule tomorrow)  before considering any myeloma therapy. The light chains are up a bit but CBC.Ca++ and cr ok Will see again in 4 months        RTC in 4 months    Rojas Raygoza MD  [phone]

## 2021-01-26 NOTE — NURSING NOTE
"Oncology Rooming Note    January 26, 2021 9:14 AM   Erasmo Pearce is a 70 year old male who presents for:    Chief Complaint   Patient presents with     RECHECK     provider visit s/p bmt txp for plasmacytoma     Initial Vitals: /56   Pulse 74   Temp 97.9  F (36.6  C) (Oral)   Resp 18   Wt 92.2 kg (203 lb 4.2 oz)   SpO2 98%   BMI 29.17 kg/m   Estimated body mass index is 29.17 kg/m  as calculated from the following:    Height as of 2/24/20: 1.778 m (5' 10\").    Weight as of this encounter: 92.2 kg (203 lb 4.2 oz). Body surface area is 2.13 meters squared.  No Pain (0) Comment: Data Unavailable   No LMP for male patient.  Allergies reviewed: Yes  Medications reviewed: Yes    Medications: Medication refills not needed today.  Pharmacy name entered into [employer]:    [facility] - [place], [place] - [address] [facility] - [place], [place] - [address] [place] [address]  [facility], [place] - [address] [place] [address]    Clinical concerns: Patient denies fevers/N/V/respiratory symptoms.  He continues to have chronic liquid diarrhea.  Patient denies pain this morning, but has chronic lower back pain.      IRENE WEBER RN              "

## 2021-01-26 NOTE — LETTER
1/26/2021         RE: Erasmo Zapata  [address]  [address]  [facility] [address]        Dear Colleague,    Thank you for referring your patient, Erasmo Zapata, to the [facility] [place]. Please see a copy of my visit note below.    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 His SOB has improved  since on digoxin, lisinopril spironolactone mexilitene, lasix 20mg every day and metoprolol 100mg qd.Has Had no ICD  triggering  since last visit. He did have a pacemaker issue once .  All in all he feels great. No COVID sx no fever  PAST MEDICAL HISTORY, SOCIAL HISTORY AND FAMILY HISTORY:  See my note from 10/2020.      REVIEW OF SYSTEMS:  A 12 point review of systems done is negative as in HPI.      PHYSICAL EXAMINATION: /56   Pulse 74   Temp 97.9  F (36.6  C) (Oral)   Resp 18   Wt 92.2 kg (203 lb 4.2 oz)   SpO2 98%   BMI 29.17 kg/m      GENERAL:  He is an alert gentleman in no acute distress.   VITAL SIGNS:  Stable.   HEENT:  Normocephalic.  EOM okay.  Mouth without lesion.   RESPIRATORY:  Clear to percussion and auscultation.   CARDIAC:  Irregularly irregular rhythm.  No S3, S4 or murmur.   ABDOMEN:  Soft without hepatosplenomegaly.   EXTREMITIES:  No edema.   LABS  JAN 20 Hgb 13.9, WBC 8.2, Plat 212k, Mg 1.7 Cr 1.08 K 4.2 Lambda light chain 28.5 Kappa 2.57, ratio 0.09  Results for JONH ZAPATA (MRN [id number]) as of 1/26/2021 13:36   Ref. Range 1/19/2021 03:30   Creatinine Urine Timed Latest Ref Range: 1.00 - 2.00  1.36   Creatinine Urine Latest Units: mg/dL 76   Protein Random Urine Latest Units: g/L 0.11    Protein Total Urine g/gr Creatinine Latest Ref Range: 0 - 0.2 g/g Cr 0.15   Protein Total 24 Hr Urine Latest Ref Range: 0.04 - 0.23  0.20   Albumin Fraction Urine Latest Ref Range: 0 % 22.2 (H)   Alpha 1 Fraction Urine Latest Ref Range: 0 % 6.8 (H)   Alpha 2 Fraction Urine Latest Ref Range: 0 % 4.4 (H)   Beta Fraction Urine Latest Ref Range: 0 % 6.5 (H)   ELP Interpretation Urine Unknown Albumin and globu...   Gamma Fraction Urine Latest Ref Range: 0 % 60.1 (H)   Monoclonal Peak Urine Latest Ref Range: 0% % 36.7 (H)     1.  Multiple myeloma.   2.  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   3.  Cardiomyopathy with CHF.   4.  Atrial fibrillation.   5.  History of deep vein thrombosis, on warfarin.   6.  Chronic back pain.   7.  Aortic aneurysm.   8.  History of plasmacytoma of the clivus, status post radiation.   9.  History of ventricular tachycardia with implantable defibrillator in place.   10.  Hypomagnesemia.     11  Hyperkalemia     Jonh is really looking very good.  I will continue drug holiday for Jonh  He has 0.1  M spike and Light chains ratio is 0.09. I want him to get his COVID vaccine(schedule tomorrow)  before considering any myeloma therapy. The light chains are up a bit but CBC.Ca++ and cr ok Will see again in 4 months        RTC in 4 months    Rojas Raygoza MD  [phone]

## 2021-02-03 ENCOUNTER — TRANSFERRED RECORDS (OUTPATIENT)
Dept: HEALTH INFORMATION MANAGEMENT | Facility: CLINIC | Age: 71
End: 2021-02-03

## 2021-02-08 ENCOUNTER — VIRTUAL VISIT (OUTPATIENT)
Dept: CARDIOLOGY | Facility: CLINIC | Age: 71
End: 2021-02-08
Attending: INTERNAL MEDICINE
Payer: MEDICARE

## 2021-02-08 DIAGNOSIS — I48.21 PERMANENT ATRIAL FIBRILLATION (H): Primary | ICD-10-CM

## 2021-02-08 DIAGNOSIS — Z95.810 IMPLANTABLE CARDIOVERTER-DEFIBRILLATOR (ICD) IN SITU: ICD-10-CM

## 2021-02-08 DIAGNOSIS — I25.5 ISCHEMIC CARDIOMYOPATHY: ICD-10-CM

## 2021-02-08 DIAGNOSIS — C90.00 MULTIPLE MYELOMA, REMISSION STATUS UNSPECIFIED (H): ICD-10-CM

## 2021-02-08 PROCEDURE — 99214 OFFICE O/P EST MOD 30 MIN: CPT | Mod: 95 | Performed by: INTERNAL MEDICINE

## 2021-02-08 ASSESSMENT — PATIENT HEALTH QUESTIONNAIRE - PHQ9: SUM OF ALL RESPONSES TO PHQ QUESTIONS 1-9: 0

## 2021-02-08 NOTE — PROGRESS NOTES
Jonh is a 70 year old who is being evaluated via a billable video visit.      How would you like to obtain your AVS? [id number]  If the video visit is dropped, the invitation should be resent by: Other e-mail: [email or web address]  Will anyone else be joining your video visit? No        HPI:     71 y/o male with multiple myeloma s/p PBSCT in 2006, ICM/NICM (LVEF 30-35%, NYHA II Stage B) s/p ICD placement in 2009, episodic VT/VF treated by appropriate ICD shocks and afib (CHADVASC 3) on warfarin followed in Cardio-oncology clinic for cardiomyopathy.      Patient was diagnosed in 2006 with multiple myeloma and underwent chemotherapy and autologous PBSCT. He was started on lenalinomide in 2017 due to relapse and then bortezomib in 07/2019 due to progressive disease.      Patient was admitted for decompensated heart failure in August 2019 (acute on chronic systolic heart failure, EF 25 to 30%) and improved after diuresis and bortezomib was stopped. He has been on a chemo holiday since and his counts are stable.    Patient today feels great. Had remote monitoring, no shocks. Device interrogation noted appropriate therapy. He denies chest pain when walking. He denies orthopnea, pnd. His leg swelling is minimal.     Jonh saw Dr. Raygoza last month and his recommendations were to continue the drug holiday. He has 0.1  M spike and Light chains ratio is 0.09 and may need myeloma therapy after COVID vaccination.     PAST MEDICAL HISTORY:  Past Medical History:   Diagnosis Date     Chronic systolic heart failure (H)      Ischemic cardiomyopathy      Other premature beats      Permanent atrial fibrillation (H)      Personal history of multiple myeloma      VF (ventricular fibrillation) (H)        CURRENT MEDICATIONS:  Current Outpatient Medications   Medication Sig Dispense Refill     albuterol (PROAIR HFA, PROVENTIL HFA, VENTOLIN HFA) 108 (90 BASE) MCG/ACT inhaler Inhale 2 puffs into the lungs every 6 hours as needed        baclofen  (LIORESAL) 10 MG tablet Take 10 mg by mouth 3 times daily as needed prn       Calcium Carbonate-Vitamin D (CALCIUM 500 + D PO) Take 2 tablets by mouth daily.       diclofenac (VOLTAREN) 75 MG EC tablet Take 75 mg by mouth 2 times daily as needed 1 tab prn       digoxin (LANOXIN) 125 MCG tablet Take 1 tablet (125 mcg) by mouth daily 30 tablet 1     fentaNYL (DURAGESIC) 25 mcg/hr patch 72 hr Place 1 patch onto the skin every 72 hours       furosemide (LASIX) 20 MG tablet Take 1 tablet (20 mg) by mouth daily 90 tablet 3     lisinopril (PRINIVIL/ZESTRIL) 5 MG tablet Take 1 tablet (5 mg) by mouth 2 times daily 60 tablet 1     Magnesium Oxide 420 MG TABS Take 3 tabs (1260 mg) three times a day. 810 tablet 3     metoprolol succinate ER (TOPROL-XL) 100 MG 24 hr tablet Take 1 tablet (100 mg) by mouth daily 90 tablet 3     mexiletine (MEXITIL) 150 MG capsule Take 1 capsule (150 mg) by mouth 2 times daily As close to 12 hours apart as possible 180 capsule 3     omeprazole (PRILOSEC) 20 MG capsule Take 20 mg by mouth daily  90 capsule 3     ondansetron (ZOFRAN-ODT) 4 MG ODT tab Take 4 mg by mouth every 6 hours as needed for nausea       oxycodone (ROXICODONE) 5 MG immediate release tablet Take 1-2 tablets by mouth every 6 hours as needed. For pain.        potassium chloride ER (K-DUR/KLOR-CON M) 10 MEQ CR tablet Take 2 tablets (20 mEq) by mouth daily 90 tablet 3     simvastatin (ZOCOR) 40 MG tablet Take 40 mg by mouth At Bedtime  30 tablet      spironolactone (ALDACTONE) 25 MG tablet Take 0.5 tablets (12.5 mg) by mouth daily 15 tablet 1     tiotropium (SPIRIVA) 18 MCG inhalation capsule Inhale 1 capsule (18 mcg) into the lungs daily 30 capsule 3     TRAZODONE HCL PO Take 50 mg by mouth nightly as needed        warfarin (COUMADIN) 5 MG tablet Take 7.5 mg by mouth See Admin Instructions Take 1.5 tab by mojth Monday and Friday and 1 tab Sun Tues WED , Thur and Sat       amoxicillin (AMOXIL) 500 MG tablet Take 4 tabs one hour  before dental appointment (Patient not taking: Reported on 2021) 4 tablet 5     nitroGLYCERIN (NITROSTAT) 0.4 MG SL tablet Place 0.4 mg under the tongue every 5 minutes as needed Reported on 2017         PAST SURGICAL HISTORY:  Past Surgical History:   Procedure Laterality Date     CV CORONARY ANGIOGRAM N/A 2019    Procedure: CV CORONARY ANGIOGRAM;  Surgeon: Giorgi Milan MD;  Location:  HEART CARDIAC CATH LAB     CV LEFT HEART CATH N/A 2019    Procedure: Left Heart Cath;  Surgeon: Giorgi Milan MD;  Location:  HEART CARDIAC CATH LAB     CV RIGHT HEART CATH MEASUREMENTS RECORDED N/A 2019    Procedure: CV RIGHT HEART CATH;  Surgeon: Giorgi Milan MD;  Location:  HEART CARDIAC CATH LAB     CV RIGHT HEART EXERCISE STRESS STUDY N/A 2019    Procedure: Stress Drug Study;  Surgeon: Giorgi Milan MD;  Location:  HEART CARDIAC CATH LAB     IMPLANT AUTOMATIC IMPLANTABLE CARDIOVERTER DEFIBRILLATOR         ALLERGIES:     Allergies   Allergen Reactions     Nkda [No Known Drug Allergies]      Sotalol Other (See Comments)     Other reaction(s): Low blood pressure, Prolonged QT interval       FAMILY HISTORY:  - Premature coronary artery disease      SOCIAL HISTORY:  Social History     Tobacco Use     Smoking status: Former Smoker     Packs/day: 1.00     Years: 25.00     Pack years: 25.00     Types: Cigarettes     Quit date: 10/15/1995     Years since quittin.3     Smokeless tobacco: Never Used   Substance Use Topics     Alcohol use: No     Drug use: No       ROS:   ROS: A comprehensive 10-point review of system is negative except for those reported in the HPI.    Exam:    In general, the patient is in no apparent distress.    There were no vitals taken for this visit.  Breathing is unlabored.   HEENT: NC/AT.  PERRLA.  EOMI.  Sclerae white, not injected.    Neck: No jugular venous distension.    Extremities: No edema.   Neurologic: Alert and oriented to  person/place/time, normal speech and affect      Labs:  CBC RESULTS:   Lab Results   Component Value Date    WBC 8.0 10/13/2020    RBC 3.83 (L) 10/13/2020    HGB 13.0 (L) 10/13/2020    HCT 40.1 10/13/2020     (H) 10/13/2020    MCH 33.9 (H) 10/13/2020    MCHC 32.4 10/13/2020    RDW 13.2 10/13/2020     10/13/2020       BMP RESULTS:  Lab Results   Component Value Date     10/13/2020    POTASSIUM 4.3 01/06/2021    CHLORIDE 107 10/13/2020    CO2 28 10/13/2020    ANIONGAP 3 10/13/2020    GLC 94 01/06/2021    BUN 23 10/13/2020    CR 1.05 01/06/2021    GFRESTIMATED >60 01/06/2021    GFRESTBLACK 73 10/13/2020    EILEEN 8.9 10/13/2020        INR RESULTS:  Lab Results   Component Value Date    INR 2.19 (H) 11/12/2019    INR 1.60 (H) 08/30/2019    INR 1.80 (H) 08/29/2019    INR 2.3 (H) 06/10/2019       Cardiac testing :    EKG 2/24/2020 afib, slow ventricular response q waves on inferior leads       1/2021 device interrogation   No ICD shocks     Stress echo 7.30.19     Submaximal stress test, achieved 74% of the age predicted maximal heart rate.  Limiting symptom fatigue.   Normal blood pressure response to exercise.  No angina symptoms with exercise.  Baseline rhythm is atrial fibrillation. No ECG evidence of ischemia. There is no chronotropic incompetence.  Severely reduced LV function with EF of 28% at rest; with exercise left ventricular cavity size and LVEF did not change significantly.  Akinesis of the basal-mid inferior/inferoseptal/inferolateral segments present. No new stress induced regional wall motion abnormalities evident.  Less than average functional capacity for age. Exercise time ~2 minutes.     Biventricular dysfunction with moderate mitral and tricuspid regurgitation and evidence of hypervolemia noted on screening 2D and Doppler examination.        Aug 2019 RHC and coronary angiogram       Right sided filling pressures are severely elevated - mean RA 23, PCWP 31, mean PA 40     Moderately  elevated pulmonary artery hypertension.    Left sided filling pressures are severely elevated.    Left ventricular filling pressures are severely elevated .    Reduced cardiac output level.    Heparin was held during the procedure due to thrombocytopenia    Nipride was started and titrate to 1mcg/kg/min. PCWP decreased from 30 mmHg to 24 mmHg. mPAP decreased from 40 mmHg to 30 mmHg. After pressures decreased team proceed with angiogram.    Mild LAD and LCx disease    Subtotal proximal RCA occlusion    No intervention performed    No mitral or aortic valve gradient     Assessment and Plan  Jonh is a 70 year old with -   1. Chronic Systolic Heart Failure (EF 25-30% in July 2019)  2. Permanent Atrial Fibrillation on warfarin  3. Ischemic heart disease - Subtotal proximal RCA occlusion and mild LAD and LCx disease August 2019  4. History of VT/VF, s/p ICD  5. Hypertension   6. Relapsed multiple myeloma with mets to brain and bone s/p BMT, currently on chemo holiday  7. T2DM  8. Hyperlipidemia      Patient developed worsening heart failure symptoms after being on bortezomib.  Today, he denies any angina or heart failure symptoms. He denies any orthopnea or PND. His renal function is stable.  I will continue low-dose digoxin which was started to optimize his heart failure regimen and check a level with his next blood draw. Digoxin level was 0.8 in Oct 2020.   He will continue the lisinopril along with mexiletine and simvastatin.     Recommendations  Continue current medical therapy  Dig level with next blood draw  Echocardiogram prior to starting chemotherapy -time to be determined after he sees Dr. Raygoza.  Remote ICD interrogation - April 2021  Virtual visit with me in 6 months     Video Visit Details:    Type of service:  Video Visit    Start: 02/08/2021 10:06 am   Stop: 02/08/2021 10:19 am    Originating Location (pt. Location): Home    Distant Location (provider location):  [facility]     Platform used  for Video Visit: Korin Benson MD, MS  Professor of Medicine  Cardiovascular division      CC  Patient Care Team:  Cong Mcdonald as PCP - General  [facility] as PCP - Internal Medicine  Rojas Raygoza MD as BMT Physician  Cong Mcdonald as Referring Physician  Yaz Jeong NP as Nurse Practitioner (Nurse Practitioner)  [facility] as Family Medicine Physician  Merry Mas RN as Continuity Care Coordinator (Transplant)  Zayra Lyle RN as Specialty Care Coordinator (Cardiology)  Ja Duarte MD as MD (Clinical Cardiac Electrophysiology)  Marichuy Shelley, APRN CNP as Assigned PCP  Omayra Serra, RN as Specialty Care Coordinator (Cardiology)  Yoko Benson MD as Assigned Heart and Vascular Provider  YOKO BENSON

## 2021-02-08 NOTE — PATIENT INSTRUCTIONS
Patient Instructions:  It was a pleasure to see you in the cardiology clinic today.      If you have any questions, call  Omayra Serra RN, at [phone].  Press Option #1 for the [facility], and then press Option #4  We are encouraging the use of Ridangot to communicate with your HealthCare Provider    Please follow up with Dr. Prince in 6 months.     Have an echo prior to starting chemotherapy. Remote interrogation in April as scheduled.       If you have an urgent need after hours (8:00 am to 4:30 pm) please call [phone] and ask for the cardiology fellow on call.

## 2021-02-08 NOTE — LETTER
2/8/2021      RE: Erasmo Pearce  [address]  [address]  [facility] [address]       Dear Colleague,    Thank you for the opportunity to participate in the care of your patient, Erasmo Pearce, at the [facility] [place] [facility]. Please see a copy of my visit note below.    Jonh is a 70 year old who is being evaluated via a billable video visit.      How would you like to obtain your AVS? MyChart  If the video visit is dropped, the invitation should be resent by: Other e-mail: [facility]  Will anyone else be joining your video visit? No        HPI:     71 y/o male with multiple myeloma s/p PBSCT in 2006, ICM/NICM (LVEF 30-35%, NYHA II Stage B) s/p ICD placement in 2009, episodic VT/VF treated by appropriate ICD shocks and afib (CHADVASC 3) on warfarin followed in Cardio-oncology clinic for cardiomyopathy.      Patient was diagnosed in 2006 with multiple myeloma and underwent chemotherapy and autologous PBSCT. He was started on lenalinomide in 2017 due to relapse and then bortezomib in 07/2019 due to progressive disease.      Patient was admitted for decompensated heart failure in August 2019 (acute on chronic systolic heart failure, EF 25 to 30%) and improved after diuresis and bortezomib was stopped. He has been on a chemo holiday since and his counts are stable.    Patient today feels great. Had remote monitoring, no shocks. Device interrogation noted appropriate therapy. He denies chest pain when walking. He denies orthopnea, pnd. His leg swelling is minimal.     Jonh saw Dr. Raygoza last month and his recommendations were to continue the drug holiday. He has 0.1  M spike and Light chains ratio is 0.09 and may need myeloma therapy after COVID vaccination.     PAST MEDICAL HISTORY:  Past Medical History:   Diagnosis Date     Chronic systolic heart failure (H)      Ischemic cardiomyopathy      Other premature beats       Permanent atrial fibrillation (H)      Personal history of multiple myeloma      VF (ventricular fibrillation) (H)        CURRENT MEDICATIONS:  Current Outpatient Medications   Medication Sig Dispense Refill     albuterol (PROAIR HFA, PROVENTIL HFA, VENTOLIN HFA) 108 (90 BASE) MCG/ACT inhaler Inhale 2 puffs into the lungs every 6 hours as needed        baclofen (LIORESAL) 10 MG tablet Take 10 mg by mouth 3 times daily as needed prn       Calcium Carbonate-Vitamin D (CALCIUM 500 + D PO) Take 2 tablets by mouth daily.       diclofenac (VOLTAREN) 75 MG EC tablet Take 75 mg by mouth 2 times daily as needed 1 tab prn       digoxin (LANOXIN) 125 MCG tablet Take 1 tablet (125 mcg) by mouth daily 30 tablet 1     fentaNYL (DURAGESIC) 25 mcg/hr patch 72 hr Place 1 patch onto the skin every 72 hours       furosemide (LASIX) 20 MG tablet Take 1 tablet (20 mg) by mouth daily 90 tablet 3     lisinopril (PRINIVIL/ZESTRIL) 5 MG tablet Take 1 tablet (5 mg) by mouth 2 times daily 60 tablet 1     Magnesium Oxide 420 MG TABS Take 3 tabs (1260 mg) three times a day. 810 tablet 3     metoprolol succinate ER (TOPROL-XL) 100 MG 24 hr tablet Take 1 tablet (100 mg) by mouth daily 90 tablet 3     mexiletine (MEXITIL) 150 MG capsule Take 1 capsule (150 mg) by mouth 2 times daily As close to 12 hours apart as possible 180 capsule 3     omeprazole (PRILOSEC) 20 MG capsule Take 20 mg by mouth daily  90 capsule 3     ondansetron (ZOFRAN-ODT) 4 MG ODT tab Take 4 mg by mouth every 6 hours as needed for nausea       oxycodone (ROXICODONE) 5 MG immediate release tablet Take 1-2 tablets by mouth every 6 hours as needed. For pain.        potassium chloride ER (K-DUR/KLOR-CON M) 10 MEQ CR tablet Take 2 tablets (20 mEq) by mouth daily 90 tablet 3     simvastatin (ZOCOR) 40 MG tablet Take 40 mg by mouth At Bedtime  30 tablet      spironolactone (ALDACTONE) 25 MG tablet Take 0.5 tablets (12.5 mg) by mouth daily 15 tablet 1     tiotropium (SPIRIVA) 18 MCG  inhalation capsule Inhale 1 capsule (18 mcg) into the lungs daily 30 capsule 3     TRAZODONE HCL PO Take 50 mg by mouth nightly as needed        warfarin (COUMADIN) 5 MG tablet Take 7.5 mg by mouth See Admin Instructions Take 1.5 tab by  and Friday and 1 tab Sun  , Thur and Sat       amoxicillin (AMOXIL) 500 MG tablet Take 4 tabs one hour before dental appointment (Patient not taking: Reported on 2021) 4 tablet 5     nitroGLYCERIN (NITROSTAT) 0.4 MG SL tablet Place 0.4 mg under the tongue every 5 minutes as needed Reported on 2017         PAST SURGICAL HISTORY:  Past Surgical History:   Procedure Laterality Date     CV CORONARY ANGIOGRAM N/A 2019    Procedure: CV CORONARY ANGIOGRAM;  Surgeon: Giorgi Milan MD;  Location:  HEART CARDIAC CATH LAB     CV LEFT HEART CATH N/A 2019    Procedure: Left Heart Cath;  Surgeon: Giorgi Milan MD;  Location:  HEART CARDIAC CATH LAB     CV RIGHT HEART CATH MEASUREMENTS RECORDED N/A 2019    Procedure: CV RIGHT HEART CATH;  Surgeon: Giorgi Milan MD;  Location:  HEART CARDIAC CATH LAB     CV RIGHT HEART EXERCISE STRESS STUDY N/A 2019    Procedure: Stress Drug Study;  Surgeon: Giorgi Milan MD;  Location:  HEART CARDIAC CATH LAB     IMPLANT AUTOMATIC IMPLANTABLE CARDIOVERTER DEFIBRILLATOR         ALLERGIES:     Allergies   Allergen Reactions     Nkda [No Known Drug Allergies]      Sotalol Other (See Comments)     Other reaction(s): Low blood pressure, Prolonged QT interval       FAMILY HISTORY:  - Premature coronary artery disease      SOCIAL HISTORY:  Social History     Tobacco Use     Smoking status: Former Smoker     Packs/day: 1.00     Years: 25.00     Pack years: 25.00     Types: Cigarettes     Quit date: 10/15/1995     Years since quittin.3     Smokeless tobacco: Never Used   Substance Use Topics     Alcohol use: No     Drug use: No       ROS:   ROS: A comprehensive 10-point review of system  is negative except for those reported in the HPI.    Exam:    In general, the patient is in no apparent distress.    There were no vitals taken for this visit.  Breathing is unlabored.   HEENT: NC/AT.  PERRLA.  EOMI.  Sclerae white, not injected.    Neck: No jugular venous distension.    Extremities: No edema.   Neurologic: Alert and oriented to person/place/time, normal speech and affect      Labs:  CBC RESULTS:   Lab Results   Component Value Date    WBC 8.0 10/13/2020    RBC 3.83 (L) 10/13/2020    HGB 13.0 (L) 10/13/2020    HCT 40.1 10/13/2020     (H) 10/13/2020    MCH 33.9 (H) 10/13/2020    MCHC 32.4 10/13/2020    RDW 13.2 10/13/2020     10/13/2020       BMP RESULTS:  Lab Results   Component Value Date     10/13/2020    POTASSIUM 4.3 01/06/2021    CHLORIDE 107 10/13/2020    CO2 28 10/13/2020    ANIONGAP 3 10/13/2020    GLC 94 01/06/2021    BUN 23 10/13/2020    CR 1.05 01/06/2021    GFRESTIMATED >60 01/06/2021    GFRESTBLACK 73 10/13/2020    EILEEN 8.9 10/13/2020        INR RESULTS:  Lab Results   Component Value Date    INR 2.19 (H) 11/12/2019    INR 1.60 (H) 08/30/2019    INR 1.80 (H) 08/29/2019    INR 2.3 (H) 06/10/2019       Cardiac testing :    EKG 2/24/2020 afib, slow ventricular response q waves on inferior leads       1/2021 device interrogation   No ICD shocks     Stress echo 7.30.19     Submaximal stress test, achieved 74% of the age predicted maximal heart rate.  Limiting symptom fatigue.   Normal blood pressure response to exercise.  No angina symptoms with exercise.  Baseline rhythm is atrial fibrillation. No ECG evidence of ischemia. There is no chronotropic incompetence.  Severely reduced LV function with EF of 28% at rest; with exercise left ventricular cavity size and LVEF did not change significantly.  Akinesis of the basal-mid inferior/inferoseptal/inferolateral segments present. No new stress induced regional wall motion abnormalities evident.  Less than average functional  capacity for age. Exercise time ~2 minutes.     Biventricular dysfunction with moderate mitral and tricuspid regurgitation and evidence of hypervolemia noted on screening 2D and Doppler examination.        Aug 2019 RHC and coronary angiogram       Right sided filling pressures are severely elevated - mean RA 23, PCWP 31, mean PA 40     Moderately elevated pulmonary artery hypertension.    Left sided filling pressures are severely elevated.    Left ventricular filling pressures are severely elevated .    Reduced cardiac output level.    Heparin was held during the procedure due to thrombocytopenia    Nipride was started and titrate to 1mcg/kg/min. PCWP decreased from 30 mmHg to 24 mmHg. mPAP decreased from 40 mmHg to 30 mmHg. After pressures decreased team proceed with angiogram.    Mild LAD and LCx disease    Subtotal proximal RCA occlusion    No intervention performed    No mitral or aortic valve gradient     Assessment and Plan  Jonh is a 70 year old with -   1. Chronic Systolic Heart Failure (EF 25-30% in July 2019)  2. Permanent Atrial Fibrillation on warfarin  3. Ischemic heart disease - Subtotal proximal RCA occlusion and mild LAD and LCx disease August 2019  4. History of VT/VF, s/p ICD  5. Hypertension   6. Relapsed multiple myeloma with mets to brain and bone s/p BMT, currently on chemo holiday  7. T2DM  8. Hyperlipidemia     Patient developed worsening heart failure symptoms after being on bortezomib.  Today, he denies any angina or heart failure symptoms. He denies any orthopnea or PND. His renal function is stable.  I will continue low-dose digoxin which was started to optimize his heart failure regimen and check a level with his next blood draw. Digoxin level was 0.8 in Oct 2020.   He will continue the lisinopril along with mexiletine and simvastatin.    Recommendations  Continue current medical therapy  Dig level with next blood draw  Echocardiogram prior to starting chemotherapy -time to be  determined after he sees Dr. Raygoza.  Remote ICD interrogation - April 2021  Virtual visit with me in 6 months     Video Visit Details:    Type of service:  Video Visit    Start: 02/08/2021 10:06 am   Stop: 02/08/2021 10:19 am    Originating Location (pt. Location): Home    Distant Location (provider location):  [facility]     Platform used for Video Visit: [employer]     Yoko Benson MD, MS  Professor of Medicine  Cardiovascular division      CC  Patient Care Team:  Cong Mcdonald as PCP - General  GinaWillian MD as PCP - Internal Medicine  Rojas Raygoza MD as BMT Physician  Cong Mcdonald as Referring Physician  Yaz Jeong NP as Nurse Practitioner (Nurse Practitioner)  Morgan, Eligio as Family Medicine Physician  Merry Mas RN as Continuity Care Coordinator (Transplant)  Zayra Lyle RN as Specialty Care Coordinator (Cardiology)  Ja Duarte MD as MD (Clinical Cardiac Electrophysiology)  Marichuy Shelley, CHA CNP as Assigned PCP  Omayra Serra, RN as Specialty Care Coordinator (Cardiology)  Yoko Benson MD as Assigned Heart and Vascular Provider  YOKO BENSON      Please do not hesitate to contact me if you have any questions/concerns.     Sincerely,     Yoko Benson MD

## 2021-02-17 ENCOUNTER — TRANSFERRED RECORDS (OUTPATIENT)
Dept: HEALTH INFORMATION MANAGEMENT | Facility: CLINIC | Age: 71
End: 2021-02-17

## 2021-02-17 LAB
CREAT SERPL-MCNC: 1.24 MG/DL (ref 0.72–1.25)
GFR SERPL CREATININE-BSD FRML MDRD: 59 ML/MIN/1.73M(2)
GLUCOSE SERPL-MCNC: 115 MG/DL (ref 70–100)
POTASSIUM SERPL-SCNC: 4.5 MMOL/L (ref 3.5–5.1)

## 2021-02-22 ENCOUNTER — CARE COORDINATION (OUTPATIENT)
Dept: TRANSPLANT | Facility: CLINIC | Age: 71
End: 2021-02-22

## 2021-02-22 NOTE — PROGRESS NOTES
RE: Erasmo Pearce   52     Multiple Myeloma 203.00     [address]   [address] [place]  [address]  [phone] (H)  [phone] (M)     Verbal order from Dr. Rock Raygoza to Merry Mas RN     CBC d/plts, BMP with Mg++ to be drawn every two weeks.  Serum light chains and SPEP to be drawn monthly.     Please fax results to Dr. Raygoza at [phone].     Labs to be drawn at [facility] in [place], [place]  Lab phone: [phone]  Fax: [phone]        Please call Merry Mas RN BMT care coordinator at [phone] with any questions.           Dr.Gregory Jaret MD/frankie  [facility]   Blood and Marrow Transplant Program  [facility] [address] [place] [address]      [phone]

## 2021-03-03 ENCOUNTER — TRANSFERRED RECORDS (OUTPATIENT)
Dept: HEALTH INFORMATION MANAGEMENT | Facility: CLINIC | Age: 71
End: 2021-03-03

## 2021-03-03 LAB
CREAT SERPL-MCNC: 1.37 MG/DL (ref 0.72–1.25)
GFR SERPL CREATININE-BSD FRML MDRD: 52 ML/MIN/1.73M2
GLUCOSE SERPL-MCNC: 121 MG/DL (ref 70–100)
POTASSIUM SERPL-SCNC: 5.2 MMOL/L (ref 3.5–5.1)

## 2021-03-27 ENCOUNTER — HEALTH MAINTENANCE LETTER (OUTPATIENT)
Age: 71
End: 2021-03-27

## 2021-03-31 ENCOUNTER — TRANSFERRED RECORDS (OUTPATIENT)
Dept: HEALTH INFORMATION MANAGEMENT | Facility: CLINIC | Age: 71
End: 2021-03-31

## 2021-03-31 LAB
CREAT SERPL-MCNC: 1.19 MG/DL (ref 0.72–1.25)
GFR SERPL CREATININE-BSD FRML MDRD: >60 ML/MIN/1.73M(2)
GLUCOSE SERPL-MCNC: 141 MG/DL (ref 70–100)
POTASSIUM SERPL-SCNC: 4.6 MMOL/L (ref 3.5–5.1)

## 2021-04-14 ENCOUNTER — TRANSFERRED RECORDS (OUTPATIENT)
Dept: HEALTH INFORMATION MANAGEMENT | Facility: CLINIC | Age: 71
End: 2021-04-14

## 2021-04-14 LAB
CREAT SERPL-MCNC: 1.15 MG/DL (ref 0.72–1.25)
GFR SERPL CREATININE-BSD FRML MDRD: >60 ML/MIN/1.73M2
GLUCOSE SERPL-MCNC: 130 MG/DL (ref 70–100)
POTASSIUM SERPL-SCNC: 4.3 MMOL/L (ref 3.5–5.1)

## 2021-04-20 DIAGNOSIS — Z51.81 ANTICOAGULATION GOAL OF INR 2 TO 3: ICD-10-CM

## 2021-04-20 DIAGNOSIS — E83.42 HYPOMAGNESEMIA: ICD-10-CM

## 2021-04-20 DIAGNOSIS — C90.00 MULTIPLE MYELOMA, REMISSION STATUS UNSPECIFIED (H): ICD-10-CM

## 2021-04-20 DIAGNOSIS — Z79.01 ANTICOAGULATION GOAL OF INR 2 TO 3: ICD-10-CM

## 2021-04-20 LAB
COLLECT DURATION TIME UR: 24 H
CREAT 24H UR-MRATE: 1.09 G/(24.H) (ref 1–2)
CREAT UR-MCNC: 48 MG/DL
PROT 24H UR-MRATE: 0.25 G/(24.H) (ref 0.04–0.23)
PROT UR-MCNC: 0.11 G/L
PROT/CREAT 24H UR: 0.23 G/G CR (ref 0–0.2)
SPECIMEN VOL UR: 2265 ML

## 2021-04-20 PROCEDURE — 86335 IMMUNFIX E-PHORSIS/URINE/CSF: CPT | Mod: 26 | Performed by: PATHOLOGY

## 2021-04-20 PROCEDURE — 84166 PROTEIN E-PHORESIS/URINE/CSF: CPT | Mod: 26 | Performed by: PATHOLOGY

## 2021-04-20 PROCEDURE — 82570 ASSAY OF URINE CREATININE: CPT | Performed by: PATHOLOGY

## 2021-04-20 PROCEDURE — 81050 URINALYSIS VOLUME MEASURE: CPT | Performed by: PATHOLOGY

## 2021-04-20 PROCEDURE — 84156 ASSAY OF PROTEIN URINE: CPT | Performed by: PATHOLOGY

## 2021-04-21 LAB
ALBUMIN MFR UR ELPH: 18.7 %
ALPHA1 GLOB MFR UR ELPH: 6.1 %
ALPHA2 GLOB MFR UR ELPH: 7.8 %
B-GLOBULIN MFR UR ELPH: 7.2 %
GAMMA GLOB MFR UR ELPH: 60.2 %
M PROTEIN MFR UR ELPH: 39.7 %
PROT ELPH PNL UR ELPH: NORMAL
PROT PATTERN UR ELPH-IMP: ABNORMAL

## 2021-04-27 ENCOUNTER — ONCOLOGY VISIT (OUTPATIENT)
Dept: TRANSPLANT | Facility: CLINIC | Age: 71
End: 2021-04-27
Attending: INTERNAL MEDICINE
Payer: MEDICARE

## 2021-04-27 VITALS
DIASTOLIC BLOOD PRESSURE: 49 MMHG | OXYGEN SATURATION: 97 % | RESPIRATION RATE: 18 BRPM | SYSTOLIC BLOOD PRESSURE: 147 MMHG | TEMPERATURE: 97.4 F | HEART RATE: 53 BPM

## 2021-04-27 DIAGNOSIS — C90.00 MULTIPLE MYELOMA, REMISSION STATUS UNSPECIFIED (H): Primary | ICD-10-CM

## 2021-04-27 DIAGNOSIS — Z51.81 ANTICOAGULATION GOAL OF INR 2 TO 3: ICD-10-CM

## 2021-04-27 DIAGNOSIS — E83.42 HYPOMAGNESEMIA: ICD-10-CM

## 2021-04-27 DIAGNOSIS — C90.02 MULTIPLE MYELOMA IN RELAPSE (H): ICD-10-CM

## 2021-04-27 DIAGNOSIS — Z79.01 ANTICOAGULATION GOAL OF INR 2 TO 3: ICD-10-CM

## 2021-04-27 LAB
ALBUMIN SERPL-MCNC: 3.9 G/DL (ref 3.4–5)
ALP SERPL-CCNC: 98 U/L (ref 40–150)
ALT SERPL W P-5'-P-CCNC: 21 U/L (ref 0–70)
ANION GAP SERPL CALCULATED.3IONS-SCNC: 4 MMOL/L (ref 3–14)
AST SERPL W P-5'-P-CCNC: 12 U/L (ref 0–45)
BASOPHILS # BLD AUTO: 0 10E9/L (ref 0–0.2)
BASOPHILS NFR BLD AUTO: 0.6 %
BILIRUB SERPL-MCNC: 0.6 MG/DL (ref 0.2–1.3)
BUN SERPL-MCNC: 26 MG/DL (ref 7–30)
CALCIUM SERPL-MCNC: 8.9 MG/DL (ref 8.5–10.1)
CHLORIDE SERPL-SCNC: 108 MMOL/L (ref 94–109)
CO2 SERPL-SCNC: 27 MMOL/L (ref 20–32)
CREAT SERPL-MCNC: 1.23 MG/DL (ref 0.66–1.25)
DIFFERENTIAL METHOD BLD: ABNORMAL
EOSINOPHIL # BLD AUTO: 0.1 10E9/L (ref 0–0.7)
EOSINOPHIL NFR BLD AUTO: 0.9 %
ERYTHROCYTE [DISTWIDTH] IN BLOOD BY AUTOMATED COUNT: 13.2 % (ref 10–15)
GFR SERPL CREATININE-BSD FRML MDRD: 59 ML/MIN/{1.73_M2}
GLUCOSE SERPL-MCNC: 109 MG/DL (ref 70–99)
HCT VFR BLD AUTO: 38.9 % (ref 40–53)
HGB BLD-MCNC: 12.7 G/DL (ref 13.3–17.7)
IMM GRANULOCYTES # BLD: 0 10E9/L (ref 0–0.4)
IMM GRANULOCYTES NFR BLD: 0.2 %
INR PPP: 2.27 (ref 0.86–1.14)
LDH SERPL L TO P-CCNC: 170 U/L (ref 85–227)
LYMPHOCYTES # BLD AUTO: 1.7 10E9/L (ref 0.8–5.3)
LYMPHOCYTES NFR BLD AUTO: 26.9 %
MCH RBC QN AUTO: 34.2 PG (ref 26.5–33)
MCHC RBC AUTO-ENTMCNC: 32.6 G/DL (ref 31.5–36.5)
MCV RBC AUTO: 105 FL (ref 78–100)
MONOCYTES # BLD AUTO: 0.7 10E9/L (ref 0–1.3)
MONOCYTES NFR BLD AUTO: 10.3 %
NEUTROPHILS # BLD AUTO: 3.9 10E9/L (ref 1.6–8.3)
NEUTROPHILS NFR BLD AUTO: 61.1 %
NRBC # BLD AUTO: 0 10*3/UL
NRBC BLD AUTO-RTO: 0 /100
PLATELET # BLD AUTO: 157 10E9/L (ref 150–450)
POTASSIUM SERPL-SCNC: 4.6 MMOL/L (ref 3.4–5.3)
PROT SERPL-MCNC: 7.3 G/DL (ref 6.8–8.8)
RBC # BLD AUTO: 3.71 10E12/L (ref 4.4–5.9)
SODIUM SERPL-SCNC: 139 MMOL/L (ref 133–144)
URATE SERPL-MCNC: 6.5 MG/DL (ref 3.5–7.2)
WBC # BLD AUTO: 6.4 10E9/L (ref 4–11)

## 2021-04-27 PROCEDURE — 99214 OFFICE O/P EST MOD 30 MIN: CPT | Performed by: INTERNAL MEDICINE

## 2021-04-27 PROCEDURE — G0463 HOSPITAL OUTPT CLINIC VISIT: HCPCS

## 2021-04-27 PROCEDURE — 80053 COMPREHEN METABOLIC PANEL: CPT | Performed by: INTERNAL MEDICINE

## 2021-04-27 PROCEDURE — 999N001036 HC STATISTIC TOTAL PROTEIN: Performed by: INTERNAL MEDICINE

## 2021-04-27 PROCEDURE — 85025 COMPLETE CBC W/AUTO DIFF WBC: CPT | Performed by: INTERNAL MEDICINE

## 2021-04-27 PROCEDURE — 82784 ASSAY IGA/IGD/IGG/IGM EACH: CPT | Performed by: INTERNAL MEDICINE

## 2021-04-27 PROCEDURE — 83883 ASSAY NEPHELOMETRY NOT SPEC: CPT | Performed by: INTERNAL MEDICINE

## 2021-04-27 PROCEDURE — 85610 PROTHROMBIN TIME: CPT | Performed by: INTERNAL MEDICINE

## 2021-04-27 PROCEDURE — 84550 ASSAY OF BLOOD/URIC ACID: CPT | Performed by: INTERNAL MEDICINE

## 2021-04-27 PROCEDURE — 83615 LACTATE (LD) (LDH) ENZYME: CPT | Performed by: INTERNAL MEDICINE

## 2021-04-27 PROCEDURE — 84165 PROTEIN E-PHORESIS SERUM: CPT | Mod: TC | Performed by: INTERNAL MEDICINE

## 2021-04-27 RX ORDER — POTASSIUM CHLORIDE 750 MG/1
10 TABLET, EXTENDED RELEASE ORAL EVERY OTHER DAY
Qty: 90 TABLET | Refills: 3 | Status: SHIPPED | OUTPATIENT
Start: 2021-04-27

## 2021-04-27 ASSESSMENT — PAIN SCALES - GENERAL: PAINLEVEL: NO PAIN (0)

## 2021-04-27 NOTE — LETTER
4/27/2021         RE: Erasmo Zapata  [address]  [address] [facility] [address]        Dear Colleague,    Thank you for referring your patient, Erasmo Zapata, to the [facility] BLOOD AND MARROW TRANSPLANT PROGRAM [place]. Please see a copy of my visit note below.    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 His SOB has improved  since on digoxin, lisinopril spironolactone mexilitene, lasix 20mg every day and metoprolol 100mg qd.Has Had no ICD  triggering  since last visit. All in all he feels fabuluous. No COVID sx no fever He received a Pfizer vaccine x2 without a serious reaction His wife also was vaccinated.No ICD activation  Remains on warfarin 7.5mg MWFS and 5m Tu Thurs Sun  PAST MEDICAL HISTORY, SOCIAL HISTORY AND FAMILY HISTORY:  See my note from 01/2021.      REVIEW OF SYSTEMS:  A 12 point review of systems done is negative as in HPI.      PHYSICAL EXAMINATION: BP (!) 147/49   Pulse 53   Temp 97.4  F (36.3  C) (Tympanic)   Resp 18   SpO2 97%     GENERAL:  He is an alert gentleman in no acute distress.   VITAL SIGNS:  Stable.   HEENT:  Normocephalic.  EOM okay.  Mouth without lesion.   RESPIRATORY:  Clear to percussion and auscultation.   CARDIAC:  Irregularly irregular rhythm.  No S3, S4 or murmur.   ABDOMEN:  Soft without hepatosplenomegaly.   EXTREMITIES:  Trace edema.   SKIN 7mm oblong brownish pink macule left subscapular area     LABS  PendingResults for JONH ZAPATA (MRN [id number]) as of 4/29/2021 06:08   Ref. Range 4/27/2021 10:37   Sodium Latest Ref Range: 133 - 144 mmol/L 139   Potassium Latest Ref Range: 3.4 - 5.3 mmol/L 4.6    Chloride Latest Ref Range: 94 - 109 mmol/L 108   Carbon Dioxide Latest Ref Range: 20 - 32 mmol/L 27   Urea Nitrogen Latest Ref Range: 7 - 30 mg/dL 26   Creatinine Latest Ref Range: 0.66 - 1.25 mg/dL 1.23   GFR Estimate Latest Ref Range: >60 mL/min/1.73_m2 59 (L)   GFR Estimate If Black Latest Ref Range: >60 mL/min/1.73_m2 69   Calcium Latest Ref Range: 8.5 - 10.1 mg/dL 8.9   Anion Gap Latest Ref Range: 3 - 14 mmol/L 4   Albumin Latest Ref Range: 3.4 - 5.0 g/dL 3.9   Protein Total Latest Ref Range: 6.8 - 8.8 g/dL 7.3   Bilirubin Total Latest Ref Range: 0.2 - 1.3 mg/dL 0.6   Alkaline Phosphatase Latest Ref Range: 40 - 150 U/L 98   ALT Latest Ref Range: 0 - 70 U/L 21   AST Latest Ref Range: 0 - 45 U/L 12   Lactate Dehydrogenase Latest Ref Range: 85 - 227 U/L 170   Uric Acid Latest Ref Range: 3.5 - 7.2 mg/dL 6.5   Glucose Latest Ref Range: 70 - 99 mg/dL 109 (H)   WBC Latest Ref Range: 4.0 - 11.0 10e9/L 6.4   Hemoglobin Latest Ref Range: 13.3 - 17.7 g/dL 12.7 (L)   Hematocrit Latest Ref Range: 40.0 - 53.0 % 38.9 (L)   Platelet Count Latest Ref Range: 150 - 450 10e9/L 157   RBC Count Latest Ref Range: 4.4 - 5.9 10e12/L 3.71 (L)   MCV Latest Ref Range: 78 - 100 fl 105 (H)   MCH Latest Ref Range: 26.5 - 33.0 pg 34.2 (H)   MCHC Latest Ref Range: 31.5 - 36.5 g/dL 32.6   RDW Latest Ref Range: 10.0 - 15.0 % 13.2   Diff Method Unknown Automated Method   % Neutrophils Latest Units: % 61.1   % Lymphocytes Latest Units: % 26.9   % Monocytes Latest Units: % 10.3   % Eosinophils Latest Units: % 0.9   % Basophils Latest Units: % 0.6   % Immature Granulocytes Latest Units: % 0.2   Nucleated RBCs Latest Ref Range: 0 /100 0   Absolute Neutrophil Latest Ref Range: 1.6 - 8.3 10e9/L 3.9   Absolute Lymphocytes Latest Ref Range: 0.8 - 5.3 10e9/L 1.7   Absolute Monocytes Latest Ref Range: 0.0 - 1.3 10e9/L 0.7   Absolute Eosinophils Latest Ref Range: 0.0 - 0.7 10e9/L 0.1   Absolute Basophils Latest Ref Range: 0.0 - 0.2 10e9/L 0.0   Abs  Immature Granulocytes Latest Ref Range: 0 - 0.4 10e9/L 0.0   Absolute Nucleated RBC Unknown 0.0   INR Latest Ref Range: 0.86 - 1.14  2.27 (H)   Albumin Fraction Latest Ref Range: 3.7 - 5.1 g/dL 4.0   Alpha 1 Fraction Latest Ref Range: 0.2 - 0.4 g/dL 0.3   Alpha 2 Fraction Latest Ref Range: 0.5 - 0.9 g/dL 0.8   Beta Fraction Latest Ref Range: 0.6 - 1.0 g/dL 0.8   ELP Interpretation: Unknown Small monoclonal ...   Gamma Fraction Latest Ref Range: 0.7 - 1.6 g/dL 0.8   IGA Latest Ref Range: 84 - 499 mg/dL 281   IGG Latest Ref Range: 610 - 1,616 mg/dL 804   IGM Latest Ref Range: 35 - 242 mg/dL 30 (L)   Lorton Free Lt Chain Latest Ref Range: 0.33 - 1.94 mg/dL 2.29 (H)   Kappa Lambda Ratio Latest Ref Range: 0.26 - 1.65  0.05 (L)   Lambda Free Lt Chain Latest Ref Range: 0.57 - 2.63 mg/dL 44.79 (H)   Monoclonal Peak Latest Ref Range: 0.0 g/dL 0.2 (H)     1.  Multiple myeloma. In relapse  2.  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   3.  Cardiomyopathy with CHF.   4.  Atrial fibrillation.   5.  History of deep vein thrombosis, on warfarin.   6.  Chronic back pain.   7.  Aortic aneurysm.   8.  History of plasmacytoma of the clivus, status post radiation.   9.  History of ventricular tachycardia with implantable defibrillator in place.   10.  Hypomagnesemia.     11  Hyperkalemia   12. Skin lesion     All in all Jonh looks very good.  His cardiac function seems to be stable and his hemoglobin white blood count and platelets are stable.  However he has continued to gradually increase his serum free light chains his lambda chains are now up to 44.  His Karnofsky is near 100.  The question is is it worth treating him now as we have in the past to put a lid on this rise in the light chains which reflect active myeloma.  Jonh is planning a trip later this year as part of a family vacation and after speaking with him and Carla they felt treating him now with then the potential of a drug holiday in  the late fall would be ideal.  He has responded very well to imides  and bortezomib I have worried about bortezomib and his cardiac function.  He has not seen pomalidomide and I would like to try pomalidomide and dexamethasone.  I suggest we start with pomalidomide 2 mg days 1 through 21 office 7 days of a 28-day cycle and dexamethasone 20 mg on days 1 8 and 15.  Will monitor his counts and his serum free light chains as well as his symptoms including neuropathy.  He already is on anticoagulation.  I will add acyclovir 400 mg twice a day as infection prophylaxis.  At this time I will not add trimethoprim sulfa as this would require altering his warfarin.  He still will need to monitor his INR as the new drugs may affect this.  Jonh would like the prescription for the pomalidomide to go to the [facility].  [facility]  I will work with Merry Mas to get monthly labs including a CBC CMP SPEP and serum free light chains monthly at [facility].  I will see him again in August.    RTC in 4 months    I spent a total of 30  minutes face to face with Erasmo Pearce during today's office visit. Over 50% of this time was spent counseling the patient and coordinating the care regarding multiple myeloma and 10minutes preparing to see the patient and  care coordination     Rojas Raygoza MD  [phone]            Again, thank you for allowing me to participate in the care of your patient.        Sincerely,        Rojas Raygoza MD

## 2021-04-27 NOTE — NURSING NOTE
"Oncology Rooming Note    April 27, 2021 9:38 AM   Erasmo Pearce is a 70 year old male who presents for:    Chief Complaint   Patient presents with     RECHECK     Pt is here for a rtn MM     Initial Vitals: Blood Pressure (Abnormal) 147/49   Pulse 53   Temperature 97.4  F (36.3  C) (Tympanic)   Respiration 18   Oxygen Saturation 97%  Estimated body mass index is 29.17 kg/m  as calculated from the following:    Height as of 2/24/20: 1.778 m (5' 10\").    Weight as of 1/26/21: 92.2 kg (203 lb 4.2 oz). There is no height or weight on file to calculate BSA.  No Pain (0) Comment: Data Unavailable   No LMP for male patient.  Allergies reviewed: Yes  Medications reviewed: Yes    Medications: Medication refills not needed today.  Pharmacy name entered into [employer]:    [facility] - [place], [place] - [address] [facility] - [place], [place] - [address] [facility]  [facility]  CELSO CRUZ [address]    Clinical concerns: none       Kina Barraza MA            "

## 2021-04-27 NOTE — PROGRESS NOTES
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 His SOB has improved  since on digoxin, lisinopril spironolactone mexilitene, lasix 20mg every day and metoprolol 100mg qd.Has Had no ICD  triggering  since last visit. All in all he feels fabuluous. No COVID sx no fever He received a Pfizer vaccine x2 without a serious reaction His wife also was vaccinated.No ICD activation  Remains on warfarin 7.5mg MWFS and 5m Tu Thurs Sun  PAST MEDICAL HISTORY, SOCIAL HISTORY AND FAMILY HISTORY:  See my note from 01/2021.      REVIEW OF SYSTEMS:  A 12 point review of systems done is negative as in HPI.      PHYSICAL EXAMINATION: BP (!) 147/49   Pulse 53   Temp 97.4  F (36.3  C) (Tympanic)   Resp 18   SpO2 97%     GENERAL:  He is an alert gentleman in no acute distress.   VITAL SIGNS:  Stable.   HEENT:  Normocephalic.  EOM okay.  Mouth without lesion.   RESPIRATORY:  Clear to percussion and auscultation.   CARDIAC:  Irregularly irregular rhythm.  No S3, S4 or murmur.   ABDOMEN:  Soft without hepatosplenomegaly.   EXTREMITIES:  Trace edema.   SKIN 7mm oblong brownish pink macule left subscapular area     LABS  PendingResults for JONH ZAPATA (MRN [id number]) as of 4/29/2021 06:08   Ref. Range 4/27/2021 10:37   Sodium Latest Ref Range: 133 - 144 mmol/L 139   Potassium Latest Ref Range: 3.4 - 5.3 mmol/L 4.6   Chloride Latest Ref Range: 94 - 109 mmol/L 108   Carbon Dioxide Latest Ref Range: 20 - 32 mmol/L 27   Urea Nitrogen Latest Ref Range: 7 - 30 mg/dL 26   Creatinine Latest Ref Range: 0.66 - 1.25 mg/dL 1.23   GFR Estimate Latest Ref Range: >60 mL/min/1.73_m2 59 (L)   GFR Estimate If Black Latest Ref Range:  >60 mL/min/1.73_m2 69   Calcium Latest Ref Range: 8.5 - 10.1 mg/dL 8.9   Anion Gap Latest Ref Range: 3 - 14 mmol/L 4   Albumin Latest Ref Range: 3.4 - 5.0 g/dL 3.9   Protein Total Latest Ref Range: 6.8 - 8.8 g/dL 7.3   Bilirubin Total Latest Ref Range: 0.2 - 1.3 mg/dL 0.6   Alkaline Phosphatase Latest Ref Range: 40 - 150 U/L 98   ALT Latest Ref Range: 0 - 70 U/L 21   AST Latest Ref Range: 0 - 45 U/L 12   Lactate Dehydrogenase Latest Ref Range: 85 - 227 U/L 170   Uric Acid Latest Ref Range: 3.5 - 7.2 mg/dL 6.5   Glucose Latest Ref Range: 70 - 99 mg/dL 109 (H)   WBC Latest Ref Range: 4.0 - 11.0 10e9/L 6.4   Hemoglobin Latest Ref Range: 13.3 - 17.7 g/dL 12.7 (L)   Hematocrit Latest Ref Range: 40.0 - 53.0 % 38.9 (L)   Platelet Count Latest Ref Range: 150 - 450 10e9/L 157   RBC Count Latest Ref Range: 4.4 - 5.9 10e12/L 3.71 (L)   MCV Latest Ref Range: 78 - 100 fl 105 (H)   MCH Latest Ref Range: 26.5 - 33.0 pg 34.2 (H)   MCHC Latest Ref Range: 31.5 - 36.5 g/dL 32.6   RDW Latest Ref Range: 10.0 - 15.0 % 13.2   Diff Method Unknown Automated Method   % Neutrophils Latest Units: % 61.1   % Lymphocytes Latest Units: % 26.9   % Monocytes Latest Units: % 10.3   % Eosinophils Latest Units: % 0.9   % Basophils Latest Units: % 0.6   % Immature Granulocytes Latest Units: % 0.2   Nucleated RBCs Latest Ref Range: 0 /100 0   Absolute Neutrophil Latest Ref Range: 1.6 - 8.3 10e9/L 3.9   Absolute Lymphocytes Latest Ref Range: 0.8 - 5.3 10e9/L 1.7   Absolute Monocytes Latest Ref Range: 0.0 - 1.3 10e9/L 0.7   Absolute Eosinophils Latest Ref Range: 0.0 - 0.7 10e9/L 0.1   Absolute Basophils Latest Ref Range: 0.0 - 0.2 10e9/L 0.0   Abs Immature Granulocytes Latest Ref Range: 0 - 0.4 10e9/L 0.0   Absolute Nucleated RBC Unknown 0.0   INR Latest Ref Range: 0.86 - 1.14  2.27 (H)   Albumin Fraction Latest Ref Range: 3.7 - 5.1 g/dL 4.0   Alpha 1 Fraction Latest Ref Range: 0.2 - 0.4 g/dL 0.3   Alpha 2 Fraction Latest Ref Range: 0.5 - 0.9 g/dL 0.8    Beta Fraction Latest Ref Range: 0.6 - 1.0 g/dL 0.8   ELP Interpretation: Unknown Small monoclonal ...   Gamma Fraction Latest Ref Range: 0.7 - 1.6 g/dL 0.8   IGA Latest Ref Range: 84 - 499 mg/dL 281   IGG Latest Ref Range: 610 - 1,616 mg/dL 804   IGM Latest Ref Range: 35 - 242 mg/dL 30 (L)   [place] Free Lt Chain Latest Ref Range: 0.33 - 1.94 mg/dL 2.29 (H)   Kappa Lambda Ratio Latest Ref Range: 0.26 - 1.65  0.05 (L)   Lambda Free Lt Chain Latest Ref Range: 0.57 - 2.63 mg/dL 44.79 (H)   Monoclonal Peak Latest Ref Range: 0.0 g/dL 0.2 (H)     1.  Multiple myeloma. In relapse  2.  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   3.  Cardiomyopathy with CHF.   4.  Atrial fibrillation.   5.  History of deep vein thrombosis, on warfarin.   6.  Chronic back pain.   7.  Aortic aneurysm.   8.  History of plasmacytoma of the clivus, status post radiation.   9.  History of ventricular tachycardia with implantable defibrillator in place.   10.  Hypomagnesemia.     11  Hyperkalemia   12. Skin lesion     All in all Jonh looks very good.  His cardiac function seems to be stable and his hemoglobin white blood count and platelets are stable.  However he has continued to gradually increase his serum free light chains his lambda chains are now up to 44.  His Karnofsky is near 100.  The question is is it worth treating him now as we have in the past to put a lid on this rise in the light chains which reflect active myeloma.  Jonh is planning a trip later this year as part of a family vacation and after speaking with him and Carla they felt treating him now with then the potential of a drug holiday in the late fall would be ideal.  He has responded very well to imides  and bortezomib I have worried about bortezomib and his cardiac function.  He has not seen pomalidomide and I would like to try pomalidomide and dexamethasone.  I suggest we start with pomalidomide 2 mg days 1 through 21 office 7 days of a  28-day cycle and dexamethasone 20 mg on days 1 8 and 15.  Will monitor his counts and his serum free light chains as well as his symptoms including neuropathy.  He already is on anticoagulation.  I will add acyclovir 400 mg twice a day as infection prophylaxis.  At this time I will not add trimethoprim sulfa as this would require altering his warfarin.  He still will need to monitor his INR as the new drugs may affect this.  Jonh would like the prescription for the pomalidomide to go to the [facility].  [facility]  I will work with Merry Mas to get monthly labs including a CBC CMP SPEP and serum free light chains monthly at [facility].  I will see him again in August.    RTC in 4 months    I spent a total of 30  minutes face to face with Erasmo Pearce during today's office visit. Over 50% of this time was spent counseling the patient and coordinating the care regarding multiple myeloma and 10minutes preparing to see the patient and  care coordination     Rojas Raygoza MD  [phone]

## 2021-04-28 ENCOUNTER — TRANSFERRED RECORDS (OUTPATIENT)
Dept: HEALTH INFORMATION MANAGEMENT | Facility: CLINIC | Age: 71
End: 2021-04-28

## 2021-04-28 LAB
ALBUMIN SERPL ELPH-MCNC: 4 G/DL (ref 3.7–5.1)
ALPHA1 GLOB SERPL ELPH-MCNC: 0.3 G/DL (ref 0.2–0.4)
ALPHA2 GLOB SERPL ELPH-MCNC: 0.8 G/DL (ref 0.5–0.9)
B-GLOBULIN SERPL ELPH-MCNC: 0.8 G/DL (ref 0.6–1)
GAMMA GLOB SERPL ELPH-MCNC: 0.8 G/DL (ref 0.7–1.6)
IGA SERPL-MCNC: 281 MG/DL (ref 84–499)
IGG SERPL-MCNC: 804 MG/DL (ref 610–1616)
IGM SERPL-MCNC: 30 MG/DL (ref 35–242)
KAPPA LC UR-MCNC: 2.29 MG/DL (ref 0.33–1.94)
KAPPA LC/LAMBDA SER: 0.05 {RATIO} (ref 0.26–1.65)
LAMBDA LC SERPL-MCNC: 44.79 MG/DL (ref 0.57–2.63)
M PROTEIN SERPL ELPH-MCNC: 0.2 G/DL
PROT PATTERN SERPL ELPH-IMP: ABNORMAL

## 2021-04-29 ENCOUNTER — ANCILLARY PROCEDURE (OUTPATIENT)
Dept: CARDIOLOGY | Facility: CLINIC | Age: 71
End: 2021-04-29
Attending: INTERNAL MEDICINE
Payer: MEDICARE

## 2021-04-29 DIAGNOSIS — C90.02 MULTIPLE MYELOMA IN RELAPSE (H): Primary | ICD-10-CM

## 2021-04-29 DIAGNOSIS — Z95.810 AUTOMATIC IMPLANTABLE CARDIOVERTER-DEFIBRILLATOR IN SITU: Chronic | ICD-10-CM

## 2021-04-29 PROCEDURE — 93295 DEV INTERROG REMOTE 1/2/MLT: CPT | Performed by: INTERNAL MEDICINE

## 2021-04-29 PROCEDURE — 93296 REM INTERROG EVL PM/IDS: CPT

## 2021-04-30 ENCOUNTER — TELEPHONE (OUTPATIENT)
Dept: ONCOLOGY | Facility: CLINIC | Age: 71
End: 2021-04-30

## 2021-04-30 NOTE — TELEPHONE ENCOUNTER
"Writer called patient and informed him of the information in previous note. Writer provided patient with [facility] Eligibility line which is [phone]. Writer informed patient that [facility] requested patient request a referral from [facility], Dr. Dara Kamara, to have Dr. Raygoza added as an approved provider. Once the request is made, Dr. Raygoza will be added and prescriptions written may be fulfilled. Writer explained to the patient that the [facility] informed this writer that the processing time for this request is lengthy, and may take 30+ days. Patient verbally expressed understanding of instructions provided, as well as the timeframe for processing. Patient stated \"Thank you very much for getting back so quickly on this, we're making a lot of progress.\" Writer provided direct contact information should patient have any questions or issues.    [facility]  [facility]   Oncology Pharmacy Liaison  [email or web address]   [phone] (phone)   [phone] (fax)      "

## 2021-04-30 NOTE — TELEPHONE ENCOUNTER
Writer called [facility] to discuss pt's current status as patient would like to have [facility]. [facility] rep stated that patient's current primary physician is [facility] physician D[facility]ara. Rep stated that if patient would like medication fulfilled [facility], patient must speak to Dr. Kamara and request a referral to have Dr. Raygoza added as an approved provider. Once patient requests referral, Dr. Kamara will have to add Dr. Raygoza and then the [facility] for [facility]. If PA is needed, PA will be completed at that time.    [facility] rep stated that this process is not quick - often times processing can take over 30 days.    [facility]   Oncology Pharmacy Liaison  [email or web address]   [phone] (phone)   [phone] (fax)

## 2021-05-01 LAB
MDC_IDC_EPISODE_DTM: NORMAL
MDC_IDC_EPISODE_DURATION: 11 S
MDC_IDC_EPISODE_DURATION: 12 S
MDC_IDC_EPISODE_DURATION: 13 S
MDC_IDC_EPISODE_DURATION: 17 S
MDC_IDC_EPISODE_DURATION: 17 S
MDC_IDC_EPISODE_DURATION: 5 S
MDC_IDC_EPISODE_ID: NORMAL
MDC_IDC_EPISODE_TYPE: NORMAL
MDC_IDC_LEAD_IMPLANT_DT: NORMAL
MDC_IDC_LEAD_LOCATION: NORMAL
MDC_IDC_LEAD_LOCATION_DETAIL_1: NORMAL
MDC_IDC_LEAD_MFG: NORMAL
MDC_IDC_LEAD_MODEL: NORMAL
MDC_IDC_LEAD_POLARITY_TYPE: NORMAL
MDC_IDC_LEAD_SERIAL: NORMAL
MDC_IDC_MSMT_BATTERY_DTM: NORMAL
MDC_IDC_MSMT_BATTERY_REMAINING_LONGEVITY: 12 MO
MDC_IDC_MSMT_BATTERY_REMAINING_PERCENTAGE: 14 %
MDC_IDC_MSMT_BATTERY_STATUS: NORMAL
MDC_IDC_MSMT_CAP_CHARGE_DTM: NORMAL
MDC_IDC_MSMT_CAP_CHARGE_DTM: NORMAL
MDC_IDC_MSMT_CAP_CHARGE_ENERGY: 41 J
MDC_IDC_MSMT_CAP_CHARGE_TIME: 10.8 S
MDC_IDC_MSMT_CAP_CHARGE_TIME: 9.7 S
MDC_IDC_MSMT_CAP_CHARGE_TYPE: NORMAL
MDC_IDC_MSMT_CAP_CHARGE_TYPE: NORMAL
MDC_IDC_MSMT_LEADCHNL_RV_IMPEDANCE_VALUE: 351 OHM
MDC_IDC_MSMT_LEADCHNL_RV_PACING_THRESHOLD_AMPLITUDE: 1.1 V
MDC_IDC_MSMT_LEADCHNL_RV_PACING_THRESHOLD_PULSEWIDTH: 0.5 MS
MDC_IDC_PG_IMPLANT_DTM: NORMAL
MDC_IDC_PG_MFG: NORMAL
MDC_IDC_PG_MODEL: NORMAL
MDC_IDC_PG_SERIAL: NORMAL
MDC_IDC_PG_TYPE: NORMAL
MDC_IDC_SESS_CLINIC_NAME: NORMAL
MDC_IDC_SESS_DTM: NORMAL
MDC_IDC_SESS_TYPE: NORMAL
MDC_IDC_SET_BRADY_LOWRATE: 40 {BEATS}/MIN
MDC_IDC_SET_BRADY_MODE: NORMAL
MDC_IDC_SET_LEADCHNL_RV_PACING_AMPLITUDE: 2.4 V
MDC_IDC_SET_LEADCHNL_RV_PACING_POLARITY: NORMAL
MDC_IDC_SET_LEADCHNL_RV_PACING_PULSEWIDTH: 0.5 MS
MDC_IDC_SET_LEADCHNL_RV_SENSING_ADAPTATION_MODE: NORMAL
MDC_IDC_SET_LEADCHNL_RV_SENSING_POLARITY: NORMAL
MDC_IDC_SET_LEADCHNL_RV_SENSING_SENSITIVITY: 0.6 MV
MDC_IDC_SET_ZONE_DETECTION_INTERVAL: 286 MS
MDC_IDC_SET_ZONE_DETECTION_INTERVAL: 375 MS
MDC_IDC_SET_ZONE_TYPE: NORMAL
MDC_IDC_SET_ZONE_TYPE: NORMAL
MDC_IDC_SET_ZONE_VENDOR_TYPE: NORMAL
MDC_IDC_SET_ZONE_VENDOR_TYPE: NORMAL
MDC_IDC_STAT_BRADY_DTM_END: NORMAL
MDC_IDC_STAT_BRADY_DTM_START: NORMAL
MDC_IDC_STAT_BRADY_RV_PERCENT_PACED: 11 %
MDC_IDC_STAT_EPISODE_RECENT_COUNT: 0
MDC_IDC_STAT_EPISODE_RECENT_COUNT: 0
MDC_IDC_STAT_EPISODE_RECENT_COUNT: 1
MDC_IDC_STAT_EPISODE_RECENT_COUNT: 369
MDC_IDC_STAT_EPISODE_RECENT_COUNT: 8
MDC_IDC_STAT_EPISODE_RECENT_COUNT_DTM_END: NORMAL
MDC_IDC_STAT_EPISODE_RECENT_COUNT_DTM_START: NORMAL
MDC_IDC_STAT_EPISODE_TYPE: NORMAL
MDC_IDC_STAT_EPISODE_VENDOR_TYPE: NORMAL
MDC_IDC_STAT_TACHYTHERAPY_ATP_DELIVERED_RECENT: 1
MDC_IDC_STAT_TACHYTHERAPY_ATP_DELIVERED_TOTAL: 25
MDC_IDC_STAT_TACHYTHERAPY_RECENT_DTM_END: NORMAL
MDC_IDC_STAT_TACHYTHERAPY_RECENT_DTM_START: NORMAL
MDC_IDC_STAT_TACHYTHERAPY_SHOCKS_ABORTED_RECENT: 1
MDC_IDC_STAT_TACHYTHERAPY_SHOCKS_ABORTED_TOTAL: 24
MDC_IDC_STAT_TACHYTHERAPY_SHOCKS_DELIVERED_RECENT: 0
MDC_IDC_STAT_TACHYTHERAPY_SHOCKS_DELIVERED_TOTAL: 24
MDC_IDC_STAT_TACHYTHERAPY_TOTAL_DTM_END: NORMAL
MDC_IDC_STAT_TACHYTHERAPY_TOTAL_DTM_START: NORMAL

## 2021-05-09 ENCOUNTER — TRANSFERRED RECORDS (OUTPATIENT)
Dept: HEALTH INFORMATION MANAGEMENT | Facility: CLINIC | Age: 71
End: 2021-05-09

## 2021-05-12 ENCOUNTER — TRANSFERRED RECORDS (OUTPATIENT)
Dept: HEALTH INFORMATION MANAGEMENT | Facility: CLINIC | Age: 71
End: 2021-05-12

## 2021-05-19 ENCOUNTER — TELEPHONE (OUTPATIENT)
Dept: ONCOLOGY | Facility: CLINIC | Age: 71
End: 2021-05-19

## 2021-05-19 DIAGNOSIS — C90.02 MULTIPLE MYELOMA IN RELAPSE (H): Primary | ICD-10-CM

## 2021-05-19 RX ORDER — DEXAMETHASONE 4 MG/1
TABLET ORAL
Qty: 15 TABLET | Refills: 4 | Status: SHIPPED | OUTPATIENT
Start: 2021-05-19 | End: 2021-05-20

## 2021-05-19 NOTE — TELEPHONE ENCOUNTER
Oral Chemotherapy Monitoring Program   Medication: Pomalyst  Rx: 2mg PO daily on days 1 through 21 of 28 day cycle   Auth #: [id number]   Risk Category: Adult Male  Routine survey questions reviewed.   [facility]  Oncology Pharmacy Liaison   [email or web address]  [phone] (phone)  [phone] (fax)

## 2021-05-20 DIAGNOSIS — C90.02 MULTIPLE MYELOMA IN RELAPSE (H): ICD-10-CM

## 2021-05-20 RX ORDER — DEXAMETHASONE 4 MG/1
TABLET ORAL
Qty: 15 TABLET | Refills: 4 | Status: SHIPPED | OUTPATIENT
Start: 2021-05-20 | End: 2021-05-21

## 2021-05-20 RX ORDER — DEXAMETHASONE 4 MG/1
TABLET ORAL
Qty: 15 TABLET | Refills: 4 | Status: SHIPPED | OUTPATIENT
Start: 2021-05-20 | End: 2021-05-20

## 2021-05-21 ENCOUNTER — TELEPHONE (OUTPATIENT)
Dept: ONCOLOGY | Facility: CLINIC | Age: 71
End: 2021-05-21

## 2021-05-21 DIAGNOSIS — C90.02 MULTIPLE MYELOMA IN RELAPSE (H): ICD-10-CM

## 2021-05-21 RX ORDER — DEXAMETHASONE 4 MG/1
TABLET ORAL
Qty: 15 TABLET | Refills: 4 | Status: SHIPPED | OUTPATIENT
Start: 2021-05-21 | End: 2021-11-17

## 2021-05-21 RX ORDER — ACYCLOVIR 400 MG/1
400 TABLET ORAL EVERY 12 HOURS
Qty: 60 TABLET | Refills: 3 | Status: SHIPPED | OUTPATIENT
Start: 2021-05-21 | End: 2021-05-24

## 2021-05-21 NOTE — ORAL ONC MGMT
Oral Chemotherapy Monitoring Program    Lab Monitoring Plan  CBC initially every two weeks, then monthly - CMP monthly, per Dr. Raygoza's orders  Labs to be drawn at [facility] in [place], [place]  Lab phone: [phone]  Fax: [phone]    Subjective/Objective:  Erasmo Pearce is a 70 year old male contacted by phone for an initial visit for oral chemotherapy education.     ORAL CHEMOTHERAPY 3/7/2019 4/18/2019 5/29/2019 6/4/2019 7/23/2019 8/20/2019 5/21/2021   Assessment Type - - - - - - New Teach   Diagnosis Code - - - - - - Multiple Myeloma   Providers - - - - - - Dr. Raygoza   [facility] - - - - - [facility]   Drug Name Revlimid (Lenalidomide) Revlimid (Lenalidomide) Revlimid (Lenalidomide) Revlimid (Lenalidomide) Revlimid (Lenalidomide) Revlimid (Lenalidomide) Pomalyst (pomalidomide)   Dose - - - - - - 2 mg   Current Schedule Daily Daily Daily Daily Daily Daily Daily   Cycle Details 2 weeks on 2 weeks off 2 weeks on 2 weeks off 2 weeks on 2 weeks off Drug on Hold 2 weeks on 2 weeks off 2 weeks on 2 weeks off 3 weeks on, 1 week off   Start Date of Last Cycle 3/3/2019 4/7/2019 5/12/2019 - 7/16/2019 8/13/2019 -   Planned next cycle start date 3/31/2019 5/5/2019 6/9/2019 - 8/13/2019 9/10/2019 -   Doses missed in last 2 weeks 0 0 0 - 0 0 -   Adherence Assessment Adherent Adherent Adherent - Adherent Adherent -   Adverse Effects No AE identified during assessment;Other (see note for details) Myalgias No AE identified during assessment - No AE identified during assessment No AE identified during assessment -   Myalgias/Arthralgias - Grade 1 - - - - -   Pharmacist Intervention(myalgias/arthralgias) - Yes - - - - -   Pharmacist intervention(other) Yes - - - - - -   Home BPs not needed not needed - - - - -   Any new drug interactions? No No No - No No Yes   Pharmacist Intervention? - - - - - - Yes   Intervention(s) - - - - - - Patient Education   Is the dose as ordered appropriate for the patient?  "Yes Yes Yes - Yes Yes -   Has the patient been assessed within the past 6 months for depression? - - - - Yes - -   Has the patient missed any days of school, work, or other routine activity? - - - - No - -       Last PHQ-2 Score on record:   PHQ-2 ( 1999 Pfizer) 2/18/2020 6/11/2019   Q1: Little interest or pleasure in doing things 0 0   Q2: Feeling down, depressed or hopeless 0 0   PHQ-2 Score 0 0       Vitals:  BP:   BP Readings from Last 1 Encounters:   04/27/21 (!) 147/49     Wt Readings from Last 1 Encounters:   01/26/21 92.2 kg (203 lb 4.2 oz)     Estimated body surface area is 2.13 meters squared as calculated from the following:    Height as of 2/24/20: 1.778 m (5' 10\").    Weight as of 1/26/21: 92.2 kg (203 lb 4.2 oz).    Labs:  _  Result Component Current Result Ref Range   Sodium 139 (4/27/2021) 133 - 144 mmol/L     _  Result Component Current Result Ref Range   Potassium 4.6 (4/27/2021) 3.4 - 5.3 mmol/L     _  Result Component Current Result Ref Range   Calcium 8.9 (4/27/2021) 8.5 - 10.1 mg/dL     No results found for Mag within last 30 days.     No results found for Phos within last 30 days.     _  Result Component Current Result Ref Range   Albumin 3.9 (4/27/2021) 3.4 - 5.0 g/dL     _  Result Component Current Result Ref Range   Urea Nitrogen 26 (4/27/2021) 7 - 30 mg/dL     _  Result Component Current Result Ref Range   Creatinine 1.23 (4/27/2021) 0.66 - 1.25 mg/dL     _  Result Component Current Result Ref Range   AST 12 (4/27/2021) 0 - 45 U/L     _  Result Component Current Result Ref Range   ALT 21 (4/27/2021) 0 - 70 U/L     _  Result Component Current Result Ref Range   Bilirubin Total 0.6 (4/27/2021) 0.2 - 1.3 mg/dL     _  Result Component Current Result Ref Range   WBC 6.4 (4/27/2021) 4.0 - 11.0 10e9/L     _  Result Component Current Result Ref Range   Hemoglobin 12.7 (L) (4/27/2021) 13.3 - 17.7 g/dL     _  Result Component Current Result Ref Range   Platelet Count 157 (4/27/2021) 150 - 450 10e9/L "     _  Result Component Current Result Ref Range   Absolute Neutrophil 3.9 (2021) 1.6 - 8.3 10e9/L       Assessment:  Patient is appropriate to start therapy.    Counseled on potential drug interactions with oxycodone (CNS depressant effects), fentanyl (CNS depressant effects), and mexiletine (potential interaction through CY)    Plan:  Basic chemotherapy teaching was reviewed with the patient including indication, start date of therapy, dose, administration, adverse effects, missed doses, food and drug interactions, monitoring, side effect management, office contact information, and safe handling. Written materials were provided and all questions answered.    Follow-Up:  One week after starting therapy to assess tolerance     Zayra Medley, PharmD, BCOP  Hematology/Oncology Clinical Pharmacist  [facility]  [facility]  [phone]

## 2021-05-24 DIAGNOSIS — C90.02 MULTIPLE MYELOMA IN RELAPSE (H): ICD-10-CM

## 2021-05-24 RX ORDER — ACYCLOVIR 400 MG/1
400 TABLET ORAL EVERY 12 HOURS
Qty: 60 TABLET | Refills: 3 | Status: SHIPPED | OUTPATIENT
Start: 2021-05-24 | End: 2021-11-17

## 2021-05-26 ENCOUNTER — TRANSFERRED RECORDS (OUTPATIENT)
Dept: HEALTH INFORMATION MANAGEMENT | Facility: CLINIC | Age: 71
End: 2021-05-26

## 2021-06-02 ENCOUNTER — TELEPHONE (OUTPATIENT)
Dept: PHARMACY | Facility: CLINIC | Age: 71
End: 2021-06-02

## 2021-06-02 NOTE — ORAL ONC MGMT
Oral Chemotherapy Monitoring Program    Placed call to patient in follow up of pomalidomide therapy.    Left message to please call back in follow up of therapy. No patient or drug names were mentioned.    Bonifacio Norton  Pharmacy Intern  Oral Chemotherapy Monitoring Program  [facility]   [phone]

## 2021-06-03 ENCOUNTER — TELEPHONE (OUTPATIENT)
Dept: PHARMACY | Facility: CLINIC | Age: 71
End: 2021-06-03

## 2021-06-03 NOTE — ORAL ONC MGMT
Oral Chemotherapy Monitoring Program    Subjective/Objective:  Erasmo Pearce is a 70 year old male contacted by phone for a follow-up visit for oral chemotherapy. Jonh has yet to start his new pomalidomide therapy. Although he received the medication earlier this week, he wanted to start his therapy on a Sunday, so he chose to begin his 3 week on, 1 week off regimen on Sunday, Jun 6. The pt is aware of the appropriate dosing regimen for his pomalidomide, which he correctly stated as 1 cap (2mg) PO every day. The pt has his labs scheduled through [facility] and has his first biweekly labs scheduled for Jun 9. Jonh was informed of potential interactions between his calcium supplements and his dexamethasone and understood the need to separate those doses by 2 hours. The pt had reviewed potential side effects during his new teach phone call and had no new questions during this call.        ORAL CHEMOTHERAPY 4/18/2019 5/29/2019 6/4/2019 7/23/2019 8/20/2019 5/21/2021 6/3/2021   Assessment Type - - - - - New Teach Initial Follow up   Diagnosis Code - - - - - Multiple Myeloma Multiple Myeloma   Providers - - - - - Dr. Jaret Raygoza   [facility]   Drug Name Revlimid (Lenalidomide) Revlimid (Lenalidomide) Revlimid (Lenalidomide) Revlimid (Lenalidomide) Revlimid (Lenalidomide) Pomalyst (pomalidomide) Pomalyst (pomalidomide)   Dose - - - - - 2 mg 2 mg   Current Schedule Daily Daily Daily Daily Daily Daily Daily   Cycle Details 2 weeks on 2 weeks off 2 weeks on 2 weeks off Drug on Hold 2 weeks on 2 weeks off 2 weeks on 2 weeks off 3 weeks on, 1 week off 3 weeks on, 1 week off   Start Date of Last Cycle 4/7/2019 5/12/2019 - 7/16/2019 8/13/2019 - (No Data)   Planned next cycle start date 5/5/2019 6/9/2019 - 8/13/2019 9/10/2019 - 6/6/2021   Doses missed in last 2 weeks 0 0 - 0 0 - -   Adherence Assessment Adherent Adherent - Adherent Adherent - -   Adverse Effects Myalgias  "No AE identified during assessment - No AE identified during assessment No AE identified during assessment - -   Myalgias/Arthralgias Grade 1 - - - - - -   Pharmacist Intervention(myalgias/arthralgias) Yes - - - - - -   Pharmacist intervention(other) - - - - - - -   Home BPs not needed - - - - - -   Any new drug interactions? No No - No No Yes -   Pharmacist Intervention? - - - - - Yes -   Intervention(s) - - - - - Patient Education -   Is the dose as ordered appropriate for the patient? Yes Yes - Yes Yes - -   Has the patient been assessed within the past 6 months for depression? - - - Yes - - -   Has the patient missed any days of school, work, or other routine activity? - - - No - - -       Last PHQ-2 Score on record:   PHQ-2 ( 1999 Pfizer) 2/18/2020 6/11/2019   Q1: Little interest or pleasure in doing things 0 0   Q2: Feeling down, depressed or hopeless 0 0   PHQ-2 Score 0 0       Vitals:  BP:   BP Readings from Last 1 Encounters:   04/27/21 (!) 147/49     Wt Readings from Last 1 Encounters:   01/26/21 92.2 kg (203 lb 4.2 oz)     Estimated body surface area is 2.13 meters squared as calculated from the following:    Height as of 2/24/20: 1.778 m (5' 10\").    Weight as of 1/26/21: 92.2 kg (203 lb 4.2 oz).    Labs:  No results found for NA within last 30 days.     No results found for K within last 30 days.     No results found for CA within last 30 days.     No results found for Mag within last 30 days.     No results found for Phos within last 30 days.     No results found for ALBUMIN within last 30 days.     No results found for BUN within last 30 days.     No results found for CR within last 30 days.     No results found for AST within last 30 days.     No results found for ALT within last 30 days.     No results found for BILITOTAL within last 30 days.     No results found for WBC within last 30 days.     No results found for HGB within last 30 days.     No results found for PLT within last 30 days.     No " results found for ANC within last 30 days.         Assessment/Plan:  -Update refill date in QNR with pt's new start date    Follow-Up:  Jun 9 labs at [facility]    Refill Due:  Jun 23     Bonifaciocharlotte Norton  Pharmacy Intern  Oral Chemotherapy Monitoring Program  [facility]   [phone]

## 2021-06-09 ENCOUNTER — DOCUMENTATION ONLY (OUTPATIENT)
Dept: PHARMACY | Facility: CLINIC | Age: 71
End: 2021-06-09

## 2021-06-09 ENCOUNTER — TRANSFERRED RECORDS (OUTPATIENT)
Dept: HEALTH INFORMATION MANAGEMENT | Facility: CLINIC | Age: 71
End: 2021-06-09

## 2021-06-09 NOTE — PROGRESS NOTES
Oral Chemotherapy Monitoring Program  Lab Follow Up    Reviewed lab results from 6/9/21.    ORAL CHEMOTHERAPY 5/29/2019 6/4/2019 7/23/2019 8/20/2019 5/21/2021 6/3/2021 6/9/2021   Assessment Type - - - - New Teach Initial Follow up Lab Monitoring   Diagnosis Code - - - - Multiple Myeloma Multiple Myeloma Multiple Myeloma   Providers - - - - Dr. Jaret Raygoza   [facility]   Drug Name Revlimid (Lenalidomide) Revlimid (Lenalidomide) Revlimid (Lenalidomide) Revlimid (Lenalidomide) Pomalyst (pomalidomide) Pomalyst (pomalidomide) Pomalyst (pomalidomide)   Dose - - - - 2 mg 2 mg 2 mg   Current Schedule Daily Daily Daily Daily Daily Daily Daily   Cycle Details 2 weeks on 2 weeks off Drug on Hold 2 weeks on 2 weeks off 2 weeks on 2 weeks off 3 weeks on, 1 week off 3 weeks on, 1 week off 3 weeks on, 1 week off   Start Date of Last Cycle 5/12/2019 - 7/16/2019 8/13/2019 - (No Data) -   Planned next cycle start date 6/9/2019 - 8/13/2019 9/10/2019 - 6/6/2021 6/11/2021   Doses missed in last 2 weeks 0 - 0 0 - - -   Adherence Assessment Adherent - Adherent Adherent - - -   Adverse Effects No AE identified during assessment - No AE identified during assessment No AE identified during assessment - - No AE identified during assessment   Myalgias/Arthralgias - - - - - - -   Pharmacist Intervention(myalgias/arthralgias) - - - - - - -   Pharmacist intervention(other) - - - - - - -   Home BPs - - - - - - -   Any new drug interactions? No - No No Yes - -   Pharmacist Intervention? - - - - Yes - -   Intervention(s) - - - - Patient Education - -   Is the dose as ordered appropriate for the patient? Yes - Yes Yes - - -   Has the patient been assessed within the past 6 months for depression? - - Yes - - - -   Has the patient missed any days of school, work, or other routine activity? - - No - - - -       Labs:      Assessment & Plan:  No concerning abnormalities.Per  previous communication with patient, he'll be starting pomalyst on 6/11. Will follow up on 6/18.     Follow-Up:  6/18: initial follow up    Mal Frye, PharmD, MS  Hematology/Oncology Clinical Pharmacist  [facility]  [phone] (oral chemotherapy)  [phone] (infusion)

## 2021-06-18 ENCOUNTER — TELEPHONE (OUTPATIENT)
Dept: ONCOLOGY | Facility: CLINIC | Age: 71
End: 2021-06-18

## 2021-06-18 NOTE — ORAL ONC MGMT
Oral Chemotherapy Monitoring Program    Subjective/Objective:  Erasmo Pearce is a 70 year old male contacted by phone for a follow-up visit for oral chemotherapy.  Jonh states he started his Pomalyst therapy on 6/6/21. He states he hasn't missed any doses and correctly states he is taking 2 mg capsule at bedtime. He knows he takes it for 3 weeks on and 1 week off. Patient states the only difference he has noticed is a little more fatigue. Otherwise he feels he is doing really well. Patient states fatigue is not new for him with this disease. Fatigue could also be due to the hot weather as well. Jonh did recently start taking Zithromax for a chest cold, checked and there are no drug interactions his last dose of Zithromax will be tomorrow.    ORAL CHEMOTHERAPY 6/4/2019 7/23/2019 8/20/2019 5/21/2021 6/3/2021 6/9/2021 6/18/2021   Assessment Type - - - New Teach Initial Follow up Lab Monitoring Initial Follow up   Diagnosis Code - - - Multiple Myeloma Multiple Myeloma Multiple Myeloma Multiple Myeloma   Providers - - - Dr. Jaret Raygoza   [facility]   Drug Name Revlimid (Lenalidomide) Revlimid (Lenalidomide) Revlimid (Lenalidomide) Pomalyst (pomalidomide) Pomalyst (pomalidomide) Pomalyst (pomalidomide) Pomalyst (pomalidomide)   Dose - - - 2 mg 2 mg 2 mg 2 mg   Current Schedule Daily Daily Daily Daily Daily Daily Daily   Cycle Details Drug on Hold 2 weeks on 2 weeks off 2 weeks on 2 weeks off 3 weeks on, 1 week off 3 weeks on, 1 week off 3 weeks on, 1 week off 3 weeks on, 1 week off   Start Date of Last Cycle - 7/16/2019 8/13/2019 - (No Data) - 6/6/2021   Planned next cycle start date - 8/13/2019 9/10/2019 - 6/6/2021 6/11/2021 7/4/2021   Doses missed in last 2 weeks - 0 0 - - - 0   Adherence Assessment - Adherent Adherent - - - Adherent   Adverse Effects - No AE identified during assessment No AE identified during  "assessment - - No AE identified during assessment Fatigue   Myalgias/Arthralgias - - - - - - -   Pharmacist Intervention(myalgias/arthralgias) - - - - - - -   Fatigue - - - - - - Grade 1   Pharmacist Intervention(fatigue) - - - - - - No   Pharmacist intervention(other) - - - - - - -   Home BPs - - - - - - -   Any new drug interactions? - No No Yes - - -   Pharmacist Intervention? - - - Yes - - -   Intervention(s) - - - Patient Education - - -   Is the dose as ordered appropriate for the patient? - Yes Yes - - - -   Has the patient been assessed within the past 6 months for depression? - Yes - - - - -   Has the patient missed any days of school, work, or other routine activity? - No - - - - -       Last PHQ-2 Score on record:   PHQ-2 ( 1999 [employer]) 2/18/2020 6/11/2019   Q1: Little interest or pleasure in doing things 0 0   Q2: Feeling down, depressed or hopeless 0 0   PHQ-2 Score 0 0       Vitals:  BP:   BP Readings from Last 1 Encounters:   04/27/21 (!) 147/49     Wt Readings from Last 1 Encounters:   01/26/21 92.2 kg (203 lb 4.2 oz)     Estimated body surface area is 2.13 meters squared as calculated from the following:    Height as of 2/24/20: 1.778 m (5' 10\").    Weight as of 1/26/21: 92.2 kg (203 lb 4.2 oz).    Labs:  No results found for NA within last 30 days.     No results found for K within last 30 days.     No results found for CA within last 30 days.     No results found for Mag within last 30 days.     No results found for Phos within last 30 days.     No results found for ALBUMIN within last 30 days.     No results found for BUN within last 30 days.     No results found for CR within last 30 days.     No results found for AST within last 30 days.     No results found for ALT within last 30 days.     No results found for BILITOTAL within last 30 days.     No results found for WBC within last 30 days.     No results found for HGB within last 30 days.     No results found for PLT within last 30 days. "     No results found for ANC within last 30 days.         Assessment/Plan:  Patient is tolerating Pomalyst therapy well. Patient does have some fatigue but states it could also be due to the hot weather and the amount of yard work he is doing. Jonh states throughout his disease he has always had fatigue and this isn't different/new for him. The fatigue is relieved by rest. Patient will get labs on 6/21 we will want to make sure patient gets labs again on 7/2 to make sure okay to start next cycle on 7/4.     Follow-Up:  Will follow-up on 6/21 with his labs. Dr. Raygoza wanted labs every 2 weeks initially and then could go to monthly.    Refill Due:  Cycle 2 would be due to start on 7/4. Patient wants us to release the script very early 6/22 as [facility] had [facility] I told him we would try our best if his labs were good on 6/21.     Sunshine Suazo, Pharm.D., [facility]  [phone]  06/18/21

## 2021-06-19 DIAGNOSIS — C90.02 MULTIPLE MYELOMA IN RELAPSE (H): Primary | ICD-10-CM

## 2021-06-21 ENCOUNTER — TRANSFERRED RECORDS (OUTPATIENT)
Dept: HEALTH INFORMATION MANAGEMENT | Facility: CLINIC | Age: 71
End: 2021-06-21

## 2021-06-21 ENCOUNTER — DOCUMENTATION ONLY (OUTPATIENT)
Dept: ONCOLOGY | Facility: CLINIC | Age: 71
End: 2021-06-21

## 2021-06-21 NOTE — PROGRESS NOTES
Oral Chemotherapy Monitoring Program  Lab Follow Up    Reviewed lab results from 6/21.    ORAL CHEMOTHERAPY 7/23/2019 8/20/2019 5/21/2021 6/3/2021 6/9/2021 6/18/2021 6/21/2021   Assessment Type - - New Teach Initial Follow up Lab Monitoring Initial Follow up Lab Monitoring   Diagnosis Code - - Multiple Myeloma Multiple Myeloma Multiple Myeloma Multiple Myeloma Multiple Myeloma   Providers - - Dr. Jaret Raygoza   [facility]   Drug Name Revlimid (Lenalidomide) Revlimid (Lenalidomide) Pomalyst (pomalidomide) Pomalyst (pomalidomide) Pomalyst (pomalidomide) Pomalyst (pomalidomide) Pomalyst (pomalidomide)   Dose - - 2 mg 2 mg 2 mg 2 mg 2 mg   Current Schedule Daily Daily Daily Daily Daily Daily Daily   Cycle Details 2 weeks on 2 weeks off 2 weeks on 2 weeks off 3 weeks on, 1 week off 3 weeks on, 1 week off 3 weeks on, 1 week off 3 weeks on, 1 week off 3 weeks on, 1 week off   Start Date of Last Cycle 7/16/2019 8/13/2019 - (No Data) - 6/6/2021 -   Planned next cycle start date 8/13/2019 9/10/2019 - 6/6/2021 6/11/2021 7/4/2021 -   Doses missed in last 2 weeks 0 0 - - - 0 -   Adherence Assessment Adherent Adherent - - - Adherent -   Adverse Effects No AE identified during assessment No AE identified during assessment - - No AE identified during assessment Fatigue -   Myalgias/Arthralgias - - - - - - -   Pharmacist Intervention(myalgias/arthralgias) - - - - - - -   Fatigue - - - - - Grade 1 -   Pharmacist Intervention(fatigue) - - - - - No -   Pharmacist intervention(other) - - - - - - -   Home BPs - - - - - - -   Any new drug interactions? No No Yes - - - -   Pharmacist Intervention? - - Yes - - - -   Intervention(s) - - Patient Education - - - -   Is the dose as ordered appropriate for the patient? Yes Yes - - - - -   Has the patient been assessed within the past 6 months for depression? Yes - - - - - -   Has  the patient missed any days of school, work, or other routine activity? No - - - - - -       Labs:  No results found for NA within last 30 days.     No results found for K within last 30 days.     No results found for CA within last 30 days.     No results found for Mag within last 30 days.     No results found for Phos within last 30 days.     No results found for ALBUMIN within last 30 days.     No results found for BUN within last 30 days.     No results found for CR within last 30 days.     No results found for AST within last 30 days.     No results found for ALT within last 30 days.     No results found for BILITOTAL within last 30 days.     No results found for WBC within last 30 days.     No results found for HGB within last 30 days.     No results found for PLT within last 30 days.     No results found for ANC within last 30 days.       Assessment & Plan:  No concerning abnormalities. Sent MyChart to patient.    Follow-Up:  7/2 Labs before next cycle. Will remind patient of this.     Mireya Hill  Pharmacy Intern  [facility]  [phone]

## 2021-06-22 ENCOUNTER — TELEPHONE (OUTPATIENT)
Dept: PHARMACY | Facility: OTHER | Age: 71
End: 2021-06-22

## 2021-06-22 DIAGNOSIS — C90.02 MULTIPLE MYELOMA IN RELAPSE (H): Primary | ICD-10-CM

## 2021-06-22 NOTE — TELEPHONE ENCOUNTER
Oral Chemotherapy Monitoring Program    Medication: Pomalyst  Rx:  2mg PO daily on days 1-21 of 28 day cycle    Auth #: [id number]  Risk Category: Adult male    Routine survey questions reviewed.    Thank you, if you have any further questions please feel free to contact me.    Viki Mcgee  Lead Oncology Liaison  [email or web address]  Phone: [phone]  Fax: [phone]

## 2021-07-02 ENCOUNTER — DOCUMENTATION ONLY (OUTPATIENT)
Dept: ONCOLOGY | Facility: CLINIC | Age: 71
End: 2021-07-02

## 2021-07-02 ENCOUNTER — TELEPHONE (OUTPATIENT)
Dept: ONCOLOGY | Facility: CLINIC | Age: 71
End: 2021-07-02

## 2021-07-02 NOTE — PROGRESS NOTES
Oral Chemotherapy Monitoring Program  Lab Follow Up    Reviewed lab results from 7/2.    ORAL CHEMOTHERAPY 6/3/2021 6/9/2021 6/18/2021 6/21/2021 6/22/2021 7/2/2021 7/2/2021   Assessment Type Initial Follow up Lab Monitoring Initial Follow up Lab Monitoring Refill Other Lab Monitoring   Diagnosis Code Multiple Myeloma Multiple Myeloma Multiple Myeloma Multiple Myeloma Multiple Myeloma Multiple Myeloma Multiple Myeloma   Providers Dr. Jaret Raygoza   [facility]   Drug Name Pomalyst (pomalidomide) Pomalyst (pomalidomide) Pomalyst (pomalidomide) Pomalyst (pomalidomide) Pomalyst (pomalidomide) Pomalyst (pomalidomide) Pomalyst (pomalidomide)   Dose 2 mg 2 mg 2 mg 2 mg 2 mg 2 mg 2 mg   Current Schedule Daily Daily Daily Daily Daily Daily Daily   Cycle Details 3 weeks on, 1 week off 3 weeks on, 1 week off 3 weeks on, 1 week off 3 weeks on, 1 week off 3 weeks on, 1 week off 3 weeks on, 1 week off 3 weeks on, 1 week off   Start Date of Last Cycle (No Data) - 6/6/2021 - - - -   Planned next cycle start date 6/6/2021 6/11/2021 7/4/2021 - - - -   Doses missed in last 2 weeks - - 0 - - - -   Adherence Assessment - - Adherent - - - -   Adverse Effects - No AE identified during assessment Fatigue - - - -   Myalgias/Arthralgias - - - - - - -   Pharmacist Intervention(myalgias/arthralgias) - - - - - - -   Fatigue - - Grade 1 - - - -   Pharmacist Intervention(fatigue) - - No - - - -   Pharmacist intervention(other) - - - - - - -   Home BPs - - - - - - -   Any new drug interactions? - - - - - - -   Pharmacist Intervention? - - - - - - -   Intervention(s) - - - - - - -   Is the dose as ordered appropriate for the patient? - - - - - - -   Has the patient been assessed within the past 6 months for depression? - - - - - - -   Has the patient missed any days of school, work, or other  routine activity? - - - - - - -       Labs:  No results found for NA within last 30 days.     No results found for K within last 30 days.     No results found for CA within last 30 days.     No results found for Mag within last 30 days.     No results found for Phos within last 30 days.     No results found for ALBUMIN within last 30 days.     No results found for BUN within last 30 days.     No results found for CR within last 30 days.     No results found for AST within last 30 days.     No results found for ALT within last 30 days.     No results found for BILITOTAL within last 30 days.     No results found for WBC within last 30 days.     No results found for HGB within last 30 days.     No results found for PLT within last 30 days.     No results found for ANC within last 30 days.       Assessment & Plan:  No concerning abnormalities. Sent [facility] message to patient.      Follow-Up:  2 weeks: Check for labs.    Mireya Hill  Pharmacy Intern  [facility]  [phone]

## 2021-07-07 ENCOUNTER — TRANSFERRED RECORDS (OUTPATIENT)
Dept: HEALTH INFORMATION MANAGEMENT | Facility: CLINIC | Age: 71
End: 2021-07-07

## 2021-07-17 ENCOUNTER — HEALTH MAINTENANCE LETTER (OUTPATIENT)
Age: 71
End: 2021-07-17

## 2021-07-20 ENCOUNTER — TELEPHONE (OUTPATIENT)
Dept: ONCOLOGY | Facility: CLINIC | Age: 71
End: 2021-07-20

## 2021-07-20 DIAGNOSIS — C90.02 MULTIPLE MYELOMA IN RELAPSE (H): Primary | ICD-10-CM

## 2021-07-20 NOTE — TELEPHONE ENCOUNTER
Oral Chemotherapy Monitoring Program    Medication:Pomalyst  Rx:  2mg PO daily 21(cycle28)    Auth #: [id number]  Risk Category: Adult male    Routine survey questions reviewed.      Danitza Chandler Pharmacy Liaison, [facility]  Phone: [phone]  Fax: [phone]  Email: [email or web address]

## 2021-07-21 ENCOUNTER — TRANSFERRED RECORDS (OUTPATIENT)
Dept: HEALTH INFORMATION MANAGEMENT | Facility: CLINIC | Age: 71
End: 2021-07-21

## 2021-07-21 ENCOUNTER — TELEPHONE (OUTPATIENT)
Dept: ONCOLOGY | Facility: CLINIC | Age: 71
End: 2021-07-21

## 2021-07-21 NOTE — ORAL ONC MGMT
Oral Chemotherapy Monitoring Program    Subjective/Objective:  Erasmo Pearce is a 70 year old male contacted by phone for a follow-up visit for oral chemotherapy and follow up on lab results from today 7/21.    Jonh confirmed he takes 1 capsule (2mg) of pomalidomide daily for 21 days then 7 days off in a 28 day cycle, with no missed doses in the past 2 weeks. He says that this Sunday 7/25 would be the first day of his off week (day 22) and that he started this cycle on 7/4.    His energy level has dropped but he is able to go outside to run errands and he makes a conscious effort to go out for a walk if the weather is not too hot and air quality is not too bad. He denied having nausea, vomiting, or diarrhea. He states that he had diarrhea concerns before starting chemotherapy but since starting, his stools actually firmed up a little now and no longer have diarrhea. He had constipation for about 4 days before but it self resolved and he denies having any more constipation now. He has no fever, rash, or itching. He has had muscle cramps before but he states that his oncologist and cardiologist are working together to make good changes for it and now his muscle cramps are under control. He has some light headedness that lasts for a few seconds when he stands up or changes position too quickly but it has been happening for years and does not seem to be worsened by pomalidomide.    Reviewed lab results from today 7/21 done at [facility] (on Care Everywhere).     ORAL CHEMOTHERAPY 6/18/2021 6/21/2021 6/22/2021 7/2/2021 7/2/2021 7/20/2021 7/21/2021   Assessment Type Initial Follow up Lab Monitoring Refill Other Lab Monitoring Refill Lab Monitoring;Monthly Follow up   Diagnosis Code Multiple Myeloma Multiple Myeloma Multiple Myeloma Multiple Myeloma Multiple Myeloma Multiple Myeloma Multiple Myeloma   Providers Dr. Jaret Raygoza Dr.  "[facility]   [facility]   Drug Name Pomalyst (pomalidomide) Pomalyst (pomalidomide) Pomalyst (pomalidomide) Pomalyst (pomalidomide) Pomalyst (pomalidomide) Pomalyst (pomalidomide) Pomalyst (pomalidomide)   Dose 2 mg 2 mg 2 mg 2 mg 2 mg 2 mg 2 mg   Current Schedule Daily Daily Daily Daily Daily Daily Daily   Cycle Details 3 weeks on, 1 week off 3 weeks on, 1 week off 3 weeks on, 1 week off 3 weeks on, 1 week off 3 weeks on, 1 week off 3 weeks on, 1 week off 3 weeks on, 1 week off   Start Date of Last Cycle 6/6/2021 - - - - - 7/4/2021   Planned next cycle start date 7/4/2021 - - - - - 8/1/2021   Doses missed in last 2 weeks 0 - - - - - 0   Adherence Assessment Adherent - - - - - Adherent   Adverse Effects Fatigue - - - - - -   Myalgias/Arthralgias - - - - - - -   Pharmacist Intervention(myalgias/arthralgias) - - - - - - -   Fatigue Grade 1 - - - - - Grade 1   Pharmacist Intervention(fatigue) No - - - - - No   Pharmacist intervention(other) - - - - - - -   Home BPs - - - - - - -   Any new drug interactions? - - - - - - -   Pharmacist Intervention? - - - - - - -   Intervention(s) - - - - - - -   Is the dose as ordered appropriate for the patient? - - - - - - -   Has the patient been assessed within the past 6 months for depression? - - - - - - -   Has the patient missed any days of school, work, or other routine activity? - - - - - - -       Last PHQ-2 Score on record:   PHQ-2 ( 1999 Pfizer) 2/18/2020 6/11/2019   Q1: Little interest or pleasure in doing things 0 0   Q2: Feeling down, depressed or hopeless 0 0   PHQ-2 Score 0 0       Vitals:  BP:   BP Readings from Last 1 Encounters:   04/27/21 (!) 147/49     Wt Readings from Last 1 Encounters:   01/26/21 92.2 kg (203 lb 4.2 oz)     Estimated body surface area is 2.13 meters squared as calculated from the following:    Height as of 2/24/20: 1.778 m (5' 10\").    Weight as of 1/26/21: 92.2 kg (203 lb 4.2 " oz).    Labs:  No results found for NA within last 30 days.     No results found for K within last 30 days.     No results found for CA within last 30 days.     No results found for Mag within last 30 days.     No results found for Phos within last 30 days.     No results found for ALBUMIN within last 30 days.     No results found for BUN within last 30 days.     No results found for CR within last 30 days.     No results found for AST within last 30 days.     No results found for ALT within last 30 days.     No results found for BILITOTAL within last 30 days.     No results found for WBC within last 30 days.     No results found for HGB within last 30 days.     No results found for PLT within last 30 days.     No results found for ANC within last 30 days.         Assessment/Plan:  Jonh is adherent and tolerating pomalidomide therapy well with manageable side effect of fatigue.     His lab results are stable with no concerning abnormalities.    Continue pomalidomide therapy as planned.    Follow-Up:  R 8/24 9292 Dr Raygoza appt.     Refill Due:  Next cycle due to start on 8/1    Kristin Casillas  Pharmacy Intern  Oral Chemotherapy Monitoring Program   [facility]  [phone]

## 2021-07-22 ENCOUNTER — TELEPHONE (OUTPATIENT)
Dept: ONCOLOGY | Facility: CLINIC | Age: 71
End: 2021-07-22

## 2021-07-22 DIAGNOSIS — C90.02 MULTIPLE MYELOMA IN RELAPSE (H): Primary | ICD-10-CM

## 2021-07-22 NOTE — TELEPHONE ENCOUNTER
Jonh has been on Pomalidamide 2mg days 1-21 and Dex 20mg weekly.His Lambda chains were 63.7 and kappa 7.06, Kappa/lambda 0.1243 on June 21 while July 21 Lambda was 50.8 and kappa 2.78, kappa/lambda is 0.0543.tolerating Pom well with only some hoarseness CBC OK  [facility]  I will see Aug 24

## 2021-07-23 ENCOUNTER — TELEPHONE (OUTPATIENT)
Dept: ONCOLOGY | Facility: CLINIC | Age: 71
End: 2021-07-23

## 2021-07-23 NOTE — TELEPHONE ENCOUNTER
Oral Chemotherapy Monitoring Program    Medication: Pomalyst   Rx:  4mg PO daily 21(cycle 28)    Auth #: [id number]  Risk Category: Adult male    Routine survey questions reviewed.      Danitza Chandler Pharmacy Liaison, [facility]  Phone: [phone]  Fax: [phone]  Email: [email or web address]

## 2021-07-26 ENCOUNTER — TELEPHONE (OUTPATIENT)
Dept: TRANSPLANT | Facility: CLINIC | Age: 71
End: 2021-07-26

## 2021-07-26 NOTE — TELEPHONE ENCOUNTER
S: Needs to have [facility].    B: [facility] is calling to [facility] [place] [facility] the escript for pts RX of Pomalyst. Need it submitted on [facility] form to process and fill RX for pt.    A: Routed to [facility] to follow up with Rosenda. PH: [phone]

## 2021-07-27 ENCOUNTER — TELEPHONE (OUTPATIENT)
Dept: ONCOLOGY | Facility: CLINIC | Age: 71
End: 2021-07-27

## 2021-07-29 PROBLEM — F32.A DEPRESSION: Status: RESOLVED | Noted: 2020-07-29 | Resolved: 2021-07-29

## 2021-07-29 RX ORDER — HYDROCORTISONE 2.5 %
CREAM (GRAM) TOPICAL
COMMUNITY
Start: 2020-07-28 | End: 2022-01-01

## 2021-07-29 RX ORDER — ONDANSETRON 4 MG/1
TABLET, FILM COATED ORAL
COMMUNITY
Start: 2021-03-31 | End: 2021-11-02

## 2021-07-29 RX ORDER — POTASSIUM CHLORIDE 750 MG/1
TABLET, EXTENDED RELEASE ORAL
COMMUNITY
Start: 2021-03-31 | End: 2021-11-02

## 2021-07-29 RX ORDER — AZITHROMYCIN 250 MG/1
TABLET, FILM COATED ORAL
COMMUNITY
Start: 2021-06-15 | End: 2022-01-01

## 2021-08-02 ENCOUNTER — VIRTUAL VISIT (OUTPATIENT)
Dept: CARDIOLOGY | Facility: CLINIC | Age: 71
End: 2021-08-02
Attending: INTERNAL MEDICINE
Payer: MEDICARE

## 2021-08-02 DIAGNOSIS — C90.00 MULTIPLE MYELOMA NOT HAVING ACHIEVED REMISSION (H): ICD-10-CM

## 2021-08-02 DIAGNOSIS — I50.22 CHRONIC SYSTOLIC HEART FAILURE (H): ICD-10-CM

## 2021-08-02 DIAGNOSIS — I25.5 ISCHEMIC CARDIOMYOPATHY: ICD-10-CM

## 2021-08-02 DIAGNOSIS — I48.21 PERMANENT ATRIAL FIBRILLATION (H): Primary | ICD-10-CM

## 2021-08-02 PROCEDURE — 99214 OFFICE O/P EST MOD 30 MIN: CPT | Mod: 95 | Performed by: INTERNAL MEDICINE

## 2021-08-02 NOTE — PATIENT INSTRUCTIONS
Cardiology Providers you saw during your visit:  Dr. Prince     Medication changes: None    Follow up:     Labs     Follow up with Dr. Prince in 6 months with echocardiogram prior    Follow the American Heart Association Diet and Lifestyle recommendations:  Limit saturated fat, trans fat, sodium, red meat, sweets and sugar-sweetened beverages. If you choose to eat red meat, compare labels and select the leanest cuts available.  Aim for at least 150 minutes of moderate physical activity or 75 minutes of vigorous physical activity - or an equal combination of both - each week.      If you have any questions, call  Al Phillips RN, at [phone].  Press Option #1 for the [employer], and then press Option #4  We are encouraging the use of incuBET to communicate with your HealthCare Provider      After hours, weekends or holidays: On Call Cardiologist- [phone] option #4 and ask to speak to the on-call Cardiologist.

## 2021-08-02 NOTE — PROGRESS NOTES
Kathleen Pearce is a 71 year old who is being evaluated via a billable video visit.      How would you like to obtain your AVS? Mail a copy  If the video visit is dropped, the invitation should be resent by: Text to cell phone: [phone]  Will anyone else be joining your video visit? No      Vitals - Patient Reported  Weight (Patient Reported): 88.3 kg (194 lb 9.6 oz)  Pain Score: No Pain (0) (No more SOB than usual)    Vitals were taken and medications reconciled.    Dirk Bolivar, EMT  10:45 AM    --------------  History:     Jonh Pearce is a 71-year-old gentleman with multiple myeloma s/p PBSCT in 2006, ICM/NICM (LVEF 30-35%, NYHA II Stage B) s/p ICD placement in 2009, episodic VT/VF treated by appropriate ICD shocks and afib (CHADVASC 3) on warfarin followed in Cardio-oncology clinic for cardiomyopathy.      Patient was diagnosed with multiple myeloma in 2006 and underwent chemotherapy and autologous PBSCT. He was started on lenalinomide in 2017 due to relapse and then bortezomib in 07/2019 due to progressive disease.      Patient was admitted for decompensated heart failure in August 2019 (acute on chronic systolic heart failure, EF 25 to 30%) and improved after diuresis and bortezomib was stopped. He was on a chemo holiday and recently started treatment with pomalidomide and dexamethasone.    Patient today feels good.  He has not had any worsening chest pain or dyspnea although the air quality has required him to take some additional doses of the albuterol inhaler.  He has not had any recent shocks. He denies orthopnea, PND. His leg swelling is minimal.     PAST MEDICAL HISTORY:  Past Medical History:   Diagnosis Date     Chronic systolic heart failure (H)      Ischemic cardiomyopathy      Other premature beats      Permanent atrial fibrillation (H)      Personal history of multiple myeloma      VF (ventricular fibrillation) (H)        CURRENT MEDICATIONS:  Current Outpatient  Medications   Medication Sig Dispense Refill     acyclovir (ZOVIRAX) 400 MG tablet Take 1 tablet (400 mg) by mouth every 12 hours 60 tablet 3     albuterol (PROAIR HFA, PROVENTIL HFA, VENTOLIN HFA) 108 (90 BASE) MCG/ACT inhaler Inhale 2 puffs into the lungs every 6 hours as needed        amoxicillin (AMOXIL) 500 MG tablet Take 4 tabs one hour before dental appointment 4 tablet 5     baclofen (LIORESAL) 10 MG tablet Take 10 mg by mouth 3 times daily as needed prn       calcium carbonate 600 mg-vitamin D 400 units (CALTRATE) 600-400 MG-UNIT per tablet TAKE ONE TABLET BY MOUTH TWICE A DAY TO SUPPLEMENT CALCIUM AND VITAMIN D       Calcium Carbonate-Vitamin D (CALCIUM 500 + D PO) Take 2 tablets by mouth daily.       dexamethasone (DECADRON) 4 MG tablet Take  5 tabs Days 1,8, and 15 each cycle 15 tablet 4     diclofenac (VOLTAREN) 75 MG EC tablet Take 75 mg by mouth 2 times daily as needed 1 tab prn       digoxin (LANOXIN) 125 MCG tablet Take 1 tablet (125 mcg) by mouth daily 30 tablet 1     fentaNYL (DURAGESIC) 25 mcg/hr patch 72 hr Place 1 patch onto the skin every 72 hours       furosemide (LASIX) 20 MG tablet Take 1 tablet (20 mg) by mouth daily 90 tablet 3     hydrocortisone 2.5 % cream        lisinopril (PRINIVIL/ZESTRIL) 5 MG tablet Take 1 tablet (5 mg) by mouth 2 times daily 60 tablet 1     Magnesium Oxide 420 MG TABS Take 3 tabs (1260 mg) three times a day. 810 tablet 3     metoprolol succinate ER (TOPROL-XL) 100 MG 24 hr tablet Take 1 tablet (100 mg) by mouth daily 90 tablet 3     mexiletine (MEXITIL) 150 MG capsule Take 1 capsule (150 mg) by mouth 2 times daily As close to 12 hours apart as possible 180 capsule 3     nitroGLYCERIN (NITROSTAT) 0.4 MG SL tablet Place 0.4 mg under the tongue every 5 minutes as needed Reported on 5/16/2017       omeprazole (PRILOSEC) 20 MG capsule Take 20 mg by mouth daily  90 capsule 3     ondansetron (ZOFRAN) 4 MG tablet        ondansetron (ZOFRAN-ODT) 4 MG ODT tab Take 4 mg by  mouth every 6 hours as needed for nausea       oxycodone (ROXICODONE) 5 MG immediate release tablet Take 1-2 tablets by mouth every 6 hours as needed. For pain.        pomalidomide (POMALYST) 4 MG capsule Take 1 capsule (4 mg) by mouth daily Day 1 thru 21 of each 28 day cycle. Administer with or without food. Swallow whole with water. 21 capsule 3     potassium chloride ER (KLOR-CON M) 10 MEQ CR tablet Take 1 tablet (10 mEq) by mouth every other day 90 tablet 3     simvastatin (ZOCOR) 40 MG tablet Take 40 mg by mouth At Bedtime  30 tablet      spironolactone (ALDACTONE) 25 MG tablet Take 0.5 tablets (12.5 mg) by mouth daily 15 tablet 1     tiotropium (SPIRIVA) 18 MCG inhalation capsule Inhale 1 capsule (18 mcg) into the lungs daily 30 capsule 3     TRAZODONE HCL PO Take 50 mg by mouth nightly as needed        warfarin (COUMADIN) 5 MG tablet Take 7.5 mg by mouth See Admin Instructions Take 1.5 tab by mojth Monday and Friday and 1 tab Sun Tues WED , Thur and Sat       azithromycin (ZITHROMAX) 250 MG tablet  (Patient not taking: Reported on 8/2/2021)       magnesium oxide (MAG-OX) 400 (241.3 Mg) MG tablet Take 9 tablets daily in divided doses       potassium chloride ER (K-TAB/KLOR-CON) 10 MEQ CR tablet          PAST SURGICAL HISTORY:  Past Surgical History:   Procedure Laterality Date     CV CORONARY ANGIOGRAM N/A 8/29/2019    Procedure: CV CORONARY ANGIOGRAM;  Surgeon: Giorgi Milan MD;  Location:  HEART CARDIAC CATH LAB     CV LEFT HEART CATH N/A 8/29/2019    Procedure: Left Heart Cath;  Surgeon: Giorgi Milan MD;  Location:  HEART CARDIAC CATH LAB     CV RIGHT HEART CATH MEASUREMENTS RECORDED N/A 8/29/2019    Procedure: CV RIGHT HEART CATH;  Surgeon: Giorgi Milan MD;  Location:  HEART CARDIAC CATH LAB     CV RIGHT HEART EXERCISE STRESS STUDY N/A 8/29/2019    Procedure: Stress Drug Study;  Surgeon: Giorgi Milan MD;  Location:  HEART CARDIAC CATH LAB     IMPLANT AUTOMATIC IMPLANTABLE  CARDIOVERTER DEFIBRILLATOR         ALLERGIES:     Allergies   Allergen Reactions     Nkda [No Known Drug Allergies]      Sotalol Other (See Comments)     Other reaction(s): Low blood pressure, Prolonged QT interval       FAMILY HISTORY:  - Premature coronary artery disease      SOCIAL HISTORY:  Social History     Tobacco Use     Smoking status: Former Smoker     Packs/day: 1.00     Years: 25.00     Pack years: 25.00     Types: Cigarettes     Quit date: 10/15/1995     Years since quittin.8     Smokeless tobacco: Never Used   Substance Use Topics     Alcohol use: No     Drug use: No       ROS:   ROS: A comprehensive 10-point review of system is negative except for those reported in the HPI.    Exam:    In general, the patient is in no apparent distress.    There were no vitals taken for this visit.  Breathing is unlabored.   HEENT: NC/AT.  PERRLA.  EOMI.  Sclerae white, not injected.    Neck: No jugular venous distension.    Extremities: No edema.   Neurologic: Alert and oriented to person/place/time, normal speech and affect      Labs:  CBC RESULTS:   Lab Results   Component Value Date    WBC 6.4 2021    RBC 3.71 (L) 2021    HGB 12.7 (L) 2021    HCT 38.9 (L) 2021     (H) 2021    MCH 34.2 (H) 2021    MCHC 32.6 2021    RDW 13.2 2021     2021       BMP RESULTS:  Lab Results   Component Value Date     2021    POTASSIUM 4.6 2021    CHLORIDE 108 2021    CO2 27 2021    ANIONGAP 4 2021     (H) 2021    BUN 26 2021    CR 1.23 2021    GFRESTIMATED 59 (L) 2021    GFRESTBLACK 69 2021    EILEEN 8.9 2021        INR RESULTS:  Lab Results   Component Value Date    INR 2.27 (H) 2021    INR 2.19 (H) 2019    INR 1.60 (H) 2019    INR 1.80 (H) 2019       Cardiac testing :    EKG 2020 afib, slow ventricular response q waves on inferior leads       2021 device  interrogation   No ICD shocks. : 11%. Ventricular Arrhythmia: 6 nonsustained episodes, lasting 5-7 seconds.      Stress echo 7.30.19     Submaximal stress test, achieved 74% of the age predicted maximal heart rate.  Limiting symptom fatigue.   Normal blood pressure response to exercise.  No angina symptoms with exercise.  Baseline rhythm is atrial fibrillation. No ECG evidence of ischemia. There is no chronotropic incompetence.  Severely reduced LV function with EF of 28% at rest; with exercise left ventricular cavity size and LVEF did not change significantly.  Akinesis of the basal-mid inferior/inferoseptal/inferolateral segments present. No new stress induced regional wall motion abnormalities evident.  Less than average functional capacity for age. Exercise time ~2 minutes.     Biventricular dysfunction with moderate mitral and tricuspid regurgitation and evidence of hypervolemia noted on screening 2D and Doppler examination.        Aug 2019 RHC and coronary angiogram       Right sided filling pressures are severely elevated - mean RA 23, PCWP 31, mean PA 40     Moderately elevated pulmonary artery hypertension.    Left sided filling pressures are severely elevated.    Left ventricular filling pressures are severely elevated .    Reduced cardiac output level.    Heparin was held during the procedure due to thrombocytopenia    Nipride was started and titrate to 1mcg/kg/min. PCWP decreased from 30 mmHg to 24 mmHg. mPAP decreased from 40 mmHg to 30 mmHg. After pressures decreased team proceed with angiogram.    Mild LAD and LCx disease    Subtotal proximal RCA occlusion    No intervention performed    No mitral or aortic valve gradient     Assessment and Plan  Jonh is a 70 year old with -   1. Chronic Systolic Heart Failure (EF 25-30% in July 2019)  2. Permanent Atrial Fibrillation on warfarin  3. Ischemic heart disease - Subtotal proximal RCA occlusion and mild LAD and LCx disease August 2019  4. History of  VT/VF, s/p ICD  5. Hypertension   6. Relapsed multiple myeloma with mets to brain and bone s/p BMT, currently on Pomalyst and dexa  7. T2DM  8. Hyperlipidemia      Jonh has noticed mild worsening of his dyspnea with the poor quality otherwise he is doing well.  He denies any angina or heart failure symptoms.   I have advised him to continue the neurohormonal blockade with metoprolol, lisinopril. His volume status is well managed with low-dose Lasix and spironolactone. I will continue low-dose digoxin which was started to optimize his heart failure regimen and check a level with his next blood draw.  For the arrhythmias he is on mexiletine and warfarin.  His lipids are well managed on simvastatin, I will reassess his lipid panel with next blood draw.     Recommendations  Continue current medical therapy  Digoxin level with next blood draw  Lipid panel with next blood draw  Remote ICD interrogation -per schedule  Follow-up visit with me in 6 months with an echocardiogram     Video Visit Details:    Type of service:  Video Visit    Start: 08/02/2021 11:45 am  Stop: 08/02/2021 11:54 am    Originating Location (pt. Location): Home    Distant Location (provider location):  [facility]     Platform used for Video Visit: [facility]     Yoko Prince MD, MS  Professor of Medicine  Cardiovascular division      CC  Patient Care Team:  Cong Mcdonald as PCP - General  GinaWillian crandall MD as PCP - Internal Medicine  Rojas Raygoza MD as BMT Physician  Cong Mcdonald as Referring Physician  Yaz Jeong NP as Nurse Practitioner (Nurse Practitioner)  [facility] as [facility] Medicine Physician  Merry Mas RN as Continuity Care Coordinator (Transplant)  Zayar Lyle RN as Specialty Care Coordinator (Cardiology)  Ja Duarte MD as MD (Clinical Cardiac Electrophysiology)  Omayra Serra, RN as Specialty Care Coordinator (Cardiology)  Yoko Prince MD as Assigned Heart and  Vascular Provider  Dara Arana, RN as Specialty Care Coordinator  KIRILL BENSON

## 2021-08-02 NOTE — NURSING NOTE
No medications changes.     Asess dig level and lipid panel with next blood draw on 8/24.    Follow up with Dr. Prince in 6 months with echo prior.     Al Phillips, RN   Cardiology Nurse Coordinator

## 2021-08-02 NOTE — LETTER
8/2/2021      RE: Erasmo Pearce  [address]  [address] [facility] [address]       Dear Colleague,    Thank you for the opportunity to participate in the care of your patient, Erasmo Pearce, at the [facility] [place] at [facility]. Please see a copy of my visit note below.      --------------  History:     Jonh Pearce is a 71-year-old gentleman with multiple myeloma s/p PBSCT in 2006, ICM/NICM (LVEF 30-35%, NYHA II Stage B) s/p ICD placement in 2009, episodic VT/VF treated by appropriate ICD shocks and afib (CHADVASC 3) on warfarin followed in Cardio-oncology clinic for cardiomyopathy.      Patient was diagnosed with multiple myeloma in 2006 and underwent chemotherapy and autologous PBSCT. He was started on lenalinomide in 2017 due to relapse and then bortezomib in 07/2019 due to progressive disease.      Patient was admitted for decompensated heart failure in August 2019 (acute on chronic systolic heart failure, EF 25 to 30%) and improved after diuresis and bortezomib was stopped. He was on a chemo holiday and recently started treatment with pomalidomide and dexamethasone.    Patient today feels good.  He has not had any worsening chest pain or dyspnea although the air quality has required him to take some additional doses of the albuterol inhaler.  He has not had any recent shocks. He denies orthopnea, PND. His leg swelling is minimal.     PAST MEDICAL HISTORY:  Past Medical History:   Diagnosis Date     Chronic systolic heart failure (H)      Ischemic cardiomyopathy      Other premature beats      Permanent atrial fibrillation (H)      Personal history of multiple myeloma      VF (ventricular fibrillation) (H)        CURRENT MEDICATIONS:  Current Outpatient Medications   Medication Sig Dispense Refill     acyclovir (ZOVIRAX) 400 MG tablet Take 1 tablet (400 mg) by mouth every 12 hours 60 tablet 3     albuterol (PROAIR HFA,  PROVENTIL HFA, VENTOLIN HFA) 108 (90 BASE) MCG/ACT inhaler Inhale 2 puffs into the lungs every 6 hours as needed        amoxicillin (AMOXIL) 500 MG tablet Take 4 tabs one hour before dental appointment 4 tablet 5     baclofen (LIORESAL) 10 MG tablet Take 10 mg by mouth 3 times daily as needed prn       calcium carbonate 600 mg-vitamin D 400 units (CALTRATE) 600-400 MG-UNIT per tablet TAKE ONE TABLET BY MOUTH TWICE A DAY TO SUPPLEMENT CALCIUM AND VITAMIN D       Calcium Carbonate-Vitamin D (CALCIUM 500 + D PO) Take 2 tablets by mouth daily.       dexamethasone (DECADRON) 4 MG tablet Take  5 tabs Days 1,8, and 15 each cycle 15 tablet 4     diclofenac (VOLTAREN) 75 MG EC tablet Take 75 mg by mouth 2 times daily as needed 1 tab prn       digoxin (LANOXIN) 125 MCG tablet Take 1 tablet (125 mcg) by mouth daily 30 tablet 1     fentaNYL (DURAGESIC) 25 mcg/hr patch 72 hr Place 1 patch onto the skin every 72 hours       furosemide (LASIX) 20 MG tablet Take 1 tablet (20 mg) by mouth daily 90 tablet 3     hydrocortisone 2.5 % cream        lisinopril (PRINIVIL/ZESTRIL) 5 MG tablet Take 1 tablet (5 mg) by mouth 2 times daily 60 tablet 1     Magnesium Oxide 420 MG TABS Take 3 tabs (1260 mg) three times a day. 810 tablet 3     metoprolol succinate ER (TOPROL-XL) 100 MG 24 hr tablet Take 1 tablet (100 mg) by mouth daily 90 tablet 3     mexiletine (MEXITIL) 150 MG capsule Take 1 capsule (150 mg) by mouth 2 times daily As close to 12 hours apart as possible 180 capsule 3     nitroGLYCERIN (NITROSTAT) 0.4 MG SL tablet Place 0.4 mg under the tongue every 5 minutes as needed Reported on 5/16/2017       omeprazole (PRILOSEC) 20 MG capsule Take 20 mg by mouth daily  90 capsule 3     ondansetron (ZOFRAN) 4 MG tablet        ondansetron (ZOFRAN-ODT) 4 MG ODT tab Take 4 mg by mouth every 6 hours as needed for nausea       oxycodone (ROXICODONE) 5 MG immediate release tablet Take 1-2 tablets by mouth every 6 hours as needed. For pain.         pomalidomide (POMALYST) 4 MG capsule Take 1 capsule (4 mg) by mouth daily Day 1 thru 21 of each 28 day cycle. Administer with or without food. Swallow whole with water. 21 capsule 3     potassium chloride ER (KLOR-CON M) 10 MEQ CR tablet Take 1 tablet (10 mEq) by mouth every other day 90 tablet 3     simvastatin (ZOCOR) 40 MG tablet Take 40 mg by mouth At Bedtime  30 tablet      spironolactone (ALDACTONE) 25 MG tablet Take 0.5 tablets (12.5 mg) by mouth daily 15 tablet 1     tiotropium (SPIRIVA) 18 MCG inhalation capsule Inhale 1 capsule (18 mcg) into the lungs daily 30 capsule 3     TRAZODONE HCL PO Take 50 mg by mouth nightly as needed        warfarin (COUMADIN) 5 MG tablet Take 7.5 mg by mouth See Admin Instructions Take 1.5 tab by mojth Monday and Friday and 1 tab Sun Tues WED , Thur and Sat       azithromycin (ZITHROMAX) 250 MG tablet  (Patient not taking: Reported on 8/2/2021)       magnesium oxide (MAG-OX) 400 (241.3 Mg) MG tablet Take 9 tablets daily in divided doses       potassium chloride ER (K-TAB/KLOR-CON) 10 MEQ CR tablet          PAST SURGICAL HISTORY:  Past Surgical History:   Procedure Laterality Date     CV CORONARY ANGIOGRAM N/A 8/29/2019    Procedure: CV CORONARY ANGIOGRAM;  Surgeon: Giorgi Milan MD;  Location:  HEART CARDIAC CATH LAB     CV LEFT HEART CATH N/A 8/29/2019    Procedure: Left Heart Cath;  Surgeon: Giorgi Milan MD;  Location:  HEART CARDIAC CATH LAB     CV RIGHT HEART CATH MEASUREMENTS RECORDED N/A 8/29/2019    Procedure: CV RIGHT HEART CATH;  Surgeon: Giorgi Milan MD;  Location:  HEART CARDIAC CATH LAB     CV RIGHT HEART EXERCISE STRESS STUDY N/A 8/29/2019    Procedure: Stress Drug Study;  Surgeon: Giorgi Milan MD;  Location:  HEART CARDIAC CATH LAB     IMPLANT AUTOMATIC IMPLANTABLE CARDIOVERTER DEFIBRILLATOR         ALLERGIES:     Allergies   Allergen Reactions     Nkda [No Known Drug Allergies]      Sotalol Other (See Comments)     Other  reaction(s): Low blood pressure, Prolonged QT interval       FAMILY HISTORY:  - Premature coronary artery disease      SOCIAL HISTORY:  Social History     Tobacco Use     Smoking status: Former Smoker     Packs/day: 1.00     Years: 25.00     Pack years: 25.00     Types: Cigarettes     Quit date: 10/15/1995     Years since quittin.8     Smokeless tobacco: Never Used   Substance Use Topics     Alcohol use: No     Drug use: No       ROS:   ROS: A comprehensive 10-point review of system is negative except for those reported in the HPI.    Exam:    In general, the patient is in no apparent distress.    There were no vitals taken for this visit.  Breathing is unlabored.   HEENT: NC/AT.  PERRLA.  EOMI.  Sclerae white, not injected.    Neck: No jugular venous distension.    Extremities: No edema.   Neurologic: Alert and oriented to person/place/time, normal speech and affect      Labs:  CBC RESULTS:   Lab Results   Component Value Date    WBC 6.4 2021    RBC 3.71 (L) 2021    HGB 12.7 (L) 2021    HCT 38.9 (L) 2021     (H) 2021    MCH 34.2 (H) 2021    MCHC 32.6 2021    RDW 13.2 2021     2021       BMP RESULTS:  Lab Results   Component Value Date     2021    POTASSIUM 4.6 2021    CHLORIDE 108 2021    CO2 27 2021    ANIONGAP 4 2021     (H) 2021    BUN 26 2021    CR 1.23 2021    GFRESTIMATED 59 (L) 2021    GFRESTBLACK 69 2021    EILEEN 8.9 2021        INR RESULTS:  Lab Results   Component Value Date    INR 2.27 (H) 2021    INR 2.19 (H) 2019    INR 1.60 (H) 2019    INR 1.80 (H) 2019       Cardiac testing :    EKG 2020 afib, slow ventricular response q waves on inferior leads       2021 device interrogation   No ICD shocks. : 11%. Ventricular Arrhythmia: 6 nonsustained episodes, lasting 5-7 seconds.      Stress echo 7.30.19     Submaximal stress test,  achieved 74% of the age predicted maximal heart rate.  Limiting symptom fatigue.   Normal blood pressure response to exercise.  No angina symptoms with exercise.  Baseline rhythm is atrial fibrillation. No ECG evidence of ischemia. There is no chronotropic incompetence.  Severely reduced LV function with EF of 28% at rest; with exercise left ventricular cavity size and LVEF did not change significantly.  Akinesis of the basal-mid inferior/inferoseptal/inferolateral segments present. No new stress induced regional wall motion abnormalities evident.  Less than average functional capacity for age. Exercise time ~2 minutes.     Biventricular dysfunction with moderate mitral and tricuspid regurgitation and evidence of hypervolemia noted on screening 2D and Doppler examination.        Aug 2019 RHC and coronary angiogram       Right sided filling pressures are severely elevated - mean RA 23, PCWP 31, mean PA 40     Moderately elevated pulmonary artery hypertension.    Left sided filling pressures are severely elevated.    Left ventricular filling pressures are severely elevated .    Reduced cardiac output level.    Heparin was held during the procedure due to thrombocytopenia    Nipride was started and titrate to 1mcg/kg/min. PCWP decreased from 30 mmHg to 24 mmHg. mPAP decreased from 40 mmHg to 30 mmHg. After pressures decreased team proceed with angiogram.    Mild LAD and LCx disease    Subtotal proximal RCA occlusion    No intervention performed    No mitral or aortic valve gradient     Assessment and Plan  Jonh is a 70 year old with -   1. Chronic Systolic Heart Failure (EF 25-30% in July 2019)  2. Permanent Atrial Fibrillation on warfarin  3. Ischemic heart disease - Subtotal proximal RCA occlusion and mild LAD and LCx disease August 2019  4. History of VT/VF, s/p ICD  5. Hypertension   6. Relapsed multiple myeloma with mets to brain and bone s/p BMT, currently on Pomalyst and dexa  7. T2DM  8. Hyperlipidemia       Jonh has noticed mild worsening of his dyspnea with the poor quality otherwise he is doing well.  He denies any angina or heart failure symptoms.   I have advised him to continue the neurohormonal blockade with metoprolol, lisinopril. His volume status is well managed with low-dose Lasix and spironolactone. I will continue low-dose digoxin which was started to optimize his heart failure regimen and check a level with his next blood draw.  For the arrhythmias he is on mexiletine and warfarin.  His lipids are well managed on simvastatin, I will reassess his lipid panel with next blood draw.     Recommendations  Continue current medical therapy  Digoxin level with next blood draw  Lipid panel with next blood draw  Remote ICD interrogation -per schedule  Follow-up visit with me in 6 months with an echocardiogram     Video Visit Details:    Type of service:  Video Visit    Start: 08/02/2021 11:45 am  Stop: 08/02/2021 11:54 am    Originating Location (pt. Location): Home    Distant Location (provider location):  [facility]     Platform used for Video Visit: [facility]     Yoko Prince MD, MS  Professor of Medicine  Cardiovascular division      CC  Patient Care Team:  Cong Mcdonald as PCP - General  [facility] MD as PCP - Internal Medicine  Rojas Raygoza MD as BMT Physician  Cong Mcdonald as Referring Physician  Yaz Jeong NP as Nurse Practitioner (Nurse Practitioner)  Eligio Mora as Family Medicine Physician  Merry Mas RN as Continuity Care Coordinator (Transplant)  Zayra Lyle RN as Specialty Care Coordinator (Cardiology)  Ja Duarte MD as MD (Clinical Cardiac Electrophysiology)  Omayra Serra, RN as Specialty Care Coordinator (Cardiology)  Yoko Prince MD as Assigned Heart and Vascular Provider  Dara Arana RN as Specialty Care Coordinator

## 2021-08-04 ENCOUNTER — TRANSFERRED RECORDS (OUTPATIENT)
Dept: HEALTH INFORMATION MANAGEMENT | Facility: CLINIC | Age: 71
End: 2021-08-04

## 2021-08-12 ENCOUNTER — ANCILLARY PROCEDURE (OUTPATIENT)
Dept: CARDIOLOGY | Facility: CLINIC | Age: 71
End: 2021-08-12
Attending: INTERNAL MEDICINE
Payer: MEDICARE

## 2021-08-12 DIAGNOSIS — I49.5 SINUS NODE DYSFUNCTION (H): ICD-10-CM

## 2021-08-12 DIAGNOSIS — Z95.810 AUTOMATIC IMPLANTABLE CARDIOVERTER-DEFIBRILLATOR IN SITU: ICD-10-CM

## 2021-08-12 DIAGNOSIS — Z95.810 AUTOMATIC IMPLANTABLE CARDIOVERTER-DEFIBRILLATOR IN SITU: Chronic | ICD-10-CM

## 2021-08-12 DIAGNOSIS — I48.91 ATRIAL FIBRILLATION (H): ICD-10-CM

## 2021-08-12 PROCEDURE — 93295 DEV INTERROG REMOTE 1/2/MLT: CPT | Performed by: INTERNAL MEDICINE

## 2021-08-12 PROCEDURE — 93296 REM INTERROG EVL PM/IDS: CPT

## 2021-08-13 DIAGNOSIS — C90.00 MULTIPLE MYELOMA, REMISSION STATUS UNSPECIFIED (H): Primary | ICD-10-CM

## 2021-08-13 DIAGNOSIS — Z51.81 ANTICOAGULATION GOAL OF INR 2 TO 3: ICD-10-CM

## 2021-08-13 DIAGNOSIS — Z79.01 ANTICOAGULATION GOAL OF INR 2 TO 3: ICD-10-CM

## 2021-08-16 ENCOUNTER — TELEPHONE (OUTPATIENT)
Dept: ONCOLOGY | Facility: CLINIC | Age: 71
End: 2021-08-16

## 2021-08-16 DIAGNOSIS — C90.02 MULTIPLE MYELOMA IN RELAPSE (H): Primary | ICD-10-CM

## 2021-08-16 NOTE — TELEPHONE ENCOUNTER
I have reviewed and agree to the [facility].   Cleo Cisneros, PharmD, BCACP  Oral Chemotherapy Monitoring Program  [facility]  [phone]

## 2021-08-16 NOTE — TELEPHONE ENCOUNTER
Oral Chemotherapy Monitoring Program    Medication: Pomalyst  Rx:  4mg PO daily 21 days/ 28 day cycle    Auth #: [id number]  Risk Category:Adult male    Routine survey questions reviewed. Yes with Wife, [facility]         Danitza Boykin   [facility] Liaison, [employer] [facility]  Phone: [phone]  Fax: [phone]  Email: [email or web address]

## 2021-08-17 ENCOUNTER — LAB (OUTPATIENT)
Dept: LAB | Facility: CLINIC | Age: 71
End: 2021-08-17
Payer: MEDICARE

## 2021-08-17 DIAGNOSIS — C90.02 MULTIPLE MYELOMA IN RELAPSE (H): Primary | ICD-10-CM

## 2021-08-17 DIAGNOSIS — C90.02 MULTIPLE MYELOMA IN RELAPSE (H): ICD-10-CM

## 2021-08-17 LAB
COLLECT DURATION TIME UR: 24 H
CREAT 24H UR-MRATE: 1.02 G/SPEC (ref 1–2)
CREAT UR-MCNC: 86 MG/DL
PROT 24H UR-MRATE: 0.47 G/SPEC (ref 0.04–0.23)
PROT UR-MCNC: 0.4 G/L
PROT/CREAT 24H UR: 0.47 G/G CR (ref 0–0.2)
SPECIMEN VOL UR: 1181 ML

## 2021-08-17 PROCEDURE — 84156 ASSAY OF PROTEIN URINE: CPT | Performed by: PATHOLOGY

## 2021-08-17 PROCEDURE — 86335 IMMUNFIX E-PHORSIS/URINE/CSF: CPT | Mod: 26 | Performed by: PATHOLOGY

## 2021-08-17 PROCEDURE — 81050 URINALYSIS VOLUME MEASURE: CPT | Performed by: PATHOLOGY

## 2021-08-17 PROCEDURE — 84166 PROTEIN E-PHORESIS/URINE/CSF: CPT | Performed by: STUDENT IN AN ORGANIZED HEALTH CARE EDUCATION/TRAINING PROGRAM

## 2021-08-18 ENCOUNTER — TRANSFERRED RECORDS (OUTPATIENT)
Dept: HEALTH INFORMATION MANAGEMENT | Facility: CLINIC | Age: 71
End: 2021-08-18

## 2021-08-18 LAB — PROT ELPH PNL UR ELPH: NORMAL

## 2021-08-24 ENCOUNTER — ONCOLOGY VISIT (OUTPATIENT)
Dept: TRANSPLANT | Facility: CLINIC | Age: 71
End: 2021-08-24
Attending: INTERNAL MEDICINE
Payer: MEDICARE

## 2021-08-24 VITALS
BODY MASS INDEX: 28.51 KG/M2 | SYSTOLIC BLOOD PRESSURE: 138 MMHG | WEIGHT: 198.7 LBS | OXYGEN SATURATION: 99 % | HEART RATE: 48 BPM | DIASTOLIC BLOOD PRESSURE: 52 MMHG | TEMPERATURE: 97 F

## 2021-08-24 DIAGNOSIS — Z51.81 ANTICOAGULATION GOAL OF INR 2 TO 3: ICD-10-CM

## 2021-08-24 DIAGNOSIS — C90.00 MULTIPLE MYELOMA, REMISSION STATUS UNSPECIFIED (H): ICD-10-CM

## 2021-08-24 DIAGNOSIS — Z79.01 ANTICOAGULATION GOAL OF INR 2 TO 3: ICD-10-CM

## 2021-08-24 LAB
ALBUMIN MFR UR ELPH: 23.7 %
ALBUMIN SERPL-MCNC: 3.2 G/DL (ref 3.4–5)
ALP SERPL-CCNC: 112 U/L (ref 40–150)
ALPHA1 GLOB MFR UR ELPH: 5.2 %
ALPHA2 GLOB MFR UR ELPH: 3.5 %
ALT SERPL W P-5'-P-CCNC: 23 U/L (ref 0–70)
ANION GAP SERPL CALCULATED.3IONS-SCNC: 4 MMOL/L (ref 3–14)
AST SERPL W P-5'-P-CCNC: 10 U/L (ref 0–45)
B-GLOBULIN MFR UR ELPH: 8 %
BASOPHILS # BLD MANUAL: 0.2 10E3/UL (ref 0–0.2)
BASOPHILS NFR BLD MANUAL: 4 %
BILIRUB SERPL-MCNC: 1 MG/DL (ref 0.2–1.3)
BUN SERPL-MCNC: 33 MG/DL (ref 7–30)
BURR CELLS BLD QL SMEAR: ABNORMAL
CALCIUM SERPL-MCNC: 8.9 MG/DL (ref 8.5–10.1)
CHLORIDE BLD-SCNC: 111 MMOL/L (ref 94–109)
CO2 SERPL-SCNC: 27 MMOL/L (ref 20–32)
CREAT SERPL-MCNC: 1.41 MG/DL (ref 0.66–1.25)
DACRYOCYTES BLD QL SMEAR: SLIGHT
EOSINOPHIL # BLD MANUAL: 0.6 10E3/UL (ref 0–0.7)
EOSINOPHIL NFR BLD MANUAL: 15 %
ERYTHROCYTE [DISTWIDTH] IN BLOOD BY AUTOMATED COUNT: 18.2 % (ref 10–15)
GAMMA GLOB MFR UR ELPH: 59.6 %
GFR SERPL CREATININE-BSD FRML MDRD: 50 ML/MIN/1.73M2
GLUCOSE BLD-MCNC: 120 MG/DL (ref 70–99)
HCT VFR BLD AUTO: 28.6 % (ref 40–53)
HGB BLD-MCNC: 9.3 G/DL (ref 13.3–17.7)
IGA SERPL-MCNC: 281 MG/DL (ref 84–499)
IGG SERPL-MCNC: 654 MG/DL (ref 610–1616)
IGM SERPL-MCNC: 33 MG/DL (ref 35–242)
INR PPP: 2.58 (ref 0.85–1.15)
LDH SERPL L TO P-CCNC: 148 U/L (ref 85–227)
LYMPHOCYTES # BLD MANUAL: 1.1 10E3/UL (ref 0.8–5.3)
LYMPHOCYTES NFR BLD MANUAL: 30 %
M PROTEIN MFR UR ELPH: 36.1 %
MCH RBC QN AUTO: 35 PG (ref 26.5–33)
MCHC RBC AUTO-ENTMCNC: 32.5 G/DL (ref 31.5–36.5)
MCV RBC AUTO: 108 FL (ref 78–100)
MONOCYTES # BLD MANUAL: 0.4 10E3/UL (ref 0–1.3)
MONOCYTES NFR BLD MANUAL: 10 %
NEUTROPHILS # BLD MANUAL: 1.6 10E3/UL (ref 1.6–8.3)
NEUTROPHILS NFR BLD MANUAL: 41 %
PLAT MORPH BLD: ABNORMAL
PLATELET # BLD AUTO: 105 10E3/UL (ref 150–450)
POTASSIUM BLD-SCNC: 5.3 MMOL/L (ref 3.4–5.3)
PROT PATTERN UR ELPH-IMP: ABNORMAL
PROT SERPL-MCNC: 6.7 G/DL (ref 6.8–8.8)
RBC # BLD AUTO: 2.66 10E6/UL (ref 4.4–5.9)
RBC MORPH BLD: ABNORMAL
SODIUM SERPL-SCNC: 142 MMOL/L (ref 133–144)
URATE SERPL-MCNC: 5.1 MG/DL (ref 3.5–7.2)
WBC # BLD AUTO: 3.8 10E3/UL (ref 4–11)

## 2021-08-24 PROCEDURE — 80053 COMPREHEN METABOLIC PANEL: CPT | Performed by: INTERNAL MEDICINE

## 2021-08-24 PROCEDURE — 82784 ASSAY IGA/IGD/IGG/IGM EACH: CPT | Performed by: INTERNAL MEDICINE

## 2021-08-24 PROCEDURE — 99214 OFFICE O/P EST MOD 30 MIN: CPT | Performed by: INTERNAL MEDICINE

## 2021-08-24 PROCEDURE — 85027 COMPLETE CBC AUTOMATED: CPT | Performed by: INTERNAL MEDICINE

## 2021-08-24 PROCEDURE — 36415 COLL VENOUS BLD VENIPUNCTURE: CPT | Performed by: INTERNAL MEDICINE

## 2021-08-24 PROCEDURE — G0463 HOSPITAL OUTPT CLINIC VISIT: HCPCS

## 2021-08-24 PROCEDURE — 83615 LACTATE (LD) (LDH) ENZYME: CPT | Performed by: INTERNAL MEDICINE

## 2021-08-24 PROCEDURE — 84550 ASSAY OF BLOOD/URIC ACID: CPT | Performed by: INTERNAL MEDICINE

## 2021-08-24 PROCEDURE — 85610 PROTHROMBIN TIME: CPT | Performed by: INTERNAL MEDICINE

## 2021-08-24 ASSESSMENT — PAIN SCALES - GENERAL: PAINLEVEL: NO PAIN (0)

## 2021-08-24 NOTE — NURSING NOTE
"Oncology Rooming Note    August 24, 2021 9:20 AM   Erasmo Pearce is a 71 year old male who presents for:    Chief Complaint   Patient presents with     Oncology Clinic Visit     Multiple myeloma      Initial Vitals: Wt 90.1 kg (198 lb 11.2 oz)   SpO2 99%   BMI 28.51 kg/m   Estimated body mass index is 28.51 kg/m  as calculated from the following:    Height as of 2/24/20: 1.778 m (5' 10\").    Weight as of this encounter: 90.1 kg (198 lb 11.2 oz). Body surface area is 2.11 meters squared.  No Pain (0) Comment: Data Unavailable   No LMP for male patient.  Allergies reviewed: Yes  Medications reviewed: Yes    Medications: Medication refills not needed today.  Pharmacy name entered into [employer]:    [facility] - [place], [place] - [address] [facility] - [place], [place] - [address] [facility]  [facility]  [place], [place] - [address] [place] [address]    Clinical concerns: New concerns: Patient has standing lab orders to be completed at another location-please ensure they are available. No new concerns.        Katie Flowers LPN August 24, 2021 9:20 AM            '    "

## 2021-08-24 NOTE — LETTER
8/24/2021         RE: Erasmo Pearce  [address]  [address]  [facility] [address]        Dear Colleague,    Thank you for referring your patient, Erasmo Pearce, to the [facility] BLOOD AND MARROW TRANSPLANT PROGRAM [place]. Please see a copy of my visit note below.    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 I increased him to Pomalidamide 4mg/d Days 1-21 with Dex in late July.He finished this cycle on Saturday Aug 21.He has tolerated these  3 cycles well  Some neuropathy no nausea. However memory issues seem a bit worse especially the day after dexHe received a Pfizer vaccine x2 without a serious reaction His wife also was vaccinated.No ICD activation  Remains on warfarin 5mg daily  PAST MEDICAL HISTORY, SOCIAL HISTORY AND FAMILY HISTORY:  See my note from 04/2021.      REVIEW OF SYSTEMS:  A 12 point review of systems done is negative as in HPI.      PHYSICAL EXAMINATION: /52 (BP Location: Left arm, Patient Position: Sitting, Cuff Size: Adult Regular)   Pulse (!) 48   Temp 97  F (36.1  C) (Oral)   Wt 90.1 kg (198 lb 11.2 oz)   SpO2 99%   BMI 28.51 kg/m      GENERAL:  He is an alert gentleman in no acute distress.   VITAL SIGNS:  Stable.   HEENT:  Normocephalic.  EOM okay.  Mouth without lesion.   RESPIRATORY:  Clear to percussion and auscultation.   CARDIAC:  Irregularly irregular rhythm.  No S3, S4 or murmur.   ABDOMEN:  Soft without hepatosplenomegaly.   EXTREMITIES:  Trace edema.   SKIN 7mm oblong brownish pink macule left subscapular area     Recent Data from  [facility]  Related to IMMUNOGLOBULIN FREE LIGHT CHAINS  Component 08/18/21 07/21/21 06/21/21 05/26/21 04/28/21 03/03/21    [place] Free Light Chain 3.11High  2.76High  7.88High  2.75High  2.16High  2.85High    Lambda Free Light Chain 55.2High  50.8High  63.4High  60.9High  41.7High  36.7High    Kappa/Lambda FLC Ratio 0.0563Low   0.0543Low   0.1243Low   0.0452Low   0.0518Low   0.0777     WBC Count 6.6 6.9 7.3 6.7 6.6 11.4   RBC Count 2.98Low  2.88Low  3.26Low  3.18Low  3.15Low  3.24Low    Hemoglobin 10.4Low  10.0Low  11.0Low  10.7Low  10.7Low  10.9Low    Hematocrit 30.9Low  29.3Low  32.8Low  31.9Low  31.3Low  32.1Low    .7High  101.6High  100.6 100.3 99.4 99.2   MCH 34.7High  34.7High  33.8High  33.5High  33.8High  33.7High    MCHC 33.5 34.2 33.6 33.4 34.1 34.0   RDW 12.8 11.8 11.4 10.6 10.8 10.7   Platelets 138Low  289 179 272 266 166Low            1.  Multiple myeloma. In relapse  2.  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   3.  Cardiomyopathy with CHF.   4.  Atrial fibrillation.   5.  History of deep vein thrombosis, on warfarin.   6.  Chronic back pain.   7.  Aortic aneurysm.   8.  History of plasmacytoma of the clivus, status post radiation.   9.  History of ventricular tachycardia with implantable defibrillator in place.   10.  Hypomagnesemia.     11  Hyperkalemia   12. Skin lesion     All in all Jonh looks very good.  .  However he has continued to gradually increase his serum free light chains his lambda chains are now up to 55.Platelets are down a bit.Need to watch closely with INR prolonged due to warfarin. May need to adjust Pomalidamide.  His Karnofsky is near 100.    I suggest we continue with pomalidomide 4 mg days 1 through 21 office 7 days of a 28-day cycle and dexamethasone 20 mg on days 1 8 and 15 for 2 more cycles.  Will monitor his counts and his serum free light chains as well as his symptoms including neuropathy.  He already  is on anticoagulation and is on acyclovir 400 mg twice a day as infection prophylaxis.  [facility]  I will work with Merry Mas to get monthly labs including a CBC CMP SPEP and serum free light chains monthly at [facility].  I will see him again in 2 months May consider adding daratumumab in 2 months  Need to get a COVID booster along with his wife    RTC in 2 months    I spent a total of 30  minutes face to face with Erasmo Pearce during today's office visit. Over 50% of this time was spent counseling the patient and coordinating the care regarding multiple myeloma and 10minutes preparing to see the patient and  care coordination     Rojas Raygoza MD  [phone]            Again, thank you for allowing me to participate in the care of your patient.        Sincerely,        Rojas Raygoza MD

## 2021-08-24 NOTE — PROGRESS NOTES
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 I increased him to Pomalidamide 4mg/d Days 1-21 with Dex in late July.He finished this cycle on Saturday Aug 21.He has tolerated these  3 cycles well  Some neuropathy no nausea. However memory issues seem a bit worse especially the day after dexHe received a Pfizer vaccine x2 without a serious reaction His wife also was vaccinated.No ICD activation  Remains on warfarin 5mg daily  PAST MEDICAL HISTORY, SOCIAL HISTORY AND FAMILY HISTORY:  See my note from 04/2021.      REVIEW OF SYSTEMS:  A 12 point review of systems done is negative as in HPI.      PHYSICAL EXAMINATION: /52 (BP Location: Left arm, Patient Position: Sitting, Cuff Size: Adult Regular)   Pulse (!) 48   Temp 97  F (36.1  C) (Oral)   Wt 90.1 kg (198 lb 11.2 oz)   SpO2 99%   BMI 28.51 kg/m      GENERAL:  He is an alert gentleman in no acute distress.   VITAL SIGNS:  Stable.   HEENT:  Normocephalic.  EOM okay.  Mouth without lesion.   RESPIRATORY:  Clear to percussion and auscultation.   CARDIAC:  Irregularly irregular rhythm.  No S3, S4 or murmur.   ABDOMEN:  Soft without hepatosplenomegaly.   EXTREMITIES:  Trace edema.   SKIN 7mm oblong brownish pink macule left subscapular area     Recent Data from [facility]  Related to IMMUNOGLOBULIN FREE LIGHT CHAINS  Component 08/18/21 07/21/21 06/21/21 05/26/21 04/28/21 03/03/21    [place] Free Light Chain 3.11High  2.76High  7.88High  2.75High  2.16High  2.85High    Lambda Free Light Chain 55.2High  50.8High  63.4High  60.9High  41.7High   36.7High    Kappa/Lambda FLC Ratio 0.0563Low   0.0543Low   0.1243Low   0.0452Low   0.0518Low   0.0777     WBC Count 6.6 6.9 7.3 6.7 6.6 11.4   RBC Count 2.98Low  2.88Low  3.26Low  3.18Low  3.15Low  3.24Low    Hemoglobin 10.4Low  10.0Low  11.0Low  10.7Low  10.7Low  10.9Low    Hematocrit 30.9Low  29.3Low  32.8Low  31.9Low  31.3Low  32.1Low    .7High  101.6High  100.6 100.3 99.4 99.2   MCH 34.7High  34.7High  33.8High  33.5High  33.8High  33.7High    MCHC 33.5 34.2 33.6 33.4 34.1 34.0   RDW 12.8 11.8 11.4 10.6 10.8 10.7   Platelets 138Low  289 179 272 266 166Low            1.  Multiple myeloma. In relapse  2.  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   3.  Cardiomyopathy with CHF.   4.  Atrial fibrillation.   5.  History of deep vein thrombosis, on warfarin.   6.  Chronic back pain.   7.  Aortic aneurysm.   8.  History of plasmacytoma of the clivus, status post radiation.   9.  History of ventricular tachycardia with implantable defibrillator in place.   10.  Hypomagnesemia.     11  Hyperkalemia   12. Skin lesion     All in all Jonh looks very good.  .  However he has continued to gradually increase his serum free light chains his lambda chains are now up to 55.Platelets are down a bit.Need to watch closely with INR prolonged due to warfarin. May need to adjust Pomalidamide.  His Karnofsky is near 100.    I suggest we continue with pomalidomide 4 mg days 1 through 21 office 7 days of a 28-day cycle and dexamethasone 20 mg on days 1 8 and 15 for 2 more cycles.  Will monitor his counts and his serum free light chains as well as his symptoms including neuropathy.  He already is on anticoagulation and is on acyclovir 400 mg twice a day as infection prophylaxis.  [facility]  I will work with Merry Mas to get monthly labs including a CBC CMP SPEP and serum free light chains monthly at [facility].  I will see him again in 2 months May  consider adding daratumumab in 2 months  Need to get a COVID booster along with his wife    RTC in 2 months    I spent a total of 30  minutes face to face with Erasmo Pearce during today's office visit. Over 50% of this time was spent counseling the patient and coordinating the care regarding multiple myeloma and 10minutes preparing to see the patient and  care coordination     Rojas Raygoza MD  [phone]

## 2021-08-29 ENCOUNTER — DOCUMENTATION ONLY (OUTPATIENT)
Dept: ONCOLOGY | Facility: CLINIC | Age: 71
End: 2021-08-29

## 2021-09-01 ENCOUNTER — TRANSFERRED RECORDS (OUTPATIENT)
Dept: HEALTH INFORMATION MANAGEMENT | Facility: CLINIC | Age: 71
End: 2021-09-01

## 2021-09-11 ENCOUNTER — HEALTH MAINTENANCE LETTER (OUTPATIENT)
Age: 71
End: 2021-09-11

## 2021-09-13 ENCOUNTER — TELEPHONE (OUTPATIENT)
Dept: ONCOLOGY | Facility: CLINIC | Age: 71
End: 2021-09-13

## 2021-09-13 NOTE — TELEPHONE ENCOUNTER
"Prescription verified by Zayra Medley, WeiD.    Confirmed directions \"take one capsule (4 mg) by mouth daily, days 1-21 of each 28 day cycle. Swallow whole with water, administer with or without food\"  "

## 2021-09-15 ENCOUNTER — TRANSFERRED RECORDS (OUTPATIENT)
Dept: HEALTH INFORMATION MANAGEMENT | Facility: CLINIC | Age: 71
End: 2021-09-15

## 2021-09-15 ENCOUNTER — TELEPHONE (OUTPATIENT)
Dept: ONCOLOGY | Facility: CLINIC | Age: 71
End: 2021-09-15

## 2021-09-15 NOTE — TELEPHONE ENCOUNTER
Oral Chemotherapy Monitoring Program    Medication: Pomalyst  Rx:  4mg PO daily 21/28 ds    Auth #: [id number]  Risk Category: Adult male    Routine survey questions reviewed. yes      Danitza Boykin   [facility] Liaison, alpha split P-Z  Phone: [phone]  Fax: [phone]  Email: [email or web address]

## 2021-09-17 ENCOUNTER — TELEPHONE (OUTPATIENT)
Dept: CARDIOLOGY | Facility: CLINIC | Age: 71
End: 2021-09-17

## 2021-09-17 DIAGNOSIS — I25.5 ISCHEMIC CARDIOMYOPATHY: Primary | ICD-10-CM

## 2021-09-17 DIAGNOSIS — E78.5 HYPERLIPIDEMIA: ICD-10-CM

## 2021-09-17 DIAGNOSIS — I50.22 CHRONIC SYSTOLIC HEART FAILURE (H): ICD-10-CM

## 2021-09-17 LAB
MDC_IDC_EPISODE_DTM: NORMAL
MDC_IDC_EPISODE_DURATION: 11 S
MDC_IDC_EPISODE_DURATION: 11 S
MDC_IDC_EPISODE_DURATION: 12 S
MDC_IDC_EPISODE_DURATION: 12 S
MDC_IDC_EPISODE_DURATION: 13 S
MDC_IDC_EPISODE_DURATION: 16 S
MDC_IDC_EPISODE_DURATION: 17 S
MDC_IDC_EPISODE_DURATION: 5 S
MDC_IDC_EPISODE_DURATION: 7 S
MDC_IDC_EPISODE_DURATION: 9 S
MDC_IDC_EPISODE_ID: NORMAL
MDC_IDC_EPISODE_TYPE: NORMAL
MDC_IDC_LEAD_IMPLANT_DT: NORMAL
MDC_IDC_LEAD_LOCATION: NORMAL
MDC_IDC_LEAD_LOCATION_DETAIL_1: NORMAL
MDC_IDC_LEAD_MFG: NORMAL
MDC_IDC_LEAD_MODEL: NORMAL
MDC_IDC_LEAD_POLARITY_TYPE: NORMAL
MDC_IDC_LEAD_SERIAL: NORMAL
MDC_IDC_MSMT_BATTERY_DTM: NORMAL
MDC_IDC_MSMT_BATTERY_REMAINING_LONGEVITY: 12 MO
MDC_IDC_MSMT_BATTERY_REMAINING_PERCENTAGE: 14 %
MDC_IDC_MSMT_BATTERY_STATUS: NORMAL
MDC_IDC_MSMT_CAP_CHARGE_DTM: NORMAL
MDC_IDC_MSMT_CAP_CHARGE_DTM: NORMAL
MDC_IDC_MSMT_CAP_CHARGE_ENERGY: 41 J
MDC_IDC_MSMT_CAP_CHARGE_TIME: 11 S
MDC_IDC_MSMT_CAP_CHARGE_TIME: 9.7 S
MDC_IDC_MSMT_CAP_CHARGE_TYPE: NORMAL
MDC_IDC_MSMT_CAP_CHARGE_TYPE: NORMAL
MDC_IDC_MSMT_LEADCHNL_RV_IMPEDANCE_VALUE: 331 OHM
MDC_IDC_MSMT_LEADCHNL_RV_PACING_THRESHOLD_AMPLITUDE: 1.1 V
MDC_IDC_MSMT_LEADCHNL_RV_PACING_THRESHOLD_PULSEWIDTH: 0.5 MS
MDC_IDC_PG_IMPLANT_DTM: NORMAL
MDC_IDC_PG_MFG: NORMAL
MDC_IDC_PG_MODEL: NORMAL
MDC_IDC_PG_SERIAL: NORMAL
MDC_IDC_PG_TYPE: NORMAL
MDC_IDC_SESS_CLINIC_NAME: NORMAL
MDC_IDC_SESS_DTM: NORMAL
MDC_IDC_SESS_TYPE: NORMAL
MDC_IDC_SET_BRADY_LOWRATE: 40 {BEATS}/MIN
MDC_IDC_SET_BRADY_MODE: NORMAL
MDC_IDC_SET_LEADCHNL_RV_PACING_AMPLITUDE: 2.4 V
MDC_IDC_SET_LEADCHNL_RV_PACING_POLARITY: NORMAL
MDC_IDC_SET_LEADCHNL_RV_PACING_PULSEWIDTH: 0.5 MS
MDC_IDC_SET_LEADCHNL_RV_SENSING_ADAPTATION_MODE: NORMAL
MDC_IDC_SET_LEADCHNL_RV_SENSING_POLARITY: NORMAL
MDC_IDC_SET_LEADCHNL_RV_SENSING_SENSITIVITY: 0.6 MV
MDC_IDC_SET_ZONE_DETECTION_INTERVAL: 286 MS
MDC_IDC_SET_ZONE_DETECTION_INTERVAL: 375 MS
MDC_IDC_SET_ZONE_TYPE: NORMAL
MDC_IDC_SET_ZONE_TYPE: NORMAL
MDC_IDC_SET_ZONE_VENDOR_TYPE: NORMAL
MDC_IDC_SET_ZONE_VENDOR_TYPE: NORMAL
MDC_IDC_STAT_BRADY_DTM_END: NORMAL
MDC_IDC_STAT_BRADY_DTM_START: NORMAL
MDC_IDC_STAT_BRADY_RV_PERCENT_PACED: 12 %
MDC_IDC_STAT_EPISODE_RECENT_COUNT: 0
MDC_IDC_STAT_EPISODE_RECENT_COUNT: 0
MDC_IDC_STAT_EPISODE_RECENT_COUNT: 1
MDC_IDC_STAT_EPISODE_RECENT_COUNT: 401
MDC_IDC_STAT_EPISODE_RECENT_COUNT: 8
MDC_IDC_STAT_EPISODE_RECENT_COUNT_DTM_END: NORMAL
MDC_IDC_STAT_EPISODE_RECENT_COUNT_DTM_START: NORMAL
MDC_IDC_STAT_EPISODE_TYPE: NORMAL
MDC_IDC_STAT_EPISODE_VENDOR_TYPE: NORMAL
MDC_IDC_STAT_TACHYTHERAPY_ATP_DELIVERED_RECENT: 1
MDC_IDC_STAT_TACHYTHERAPY_ATP_DELIVERED_TOTAL: 25
MDC_IDC_STAT_TACHYTHERAPY_RECENT_DTM_END: NORMAL
MDC_IDC_STAT_TACHYTHERAPY_RECENT_DTM_START: NORMAL
MDC_IDC_STAT_TACHYTHERAPY_SHOCKS_ABORTED_RECENT: 1
MDC_IDC_STAT_TACHYTHERAPY_SHOCKS_ABORTED_TOTAL: 24
MDC_IDC_STAT_TACHYTHERAPY_SHOCKS_DELIVERED_RECENT: 0
MDC_IDC_STAT_TACHYTHERAPY_SHOCKS_DELIVERED_TOTAL: 24
MDC_IDC_STAT_TACHYTHERAPY_TOTAL_DTM_END: NORMAL
MDC_IDC_STAT_TACHYTHERAPY_TOTAL_DTM_START: NORMAL

## 2021-09-17 NOTE — TELEPHONE ENCOUNTER
Called to check in with pt. Orders from visit with Dr. Prince on 8/2/21 were cancelled. Pt states he does not know why these were cancelled. Dr. Prince requesting pt have lipids and digoxin level drawn. Pt states he has labs checked every 2 weeks. I will fax these orders to [facility] in [place].     We also scheduled pt's follow up with Dr. Prince for Monday February 7 at 10:00 AM. Pt still needs to be scheduled for echo.     Al Phillips RN   Cardiology Nurse Coordinator

## 2021-09-29 ENCOUNTER — TRANSFERRED RECORDS (OUTPATIENT)
Dept: HEALTH INFORMATION MANAGEMENT | Facility: CLINIC | Age: 71
End: 2021-09-29

## 2021-09-29 ENCOUNTER — CARE COORDINATION (OUTPATIENT)
Dept: TRANSPLANT | Facility: CLINIC | Age: 71
End: 2021-09-29

## 2021-09-29 DIAGNOSIS — C90.00 MULTIPLE MYELOMA, REMISSION STATUS UNSPECIFIED (H): Primary | ICD-10-CM

## 2021-09-29 NOTE — PROGRESS NOTES
"Patient has labs drawn at [facility] every two weeks to monitor while on Pomalidomide. Labs drawn on 9/29/21 show a WBC of 5.6, Hgb 8.5, and plts 213K. These results were reviewed by Dr. Raygoza who would like the patient to have a repeat CBC and Differential drawn on 10/5 at [facility] to recheck the hgb in particular which is trending downward.This information was conveyed to the patient and his wife who verbalized agreement to and understanding of this plan. Patient denies any signs or symptoms of bleeding, but does report SOB with mild exertion such as climbing stairs, feels light headed at times, and generally feels more \"wiped out\" since starting the medication and particularly since increasing the dose.  Patient understands to call if symptoms exacerbate.      Jonh started Pomalidomide 2 mg days 1-21 and Dex 20mg weekly in May 2021 and completed two cycles at that dosing. Then in August started his third cycle of Pomalidomide with an increased dose of 4 mg. On September 26th he started his fifth cycle of Pomalidomide, and that being the third cycle at the dose of 4mg.  "

## 2021-10-07 ENCOUNTER — TELEPHONE (OUTPATIENT)
Dept: ONCOLOGY | Facility: CLINIC | Age: 71
End: 2021-10-07

## 2021-10-07 VITALS — HEIGHT: 70 IN | BODY MASS INDEX: 28.51 KG/M2

## 2021-10-07 DIAGNOSIS — Z95.810 AUTOMATIC IMPLANTABLE CARDIOVERTER-DEFIBRILLATOR IN SITU: ICD-10-CM

## 2021-10-07 DIAGNOSIS — I25.5 ISCHEMIC CARDIOMYOPATHY: ICD-10-CM

## 2021-10-07 DIAGNOSIS — C90.02 MULTIPLE MYELOMA IN RELAPSE (H): Primary | ICD-10-CM

## 2021-10-07 NOTE — TELEPHONE ENCOUNTER
Jonh is fatigue and anemic Hgb 8.8  Light chains show lambda 72 kappa 1.8  I called Jonh he is now on Pomalidamide 4mg/day Days 1-21 and now is at midcycle. I suggest we change regimens when he sees me on Nov 2 to Darzalex, Pomalidamide and kyprolis. Will place chemo orders. I will get myeloma labs on Oct 27 and an echo.

## 2021-10-09 DIAGNOSIS — C90.02 MULTIPLE MYELOMA IN RELAPSE (H): Primary | ICD-10-CM

## 2021-10-09 RX ORDER — HEPARIN SODIUM,PORCINE 10 UNIT/ML
5 VIAL (ML) INTRAVENOUS
Status: CANCELLED | OUTPATIENT
Start: 2021-11-24

## 2021-10-09 RX ORDER — LORAZEPAM 2 MG/ML
0.5 INJECTION INTRAMUSCULAR EVERY 4 HOURS PRN
Status: CANCELLED | OUTPATIENT
Start: 2021-11-03

## 2021-10-09 RX ORDER — MEPERIDINE HYDROCHLORIDE 25 MG/ML
25 INJECTION INTRAMUSCULAR; INTRAVENOUS; SUBCUTANEOUS EVERY 30 MIN PRN
Status: CANCELLED | OUTPATIENT
Start: 2021-11-03

## 2021-10-09 RX ORDER — EPINEPHRINE 1 MG/ML
0.3 INJECTION, SOLUTION INTRAMUSCULAR; SUBCUTANEOUS EVERY 5 MIN PRN
Status: CANCELLED | OUTPATIENT
Start: 2021-11-03

## 2021-10-09 RX ORDER — EPINEPHRINE 1 MG/ML
0.3 INJECTION, SOLUTION INTRAMUSCULAR; SUBCUTANEOUS EVERY 5 MIN PRN
Status: CANCELLED | OUTPATIENT
Start: 2021-11-10

## 2021-10-09 RX ORDER — NALOXONE HYDROCHLORIDE 0.4 MG/ML
0.2 INJECTION, SOLUTION INTRAMUSCULAR; INTRAVENOUS; SUBCUTANEOUS
Status: CANCELLED | OUTPATIENT
Start: 2021-11-17

## 2021-10-09 RX ORDER — ACETAMINOPHEN 325 MG/1
650 TABLET ORAL ONCE
Status: CANCELLED | OUTPATIENT
Start: 2021-11-03

## 2021-10-09 RX ORDER — ALBUTEROL SULFATE 0.83 MG/ML
2.5 SOLUTION RESPIRATORY (INHALATION)
Status: CANCELLED | OUTPATIENT
Start: 2021-11-17

## 2021-10-09 RX ORDER — METHYLPREDNISOLONE SODIUM SUCCINATE 125 MG/2ML
125 INJECTION, POWDER, LYOPHILIZED, FOR SOLUTION INTRAMUSCULAR; INTRAVENOUS
Status: CANCELLED
Start: 2021-11-17

## 2021-10-09 RX ORDER — HEPARIN SODIUM (PORCINE) LOCK FLUSH IV SOLN 100 UNIT/ML 100 UNIT/ML
5 SOLUTION INTRAVENOUS
Status: CANCELLED | OUTPATIENT
Start: 2021-11-24

## 2021-10-09 RX ORDER — LORAZEPAM 2 MG/ML
0.5 INJECTION INTRAMUSCULAR EVERY 4 HOURS PRN
Status: CANCELLED | OUTPATIENT
Start: 2021-11-17

## 2021-10-09 RX ORDER — METHYLPREDNISOLONE SODIUM SUCCINATE 125 MG/2ML
125 INJECTION, POWDER, LYOPHILIZED, FOR SOLUTION INTRAMUSCULAR; INTRAVENOUS
Status: CANCELLED
Start: 2021-11-24

## 2021-10-09 RX ORDER — ALBUTEROL SULFATE 90 UG/1
1-2 AEROSOL, METERED RESPIRATORY (INHALATION)
Status: CANCELLED
Start: 2021-11-03

## 2021-10-09 RX ORDER — HEPARIN SODIUM,PORCINE 10 UNIT/ML
5 VIAL (ML) INTRAVENOUS
Status: CANCELLED | OUTPATIENT
Start: 2021-11-17

## 2021-10-09 RX ORDER — DIPHENHYDRAMINE HYDROCHLORIDE 50 MG/ML
50 INJECTION INTRAMUSCULAR; INTRAVENOUS
Status: CANCELLED
Start: 2021-11-03

## 2021-10-09 RX ORDER — NALOXONE HYDROCHLORIDE 0.4 MG/ML
0.2 INJECTION, SOLUTION INTRAMUSCULAR; INTRAVENOUS; SUBCUTANEOUS
Status: CANCELLED | OUTPATIENT
Start: 2021-11-10

## 2021-10-09 RX ORDER — ALBUTEROL SULFATE 90 UG/1
1-2 AEROSOL, METERED RESPIRATORY (INHALATION)
Status: CANCELLED
Start: 2021-11-17

## 2021-10-09 RX ORDER — EPINEPHRINE 1 MG/ML
0.3 INJECTION, SOLUTION INTRAMUSCULAR; SUBCUTANEOUS EVERY 5 MIN PRN
Status: CANCELLED | OUTPATIENT
Start: 2021-11-24

## 2021-10-09 RX ORDER — LORAZEPAM 2 MG/ML
0.5 INJECTION INTRAMUSCULAR EVERY 4 HOURS PRN
Status: CANCELLED | OUTPATIENT
Start: 2021-11-10

## 2021-10-09 RX ORDER — ACETAMINOPHEN 325 MG/1
650 TABLET ORAL ONCE
Status: CANCELLED | OUTPATIENT
Start: 2021-11-24

## 2021-10-09 RX ORDER — ALBUTEROL SULFATE 0.83 MG/ML
2.5 SOLUTION RESPIRATORY (INHALATION)
Status: CANCELLED | OUTPATIENT
Start: 2021-11-10

## 2021-10-09 RX ORDER — METHYLPREDNISOLONE SODIUM SUCCINATE 125 MG/2ML
125 INJECTION, POWDER, LYOPHILIZED, FOR SOLUTION INTRAMUSCULAR; INTRAVENOUS
Status: CANCELLED
Start: 2021-11-10

## 2021-10-09 RX ORDER — EPINEPHRINE 1 MG/ML
0.3 INJECTION, SOLUTION INTRAMUSCULAR; SUBCUTANEOUS EVERY 5 MIN PRN
Status: CANCELLED | OUTPATIENT
Start: 2021-11-17

## 2021-10-09 RX ORDER — HEPARIN SODIUM (PORCINE) LOCK FLUSH IV SOLN 100 UNIT/ML 100 UNIT/ML
5 SOLUTION INTRAVENOUS
Status: CANCELLED | OUTPATIENT
Start: 2021-11-17

## 2021-10-09 RX ORDER — DIPHENHYDRAMINE HCL 25 MG
50 CAPSULE ORAL ONCE
Status: CANCELLED | OUTPATIENT
Start: 2021-11-24

## 2021-10-09 RX ORDER — ALBUTEROL SULFATE 0.83 MG/ML
2.5 SOLUTION RESPIRATORY (INHALATION)
Status: CANCELLED | OUTPATIENT
Start: 2021-11-03

## 2021-10-09 RX ORDER — MEPERIDINE HYDROCHLORIDE 25 MG/ML
25 INJECTION INTRAMUSCULAR; INTRAVENOUS; SUBCUTANEOUS EVERY 30 MIN PRN
Status: CANCELLED | OUTPATIENT
Start: 2021-11-10

## 2021-10-09 RX ORDER — DIPHENHYDRAMINE HCL 25 MG
50 CAPSULE ORAL ONCE
Status: CANCELLED | OUTPATIENT
Start: 2021-11-03

## 2021-10-09 RX ORDER — MONTELUKAST SODIUM 10 MG/1
10 TABLET ORAL ONCE
Status: CANCELLED | OUTPATIENT
Start: 2021-11-17

## 2021-10-09 RX ORDER — MONTELUKAST SODIUM 10 MG/1
10 TABLET ORAL ONCE
Status: CANCELLED | OUTPATIENT
Start: 2021-11-10

## 2021-10-09 RX ORDER — DIPHENHYDRAMINE HYDROCHLORIDE 50 MG/ML
50 INJECTION INTRAMUSCULAR; INTRAVENOUS
Status: CANCELLED
Start: 2021-11-24

## 2021-10-09 RX ORDER — ALBUTEROL SULFATE 90 UG/1
1-2 AEROSOL, METERED RESPIRATORY (INHALATION)
Status: CANCELLED
Start: 2021-11-10

## 2021-10-09 RX ORDER — NALOXONE HYDROCHLORIDE 0.4 MG/ML
0.2 INJECTION, SOLUTION INTRAMUSCULAR; INTRAVENOUS; SUBCUTANEOUS
Status: CANCELLED | OUTPATIENT
Start: 2021-11-03

## 2021-10-09 RX ORDER — DIPHENHYDRAMINE HYDROCHLORIDE 50 MG/ML
50 INJECTION INTRAMUSCULAR; INTRAVENOUS
Status: CANCELLED
Start: 2021-11-10

## 2021-10-09 RX ORDER — LORAZEPAM 2 MG/ML
0.5 INJECTION INTRAMUSCULAR EVERY 4 HOURS PRN
Status: CANCELLED | OUTPATIENT
Start: 2021-11-24

## 2021-10-09 RX ORDER — ACETAMINOPHEN 325 MG/1
650 TABLET ORAL ONCE
Status: CANCELLED | OUTPATIENT
Start: 2021-11-10

## 2021-10-09 RX ORDER — DIPHENHYDRAMINE HCL 25 MG
50 CAPSULE ORAL ONCE
Status: CANCELLED | OUTPATIENT
Start: 2021-11-17

## 2021-10-09 RX ORDER — MEPERIDINE HYDROCHLORIDE 25 MG/ML
25 INJECTION INTRAMUSCULAR; INTRAVENOUS; SUBCUTANEOUS EVERY 30 MIN PRN
Status: CANCELLED | OUTPATIENT
Start: 2021-11-17

## 2021-10-09 RX ORDER — HEPARIN SODIUM (PORCINE) LOCK FLUSH IV SOLN 100 UNIT/ML 100 UNIT/ML
5 SOLUTION INTRAVENOUS
Status: CANCELLED | OUTPATIENT
Start: 2021-11-03

## 2021-10-09 RX ORDER — DIPHENHYDRAMINE HCL 25 MG
50 CAPSULE ORAL ONCE
Status: CANCELLED | OUTPATIENT
Start: 2021-11-10

## 2021-10-09 RX ORDER — DIPHENHYDRAMINE HYDROCHLORIDE 50 MG/ML
50 INJECTION INTRAMUSCULAR; INTRAVENOUS
Status: CANCELLED
Start: 2021-11-17

## 2021-10-09 RX ORDER — HEPARIN SODIUM,PORCINE 10 UNIT/ML
5 VIAL (ML) INTRAVENOUS
Status: CANCELLED | OUTPATIENT
Start: 2021-11-03

## 2021-10-09 RX ORDER — ALBUTEROL SULFATE 90 UG/1
1-2 AEROSOL, METERED RESPIRATORY (INHALATION)
Status: CANCELLED
Start: 2021-11-24

## 2021-10-09 RX ORDER — ACETAMINOPHEN 325 MG/1
650 TABLET ORAL ONCE
Status: CANCELLED | OUTPATIENT
Start: 2021-11-17

## 2021-10-09 RX ORDER — ALBUTEROL SULFATE 0.83 MG/ML
2.5 SOLUTION RESPIRATORY (INHALATION)
Status: CANCELLED | OUTPATIENT
Start: 2021-11-24

## 2021-10-09 RX ORDER — HEPARIN SODIUM (PORCINE) LOCK FLUSH IV SOLN 100 UNIT/ML 100 UNIT/ML
5 SOLUTION INTRAVENOUS
Status: CANCELLED | OUTPATIENT
Start: 2021-11-10

## 2021-10-09 RX ORDER — MONTELUKAST SODIUM 10 MG/1
10 TABLET ORAL ONCE
Status: CANCELLED | OUTPATIENT
Start: 2021-11-03

## 2021-10-09 RX ORDER — HEPARIN SODIUM,PORCINE 10 UNIT/ML
5 VIAL (ML) INTRAVENOUS
Status: CANCELLED | OUTPATIENT
Start: 2021-11-10

## 2021-10-09 RX ORDER — METHYLPREDNISOLONE SODIUM SUCCINATE 125 MG/2ML
125 INJECTION, POWDER, LYOPHILIZED, FOR SOLUTION INTRAMUSCULAR; INTRAVENOUS
Status: CANCELLED
Start: 2021-11-03

## 2021-10-09 RX ORDER — NALOXONE HYDROCHLORIDE 0.4 MG/ML
0.2 INJECTION, SOLUTION INTRAMUSCULAR; INTRAVENOUS; SUBCUTANEOUS
Status: CANCELLED | OUTPATIENT
Start: 2021-11-24

## 2021-10-09 RX ORDER — MEPERIDINE HYDROCHLORIDE 25 MG/ML
25 INJECTION INTRAMUSCULAR; INTRAVENOUS; SUBCUTANEOUS EVERY 30 MIN PRN
Status: CANCELLED | OUTPATIENT
Start: 2021-11-24

## 2021-10-11 ENCOUNTER — TELEPHONE (OUTPATIENT)
Dept: ONCOLOGY | Facility: CLINIC | Age: 71
End: 2021-10-11

## 2021-10-11 DIAGNOSIS — C90.02 MULTIPLE MYELOMA IN RELAPSE (H): Primary | ICD-10-CM

## 2021-10-12 ENCOUNTER — TELEPHONE (OUTPATIENT)
Dept: ONCOLOGY | Facility: CLINIC | Age: 71
End: 2021-10-12

## 2021-10-12 LAB
ABO/RH(D): NORMAL
ANTIBODY SCREEN: NEGATIVE
SPECIMEN EXPIRATION DATE: NORMAL

## 2021-10-12 NOTE — TELEPHONE ENCOUNTER
Oral Chemotherapy Monitoring Program    Medication: Pomalyst  Rx:  4mg PO daily  14/28    Auth #: [id number]  Risk Category: Adult male    Routine survey questions reviewed. yes      Danitza Boykin   [facility] Liaison, alpha split P-Z  Phone: [phone]  Fax: [phone]  Email: [email or web address]

## 2021-10-12 NOTE — TELEPHONE ENCOUNTER
ADDENDUM: I have reviewed this prescription for accuracy. After clarification with Dr. Raygoza, the pomalyst RX was updated to 4mg daily for days 1-14 of a 28 day cycle.     Mal Frye, WeiD, MS  Hematology/Oncology Clinical Pharmacist  [facility]  [phone] (oral chemotherapy)  [phone] (infusion)

## 2021-10-13 ENCOUNTER — TRANSFERRED RECORDS (OUTPATIENT)
Dept: HEALTH INFORMATION MANAGEMENT | Facility: CLINIC | Age: 71
End: 2021-10-13

## 2021-10-15 ENCOUNTER — TRANSFERRED RECORDS (OUTPATIENT)
Dept: HEALTH INFORMATION MANAGEMENT | Facility: CLINIC | Age: 71
End: 2021-10-15
Payer: MEDICARE

## 2021-10-21 NOTE — MR AVS SNAPSHOT
After Visit Summary   7/31/2018    Erasmo Pearce    MRN: [id number]           Patient Information     Date Of Birth          1950        Visit Information        Provider Department      7/31/2018 11:00 AM Rojas Raygoza MD [facility] Blood and Marrow Transplant        Today's Diagnoses     Multiple myeloma in relapse (H)    -  1          [facility] ([facility]  [address] [place] [address]  Phone: [phone]  Clinic Hours:   Monday-Thursday:7am to 7pm   Friday: 7am to 5pm   Weekends and holidays:    8am to noon (in general)  If your fever is 100.5  or greater,   call the clinic.  After hours call the   hospital at [phone] or   [phone]. Ask for the BMT   fellow on-call            Follow-ups after your visit        Your next 10 appointments already scheduled     Oct 02, 2018 10:30 AM CDT   Return with Rojas Raygoza MD   [facility] Blood and Marrow Transplant ([place] [facility])    [address] [place] [address]  [address]  [facility] [id number] [phone]            Nov 13, 2018  8:30 AM CST   (Arrive by 8:15 AM)   Implanted Defibulator with Uc Cv Device 1   [facility] ([place])    [address] [place] [address]  [address] [place] [id number] [phone]            Nov 13, 2018  9:00 AM CST   (Arrive by 8:45 AM)   RETURN ARRHYTHMIA with Chuy Jean-Baptiste MD   [facility] ([place])    [address] [place] [address]  [address] [place] [id number] [phone]              Future tests that were ordered for you today     Open Future Orders        Priority Expected Expires Ordered    Vitamin B12 Routine 10/3/2018 7/31/2019 7/31/2018    CBC with platelets differential Routine 10/3/2018 11/30/2018 7/31/2018    Comprehensive metabolic panel Routine 10/3/2018 11/30/2018 7/31/2018    [place] and lambda light chain Routine 10/3/2018 11/30/2018 7/31/2018    Protein  oriented to person, place and time , normal sensation , short and long term memory intact electrophoresis Routine 10/3/2018 11/30/2018 7/31/2018    Magnesium Routine 10/3/2018 11/30/2018 7/31/2018    CRP inflammation Routine 10/3/2018 11/30/2018 7/31/2018    Protein electrophoresis timed urine Routine 10/3/2018 11/30/2018 7/31/2018    Protein immunofixation urine Routine 10/3/2018 11/30/2018 7/31/2018    Protein timed urine with Creat Ratio Routine 10/3/2018 11/30/2018 7/31/2018            Who to contact     If you have questions or need follow up information about today's clinic visit or your schedule please contact [facility] BLOOD AND MARROW TRANSPLANT directly at [phone].  Normal or non-critical lab and imaging results will be communicated to you by [employer], letter or phone within 4 business days after the clinic has received the results. If you do not hear from us within 7 days, please contact the clinic through Back&t or phone. If you have a critical or abnormal lab result, we will notify you by phone as soon as possible.  Submit refill requests through [email or web address] or call your pharmacy and they will forward the refill request to us. Please allow 3 business days for your refill to be completed.          Additional Information About Your Visit        [email or web address] Information     [email or web address] gives you secure access to your electronic health record. If you see a primary care provider, you can also send messages to your care team and make appointments. If you have questions, please call your primary care clinic.  If you do not have a primary care provider, please call [phone] and they will assist you.        Care EveryWhere ID     This is your Care EveryWhere ID. This could be used by other organizations to access your [place] medical records  [id number]        Your Vitals Were     Pulse Temperature Respirations Pulse Oximetry BMI (Body Mass Index)       92 97  F (36.1  C) (Oral) 17 98% 28.28 kg/m2        Blood Pressure from Last 3 Encounters:   07/31/18 124/64   07/24/18 115/69   05/15/18 110/72     Weight from Last 3 Encounters:   07/31/18 89.4 kg (197 lb 1.6 oz)   07/24/18 90.4 kg (199 lb 4.8 oz)   05/15/18 91.9 kg (202 lb 11.2 oz)               Recent Review Flowsheet Data     BMT Recent Results Latest Ref Rng & Units 6/13/2017 7/6/2017 9/11/2017 11/17/2017 1/16/2018 4/10/2018 7/24/2018    WBC 4.0 - 11.0 10e9/L 6.4 7.7 8.2 8.5 10.5 8.2 5.6    Hemoglobin 13.3 - 17.7 g/dL 9.9(L) 10.8(L) 13.1(L) 13.6 12.6(L) 10.8(L) 9.5(L)    Platelet Count 150 - 450 10e9/L 217 165 173 187 184 165 92(L)    Neutrophils (Absolute) 1.6 - 8.3 10e9/L 4.0 5.8 5.7 6.1 7.5 4.8 3.3    INR 0.86 - 1.14 - - - - 2.25(H) 2.99(H) 4.11(H)    Sodium 133 - 144 mmol/L 141 138 136 139 138 140 140    Potassium 3.4 - 5.3 mmol/L 3.9 4.1 4.4 4.4 4.3 3.7 3.8    Chloride 94 - 109 mmol/L 105 104 103 104 104 108 105    Glucose 70 - 99 mg/dL 142(H) 151(H) 112(H) 118(H) 89 83 72    Urea Nitrogen 7 - 30 mg/dL 19 22 21 31(H) 21 31(H) 30    Creatinine 0.66 - 1.25 mg/dL 1.02 1.23 1.12 1.12 1.04 0.88 1.03    Calcium (Total) 8.5 - 10.1 mg/dL 8.7 9.0 9.1 9.6 8.6 9.1 8.5    Protein (Total) 6.8 - 8.8 g/dL 6.7(L) 7.2 7.3 7.2 7.3 6.6(L) 6.7(L)    Albumin 3.4 - 5.0 g/dL 3.3(L) 4.0 3.7 4.1 3.6 3.5 3.3(L)    Alkaline Phosphatase 40 - 150 U/L 77 83 83 87 88 70 70    AST 0 - 45 U/L 14 17 18 21 18 15 13    ALT 0 - 70 U/L 17 19 21 24 18 19 25    MCV 78 - 100 fl 106(H) 108(H) 101(H) 104(H) 105(H) 104(H) 112(H)               Primary Care Provider Office Phone # Fax #    Cong Mcdonald [phone]       [place] [address]        Equal Access to Services     CALI DAVILA : Jese Truong, devi carr, naty ungeralamelie gonzalez, nino sierra. [facility] [phone].    ATENCIÓN: Si habla español, tiene a miles disposición servicios gratuitos de asistencia lingüística. Llame al [phone].    We comply with applicable federal civil rights laws and [place] laws. We do not discriminate on  the basis of race, color, national origin, age, disability, sex, sexual orientation, or gender identity.            Thank you!     Thank you for choosing [facility] BLOOD AND MARROW TRANSPLANT  for your care. Our goal is always to provide you with excellent care. Hearing back from our patients is one way we can continue to improve our services. Please take a few minutes to complete the written survey that you may receive in the mail after your visit with us. Thank you!             Your Updated Medication List - Protect others around you: Learn how to safely use, store and throw away your medicines at www.disposemymeds.org.          This list is accurate as of 7/31/18 11:35 AM.  Always use your most recent med list.                   Brand Name Dispense Instructions for use Diagnosis    albuterol 108 (90 Base) MCG/ACT Inhaler    PROAIR HFA/PROVENTIL HFA/VENTOLIN HFA     Inhale 2 puffs into the lungs every 6 hours as needed        amoxicillin 500 MG tablet    AMOXIL    4 tablet    Take 4 tabs one hour before dental appointment    Multiple myeloma, without mention of having achieved remission       baclofen 10 MG tablet    LIORESAL     Take 10 mg by mouth 3 times daily as needed prn        CALCIUM 500 + D PO      Take 2 tablets by mouth daily.        dexamethasone 4 MG tablet    DECADRON    30 tablet    Take 10 tabs (40mg) Days 1, 8, and 15 of 28 day cycle.    Multiple myeloma not having achieved remission (H), Anticoagulation goal of INR 2 to 3       diclofenac 75 MG EC tablet    VOLTAREN     Take 75 mg by mouth 2 times daily as needed 1 tab prn        fentaNYL 25 mcg/hr 72 hr patch    DURAGESIC     Place 1 patch onto the skin every 72 hours        furosemide 20 MG tablet    LASIX    30 tablet    Take 20 mg by mouth daily    Status post chemotherapy, Multiple myeloma, without mention of having achieved remission       LENalidomide 10 MG Caps capsule CHEMO    REVLIMID    21 each    Take 10 mg by mouth daily on days 1-21  of 28 day cycle.    Multiple myeloma not having achieved remission (H), Anticoagulation goal of INR 2 to 3       lisinopril 5 MG tablet    PRINIVIL/ZESTRIL     Take 5 mg by mouth daily    Multiple myeloma, without mention of having achieved remission, Hypomagnesemia, Ventricular fibrillation (H), Anemia of other chronic disease, Renal insufficiency, Dilated cardiomyopathy (H), Automatic implantable cardiac defibrillator in situ, Personal history of diseases of blood and blood-forming organs, History of long-term use of multiple prescription drugs       Magnesium Oxide 420 MG Tabs     180 tablet    Take 1,260 mg by mouth 2 times daily    Hypomagnesemia       metoprolol succinate 200 MG 24 hr tablet    TOPROL-XL    90 tablet    Take 1 tablet (200 mg) by mouth daily    Atrial fibrillation, unspecified type (H), Paroxysmal ventricular tachycardia (H), Permanent atrial fibrillation (H)       nitroGLYcerin 0.4 MG sublingual tablet    NITROSTAT     Place 0.4 mg under the tongue every 5 minutes as needed Reported on 5/16/2017        omeprazole 20 MG CR capsule    priLOSEC    90 capsule    Take 20 mg by mouth daily    GERD (gastroesophageal reflux disease)       oxyCODONE IR 5 MG tablet    ROXICODONE     Take 1-2 tablets by mouth every 6 hours as needed. For pain.        simvastatin 40 MG tablet    ZOCOR    30 tablet    Take 40 mg by mouth At Bedtime        tiotropium 18 MCG capsule    SPIRIVA    30 capsule    Inhale 1 capsule (18 mcg) into the lungs daily    SOB (shortness of breath)       TRAZODONE HCL PO      Take 50 mg by mouth nightly as needed        warfarin 5 MG tablet    COUMADIN     Take 7.5 mg by mouth See Admin Instructions Take 1.5 tab by mojth Monday and Friday and 1 tab Sun Tues WED , Thur and Sat

## 2021-10-26 ENCOUNTER — ANCILLARY PROCEDURE (OUTPATIENT)
Dept: CARDIOLOGY | Facility: CLINIC | Age: 71
End: 2021-10-26
Attending: INTERNAL MEDICINE
Payer: MEDICARE

## 2021-10-26 ENCOUNTER — LAB (OUTPATIENT)
Dept: LAB | Facility: CLINIC | Age: 71
End: 2021-10-26
Attending: INTERNAL MEDICINE
Payer: MEDICARE

## 2021-10-26 VITALS — SYSTOLIC BLOOD PRESSURE: 118 MMHG | DIASTOLIC BLOOD PRESSURE: 58 MMHG

## 2021-10-26 DIAGNOSIS — C90.02 MULTIPLE MYELOMA IN RELAPSE (H): ICD-10-CM

## 2021-10-26 DIAGNOSIS — I25.5 ISCHEMIC CARDIOMYOPATHY: ICD-10-CM

## 2021-10-26 DIAGNOSIS — Z95.810 AUTOMATIC IMPLANTABLE CARDIOVERTER-DEFIBRILLATOR IN SITU: ICD-10-CM

## 2021-10-26 DIAGNOSIS — Z79.01 ANTICOAGULATION GOAL OF INR 2 TO 3: ICD-10-CM

## 2021-10-26 DIAGNOSIS — Z51.81 ANTICOAGULATION GOAL OF INR 2 TO 3: ICD-10-CM

## 2021-10-26 DIAGNOSIS — C90.00 MULTIPLE MYELOMA, REMISSION STATUS UNSPECIFIED (H): ICD-10-CM

## 2021-10-26 LAB
ABO/RH(D): NORMAL
ALBUMIN SERPL-MCNC: 3.1 G/DL (ref 3.4–5)
ALP SERPL-CCNC: 118 U/L (ref 40–150)
ALT SERPL W P-5'-P-CCNC: 20 U/L (ref 0–70)
ANION GAP SERPL CALCULATED.3IONS-SCNC: 5 MMOL/L (ref 3–14)
ANTIBODY SCREEN: NEGATIVE
AST SERPL W P-5'-P-CCNC: 14 U/L (ref 0–45)
BASOPHILS # BLD AUTO: 0.1 10E3/UL (ref 0–0.2)
BASOPHILS NFR BLD AUTO: 2 %
BILIRUB SERPL-MCNC: 0.6 MG/DL (ref 0.2–1.3)
BUN SERPL-MCNC: 18 MG/DL (ref 7–30)
CALCIUM SERPL-MCNC: 8.6 MG/DL (ref 8.5–10.1)
CHLORIDE BLD-SCNC: 108 MMOL/L (ref 94–109)
CO2 SERPL-SCNC: 27 MMOL/L (ref 20–32)
COLLECT DURATION TIME UR: 24 H
CREAT 24H UR-MRATE: 0.99 G/SPEC (ref 1–2)
CREAT SERPL-MCNC: 1.13 MG/DL (ref 0.66–1.25)
CREAT UR-MCNC: 74 MG/DL
EOSINOPHIL # BLD AUTO: 0.3 10E3/UL (ref 0–0.7)
EOSINOPHIL NFR BLD AUTO: 6 %
ERYTHROCYTE [DISTWIDTH] IN BLOOD BY AUTOMATED COUNT: 17.1 % (ref 10–15)
GFR SERPL CREATININE-BSD FRML MDRD: 65 ML/MIN/1.73M2
GLUCOSE BLD-MCNC: 104 MG/DL (ref 70–99)
HCT VFR BLD AUTO: 24.5 % (ref 40–53)
HGB BLD-MCNC: 7.6 G/DL (ref 13.3–17.7)
IGA SERPL-MCNC: 382 MG/DL (ref 84–499)
IGG SERPL-MCNC: 597 MG/DL (ref 610–1616)
IGM SERPL-MCNC: 25 MG/DL (ref 35–242)
IMM GRANULOCYTES # BLD: 0 10E3/UL
IMM GRANULOCYTES NFR BLD: 0 %
INR PPP: 1.91 (ref 0.85–1.15)
KAPPA LC FREE SER-MCNC: 3 MG/DL (ref 0.33–1.94)
KAPPA LC FREE/LAMBDA FREE SER NEPH: 0.03 {RATIO} (ref 0.26–1.65)
LAMBDA LC FREE SERPL-MCNC: 102.51 MG/DL (ref 0.57–2.63)
LDH SERPL L TO P-CCNC: 158 U/L (ref 85–227)
LVEF ECHO: NORMAL
LYMPHOCYTES # BLD AUTO: 1.3 10E3/UL (ref 0.8–5.3)
LYMPHOCYTES NFR BLD AUTO: 26 %
MAGNESIUM SERPL-MCNC: 1.6 MG/DL (ref 1.6–2.3)
MCH RBC QN AUTO: 36.5 PG (ref 26.5–33)
MCHC RBC AUTO-ENTMCNC: 31 G/DL (ref 31.5–36.5)
MCV RBC AUTO: 118 FL (ref 78–100)
MONOCYTES # BLD AUTO: 0.9 10E3/UL (ref 0–1.3)
MONOCYTES NFR BLD AUTO: 18 %
NEUTROPHILS # BLD AUTO: 2.5 10E3/UL (ref 1.6–8.3)
NEUTROPHILS NFR BLD AUTO: 48 %
NRBC # BLD AUTO: 0 10E3/UL
NRBC BLD AUTO-RTO: 0 /100
PLATELET # BLD AUTO: 192 10E3/UL (ref 150–450)
POTASSIUM BLD-SCNC: 4.2 MMOL/L (ref 3.4–5.3)
PROT 24H UR-MRATE: 0.45 G/SPEC (ref 0.04–0.23)
PROT SERPL-MCNC: 6.4 G/DL (ref 6.8–8.8)
PROT UR-MCNC: 0.34 G/L
PROT/CREAT 24H UR: 0.46 G/G CR (ref 0–0.2)
RBC # BLD AUTO: 2.08 10E6/UL (ref 4.4–5.9)
SODIUM SERPL-SCNC: 140 MMOL/L (ref 133–144)
SPECIMEN EXPIRATION DATE: NORMAL
SPECIMEN VOL UR: 1332 ML
TOTAL PROTEIN SERUM FOR ELP: 6.2 G/DL (ref 6.8–8.8)
URATE SERPL-MCNC: 5.2 MG/DL (ref 3.5–7.2)
WBC # BLD AUTO: 5.1 10E3/UL (ref 4–11)

## 2021-10-26 PROCEDURE — 83883 ASSAY NEPHELOMETRY NOT SPEC: CPT

## 2021-10-26 PROCEDURE — 0001U RBC DNA HEA 35 AG 11 BLD GRP: CPT

## 2021-10-26 PROCEDURE — 82784 ASSAY IGA/IGD/IGG/IGM EACH: CPT

## 2021-10-26 PROCEDURE — 84156 ASSAY OF PROTEIN URINE: CPT | Performed by: PATHOLOGY

## 2021-10-26 PROCEDURE — 93306 TTE W/DOPPLER COMPLETE: CPT | Performed by: INTERNAL MEDICINE

## 2021-10-26 PROCEDURE — 84155 ASSAY OF PROTEIN SERUM: CPT

## 2021-10-26 PROCEDURE — 84166 PROTEIN E-PHORESIS/URINE/CSF: CPT | Mod: 26 | Performed by: PATHOLOGY

## 2021-10-26 PROCEDURE — 86335 IMMUNFIX E-PHORSIS/URINE/CSF: CPT | Mod: TC | Performed by: INTERNAL MEDICINE

## 2021-10-26 PROCEDURE — 81050 URINALYSIS VOLUME MEASURE: CPT | Performed by: PATHOLOGY

## 2021-10-26 PROCEDURE — 85025 COMPLETE CBC W/AUTO DIFF WBC: CPT

## 2021-10-26 PROCEDURE — 84550 ASSAY OF BLOOD/URIC ACID: CPT

## 2021-10-26 PROCEDURE — 86905 BLOOD TYPING RBC ANTIGENS: CPT

## 2021-10-26 PROCEDURE — 86870 RBC ANTIBODY IDENTIFICATION: CPT

## 2021-10-26 PROCEDURE — 83735 ASSAY OF MAGNESIUM: CPT

## 2021-10-26 PROCEDURE — 36415 COLL VENOUS BLD VENIPUNCTURE: CPT

## 2021-10-26 PROCEDURE — 84165 PROTEIN E-PHORESIS SERUM: CPT | Mod: TC | Performed by: PATHOLOGY

## 2021-10-26 PROCEDURE — 86335 IMMUNFIX E-PHORSIS/URINE/CSF: CPT | Mod: 26 | Performed by: PATHOLOGY

## 2021-10-26 PROCEDURE — 80053 COMPREHEN METABOLIC PANEL: CPT

## 2021-10-26 PROCEDURE — 86900 BLOOD TYPING SEROLOGIC ABO: CPT

## 2021-10-26 PROCEDURE — 83615 LACTATE (LD) (LDH) ENZYME: CPT

## 2021-10-26 PROCEDURE — 85610 PROTHROMBIN TIME: CPT

## 2021-10-26 RX ADMIN — Medication 5 ML: at 10:48

## 2021-10-27 ENCOUNTER — TELEPHONE (OUTPATIENT)
Dept: PHARMACY | Facility: CLINIC | Age: 71
End: 2021-10-27

## 2021-10-27 ENCOUNTER — TELEPHONE (OUTPATIENT)
Dept: ONCOLOGY | Facility: CLINIC | Age: 71
End: 2021-10-27

## 2021-10-27 ENCOUNTER — TELEPHONE (OUTPATIENT)
Dept: CARDIOLOGY | Facility: CLINIC | Age: 71
End: 2021-10-27

## 2021-10-27 LAB
ALBUMIN MFR UR ELPH: 19.2 %
ALBUMIN SERPL ELPH-MCNC: 3.4 G/DL (ref 3.7–5.1)
ALPHA1 GLOB MFR UR ELPH: 7.8 %
ALPHA1 GLOB SERPL ELPH-MCNC: 0.4 G/DL (ref 0.2–0.4)
ALPHA2 GLOB MFR UR ELPH: 3.6 %
ALPHA2 GLOB SERPL ELPH-MCNC: 0.8 G/DL (ref 0.5–0.9)
B-GLOBULIN MFR UR ELPH: 4.4 %
B-GLOBULIN SERPL ELPH-MCNC: 0.8 G/DL (ref 0.6–1)
GAMMA GLOB MFR UR ELPH: 65 %
GAMMA GLOB SERPL ELPH-MCNC: 0.7 G/DL (ref 0.7–1.6)
M PROTEIN MFR UR ELPH: 49.9 %
M PROTEIN SERPL ELPH-MCNC: 0.1 G/DL
PROT ELPH PNL UR ELPH: NORMAL
PROT PATTERN SERPL ELPH-IMP: ABNORMAL
PROT PATTERN UR ELPH-IMP: ABNORMAL

## 2021-10-27 PROCEDURE — 84165 PROTEIN E-PHORESIS SERUM: CPT | Mod: 26 | Performed by: PATHOLOGY

## 2021-10-27 NOTE — ORAL ONC MGMT
Oral Chemotherapy Monitoring Program     Placed call to patient in follow up of Pomalyst therapy.     Left message to please call back in follow-up of therapy. No patient or drug names were mentioned.     Favio CarvajalD  [facility]  [phone]  October 27, 2021

## 2021-10-27 NOTE — ORAL ONC MGMT
Oral Chemotherapy Monitoring Program  Lab Follow Up    Reviewed lab results from 10/26/21.    ORAL CHEMOTHERAPY 7/20/2021 7/21/2021 8/16/2021 8/29/2021 10/12/2021 10/27/2021 10/27/2021   Assessment Type Refill Lab Monitoring;Monthly Follow up Refill Chart Review Refill Left Voicemail Lab Monitoring;Monthly Follow up   Diagnosis Code Multiple Myeloma Multiple Myeloma Multiple Myeloma Multiple Myeloma Multiple Myeloma Multiple Myeloma Multiple Myeloma   Providers Dr. Jaret Raygoza   [facility]   Drug Name Pomalyst (pomalidomide) Pomalyst (pomalidomide) Pomalyst (pomalidomide) Pomalyst (pomalidomide) Pomalyst (pomalidomide) Pomalyst (pomalidomide) Pomalyst (pomalidomide)   Dose 2 mg 2 mg 4 mg 4 mg 4 mg - 4 mg   Current Schedule Daily Daily Daily Daily Daily - Daily   Cycle Details 3 weeks on, 1 week off 3 weeks on, 1 week off 3 weeks on, 1 week off 3 weeks on, 1 week off 2 weeks on, 2 weeks off - 2 weeks on, 2 weeks off   Start Date of Last Cycle - 7/4/2021 - - - - -   Planned next cycle start date - 8/1/2021 - - - - -   Doses missed in last 2 weeks - 0 - - - - -   Adherence Assessment - Adherent - - - - -   Adverse Effects - - - - - - No AE identified during assessment   Myalgias/Arthralgias - - - - - - -   Pharmacist Intervention(myalgias/arthralgias) - - - - - - -   Fatigue - Grade 1 - - - - -   Pharmacist Intervention(fatigue) - No - - - - -   Pharmacist intervention(other) - - - - - - -   Home BPs - - - - - - -   Any new drug interactions? - - - - - - No   Pharmacist Intervention? - - - - - - -   Intervention(s) - - - - - - -   Is the dose as ordered appropriate for the patient? - - - - - - Yes   Has the patient been assessed within the past 6 months for depression? - - - - - - -   Has the patient missed any days of school, work, or other routine activity?  - - - - - - -       Labs:  _  Result Component Current Result Ref Range   Sodium 140 (10/26/2021) 133 - 144 mmol/L     _  Result Component Current Result Ref Range   Potassium 4.2 (10/26/2021) 3.4 - 5.3 mmol/L     _  Result Component Current Result Ref Range   Calcium 8.6 (10/26/2021) 8.5 - 10.1 mg/dL     _  Result Component Current Result Ref Range   Magnesium 1.6 (10/26/2021) 1.6 - 2.3 mg/dL     No results found for Phos within last 30 days.     _  Result Component Current Result Ref Range   Albumin 3.1 (L) (10/26/2021) 3.4 - 5.0 g/dL     _  Result Component Current Result Ref Range   Urea Nitrogen 18 (10/26/2021) 7 - 30 mg/dL     _  Result Component Current Result Ref Range   Creatinine 1.13 (10/26/2021) 0.66 - 1.25 mg/dL     _  Result Component Current Result Ref Range   AST 14 (10/26/2021) 0 - 45 U/L     _  Result Component Current Result Ref Range   ALT 20 (10/26/2021) 0 - 70 U/L     _  Result Component Current Result Ref Range   Bilirubin Total 0.6 (10/26/2021) 0.2 - 1.3 mg/dL     _  Result Component Current Result Ref Range   WBC Count 5.1 (10/26/2021) 4.0 - 11.0 10e3/uL     _  Result Component Current Result Ref Range   Hemoglobin 7.6 (L) (10/26/2021) 13.3 - 17.7 g/dL     _  Result Component Current Result Ref Range   Platelet Count 192 (10/26/2021) 150 - 450 10e3/uL     No results found for ANC within last 30 days.       Assessment & Plan:  No concerning abnormalities. Jonh is meeting with Dr. Raygoza on 11/2 to discuss next steps in treatment plan. He is changing regimens so we briefly discussed the new medications per his request.     We also discussed how he is doing on pomalyst. He denies any new/worsening side effects, missed doses, or med changes. He is ready to start the new regimen with infusion drugs but knows it will be a busy time. He knows our phone number in case he has any medication needs moving forward.     Questions answered to patient's satisfaction.    Follow-Up:  11/2: review appt  with Dr. Jaret Frye, PharmD, MS  Hematology/Oncology Clinical Pharmacist  [facility]  [phone] (oral chemotherapy)  [phone] (infusion)

## 2021-10-29 LAB
C AG RBC QL: NORMAL
E AG RBC QL: NORMAL
FY SUP(A) AG RBC QL: NEGATIVE
FY SUP(B) AG RBC QL: POSITIVE
JK SUP(A) AG RBC QL: NORMAL
JK SUP(B) AG RBC QL: POSITIVE
K AG RBC QL: NEGATIVE
LITTLE C AG RBC QL: NEGATIVE
LITTLE E AG RBC QL: POSITIVE
LITTLE S AG RBC QL: NORMAL
M AG RBC QL: NORMAL
N AG RBC QL: NEGATIVE
S AG RBC QL: NORMAL
SPECIMEN EXPIRATION DATE: NORMAL

## 2021-11-02 ENCOUNTER — INFUSION THERAPY VISIT (OUTPATIENT)
Dept: TRANSPLANT | Facility: CLINIC | Age: 71
End: 2021-11-02
Attending: INTERNAL MEDICINE
Payer: MEDICARE

## 2021-11-02 ENCOUNTER — APPOINTMENT (OUTPATIENT)
Dept: LAB | Facility: CLINIC | Age: 71
End: 2021-11-02
Attending: INTERNAL MEDICINE
Payer: MEDICARE

## 2021-11-02 ENCOUNTER — TELEPHONE (OUTPATIENT)
Dept: ONCOLOGY | Facility: CLINIC | Age: 71
End: 2021-11-02

## 2021-11-02 VITALS
OXYGEN SATURATION: 98 % | TEMPERATURE: 97.5 F | SYSTOLIC BLOOD PRESSURE: 135 MMHG | HEART RATE: 71 BPM | BODY MASS INDEX: 28.93 KG/M2 | DIASTOLIC BLOOD PRESSURE: 75 MMHG | HEIGHT: 69 IN | RESPIRATION RATE: 18 BRPM

## 2021-11-02 VITALS
OXYGEN SATURATION: 97 % | WEIGHT: 198.7 LBS | DIASTOLIC BLOOD PRESSURE: 61 MMHG | HEART RATE: 52 BPM | SYSTOLIC BLOOD PRESSURE: 121 MMHG | TEMPERATURE: 97.9 F | RESPIRATION RATE: 16 BRPM | BODY MASS INDEX: 28.51 KG/M2

## 2021-11-02 DIAGNOSIS — C90.02 MULTIPLE MYELOMA IN RELAPSE (H): Primary | ICD-10-CM

## 2021-11-02 PROCEDURE — 250N000009 HC RX 250: Performed by: INTERNAL MEDICINE

## 2021-11-02 PROCEDURE — 96415 CHEMO IV INFUSION ADDL HR: CPT

## 2021-11-02 PROCEDURE — G0463 HOSPITAL OUTPT CLINIC VISIT: HCPCS | Mod: 25

## 2021-11-02 PROCEDURE — 258N000003 HC RX IP 258 OP 636: Performed by: INTERNAL MEDICINE

## 2021-11-02 PROCEDURE — 250N000011 HC RX IP 250 OP 636: Performed by: INTERNAL MEDICINE

## 2021-11-02 PROCEDURE — 86900 BLOOD TYPING SEROLOGIC ABO: CPT | Performed by: INTERNAL MEDICINE

## 2021-11-02 PROCEDURE — 96411 CHEMO IV PUSH ADDL DRUG: CPT

## 2021-11-02 PROCEDURE — 96375 TX/PRO/DX INJ NEW DRUG ADDON: CPT

## 2021-11-02 PROCEDURE — 96413 CHEMO IV INFUSION 1 HR: CPT

## 2021-11-02 PROCEDURE — 99215 OFFICE O/P EST HI 40 MIN: CPT | Performed by: INTERNAL MEDICINE

## 2021-11-02 PROCEDURE — 250N000013 HC RX MED GY IP 250 OP 250 PS 637: Performed by: INTERNAL MEDICINE

## 2021-11-02 RX ORDER — MONTELUKAST SODIUM 10 MG/1
10 TABLET ORAL ONCE
Status: COMPLETED | OUTPATIENT
Start: 2021-11-02 | End: 2021-11-02

## 2021-11-02 RX ORDER — ACETAMINOPHEN 325 MG/1
650 TABLET ORAL ONCE
Status: COMPLETED | OUTPATIENT
Start: 2021-11-02 | End: 2021-11-02

## 2021-11-02 RX ORDER — DIPHENHYDRAMINE HCL 25 MG
50 CAPSULE ORAL ONCE
Status: COMPLETED | OUTPATIENT
Start: 2021-11-02 | End: 2021-11-02

## 2021-11-02 RX ORDER — LORAZEPAM 2 MG/ML
0.5 INJECTION INTRAMUSCULAR EVERY 4 HOURS PRN
Status: DISCONTINUED | OUTPATIENT
Start: 2021-11-02 | End: 2021-11-02 | Stop reason: HOSPADM

## 2021-11-02 RX ADMIN — FAMOTIDINE 20 MG: 10 INJECTION INTRAVENOUS at 11:12

## 2021-11-02 RX ADMIN — CARFILZOMIB 20 MG: 10 INJECTION, POWDER, LYOPHILIZED, FOR SOLUTION INTRAVENOUS at 08:46

## 2021-11-02 RX ADMIN — DIPHENHYDRAMINE HYDROCHLORIDE 50 MG: 25 CAPSULE ORAL at 08:08

## 2021-11-02 RX ADMIN — DEXAMETHASONE SODIUM PHOSPHATE 20 MG: 10 INJECTION, SOLUTION INTRAMUSCULAR; INTRAVENOUS at 08:08

## 2021-11-02 RX ADMIN — MONTELUKAST SODIUM 10 MG: 10 TABLET, COATED ORAL at 08:08

## 2021-11-02 RX ADMIN — ACETAMINOPHEN 650 MG: 325 TABLET ORAL at 08:08

## 2021-11-02 RX ADMIN — LORAZEPAM 0.5 MG: 2 INJECTION INTRAMUSCULAR; INTRAVENOUS at 11:12

## 2021-11-02 RX ADMIN — DARATUMUMAB 1400 MG: 100 INJECTION, SOLUTION, CONCENTRATE INTRAVENOUS at 09:00

## 2021-11-02 RX ADMIN — DEXTROSE MONOHYDRATE 500 ML: 50 INJECTION, SOLUTION INTRAVENOUS at 07:35

## 2021-11-02 ASSESSMENT — PAIN SCALES - GENERAL: PAINLEVEL: NO PAIN (0)

## 2021-11-02 NOTE — LETTER
11/2/2021         RE: Erasmo Pearce  [address]  [address]  [place] [address]        Dear Colleague,    Thank you for referring your patient, Erasmo Pearce, to the [facility] [place]. Please see a copy of my visit note below.    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 I increased him to Pomalidamide 4mg/d Days 1-21 with Dex in late July.for 2 cycle He finished this cycle  October 17.He has tolerated these  cycles well  ut had significant anemia last week without bleeding to Hgb 7.6.He received 2 unit(s) PRBC last week and feels better with less fatigue and SOB. Some neuropathy no nausea.  Because of increase lambda light chains to 102  Will change to new myeloma protocl including daratumumab, kyprolis, pomalidamide and decadron.He is her to begin Cycl1 1.No ICD activation  Remains on warfarin 5mg daily He did  Get a 3rd Pfizer booster.  PAST MEDICAL HISTORY, SOCIAL HISTORY AND FAMILY HISTORY:  See my note from 04/2021.      REVIEW OF SYSTEMS:  A 12 point review of systems done is negative as in HPI.      PHYSICAL EXAMINATION: /61   Pulse 52   Temp 97.9  F (36.6  C) (Oral)   Resp 16   Wt 90.1 kg (198 lb 11.2 oz)   SpO2 97%   BMI 28.51 kg/m      GENERAL:  He is an alert gentleman in no acute distress.   VITAL SIGNS:  Stable.   HEENT:  Normocephalic.  EOM okay.  Mouth without lesion.   RESPIRATORY:  Clear to percussion and auscultation.   CARDIAC:  Irregularly irregular rhythm.  No S3, S4 or murmur.   ABDOMEN:  Soft without  hepatosplenomegaly.   EXTREMITIES:  +2 edema bilaterally   SKIN 7mm oblong brownish pink macule left subscapular area      Results for FRANDY ZAPATA (MRN [id number]) as of 11/2/2021 08:49   Ref. Range 11/2/2021 07:06   Sodium Latest Ref Range: 133 - 144 mmol/L 140   Potassium Latest Ref Range: 3.4 - 5.3 mmol/L 4.0   Chloride Latest Ref Range: 94 - 109 mmol/L 109   Carbon Dioxide Latest Ref Range: 20 - 32 mmol/L 26   Urea Nitrogen Latest Ref Range: 7 - 30 mg/dL 16   Creatinine Latest Ref Range: 0.66 - 1.25 mg/dL 0.96   GFR Estimate Latest Ref Range: >60 mL/min/1.73m2 80   Calcium Latest Ref Range: 8.5 - 10.1 mg/dL 8.7   Anion Gap Latest Ref Range: 3 - 14 mmol/L 5   Magnesium Latest Ref Range: 1.6 - 2.3 mg/dL 1.5 (L)   Albumin Latest Ref Range: 3.4 - 5.0 g/dL 3.0 (L)   Protein Total Latest Ref Range: 6.8 - 8.8 g/dL 6.5 (L)   Bilirubin Total Latest Ref Range: 0.2 - 1.3 mg/dL 0.9   Alkaline Phosphatase Latest Ref Range: 40 - 150 U/L 127   ALT Latest Ref Range: 0 - 70 U/L 17   AST Latest Ref Range: 0 - 45 U/L 15   Glucose Latest Ref Range: 70 - 99 mg/dL 128 (H)   WBC Latest Ref Range: 4.0 - 11.0 10e3/uL 6.1   Hemoglobin Latest Ref Range: 13.3 - 17.7 g/dL 9.7 (L)   Hematocrit Latest Ref Range: 40.0 - 53.0 % 29.4 (L)   Platelet Count Latest Ref Range: 150 - 450 10e3/uL 177   RBC Count Latest Ref Range: 4.40 - 5.90 10e6/uL 2.73 (L)   MCV Latest Ref Range: 78 - 100 fL 108 (H)   MCH Latest Ref Range: 26.5 - 33.0 pg 35.5 (H)   MCHC Latest Ref Range: 31.5 - 36.5 g/dL 33.0   RDW Latest Ref Range: 10.0 - 15.0 % 18.8 (H)   % Neutrophils Latest Units: % 60   % Lymphocytes Latest Units: % 19   % Monocytes Latest Units: % 13   % Eosinophils Latest Units: % 5   % Basophils Latest Units: % 3   Absolute Basophils Latest Ref Range: 0.0 - 0.2 10e3/uL 0.2   Absolute Eosinophils Latest Ref Range: 0.0 - 0.7 10e3/uL 0.3   Absolute Immature Granulocytes Latest Ref Range: <=0.0 10e3/uL 0.0   Absolute Lymphocytes Latest Ref Range: 0.8 -  5.3 10e3/uL 1.2   Absolute Monocytes Latest Ref Range: 0.0 - 1.3 10e3/uL 0.8   % Immature Granulocytes Latest Units: % 0   Absolute Neutrophils Latest Ref Range: 1.6 - 8.3 10e3/uL 3.7   Absolute NRBCs Latest Units: 10e3/uL 0.0   NRBCs per 100 WBC Latest Ref Range: <1 /100 0   INR Latest Ref Range: 0.85 - 1.15  2.03 (H)     All in all Results for JONH ZAPATA (MRN [id number]) as of 11/2/2021 08:49   Ref. Range 10/26/2021 10:35   Albumin Fraction Latest Ref Range: 3.7 - 5.1 g/dL 3.4 (L)   Alpha 1 Fraction Latest Ref Range: 0.2 - 0.4 g/dL 0.4   Alpha 2 Fraction Latest Ref Range: 0.5 - 0.9 g/dL 0.8   Beta Fraction Latest Ref Range: 0.6 - 1.0 g/dL 0.8   ELP Interpretation: Unknown Small serum monoc...   Gamma Fraction Latest Ref Range: 0.7 - 1.6 g/dL 0.7   IGA Latest Ref Range: 84 - 499 mg/dL 382   IGG Latest Ref Range: 610-1,616 mg/dL 597 (L)   IGM Latest Ref Range: 35 - 242 mg/dL 25 (L)   Monoclonal Peak Latest Ref Range: <=0.0 g/dL 0.1 (H)   Kappa Free Light Chains Latest Ref Range: 0.33 - 1.94 mg/dL 3.00 (H)   LAMBDA FREE LT CHAINS Latest Ref Range: 0.57 - 2.63 mg/dL 102.51 (H)   KAPPA/LAMBDA RATIO Latest Ref Range: 0.26 - 1.65  0.03 (L)   Total Protein Serum for ELP Latest Ref Range: 6.8 - 8.8 g/dL 6.2 (L)     ASSESSMENT  1.  Multiple myeloma. In relapse  2.  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   3.  Cardiomyopathy with CHF.   4.  Atrial fibrillation.   5.  History of deep vein thrombosis, on warfarin.   6.  Chronic back pain.   7.  Aortic aneurysm.   8.  History of plasmacytoma of the clivus, status post radiation.   9.  History of ventricular tachycardia with implantable defibrillator in place.   10.  Hypomagnesemia.     11  Hyperkalemia   12. Skin lesion   .He has continued to gradually increase his serum free light chains his lambda chains are now up to 102 and he has anemia...I had talked to Jonh about starting a new protocol  With Daratumumab Sub Q 1400mg weekly  x 8 weeks  , [id number] mg Days 1,8 and 15, Pomalidamide 4mg Days 1-14, Decadron Days 1, 2, 9, 16 and 23  In a 28 Day cycle. I adjusted doses due to cytopenia. I reviewed the risks with him including infusion reactions difficulty crossmatching blood, neuropathy, potential cardiac issues and cytopenias.I gave him a calendar and drug info Today will begin with IV Jenny  And if tolerated will give Sub q subsequently.Will monitor light chains, kidney and cardiac function.After 8 weeks Jenny would go to  Every 2 weeks. Will give chemo here at the [facility]  He already is on anticoagulation and is on acyclovir 400 mg twice a day as infection prophylaxis.    Weekly infusion visitssss  RTC in 2 months    I spent a total of 40  minutes face to face with Erasmo Pearce during today's office visit. Over 50% of this time was spent counseling the patient and coordinating the care regarding multiple myeloma and 10minutes preparing to see the patient and  care coordination       Rojas Raygoza MD  [phone]

## 2021-11-02 NOTE — PROGRESS NOTES
"Infusion Nursing Note:  Erasmo Pearce presents today for scheduled Cycle 1, Day 1 Kyprolis & Daratumumab.    Patient seen by provider today: Yes: Dr. Raygoza   present during visit today: Not Applicable.    Note: Labs were monitored.  Lab parameters for today's treatment were met.  Patient assessment was completed, see flowsheet for details.  Consent to Receive a Blood Transfusion was obtained by Provider and signed by Patient.      Intravenous Access:  Peripheral IV placed.  Positive blood flow was noted pre and post each medication.    Treatment:  Patient received scheduled IV fluid flush, 650 mg oral Tylenol, 50 mg oral Benadryl, 10 mg oral Singulair and 20 mg IV Decadron prior to receiving scheduled Kyprolis.  Patient received scheduled first dose of IV Daratumumab.  About one hour into the infusion, Patient experienced abdominal cramping, diarrhea, and vomiting.  Respiratory status remained at baseline.  The infusion was immediately stopped.  Pepcid and Ativan were administered.  The infusion was restarted about 1/2 hour later at the previous rate and Patient tolerated the remained of the infusion without incident.  Patient did report he had taken \"two gas pills\" prior to coming for his appointment today - it was unclear or he denied having any previous GI issues this morning.  Patient's magnesium level was 1.5 today - he is scheduled to take his home magnesium dose later today.    Post Infusion Assessment:  See Treatment Note - Patient tolerated the remainder of the infusion with no further incident.        Discharge Plan:   Patient discharged in stable condition accompanied by: wife.      IRENE WEBER RN                      "

## 2021-11-02 NOTE — LETTER
"    11/2/2021         RE: Erasmo Pearce  [address]  [address] [facility] [address]        Dear Colleague,    Thank you for referring your patient, Erasmo Pearce, to the [facility]ANSPL[facility] [place]. Please see a copy of my visit note below.    Infusion Nursing Note:  Erasmo Pearce presents today for scheduled Cycle 1, Day 1 Kyprolis & Daratumumab.    Patient seen by provider today: Yes: Dr. Raygoza   present during visit today: Not Applicable.    Note: Labs were monitored.  Lab parameters for today's treatment were met.  Patient assessment was completed, see flowsheet for details.  Consent to Receive a Blood Transfusion was obtained by Provider and signed by Patient.      Intravenous Access:  Peripheral IV placed.  Positive blood flow was noted pre and post each medication.    Treatment:  Patient received scheduled IV fluid flush, 650 mg oral Tylenol, 50 mg oral Benadryl, 10 mg oral Singulair and 20 mg IV Decadron prior to receiving scheduled Kyprolis.  Patient received scheduled first dose of IV Daratumumab.  About one hour into the infusion, Patient experienced abdominal cramping, diarrhea, and vomiting.  Respiratory status remained at baseline.  The infusion was immediately stopped.  Pepcid and Ativan were administered.  The infusion was restarted about 1/2 hour later at the previous rate and Patient tolerated the remained of the infusion without incident.  Patient did report he had taken \"two gas pills\" prior to coming for his appointment today - it was unclear or he denied having any previous GI issues this morning.  Patient's magnesium level was 1.5 today - he is scheduled to take his home magnesium dose later today.    Post Infusion Assessment:  See Treatment Note - Patient tolerated the remainder of the infusion with no further incident.        Discharge Plan:   Patient discharged in stable condition accompanied by: " wife.      IRENE WEBER RN                          Again, thank you for allowing me to participate in the care of your patient.        Sincerely,        [facility]

## 2021-11-02 NOTE — NURSING NOTE
"Oncology Rooming Note    November 2, 2021 8:19 AM   Erasmo Pearce is a 71 year old male who presents for:    Chief Complaint   Patient presents with     Blood Draw     Labs drawn via piv by RN in lab.  VS taken     RECHECK     provider visit, scheduled chemo infusion for multiple myeloma     Initial Vitals: /61   Pulse 52   Temp 97.9  F (36.6  C) (Oral)   Resp 16   Wt 90.1 kg (198 lb 11.2 oz)   SpO2 97%   BMI 28.51 kg/m   Estimated body mass index is 28.51 kg/m  as calculated from the following:    Height as of 10/7/21: 1.778 m (5' 10\").    Weight as of this encounter: 90.1 kg (198 lb 11.2 oz). Body surface area is 2.11 meters squared.  No Pain (0) Comment: Data Unavailable   No LMP for male patient.  Allergies reviewed: Yes  Medications reviewed: Yes    Medications: Medication refills not needed today.  Pharmacy name entered into [email or web address]:    [facility] - [place], [place] - [address] [facility] - [place], [place] - [address] [place] [address]  [facility]  [place], [place] - [address] [place] [address]    Clinical concerns:  Patient denies fevers/N/VD/respiratory symptoms.  Patient denies any pain/discomfort.      IRENE WEBER RN              "

## 2021-11-02 NOTE — TELEPHONE ENCOUNTER
If provider orders tests at today's visit, patient would like to be contacted via [employer].  If to contact patient by phone, patient's preferred phone # is [phone] (Cell) and it is OK to leave message on voice mail or with family member.  If medications are ordered at today's visit, the pharmacy name/location patient would like them to be sent to is   [id number] - [place], [place] - [address]  [address]  [facility] [address]  Phone: [phone] Fax: [phone]     Oral Chemotherapy Monitoring Program    Placed call to Jonh in review of labs drawn yesterday with Jerrod (viewable in Care Everywhere). No answer. Left message stating no concerning abnormalities with labs and requesting call back to discuss how therapy is going. No medication or patient names were mentioned in this message.    Not left in message: Of note, magnesium is still low. Plan to discuss with patient when he calls back and assess adherence to magnesium supplement. Also needs routine Revlimid adherence and side effect assessment.     Trupti Loredo  Pharmacy Intern   [facility]  Oral Chemotherapy Monitoring Program  [phone]

## 2021-11-02 NOTE — NURSING NOTE
Chief Complaint   Patient presents with     Blood Draw     Labs drawn via piv by RN in lab.  VS taken       Labs drawn from PIV placed by RN. Line flushed with saline. Vitals taken. Pt checked in for appointment(s).    Danyelle Wood RN

## 2021-11-02 NOTE — PROGRESS NOTES
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 I increased him to Pomalidamide 4mg/d Days 1-21 with Dex in late July.for 2 cycle He finished this cycle  October 17.He has tolerated these  cycles well  ut had significant anemia last week without bleeding to Hgb 7.6.He received 2 unit(s) PRBC last week and feels better with less fatigue and SOB. Some neuropathy no nausea.  Because of increase lambda light chains to 102  Will change to new myeloma protocl including daratumumab, kyprolis, pomalidamide and decadron.He is her to begin Cycl1 1.No ICD activation  Remains on warfarin 5mg daily He did  Get a 3rd Pfizer booster.  PAST MEDICAL HISTORY, SOCIAL HISTORY AND FAMILY HISTORY:  See my note from 04/2021.      REVIEW OF SYSTEMS:  A 12 point review of systems done is negative as in HPI.      PHYSICAL EXAMINATION: /61   Pulse 52   Temp 97.9  F (36.6  C) (Oral)   Resp 16   Wt 90.1 kg (198 lb 11.2 oz)   SpO2 97%   BMI 28.51 kg/m      GENERAL:  He is an alert gentleman in no acute distress.   VITAL SIGNS:  Stable.   HEENT:  Normocephalic.  EOM okay.  Mouth without lesion.   RESPIRATORY:  Clear to percussion and auscultation.   CARDIAC:  Irregularly irregular rhythm.  No S3, S4 or murmur.   ABDOMEN:  Soft without hepatosplenomegaly.   EXTREMITIES:  +2 edema bilaterally   SKIN 7mm oblong brownish pink macule left subscapular area      Results for JONH ZAPATA (MRN [id number]) as of 11/2/2021 08:49   Ref. Range 11/2/2021 07:06   Sodium Latest Ref Range: 133 - 144 mmol/L 140   Potassium Latest Ref Range: 3.4 - 5.3  mmol/L 4.0   Chloride Latest Ref Range: 94 - 109 mmol/L 109   Carbon Dioxide Latest Ref Range: 20 - 32 mmol/L 26   Urea Nitrogen Latest Ref Range: 7 - 30 mg/dL 16   Creatinine Latest Ref Range: 0.66 - 1.25 mg/dL 0.96   GFR Estimate Latest Ref Range: >60 mL/min/1.73m2 80   Calcium Latest Ref Range: 8.5 - 10.1 mg/dL 8.7   Anion Gap Latest Ref Range: 3 - 14 mmol/L 5   Magnesium Latest Ref Range: 1.6 - 2.3 mg/dL 1.5 (L)   Albumin Latest Ref Range: 3.4 - 5.0 g/dL 3.0 (L)   Protein Total Latest Ref Range: 6.8 - 8.8 g/dL 6.5 (L)   Bilirubin Total Latest Ref Range: 0.2 - 1.3 mg/dL 0.9   Alkaline Phosphatase Latest Ref Range: 40 - 150 U/L 127   ALT Latest Ref Range: 0 - 70 U/L 17   AST Latest Ref Range: 0 - 45 U/L 15   Glucose Latest Ref Range: 70 - 99 mg/dL 128 (H)   WBC Latest Ref Range: 4.0 - 11.0 10e3/uL 6.1   Hemoglobin Latest Ref Range: 13.3 - 17.7 g/dL 9.7 (L)   Hematocrit Latest Ref Range: 40.0 - 53.0 % 29.4 (L)   Platelet Count Latest Ref Range: 150 - 450 10e3/uL 177   RBC Count Latest Ref Range: 4.40 - 5.90 10e6/uL 2.73 (L)   MCV Latest Ref Range: 78 - 100 fL 108 (H)   MCH Latest Ref Range: 26.5 - 33.0 pg 35.5 (H)   MCHC Latest Ref Range: 31.5 - 36.5 g/dL 33.0   RDW Latest Ref Range: 10.0 - 15.0 % 18.8 (H)   % Neutrophils Latest Units: % 60   % Lymphocytes Latest Units: % 19   % Monocytes Latest Units: % 13   % Eosinophils Latest Units: % 5   % Basophils Latest Units: % 3   Absolute Basophils Latest Ref Range: 0.0 - 0.2 10e3/uL 0.2   Absolute Eosinophils Latest Ref Range: 0.0 - 0.7 10e3/uL 0.3   Absolute Immature Granulocytes Latest Ref Range: <=0.0 10e3/uL 0.0   Absolute Lymphocytes Latest Ref Range: 0.8 - 5.3 10e3/uL 1.2   Absolute Monocytes Latest Ref Range: 0.0 - 1.3 10e3/uL 0.8   % Immature Granulocytes Latest Units: % 0   Absolute Neutrophils Latest Ref Range: 1.6 - 8.3 10e3/uL 3.7   Absolute NRBCs Latest Units: 10e3/uL 0.0   NRBCs per 100 WBC Latest Ref Range: <1 /100 0   INR Latest Ref Range: 0.85 - 1.15   2.03 (H)     All in all Results for JONH ZAPATA (MRN [id number]) as of 11/2/2021 08:49   Ref. Range 10/26/2021 10:35   Albumin Fraction Latest Ref Range: 3.7 - 5.1 g/dL 3.4 (L)   Alpha 1 Fraction Latest Ref Range: 0.2 - 0.4 g/dL 0.4   Alpha 2 Fraction Latest Ref Range: 0.5 - 0.9 g/dL 0.8   Beta Fraction Latest Ref Range: 0.6 - 1.0 g/dL 0.8   ELP Interpretation: Unknown Small serum monoc...   Gamma Fraction Latest Ref Range: 0.7 - 1.6 g/dL 0.7   IGA Latest Ref Range: 84 - 499 mg/dL 382   IGG Latest Ref Range: 610-1,616 mg/dL 597 (L)   IGM Latest Ref Range: 35 - 242 mg/dL 25 (L)   Monoclonal Peak Latest Ref Range: <=0.0 g/dL 0.1 (H)   Kappa Free Light Chains Latest Ref Range: 0.33 - 1.94 mg/dL 3.00 (H)   LAMBDA FREE LT CHAINS Latest Ref Range: 0.57 - 2.63 mg/dL 102.51 (H)   KAPPA/LAMBDA RATIO Latest Ref Range: 0.26 - 1.65  0.03 (L)   Total Protein Serum for ELP Latest Ref Range: 6.8 - 8.8 g/dL 6.2 (L)     ASSESSMENT  1.  Multiple myeloma. In relapse  2.  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   3.  Cardiomyopathy with CHF.   4.  Atrial fibrillation.   5.  History of deep vein thrombosis, on warfarin.   6.  Chronic back pain.   7.  Aortic aneurysm.   8.  History of plasmacytoma of the clivus, status post radiation.   9.  History of ventricular tachycardia with implantable defibrillator in place.   10.  Hypomagnesemia.     11  Hyperkalemia   12. Skin lesion   .He has continued to gradually increase his serum free light chains his lambda chains are now up to 102 and he has anemia...I had talked to Jonh about starting a new protocol  With Daratumumab Sub Q 1400mg weekly x 8 weeks  , [id number] mg Days 1,8 and 15, Pomalidamide 4mg Days 1-14, Decadron Days 1, 2, 9, 16 and 23  In a 28 Day cycle. I adjusted doses due to cytopenia. I reviewed the risks with him including infusion reactions difficulty crossmatching blood, neuropathy, potential cardiac issues and cytopenias.I gave  him a calendar and drug info Today will begin with IV Jenny  And if tolerated will give Sub q subsequently.Will monitor light chains, kidney and cardiac function.After 8 weeks Jenny would go to  Every 2 weeks. Will give chemo here at the [facility]  He already is on anticoagulation and is on acyclovir 400 mg twice a day as infection prophylaxis.    Weekly infusion visitssss  RTC in 2 months    I spent a total of 40  minutes face to face with Erasmo Pearce during today's office visit. Over 50% of this time was spent counseling the patient and coordinating the care regarding multiple myeloma and 10minutes preparing to see the patient and  care coordination     Rojas Raygoza MD  [phone]

## 2021-11-04 DIAGNOSIS — C90.02 MULTIPLE MYELOMA IN RELAPSE (H): Primary | ICD-10-CM

## 2021-11-06 ENCOUNTER — HEALTH MAINTENANCE LETTER (OUTPATIENT)
Age: 71
End: 2021-11-06

## 2021-11-07 DIAGNOSIS — C90.02 MULTIPLE MYELOMA IN RELAPSE (H): ICD-10-CM

## 2021-11-09 ENCOUNTER — INFUSION THERAPY VISIT (OUTPATIENT)
Dept: TRANSPLANT | Facility: CLINIC | Age: 71
End: 2021-11-09
Attending: PEDIATRICS
Payer: MEDICARE

## 2021-11-09 ENCOUNTER — APPOINTMENT (OUTPATIENT)
Dept: LAB | Facility: CLINIC | Age: 71
End: 2021-11-09
Attending: INTERNAL MEDICINE
Payer: MEDICARE

## 2021-11-09 VITALS
BODY MASS INDEX: 28.79 KG/M2 | OXYGEN SATURATION: 98 % | HEART RATE: 55 BPM | RESPIRATION RATE: 16 BRPM | SYSTOLIC BLOOD PRESSURE: 123 MMHG | DIASTOLIC BLOOD PRESSURE: 56 MMHG | WEIGHT: 197.7 LBS | TEMPERATURE: 98.2 F

## 2021-11-09 DIAGNOSIS — C90.02 MULTIPLE MYELOMA IN RELAPSE (H): Primary | ICD-10-CM

## 2021-11-09 LAB
ALBUMIN SERPL-MCNC: 3 G/DL (ref 3.4–5)
ALP SERPL-CCNC: 135 U/L (ref 40–150)
ALT SERPL W P-5'-P-CCNC: 32 U/L (ref 0–70)
ANION GAP SERPL CALCULATED.3IONS-SCNC: 7 MMOL/L (ref 3–14)
AST SERPL W P-5'-P-CCNC: 15 U/L (ref 0–45)
BASOPHILS # BLD AUTO: 0 10E3/UL (ref 0–0.2)
BASOPHILS NFR BLD AUTO: 0 %
BILIRUB SERPL-MCNC: 1.2 MG/DL (ref 0.2–1.3)
BUN SERPL-MCNC: 19 MG/DL (ref 7–30)
CALCIUM SERPL-MCNC: 8.4 MG/DL (ref 8.5–10.1)
CHLORIDE BLD-SCNC: 110 MMOL/L (ref 94–109)
CO2 SERPL-SCNC: 25 MMOL/L (ref 20–32)
CREAT SERPL-MCNC: 1.08 MG/DL (ref 0.66–1.25)
EOSINOPHIL # BLD AUTO: 0.7 10E3/UL (ref 0–0.7)
EOSINOPHIL NFR BLD AUTO: 11 %
ERYTHROCYTE [DISTWIDTH] IN BLOOD BY AUTOMATED COUNT: 18.4 % (ref 10–15)
GFR SERPL CREATININE-BSD FRML MDRD: 69 ML/MIN/1.73M2
GLUCOSE BLD-MCNC: 123 MG/DL (ref 70–99)
HCT VFR BLD AUTO: 33.1 % (ref 40–53)
HGB BLD-MCNC: 10.4 G/DL (ref 13.3–17.7)
IMM GRANULOCYTES # BLD: 0 10E3/UL
IMM GRANULOCYTES NFR BLD: 0 %
LDH SERPL L TO P-CCNC: 167 U/L (ref 85–227)
LYMPHOCYTES # BLD AUTO: 0.5 10E3/UL (ref 0.8–5.3)
LYMPHOCYTES NFR BLD AUTO: 8 %
MAGNESIUM SERPL-MCNC: 1.5 MG/DL (ref 1.6–2.3)
MCH RBC QN AUTO: 34.3 PG (ref 26.5–33)
MCHC RBC AUTO-ENTMCNC: 31.4 G/DL (ref 31.5–36.5)
MCV RBC AUTO: 109 FL (ref 78–100)
MONOCYTES # BLD AUTO: 0.5 10E3/UL (ref 0–1.3)
MONOCYTES NFR BLD AUTO: 7 %
NEUTROPHILS # BLD AUTO: 5 10E3/UL (ref 1.6–8.3)
NEUTROPHILS NFR BLD AUTO: 74 %
NRBC # BLD AUTO: 0 10E3/UL
NRBC BLD AUTO-RTO: 0 /100
PLATELET # BLD AUTO: 132 10E3/UL (ref 150–450)
POTASSIUM BLD-SCNC: 4.2 MMOL/L (ref 3.4–5.3)
PROT SERPL-MCNC: 6.1 G/DL (ref 6.8–8.8)
RBC # BLD AUTO: 3.03 10E6/UL (ref 4.4–5.9)
SODIUM SERPL-SCNC: 142 MMOL/L (ref 133–144)
TOTAL PROTEIN SERUM FOR ELP: 6 G/DL (ref 6.8–8.8)
URATE SERPL-MCNC: 5.4 MG/DL (ref 3.5–7.2)
WBC # BLD AUTO: 6.8 10E3/UL (ref 4–11)

## 2021-11-09 PROCEDURE — 83883 ASSAY NEPHELOMETRY NOT SPEC: CPT

## 2021-11-09 PROCEDURE — 84155 ASSAY OF PROTEIN SERUM: CPT | Mod: 91

## 2021-11-09 PROCEDURE — 250N000011 HC RX IP 250 OP 636: Performed by: INTERNAL MEDICINE

## 2021-11-09 PROCEDURE — 36415 COLL VENOUS BLD VENIPUNCTURE: CPT

## 2021-11-09 PROCEDURE — 83735 ASSAY OF MAGNESIUM: CPT

## 2021-11-09 PROCEDURE — 86850 RBC ANTIBODY SCREEN: CPT | Performed by: INTERNAL MEDICINE

## 2021-11-09 PROCEDURE — 82784 ASSAY IGA/IGD/IGG/IGM EACH: CPT

## 2021-11-09 PROCEDURE — 96413 CHEMO IV INFUSION 1 HR: CPT

## 2021-11-09 PROCEDURE — 86900 BLOOD TYPING SEROLOGIC ABO: CPT | Performed by: INTERNAL MEDICINE

## 2021-11-09 PROCEDURE — 85025 COMPLETE CBC W/AUTO DIFF WBC: CPT | Performed by: INTERNAL MEDICINE

## 2021-11-09 PROCEDURE — 250N000013 HC RX MED GY IP 250 OP 250 PS 637: Performed by: INTERNAL MEDICINE

## 2021-11-09 PROCEDURE — 84550 ASSAY OF BLOOD/URIC ACID: CPT

## 2021-11-09 PROCEDURE — 258N000003 HC RX IP 258 OP 636: Performed by: INTERNAL MEDICINE

## 2021-11-09 PROCEDURE — 96375 TX/PRO/DX INJ NEW DRUG ADDON: CPT

## 2021-11-09 PROCEDURE — 80053 COMPREHEN METABOLIC PANEL: CPT

## 2021-11-09 PROCEDURE — 86334 IMMUNOFIX E-PHORESIS SERUM: CPT | Mod: TC

## 2021-11-09 PROCEDURE — 96411 CHEMO IV PUSH ADDL DRUG: CPT

## 2021-11-09 PROCEDURE — 96417 CHEMO IV INFUS EACH ADDL SEQ: CPT

## 2021-11-09 PROCEDURE — 84165 PROTEIN E-PHORESIS SERUM: CPT | Mod: TC | Performed by: PATHOLOGY

## 2021-11-09 PROCEDURE — 83615 LACTATE (LD) (LDH) ENZYME: CPT

## 2021-11-09 RX ORDER — HEPARIN SODIUM,PORCINE 10 UNIT/ML
5 VIAL (ML) INTRAVENOUS
Status: CANCELLED | OUTPATIENT
Start: 2021-01-01

## 2021-11-09 RX ORDER — HEPARIN SODIUM (PORCINE) LOCK FLUSH IV SOLN 100 UNIT/ML 100 UNIT/ML
5 SOLUTION INTRAVENOUS
Status: CANCELLED | OUTPATIENT
Start: 2021-01-01

## 2021-11-09 RX ORDER — ALBUTEROL SULFATE 0.83 MG/ML
2.5 SOLUTION RESPIRATORY (INHALATION)
Status: CANCELLED | OUTPATIENT
Start: 2021-01-01

## 2021-11-09 RX ORDER — MONTELUKAST SODIUM 10 MG/1
10 TABLET ORAL ONCE
Status: CANCELLED | OUTPATIENT
Start: 2021-01-01

## 2021-11-09 RX ORDER — DIPHENHYDRAMINE HCL 25 MG
50 CAPSULE ORAL ONCE
Status: CANCELLED | OUTPATIENT
Start: 2021-01-01

## 2021-11-09 RX ORDER — DIPHENHYDRAMINE HYDROCHLORIDE 50 MG/ML
50 INJECTION INTRAMUSCULAR; INTRAVENOUS
Status: CANCELLED
Start: 2021-01-01

## 2021-11-09 RX ORDER — NALOXONE HYDROCHLORIDE 0.4 MG/ML
0.2 INJECTION, SOLUTION INTRAMUSCULAR; INTRAVENOUS; SUBCUTANEOUS
Status: CANCELLED | OUTPATIENT
Start: 2021-01-01

## 2021-11-09 RX ORDER — EPINEPHRINE 1 MG/ML
0.3 INJECTION, SOLUTION INTRAMUSCULAR; SUBCUTANEOUS EVERY 5 MIN PRN
Status: CANCELLED | OUTPATIENT
Start: 2021-01-01

## 2021-11-09 RX ORDER — ACETAMINOPHEN 325 MG/1
650 TABLET ORAL ONCE
Status: CANCELLED | OUTPATIENT
Start: 2021-01-01

## 2021-11-09 RX ORDER — DIPHENHYDRAMINE HCL 25 MG
50 CAPSULE ORAL ONCE
Status: COMPLETED | OUTPATIENT
Start: 2021-11-09 | End: 2021-11-09

## 2021-11-09 RX ORDER — MEPERIDINE HYDROCHLORIDE 25 MG/ML
25 INJECTION INTRAMUSCULAR; INTRAVENOUS; SUBCUTANEOUS EVERY 30 MIN PRN
Status: CANCELLED | OUTPATIENT
Start: 2021-01-01

## 2021-11-09 RX ORDER — LORAZEPAM 2 MG/ML
0.5 INJECTION INTRAMUSCULAR EVERY 4 HOURS PRN
Status: CANCELLED | OUTPATIENT
Start: 2021-01-01

## 2021-11-09 RX ORDER — METHYLPREDNISOLONE SODIUM SUCCINATE 125 MG/2ML
125 INJECTION, POWDER, LYOPHILIZED, FOR SOLUTION INTRAMUSCULAR; INTRAVENOUS
Status: CANCELLED
Start: 2021-01-01

## 2021-11-09 RX ORDER — MONTELUKAST SODIUM 10 MG/1
10 TABLET ORAL ONCE
Status: COMPLETED | OUTPATIENT
Start: 2021-11-09 | End: 2021-11-09

## 2021-11-09 RX ORDER — ALBUTEROL SULFATE 90 UG/1
1-2 AEROSOL, METERED RESPIRATORY (INHALATION)
Status: CANCELLED
Start: 2021-01-01

## 2021-11-09 RX ORDER — ACETAMINOPHEN 325 MG/1
650 TABLET ORAL ONCE
Status: COMPLETED | OUTPATIENT
Start: 2021-11-09 | End: 2021-11-09

## 2021-11-09 RX ADMIN — ACETAMINOPHEN 650 MG: 325 TABLET ORAL at 10:10

## 2021-11-09 RX ADMIN — MONTELUKAST SODIUM 10 MG: 10 TABLET, COATED ORAL at 10:10

## 2021-11-09 RX ADMIN — DEXTROSE MONOHYDRATE 500 ML: 50 INJECTION, SOLUTION INTRAVENOUS at 10:10

## 2021-11-09 RX ADMIN — CARFILZOMIB 20 MG: 10 INJECTION, POWDER, LYOPHILIZED, FOR SOLUTION INTRAVENOUS at 10:55

## 2021-11-09 RX ADMIN — DIPHENHYDRAMINE HYDROCHLORIDE 50 MG: 25 CAPSULE ORAL at 10:10

## 2021-11-09 RX ADMIN — DEXAMETHASONE SODIUM PHOSPHATE 20 MG: 10 INJECTION, SOLUTION INTRAMUSCULAR; INTRAVENOUS at 10:11

## 2021-11-09 RX ADMIN — DARATUMUMAB 1400 MG: 100 INJECTION, SOLUTION, CONCENTRATE INTRAVENOUS at 11:42

## 2021-11-09 ASSESSMENT — PAIN SCALES - GENERAL: PAINLEVEL: NO PAIN (0)

## 2021-11-09 NOTE — NURSING NOTE
Chief Complaint   Patient presents with     Blood Draw     Labs drawn via PIV by RN in lab. VS taken.      Labs drawn via peripheral IV. Vital signs taken. Checked into next appointment.   Laura Almaraz RN     Surgeon Performing Repair (Optional): Dr. Lopez

## 2021-11-09 NOTE — PROGRESS NOTES
Infusion Nursing Note:  Erasmo Pearce presents today for scheduled infusion  Patient seen by provider today: Yes: Ferny (not scheduled but provider stopped in to see patient)    present during visit today: Not Applicable.    Note: Patient received kypolis and darzalex per treatment plan. Patient met parameters to infuse. Patient was premedicated with tylenol, benadryl, montelukast, and decadron IV. No reaction noted throughout infusion. Dextrose 5% bolus given 30 minutes prior to kyprolis and 30 minutes after kyprolis. Blood return noted before and after infusion. Provider to switch IV darzalex to subcutaneous injection for next appointment.       Intravenous Access:  Peripheral IV placed.    Treatment Conditions:  Results reviewed, labs MET treatment parameters, ok to proceed with treatment.      Post Infusion Assessment:  Patient tolerated infusion without incident.       Discharge Plan:   Patient and/or family verbalized understanding of discharge instructions and all questions answered.      Joana Woodall RN

## 2021-11-10 LAB
ALBUMIN SERPL ELPH-MCNC: 3.6 G/DL (ref 3.7–5.1)
ALPHA1 GLOB SERPL ELPH-MCNC: 0.4 G/DL (ref 0.2–0.4)
ALPHA2 GLOB SERPL ELPH-MCNC: 0.7 G/DL (ref 0.5–0.9)
B-GLOBULIN SERPL ELPH-MCNC: 0.7 G/DL (ref 0.6–1)
GAMMA GLOB SERPL ELPH-MCNC: 0.6 G/DL (ref 0.7–1.6)
IGA SERPL-MCNC: 215 MG/DL (ref 84–499)
IGG SERPL-MCNC: 534 MG/DL (ref 610–1616)
IGM SERPL-MCNC: 16 MG/DL (ref 35–242)
KAPPA LC FREE SER-MCNC: 1.17 MG/DL (ref 0.33–1.94)
KAPPA LC FREE/LAMBDA FREE SER NEPH: 0.03 {RATIO} (ref 0.26–1.65)
LAMBDA LC FREE SERPL-MCNC: 39.48 MG/DL (ref 0.57–2.63)
M PROTEIN SERPL ELPH-MCNC: 0.1 G/DL
PROT PATTERN SERPL ELPH-IMP: ABNORMAL
PROT PATTERN SERPL IFE-IMP: NORMAL

## 2021-11-10 PROCEDURE — 86334 IMMUNOFIX E-PHORESIS SERUM: CPT | Mod: 26 | Performed by: PATHOLOGY

## 2021-11-10 PROCEDURE — 84165 PROTEIN E-PHORESIS SERUM: CPT | Mod: 26 | Performed by: PATHOLOGY

## 2021-11-15 ENCOUNTER — ANCILLARY PROCEDURE (OUTPATIENT)
Dept: CARDIOLOGY | Facility: CLINIC | Age: 71
End: 2021-11-15
Attending: INTERNAL MEDICINE
Payer: MEDICARE

## 2021-11-15 DIAGNOSIS — I48.91 ATRIAL FIBRILLATION (H): ICD-10-CM

## 2021-11-15 DIAGNOSIS — I49.5 SINUS NODE DYSFUNCTION (H): ICD-10-CM

## 2021-11-15 DIAGNOSIS — Z95.810 AUTOMATIC IMPLANTABLE CARDIOVERTER-DEFIBRILLATOR IN SITU: ICD-10-CM

## 2021-11-15 LAB — SCANNED LAB RESULT: NORMAL

## 2021-11-15 PROCEDURE — 93295 DEV INTERROG REMOTE 1/2/MLT: CPT | Performed by: INTERNAL MEDICINE

## 2021-11-15 PROCEDURE — 93296 REM INTERROG EVL PM/IDS: CPT

## 2021-11-16 ENCOUNTER — APPOINTMENT (OUTPATIENT)
Dept: LAB | Facility: CLINIC | Age: 71
End: 2021-11-16
Attending: INTERNAL MEDICINE
Payer: MEDICARE

## 2021-11-16 ENCOUNTER — INFUSION THERAPY VISIT (OUTPATIENT)
Dept: TRANSPLANT | Facility: CLINIC | Age: 71
End: 2021-11-16
Attending: PEDIATRICS
Payer: MEDICARE

## 2021-11-16 VITALS
SYSTOLIC BLOOD PRESSURE: 107 MMHG | DIASTOLIC BLOOD PRESSURE: 48 MMHG | RESPIRATION RATE: 18 BRPM | TEMPERATURE: 97.6 F | OXYGEN SATURATION: 97 % | WEIGHT: 200.8 LBS | HEART RATE: 54 BPM | BODY MASS INDEX: 29.24 KG/M2

## 2021-11-16 DIAGNOSIS — Z79.01 ANTICOAGULATION GOAL OF INR 2 TO 3: ICD-10-CM

## 2021-11-16 DIAGNOSIS — Z51.81 ANTICOAGULATION GOAL OF INR 2 TO 3: ICD-10-CM

## 2021-11-16 DIAGNOSIS — C90.00 MULTIPLE MYELOMA, REMISSION STATUS UNSPECIFIED (H): Primary | ICD-10-CM

## 2021-11-16 DIAGNOSIS — C90.02 MULTIPLE MYELOMA IN RELAPSE (H): ICD-10-CM

## 2021-11-16 LAB
ALBUMIN SERPL-MCNC: 3.1 G/DL (ref 3.4–5)
ALP SERPL-CCNC: 132 U/L (ref 40–150)
ALT SERPL W P-5'-P-CCNC: 36 U/L (ref 0–70)
ANION GAP SERPL CALCULATED.3IONS-SCNC: 3 MMOL/L (ref 3–14)
AST SERPL W P-5'-P-CCNC: 21 U/L (ref 0–45)
BASOPHILS # BLD AUTO: 0 10E3/UL (ref 0–0.2)
BASOPHILS NFR BLD AUTO: 0 %
BILIRUB SERPL-MCNC: 2.3 MG/DL (ref 0.2–1.3)
BUN SERPL-MCNC: 22 MG/DL (ref 7–30)
CALCIUM SERPL-MCNC: 8.4 MG/DL (ref 8.5–10.1)
CHLORIDE BLD-SCNC: 111 MMOL/L (ref 94–109)
CO2 SERPL-SCNC: 23 MMOL/L (ref 20–32)
CREAT SERPL-MCNC: 0.92 MG/DL (ref 0.66–1.25)
EOSINOPHIL # BLD AUTO: 0.2 10E3/UL (ref 0–0.7)
EOSINOPHIL NFR BLD AUTO: 6 %
ERYTHROCYTE [DISTWIDTH] IN BLOOD BY AUTOMATED COUNT: 17.4 % (ref 10–15)
GFR SERPL CREATININE-BSD FRML MDRD: 84 ML/MIN/1.73M2
GLUCOSE BLD-MCNC: 155 MG/DL (ref 70–99)
HCT VFR BLD AUTO: 30.6 % (ref 40–53)
HGB BLD-MCNC: 10.1 G/DL (ref 13.3–17.7)
IMM GRANULOCYTES # BLD: 0 10E3/UL
IMM GRANULOCYTES NFR BLD: 1 %
INR PPP: 1.96 (ref 0.85–1.15)
LYMPHOCYTES # BLD AUTO: 0.1 10E3/UL (ref 0.8–5.3)
LYMPHOCYTES NFR BLD AUTO: 4 %
MAGNESIUM SERPL-MCNC: 1.8 MG/DL (ref 1.6–2.3)
MCH RBC QN AUTO: 35.7 PG (ref 26.5–33)
MCHC RBC AUTO-ENTMCNC: 33 G/DL (ref 31.5–36.5)
MCV RBC AUTO: 108 FL (ref 78–100)
MONOCYTES # BLD AUTO: 0.3 10E3/UL (ref 0–1.3)
MONOCYTES NFR BLD AUTO: 7 %
NEUTROPHILS # BLD AUTO: 3 10E3/UL (ref 1.6–8.3)
NEUTROPHILS NFR BLD AUTO: 82 %
NRBC # BLD AUTO: 0 10E3/UL
NRBC BLD AUTO-RTO: 0 /100
PLATELET # BLD AUTO: 57 10E3/UL (ref 150–450)
POTASSIUM BLD-SCNC: 4.4 MMOL/L (ref 3.4–5.3)
PROT SERPL-MCNC: 6.3 G/DL (ref 6.8–8.8)
RBC # BLD AUTO: 2.83 10E6/UL (ref 4.4–5.9)
SODIUM SERPL-SCNC: 137 MMOL/L (ref 133–144)
WBC # BLD AUTO: 3.6 10E3/UL (ref 4–11)

## 2021-11-16 PROCEDURE — 83735 ASSAY OF MAGNESIUM: CPT

## 2021-11-16 PROCEDURE — 85610 PROTHROMBIN TIME: CPT

## 2021-11-16 PROCEDURE — 82040 ASSAY OF SERUM ALBUMIN: CPT

## 2021-11-16 PROCEDURE — 85025 COMPLETE CBC W/AUTO DIFF WBC: CPT

## 2021-11-16 PROCEDURE — 250N000013 HC RX MED GY IP 250 OP 250 PS 637: Performed by: INTERNAL MEDICINE

## 2021-11-16 PROCEDURE — 36415 COLL VENOUS BLD VENIPUNCTURE: CPT

## 2021-11-16 PROCEDURE — 96401 CHEMO ANTI-NEOPL SQ/IM: CPT

## 2021-11-16 PROCEDURE — 96409 CHEMO IV PUSH SNGL DRUG: CPT

## 2021-11-16 PROCEDURE — 250N000011 HC RX IP 250 OP 636: Performed by: INTERNAL MEDICINE

## 2021-11-16 PROCEDURE — 258N000003 HC RX IP 258 OP 636: Performed by: INTERNAL MEDICINE

## 2021-11-16 PROCEDURE — 96375 TX/PRO/DX INJ NEW DRUG ADDON: CPT

## 2021-11-16 RX ORDER — MONTELUKAST SODIUM 10 MG/1
10 TABLET ORAL ONCE
Status: COMPLETED | OUTPATIENT
Start: 2021-11-16 | End: 2021-11-16

## 2021-11-16 RX ORDER — ACETAMINOPHEN 325 MG/1
650 TABLET ORAL ONCE
Status: COMPLETED | OUTPATIENT
Start: 2021-11-16 | End: 2021-11-16

## 2021-11-16 RX ORDER — DIPHENHYDRAMINE HCL 25 MG
50 CAPSULE ORAL ONCE
Status: COMPLETED | OUTPATIENT
Start: 2021-11-16 | End: 2021-11-16

## 2021-11-16 RX ADMIN — DIPHENHYDRAMINE HYDROCHLORIDE 50 MG: 25 CAPSULE ORAL at 10:00

## 2021-11-16 RX ADMIN — DEXTROSE MONOHYDRATE 500 ML: 50 INJECTION, SOLUTION INTRAVENOUS at 10:04

## 2021-11-16 RX ADMIN — DARATUMUMAB AND HYALURONIDASE-FIHJ (HUMAN RECOMBINANT) 1800 MG: 1800; 30000 INJECTION SUBCUTANEOUS at 10:45

## 2021-11-16 RX ADMIN — DEXAMETHASONE SODIUM PHOSPHATE 20 MG: 10 INJECTION, SOLUTION INTRAMUSCULAR; INTRAVENOUS at 10:04

## 2021-11-16 RX ADMIN — MONTELUKAST 10 MG: 10 TABLET, FILM COATED ORAL at 10:00

## 2021-11-16 RX ADMIN — CARFILZOMIB 16 MG: 10 INJECTION, POWDER, LYOPHILIZED, FOR SOLUTION INTRAVENOUS at 11:15

## 2021-11-16 RX ADMIN — ACETAMINOPHEN 650 MG: 325 TABLET ORAL at 10:00

## 2021-11-16 ASSESSMENT — PAIN SCALES - GENERAL: PAINLEVEL: NO PAIN (0)

## 2021-11-16 NOTE — NURSING NOTE
Platelet parameters for chemo today subtherapeutic, Dr. Raygoza notified- per Dr. Raygoza ok to give chemo today. MD was also notified patient reported ICD shock on Saturday, he spoke about this with his cardiology team and told to monitor.

## 2021-11-16 NOTE — PROGRESS NOTES
Infusion Nursing Note:  Erasmo Pearce presents today for day 15 cycle 1 carfilzomib and daratumumab.    Patient seen by provider today: Yes: drop in visit with Dr. Raygoza   present during visit today: Not Applicable.    Note: Jonh here today for day 15 cycle 1 carfilzomib and daratumumab. Jonh was premedicated with IV decadron, PO benadryl, tylenol, and singulair. He received 250ml D5 flush prior to chemo and 250ml D5 flush after chemo. Received daratumumab subcutaneous into Left lower quadrant, tolerated well over 4 minutes. Carfilzomib infused into Left hand PIV over 10 minutes, pt tolerated without difficulty. Followed by post flush. VSS on discharge.       Intravenous Access:  Peripheral IV placed.    Treatment Conditions:  Lab Results   Component Value Date    HGB 10.1 (L) 11/16/2021    WBC 3.6 (L) 11/16/2021    ANEU 1.6 08/24/2021    ANEUTAUTO 3.0 11/16/2021    PLT 57 (L) 11/16/2021      Lab Results   Component Value Date     11/16/2021    POTASSIUM 4.4 11/16/2021    MAG 1.8 11/16/2021    CR 0.92 11/16/2021    EILEEN 8.4 (L) 11/16/2021    BILITOTAL 2.3 (H) 11/16/2021    ALBUMIN 3.1 (L) 11/16/2021    ALT 36 11/16/2021    AST 21 11/16/2021     Results reviewed, labs did NOT meet treatment parameters: Dr. Raygoza ok'd chemotherapy infusion despite platelet parameter out of range.      Post Infusion Assessment:  Patient tolerated infusion without incident.  Blood return noted pre and post infusion.  No evidence of extravasations.  Access discontinued per protocol.       Discharge Plan:   Copy of AVS reviewed with patient and/or family.    Departure Mode: Ambulatory.      Mindi Loo RN

## 2021-11-16 NOTE — NURSING NOTE
Chief Complaint   Patient presents with     Blood Draw     VItals, blood drawn and PIV placed by LPN. Pt checked into appt.      JERROD Mittal LPN

## 2021-11-17 DIAGNOSIS — C90.02 MULTIPLE MYELOMA IN RELAPSE (H): Primary | ICD-10-CM

## 2021-11-17 DIAGNOSIS — C90.02 MULTIPLE MYELOMA IN RELAPSE (H): ICD-10-CM

## 2021-11-17 LAB
MDC_IDC_EPISODE_DTM: NORMAL
MDC_IDC_EPISODE_DURATION: 10 S
MDC_IDC_EPISODE_DURATION: 11 S
MDC_IDC_EPISODE_DURATION: 11 S
MDC_IDC_EPISODE_DURATION: 12 S
MDC_IDC_EPISODE_DURATION: 13 S
MDC_IDC_EPISODE_DURATION: 13 S
MDC_IDC_EPISODE_DURATION: 46 S
MDC_IDC_EPISODE_DURATION: 6 S
MDC_IDC_EPISODE_DURATION: 8 S
MDC_IDC_EPISODE_ID: NORMAL
MDC_IDC_EPISODE_TYPE: NORMAL
MDC_IDC_EPISODE_VENDOR_TYPE: NORMAL
MDC_IDC_LEAD_IMPLANT_DT: NORMAL
MDC_IDC_LEAD_LOCATION: NORMAL
MDC_IDC_LEAD_LOCATION_DETAIL_1: NORMAL
MDC_IDC_LEAD_MFG: NORMAL
MDC_IDC_LEAD_MODEL: NORMAL
MDC_IDC_LEAD_POLARITY_TYPE: NORMAL
MDC_IDC_LEAD_SERIAL: NORMAL
MDC_IDC_MSMT_BATTERY_DTM: NORMAL
MDC_IDC_MSMT_BATTERY_REMAINING_LONGEVITY: 10 MO
MDC_IDC_MSMT_BATTERY_REMAINING_PERCENTAGE: 10 %
MDC_IDC_MSMT_BATTERY_STATUS: NORMAL
MDC_IDC_MSMT_CAP_CHARGE_DTM: NORMAL
MDC_IDC_MSMT_CAP_CHARGE_DTM: NORMAL
MDC_IDC_MSMT_CAP_CHARGE_ENERGY: 41 J
MDC_IDC_MSMT_CAP_CHARGE_TIME: 10.5 S
MDC_IDC_MSMT_CAP_CHARGE_TIME: 10.5 S
MDC_IDC_MSMT_CAP_CHARGE_TYPE: NORMAL
MDC_IDC_MSMT_CAP_CHARGE_TYPE: NORMAL
MDC_IDC_MSMT_LEADCHNL_RV_IMPEDANCE_VALUE: 321 OHM
MDC_IDC_MSMT_LEADCHNL_RV_PACING_THRESHOLD_AMPLITUDE: 1.1 V
MDC_IDC_MSMT_LEADCHNL_RV_PACING_THRESHOLD_PULSEWIDTH: 0.5 MS
MDC_IDC_PG_IMPLANT_DTM: NORMAL
MDC_IDC_PG_MFG: NORMAL
MDC_IDC_PG_MODEL: NORMAL
MDC_IDC_PG_SERIAL: NORMAL
MDC_IDC_PG_TYPE: NORMAL
MDC_IDC_SESS_CLINIC_NAME: NORMAL
MDC_IDC_SESS_DTM: NORMAL
MDC_IDC_SESS_TYPE: NORMAL
MDC_IDC_SET_BRADY_LOWRATE: 40 {BEATS}/MIN
MDC_IDC_SET_BRADY_MODE: NORMAL
MDC_IDC_SET_LEADCHNL_RV_PACING_AMPLITUDE: 2.4 V
MDC_IDC_SET_LEADCHNL_RV_PACING_POLARITY: NORMAL
MDC_IDC_SET_LEADCHNL_RV_PACING_PULSEWIDTH: 0.5 MS
MDC_IDC_SET_LEADCHNL_RV_SENSING_ADAPTATION_MODE: NORMAL
MDC_IDC_SET_LEADCHNL_RV_SENSING_POLARITY: NORMAL
MDC_IDC_SET_LEADCHNL_RV_SENSING_SENSITIVITY: 0.6 MV
MDC_IDC_SET_ZONE_DETECTION_INTERVAL: 286 MS
MDC_IDC_SET_ZONE_DETECTION_INTERVAL: 375 MS
MDC_IDC_SET_ZONE_TYPE: NORMAL
MDC_IDC_SET_ZONE_TYPE: NORMAL
MDC_IDC_SET_ZONE_VENDOR_TYPE: NORMAL
MDC_IDC_SET_ZONE_VENDOR_TYPE: NORMAL
MDC_IDC_STAT_BRADY_DTM_END: NORMAL
MDC_IDC_STAT_BRADY_DTM_START: NORMAL
MDC_IDC_STAT_BRADY_RV_PERCENT_PACED: 13 %
MDC_IDC_STAT_EPISODE_RECENT_COUNT: 0
MDC_IDC_STAT_EPISODE_RECENT_COUNT: 0
MDC_IDC_STAT_EPISODE_RECENT_COUNT: 2
MDC_IDC_STAT_EPISODE_RECENT_COUNT: 421
MDC_IDC_STAT_EPISODE_RECENT_COUNT: 8
MDC_IDC_STAT_EPISODE_RECENT_COUNT_DTM_END: NORMAL
MDC_IDC_STAT_EPISODE_RECENT_COUNT_DTM_START: NORMAL
MDC_IDC_STAT_EPISODE_TYPE: NORMAL
MDC_IDC_STAT_EPISODE_VENDOR_TYPE: NORMAL
MDC_IDC_STAT_TACHYTHERAPY_ATP_DELIVERED_RECENT: 1
MDC_IDC_STAT_TACHYTHERAPY_ATP_DELIVERED_TOTAL: 25
MDC_IDC_STAT_TACHYTHERAPY_RECENT_DTM_END: NORMAL
MDC_IDC_STAT_TACHYTHERAPY_RECENT_DTM_START: NORMAL
MDC_IDC_STAT_TACHYTHERAPY_SHOCKS_ABORTED_RECENT: 1
MDC_IDC_STAT_TACHYTHERAPY_SHOCKS_ABORTED_TOTAL: 24
MDC_IDC_STAT_TACHYTHERAPY_SHOCKS_DELIVERED_RECENT: 1
MDC_IDC_STAT_TACHYTHERAPY_SHOCKS_DELIVERED_TOTAL: 25
MDC_IDC_STAT_TACHYTHERAPY_TOTAL_DTM_END: NORMAL
MDC_IDC_STAT_TACHYTHERAPY_TOTAL_DTM_START: NORMAL

## 2021-11-17 RX ORDER — ACYCLOVIR 400 MG/1
400 TABLET ORAL EVERY 12 HOURS
Qty: 60 TABLET | Refills: 3 | Status: SHIPPED | OUTPATIENT
Start: 2021-11-17 | End: 2022-01-01

## 2021-11-17 RX ORDER — DEXAMETHASONE 4 MG/1
TABLET ORAL
Qty: 15 TABLET | Refills: 4 | Status: SHIPPED | OUTPATIENT
Start: 2021-11-17 | End: 2022-01-01

## 2021-11-23 ENCOUNTER — APPOINTMENT (OUTPATIENT)
Dept: LAB | Facility: CLINIC | Age: 71
End: 2021-11-23
Attending: INTERNAL MEDICINE
Payer: MEDICARE

## 2021-11-23 ENCOUNTER — INFUSION THERAPY VISIT (OUTPATIENT)
Dept: TRANSPLANT | Facility: CLINIC | Age: 71
End: 2021-11-23
Attending: PEDIATRICS
Payer: MEDICARE

## 2021-11-23 VITALS
SYSTOLIC BLOOD PRESSURE: 131 MMHG | RESPIRATION RATE: 17 BRPM | WEIGHT: 192.6 LBS | TEMPERATURE: 97.7 F | BODY MASS INDEX: 28.04 KG/M2 | OXYGEN SATURATION: 94 % | HEART RATE: 62 BPM | DIASTOLIC BLOOD PRESSURE: 81 MMHG

## 2021-11-23 DIAGNOSIS — C90.02 MULTIPLE MYELOMA IN RELAPSE (H): Primary | ICD-10-CM

## 2021-11-23 LAB
BASOPHILS # BLD MANUAL: 0 10E3/UL (ref 0–0.2)
BASOPHILS NFR BLD MANUAL: 0 %
COLLECT DURATION TIME UR: 24 H
CREAT 24H UR-MRATE: 0.97 G/SPEC (ref 1–2)
CREAT UR-MCNC: 28 MG/DL
EOSINOPHIL # BLD MANUAL: 0.3 10E3/UL (ref 0–0.7)
EOSINOPHIL NFR BLD MANUAL: 16 %
ERYTHROCYTE [DISTWIDTH] IN BLOOD BY AUTOMATED COUNT: 16.4 % (ref 10–15)
HCT VFR BLD AUTO: 28.8 % (ref 40–53)
HGB BLD-MCNC: 9.4 G/DL (ref 13.3–17.7)
LYMPHOCYTES # BLD MANUAL: 0.5 10E3/UL (ref 0.8–5.3)
LYMPHOCYTES NFR BLD MANUAL: 29 %
MCH RBC QN AUTO: 34.9 PG (ref 26.5–33)
MCHC RBC AUTO-ENTMCNC: 32.6 G/DL (ref 31.5–36.5)
MCV RBC AUTO: 107 FL (ref 78–100)
MONOCYTES # BLD MANUAL: 0.4 10E3/UL (ref 0–1.3)
MONOCYTES NFR BLD MANUAL: 22 %
NEUTROPHILS # BLD MANUAL: 0.5 10E3/UL (ref 1.6–8.3)
NEUTROPHILS NFR BLD MANUAL: 33 %
NRBC # BLD AUTO: 0 10E3/UL
NRBC BLD MANUAL-RTO: 1 %
PLAT MORPH BLD: ABNORMAL
PLATELET # BLD AUTO: 80 10E3/UL (ref 150–450)
PROT 24H UR-MRATE: 0.42 G/SPEC (ref 0.04–0.23)
PROT UR-MCNC: 0.12 G/L
PROT/CREAT 24H UR: 0.43 G/G CR (ref 0–0.2)
RBC # BLD AUTO: 2.69 10E6/UL (ref 4.4–5.9)
RBC MORPH BLD: ABNORMAL
SPECIMEN VOL UR: 3466 ML
WBC # BLD AUTO: 1.6 10E3/UL (ref 4–11)

## 2021-11-23 PROCEDURE — 86335 IMMUNFIX E-PHORSIS/URINE/CSF: CPT | Mod: TC

## 2021-11-23 PROCEDURE — 250N000011 HC RX IP 250 OP 636: Performed by: INTERNAL MEDICINE

## 2021-11-23 PROCEDURE — 84156 ASSAY OF PROTEIN URINE: CPT

## 2021-11-23 PROCEDURE — 81050 URINALYSIS VOLUME MEASURE: CPT | Performed by: PATHOLOGY

## 2021-11-23 PROCEDURE — 96365 THER/PROPH/DIAG IV INF INIT: CPT

## 2021-11-23 PROCEDURE — 36415 COLL VENOUS BLD VENIPUNCTURE: CPT | Performed by: INTERNAL MEDICINE

## 2021-11-23 PROCEDURE — 250N000013 HC RX MED GY IP 250 OP 250 PS 637: Performed by: INTERNAL MEDICINE

## 2021-11-23 PROCEDURE — 85027 COMPLETE CBC AUTOMATED: CPT | Performed by: INTERNAL MEDICINE

## 2021-11-23 PROCEDURE — 96401 CHEMO ANTI-NEOPL SQ/IM: CPT

## 2021-11-23 PROCEDURE — 258N000003 HC RX IP 258 OP 636: Performed by: INTERNAL MEDICINE

## 2021-11-23 RX ORDER — ACETAMINOPHEN 325 MG/1
650 TABLET ORAL ONCE
Status: COMPLETED | OUTPATIENT
Start: 2021-11-23 | End: 2021-11-23

## 2021-11-23 RX ORDER — DIPHENHYDRAMINE HCL 25 MG
50 CAPSULE ORAL ONCE
Status: COMPLETED | OUTPATIENT
Start: 2021-11-23 | End: 2021-11-23

## 2021-11-23 RX ADMIN — DARATUMUMAB AND HYALURONIDASE-FIHJ (HUMAN RECOMBINANT) 1800 MG: 1800; 30000 INJECTION SUBCUTANEOUS at 11:49

## 2021-11-23 RX ADMIN — DIPHENHYDRAMINE HYDROCHLORIDE 50 MG: 25 CAPSULE ORAL at 11:02

## 2021-11-23 RX ADMIN — ACETAMINOPHEN 650 MG: 325 TABLET ORAL at 11:02

## 2021-11-23 RX ADMIN — DEXAMETHASONE SODIUM PHOSPHATE 20 MG: 10 INJECTION, SOLUTION INTRAMUSCULAR; INTRAVENOUS at 11:04

## 2021-11-23 ASSESSMENT — PAIN SCALES - GENERAL: PAINLEVEL: NO PAIN (0)

## 2021-11-23 NOTE — PROGRESS NOTES
Infusion Nursing Note:  Erasmo ALEXEY Pearce presents today for Darzalex inj.   Patient seen by provider today:No   present during visit today: Not Applicable.     Note: VSS, pt's anc less than parameter. Per Dr. Raygoza, ok to proceed with kenton today. Patient was premedicated with tylenol, benadryl, and decadron IV. Per patient he always receives those premeds prior to treatment. Pt received subcutaneous darzalex. Blood return noted before and after infusion. Pt tolerated subcutaneous inj.         Intravenous Access:  Peripheral IV placed.     Treatment Conditions:  Results reviewed, labs MET treatment parameters, ok to proceed with treatment.        Post Infusion Assessment:  Patient tolerated infusion without incident.         Discharge Plan:   Patient and/or family verbalized understanding of discharge instructions and all questions answered.

## 2021-11-23 NOTE — NURSING NOTE
"Oncology Rooming Note    November 23, 2021 10:21 AM   Erasmo Pearce is a 71 year old male who presents for:    Chief Complaint   Patient presents with     Blood Draw     IV placed, blood drawn, vitals taken, checked into next appointment     Infusion     Day 22 Cycle 1 Daratumumab r/t MM     Initial Vitals: /81   Pulse 62   Temp 97.7  F (36.5  C) (Oral)   Resp 17   Wt 87.4 kg (192 lb 9.6 oz)   SpO2 94%   BMI 28.04 kg/m   Estimated body mass index is 28.04 kg/m  as calculated from the following:    Height as of 11/2/21: 1.765 m (5' 9.49\").    Weight as of this encounter: 87.4 kg (192 lb 9.6 oz). Body surface area is 2.07 meters squared.  No Pain (0) Comment: Data Unavailable   No LMP for male patient.  Allergies reviewed: Yes  Medications reviewed: Yes    Medications: Medication refills not needed today.  Pharmacy name entered into [facility]:    [facility] - [place], [place] - [address] [facility] - [place], [place] - [address] [place] [address]  [facility] [place] - [address] [facility]    Clinical concerns: VSS. No clinical concerns.       Rosenda Vázquez RN              "

## 2021-11-23 NOTE — NURSING NOTE
Chief Complaint   Patient presents with     Blood Draw     IV placed, blood drawn, vitals taken, checked into next appointment     Labs drawn via IV placed by Chelsea Steinberg MA   in left arm.    Chelsea Steinberg MA

## 2021-11-24 LAB
ALBUMIN MFR UR ELPH: 23.2 %
ALPHA1 GLOB MFR UR ELPH: 16.4 %
ALPHA2 GLOB MFR UR ELPH: 3.8 %
B-GLOBULIN MFR UR ELPH: 7.4 %
GAMMA GLOB MFR UR ELPH: 49.2 %
M PROTEIN MFR UR ELPH: 37.1 %
PROT ELPH PNL UR ELPH: NORMAL
PROT PATTERN UR ELPH-IMP: ABNORMAL

## 2021-11-24 PROCEDURE — 84166 PROTEIN E-PHORESIS/URINE/CSF: CPT | Mod: 26 | Performed by: PATHOLOGY

## 2021-11-24 PROCEDURE — 86335 IMMUNFIX E-PHORSIS/URINE/CSF: CPT | Mod: 26 | Performed by: PATHOLOGY

## 2021-11-26 ENCOUNTER — TELEPHONE (OUTPATIENT)
Dept: ONCOLOGY | Facility: CLINIC | Age: 71
End: 2021-11-26
Payer: MEDICARE

## 2021-11-26 NOTE — TELEPHONE ENCOUNTER
Reviewed below rx and confirmed dose appropriateness and accuracy.    Werner Walker, Pharm D.  (He/Him/His)  Clinical Oncology Pharmacist- Oral Chemotherapy Monitoring Program  [phone]    November 26, 2021

## 2021-11-26 NOTE — TELEPHONE ENCOUNTER
Oral Chemotherapy Monitoring Program    Medication: Pomalyst  Rx:  4mg PO daily on days 1-14 of 21 day cycle    Auth #: [id number]  Risk Category: Adult male    Routine survey questions reviewed.    Thank you, if you have any further questions please feel free to contact me.    Yordan Staples  Lead Oncology Liaison  [email or web address]  Phone: [phone]  Fax: [phone]

## 2021-11-29 LAB
ABO/RH(D): ABNORMAL
ANTIBODY SCREEN: POSITIVE
SPECIMEN EXPIRATION DATE: ABNORMAL

## 2021-11-29 RX ORDER — HEPARIN SODIUM,PORCINE 10 UNIT/ML
5 VIAL (ML) INTRAVENOUS
Status: CANCELLED | OUTPATIENT
Start: 2021-11-29

## 2021-11-29 RX ORDER — HEPARIN SODIUM (PORCINE) LOCK FLUSH IV SOLN 100 UNIT/ML 100 UNIT/ML
5 SOLUTION INTRAVENOUS
Status: CANCELLED | OUTPATIENT
Start: 2021-11-29

## 2021-11-30 NOTE — PROGRESS NOTES
Infusion Nursing Note:  Erasmo Pearce presents today for scheduled infusion.    Patient seen by provider today: Yes: Dr. Rock Raygoza   present during visit today: Not Applicable.    Note: Lab results monitored, met treatment parameters. Patient given 20mg IV Decadron, 650mg PO Tylenol, and 50mg PO Benadryl as premeds. Discussed with patient that Decadron is the only premed he needs to have, as he has not had a reaction in the past to Jenny. Treatment plan should be updated for next infusion. Daratumumab given subcutaneously in LLQ of abdomen over 4 minutes. Kyprolis infused over 10 minutes. Both drugs checked by second RN prior to infusion. VSS throughout. No clinical complaints. 24 hour urine jug given to patient.    Intravenous Access:  Peripheral IV placed.  Removed prior to discharge.    Treatment Conditions:  Results reviewed, labs MET treatment parameters, ok to proceed with treatment.      Post Infusion Assessment:  Patient tolerated infusion without incident.       Discharge Plan:   Patient discharged in stable condition accompanied by: wife.      Mindi Ortiz RN

## 2021-11-30 NOTE — PROGRESS NOTES
BONE MARROW CLINIC VISIT       Jonh returns to the hematology clinic for treatment of relapsed multiple myeloma Jonh has completed 1 cycle of daratumumab 1400 mg weekly Progress 20 mg on days 1 8 and 15 and pomalidomide 4 mg on days 1 through 14 and Decadron on days 1-9 16 and 23 he did have some cytopenias with thrombocytopenia with his platelet count falling into the 80s he otherwise is tolerated this very well no nausea vomiting or diarrhea he is here to begin cycle 2.  No problems with ventricular arrhythmias no ICD activation  Remains on warfarin 5mg daily He did  Get a 3rd Pfizer booster.  PAST MEDICAL HISTORY, SOCIAL HISTORY AND FAMILY HISTORY:  See my note from 11/2021.      REVIEW OF SYSTEMS:  A 12 point review of systems done is negative as in HPI.      PHYSICAL EXAMINATION: /55   Pulse 60   Temp 98.4  F (36.9  C) (Oral)   Resp 16   Wt 87.7 kg (193 lb 4.8 oz)   SpO2 94%   BMI 28.15 kg/m      GENERAL:  He is an alert gentleman in no acute distress.   VITAL SIGNS:  Stable.   HEENT:  Normocephalic.  EOM okay.  Mouth without lesion.   RESPIRATORY:  Clear to percussion and auscultation.   CARDIAC:  Irregularly irregular rhythm.  No S3, S4 or murmur.   ABDOMEN:  Soft without hepatosplenomegaly.   EXTREMITIES:  +2 edema bilaterally   SKIN 7mm oblong brownish pink macule left subscapular area    Results for JONH ZAPATA (MRN [id number]) as of 11/30/2021 13:02   Ref. Range 11/30/2021 09:33   Sodium Latest Ref Range: 133 - 144 mmol/L 139   Potassium Latest Ref Range: 3.4 - 5.3 mmol/L 4.9   Chloride Latest Ref Range: 94 - 109 mmol/L 105   Carbon Dioxide Latest Ref Range: 20 - 32 mmol/L 26   Urea Nitrogen Latest Ref Range: 7 - 30 mg/dL 22   Creatinine Latest Ref Range: 0.66 - 1.25 mg/dL 1.29 (H)   GFR Estimate Latest Ref Range: >60 mL/min/1.73m2 56 (L)   Calcium Latest Ref Range: 8.5 - 10.1 mg/dL 9.0   Anion Gap Latest Ref Range: 3 - 14 mmol/L 8   Magnesium Latest Ref Range: 1.6 - 2.3 mg/dL 1.6    Albumin Latest Ref Range: 3.4 - 5.0 g/dL 3.2 (L)   Protein Total Latest Ref Range: 6.8 - 8.8 g/dL 6.3 (L)   Bilirubin Total Latest Ref Range: 0.2 - 1.3 mg/dL 1.1   Alkaline Phosphatase Latest Ref Range: 40 - 150 U/L 136   ALT Latest Ref Range: 0 - 70 U/L 26   AST Latest Ref Range: 0 - 45 U/L 15   Glucose Latest Ref Range: 70 - 99 mg/dL 117 (H)   WBC Latest Ref Range: 4.0 - 11.0 10e3/uL 5.8   Hemoglobin Latest Ref Range: 13.3 - 17.7 g/dL 9.4 (L)   Hematocrit Latest Ref Range: 40.0 - 53.0 % 29.2 (L)   Platelet Count Latest Ref Range: 150 - 450 10e3/uL 218   RBC Count Latest Ref Range: 4.40 - 5.90 10e6/uL 2.67 (L)   MCV Latest Ref Range: 78 - 100 fL 109 (H)   MCH Latest Ref Range: 26.5 - 33.0 pg 35.2 (H)   MCHC Latest Ref Range: 31.5 - 36.5 g/dL 32.2   RDW Latest Ref Range: 10.0 - 15.0 % 17.5 (H)   % Neutrophils Latest Units: % 75   % Lymphocytes Latest Units: % 9   % Monocytes Latest Units: % 12   % Eosinophils Latest Units: % 2   % Basophils Latest Units: % 1   Absolute Basophils Latest Ref Range: 0.0 - 0.2 10e3/uL 0.0   Absolute Eosinophils Latest Ref Range: 0.0 - 0.7 10e3/uL 0.1   Absolute Immature Granulocytes Latest Ref Range: <=0.0 10e3/uL 0.1 (H)   Absolute Lymphocytes Latest Ref Range: 0.8 - 5.3 10e3/uL 0.5 (L)   Absolute Monocytes Latest Ref Range: 0.0 - 1.3 10e3/uL 0.7   % Immature Granulocytes Latest Units: % 1   Absolute Neutrophils Latest Ref Range: 1.6 - 8.3 10e3/uL 4.4   Absolute NRBCs Latest Units: 10e3/uL 0.0   NRBCs per 100 WBC Latest Ref Range: <1 /100 0   INR Latest Ref Range: 0.85 - 1.15  2.04 (H)   ABO/Rh(D) Unknown A POS   Antibody Screen Latest Ref Range: Negative  Positive (A)   SPECIMEN EXPIRATION DATE Unknown [id number]   ANTIBODY SCREEN, TUBE Latest Ref Range: Negative  NEG         ASSESSMENT  1.  Multiple myeloma. In relapse  2.  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   3.  Cardiomyopathy with CHF.   4.  Atrial fibrillation.   5.  History of deep  vein thrombosis, on warfarin.   6.  Chronic back pain.   7.  Aortic aneurysm.   8.  History of plasmacytoma of the clivus, status post radiation.   9.  History of ventricular tachycardia with implantable defibrillator in place.   10.  Hypomagnesemia.     11  Hyperkalemia   12. Skin lesion    Jonh has bounced back with platelets over 200k today to begin. Light chains and IGA have decreased. Cycle 2, Will give  Daratumumab Sub Q 1400mg weekly x 4 weeks  , [id number] mg Days 1,8 and 15, Pomalidamide 4mg Days 1-14, Decadron Days 1, 8 ,15 and 228  In a 28 Day cycle. I adjusted doses due to cytopenia..Will monitor light chains, kidney and cardiac function.After this cycle Jenny would go to  Every 2 weeks. Will give chemo here at the [facility]  He already is on anticoagulation and is on acyclovir 400 mg twice a day as infection prophylaxis.    Weekly infusion visits  RTC  With Dr LIZ Thornton 28    I spent a total of 30  minutes face to face with Erasmo Pearce during today's office visit. Over 50% of this time was spent counseling the patient and coordinating the care regarding multiple myeloma and 10minutes preparing to see the patient and  care coordination     Rojas Raygoza MD  [phone]

## 2021-11-30 NOTE — NURSING NOTE
Chief Complaint   Patient presents with     Blood Draw     Labs drawn via PIV placed by RN in lab. VS taken.      Labs drawn from PIV placed by RN. Line flushed with saline. Vitals taken. Pt checked in for appointment(s).    Carole PENA RN PHN BSN  BMT/Oncology Lab]

## 2021-11-30 NOTE — LETTER
11/30/2021         RE: Erasmo Zapata  [address]  [address]  [facility] [address]        Dear Colleague,    Thank you for referring your patient, Erasmo Zapata, to the [facility] BLOOD AND MARROW TRANSPLANT PROGRAM [place]. Please see a copy of my visit note below.    BONE MARROW CLINIC VISIT       Jonh returns to the hematology clinic for treatment of relapsed multiple myeloma Jonh has completed 1 cycle of daratumumab 1400 mg weekly Progress 20 mg on days 1 8 and 15 and pomalidomide 4 mg on days 1 through 14 and Decadron on days 1-9 16 and 23 he did have some cytopenias with thrombocytopenia with his platelet count falling into the 80s he otherwise is tolerated this very well no nausea vomiting or diarrhea he is here to begin cycle 2.  No problems with ventricular arrhythmias no ICD activation  Remains on warfarin 5mg daily He did  Get a 3rd Pfizer booster.  PAST MEDICAL HISTORY, SOCIAL HISTORY AND FAMILY HISTORY:  See my note from 11/2021.      REVIEW OF SYSTEMS:  A 12 point review of systems done is negative as in HPI.      PHYSICAL EXAMINATION: /55   Pulse 60   Temp 98.4  F (36.9  C) (Oral)   Resp 16   Wt 87.7 kg (193 lb 4.8 oz)   SpO2 94%   BMI 28.15 kg/m      GENERAL:  He is an alert gentleman in no acute distress.   VITAL SIGNS:  Stable.   HEENT:  Normocephalic.  EOM okay.  Mouth without lesion.   RESPIRATORY:  Clear to percussion and auscultation.   CARDIAC:  Irregularly irregular rhythm.  No S3, S4 or murmur.   ABDOMEN:  Soft without hepatosplenomegaly.   EXTREMITIES:  +2 edema bilaterally   SKIN 7mm oblong brownish pink macule left subscapular area    Results for JONH ZAPATA (MRN [id number]) as of 11/30/2021 13:02   Ref. Range 11/30/2021 09:33   Sodium Latest Ref Range: 133 - 144 mmol/L 139   Potassium Latest Ref Range: 3.4 - 5.3 mmol/L 4.9   Chloride Latest Ref Range: 94 - 109 mmol/L 105   Carbon Dioxide Latest Ref Range: 20 - 32 mmol/L 26   Urea  Nitrogen Latest Ref Range: 7 - 30 mg/dL 22   Creatinine Latest Ref Range: 0.66 - 1.25 mg/dL 1.29 (H)   GFR Estimate Latest Ref Range: >60 mL/min/1.73m2 56 (L)   Calcium Latest Ref Range: 8.5 - 10.1 mg/dL 9.0   Anion Gap Latest Ref Range: 3 - 14 mmol/L 8   Magnesium Latest Ref Range: 1.6 - 2.3 mg/dL 1.6   Albumin Latest Ref Range: 3.4 - 5.0 g/dL 3.2 (L)   Protein Total Latest Ref Range: 6.8 - 8.8 g/dL 6.3 (L)   Bilirubin Total Latest Ref Range: 0.2 - 1.3 mg/dL 1.1   Alkaline Phosphatase Latest Ref Range: 40 - 150 U/L 136   ALT Latest Ref Range: 0 - 70 U/L 26   AST Latest Ref Range: 0 - 45 U/L 15   Glucose Latest Ref Range: 70 - 99 mg/dL 117 (H)   WBC Latest Ref Range: 4.0 - 11.0 10e3/uL 5.8   Hemoglobin Latest Ref Range: 13.3 - 17.7 g/dL 9.4 (L)   Hematocrit Latest Ref Range: 40.0 - 53.0 % 29.2 (L)   Platelet Count Latest Ref Range: 150 - 450 10e3/uL 218   RBC Count Latest Ref Range: 4.40 - 5.90 10e6/uL 2.67 (L)   MCV Latest Ref Range: 78 - 100 fL 109 (H)   MCH Latest Ref Range: 26.5 - 33.0 pg 35.2 (H)   MCHC Latest Ref Range: 31.5 - 36.5 g/dL 32.2   RDW Latest Ref Range: 10.0 - 15.0 % 17.5 (H)   % Neutrophils Latest Units: % 75   % Lymphocytes Latest Units: % 9   % Monocytes Latest Units: % 12   % Eosinophils Latest Units: % 2   % Basophils Latest Units: % 1   Absolute Basophils Latest Ref Range: 0.0 - 0.2 10e3/uL 0.0   Absolute Eosinophils Latest Ref Range: 0.0 - 0.7 10e3/uL 0.1   Absolute Immature Granulocytes Latest Ref Range: <=0.0 10e3/uL 0.1 (H)   Absolute Lymphocytes Latest Ref Range: 0.8 - 5.3 10e3/uL 0.5 (L)   Absolute Monocytes Latest Ref Range: 0.0 - 1.3 10e3/uL 0.7   % Immature Granulocytes Latest Units: % 1   Absolute Neutrophils Latest Ref Range: 1.6 - 8.3 10e3/uL 4.4   Absolute NRBCs Latest Units: 10e3/uL 0.0   NRBCs per 100 WBC Latest Ref Range: <1 /100 0   INR Latest Ref Range: 0.85 - 1.15  2.04 (H)   ABO/Rh(D) Unknown A POS   Antibody Screen Latest Ref Range: Negative  Positive (A)   SPECIMEN  EXPIRATION DATE Unknown [id number]   ANTIBODY SCREEN, TUBE Latest Ref Range: Negative  NEG         ASSESSMENT  1.  Multiple myeloma. In relapse  2.  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   3.  Cardiomyopathy with CHF.   4.  Atrial fibrillation.   5.  History of deep vein thrombosis, on warfarin.   6.  Chronic back pain.   7.  Aortic aneurysm.   8.  History of plasmacytoma of the clivus, status post radiation.   9.  History of ventricular tachycardia with implantable defibrillator in place.   10.  Hypomagnesemia.     11  Hyperkalemia   12. Skin lesion    Jonh has bounced back with platelets over 200k today to begin. Light chains and IGA have decreased. Cycle 2, Will give  Daratumumab Sub Q 1400mg weekly x 4 weeks  , [id number] mg Days 1,8 and 15, Pomalidamide 4mg Days 1-14, Decadron Days 1, 8 ,15 and 228  In a 28 Day cycle. I adjusted doses due to cytopenia..Will monitor light chains, kidney and cardiac function.After this cycle Jenny would go to  Every 2 weeks. [place] [facility]  He already is on anticoagulation and is on acyclovir 400 mg twice a day as infection prophylaxis.    Weekly infusion visits  RTC  With Dr HILL Dec 28    I spent a total of 30  minutes face to face with Erasmo Pearce during today's office visit. Over 50% of this time was spent counseling the patient and coordinating the care regarding multiple myeloma and 10minutes preparing to see the patient and  care coordination     Rojas Raygoza MD  [phone]

## 2021-12-07 NOTE — PROGRESS NOTES
Infusion Nursing Note:  Erasmo Pearce presents today for daratumumab and kyprolis infusion.    Patient seen by provider today: No   present during visit today: Not Applicable.    Note: Jonh here today for Cycle 2 Day 8 Daratumumab and Kyprolis. He was premedicated with decadron, tylenol, and benadryl. Received D5 fluid flush 30 minutes prior to and 30 minutes post Kyprolis. Daratumumab injection was given in the subcutaneous tissue in LLQ of abdomen. Tolerated both therapies without any issues or side effects. Labs were monitored, no additional infusions were needed.     Intravenous Access:  Peripheral IV placed.    Treatment Conditions:  Lab Results   Component Value Date    HGB 9.8 (L) 12/07/2021    WBC 7.7 12/07/2021    ANEU 0.5 (L) 11/23/2021    ANEUTAUTO 7.2 12/07/2021     12/07/2021      Lab Results   Component Value Date     12/07/2021    POTASSIUM 4.3 12/07/2021    MAG 1.7 12/07/2021    CR 1.23 12/07/2021    EILEEN 8.6 12/07/2021    BILITOTAL 1.9 (H) 12/07/2021    ALBUMIN 3.1 (L) 12/07/2021    ALT 25 12/07/2021    AST 15 12/07/2021     Results reviewed, labs MET treatment parameters, ok to proceed with treatment.      Post Infusion Assessment:  Patient tolerated kyprolis infusion without incident.  Patient tolerated daratumumab injection without incident.  Blood return noted pre and post infusion.  Access discontinued per protocol.    Biologic Infusion Post Education: Call the triage nurse at your clinic or seek medical attention if you have chills and/or temperature greater than or equal to 100.5, uncontrolled nausea/vomiting, diarrhea, constipation, dizziness, shortness of breath, chest pain, heart palpitations, weakness or any other new or concerning symptoms, questions or concerns.  You cannot have any live virus vaccines prior to or during treatment or up to 6 months post infusion.  If you have an upcoming surgery, medical procedure or dental procedure during treatment,  this should be discussed with your ordering physician and your surgeon/dentist.  If you are having any concerning symptom, if you are unsure if you should get your next infusion or wish to speak to a provider before your next infusion, please call your care coordinator or triage nurse at your clinic to notify them so we can adequately serve you.       Discharge Plan:   Patient discharged in stable condition accompanied by: self.  Departure Mode: Ambulatory.      Mindi Loo RN

## 2021-12-07 NOTE — NURSING NOTE
Chief Complaint   Patient presents with     Blood Draw     Labs drawn via PIV placed by RN in lab. VS taken.      Sundeep Liang RN

## 2021-12-09 NOTE — TELEPHONE ENCOUNTER
Called pt to let him know [facility]. I stated we could renew prescription when he is here for visit with Dr. Prince in February 2022. Pt reports understanding and denies further questions.     Al Phillips RN   Cardiology Nurse Coordinator

## 2021-12-09 NOTE — ORAL ONC MGMT
Oral Chemotherapy Monitoring Program    Subjective/Objective:  Erasmo Pearce is a 71 year old male contacted by phone for a follow-up visit for oral chemotherapy. Jonh confirms taking Pomalyst 4 mg daily for 2 weeks on, then 2 weeks off. He states that he is tolerating this well, with no new or worsening adverse effects. He denies any recent medication changes or missed doses of Pomalyst. Last cycle started 11/30.    ORAL CHEMOTHERAPY 8/29/2021 10/12/2021 10/27/2021 10/27/2021 12/3/2021 12/5/2021 12/9/2021   Assessment Type Chart Review Refill Left Voicemail Lab Monitoring;Monthly Follow up Refill Chart Review Monthly Follow up   Diagnosis Code Multiple Myeloma Multiple Myeloma Multiple Myeloma Multiple Myeloma Multiple Myeloma Multiple Myeloma Multiple Myeloma   Providers Dr. Jaret Raygoza   [facility]   Drug Name Pomalyst (pomalidomide) Pomalyst (pomalidomide) Pomalyst (pomalidomide) Pomalyst (pomalidomide) Pomalyst (pomalidomide) Pomalyst (pomalidomide) Pomalyst (pomalidomide)   Dose 4 mg 4 mg - 4 mg 4 mg 4 mg 4 mg   Current Schedule Daily Daily - Daily Daily Daily Daily   Cycle Details 3 weeks on, 1 week off 2 weeks on, 2 weeks off - 2 weeks on, 2 weeks off 2 weeks on, 2 weeks off 2 weeks on, 2 weeks off 2 weeks on, 2 weeks off   Start Date of Last Cycle - - - - - - 11/30/2021   Planned next cycle start date - - - - - - 12/28/2021   Doses missed in last 2 weeks - - - - - - 0   Adherence Assessment - - - - - - Adherent   Adverse Effects - - - No AE identified during assessment - - No AE identified during assessment   Myalgias/Arthralgias - - - - - - -   Pharmacist Intervention(myalgias/arthralgias) - - - - - - -   Fatigue - - - - - - -   Pharmacist Intervention(fatigue) - - - - - - -   Pharmacist intervention(other) - - - - - - -   Home BPs - - -  "- - - -   Any new drug interactions? - - - No - - No   Pharmacist Intervention? - - - - - - -   Intervention(s) - - - - - - -   Is the dose as ordered appropriate for the patient? - - - Yes - - Yes   Has the patient been assessed within the past 6 months for depression? - - - - - - -   Has the patient missed any days of school, work, or other routine activity? - - - - - - -       Last PHQ-2 Score on record:   PHQ-2 ( [facility]) 2/18/2020 6/11/2019   Q1: Little interest or pleasure in doing things 0 0   Q2: Feeling down, depressed or hopeless 0 0   PHQ-2 Score 0 0   PHQ-2 Total Score (12-17 Years)- Positive if 3 or more points; Administer PHQ-A if positive 0 0       Vitals:  BP:   BP Readings from Last 1 Encounters:   12/07/21 91/55     Wt Readings from Last 1 Encounters:   12/07/21 87.1 kg (192 lb)     Estimated body surface area is 2.07 meters squared as calculated from the following:    Height as of 11/2/21: 1.765 m (5' 9.49\").    Weight as of 12/7/21: 87.1 kg (192 lb).    Labs:  _  Result Component Current Result Ref Range   Sodium 139 (12/7/2021) 133 - 144 mmol/L     _  Result Component Current Result Ref Range   Potassium 4.3 (12/7/2021) 3.4 - 5.3 mmol/L     _  Result Component Current Result Ref Range   Calcium 8.6 (12/7/2021) 8.5 - 10.1 mg/dL     _  Result Component Current Result Ref Range   Magnesium 1.7 (12/7/2021) 1.6 - 2.3 mg/dL     No results found for Phos within last 30 days.     _  Result Component Current Result Ref Range   Albumin 3.1 (L) (12/7/2021) 3.4 - 5.0 g/dL     _  Result Component Current Result Ref Range   Urea Nitrogen 23 (12/7/2021) 7 - 30 mg/dL     _  Result Component Current Result Ref Range   Creatinine 1.23 (12/7/2021) 0.66 - 1.25 mg/dL     _  Result Component Current Result Ref Range   AST 15 (12/7/2021) 0 - 45 U/L     _  Result Component Current Result Ref Range   ALT 25 (12/7/2021) 0 - 70 U/L     _  Result Component Current Result Ref Range   Bilirubin Total 1.9 (H) (12/7/2021) " 0.2 - 1.3 mg/dL     _  Result Component Current Result Ref Range   WBC Count 7.7 (12/7/2021) 4.0 - 11.0 10e3/uL     _  Result Component Current Result Ref Range   Hemoglobin 9.8 (L) (12/7/2021) 13.3 - 17.7 g/dL     _  Result Component Current Result Ref Range   Platelet Count 177 (12/7/2021) 150 - 450 10e3/uL     _  Result Component Current Result Ref Range   Absolute Neutrophils 0.5 (L) (11/23/2021) 1.6 - 8.3 10e3/uL         Assessment/Plan:  Jonh is tolerating Pomalyst well. Continue current therapy.     Follow-Up:  12/14: labs and infusion appointments    Refill Due:  12/28    Zayra Medley, PharmD, [facility]  Hematology/Oncology Clinical Pharmacist  [facility]  [facility]  [phone]

## 2021-12-09 NOTE — TELEPHONE ENCOUNTER
[facility] Call Center    Phone Message    May a detailed message be left on voicemail: yes     Reason for Call: Medication Refill Request    Has the patient contacted the pharmacy for the refill? Yes   Name of medication being requested: mexiletine (MEXITIL) 150 MG capsule  And  simvastatin (ZOCOR) 40 MG tablet - pt   Provider who prescribed the medication: Rojas Raygoza for Zocor and Ja Duarte for mexiletine (MEXITIL) 150 MG capsule    [facility]  Date medication is needed: Jan 1, 2022     Action Taken: Message routed to:  [facility] cardio    Travel Screening: Not Applicable

## 2021-12-14 NOTE — PROGRESS NOTES
Infusion Nursing Note:  Erasmo Pearce presents today for Cycle 2 Day 15 darzalex faspro and kyprolis.  Patient seen by provider today: No   present during visit today: Not Applicable.    Note:   Pt received Cycle 2 Day 15 darzalex faspro by subcutaneous route to left lower abdomen. Pt received tylenol, benadryl, and decadron as premeds.      Pt received kyprolis over 10 minutes. Received D5W flush 500ml/hr for 30 minutes pre-kyprolis and post-kyprolis.    Received fluzone injection to left deltoid.    Intravenous Access:  Peripheral IV placed.    Treatment Conditions:  Lab Results   Component Value Date    HGB 9.5 (L) 12/14/2021    WBC 2.7 (L) 12/14/2021    ANEU 0.5 (L) 11/23/2021    ANEUTAUTO 1.7 12/14/2021    PLT 75 (L) 12/14/2021      Lab Results   Component Value Date     12/14/2021    POTASSIUM 4.8 12/14/2021    MAG 1.7 12/14/2021    CR 1.35 (H) 12/14/2021    EILEEN 8.4 (L) 12/14/2021    BILITOTAL 1.9 (H) 12/14/2021    ALBUMIN 3.0 (L) 12/14/2021    ALT 34 12/14/2021    AST 18 12/14/2021     Results reviewed, labs MET treatment parameters, ok to proceed with treatment.      Post Infusion Assessment:  Patient tolerated infusion without incident.  Patient tolerated injection without incident.  Blood return noted pre and post infusion.  Site patent and intact, free from redness, edema or discomfort.  No evidence of extravasations.  Access discontinued per protocol.       Discharge Plan:   Discharge instructions reviewed with: Patient.  Patient discharged in stable condition accompanied by: self and wife.  Departure Mode: Ambulatory.      Sandra Monroy RN

## 2021-12-14 NOTE — NURSING NOTE
"(late entry)  Oncology Rooming Note    December 14, 2021 2:08 PM   Erasmo Pearce is a 71 year old male who presents for:    Chief Complaint   Patient presents with     Blood Draw     Vitals, blood drawn and PIV placed by LPN. Pt checked into appt.     Initial Vitals: /54   Pulse 50   Temp 98.1  F (36.7  C) (Oral)   Resp 16   Wt 85.7 kg (188 lb 14.4 oz)   SpO2 95%   BMI 27.51 kg/m   Estimated body mass index is 27.51 kg/m  as calculated from the following:    Height as of 11/2/21: 1.765 m (5' 9.49\").    Weight as of this encounter: 85.7 kg (188 lb 14.4 oz). Body surface area is 2.05 meters squared.  No Pain (0) Comment: Data Unavailable   No LMP for male patient.  Allergies reviewed: Yes  Medications reviewed: Yes    Medications: Medication refills not needed today.  Pharmacy name entered into [employer]:    [facility] [place] [place] - [address] [facility] [place] - [address] [place] [address]  [facility] [place] [place] - [address] [facility]    Clinical concerns: none     Sandra Monroy RN              "

## 2021-12-21 NOTE — TELEPHONE ENCOUNTER
Oral Chemotherapy Monitoring Program    Medication:Pomalyst  Rx:  4mg PO daily 14/ 28 day supply    Auth #: [id number]  Risk Category: Adult male    Routine survey questions reviewed. yes        Danitza Boykin   [facility] Liaison, [facility]  Phone: [phone]  Fax: [phone]  Email: [email or web address]

## 2021-12-21 NOTE — PROGRESS NOTES
Infusion Nursing Note:  Erasmo Pearce presents today for subcutaneous daratumumab injection.    Patient seen by provider today: No   present during visit today: Not Applicable.    Note: Jonh here today for Cycle 2 Day 22 SubQ daratumumab. Assessment completed, see flowsheets for details. Vitals/meds/allergies reviewed. Jonh was premedicated with IV decadron- declined tylenol and benadryl today as no previous reaction. Tolerated injection over 3 minutes in LLQ subcutaneous tissue.       Intravenous Access:  Peripheral IV placed.    Treatment Conditions:  Lab Results   Component Value Date    HGB 8.9 (L) 12/21/2021    WBC 2.2 (L) 12/21/2021    ANEU 0.5 (L) 11/23/2021    ANEUTAUTO 1.6 12/21/2021     (L) 12/21/2021      Lab Results   Component Value Date     12/21/2021    POTASSIUM 4.9 12/21/2021    MAG 1.7 12/14/2021    CR 1.22 12/21/2021    EILEEN 8.5 12/21/2021    BILITOTAL 0.7 12/21/2021    ALBUMIN 3.0 (L) 12/21/2021    ALT 37 12/21/2021    AST 14 12/21/2021         Post Infusion Assessment:  Patient tolerated injection without incident.       Discharge Plan:   Copy of AVS reviewed with patient and/or family.  Patient will return for next appointment.  Patient discharged in stable condition accompanied by: self.  Departure Mode: Ambulatory.      Mindi Loo RN

## 2021-12-21 NOTE — TELEPHONE ENCOUNTER
Please verify the rx for this patient.  This will be faxed to the [facility]    Thank  you!

## 2021-12-21 NOTE — TELEPHONE ENCOUNTER
I have reviewed and agree to the information submitted for this prescription.     Janina Lynne, PharmD, BCACP  Oral Chemotherapy Monitoring Program  [facility]  [phone]  December 21, 2021

## 2021-12-21 NOTE — NURSING NOTE
"Oncology Rooming Note    December 21, 2021 10:11 AM   Erasmo Pearce is a 71 year old male who presents for:    Chief Complaint   Patient presents with     Blood Draw     Labs drawn via piv by RN in lab.  VS taken     Initial Vitals: /47   Pulse 88   Temp 97.9  F (36.6  C) (Oral)   Resp 16   Wt 83.7 kg (184 lb 9.6 oz)   SpO2 93%   BMI 26.88 kg/m   Estimated body mass index is 26.88 kg/m  as calculated from the following:    Height as of 11/2/21: 1.765 m (5' 9.49\").    Weight as of this encounter: 83.7 kg (184 lb 9.6 oz). Body surface area is 2.03 meters squared.  No Pain (0) Comment: Data Unavailable   No LMP for male patient.  Allergies reviewed: Yes  Medications reviewed: Yes    Medications: Medication refills not needed today.  Pharmacy name entered into [employer]:    [facility] - [place], [place] - [address] [facility] - [place], [place] - [address] [place] [address]  [facility] [place] - [address] [facility]    Clinical concerns: none       Mindi Loo, RN            "

## 2021-12-28 NOTE — LETTER
12/28/2021         RE: Erasmo Pearce  [address]  [address]  [facility] [address]        Dear Colleague,    Thank you for referring your patient, Erasmo Pearce, to the [facility] BLOOD AND MARROW TRANSPLANT PROGRAM [place]. Please see a copy of my visit note below.    Infusion Nursing Note:  Erasmo Pearce presents today for Cycle 3 day 1 daratumumab and kyprolis infusion.    Patient seen by provider today: No   present during visit today: Not Applicable.     Note: Jonh here today for Cycle 3 Day 1 Daratumumab and Kyprolis. He was premedicated with singular, tylenol, and benadryl. Received D5 fluid flush 30 minutes prior to and 30 minutes post Kyprolis. Daratumumab injection was given in the subcutaneous tissue in RLQ of abdomen. Tolerated both therapies without any issues or side effects. Low b/p. Could be combination of diarrhea and meds. 1L NS bolus administered. 2g IV mag administered. Per provider, pt is to increase oral magnesium, hold lisinopril tonight and tomorrow, check b/p and send results via [employer] to bmt provider. Pt and wife verbalize understanding.     Intravenous Access:  Peripheral IV placed.    Treatment Conditions:  Results reviewed, labs MET treatment parameters, ok to proceed with treatment.      Post Infusion Assessment:  Patient tolerated infusion without incident.  Patient tolerated injection without incident.       Discharge Plan:   AVS to patient via [employer].  Patient will return Next week for next appointment.   Patient discharged in stable condition accompanied by: wife.  Departure Mode: Ambulatory.      Yevgeniy Meyers RN                          Again, thank you for allowing me to participate in the care of your patient.        Sincerely,        [facility]

## 2021-12-28 NOTE — LETTER
12/28/2021         RE: Erasmo Pearce  [address]  [address]  [facility] [address]        Dear Colleague,    Thank you for referring your patient, Erasmo Pearce, to the [facility]PL[facility]. Please see a copy of my visit note below.    BONE MARROW CLINIC VISIT       Jonh returns to the hematology clinic for treatment of relapsed multiple myeloma Jonh has completed 1 cycle of daratumumab 1400 mg weekly Progress 20 mg on days 1 8 and 15 and pomalidomide 4 mg on days 1 through 14 and Decadron on days 1-9 16 and 23 he did have some cytopenias with thrombocytopenia with his platelet count falling into the 80s he otherwise is tolerated this very well no nausea vomiting or diarrhea he is here to begin cycle 3, Day 1 with kyprolis and kenton..He has had diarrhea the last couple of days and was noted to have low BP. Wife notes he was confused and forgetful this morning on the drive in.   No problems with ventricular arrhythmias no ICD activation  Remains on warfarin 5mg daily     PAST MEDICAL HISTORY, SOCIAL HISTORY AND FAMILY HISTORY:  See my note from 11/2021.      REVIEW OF SYSTEMS:  A 12 point review of systems done is negative as in HPI.      PHYSICAL EXAMINATION: BP 83/41 P 51 O2Sat 95%  GENERAL:  He is an alert gentleman in no acute distress.   VITAL SIGNS:  Stable.   HEENT:  Normocephalic.  EOM okay.  Mouth without lesion.   RESPIRATORY:  Clear to percussion and auscultation.   CARDIAC:  Irregularly irregular rhythm.  No S3, S4 or murmur.   ABDOMEN:  Soft without hepatosplenomegaly.   EXTREMITIES:  +1 edema bilaterally   SKIN 7mm oblong brownish pink macule left subscapular area  LABS  Mg 1.2  Cr 1.41  Hgb 8.9 Plat 240k  WBC 9k      ASSESSMENT  1.  Multiple myeloma. In relapse  2.  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   3.  Cardiomyopathy with CHF.   4.  Atrial fibrillation.   5.  History of deep vein  thrombosis, on warfarin.   6.  Chronic back pain.   7.  Aortic aneurysm.   8.  History of plasmacytoma of the clivus, status post radiation.   9.  History of ventricular tachycardia with implantable defibrillator in place.   10.  Hypomagnesemia.     11  Hyperkalemia   12. Skin lesion   13. Hypotension  14 Hypomagnesemia  Jonh is here for day 1 of cycle 3 Kyprolis and Jenny and Dex.  Unfortunately Jonh is dehydrated probably due to diarrhea and hypomagnesemia.  I gave him an extra liter of saline and 2 g of magnesium he should have Gatorade on the way home to [place].  I asked him to hold his 5 mg lisinopril tonight and tomorrow his wife Carla will send me blood pressure readings.  He apparently has some problems with constipation with pomalidomide and then will have diarrhea when the constipation resolves.  He will begin Pomalidamide 4 mg days 1 through 14 he will return to clinic in 2 weeks and I will see him in approximately 4 weeks.    I spent a total of 30  minutes face to face with Erasmo Pearce during today's office visit. Over 50% of this time was spent counseling the patient and coordinating the care regarding multiple myeloma and 10minutes preparing to see the patient and  care coordination     Rojas Raygoza MD  [phone]

## 2021-12-28 NOTE — NURSING NOTE
Chief Complaint   Patient presents with     Blood Draw     Labs drawn via PIV by RN in lab. VS taken.      Labs drawn via peripheral IV. Vital signs taken. Checked into next appointment.   Laura Almaraz RN

## 2021-12-28 NOTE — PROGRESS NOTES
Infusion Nursing Note:  Erasmo Pearce presents today for Cycle 3 day 1 daratumumab and kyprolis infusion.    Patient seen by provider today: No   present during visit today: Not Applicable.     Note: Jonh here today for Cycle 3 Day 1 Daratumumab and Kyprolis. He was premedicated with singular, tylenol, and benadryl. Received D5 fluid flush 30 minutes prior to and 30 minutes post Kyprolis. Daratumumab injection was given in the subcutaneous tissue in RLQ of abdomen. Tolerated both therapies without any issues or side effects. Low b/p. Could be combination of diarrhea and meds. 1L NS bolus administered. 2g IV mag administered. Per provider, pt is to increase oral magnesium, hold lisinopril tonight and tomorrow, check b/p and send results via [employer] to bmt provider. Pt and wife verbalize understanding.     Intravenous Access:  Peripheral IV placed.    Treatment Conditions:  Results reviewed, labs MET treatment parameters, ok to proceed with treatment.      Post Infusion Assessment:  Patient tolerated infusion without incident.  Patient tolerated injection without incident.       Discharge Plan:   AVS to patient via "[employer]T.  Patient will return Next week for next appointment.   Patient discharged in stable condition accompanied by: wife.  Departure Mode: Ambulatory.      Yevgeniy Meyers RN

## 2021-12-28 NOTE — PROGRESS NOTES
BONE MARROW CLINIC VISIT       Jonh returns to the hematology clinic for treatment of relapsed multiple myeloma Jonh has completed 1 cycle of daratumumab 1400 mg weekly Progress 20 mg on days 1 8 and 15 and pomalidomide 4 mg on days 1 through 14 and Decadron on days 1-9 16 and 23 he did have some cytopenias with thrombocytopenia with his platelet count falling into the 80s he otherwise is tolerated this very well no nausea vomiting or diarrhea he is here to begin cycle 3, Day 1 with kyprolis and kenton..He has had diarrhea the last couple of days and was noted to have low BP. Wife notes he was confused and forgetful this morning on the drive in.   No problems with ventricular arrhythmias no ICD activation  Remains on warfarin 5mg daily     PAST MEDICAL HISTORY, SOCIAL HISTORY AND FAMILY HISTORY:  See my note from 11/2021.      REVIEW OF SYSTEMS:  A 12 point review of systems done is negative as in HPI.      PHYSICAL EXAMINATION: BP 83/41 P 51 O2Sat 95%  GENERAL:  He is an alert gentleman in no acute distress.   VITAL SIGNS:  Stable.   HEENT:  Normocephalic.  EOM okay.  Mouth without lesion.   RESPIRATORY:  Clear to percussion and auscultation.   CARDIAC:  Irregularly irregular rhythm.  No S3, S4 or murmur.   ABDOMEN:  Soft without hepatosplenomegaly.   EXTREMITIES:  +1 edema bilaterally   SKIN 7mm oblong brownish pink macule left subscapular area  LABS  Mg 1.2  Cr 1.41  Hgb 8.9 Plat 240k  WBC 9k      ASSESSMENT  1.  Multiple myeloma. In relapse  2.  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   3.  Cardiomyopathy with CHF.   4.  Atrial fibrillation.   5.  History of deep vein thrombosis, on warfarin.   6.  Chronic back pain.   7.  Aortic aneurysm.   8.  History of plasmacytoma of the clivus, status post radiation.   9.  History of ventricular tachycardia with implantable defibrillator in place.   10.  Hypomagnesemia.     11  Hyperkalemia   12. Skin lesion   13. Hypotension  14  Hypomagnesemia  Jonh is here for day 1 of cycle 3 Kyprolis and Jenny and Dex.  Unfortunately Jonh is dehydrated probably due to diarrhea and hypomagnesemia.  I gave him an extra liter of saline and 2 g of magnesium he should have Gatorade on the way home to [place].  I asked him to hold his 5 mg lisinopril tonight and tomorrow his wife Carla will send me blood pressure readings.  He apparently has some problems with constipation with pomalidomide and then will have diarrhea when the constipation resolves.  He will begin Pomalidamide 4 mg days 1 through 14 he will return to clinic in 2 weeks and I will see him in approximately 4 weeks.        I spent a total of 30  minutes face to face with Erasmo Pearce during today's office visit. Over 50% of this time was spent counseling the patient and coordinating the care regarding multiple myeloma and 10minutes preparing to see the patient and  care coordination     Rojas Raygoza MD  [phone]

## 2021-12-29 NOTE — TELEPHONE ENCOUNTER
BMT patient routing to Katlyn Looney for triage    Katlyn Looney returned message stating this is not her Patient, but will take care of it since Merry Mas is out.    Routing to Merry Msa.

## 2021-12-31 NOTE — TELEPHONE ENCOUNTER
Pt's [employer] message was inadvertently routed to me, and was not viewed until today. Pt's correct BMT RNCC is out today.    Attempted to call pt back on cell and home phone. Left a VM on home phone stating that patient should follow up with Dr. Mcdonald or present to the ED ASAP to have O2 levels checked again, as sats in the 70s and 80s are very concerning.      Fela Looney, RN, BSN  RN Care Coordinator - BMT  Ph [phone]  Pg [phone]

## 2022-01-01 ENCOUNTER — TELEPHONE (OUTPATIENT)
Dept: ONCOLOGY | Facility: CLINIC | Age: 72
End: 2022-01-01
Payer: MEDICARE

## 2022-01-01 ENCOUNTER — CARE COORDINATION (OUTPATIENT)
Dept: TRANSPLANT | Facility: CLINIC | Age: 72
End: 2022-01-01
Payer: MEDICARE

## 2022-01-01 ENCOUNTER — PATIENT OUTREACH (OUTPATIENT)
Dept: ONCOLOGY | Facility: CLINIC | Age: 72
End: 2022-01-01

## 2022-01-01 ENCOUNTER — TELEPHONE (OUTPATIENT)
Dept: ONCOLOGY | Facility: CLINIC | Age: 72
End: 2022-01-01

## 2022-01-01 ENCOUNTER — ANCILLARY PROCEDURE (OUTPATIENT)
Dept: CARDIOLOGY | Facility: CLINIC | Age: 72
End: 2022-01-01
Attending: INTERNAL MEDICINE
Payer: MEDICARE

## 2022-01-01 ENCOUNTER — ONCOLOGY VISIT (OUTPATIENT)
Dept: TRANSPLANT | Facility: CLINIC | Age: 72
End: 2022-01-01
Attending: INTERNAL MEDICINE
Payer: MEDICARE

## 2022-01-01 ENCOUNTER — APPOINTMENT (OUTPATIENT)
Dept: LAB | Facility: CLINIC | Age: 72
End: 2022-01-01
Attending: INTERNAL MEDICINE
Payer: MEDICARE

## 2022-01-01 ENCOUNTER — LAB (OUTPATIENT)
Dept: LAB | Facility: CLINIC | Age: 72
End: 2022-01-01

## 2022-01-01 ENCOUNTER — MYC MEDICAL ADVICE (OUTPATIENT)
Dept: TRANSPLANT | Facility: CLINIC | Age: 72
End: 2022-01-01
Payer: MEDICARE

## 2022-01-01 ENCOUNTER — TELEPHONE (OUTPATIENT)
Dept: CARDIOLOGY | Facility: CLINIC | Age: 72
End: 2022-01-01

## 2022-01-01 ENCOUNTER — MEDICAL CORRESPONDENCE (OUTPATIENT)
Dept: TRANSPLANT | Facility: CLINIC | Age: 72
End: 2022-01-01

## 2022-01-01 ENCOUNTER — LAB (OUTPATIENT)
Dept: LAB | Facility: CLINIC | Age: 72
End: 2022-01-01
Payer: MEDICARE

## 2022-01-01 ENCOUNTER — ANCILLARY PROCEDURE (OUTPATIENT)
Dept: CARDIOLOGY | Facility: CLINIC | Age: 72
End: 2022-01-01
Payer: MEDICARE

## 2022-01-01 ENCOUNTER — TELEPHONE (OUTPATIENT)
Dept: TRANSPLANT | Facility: CLINIC | Age: 72
End: 2022-01-01
Payer: MEDICARE

## 2022-01-01 ENCOUNTER — DOCUMENTATION ONLY (OUTPATIENT)
Dept: ONCOLOGY | Facility: CLINIC | Age: 72
End: 2022-01-01

## 2022-01-01 ENCOUNTER — TELEPHONE (OUTPATIENT)
Dept: CARDIOLOGY | Facility: CLINIC | Age: 72
End: 2022-01-01
Payer: MEDICARE

## 2022-01-01 ENCOUNTER — INFUSION THERAPY VISIT (OUTPATIENT)
Dept: ONCOLOGY | Facility: CLINIC | Age: 72
End: 2022-01-01
Attending: INTERNAL MEDICINE
Payer: MEDICARE

## 2022-01-01 ENCOUNTER — HEALTH MAINTENANCE LETTER (OUTPATIENT)
Age: 72
End: 2022-01-01

## 2022-01-01 ENCOUNTER — MYC MEDICAL ADVICE (OUTPATIENT)
Dept: ONCOLOGY | Facility: CLINIC | Age: 72
End: 2022-01-01

## 2022-01-01 ENCOUNTER — MYC MEDICAL ADVICE (OUTPATIENT)
Dept: TRANSPLANT | Facility: CLINIC | Age: 72
End: 2022-01-01

## 2022-01-01 ENCOUNTER — APPOINTMENT (OUTPATIENT)
Dept: MEDSURG UNIT | Facility: CLINIC | Age: 72
End: 2022-01-01
Attending: INTERNAL MEDICINE
Payer: MEDICARE

## 2022-01-01 ENCOUNTER — TELEPHONE (OUTPATIENT)
Dept: TRANSPLANT | Facility: CLINIC | Age: 72
End: 2022-01-01

## 2022-01-01 ENCOUNTER — HOSPITAL ENCOUNTER (OUTPATIENT)
Facility: CLINIC | Age: 72
Discharge: HOME | End: 2022-04-26
Attending: INTERNAL MEDICINE | Admitting: INTERNAL MEDICINE
Payer: MEDICARE

## 2022-01-01 ENCOUNTER — DOCUMENTATION ONLY (OUTPATIENT)
Dept: PHARMACY | Facility: CLINIC | Age: 72
End: 2022-01-01
Payer: MEDICARE

## 2022-01-01 ENCOUNTER — MYC MEDICAL ADVICE (OUTPATIENT)
Dept: CARDIOLOGY | Facility: CLINIC | Age: 72
End: 2022-01-01
Payer: MEDICARE

## 2022-01-01 ENCOUNTER — APPOINTMENT (OUTPATIENT)
Dept: GENERAL RADIOLOGY | Facility: CLINIC | Age: 72
End: 2022-01-01
Attending: NURSE PRACTITIONER
Payer: MEDICARE

## 2022-01-01 VITALS
SYSTOLIC BLOOD PRESSURE: 119 MMHG | HEART RATE: 65 BPM | DIASTOLIC BLOOD PRESSURE: 74 MMHG | OXYGEN SATURATION: 100 % | WEIGHT: 190 LBS | BODY MASS INDEX: 27.2 KG/M2 | TEMPERATURE: 97.9 F | HEIGHT: 70 IN | RESPIRATION RATE: 18 BRPM

## 2022-01-01 VITALS
WEIGHT: 197.1 LBS | HEART RATE: 78 BPM | BODY MASS INDEX: 28.7 KG/M2 | SYSTOLIC BLOOD PRESSURE: 122 MMHG | OXYGEN SATURATION: 96 % | DIASTOLIC BLOOD PRESSURE: 63 MMHG | TEMPERATURE: 97.5 F | RESPIRATION RATE: 16 BRPM

## 2022-01-01 VITALS
HEART RATE: 77 BPM | DIASTOLIC BLOOD PRESSURE: 63 MMHG | BODY MASS INDEX: 26.11 KG/M2 | TEMPERATURE: 98 F | OXYGEN SATURATION: 98 % | SYSTOLIC BLOOD PRESSURE: 110 MMHG | WEIGHT: 182 LBS | RESPIRATION RATE: 16 BRPM

## 2022-01-01 VITALS
TEMPERATURE: 97.8 F | WEIGHT: 193 LBS | HEART RATE: 81 BPM | BODY MASS INDEX: 26.92 KG/M2 | RESPIRATION RATE: 18 BRPM | DIASTOLIC BLOOD PRESSURE: 56 MMHG | OXYGEN SATURATION: 96 % | SYSTOLIC BLOOD PRESSURE: 105 MMHG

## 2022-01-01 VITALS
HEART RATE: 70 BPM | SYSTOLIC BLOOD PRESSURE: 117 MMHG | WEIGHT: 188.3 LBS | OXYGEN SATURATION: 99 % | HEIGHT: 70 IN | DIASTOLIC BLOOD PRESSURE: 71 MMHG | BODY MASS INDEX: 26.96 KG/M2

## 2022-01-01 VITALS
HEART RATE: 49 BPM | TEMPERATURE: 98 F | WEIGHT: 179.2 LBS | BODY MASS INDEX: 26.09 KG/M2 | SYSTOLIC BLOOD PRESSURE: 137 MMHG | RESPIRATION RATE: 16 BRPM | OXYGEN SATURATION: 96 % | DIASTOLIC BLOOD PRESSURE: 56 MMHG

## 2022-01-01 VITALS
SYSTOLIC BLOOD PRESSURE: 116 MMHG | TEMPERATURE: 97.7 F | RESPIRATION RATE: 16 BRPM | HEART RATE: 58 BPM | DIASTOLIC BLOOD PRESSURE: 58 MMHG | OXYGEN SATURATION: 97 %

## 2022-01-01 VITALS
HEIGHT: 71 IN | DIASTOLIC BLOOD PRESSURE: 64 MMHG | OXYGEN SATURATION: 96 % | HEART RATE: 80 BPM | BODY MASS INDEX: 26.18 KG/M2 | WEIGHT: 187 LBS | SYSTOLIC BLOOD PRESSURE: 111 MMHG

## 2022-01-01 VITALS
WEIGHT: 198 LBS | BODY MASS INDEX: 28.83 KG/M2 | DIASTOLIC BLOOD PRESSURE: 57 MMHG | TEMPERATURE: 98.1 F | SYSTOLIC BLOOD PRESSURE: 116 MMHG | RESPIRATION RATE: 16 BRPM | HEART RATE: 68 BPM | OXYGEN SATURATION: 96 %

## 2022-01-01 VITALS
HEART RATE: 72 BPM | DIASTOLIC BLOOD PRESSURE: 68 MMHG | BODY MASS INDEX: 26 KG/M2 | OXYGEN SATURATION: 100 % | DIASTOLIC BLOOD PRESSURE: 53 MMHG | SYSTOLIC BLOOD PRESSURE: 113 MMHG | TEMPERATURE: 97.7 F | SYSTOLIC BLOOD PRESSURE: 111 MMHG | TEMPERATURE: 98.1 F | WEIGHT: 182.4 LBS | RESPIRATION RATE: 18 BRPM | OXYGEN SATURATION: 98 % | HEART RATE: 88 BPM | RESPIRATION RATE: 18 BRPM

## 2022-01-01 VITALS
BODY MASS INDEX: 28.58 KG/M2 | OXYGEN SATURATION: 94 % | SYSTOLIC BLOOD PRESSURE: 103 MMHG | HEART RATE: 50 BPM | WEIGHT: 196.3 LBS | DIASTOLIC BLOOD PRESSURE: 50 MMHG | RESPIRATION RATE: 16 BRPM | TEMPERATURE: 98.3 F

## 2022-01-01 VITALS
SYSTOLIC BLOOD PRESSURE: 106 MMHG | TEMPERATURE: 97 F | WEIGHT: 193 LBS | HEART RATE: 56 BPM | BODY MASS INDEX: 28.1 KG/M2 | OXYGEN SATURATION: 97 % | DIASTOLIC BLOOD PRESSURE: 50 MMHG | RESPIRATION RATE: 16 BRPM

## 2022-01-01 VITALS
DIASTOLIC BLOOD PRESSURE: 54 MMHG | OXYGEN SATURATION: 96 % | RESPIRATION RATE: 18 BRPM | TEMPERATURE: 97.7 F | WEIGHT: 191.1 LBS | SYSTOLIC BLOOD PRESSURE: 108 MMHG | HEART RATE: 74 BPM | BODY MASS INDEX: 27.24 KG/M2

## 2022-01-01 VITALS
HEART RATE: 58 BPM | RESPIRATION RATE: 18 BRPM | WEIGHT: 190.6 LBS | DIASTOLIC BLOOD PRESSURE: 54 MMHG | TEMPERATURE: 98 F | OXYGEN SATURATION: 97 % | SYSTOLIC BLOOD PRESSURE: 122 MMHG | BODY MASS INDEX: 27.75 KG/M2

## 2022-01-01 VITALS
RESPIRATION RATE: 16 BRPM | HEART RATE: 55 BPM | WEIGHT: 195 LBS | DIASTOLIC BLOOD PRESSURE: 61 MMHG | TEMPERATURE: 97.8 F | SYSTOLIC BLOOD PRESSURE: 105 MMHG | OXYGEN SATURATION: 98 % | BODY MASS INDEX: 28.39 KG/M2

## 2022-01-01 VITALS
DIASTOLIC BLOOD PRESSURE: 54 MMHG | SYSTOLIC BLOOD PRESSURE: 104 MMHG | BODY MASS INDEX: 27.38 KG/M2 | TEMPERATURE: 97.4 F | RESPIRATION RATE: 14 BRPM | OXYGEN SATURATION: 96 % | WEIGHT: 190.8 LBS | HEART RATE: 73 BPM

## 2022-01-01 VITALS
WEIGHT: 192.3 LBS | BODY MASS INDEX: 28 KG/M2 | OXYGEN SATURATION: 95 % | RESPIRATION RATE: 16 BRPM | HEART RATE: 72 BPM | DIASTOLIC BLOOD PRESSURE: 57 MMHG | SYSTOLIC BLOOD PRESSURE: 109 MMHG | TEMPERATURE: 97.5 F

## 2022-01-01 VITALS
WEIGHT: 184.5 LBS | RESPIRATION RATE: 16 BRPM | BODY MASS INDEX: 26.47 KG/M2 | TEMPERATURE: 97.4 F | OXYGEN SATURATION: 97 % | DIASTOLIC BLOOD PRESSURE: 68 MMHG | SYSTOLIC BLOOD PRESSURE: 112 MMHG | HEART RATE: 70 BPM

## 2022-01-01 VITALS
DIASTOLIC BLOOD PRESSURE: 62 MMHG | SYSTOLIC BLOOD PRESSURE: 121 MMHG | OXYGEN SATURATION: 96 % | HEART RATE: 68 BPM | TEMPERATURE: 97.6 F | RESPIRATION RATE: 20 BRPM | WEIGHT: 188.49 LBS | BODY MASS INDEX: 26.86 KG/M2

## 2022-01-01 VITALS
RESPIRATION RATE: 16 BRPM | WEIGHT: 191.4 LBS | HEART RATE: 62 BPM | TEMPERATURE: 97.7 F | OXYGEN SATURATION: 99 % | DIASTOLIC BLOOD PRESSURE: 69 MMHG | SYSTOLIC BLOOD PRESSURE: 122 MMHG | BODY MASS INDEX: 27.87 KG/M2

## 2022-01-01 VITALS
OXYGEN SATURATION: 95 % | HEART RATE: 84 BPM | TEMPERATURE: 96.9 F | DIASTOLIC BLOOD PRESSURE: 68 MMHG | WEIGHT: 189.7 LBS | SYSTOLIC BLOOD PRESSURE: 109 MMHG | BODY MASS INDEX: 26.46 KG/M2 | RESPIRATION RATE: 18 BRPM

## 2022-01-01 VITALS
DIASTOLIC BLOOD PRESSURE: 67 MMHG | SYSTOLIC BLOOD PRESSURE: 114 MMHG | RESPIRATION RATE: 14 BRPM | TEMPERATURE: 97.5 F | OXYGEN SATURATION: 97 % | HEART RATE: 91 BPM

## 2022-01-01 VITALS
RESPIRATION RATE: 14 BRPM | SYSTOLIC BLOOD PRESSURE: 111 MMHG | DIASTOLIC BLOOD PRESSURE: 54 MMHG | OXYGEN SATURATION: 97 % | HEART RATE: 52 BPM

## 2022-01-01 VITALS — HEIGHT: 70 IN | BODY MASS INDEX: 26.17 KG/M2

## 2022-01-01 DIAGNOSIS — D63.8 ANEMIA OF CHRONIC DISEASE: ICD-10-CM

## 2022-01-01 DIAGNOSIS — C90.02 MULTIPLE MYELOMA IN RELAPSE (H): Primary | ICD-10-CM

## 2022-01-01 DIAGNOSIS — Z51.81 ANTICOAGULATION GOAL OF INR 2 TO 3: ICD-10-CM

## 2022-01-01 DIAGNOSIS — I42.0 DILATED CARDIOMYOPATHY (H): ICD-10-CM

## 2022-01-01 DIAGNOSIS — Z95.810 AUTOMATIC IMPLANTABLE CARDIOVERTER-DEFIBRILLATOR IN SITU: ICD-10-CM

## 2022-01-01 DIAGNOSIS — Z79.01 ANTICOAGULATION GOAL OF INR 2 TO 3: ICD-10-CM

## 2022-01-01 DIAGNOSIS — N28.9 RENAL INSUFFICIENCY: ICD-10-CM

## 2022-01-01 DIAGNOSIS — R60.9 EDEMA: ICD-10-CM

## 2022-01-01 DIAGNOSIS — C90.02 MULTIPLE MYELOMA IN RELAPSE (H): ICD-10-CM

## 2022-01-01 DIAGNOSIS — C90.00 MULTIPLE MYELOMA, REMISSION STATUS UNSPECIFIED (H): ICD-10-CM

## 2022-01-01 DIAGNOSIS — I25.5 ISCHEMIC CARDIOMYOPATHY: ICD-10-CM

## 2022-01-01 DIAGNOSIS — I49.5 SINUS NODE DYSFUNCTION (H): ICD-10-CM

## 2022-01-01 DIAGNOSIS — I42.0 DILATED CARDIOMYOPATHY (H): Primary | ICD-10-CM

## 2022-01-01 DIAGNOSIS — E83.42 HYPOMAGNESEMIA: ICD-10-CM

## 2022-01-01 DIAGNOSIS — Z20.822 ENCOUNTER FOR LABORATORY TESTING FOR COVID-19 VIRUS: ICD-10-CM

## 2022-01-01 DIAGNOSIS — I49.01 VENTRICULAR FIBRILLATION (H): ICD-10-CM

## 2022-01-01 DIAGNOSIS — I48.91 ATRIAL FIBRILLATION (H): ICD-10-CM

## 2022-01-01 DIAGNOSIS — E78.5 DYSLIPIDEMIA: ICD-10-CM

## 2022-01-01 DIAGNOSIS — Z86.2 PERSONAL HISTORY OF DISEASES OF BLOOD AND BLOOD-FORMING ORGANS: ICD-10-CM

## 2022-01-01 DIAGNOSIS — I50.9 CONGESTIVE HEART FAILURE (H): ICD-10-CM

## 2022-01-01 DIAGNOSIS — Z95.810 IMPLANTABLE CARDIOVERTER-DEFIBRILLATOR (ICD) IN SITU: ICD-10-CM

## 2022-01-01 DIAGNOSIS — C90.00 MULTIPLE MYELOMA, REMISSION STATUS UNSPECIFIED (H): Primary | ICD-10-CM

## 2022-01-01 DIAGNOSIS — I50.22 CHRONIC SYSTOLIC HEART FAILURE (H): ICD-10-CM

## 2022-01-01 DIAGNOSIS — I48.21 PERMANENT ATRIAL FIBRILLATION (H): ICD-10-CM

## 2022-01-01 DIAGNOSIS — I25.5 ISCHEMIC CARDIOMYOPATHY: Primary | ICD-10-CM

## 2022-01-01 DIAGNOSIS — I48.21 PERMANENT ATRIAL FIBRILLATION (H): Primary | ICD-10-CM

## 2022-01-01 DIAGNOSIS — Z94.84 HISTORY OF STEM CELL TRANSPLANT (H): ICD-10-CM

## 2022-01-01 DIAGNOSIS — R06.00 DYSPNEA, UNSPECIFIED: ICD-10-CM

## 2022-01-01 DIAGNOSIS — N18.31 STAGE 3A CHRONIC KIDNEY DISEASE (H): ICD-10-CM

## 2022-01-01 DIAGNOSIS — Z92.29 HISTORY OF LONG-TERM USE OF MULTIPLE PRESCRIPTION DRUGS: ICD-10-CM

## 2022-01-01 DIAGNOSIS — Z53.9 ERRONEOUS ENCOUNTER--DISREGARD: Primary | ICD-10-CM

## 2022-01-01 DIAGNOSIS — Z11.59 ENCOUNTER FOR SCREENING FOR OTHER VIRAL DISEASES: Primary | ICD-10-CM

## 2022-01-01 DIAGNOSIS — Z23 NEED FOR PROPHYLACTIC VACCINATION AND INOCULATION AGAINST INFLUENZA: ICD-10-CM

## 2022-01-01 DIAGNOSIS — C90.00 MULTIPLE MYELOMA (H): ICD-10-CM

## 2022-01-01 DIAGNOSIS — Z95.810 S/P ICD (INTERNAL CARDIAC DEFIBRILLATOR) PROCEDURE: Primary | ICD-10-CM

## 2022-01-01 LAB
ABO/RH(D): ABNORMAL
ALBUMIN MFR UR ELPH: 15.7 MG/DL
ALBUMIN MFR UR ELPH: 27.1 %
ALBUMIN MFR UR ELPH: 27.9 %
ALBUMIN MFR UR ELPH: 48.3 %
ALBUMIN MFR UR ELPH: 53 %
ALBUMIN MFR UR ELPH: 7.4 %
ALBUMIN SERPL BCG-MCNC: 3.6 G/DL (ref 3.5–5.2)
ALBUMIN SERPL BCG-MCNC: 3.7 G/DL (ref 3.5–5.2)
ALBUMIN SERPL BCG-MCNC: 3.8 G/DL (ref 3.5–5.2)
ALBUMIN SERPL BCG-MCNC: 3.9 G/DL (ref 3.5–5.2)
ALBUMIN SERPL BCG-MCNC: 4 G/DL (ref 3.5–5.2)
ALBUMIN SERPL BCG-MCNC: 4.3 G/DL (ref 3.5–5.2)
ALBUMIN SERPL ELPH-MCNC: 2.8 G/DL (ref 3.7–5.1)
ALBUMIN SERPL ELPH-MCNC: 2.9 G/DL (ref 3.7–5.1)
ALBUMIN SERPL ELPH-MCNC: 3 G/DL (ref 3.7–5.1)
ALBUMIN SERPL ELPH-MCNC: 3.6 G/DL (ref 3.7–5.1)
ALBUMIN SERPL ELPH-MCNC: 3.9 G/DL (ref 3.7–5.1)
ALBUMIN SERPL ELPH-MCNC: 3.9 G/DL (ref 3.7–5.1)
ALBUMIN SERPL ELPH-MCNC: 4 G/DL (ref 3.7–5.1)
ALBUMIN SERPL ELPH-MCNC: 4.2 G/DL (ref 3.7–5.1)
ALBUMIN SERPL ELPH-MCNC: 4.2 G/DL (ref 3.7–5.1)
ALBUMIN SERPL-MCNC: 3.1 G/DL (ref 3.4–5)
ALBUMIN SERPL-MCNC: 3.2 G/DL (ref 3.4–5)
ALBUMIN SERPL-MCNC: 3.3 G/DL (ref 3.4–5)
ALBUMIN SERPL-MCNC: 3.4 G/DL (ref 3.4–5)
ALBUMIN SERPL-MCNC: 3.5 G/DL (ref 3.4–5)
ALBUMIN SERPL-MCNC: 3.8 G/DL (ref 3.4–5)
ALBUMIN SERPL-MCNC: 3.8 G/DL (ref 3.4–5)
ALBUMIN SERPL-MCNC: 3.9 G/DL (ref 3.4–5)
ALBUMIN SERPL-MCNC: 3.9 G/DL (ref 3.4–5)
ALBUMIN SERPL-MCNC: 4 G/DL (ref 3.4–5)
ALBUMIN SERPL-MCNC: 4.2 G/DL (ref 3.4–5)
ALP SERPL-CCNC: 102 U/L (ref 40–129)
ALP SERPL-CCNC: 106 U/L (ref 40–150)
ALP SERPL-CCNC: 108 U/L (ref 40–129)
ALP SERPL-CCNC: 108 U/L (ref 40–150)
ALP SERPL-CCNC: 113 U/L (ref 40–129)
ALP SERPL-CCNC: 113 U/L (ref 40–150)
ALP SERPL-CCNC: 114 U/L (ref 40–129)
ALP SERPL-CCNC: 117 U/L (ref 40–150)
ALP SERPL-CCNC: 120 U/L (ref 40–129)
ALP SERPL-CCNC: 122 U/L (ref 40–150)
ALP SERPL-CCNC: 123 U/L (ref 40–150)
ALP SERPL-CCNC: 124 U/L (ref 40–129)
ALP SERPL-CCNC: 127 U/L (ref 40–150)
ALP SERPL-CCNC: 131 U/L (ref 40–150)
ALP SERPL-CCNC: 133 U/L (ref 40–150)
ALP SERPL-CCNC: 134 U/L (ref 40–150)
ALP SERPL-CCNC: 135 U/L (ref 40–150)
ALP SERPL-CCNC: 141 U/L (ref 40–150)
ALP SERPL-CCNC: 144 U/L (ref 40–150)
ALP SERPL-CCNC: 146 U/L (ref 40–150)
ALP SERPL-CCNC: 96 U/L (ref 40–150)
ALPHA1 GLOB MFR UR ELPH: 4.8 %
ALPHA1 GLOB MFR UR ELPH: 6.6 %
ALPHA1 GLOB MFR UR ELPH: 7 %
ALPHA1 GLOB MFR UR ELPH: 8 %
ALPHA1 GLOB MFR UR ELPH: 8.8 %
ALPHA1 GLOB SERPL ELPH-MCNC: 0.3 G/DL (ref 0.2–0.4)
ALPHA1 GLOB SERPL ELPH-MCNC: 0.4 G/DL (ref 0.2–0.4)
ALPHA1 GLOB SERPL ELPH-MCNC: 0.5 G/DL (ref 0.2–0.4)
ALPHA1 GLOB SERPL ELPH-MCNC: 0.5 G/DL (ref 0.2–0.4)
ALPHA1 GLOB SERPL ELPH-MCNC: 0.6 G/DL (ref 0.2–0.4)
ALPHA2 GLOB MFR UR ELPH: 3 %
ALPHA2 GLOB MFR UR ELPH: 5.5 %
ALPHA2 GLOB MFR UR ELPH: 7 %
ALPHA2 GLOB MFR UR ELPH: 8.2 %
ALPHA2 GLOB MFR UR ELPH: 9.1 %
ALPHA2 GLOB SERPL ELPH-MCNC: 0.7 G/DL (ref 0.5–0.9)
ALPHA2 GLOB SERPL ELPH-MCNC: 0.8 G/DL (ref 0.5–0.9)
ALPHA2 GLOB SERPL ELPH-MCNC: 0.8 G/DL (ref 0.5–0.9)
ALPHA2 GLOB SERPL ELPH-MCNC: 0.9 G/DL (ref 0.5–0.9)
ALPHA2 GLOB SERPL ELPH-MCNC: 1.1 G/DL (ref 0.5–0.9)
ALT SERPL W P-5'-P-CCNC: 135 U/L (ref 0–70)
ALT SERPL W P-5'-P-CCNC: 15 U/L (ref 0–70)
ALT SERPL W P-5'-P-CCNC: 16 U/L (ref 0–70)
ALT SERPL W P-5'-P-CCNC: 16 U/L (ref 0–70)
ALT SERPL W P-5'-P-CCNC: 16 U/L (ref 10–50)
ALT SERPL W P-5'-P-CCNC: 17 U/L (ref 0–70)
ALT SERPL W P-5'-P-CCNC: 18 U/L (ref 0–70)
ALT SERPL W P-5'-P-CCNC: 19 U/L (ref 0–70)
ALT SERPL W P-5'-P-CCNC: 19 U/L (ref 0–70)
ALT SERPL W P-5'-P-CCNC: 23 U/L (ref 0–70)
ALT SERPL W P-5'-P-CCNC: 23 U/L (ref 10–50)
ALT SERPL W P-5'-P-CCNC: 26 U/L (ref 0–70)
ALT SERPL W P-5'-P-CCNC: 26 U/L (ref 0–70)
ALT SERPL W P-5'-P-CCNC: 26 U/L (ref 10–50)
ALT SERPL W P-5'-P-CCNC: 27 U/L (ref 0–70)
ALT SERPL W P-5'-P-CCNC: 27 U/L (ref 10–50)
ALT SERPL W P-5'-P-CCNC: 32 U/L (ref 0–70)
ALT SERPL W P-5'-P-CCNC: 38 U/L (ref 10–50)
ALT SERPL W P-5'-P-CCNC: 44 U/L (ref 10–50)
ANION GAP SERPL CALCULATED.3IONS-SCNC: 10 MMOL/L (ref 3–14)
ANION GAP SERPL CALCULATED.3IONS-SCNC: 10 MMOL/L (ref 7–15)
ANION GAP SERPL CALCULATED.3IONS-SCNC: 10 MMOL/L (ref 7–15)
ANION GAP SERPL CALCULATED.3IONS-SCNC: 11 MMOL/L (ref 3–14)
ANION GAP SERPL CALCULATED.3IONS-SCNC: 11 MMOL/L (ref 7–15)
ANION GAP SERPL CALCULATED.3IONS-SCNC: 12 MMOL/L (ref 7–15)
ANION GAP SERPL CALCULATED.3IONS-SCNC: 14 MMOL/L (ref 7–15)
ANION GAP SERPL CALCULATED.3IONS-SCNC: 14 MMOL/L (ref 7–15)
ANION GAP SERPL CALCULATED.3IONS-SCNC: 5 MMOL/L (ref 3–14)
ANION GAP SERPL CALCULATED.3IONS-SCNC: 6 MMOL/L (ref 3–14)
ANION GAP SERPL CALCULATED.3IONS-SCNC: 7 MMOL/L (ref 3–14)
ANION GAP SERPL CALCULATED.3IONS-SCNC: 8 MMOL/L (ref 3–14)
ANION GAP SERPL CALCULATED.3IONS-SCNC: 9 MMOL/L (ref 3–14)
ANTIBODY ID: NORMAL
ANTIBODY SCREEN, TUBE: ABNORMAL
ANTIBODY SCREEN, TUBE: ABNORMAL
ANTIBODY SCREEN, TUBE: NORMAL
ANTIBODY SCREEN: POSITIVE
AST SERPL W P-5'-P-CCNC: 11 U/L (ref 0–45)
AST SERPL W P-5'-P-CCNC: 11 U/L (ref 0–45)
AST SERPL W P-5'-P-CCNC: 14 U/L (ref 0–45)
AST SERPL W P-5'-P-CCNC: 14 U/L (ref 0–45)
AST SERPL W P-5'-P-CCNC: 15 U/L (ref 0–45)
AST SERPL W P-5'-P-CCNC: 16 U/L (ref 0–45)
AST SERPL W P-5'-P-CCNC: 16 U/L (ref 0–45)
AST SERPL W P-5'-P-CCNC: 16 U/L (ref 10–50)
AST SERPL W P-5'-P-CCNC: 17 U/L (ref 0–45)
AST SERPL W P-5'-P-CCNC: 17 U/L (ref 0–45)
AST SERPL W P-5'-P-CCNC: 18 U/L (ref 0–45)
AST SERPL W P-5'-P-CCNC: 18 U/L (ref 0–45)
AST SERPL W P-5'-P-CCNC: 19 U/L (ref 10–50)
AST SERPL W P-5'-P-CCNC: 19 U/L (ref 10–50)
AST SERPL W P-5'-P-CCNC: 21 U/L (ref 10–50)
AST SERPL W P-5'-P-CCNC: 22 U/L (ref 0–45)
AST SERPL W P-5'-P-CCNC: 22 U/L (ref 0–45)
AST SERPL W P-5'-P-CCNC: 22 U/L (ref 10–50)
AST SERPL W P-5'-P-CCNC: 26 U/L (ref 0–45)
AST SERPL W P-5'-P-CCNC: 28 U/L (ref 0–45)
AST SERPL W P-5'-P-CCNC: 32 U/L (ref 10–50)
ATRIAL RATE - MUSE: 500 BPM
ATRIAL RATE - MUSE: 57 BPM
ATRIAL RATE - MUSE: 66 BPM
B-GLOBULIN MFR UR ELPH: 12.4 %
B-GLOBULIN MFR UR ELPH: 23.5 %
B-GLOBULIN MFR UR ELPH: 49.2 %
B-GLOBULIN MFR UR ELPH: 78.3 %
B-GLOBULIN MFR UR ELPH: 8.4 %
B-GLOBULIN SERPL ELPH-MCNC: 0.6 G/DL (ref 0.6–1)
B-GLOBULIN SERPL ELPH-MCNC: 0.7 G/DL (ref 0.6–1)
B-GLOBULIN SERPL ELPH-MCNC: 0.8 G/DL (ref 0.6–1)
B-GLOBULIN SERPL ELPH-MCNC: 0.8 G/DL (ref 0.6–1)
BASOPHILS # BLD AUTO: 0 10E3/UL (ref 0–0.2)
BASOPHILS # BLD AUTO: 0.1 10E3/UL (ref 0–0.2)
BASOPHILS # BLD MANUAL: 0 10E3/UL (ref 0–0.2)
BASOPHILS # BLD MANUAL: 0 10E3/UL (ref 0–0.2)
BASOPHILS NFR BLD AUTO: 0 %
BASOPHILS NFR BLD AUTO: 1 %
BASOPHILS NFR BLD AUTO: 2 %
BASOPHILS NFR BLD MANUAL: 0 %
BASOPHILS NFR BLD MANUAL: 1 %
BI-PLANE LVEF ECHO: NORMAL
BILIRUB SERPL-MCNC: 0.4 MG/DL
BILIRUB SERPL-MCNC: 0.5 MG/DL
BILIRUB SERPL-MCNC: 0.6 MG/DL (ref 0.2–1.3)
BILIRUB SERPL-MCNC: 0.7 MG/DL
BILIRUB SERPL-MCNC: 0.7 MG/DL
BILIRUB SERPL-MCNC: 0.7 MG/DL (ref 0.2–1.3)
BILIRUB SERPL-MCNC: 0.8 MG/DL
BILIRUB SERPL-MCNC: 0.8 MG/DL (ref 0.2–1.3)
BILIRUB SERPL-MCNC: 0.9 MG/DL (ref 0.2–1.3)
BILIRUB SERPL-MCNC: 1 MG/DL
BILIRUB SERPL-MCNC: 1 MG/DL (ref 0.2–1.3)
BILIRUB SERPL-MCNC: 1 MG/DL (ref 0.2–1.3)
BILIRUB SERPL-MCNC: 1.1 MG/DL (ref 0.2–1.3)
BILIRUB SERPL-MCNC: 1.2 MG/DL (ref 0.2–1.3)
BILIRUB SERPL-MCNC: 1.2 MG/DL (ref 0.2–1.3)
BILIRUB SERPL-MCNC: 1.3 MG/DL (ref 0.2–1.3)
BLD PROD TYP BPU: NORMAL
BLD PROD TYP BPU: NORMAL
BLOOD COMPONENT TYPE: NORMAL
BLOOD COMPONENT TYPE: NORMAL
BUN SERPL-MCNC: 12 MG/DL (ref 7–30)
BUN SERPL-MCNC: 17 MG/DL (ref 7–30)
BUN SERPL-MCNC: 17 MG/DL (ref 7–30)
BUN SERPL-MCNC: 21 MG/DL (ref 7–30)
BUN SERPL-MCNC: 21 MG/DL (ref 7–30)
BUN SERPL-MCNC: 22 MG/DL (ref 7–30)
BUN SERPL-MCNC: 22 MG/DL (ref 7–30)
BUN SERPL-MCNC: 22.2 MG/DL (ref 8–23)
BUN SERPL-MCNC: 24 MG/DL (ref 7–30)
BUN SERPL-MCNC: 26 MG/DL (ref 7–30)
BUN SERPL-MCNC: 27 MG/DL (ref 7–30)
BUN SERPL-MCNC: 27 MG/DL (ref 8–23)
BUN SERPL-MCNC: 27.5 MG/DL (ref 8–23)
BUN SERPL-MCNC: 28 MG/DL (ref 7–30)
BUN SERPL-MCNC: 28 MG/DL (ref 7–30)
BUN SERPL-MCNC: 28.2 MG/DL (ref 8–23)
BUN SERPL-MCNC: 28.8 MG/DL (ref 8–23)
BUN SERPL-MCNC: 29 MG/DL (ref 7–30)
BUN SERPL-MCNC: 32 MG/DL (ref 7–30)
BUN SERPL-MCNC: 36 MG/DL (ref 7–30)
BUN SERPL-MCNC: 40 MG/DL (ref 8–23)
CALCIUM SERPL-MCNC: 7.9 MG/DL (ref 8.5–10.1)
CALCIUM SERPL-MCNC: 8.2 MG/DL (ref 8.5–10.1)
CALCIUM SERPL-MCNC: 8.5 MG/DL (ref 8.5–10.1)
CALCIUM SERPL-MCNC: 8.7 MG/DL (ref 8.5–10.1)
CALCIUM SERPL-MCNC: 8.8 MG/DL (ref 8.5–10.1)
CALCIUM SERPL-MCNC: 8.9 MG/DL (ref 8.5–10.1)
CALCIUM SERPL-MCNC: 8.9 MG/DL (ref 8.8–10.2)
CALCIUM SERPL-MCNC: 9 MG/DL (ref 8.5–10.1)
CALCIUM SERPL-MCNC: 9.1 MG/DL (ref 8.8–10.2)
CALCIUM SERPL-MCNC: 9.2 MG/DL (ref 8.5–10.1)
CALCIUM SERPL-MCNC: 9.3 MG/DL (ref 8.8–10.2)
CALCIUM SERPL-MCNC: 9.4 MG/DL (ref 8.8–10.2)
CALCIUM SERPL-MCNC: 9.5 MG/DL (ref 8.5–10.1)
CALCIUM SERPL-MCNC: 9.5 MG/DL (ref 8.8–10.2)
CALCIUM SERPL-MCNC: 9.8 MG/DL (ref 8.5–10.1)
CALCIUM SERPL-MCNC: 9.8 MG/DL (ref 8.8–10.2)
CHLORIDE BLD-SCNC: 101 MMOL/L (ref 94–109)
CHLORIDE BLD-SCNC: 103 MMOL/L (ref 94–109)
CHLORIDE BLD-SCNC: 104 MMOL/L (ref 94–109)
CHLORIDE BLD-SCNC: 105 MMOL/L (ref 94–109)
CHLORIDE BLD-SCNC: 105 MMOL/L (ref 94–109)
CHLORIDE BLD-SCNC: 106 MMOL/L (ref 94–109)
CHLORIDE BLD-SCNC: 107 MMOL/L (ref 94–109)
CHLORIDE BLD-SCNC: 108 MMOL/L (ref 94–109)
CHLORIDE SERPL-SCNC: 101 MMOL/L (ref 98–107)
CHLORIDE SERPL-SCNC: 101 MMOL/L (ref 98–107)
CHLORIDE SERPL-SCNC: 103 MMOL/L (ref 98–107)
CHLORIDE SERPL-SCNC: 103 MMOL/L (ref 98–107)
CHLORIDE SERPL-SCNC: 104 MMOL/L (ref 98–107)
CHLORIDE SERPL-SCNC: 106 MMOL/L (ref 98–107)
CHOLEST SERPL-MCNC: 139 MG/DL
CO2 SERPL-SCNC: 22 MMOL/L (ref 20–32)
CO2 SERPL-SCNC: 24 MMOL/L (ref 20–32)
CO2 SERPL-SCNC: 25 MMOL/L (ref 20–32)
CO2 SERPL-SCNC: 26 MMOL/L (ref 20–32)
CO2 SERPL-SCNC: 27 MMOL/L (ref 20–32)
CO2 SERPL-SCNC: 28 MMOL/L (ref 20–32)
CO2 SERPL-SCNC: 30 MMOL/L (ref 20–32)
CODING SYSTEM: NORMAL
CODING SYSTEM: NORMAL
COLLECT DURATION TIME UR: 24 H
CREAT 24H UR-MRATE: 0.95 G/SPEC (ref 1–2)
CREAT 24H UR-MRATE: 0.96 G/SPEC (ref 1–2)
CREAT 24H UR-MRATE: 0.99 G/SPEC (ref 1–2)
CREAT 24H UR-MRATE: 1.02 G/SPEC (ref 1–2)
CREAT SERPL-MCNC: 0.74 MG/DL (ref 0.66–1.25)
CREAT SERPL-MCNC: 0.98 MG/DL (ref 0.66–1.25)
CREAT SERPL-MCNC: 1.01 MG/DL (ref 0.66–1.25)
CREAT SERPL-MCNC: 1.07 MG/DL (ref 0.66–1.25)
CREAT SERPL-MCNC: 1.08 MG/DL (ref 0.66–1.25)
CREAT SERPL-MCNC: 1.1 MG/DL (ref 0.66–1.25)
CREAT SERPL-MCNC: 1.14 MG/DL (ref 0.66–1.25)
CREAT SERPL-MCNC: 1.14 MG/DL (ref 0.66–1.25)
CREAT SERPL-MCNC: 1.14 MG/DL (ref 0.67–1.17)
CREAT SERPL-MCNC: 1.18 MG/DL (ref 0.66–1.25)
CREAT SERPL-MCNC: 1.2 MG/DL (ref 0.66–1.25)
CREAT SERPL-MCNC: 1.2 MG/DL (ref 0.66–1.25)
CREAT SERPL-MCNC: 1.21 MG/DL (ref 0.66–1.25)
CREAT SERPL-MCNC: 1.23 MG/DL (ref 0.66–1.25)
CREAT SERPL-MCNC: 1.23 MG/DL (ref 0.67–1.17)
CREAT SERPL-MCNC: 1.28 MG/DL (ref 0.67–1.17)
CREAT SERPL-MCNC: 1.31 MG/DL (ref 0.66–1.25)
CREAT SERPL-MCNC: 1.32 MG/DL (ref 0.67–1.17)
CREAT SERPL-MCNC: 1.33 MG/DL (ref 0.66–1.25)
CREAT SERPL-MCNC: 1.49 MG/DL (ref 0.67–1.17)
CREAT SERPL-MCNC: 1.5 MG/DL (ref 0.67–1.17)
CREAT UR-MCNC: 44 MG/DL
CREAT UR-MCNC: 53 MG/DL
CREAT UR-MCNC: 59 MG/DL
CREAT UR-MCNC: 66 MG/DL
CROSSMATCH: NORMAL
CRP SERPL-MCNC: 14.3 MG/L (ref 0–8)
CRP SERPL-MCNC: 27.1 MG/L (ref 0–8)
CRP SERPL-MCNC: 42 MG/L
DEPRECATED HCO3 PLAS-SCNC: 22 MMOL/L (ref 22–29)
DEPRECATED HCO3 PLAS-SCNC: 22 MMOL/L (ref 22–29)
DEPRECATED HCO3 PLAS-SCNC: 23 MMOL/L (ref 22–29)
DEPRECATED HCO3 PLAS-SCNC: 24 MMOL/L (ref 22–29)
DIASTOLIC BLOOD PRESSURE - MUSE: NORMAL MMHG
ELLIPTOCYTES BLD QL SMEAR: SLIGHT
EOSINOPHIL # BLD AUTO: 0 10E3/UL (ref 0–0.7)
EOSINOPHIL # BLD AUTO: 0.1 10E3/UL (ref 0–0.7)
EOSINOPHIL # BLD AUTO: 0.2 10E3/UL (ref 0–0.7)
EOSINOPHIL # BLD AUTO: 0.3 10E3/UL (ref 0–0.7)
EOSINOPHIL # BLD AUTO: 0.5 10E3/UL (ref 0–0.7)
EOSINOPHIL # BLD AUTO: 0.6 10E3/UL (ref 0–0.7)
EOSINOPHIL # BLD MANUAL: 0 10E3/UL (ref 0–0.7)
EOSINOPHIL # BLD MANUAL: 0.1 10E3/UL (ref 0–0.7)
EOSINOPHIL NFR BLD AUTO: 0 %
EOSINOPHIL NFR BLD AUTO: 0 %
EOSINOPHIL NFR BLD AUTO: 1 %
EOSINOPHIL NFR BLD AUTO: 13 %
EOSINOPHIL NFR BLD AUTO: 2 %
EOSINOPHIL NFR BLD AUTO: 3 %
EOSINOPHIL NFR BLD AUTO: 4 %
EOSINOPHIL NFR BLD AUTO: 5 %
EOSINOPHIL NFR BLD AUTO: 7 %
EOSINOPHIL NFR BLD MANUAL: 0 %
EOSINOPHIL NFR BLD MANUAL: 4 %
ERYTHROCYTE [DISTWIDTH] IN BLOOD BY AUTOMATED COUNT: 12 % (ref 10–15)
ERYTHROCYTE [DISTWIDTH] IN BLOOD BY AUTOMATED COUNT: 12.1 % (ref 10–15)
ERYTHROCYTE [DISTWIDTH] IN BLOOD BY AUTOMATED COUNT: 12.4 % (ref 10–15)
ERYTHROCYTE [DISTWIDTH] IN BLOOD BY AUTOMATED COUNT: 13.5 % (ref 10–15)
ERYTHROCYTE [DISTWIDTH] IN BLOOD BY AUTOMATED COUNT: 13.6 % (ref 10–15)
ERYTHROCYTE [DISTWIDTH] IN BLOOD BY AUTOMATED COUNT: 13.7 % (ref 10–15)
ERYTHROCYTE [DISTWIDTH] IN BLOOD BY AUTOMATED COUNT: 14.1 % (ref 10–15)
ERYTHROCYTE [DISTWIDTH] IN BLOOD BY AUTOMATED COUNT: 14.1 % (ref 10–15)
ERYTHROCYTE [DISTWIDTH] IN BLOOD BY AUTOMATED COUNT: 14.6 % (ref 10–15)
ERYTHROCYTE [DISTWIDTH] IN BLOOD BY AUTOMATED COUNT: 15 % (ref 10–15)
ERYTHROCYTE [DISTWIDTH] IN BLOOD BY AUTOMATED COUNT: 15.6 % (ref 10–15)
ERYTHROCYTE [DISTWIDTH] IN BLOOD BY AUTOMATED COUNT: 15.9 % (ref 10–15)
ERYTHROCYTE [DISTWIDTH] IN BLOOD BY AUTOMATED COUNT: 16 % (ref 10–15)
ERYTHROCYTE [DISTWIDTH] IN BLOOD BY AUTOMATED COUNT: 16.2 % (ref 10–15)
ERYTHROCYTE [DISTWIDTH] IN BLOOD BY AUTOMATED COUNT: 16.6 % (ref 10–15)
ERYTHROCYTE [DISTWIDTH] IN BLOOD BY AUTOMATED COUNT: 17.9 % (ref 10–15)
ERYTHROCYTE [DISTWIDTH] IN BLOOD BY AUTOMATED COUNT: 18.6 % (ref 10–15)
ERYTHROCYTE [DISTWIDTH] IN BLOOD BY AUTOMATED COUNT: 19.6 % (ref 10–15)
ERYTHROCYTE [DISTWIDTH] IN BLOOD BY AUTOMATED COUNT: 19.9 % (ref 10–15)
ERYTHROCYTE [DISTWIDTH] IN BLOOD BY AUTOMATED COUNT: 20.1 % (ref 10–15)
ERYTHROCYTE [DISTWIDTH] IN BLOOD BY AUTOMATED COUNT: 20.9 % (ref 10–15)
GAMMA GLOB MFR UR ELPH: 10.3 %
GAMMA GLOB MFR UR ELPH: 26.5 %
GAMMA GLOB MFR UR ELPH: 46.5 %
GAMMA GLOB MFR UR ELPH: 6.5 %
GAMMA GLOB MFR UR ELPH: 6.7 %
GAMMA GLOB SERPL ELPH-MCNC: 0.4 G/DL (ref 0.7–1.6)
GAMMA GLOB SERPL ELPH-MCNC: 0.5 G/DL (ref 0.7–1.6)
GAMMA GLOB SERPL ELPH-MCNC: 0.6 G/DL (ref 0.7–1.6)
GFR SERPL CREATININE-BSD FRML MDRD: 49 ML/MIN/1.73M2
GFR SERPL CREATININE-BSD FRML MDRD: 50 ML/MIN/1.73M2
GFR SERPL CREATININE-BSD FRML MDRD: 57 ML/MIN/1.73M2
GFR SERPL CREATININE-BSD FRML MDRD: 58 ML/MIN/1.73M2
GFR SERPL CREATININE-BSD FRML MDRD: 59 ML/MIN/1.73M2
GFR SERPL CREATININE-BSD FRML MDRD: 60 ML/MIN/1.73M2
GFR SERPL CREATININE-BSD FRML MDRD: 63 ML/MIN/1.73M2
GFR SERPL CREATININE-BSD FRML MDRD: 63 ML/MIN/1.73M2
GFR SERPL CREATININE-BSD FRML MDRD: 64 ML/MIN/1.73M2
GFR SERPL CREATININE-BSD FRML MDRD: 65 ML/MIN/1.73M2
GFR SERPL CREATININE-BSD FRML MDRD: 65 ML/MIN/1.73M2
GFR SERPL CREATININE-BSD FRML MDRD: 66 ML/MIN/1.73M2
GFR SERPL CREATININE-BSD FRML MDRD: 69 ML/MIN/1.73M2
GFR SERPL CREATININE-BSD FRML MDRD: 72 ML/MIN/1.73M2
GFR SERPL CREATININE-BSD FRML MDRD: 74 ML/MIN/1.73M2
GFR SERPL CREATININE-BSD FRML MDRD: 75 ML/MIN/1.73M2
GFR SERPL CREATININE-BSD FRML MDRD: 80 ML/MIN/1.73M2
GFR SERPL CREATININE-BSD FRML MDRD: 83 ML/MIN/1.73M2
GFR SERPL CREATININE-BSD FRML MDRD: >90 ML/MIN/1.73M2
GLUCOSE BLD-MCNC: 107 MG/DL (ref 70–99)
GLUCOSE BLD-MCNC: 108 MG/DL (ref 70–99)
GLUCOSE BLD-MCNC: 109 MG/DL (ref 70–99)
GLUCOSE BLD-MCNC: 110 MG/DL (ref 70–99)
GLUCOSE BLD-MCNC: 113 MG/DL (ref 70–99)
GLUCOSE BLD-MCNC: 117 MG/DL (ref 70–99)
GLUCOSE BLD-MCNC: 130 MG/DL (ref 70–99)
GLUCOSE BLD-MCNC: 134 MG/DL (ref 70–99)
GLUCOSE BLD-MCNC: 146 MG/DL (ref 70–99)
GLUCOSE BLD-MCNC: 153 MG/DL (ref 70–99)
GLUCOSE BLD-MCNC: 154 MG/DL (ref 70–99)
GLUCOSE BLD-MCNC: 154 MG/DL (ref 70–99)
GLUCOSE BLD-MCNC: 188 MG/DL (ref 70–99)
GLUCOSE BLD-MCNC: 236 MG/DL (ref 70–99)
GLUCOSE BLD-MCNC: 92 MG/DL (ref 70–99)
GLUCOSE SERPL-MCNC: 126 MG/DL (ref 70–99)
GLUCOSE SERPL-MCNC: 130 MG/DL (ref 70–99)
GLUCOSE SERPL-MCNC: 145 MG/DL (ref 70–99)
GLUCOSE SERPL-MCNC: 158 MG/DL (ref 70–99)
GLUCOSE SERPL-MCNC: 163 MG/DL (ref 70–99)
GLUCOSE SERPL-MCNC: 202 MG/DL (ref 70–99)
HCT VFR BLD AUTO: 25.8 % (ref 40–53)
HCT VFR BLD AUTO: 25.9 % (ref 40–53)
HCT VFR BLD AUTO: 26.1 % (ref 40–53)
HCT VFR BLD AUTO: 26.4 % (ref 40–53)
HCT VFR BLD AUTO: 27.3 % (ref 40–53)
HCT VFR BLD AUTO: 28 % (ref 40–53)
HCT VFR BLD AUTO: 28.6 % (ref 40–53)
HCT VFR BLD AUTO: 29.7 % (ref 40–53)
HCT VFR BLD AUTO: 30 % (ref 40–53)
HCT VFR BLD AUTO: 31 % (ref 40–53)
HCT VFR BLD AUTO: 31.1 % (ref 40–53)
HCT VFR BLD AUTO: 31.1 % (ref 40–53)
HCT VFR BLD AUTO: 32.2 % (ref 40–53)
HCT VFR BLD AUTO: 32.7 % (ref 40–53)
HCT VFR BLD AUTO: 32.9 % (ref 40–53)
HCT VFR BLD AUTO: 32.9 % (ref 40–53)
HCT VFR BLD AUTO: 33.4 % (ref 40–53)
HCT VFR BLD AUTO: 33.6 % (ref 40–53)
HCT VFR BLD AUTO: 34 % (ref 40–53)
HCT VFR BLD AUTO: 34.5 % (ref 40–53)
HCT VFR BLD AUTO: 37 % (ref 40–53)
HDLC SERPL-MCNC: 43 MG/DL
HGB BLD-MCNC: 10 G/DL (ref 13.3–17.7)
HGB BLD-MCNC: 10.2 G/DL (ref 13.3–17.7)
HGB BLD-MCNC: 10.4 G/DL (ref 13.3–17.7)
HGB BLD-MCNC: 10.4 G/DL (ref 13.3–17.7)
HGB BLD-MCNC: 10.7 G/DL (ref 13.3–17.7)
HGB BLD-MCNC: 10.8 G/DL (ref 13.3–17.7)
HGB BLD-MCNC: 11 G/DL (ref 13.3–17.7)
HGB BLD-MCNC: 11.1 G/DL (ref 13.3–17.7)
HGB BLD-MCNC: 11.1 G/DL (ref 13.3–17.7)
HGB BLD-MCNC: 11.5 G/DL (ref 13.3–17.7)
HGB BLD-MCNC: 12.3 G/DL (ref 13.3–17.7)
HGB BLD-MCNC: 8.1 G/DL (ref 13.3–17.7)
HGB BLD-MCNC: 8.3 G/DL (ref 13.3–17.7)
HGB BLD-MCNC: 8.4 G/DL (ref 13.3–17.7)
HGB BLD-MCNC: 8.6 G/DL (ref 13.3–17.7)
HGB BLD-MCNC: 8.7 G/DL (ref 13.3–17.7)
HGB BLD-MCNC: 9.1 G/DL (ref 13.3–17.7)
HGB BLD-MCNC: 9.3 G/DL (ref 13.3–17.7)
HGB BLD-MCNC: 9.4 G/DL (ref 13.3–17.7)
HGB BLD-MCNC: 9.8 G/DL (ref 13.3–17.7)
HGB BLD-MCNC: 9.9 G/DL (ref 13.3–17.7)
IGA SERPL-MCNC: 126 MG/DL (ref 84–499)
IGA SERPL-MCNC: 132 MG/DL (ref 84–499)
IGA SERPL-MCNC: 151 MG/DL (ref 84–499)
IGA SERPL-MCNC: 161 MG/DL (ref 84–499)
IGA SERPL-MCNC: 261 MG/DL (ref 84–499)
IGG SERPL-MCNC: 359 MG/DL (ref 610–1616)
IGG SERPL-MCNC: 391 MG/DL (ref 610–1616)
IGG SERPL-MCNC: 424 MG/DL (ref 610–1616)
IGG SERPL-MCNC: 428 MG/DL (ref 610–1616)
IGG SERPL-MCNC: 429 MG/DL (ref 610–1616)
IGM SERPL-MCNC: 12 MG/DL (ref 35–242)
IGM SERPL-MCNC: 13 MG/DL (ref 35–242)
IGM SERPL-MCNC: 13 MG/DL (ref 35–242)
IGM SERPL-MCNC: 15 MG/DL (ref 35–242)
IGM SERPL-MCNC: 23 MG/DL (ref 35–242)
IMM GRANULOCYTES # BLD: 0 10E3/UL
IMM GRANULOCYTES # BLD: 0.1 10E3/UL
IMM GRANULOCYTES NFR BLD: 0 %
IMM GRANULOCYTES NFR BLD: 1 %
INR PPP: 1.51 (ref 0.85–1.15)
INR PPP: 1.72 (ref 0.85–1.15)
INR PPP: 1.88 (ref 0.85–1.15)
INR PPP: 2.08 (ref 0.85–1.15)
INR PPP: 2.63 (ref 0.85–1.15)
INR PPP: 3.06 (ref 0.85–1.15)
INTERPRETATION ECG - MUSE: NORMAL
ISSUE DATE AND TIME: NORMAL
KAPPA LC FREE SER-MCNC: 1.01 MG/DL (ref 0.33–1.94)
KAPPA LC FREE SER-MCNC: 1.14 MG/DL (ref 0.33–1.94)
KAPPA LC FREE SER-MCNC: 1.23 MG/DL (ref 0.33–1.94)
KAPPA LC FREE SER-MCNC: 1.29 MG/DL (ref 0.33–1.94)
KAPPA LC FREE SER-MCNC: 1.3 MG/DL (ref 0.33–1.94)
KAPPA LC FREE SER-MCNC: 1.33 MG/DL (ref 0.33–1.94)
KAPPA LC FREE SER-MCNC: 2.31 MG/DL (ref 0.33–1.94)
KAPPA LC FREE SER-MCNC: 2.4 MG/DL (ref 0.33–1.94)
KAPPA LC FREE SER-MCNC: 2.59 MG/DL (ref 0.33–1.94)
KAPPA LC FREE SER-MCNC: 3.46 MG/DL (ref 0.33–1.94)
KAPPA LC FREE/LAMBDA FREE SER NEPH: 0.03 {RATIO} (ref 0.26–1.65)
KAPPA LC FREE/LAMBDA FREE SER NEPH: 0.04 {RATIO} (ref 0.26–1.65)
KAPPA LC FREE/LAMBDA FREE SER NEPH: 0.05 {RATIO} (ref 0.26–1.65)
KAPPA LC FREE/LAMBDA FREE SER NEPH: 0.05 {RATIO} (ref 0.26–1.65)
KAPPA LC FREE/LAMBDA FREE SER NEPH: 0.06 {RATIO} (ref 0.26–1.65)
KAPPA LC FREE/LAMBDA FREE SER NEPH: 0.08 {RATIO} (ref 0.26–1.65)
KAPPA LC FREE/LAMBDA FREE SER NEPH: 0.1 {RATIO} (ref 0.26–1.65)
KAPPA LC FREE/LAMBDA FREE SER NEPH: 0.17 {RATIO} (ref 0.26–1.65)
LAMBDA LC FREE SERPL-MCNC: 12.39 MG/DL (ref 0.57–2.63)
LAMBDA LC FREE SERPL-MCNC: 15.22 MG/DL (ref 0.57–2.63)
LAMBDA LC FREE SERPL-MCNC: 15.55 MG/DL (ref 0.57–2.63)
LAMBDA LC FREE SERPL-MCNC: 17.23 MG/DL (ref 0.57–2.63)
LAMBDA LC FREE SERPL-MCNC: 27.03 MG/DL (ref 0.57–2.63)
LAMBDA LC FREE SERPL-MCNC: 27.53 MG/DL (ref 0.57–2.63)
LAMBDA LC FREE SERPL-MCNC: 40.97 MG/DL (ref 0.57–2.63)
LAMBDA LC FREE SERPL-MCNC: 44.88 MG/DL (ref 0.57–2.63)
LAMBDA LC FREE SERPL-MCNC: 65.78 MG/DL (ref 0.57–2.63)
LAMBDA LC FREE SERPL-MCNC: 98.74 MG/DL (ref 0.57–2.63)
LDH SERPL L TO P-CCNC: 138 U/L (ref 85–227)
LDH SERPL L TO P-CCNC: 165 U/L (ref 85–227)
LDH SERPL L TO P-CCNC: 186 U/L (ref 0–250)
LDH SERPL L TO P-CCNC: 186 U/L (ref 85–227)
LDH SERPL L TO P-CCNC: 188 U/L (ref 85–227)
LDH SERPL L TO P-CCNC: 213 U/L (ref 0–250)
LDLC SERPL CALC-MCNC: 75 MG/DL
LYMPHOCYTES # BLD AUTO: 0.1 10E3/UL (ref 0.8–5.3)
LYMPHOCYTES # BLD AUTO: 0.2 10E3/UL (ref 0.8–5.3)
LYMPHOCYTES # BLD AUTO: 0.3 10E3/UL (ref 0.8–5.3)
LYMPHOCYTES # BLD AUTO: 0.4 10E3/UL (ref 0.8–5.3)
LYMPHOCYTES # BLD AUTO: 0.4 10E3/UL (ref 0.8–5.3)
LYMPHOCYTES # BLD AUTO: 0.5 10E3/UL (ref 0.8–5.3)
LYMPHOCYTES # BLD AUTO: 0.6 10E3/UL (ref 0.8–5.3)
LYMPHOCYTES # BLD AUTO: 0.7 10E3/UL (ref 0.8–5.3)
LYMPHOCYTES # BLD AUTO: 0.9 10E3/UL (ref 0.8–5.3)
LYMPHOCYTES # BLD AUTO: 0.9 10E3/UL (ref 0.8–5.3)
LYMPHOCYTES # BLD AUTO: 1 10E3/UL (ref 0.8–5.3)
LYMPHOCYTES # BLD AUTO: 1.1 10E3/UL (ref 0.8–5.3)
LYMPHOCYTES # BLD AUTO: 1.3 10E3/UL (ref 0.8–5.3)
LYMPHOCYTES # BLD AUTO: 1.4 10E3/UL (ref 0.8–5.3)
LYMPHOCYTES # BLD AUTO: 1.6 10E3/UL (ref 0.8–5.3)
LYMPHOCYTES # BLD MANUAL: 0.2 10E3/UL (ref 0.8–5.3)
LYMPHOCYTES # BLD MANUAL: 0.7 10E3/UL (ref 0.8–5.3)
LYMPHOCYTES NFR BLD AUTO: 1 %
LYMPHOCYTES NFR BLD AUTO: 10 %
LYMPHOCYTES NFR BLD AUTO: 10 %
LYMPHOCYTES NFR BLD AUTO: 11 %
LYMPHOCYTES NFR BLD AUTO: 12 %
LYMPHOCYTES NFR BLD AUTO: 13 %
LYMPHOCYTES NFR BLD AUTO: 13 %
LYMPHOCYTES NFR BLD AUTO: 14 %
LYMPHOCYTES NFR BLD AUTO: 15 %
LYMPHOCYTES NFR BLD AUTO: 16 %
LYMPHOCYTES NFR BLD AUTO: 19 %
LYMPHOCYTES NFR BLD AUTO: 23 %
LYMPHOCYTES NFR BLD AUTO: 24 %
LYMPHOCYTES NFR BLD AUTO: 24 %
LYMPHOCYTES NFR BLD AUTO: 29 %
LYMPHOCYTES NFR BLD AUTO: 4 %
LYMPHOCYTES NFR BLD AUTO: 6 %
LYMPHOCYTES NFR BLD AUTO: 7 %
LYMPHOCYTES NFR BLD AUTO: 8 %
LYMPHOCYTES NFR BLD MANUAL: 2 %
LYMPHOCYTES NFR BLD MANUAL: 26 %
Lab: NORMAL
M PROTEIN MFR UR ELPH: 11.5 %
M PROTEIN MFR UR ELPH: 19.5 %
M PROTEIN MFR UR ELPH: 32.7 %
M PROTEIN MFR UR ELPH: 35.6 %
M PROTEIN MFR UR ELPH: 66.3 %
M PROTEIN SERPL ELPH-MCNC: 0.1 G/DL
M PROTEIN SERPL ELPH-MCNC: 0.3 G/DL
MAGNESIUM SERPL-MCNC: 1.1 MG/DL (ref 1.6–2.3)
MAGNESIUM SERPL-MCNC: 1.2 MG/DL (ref 1.7–2.3)
MAGNESIUM SERPL-MCNC: 1.2 MG/DL (ref 1.7–2.3)
MAGNESIUM SERPL-MCNC: 1.3 MG/DL (ref 1.7–2.3)
MAGNESIUM SERPL-MCNC: 1.4 MG/DL (ref 1.6–2.3)
MAGNESIUM SERPL-MCNC: 1.4 MG/DL (ref 1.8–2.6)
MAGNESIUM SERPL-MCNC: 1.5 MG/DL (ref 1.6–2.3)
MAGNESIUM SERPL-MCNC: 1.6 MG/DL (ref 1.6–2.3)
MAGNESIUM SERPL-MCNC: 1.8 MG/DL (ref 1.6–2.3)
MAGNESIUM SERPL-MCNC: 1.9 MG/DL (ref 1.7–2.3)
MAGNESIUM SERPL-MCNC: 2 MG/DL (ref 1.6–2.3)
MAGNESIUM SERPL-MCNC: 2 MG/DL (ref 1.6–2.3)
MAGNESIUM SERPL-MCNC: 2.1 MG/DL (ref 1.6–2.3)
MAGNESIUM SERPL-MCNC: 2.4 MG/DL (ref 1.6–2.3)
MCH RBC QN AUTO: 32.4 PG (ref 26.5–33)
MCH RBC QN AUTO: 32.6 PG (ref 26.5–33)
MCH RBC QN AUTO: 33.1 PG (ref 26.5–33)
MCH RBC QN AUTO: 33.3 PG (ref 26.5–33)
MCH RBC QN AUTO: 33.7 PG (ref 26.5–33)
MCH RBC QN AUTO: 34 PG (ref 26.5–33)
MCH RBC QN AUTO: 34.3 PG (ref 26.5–33)
MCH RBC QN AUTO: 34.5 PG (ref 26.5–33)
MCH RBC QN AUTO: 34.5 PG (ref 26.5–33)
MCH RBC QN AUTO: 34.6 PG (ref 26.5–33)
MCH RBC QN AUTO: 34.6 PG (ref 26.5–33)
MCH RBC QN AUTO: 34.7 PG (ref 26.5–33)
MCH RBC QN AUTO: 34.7 PG (ref 26.5–33)
MCH RBC QN AUTO: 35 PG (ref 26.5–33)
MCH RBC QN AUTO: 35.2 PG (ref 26.5–33)
MCH RBC QN AUTO: 35.3 PG (ref 26.5–33)
MCH RBC QN AUTO: 35.3 PG (ref 26.5–33)
MCH RBC QN AUTO: 35.6 PG (ref 26.5–33)
MCH RBC QN AUTO: 35.7 PG (ref 26.5–33)
MCHC RBC AUTO-ENTMCNC: 31.3 G/DL (ref 31.5–36.5)
MCHC RBC AUTO-ENTMCNC: 31.3 G/DL (ref 31.5–36.5)
MCHC RBC AUTO-ENTMCNC: 31.6 G/DL (ref 31.5–36.5)
MCHC RBC AUTO-ENTMCNC: 31.7 G/DL (ref 31.5–36.5)
MCHC RBC AUTO-ENTMCNC: 31.8 G/DL (ref 31.5–36.5)
MCHC RBC AUTO-ENTMCNC: 31.8 G/DL (ref 31.5–36.5)
MCHC RBC AUTO-ENTMCNC: 31.9 G/DL (ref 31.5–36.5)
MCHC RBC AUTO-ENTMCNC: 32.3 G/DL (ref 31.5–36.5)
MCHC RBC AUTO-ENTMCNC: 32.5 G/DL (ref 31.5–36.5)
MCHC RBC AUTO-ENTMCNC: 32.6 G/DL (ref 31.5–36.5)
MCHC RBC AUTO-ENTMCNC: 32.7 G/DL (ref 31.5–36.5)
MCHC RBC AUTO-ENTMCNC: 32.7 G/DL (ref 31.5–36.5)
MCHC RBC AUTO-ENTMCNC: 32.8 G/DL (ref 31.5–36.5)
MCHC RBC AUTO-ENTMCNC: 32.9 G/DL (ref 31.5–36.5)
MCHC RBC AUTO-ENTMCNC: 32.9 G/DL (ref 31.5–36.5)
MCHC RBC AUTO-ENTMCNC: 33 G/DL (ref 31.5–36.5)
MCHC RBC AUTO-ENTMCNC: 33.2 G/DL (ref 31.5–36.5)
MCHC RBC AUTO-ENTMCNC: 33.3 G/DL (ref 31.5–36.5)
MCHC RBC AUTO-ENTMCNC: 33.4 G/DL (ref 31.5–36.5)
MCV RBC AUTO: 100 FL (ref 78–100)
MCV RBC AUTO: 102 FL (ref 78–100)
MCV RBC AUTO: 102 FL (ref 78–100)
MCV RBC AUTO: 103 FL (ref 78–100)
MCV RBC AUTO: 104 FL (ref 78–100)
MCV RBC AUTO: 105 FL (ref 78–100)
MCV RBC AUTO: 106 FL (ref 78–100)
MCV RBC AUTO: 107 FL (ref 78–100)
MCV RBC AUTO: 108 FL (ref 78–100)
MCV RBC AUTO: 110 FL (ref 78–100)
MCV RBC AUTO: 113 FL (ref 78–100)
MDC_IDC_EPISODE_DTM: NORMAL
MDC_IDC_EPISODE_DURATION: 10 S
MDC_IDC_EPISODE_DURATION: 11 S
MDC_IDC_EPISODE_DURATION: 15 S
MDC_IDC_EPISODE_DURATION: 16 S
MDC_IDC_EPISODE_DURATION: 17 S
MDC_IDC_EPISODE_DURATION: 17 S
MDC_IDC_EPISODE_DURATION: 20 S
MDC_IDC_EPISODE_DURATION: 33 S
MDC_IDC_EPISODE_DURATION: 44 S
MDC_IDC_EPISODE_DURATION: 45 S
MDC_IDC_EPISODE_DURATION: 46 S
MDC_IDC_EPISODE_DURATION: 49 S
MDC_IDC_EPISODE_DURATION: 5 S
MDC_IDC_EPISODE_DURATION: 5 S
MDC_IDC_EPISODE_DURATION: 6 S
MDC_IDC_EPISODE_DURATION: 7 S
MDC_IDC_EPISODE_DURATION: 7 S
MDC_IDC_EPISODE_DURATION: 8 S
MDC_IDC_EPISODE_DURATION: 8 S
MDC_IDC_EPISODE_ID: NORMAL
MDC_IDC_EPISODE_TYPE: NORMAL
MDC_IDC_EPISODE_VENDOR_TYPE: NORMAL
MDC_IDC_LEAD_IMPLANT_DT: NORMAL
MDC_IDC_LEAD_LOCATION: NORMAL
MDC_IDC_LEAD_LOCATION_DETAIL_1: NORMAL
MDC_IDC_LEAD_MFG: NORMAL
MDC_IDC_LEAD_MODEL: NORMAL
MDC_IDC_LEAD_POLARITY_TYPE: NORMAL
MDC_IDC_LEAD_SERIAL: NORMAL
MDC_IDC_MSMT_BATTERY_DTM: NORMAL
MDC_IDC_MSMT_BATTERY_REMAINING_LONGEVITY: 102 MO
MDC_IDC_MSMT_BATTERY_REMAINING_LONGEVITY: 132 MO
MDC_IDC_MSMT_BATTERY_REMAINING_LONGEVITY: 3 MO
MDC_IDC_MSMT_BATTERY_REMAINING_LONGEVITY: 3 MO
MDC_IDC_MSMT_BATTERY_REMAINING_LONGEVITY: 96 MO
MDC_IDC_MSMT_BATTERY_REMAINING_PERCENTAGE: 100 %
MDC_IDC_MSMT_BATTERY_REMAINING_PERCENTAGE: 2 %
MDC_IDC_MSMT_BATTERY_REMAINING_PERCENTAGE: 2 %
MDC_IDC_MSMT_BATTERY_STATUS: NORMAL
MDC_IDC_MSMT_CAP_CHARGE_DTM: NORMAL
MDC_IDC_MSMT_CAP_CHARGE_ENERGY: 41 J
MDC_IDC_MSMT_CAP_CHARGE_TIME: 10.5 S
MDC_IDC_MSMT_CAP_CHARGE_TIME: 11 S
MDC_IDC_MSMT_CAP_CHARGE_TIME: 11.1 S
MDC_IDC_MSMT_CAP_CHARGE_TIME: 11.1 S
MDC_IDC_MSMT_CAP_CHARGE_TIME: 8.9 S
MDC_IDC_MSMT_CAP_CHARGE_TYPE: NORMAL
MDC_IDC_MSMT_LEADCHNL_LV_IMPEDANCE_VALUE: 645 OHM
MDC_IDC_MSMT_LEADCHNL_LV_IMPEDANCE_VALUE: 684 OHM
MDC_IDC_MSMT_LEADCHNL_LV_IMPEDANCE_VALUE: 909 OHM
MDC_IDC_MSMT_LEADCHNL_LV_IMPEDANCE_VALUE: 948 OHM
MDC_IDC_MSMT_LEADCHNL_LV_LEAD_CHANNEL_STATUS: NORMAL
MDC_IDC_MSMT_LEADCHNL_LV_PACING_THRESHOLD_AMPLITUDE: 0.9 V
MDC_IDC_MSMT_LEADCHNL_LV_PACING_THRESHOLD_AMPLITUDE: 1.7 V
MDC_IDC_MSMT_LEADCHNL_LV_PACING_THRESHOLD_AMPLITUDE: 2.3 V
MDC_IDC_MSMT_LEADCHNL_LV_PACING_THRESHOLD_PULSEWIDTH: 0.6 MS
MDC_IDC_MSMT_LEADCHNL_LV_PACING_THRESHOLD_PULSEWIDTH: 0.6 MS
MDC_IDC_MSMT_LEADCHNL_LV_PACING_THRESHOLD_PULSEWIDTH: 1 MS
MDC_IDC_MSMT_LEADCHNL_RA_IMPEDANCE_VALUE: 3000 OHM
MDC_IDC_MSMT_LEADCHNL_RA_LEAD_CHANNEL_STATUS: NORMAL
MDC_IDC_MSMT_LEADCHNL_RV_IMPEDANCE_VALUE: 318 OHM
MDC_IDC_MSMT_LEADCHNL_RV_IMPEDANCE_VALUE: 321 OHM
MDC_IDC_MSMT_LEADCHNL_RV_IMPEDANCE_VALUE: 343 OHM
MDC_IDC_MSMT_LEADCHNL_RV_IMPEDANCE_VALUE: 346 OHM
MDC_IDC_MSMT_LEADCHNL_RV_IMPEDANCE_VALUE: 393 OHM
MDC_IDC_MSMT_LEADCHNL_RV_IMPEDANCE_VALUE: 414 OHM
MDC_IDC_MSMT_LEADCHNL_RV_PACING_THRESHOLD_AMPLITUDE: 1 V
MDC_IDC_MSMT_LEADCHNL_RV_PACING_THRESHOLD_AMPLITUDE: 1.1 V
MDC_IDC_MSMT_LEADCHNL_RV_PACING_THRESHOLD_PULSEWIDTH: 0.4 MS
MDC_IDC_MSMT_LEADCHNL_RV_PACING_THRESHOLD_PULSEWIDTH: 0.4 MS
MDC_IDC_MSMT_LEADCHNL_RV_PACING_THRESHOLD_PULSEWIDTH: 0.5 MS
MDC_IDC_PG_IMPLANT_DTM: NORMAL
MDC_IDC_PG_MFG: NORMAL
MDC_IDC_PG_MODEL: NORMAL
MDC_IDC_PG_SERIAL: NORMAL
MDC_IDC_PG_TYPE: NORMAL
MDC_IDC_SESS_CLINIC_NAME: NORMAL
MDC_IDC_SESS_DTM: NORMAL
MDC_IDC_SESS_TYPE: NORMAL
MDC_IDC_SET_BRADY_AT_MODE_SWITCH_RATE: 170 {BEATS}/MIN
MDC_IDC_SET_BRADY_AT_MODE_SWITCH_RATE: 170 {BEATS}/MIN
MDC_IDC_SET_BRADY_LOWRATE: 40 {BEATS}/MIN
MDC_IDC_SET_BRADY_LOWRATE: 40 {BEATS}/MIN
MDC_IDC_SET_BRADY_LOWRATE: 60 {BEATS}/MIN
MDC_IDC_SET_BRADY_LOWRATE: 65 {BEATS}/MIN
MDC_IDC_SET_BRADY_LOWRATE: 65 {BEATS}/MIN
MDC_IDC_SET_BRADY_LOWRATE: 75 {BEATS}/MIN
MDC_IDC_SET_BRADY_LOWRATE: 75 {BEATS}/MIN
MDC_IDC_SET_BRADY_MAX_SENSOR_RATE: 130 {BEATS}/MIN
MDC_IDC_SET_BRADY_MODE: NORMAL
MDC_IDC_SET_CRT_LVRV_DELAY: 30 MS
MDC_IDC_SET_CRT_PACED_CHAMBERS: NORMAL
MDC_IDC_SET_LEADCHNL_LV_PACING_AMPLITUDE: 2 V
MDC_IDC_SET_LEADCHNL_LV_PACING_AMPLITUDE: 2 V
MDC_IDC_SET_LEADCHNL_LV_PACING_AMPLITUDE: 2.7 V
MDC_IDC_SET_LEADCHNL_LV_PACING_AMPLITUDE: 3.5 V
MDC_IDC_SET_LEADCHNL_LV_PACING_AMPLITUDE: 3.5 V
MDC_IDC_SET_LEADCHNL_LV_PACING_ANODE_ELECTRODE_1: NORMAL
MDC_IDC_SET_LEADCHNL_LV_PACING_ANODE_LOCATION_1: NORMAL
MDC_IDC_SET_LEADCHNL_LV_PACING_CAPTURE_MODE: NORMAL
MDC_IDC_SET_LEADCHNL_LV_PACING_CATHODE_ELECTRODE_1: NORMAL
MDC_IDC_SET_LEADCHNL_LV_PACING_CATHODE_LOCATION_1: NORMAL
MDC_IDC_SET_LEADCHNL_LV_PACING_PULSEWIDTH: 0.5 MS
MDC_IDC_SET_LEADCHNL_LV_PACING_PULSEWIDTH: 0.6 MS
MDC_IDC_SET_LEADCHNL_LV_PACING_PULSEWIDTH: 1 MS
MDC_IDC_SET_LEADCHNL_LV_SENSING_ADAPTATION_MODE: NORMAL
MDC_IDC_SET_LEADCHNL_LV_SENSING_ANODE_ELECTRODE_1: NORMAL
MDC_IDC_SET_LEADCHNL_LV_SENSING_ANODE_LOCATION_1: NORMAL
MDC_IDC_SET_LEADCHNL_LV_SENSING_CATHODE_ELECTRODE_1: NORMAL
MDC_IDC_SET_LEADCHNL_LV_SENSING_CATHODE_LOCATION_1: NORMAL
MDC_IDC_SET_LEADCHNL_LV_SENSING_SENSITIVITY: 1 MV
MDC_IDC_SET_LEADCHNL_RA_PACING_POLARITY: NORMAL
MDC_IDC_SET_LEADCHNL_RA_SENSING_ADAPTATION_MODE: NORMAL
MDC_IDC_SET_LEADCHNL_RA_SENSING_POLARITY: NORMAL
MDC_IDC_SET_LEADCHNL_RA_SENSING_SENSITIVITY: 0.25 MV
MDC_IDC_SET_LEADCHNL_RV_PACING_AMPLITUDE: 2 V
MDC_IDC_SET_LEADCHNL_RV_PACING_AMPLITUDE: 2.2 V
MDC_IDC_SET_LEADCHNL_RV_PACING_AMPLITUDE: 2.2 V
MDC_IDC_SET_LEADCHNL_RV_PACING_AMPLITUDE: 2.4 V
MDC_IDC_SET_LEADCHNL_RV_PACING_AMPLITUDE: 2.4 V
MDC_IDC_SET_LEADCHNL_RV_PACING_AMPLITUDE: 2.5 V
MDC_IDC_SET_LEADCHNL_RV_PACING_AMPLITUDE: 2.5 V
MDC_IDC_SET_LEADCHNL_RV_PACING_CAPTURE_MODE: NORMAL
MDC_IDC_SET_LEADCHNL_RV_PACING_POLARITY: NORMAL
MDC_IDC_SET_LEADCHNL_RV_PACING_PULSEWIDTH: 0.4 MS
MDC_IDC_SET_LEADCHNL_RV_PACING_PULSEWIDTH: 0.5 MS
MDC_IDC_SET_LEADCHNL_RV_SENSING_ADAPTATION_MODE: NORMAL
MDC_IDC_SET_LEADCHNL_RV_SENSING_POLARITY: NORMAL
MDC_IDC_SET_LEADCHNL_RV_SENSING_SENSITIVITY: 0.6 MV
MDC_IDC_SET_ZONE_DETECTION_INTERVAL: 286 MS
MDC_IDC_SET_ZONE_DETECTION_INTERVAL: 333 MS
MDC_IDC_SET_ZONE_DETECTION_INTERVAL: 375 MS
MDC_IDC_SET_ZONE_DETECTION_INTERVAL: 400 MS
MDC_IDC_SET_ZONE_TYPE: NORMAL
MDC_IDC_SET_ZONE_VENDOR_TYPE: NORMAL
MDC_IDC_STAT_BRADY_DTM_END: NORMAL
MDC_IDC_STAT_BRADY_DTM_START: NORMAL
MDC_IDC_STAT_BRADY_RA_PERCENT_PACED: 0 %
MDC_IDC_STAT_BRADY_RV_PERCENT_PACED: 12 %
MDC_IDC_STAT_BRADY_RV_PERCENT_PACED: 13 %
MDC_IDC_STAT_BRADY_RV_PERCENT_PACED: 65 %
MDC_IDC_STAT_BRADY_RV_PERCENT_PACED: 71 %
MDC_IDC_STAT_BRADY_RV_PERCENT_PACED: 90 %
MDC_IDC_STAT_BRADY_RV_PERCENT_PACED: 92 %
MDC_IDC_STAT_BRADY_RV_PERCENT_PACED: 93 %
MDC_IDC_STAT_CRT_DTM_END: NORMAL
MDC_IDC_STAT_CRT_DTM_START: NORMAL
MDC_IDC_STAT_CRT_LV_PERCENT_PACED: 65 %
MDC_IDC_STAT_CRT_LV_PERCENT_PACED: 71 %
MDC_IDC_STAT_CRT_LV_PERCENT_PACED: 90 %
MDC_IDC_STAT_CRT_LV_PERCENT_PACED: 92 %
MDC_IDC_STAT_CRT_LV_PERCENT_PACED: 93 %
MDC_IDC_STAT_EPISODE_RECENT_COUNT: 0
MDC_IDC_STAT_EPISODE_RECENT_COUNT: 10
MDC_IDC_STAT_EPISODE_RECENT_COUNT: 11
MDC_IDC_STAT_EPISODE_RECENT_COUNT: 13
MDC_IDC_STAT_EPISODE_RECENT_COUNT: 2
MDC_IDC_STAT_EPISODE_RECENT_COUNT: 3
MDC_IDC_STAT_EPISODE_RECENT_COUNT: 595
MDC_IDC_STAT_EPISODE_RECENT_COUNT: 597
MDC_IDC_STAT_EPISODE_RECENT_COUNT_DTM_END: NORMAL
MDC_IDC_STAT_EPISODE_RECENT_COUNT_DTM_START: NORMAL
MDC_IDC_STAT_EPISODE_TYPE: NORMAL
MDC_IDC_STAT_EPISODE_VENDOR_TYPE: NORMAL
MDC_IDC_STAT_TACHYTHERAPY_ATP_DELIVERED_RECENT: 0
MDC_IDC_STAT_TACHYTHERAPY_ATP_DELIVERED_RECENT: 1
MDC_IDC_STAT_TACHYTHERAPY_ATP_DELIVERED_RECENT: 2
MDC_IDC_STAT_TACHYTHERAPY_ATP_DELIVERED_RECENT: 3
MDC_IDC_STAT_TACHYTHERAPY_ATP_DELIVERED_RECENT: 3
MDC_IDC_STAT_TACHYTHERAPY_ATP_DELIVERED_TOTAL: 25
MDC_IDC_STAT_TACHYTHERAPY_ATP_DELIVERED_TOTAL: 26
MDC_IDC_STAT_TACHYTHERAPY_ATP_DELIVERED_TOTAL: 3
MDC_IDC_STAT_TACHYTHERAPY_RECENT_DTM_END: NORMAL
MDC_IDC_STAT_TACHYTHERAPY_RECENT_DTM_START: NORMAL
MDC_IDC_STAT_TACHYTHERAPY_SHOCKS_ABORTED_RECENT: 0
MDC_IDC_STAT_TACHYTHERAPY_SHOCKS_ABORTED_RECENT: 2
MDC_IDC_STAT_TACHYTHERAPY_SHOCKS_ABORTED_RECENT: 2
MDC_IDC_STAT_TACHYTHERAPY_SHOCKS_ABORTED_TOTAL: 0
MDC_IDC_STAT_TACHYTHERAPY_SHOCKS_ABORTED_TOTAL: 25
MDC_IDC_STAT_TACHYTHERAPY_SHOCKS_ABORTED_TOTAL: 25
MDC_IDC_STAT_TACHYTHERAPY_SHOCKS_DELIVERED_RECENT: 0
MDC_IDC_STAT_TACHYTHERAPY_SHOCKS_DELIVERED_RECENT: 1
MDC_IDC_STAT_TACHYTHERAPY_SHOCKS_DELIVERED_RECENT: 2
MDC_IDC_STAT_TACHYTHERAPY_SHOCKS_DELIVERED_RECENT: 3
MDC_IDC_STAT_TACHYTHERAPY_SHOCKS_DELIVERED_RECENT: 3
MDC_IDC_STAT_TACHYTHERAPY_SHOCKS_DELIVERED_TOTAL: 25
MDC_IDC_STAT_TACHYTHERAPY_SHOCKS_DELIVERED_TOTAL: 26
MDC_IDC_STAT_TACHYTHERAPY_SHOCKS_DELIVERED_TOTAL: 3
MDC_IDC_STAT_TACHYTHERAPY_TOTAL_DTM_END: NORMAL
MDC_IDC_STAT_TACHYTHERAPY_TOTAL_DTM_START: NORMAL
METAMYELOCYTES # BLD MANUAL: 0.2 10E3/UL
METAMYELOCYTES NFR BLD MANUAL: 2 %
MONOCYTES # BLD AUTO: 0.1 10E3/UL (ref 0–1.3)
MONOCYTES # BLD AUTO: 0.3 10E3/UL (ref 0–1.3)
MONOCYTES # BLD AUTO: 0.4 10E3/UL (ref 0–1.3)
MONOCYTES # BLD AUTO: 0.4 10E3/UL (ref 0–1.3)
MONOCYTES # BLD AUTO: 0.5 10E3/UL (ref 0–1.3)
MONOCYTES # BLD AUTO: 0.6 10E3/UL (ref 0–1.3)
MONOCYTES # BLD AUTO: 0.7 10E3/UL (ref 0–1.3)
MONOCYTES # BLD AUTO: 0.9 10E3/UL (ref 0–1.3)
MONOCYTES # BLD AUTO: 0.9 10E3/UL (ref 0–1.3)
MONOCYTES # BLD AUTO: 1.2 10E3/UL (ref 0–1.3)
MONOCYTES # BLD MANUAL: 0.7 10E3/UL (ref 0–1.3)
MONOCYTES # BLD MANUAL: 0.9 10E3/UL (ref 0–1.3)
MONOCYTES NFR BLD AUTO: 1 %
MONOCYTES NFR BLD AUTO: 1 %
MONOCYTES NFR BLD AUTO: 10 %
MONOCYTES NFR BLD AUTO: 11 %
MONOCYTES NFR BLD AUTO: 12 %
MONOCYTES NFR BLD AUTO: 12 %
MONOCYTES NFR BLD AUTO: 19 %
MONOCYTES NFR BLD AUTO: 2 %
MONOCYTES NFR BLD AUTO: 20 %
MONOCYTES NFR BLD AUTO: 5 %
MONOCYTES NFR BLD AUTO: 8 %
MONOCYTES NFR BLD MANUAL: 29 %
MONOCYTES NFR BLD MANUAL: 8 %
MYELOCYTES # BLD MANUAL: 0.1 10E3/UL
MYELOCYTES NFR BLD MANUAL: 1 %
NEUTROPHILS # BLD AUTO: 1.6 10E3/UL (ref 1.6–8.3)
NEUTROPHILS # BLD AUTO: 2.4 10E3/UL (ref 1.6–8.3)
NEUTROPHILS # BLD AUTO: 2.7 10E3/UL (ref 1.6–8.3)
NEUTROPHILS # BLD AUTO: 2.8 10E3/UL (ref 1.6–8.3)
NEUTROPHILS # BLD AUTO: 3 10E3/UL (ref 1.6–8.3)
NEUTROPHILS # BLD AUTO: 3.4 10E3/UL (ref 1.6–8.3)
NEUTROPHILS # BLD AUTO: 3.9 10E3/UL (ref 1.6–8.3)
NEUTROPHILS # BLD AUTO: 4.4 10E3/UL (ref 1.6–8.3)
NEUTROPHILS # BLD AUTO: 4.5 10E3/UL (ref 1.6–8.3)
NEUTROPHILS # BLD AUTO: 4.6 10E3/UL (ref 1.6–8.3)
NEUTROPHILS # BLD AUTO: 5 10E3/UL (ref 1.6–8.3)
NEUTROPHILS # BLD AUTO: 5.3 10E3/UL (ref 1.6–8.3)
NEUTROPHILS # BLD AUTO: 5.4 10E3/UL (ref 1.6–8.3)
NEUTROPHILS # BLD AUTO: 5.6 10E3/UL (ref 1.6–8.3)
NEUTROPHILS # BLD AUTO: 5.9 10E3/UL (ref 1.6–8.3)
NEUTROPHILS # BLD AUTO: 6.4 10E3/UL (ref 1.6–8.3)
NEUTROPHILS # BLD AUTO: 6.9 10E3/UL (ref 1.6–8.3)
NEUTROPHILS # BLD AUTO: 7.8 10E3/UL (ref 1.6–8.3)
NEUTROPHILS # BLD AUTO: 8.6 10E3/UL (ref 1.6–8.3)
NEUTROPHILS # BLD MANUAL: 1 10E3/UL (ref 1.6–8.3)
NEUTROPHILS # BLD MANUAL: 9.7 10E3/UL (ref 1.6–8.3)
NEUTROPHILS NFR BLD AUTO: 44 %
NEUTROPHILS NFR BLD AUTO: 48 %
NEUTROPHILS NFR BLD AUTO: 59 %
NEUTROPHILS NFR BLD AUTO: 64 %
NEUTROPHILS NFR BLD AUTO: 64 %
NEUTROPHILS NFR BLD AUTO: 66 %
NEUTROPHILS NFR BLD AUTO: 72 %
NEUTROPHILS NFR BLD AUTO: 73 %
NEUTROPHILS NFR BLD AUTO: 74 %
NEUTROPHILS NFR BLD AUTO: 74 %
NEUTROPHILS NFR BLD AUTO: 75 %
NEUTROPHILS NFR BLD AUTO: 77 %
NEUTROPHILS NFR BLD AUTO: 77 %
NEUTROPHILS NFR BLD AUTO: 79 %
NEUTROPHILS NFR BLD AUTO: 89 %
NEUTROPHILS NFR BLD AUTO: 93 %
NEUTROPHILS NFR BLD AUTO: 97 %
NEUTROPHILS NFR BLD MANUAL: 40 %
NEUTROPHILS NFR BLD MANUAL: 87 %
NONHDLC SERPL-MCNC: 96 MG/DL
NRBC # BLD AUTO: 0 10E3/UL
NRBC # BLD AUTO: 0.4 10E3/UL
NRBC BLD AUTO-RTO: 0 /100
NRBC BLD AUTO-RTO: 1 /100
NRBC BLD MANUAL-RTO: 4 %
NT-PROBNP SERPL-MCNC: 3684 PG/ML (ref 0–900)
NT-PROBNP SERPL-MCNC: 6982 PG/ML (ref 0–125)
P AXIS - MUSE: 79 DEGREES
P AXIS - MUSE: NORMAL DEGREES
P AXIS - MUSE: NORMAL DEGREES
PERFORMING LABORATORY: NORMAL
PLAT MORPH BLD: ABNORMAL
PLAT MORPH BLD: ABNORMAL
PLATELET # BLD AUTO: 130 10E3/UL (ref 150–450)
PLATELET # BLD AUTO: 131 10E3/UL (ref 150–450)
PLATELET # BLD AUTO: 133 10E3/UL (ref 150–450)
PLATELET # BLD AUTO: 147 10E3/UL (ref 150–450)
PLATELET # BLD AUTO: 154 10E3/UL (ref 150–450)
PLATELET # BLD AUTO: 155 10E3/UL (ref 150–450)
PLATELET # BLD AUTO: 172 10E3/UL (ref 150–450)
PLATELET # BLD AUTO: 176 10E3/UL (ref 150–450)
PLATELET # BLD AUTO: 177 10E3/UL (ref 150–450)
PLATELET # BLD AUTO: 181 10E3/UL (ref 150–450)
PLATELET # BLD AUTO: 192 10E3/UL (ref 150–450)
PLATELET # BLD AUTO: 212 10E3/UL (ref 150–450)
PLATELET # BLD AUTO: 214 10E3/UL (ref 150–450)
PLATELET # BLD AUTO: 231 10E3/UL (ref 150–450)
PLATELET # BLD AUTO: 240 10E3/UL (ref 150–450)
PLATELET # BLD AUTO: 283 10E3/UL (ref 150–450)
PLATELET # BLD AUTO: 289 10E3/UL (ref 150–450)
PLATELET # BLD AUTO: 315 10E3/UL (ref 150–450)
PLATELET # BLD AUTO: 59 10E3/UL (ref 150–450)
PLATELET # BLD AUTO: 64 10E3/UL (ref 150–450)
PLATELET # BLD AUTO: 76 10E3/UL (ref 150–450)
POTASSIUM BLD-SCNC: 3.8 MMOL/L (ref 3.4–5.3)
POTASSIUM BLD-SCNC: 3.9 MMOL/L (ref 3.4–5.3)
POTASSIUM BLD-SCNC: 3.9 MMOL/L (ref 3.4–5.3)
POTASSIUM BLD-SCNC: 4 MMOL/L (ref 3.4–5.3)
POTASSIUM BLD-SCNC: 4.1 MMOL/L (ref 3.4–5.3)
POTASSIUM BLD-SCNC: 4.2 MMOL/L (ref 3.4–5.3)
POTASSIUM BLD-SCNC: 4.2 MMOL/L (ref 3.4–5.3)
POTASSIUM BLD-SCNC: 4.3 MMOL/L (ref 3.4–5.3)
POTASSIUM BLD-SCNC: 4.4 MMOL/L (ref 3.4–5.3)
POTASSIUM BLD-SCNC: 4.5 MMOL/L (ref 3.4–5.3)
POTASSIUM BLD-SCNC: 4.6 MMOL/L (ref 3.4–5.3)
POTASSIUM BLD-SCNC: 4.6 MMOL/L (ref 3.4–5.3)
POTASSIUM BLD-SCNC: 4.7 MMOL/L (ref 3.4–5.3)
POTASSIUM BLD-SCNC: 5.1 MMOL/L (ref 3.4–5.3)
POTASSIUM BLD-SCNC: 5.2 MMOL/L (ref 3.4–5.3)
POTASSIUM SERPL-SCNC: 4.6 MMOL/L (ref 3.4–5.3)
POTASSIUM SERPL-SCNC: 4.7 MMOL/L (ref 3.4–5.3)
POTASSIUM SERPL-SCNC: 4.8 MMOL/L (ref 3.4–5.3)
POTASSIUM SERPL-SCNC: 4.8 MMOL/L (ref 3.4–5.3)
POTASSIUM SERPL-SCNC: 5 MMOL/L (ref 3.4–5.3)
POTASSIUM SERPL-SCNC: 5.4 MMOL/L (ref 3.4–5.3)
PR INTERVAL - MUSE: 80 MS
PR INTERVAL - MUSE: NORMAL MS
PR INTERVAL - MUSE: NORMAL MS
PROT 24H UR-MRATE: 0.14 G/SPEC (ref 0.04–0.23)
PROT 24H UR-MRATE: 0.15 G/SPEC (ref 0.04–0.23)
PROT 24H UR-MRATE: 0.16 G/SPEC (ref 0.04–0.23)
PROT 24H UR-MRATE: 0.21 G/SPEC (ref 0.04–0.23)
PROT 24H UR-MRATE: 283.54 MG/SPECIMEN
PROT ELPH PNL UR ELPH: NORMAL
PROT PATTERN SERPL ELPH-IMP: ABNORMAL
PROT PATTERN UR ELPH-IMP: ABNORMAL
PROT SERPL-MCNC: 6.1 G/DL (ref 6.8–8.8)
PROT SERPL-MCNC: 6.2 G/DL (ref 6.4–8.3)
PROT SERPL-MCNC: 6.3 G/DL (ref 6.8–8.8)
PROT SERPL-MCNC: 6.4 G/DL (ref 6.8–8.8)
PROT SERPL-MCNC: 6.5 G/DL (ref 6.4–8.3)
PROT SERPL-MCNC: 6.5 G/DL (ref 6.8–8.8)
PROT SERPL-MCNC: 6.6 G/DL (ref 6.4–8.3)
PROT SERPL-MCNC: 6.6 G/DL (ref 6.8–8.8)
PROT SERPL-MCNC: 6.7 G/DL (ref 6.4–8.3)
PROT SERPL-MCNC: 6.7 G/DL (ref 6.8–8.8)
PROT SERPL-MCNC: 6.8 G/DL (ref 6.8–8.8)
PROT SERPL-MCNC: 6.9 G/DL (ref 6.8–8.8)
PROT SERPL-MCNC: 7 G/DL (ref 6.8–8.8)
PROT SERPL-MCNC: 7.5 G/DL (ref 6.8–8.8)
PROT UR-MCNC: 0.06 G/L
PROT UR-MCNC: 0.09 G/L
PROT UR-MCNC: 0.1 G/L
PROT UR-MCNC: 0.13 G/L
PROT/CREAT 24H UR: 0.14 G/G CR (ref 0–0.2)
PROT/CREAT 24H UR: 0.15 G/G CR (ref 0–0.2)
PROT/CREAT 24H UR: 0.17 G/G CR (ref 0–0.2)
PROT/CREAT 24H UR: 0.22 G/G CR (ref 0–0.2)
QRS DURATION - MUSE: 112 MS
QRS DURATION - MUSE: 126 MS
QRS DURATION - MUSE: 202 MS
QT - MUSE: 370 MS
QT - MUSE: 444 MS
QT - MUSE: 468 MS
QTC - MUSE: 378 MS
QTC - MUSE: 490 MS
QTC - MUSE: 499 MS
R AXIS - MUSE: -65 DEGREES
R AXIS - MUSE: 10 DEGREES
R AXIS - MUSE: 197 DEGREES
RBC # BLD AUTO: 2.3 10E6/UL (ref 4.4–5.9)
RBC # BLD AUTO: 2.52 10E6/UL (ref 4.4–5.9)
RBC # BLD AUTO: 2.54 10E6/UL (ref 4.4–5.9)
RBC # BLD AUTO: 2.56 10E6/UL (ref 4.4–5.9)
RBC # BLD AUTO: 2.63 10E6/UL (ref 4.4–5.9)
RBC # BLD AUTO: 2.64 10E6/UL (ref 4.4–5.9)
RBC # BLD AUTO: 2.72 10E6/UL (ref 4.4–5.9)
RBC # BLD AUTO: 2.76 10E6/UL (ref 4.4–5.9)
RBC # BLD AUTO: 2.78 10E6/UL (ref 4.4–5.9)
RBC # BLD AUTO: 2.87 10E6/UL (ref 4.4–5.9)
RBC # BLD AUTO: 2.92 10E6/UL (ref 4.4–5.9)
RBC # BLD AUTO: 2.97 10E6/UL (ref 4.4–5.9)
RBC # BLD AUTO: 3 10E6/UL (ref 4.4–5.9)
RBC # BLD AUTO: 3.02 10E6/UL (ref 4.4–5.9)
RBC # BLD AUTO: 3.06 10E6/UL (ref 4.4–5.9)
RBC # BLD AUTO: 3.08 10E6/UL (ref 4.4–5.9)
RBC # BLD AUTO: 3.1 10E6/UL (ref 4.4–5.9)
RBC # BLD AUTO: 3.17 10E6/UL (ref 4.4–5.9)
RBC # BLD AUTO: 3.26 10E6/UL (ref 4.4–5.9)
RBC # BLD AUTO: 3.31 10E6/UL (ref 4.4–5.9)
RBC # BLD AUTO: 3.59 10E6/UL (ref 4.4–5.9)
RBC MORPH BLD: ABNORMAL
RBC MORPH BLD: ABNORMAL
SARS-COV-2 RNA RESP QL NAA+PROBE: NEGATIVE
SCANNED LAB RESULT: NORMAL
SODIUM SERPL-SCNC: 135 MMOL/L (ref 136–145)
SODIUM SERPL-SCNC: 136 MMOL/L (ref 133–144)
SODIUM SERPL-SCNC: 137 MMOL/L (ref 136–145)
SODIUM SERPL-SCNC: 138 MMOL/L (ref 133–144)
SODIUM SERPL-SCNC: 138 MMOL/L (ref 133–144)
SODIUM SERPL-SCNC: 139 MMOL/L (ref 133–144)
SODIUM SERPL-SCNC: 139 MMOL/L (ref 133–144)
SODIUM SERPL-SCNC: 140 MMOL/L (ref 133–144)
SODIUM SERPL-SCNC: 140 MMOL/L (ref 136–145)
SODIUM SERPL-SCNC: 142 MMOL/L (ref 133–144)
SODIUM SERPL-SCNC: 142 MMOL/L (ref 133–144)
SODIUM SERPL-SCNC: 142 MMOL/L (ref 136–145)
SODIUM SERPL-SCNC: 143 MMOL/L (ref 133–144)
SODIUM SERPL-SCNC: 144 MMOL/L (ref 133–144)
SPECIMEN EXPIRATION DATE: ABNORMAL
SPECIMEN EXPIRATION DATE: NORMAL
SPECIMEN VOL UR: 1503 ML
SPECIMEN VOL UR: 1610 ML
SPECIMEN VOL UR: 1805 ML
SPECIMEN VOL UR: 1806 ML
SPECIMEN VOL UR: 2323 ML
SYSTOLIC BLOOD PRESSURE - MUSE: NORMAL MMHG
T AXIS - MUSE: -18 DEGREES
T AXIS - MUSE: 196 DEGREES
T AXIS - MUSE: 60 DEGREES
TEST NAME: NORMAL
TOTAL PROTEIN SERUM FOR ELP: 5.3 G/DL (ref 6.8–8.8)
TOTAL PROTEIN SERUM FOR ELP: 5.5 G/DL (ref 6.4–8.3)
TOTAL PROTEIN SERUM FOR ELP: 5.8 G/DL (ref 6.8–8.8)
TOTAL PROTEIN SERUM FOR ELP: 6 G/DL (ref 6.4–8.3)
TOTAL PROTEIN SERUM FOR ELP: 6 G/DL (ref 6.4–8.3)
TOTAL PROTEIN SERUM FOR ELP: 6.1 G/DL (ref 6.8–8.8)
TOTAL PROTEIN SERUM FOR ELP: 6.3 G/DL (ref 6.4–8.3)
TOTAL PROTEIN SERUM FOR ELP: 6.4 G/DL (ref 6.4–8.3)
TOTAL PROTEIN SERUM FOR ELP: 6.4 G/DL (ref 6.8–8.8)
TRIGL SERPL-MCNC: 104 MG/DL
UNIT ABO/RH: NORMAL
UNIT ABO/RH: NORMAL
UNIT NUMBER: NORMAL
UNIT NUMBER: NORMAL
UNIT STATUS: NORMAL
UNIT STATUS: NORMAL
UNIT TYPE ISBT: 5100
UNIT TYPE ISBT: 6200
URATE SERPL-MCNC: 4.8 MG/DL (ref 3.4–7)
URATE SERPL-MCNC: 5 MG/DL (ref 3.4–7)
URATE SERPL-MCNC: 5 MG/DL (ref 3.4–7)
URATE SERPL-MCNC: 6.3 MG/DL (ref 3.5–7.2)
URATE SERPL-MCNC: 6.3 MG/DL (ref 3.5–7.2)
URATE SERPL-MCNC: 7.1 MG/DL (ref 3.5–7.2)
VENTRICULAR RATE- MUSE: 63 BPM
VENTRICULAR RATE- MUSE: 66 BPM
VENTRICULAR RATE- MUSE: 76 BPM
WBC # BLD AUTO: 11.1 10E3/UL (ref 4–11)
WBC # BLD AUTO: 2.5 10E3/UL (ref 4–11)
WBC # BLD AUTO: 3.4 10E3/UL (ref 4–11)
WBC # BLD AUTO: 3.6 10E3/UL (ref 4–11)
WBC # BLD AUTO: 3.8 10E3/UL (ref 4–11)
WBC # BLD AUTO: 4.3 10E3/UL (ref 4–11)
WBC # BLD AUTO: 5.2 10E3/UL (ref 4–11)
WBC # BLD AUTO: 5.8 10E3/UL (ref 4–11)
WBC # BLD AUTO: 6 10E3/UL (ref 4–11)
WBC # BLD AUTO: 6 10E3/UL (ref 4–11)
WBC # BLD AUTO: 6.3 10E3/UL (ref 4–11)
WBC # BLD AUTO: 6.4 10E3/UL (ref 4–11)
WBC # BLD AUTO: 6.7 10E3/UL (ref 4–11)
WBC # BLD AUTO: 6.7 10E3/UL (ref 4–11)
WBC # BLD AUTO: 7.3 10E3/UL (ref 4–11)
WBC # BLD AUTO: 7.5 10E3/UL (ref 4–11)
WBC # BLD AUTO: 7.8 10E3/UL (ref 4–11)
WBC # BLD AUTO: 8.1 10E3/UL (ref 4–11)
WBC # BLD AUTO: 8.4 10E3/UL (ref 4–11)
WBC # BLD AUTO: 8.9 10E3/UL (ref 4–11)
WBC # BLD AUTO: 9 10E3/UL (ref 4–11)

## 2022-01-01 PROCEDURE — 85018 HEMOGLOBIN: CPT | Performed by: INTERNAL MEDICINE

## 2022-01-01 PROCEDURE — 80053 COMPREHEN METABOLIC PANEL: CPT

## 2022-01-01 PROCEDURE — 36415 COLL VENOUS BLD VENIPUNCTURE: CPT

## 2022-01-01 PROCEDURE — 83615 LACTATE (LD) (LDH) ENZYME: CPT

## 2022-01-01 PROCEDURE — 86870 RBC ANTIBODY IDENTIFICATION: CPT

## 2022-01-01 PROCEDURE — 96361 HYDRATE IV INFUSION ADD-ON: CPT

## 2022-01-01 PROCEDURE — 83735 ASSAY OF MAGNESIUM: CPT | Performed by: INTERNAL MEDICINE

## 2022-01-01 PROCEDURE — 85025 COMPLETE CBC W/AUTO DIFF WBC: CPT | Performed by: INTERNAL MEDICINE

## 2022-01-01 PROCEDURE — 82040 ASSAY OF SERUM ALBUMIN: CPT | Performed by: INTERNAL MEDICINE

## 2022-01-01 PROCEDURE — 999N000142 HC STATISTIC PROCEDURE PREP ONLY

## 2022-01-01 PROCEDURE — 80053 COMPREHEN METABOLIC PANEL: CPT | Performed by: INTERNAL MEDICINE

## 2022-01-01 PROCEDURE — 84165 PROTEIN E-PHORESIS SERUM: CPT | Mod: 26 | Performed by: PATHOLOGY

## 2022-01-01 PROCEDURE — 96366 THER/PROPH/DIAG IV INF ADDON: CPT

## 2022-01-01 PROCEDURE — G0463 HOSPITAL OUTPT CLINIC VISIT: HCPCS | Mod: 25

## 2022-01-01 PROCEDURE — 99152 MOD SED SAME PHYS/QHP 5/>YRS: CPT | Performed by: INTERNAL MEDICINE

## 2022-01-01 PROCEDURE — 250N000013 HC RX MED GY IP 250 OP 250 PS 637: Performed by: INTERNAL MEDICINE

## 2022-01-01 PROCEDURE — 272N000001 HC OR GENERAL SUPPLY STERILE: Performed by: INTERNAL MEDICINE

## 2022-01-01 PROCEDURE — 93005 ELECTROCARDIOGRAM TRACING: CPT

## 2022-01-01 PROCEDURE — 96401 CHEMO ANTI-NEOPL SQ/IM: CPT

## 2022-01-01 PROCEDURE — 83735 ASSAY OF MAGNESIUM: CPT

## 2022-01-01 PROCEDURE — 258N000003 HC RX IP 258 OP 636: Performed by: INTERNAL MEDICINE

## 2022-01-01 PROCEDURE — 85025 COMPLETE CBC W/AUTO DIFF WBC: CPT

## 2022-01-01 PROCEDURE — 84155 ASSAY OF PROTEIN SERUM: CPT | Performed by: INTERNAL MEDICINE

## 2022-01-01 PROCEDURE — 96409 CHEMO IV PUSH SNGL DRUG: CPT

## 2022-01-01 PROCEDURE — 250N000011 HC RX IP 250 OP 636: Performed by: INTERNAL MEDICINE

## 2022-01-01 PROCEDURE — 85014 HEMATOCRIT: CPT | Performed by: INTERNAL MEDICINE

## 2022-01-01 PROCEDURE — 96367 TX/PROPH/DG ADDL SEQ IV INF: CPT

## 2022-01-01 PROCEDURE — 81050 URINALYSIS VOLUME MEASURE: CPT | Performed by: PATHOLOGY

## 2022-01-01 PROCEDURE — 82310 ASSAY OF CALCIUM: CPT | Performed by: INTERNAL MEDICINE

## 2022-01-01 PROCEDURE — 84156 ASSAY OF PROTEIN URINE: CPT | Performed by: PATHOLOGY

## 2022-01-01 PROCEDURE — 86850 RBC ANTIBODY SCREEN: CPT

## 2022-01-01 PROCEDURE — 84165 PROTEIN E-PHORESIS SERUM: CPT | Mod: TC | Performed by: STUDENT IN AN ORGANIZED HEALTH CARE EDUCATION/TRAINING PROGRAM

## 2022-01-01 PROCEDURE — P9040 RBC LEUKOREDUCED IRRADIATED: HCPCS

## 2022-01-01 PROCEDURE — 86335 IMMUNFIX E-PHORSIS/URINE/CSF: CPT | Performed by: STUDENT IN AN ORGANIZED HEALTH CARE EDUCATION/TRAINING PROGRAM

## 2022-01-01 PROCEDURE — 85610 PROTHROMBIN TIME: CPT

## 2022-01-01 PROCEDURE — 82784 ASSAY IGA/IGD/IGG/IGM EACH: CPT | Performed by: INTERNAL MEDICINE

## 2022-01-01 PROCEDURE — 93296 REM INTERROG EVL PM/IDS: CPT

## 2022-01-01 PROCEDURE — 84550 ASSAY OF BLOOD/URIC ACID: CPT | Performed by: PATHOLOGY

## 2022-01-01 PROCEDURE — 83521 IG LIGHT CHAINS FREE EACH: CPT

## 2022-01-01 PROCEDURE — C1900 LEAD, CORONARY VENOUS: HCPCS | Performed by: INTERNAL MEDICINE

## 2022-01-01 PROCEDURE — 84165 PROTEIN E-PHORESIS SERUM: CPT | Mod: TC | Performed by: PATHOLOGY

## 2022-01-01 PROCEDURE — 99214 OFFICE O/P EST MOD 30 MIN: CPT | Performed by: INTERNAL MEDICINE

## 2022-01-01 PROCEDURE — 99215 OFFICE O/P EST HI 40 MIN: CPT | Performed by: INTERNAL MEDICINE

## 2022-01-01 PROCEDURE — 36415 COLL VENOUS BLD VENIPUNCTURE: CPT | Performed by: INTERNAL MEDICINE

## 2022-01-01 PROCEDURE — 83615 LACTATE (LD) (LDH) ENZYME: CPT | Performed by: PATHOLOGY

## 2022-01-01 PROCEDURE — 86140 C-REACTIVE PROTEIN: CPT

## 2022-01-01 PROCEDURE — 84155 ASSAY OF PROTEIN SERUM: CPT | Mod: 91

## 2022-01-01 PROCEDURE — 83880 ASSAY OF NATRIURETIC PEPTIDE: CPT | Performed by: PATHOLOGY

## 2022-01-01 PROCEDURE — 99212 OFFICE O/P EST SF 10 MIN: CPT | Mod: 95 | Performed by: INTERNAL MEDICINE

## 2022-01-01 PROCEDURE — 84550 ASSAY OF BLOOD/URIC ACID: CPT | Performed by: INTERNAL MEDICINE

## 2022-01-01 PROCEDURE — 84166 PROTEIN E-PHORESIS/URINE/CSF: CPT | Mod: 26

## 2022-01-01 PROCEDURE — G0463 HOSPITAL OUTPT CLINIC VISIT: HCPCS

## 2022-01-01 PROCEDURE — 84166 PROTEIN E-PHORESIS/URINE/CSF: CPT | Performed by: PATHOLOGY

## 2022-01-01 PROCEDURE — 96372 THER/PROPH/DIAG INJ SC/IM: CPT | Performed by: INTERNAL MEDICINE

## 2022-01-01 PROCEDURE — 84550 ASSAY OF BLOOD/URIC ACID: CPT

## 2022-01-01 PROCEDURE — 93295 DEV INTERROG REMOTE 1/2/MLT: CPT | Performed by: INTERNAL MEDICINE

## 2022-01-01 PROCEDURE — 96413 CHEMO IV INFUSION 1 HR: CPT

## 2022-01-01 PROCEDURE — 86850 RBC ANTIBODY SCREEN: CPT | Performed by: INTERNAL MEDICINE

## 2022-01-01 PROCEDURE — 86335 IMMUNFIX E-PHORSIS/URINE/CSF: CPT | Mod: 26

## 2022-01-01 PROCEDURE — 84166 PROTEIN E-PHORESIS/URINE/CSF: CPT | Performed by: STUDENT IN AN ORGANIZED HEALTH CARE EDUCATION/TRAINING PROGRAM

## 2022-01-01 PROCEDURE — 99214 OFFICE O/P EST MOD 30 MIN: CPT | Mod: 24 | Performed by: INTERNAL MEDICINE

## 2022-01-01 PROCEDURE — 82784 ASSAY IGA/IGD/IGG/IGM EACH: CPT

## 2022-01-01 PROCEDURE — G0463 HOSPITAL OUTPT CLINIC VISIT: HCPCS | Mod: PN,RTG | Performed by: INTERNAL MEDICINE

## 2022-01-01 PROCEDURE — 84166 PROTEIN E-PHORESIS/URINE/CSF: CPT | Mod: 26 | Performed by: PATHOLOGY

## 2022-01-01 PROCEDURE — 86335 IMMUNFIX E-PHORSIS/URINE/CSF: CPT | Performed by: PATHOLOGY

## 2022-01-01 PROCEDURE — 80053 COMPREHEN METABOLIC PANEL: CPT | Performed by: PATHOLOGY

## 2022-01-01 PROCEDURE — 999N000128 HC STATISTIC PERIPHERAL IV START W/O US GUIDANCE

## 2022-01-01 PROCEDURE — 36430 TRANSFUSION BLD/BLD COMPNT: CPT

## 2022-01-01 PROCEDURE — 71045 X-RAY EXAM CHEST 1 VIEW: CPT | Mod: 26 | Performed by: RADIOLOGY

## 2022-01-01 PROCEDURE — 83735 ASSAY OF MAGNESIUM: CPT | Performed by: PATHOLOGY

## 2022-01-01 PROCEDURE — 83521 IG LIGHT CHAINS FREE EACH: CPT | Performed by: INTERNAL MEDICINE

## 2022-01-01 PROCEDURE — 83615 LACTATE (LD) (LDH) ENZYME: CPT | Performed by: INTERNAL MEDICINE

## 2022-01-01 PROCEDURE — 85610 PROTHROMBIN TIME: CPT | Performed by: PATHOLOGY

## 2022-01-01 PROCEDURE — 10000001 PR ERRONEOUS ENCOUNTER--DISREGARD: Performed by: INTERNAL MEDICINE

## 2022-01-01 PROCEDURE — 86901 BLOOD TYPING SEROLOGIC RH(D): CPT

## 2022-01-01 PROCEDURE — 93283 PRGRMG EVAL IMPLANTABLE DFB: CPT | Performed by: INTERNAL MEDICINE

## 2022-01-01 PROCEDURE — 90662 IIV NO PRSV INCREASED AG IM: CPT | Performed by: INTERNAL MEDICINE

## 2022-01-01 PROCEDURE — 33225 L VENTRIC PACING LEAD ADD-ON: CPT | Mod: GC | Performed by: INTERNAL MEDICINE

## 2022-01-01 PROCEDURE — 84165 PROTEIN E-PHORESIS SERUM: CPT | Mod: 26

## 2022-01-01 PROCEDURE — 33264 RMVL & RPLCMT DFB GEN MLT LD: CPT

## 2022-01-01 PROCEDURE — 81050 URINALYSIS VOLUME MEASURE: CPT

## 2022-01-01 PROCEDURE — 99152 MOD SED SAME PHYS/QHP 5/>YRS: CPT | Mod: GC | Performed by: INTERNAL MEDICINE

## 2022-01-01 PROCEDURE — 250N000009 HC RX 250: Performed by: INTERNAL MEDICINE

## 2022-01-01 PROCEDURE — 82374 ASSAY BLOOD CARBON DIOXIDE: CPT | Performed by: INTERNAL MEDICINE

## 2022-01-01 PROCEDURE — 84155 ASSAY OF PROTEIN SERUM: CPT

## 2022-01-01 PROCEDURE — 36415 COLL VENOUS BLD VENIPUNCTURE: CPT | Performed by: PATHOLOGY

## 2022-01-01 PROCEDURE — 85025 COMPLETE CBC W/AUTO DIFF WBC: CPT | Performed by: PATHOLOGY

## 2022-01-01 PROCEDURE — 999N000054 HC STATISTIC EKG NON-CHARGEABLE

## 2022-01-01 PROCEDURE — 93010 ELECTROCARDIOGRAM REPORT: CPT | Mod: 76 | Performed by: INTERNAL MEDICINE

## 2022-01-01 PROCEDURE — U0005 INFEC AGEN DETEC AMPLI PROBE: HCPCS | Performed by: INTERNAL MEDICINE

## 2022-01-01 PROCEDURE — 84165 PROTEIN E-PHORESIS SERUM: CPT | Mod: 26 | Performed by: STUDENT IN AN ORGANIZED HEALTH CARE EDUCATION/TRAINING PROGRAM

## 2022-01-01 PROCEDURE — 85004 AUTOMATED DIFF WBC COUNT: CPT | Performed by: INTERNAL MEDICINE

## 2022-01-01 PROCEDURE — C1887 CATHETER, GUIDING: HCPCS | Performed by: INTERNAL MEDICINE

## 2022-01-01 PROCEDURE — C1882 AICD, OTHER THAN SING/DUAL: HCPCS | Performed by: INTERNAL MEDICINE

## 2022-01-01 PROCEDURE — 86901 BLOOD TYPING SEROLOGIC RH(D): CPT | Mod: 91

## 2022-01-01 PROCEDURE — 86335 IMMUNFIX E-PHORSIS/URINE/CSF: CPT | Mod: 26 | Performed by: PATHOLOGY

## 2022-01-01 PROCEDURE — C1894 INTRO/SHEATH, NON-LASER: HCPCS | Performed by: INTERNAL MEDICINE

## 2022-01-01 PROCEDURE — C1769 GUIDE WIRE: HCPCS | Performed by: INTERNAL MEDICINE

## 2022-01-01 PROCEDURE — 99215 OFFICE O/P EST HI 40 MIN: CPT | Mod: 25 | Performed by: INTERNAL MEDICINE

## 2022-01-01 PROCEDURE — 80061 LIPID PANEL: CPT | Performed by: PATHOLOGY

## 2022-01-01 PROCEDURE — 93284 PRGRMG EVAL IMPLANTABLE DFB: CPT | Performed by: INTERNAL MEDICINE

## 2022-01-01 PROCEDURE — M0220 HC INJECTION TIXAGEVIMAB & CILGAVIMAB (EVUSHELD): HCPCS | Performed by: INTERNAL MEDICINE

## 2022-01-01 PROCEDURE — 84156 ASSAY OF PROTEIN URINE: CPT | Performed by: INTERNAL MEDICINE

## 2022-01-01 PROCEDURE — 86900 BLOOD TYPING SEROLOGIC ABO: CPT | Mod: 91

## 2022-01-01 PROCEDURE — 82040 ASSAY OF SERUM ALBUMIN: CPT

## 2022-01-01 PROCEDURE — 99153 MOD SED SAME PHYS/QHP EA: CPT | Performed by: INTERNAL MEDICINE

## 2022-01-01 PROCEDURE — 86880 COOMBS TEST DIRECT: CPT

## 2022-01-01 PROCEDURE — 85004 AUTOMATED DIFF WBC COUNT: CPT

## 2022-01-01 PROCEDURE — 33264 RMVL & RPLCMT DFB GEN MLT LD: CPT | Mod: GC | Performed by: INTERNAL MEDICINE

## 2022-01-01 PROCEDURE — 99215 OFFICE O/P EST HI 40 MIN: CPT | Mod: 95 | Performed by: INTERNAL MEDICINE

## 2022-01-01 PROCEDURE — 278N000051 HC OR IMPLANT GENERAL: Performed by: INTERNAL MEDICINE

## 2022-01-01 PROCEDURE — 99207 CARDIAC DEVICE CHECK - REMOTE: CPT | Performed by: INTERNAL MEDICINE

## 2022-01-01 PROCEDURE — 84999 UNLISTED CHEMISTRY PROCEDURE: CPT | Mod: 91

## 2022-01-01 PROCEDURE — 83521 IG LIGHT CHAINS FREE EACH: CPT | Mod: 59 | Performed by: INTERNAL MEDICINE

## 2022-01-01 PROCEDURE — 83521 IG LIGHT CHAINS FREE EACH: CPT | Mod: 59

## 2022-01-01 PROCEDURE — 86922 COMPATIBILITY TEST ANTIGLOB: CPT | Performed by: INTERNAL MEDICINE

## 2022-01-01 PROCEDURE — 85027 COMPLETE CBC AUTOMATED: CPT

## 2022-01-01 PROCEDURE — 84156 ASSAY OF PROTEIN URINE: CPT

## 2022-01-01 PROCEDURE — 84155 ASSAY OF PROTEIN SERUM: CPT | Mod: 91 | Performed by: INTERNAL MEDICINE

## 2022-01-01 PROCEDURE — 999N000127 HC STATISTIC PERIPHERAL IV START W US GUIDANCE

## 2022-01-01 PROCEDURE — 85610 PROTHROMBIN TIME: CPT | Performed by: INTERNAL MEDICINE

## 2022-01-01 PROCEDURE — 83880 ASSAY OF NATRIURETIC PEPTIDE: CPT

## 2022-01-01 PROCEDURE — 99214 OFFICE O/P EST MOD 30 MIN: CPT | Mod: GC | Performed by: INTERNAL MEDICINE

## 2022-01-01 PROCEDURE — 93306 TTE W/DOPPLER COMPLETE: CPT | Performed by: INTERNAL MEDICINE

## 2022-01-01 PROCEDURE — G0008 ADMIN INFLUENZA VIRUS VAC: HCPCS | Performed by: INTERNAL MEDICINE

## 2022-01-01 PROCEDURE — 999N000134 HC STATISTIC PP CARE STAGE 3

## 2022-01-01 PROCEDURE — 85007 BL SMEAR W/DIFF WBC COUNT: CPT

## 2022-01-01 PROCEDURE — 84075 ASSAY ALKALINE PHOSPHATASE: CPT | Performed by: INTERNAL MEDICINE

## 2022-01-01 PROCEDURE — 999N000065 XR CHEST PORT 1 VIEW

## 2022-01-01 PROCEDURE — 33225 L VENTRIC PACING LEAD ADD-ON: CPT | Performed by: INTERNAL MEDICINE

## 2022-01-01 PROCEDURE — 86970 RBC PRETX INCUBATJ W/CHEMICL: CPT

## 2022-01-01 DEVICE — IMPLANTABLE DEVICE: Type: IMPLANTABLE DEVICE | Status: FUNCTIONAL

## 2022-01-01 DEVICE — CRT-D MOMENTUM X4 IS4 DF1: Type: IMPLANTABLE DEVICE | Status: FUNCTIONAL

## 2022-01-01 RX ORDER — DIPHENHYDRAMINE HYDROCHLORIDE 50 MG/ML
50 INJECTION INTRAMUSCULAR; INTRAVENOUS
Status: CANCELLED
Start: 2022-01-01

## 2022-01-01 RX ORDER — LISINOPRIL 5 MG/1
2.5 TABLET ORAL 2 TIMES DAILY
Qty: 90 TABLET | Refills: 0
Start: 2022-01-01 | End: 2022-01-01

## 2022-01-01 RX ORDER — MEPERIDINE HYDROCHLORIDE 25 MG/ML
25 INJECTION INTRAMUSCULAR; INTRAVENOUS; SUBCUTANEOUS EVERY 30 MIN PRN
Status: CANCELLED | OUTPATIENT
Start: 2022-01-01

## 2022-01-01 RX ORDER — MAGNESIUM SULFATE HEPTAHYDRATE 40 MG/ML
2 INJECTION, SOLUTION INTRAVENOUS ONCE
Status: COMPLETED | OUTPATIENT
Start: 2022-01-01 | End: 2022-01-01

## 2022-01-01 RX ORDER — HEPARIN SODIUM,PORCINE 10 UNIT/ML
5 VIAL (ML) INTRAVENOUS
Status: CANCELLED | OUTPATIENT
Start: 2022-01-01

## 2022-01-01 RX ORDER — METHYLPREDNISOLONE SODIUM SUCCINATE 125 MG/2ML
125 INJECTION, POWDER, LYOPHILIZED, FOR SOLUTION INTRAMUSCULAR; INTRAVENOUS
Status: CANCELLED
Start: 2022-01-01

## 2022-01-01 RX ORDER — CEFAZOLIN SODIUM 2 G/100ML
2 INJECTION, SOLUTION INTRAVENOUS
Status: COMPLETED | OUTPATIENT
Start: 2022-01-01 | End: 2022-01-01

## 2022-01-01 RX ORDER — ALBUTEROL SULFATE 90 UG/1
1-2 AEROSOL, METERED RESPIRATORY (INHALATION)
Status: CANCELLED
Start: 2022-01-01

## 2022-01-01 RX ORDER — MONTELUKAST SODIUM 10 MG/1
10 TABLET ORAL ONCE
Status: CANCELLED | OUTPATIENT
Start: 2022-01-01

## 2022-01-01 RX ORDER — HEPARIN SODIUM (PORCINE) LOCK FLUSH IV SOLN 100 UNIT/ML 100 UNIT/ML
5 SOLUTION INTRAVENOUS
Status: CANCELLED | OUTPATIENT
Start: 2022-01-01

## 2022-01-01 RX ORDER — LORAZEPAM 2 MG/ML
0.5 INJECTION INTRAMUSCULAR EVERY 4 HOURS PRN
Status: CANCELLED | OUTPATIENT
Start: 2022-01-01

## 2022-01-01 RX ORDER — HYDROCORTISONE 2.5 %
CREAM (GRAM) TOPICAL 2 TIMES DAILY
Start: 2022-01-01

## 2022-01-01 RX ORDER — SIMVASTATIN 40 MG
40 TABLET ORAL AT BEDTIME
Status: DISCONTINUED | OUTPATIENT
Start: 2022-01-01 | End: 2022-01-01 | Stop reason: HOSPADM

## 2022-01-01 RX ORDER — NALOXONE HYDROCHLORIDE 0.4 MG/ML
0.2 INJECTION, SOLUTION INTRAMUSCULAR; INTRAVENOUS; SUBCUTANEOUS
Status: CANCELLED | OUTPATIENT
Start: 2022-01-01

## 2022-01-01 RX ORDER — DIPHENHYDRAMINE HCL 25 MG
50 CAPSULE ORAL
Status: CANCELLED | OUTPATIENT
Start: 2022-01-01

## 2022-01-01 RX ORDER — IOPAMIDOL 755 MG/ML
INJECTION, SOLUTION INTRAVASCULAR
Status: DISCONTINUED | OUTPATIENT
Start: 2022-01-01 | End: 2022-01-01

## 2022-01-01 RX ORDER — DEXAMETHASONE 4 MG/1
20 TABLET ORAL ONCE
Status: CANCELLED
Start: 2022-01-01 | End: 2022-01-01

## 2022-01-01 RX ORDER — AZITHROMYCIN 250 MG/1
250 TABLET, FILM COATED ORAL DAILY
Qty: 6 TABLET | Refills: 3 | Status: SHIPPED | OUTPATIENT
Start: 2022-01-01 | End: 2022-01-01

## 2022-01-01 RX ORDER — DIPHENHYDRAMINE HCL 25 MG
50 CAPSULE ORAL ONCE
Status: CANCELLED | OUTPATIENT
Start: 2022-01-01

## 2022-01-01 RX ORDER — EPINEPHRINE 1 MG/ML
0.3 INJECTION, SOLUTION INTRAMUSCULAR; SUBCUTANEOUS EVERY 5 MIN PRN
Status: CANCELLED | OUTPATIENT
Start: 2022-01-01

## 2022-01-01 RX ORDER — DICLOFENAC SODIUM 75 MG/1
75 TABLET, DELAYED RELEASE ORAL 2 TIMES DAILY PRN
Start: 2022-01-01 | End: 2022-01-01

## 2022-01-01 RX ORDER — ALBUTEROL SULFATE 0.83 MG/ML
2.5 SOLUTION RESPIRATORY (INHALATION)
Status: CANCELLED | OUTPATIENT
Start: 2022-01-01

## 2022-01-01 RX ORDER — OXYCODONE HYDROCHLORIDE 5 MG/1
5-10 TABLET ORAL EVERY 6 HOURS PRN
Status: DISCONTINUED | OUTPATIENT
Start: 2022-01-01 | End: 2022-01-01 | Stop reason: HOSPADM

## 2022-01-01 RX ORDER — EPINEPHRINE 1 MG/ML
0.3 INJECTION, SOLUTION, CONCENTRATE INTRAVENOUS EVERY 5 MIN PRN
Status: CANCELLED | OUTPATIENT
Start: 2022-01-01

## 2022-01-01 RX ORDER — ACETAMINOPHEN 325 MG/1
650 TABLET ORAL
Status: CANCELLED | OUTPATIENT
Start: 2022-01-01

## 2022-01-01 RX ORDER — LENALIDOMIDE 10 MG/1
10 CAPSULE ORAL DAILY
Qty: 14 CAPSULE | Refills: 0
Start: 2022-01-01 | End: 2022-01-01

## 2022-01-01 RX ORDER — FUROSEMIDE 40 MG
40 TABLET ORAL DAILY
Status: DISCONTINUED | OUTPATIENT
Start: 2022-01-01 | End: 2022-01-01 | Stop reason: HOSPADM

## 2022-01-01 RX ORDER — ACYCLOVIR 400 MG/1
400 TABLET ORAL EVERY 12 HOURS
Qty: 60 TABLET | Refills: 3 | Status: SHIPPED | OUTPATIENT
Start: 2022-01-01 | End: 2022-01-01

## 2022-01-01 RX ORDER — AMOXICILLIN 500 MG/1
CAPSULE ORAL
Qty: 8 CAPSULE | Refills: 3 | Status: SHIPPED | OUTPATIENT
Start: 2022-01-01 | End: 2022-01-01

## 2022-01-01 RX ORDER — AMOXICILLIN 500 MG/1
TABLET, FILM COATED ORAL
Qty: 4 TABLET | Refills: 5
Start: 2022-01-01 | End: 2022-01-01

## 2022-01-01 RX ORDER — LISINOPRIL 5 MG/1
5 TABLET ORAL 2 TIMES DAILY
Qty: 180 TABLET | Refills: 3 | Status: SHIPPED | OUTPATIENT
Start: 2022-01-01

## 2022-01-01 RX ORDER — ALBUTEROL SULFATE 90 UG/1
2 AEROSOL, METERED RESPIRATORY (INHALATION) EVERY 6 HOURS PRN
Qty: 18 G
Start: 2022-01-01

## 2022-01-01 RX ORDER — DEXAMETHASONE 4 MG/1
20 TABLET ORAL ONCE
Status: COMPLETED | OUTPATIENT
Start: 2022-01-01 | End: 2022-01-01

## 2022-01-01 RX ORDER — DIPHENHYDRAMINE HCL 25 MG
50 CAPSULE ORAL
Status: CANCELLED
Start: 2022-01-01

## 2022-01-01 RX ORDER — DIPHENHYDRAMINE HCL 25 MG
50 CAPSULE ORAL
Status: COMPLETED | OUTPATIENT
Start: 2022-01-01 | End: 2022-01-01

## 2022-01-01 RX ORDER — ACETAMINOPHEN 325 MG/1
650 TABLET ORAL ONCE
Status: COMPLETED | OUTPATIENT
Start: 2022-01-01 | End: 2022-01-01

## 2022-01-01 RX ORDER — MAGNESIUM SULFATE HEPTAHYDRATE 40 MG/ML
4 INJECTION, SOLUTION INTRAVENOUS ONCE
Status: COMPLETED | OUTPATIENT
Start: 2022-01-01 | End: 2022-01-01

## 2022-01-01 RX ORDER — ACETAMINOPHEN 325 MG/1
650 TABLET ORAL ONCE
Status: CANCELLED | OUTPATIENT
Start: 2022-01-01

## 2022-01-01 RX ORDER — MONTELUKAST SODIUM 10 MG/1
10 TABLET ORAL ONCE
Status: COMPLETED | OUTPATIENT
Start: 2022-01-01 | End: 2022-01-01

## 2022-01-01 RX ORDER — OXYCODONE AND ACETAMINOPHEN 5; 325 MG/1; MG/1
1 TABLET ORAL EVERY 4 HOURS PRN
Status: DISCONTINUED | OUTPATIENT
Start: 2022-01-01 | End: 2022-01-01 | Stop reason: HOSPADM

## 2022-01-01 RX ORDER — DEXAMETHASONE 4 MG/1
TABLET ORAL
Qty: 6 TABLET | Refills: 0 | Status: SHIPPED | OUTPATIENT
Start: 2022-01-01 | End: 2022-12-01

## 2022-01-01 RX ORDER — FLUTICASONE PROPIONATE 50 MCG
1 SPRAY, SUSPENSION (ML) NASAL DAILY
COMMUNITY
End: 2022-01-01

## 2022-01-01 RX ORDER — DIPHENHYDRAMINE HCL 25 MG
50 CAPSULE ORAL ONCE
Status: COMPLETED | OUTPATIENT
Start: 2022-01-01 | End: 2022-01-01

## 2022-01-01 RX ORDER — METOPROLOL SUCCINATE 100 MG/1
100 TABLET, EXTENDED RELEASE ORAL DAILY
Status: DISCONTINUED | OUTPATIENT
Start: 2022-01-01 | End: 2022-01-01 | Stop reason: HOSPADM

## 2022-01-01 RX ORDER — NALOXONE HYDROCHLORIDE 0.4 MG/ML
0.2 INJECTION, SOLUTION INTRAMUSCULAR; INTRAVENOUS; SUBCUTANEOUS
Status: DISCONTINUED | OUTPATIENT
Start: 2022-01-01 | End: 2022-01-01 | Stop reason: HOSPADM

## 2022-01-01 RX ORDER — NALOXONE HYDROCHLORIDE 0.4 MG/ML
0.4 INJECTION, SOLUTION INTRAMUSCULAR; INTRAVENOUS; SUBCUTANEOUS
Status: DISCONTINUED | OUTPATIENT
Start: 2022-01-01 | End: 2022-01-01 | Stop reason: HOSPADM

## 2022-01-01 RX ORDER — LIDOCAINE 40 MG/G
CREAM TOPICAL
Status: DISCONTINUED | OUTPATIENT
Start: 2022-01-01 | End: 2022-01-01 | Stop reason: HOSPADM

## 2022-01-01 RX ORDER — DIGOXIN 125 MCG
125 TABLET ORAL DAILY
Status: DISCONTINUED | OUTPATIENT
Start: 2022-01-01 | End: 2022-01-01 | Stop reason: HOSPADM

## 2022-01-01 RX ORDER — DEXAMETHASONE 4 MG/1
8 TABLET ORAL ONCE
Status: CANCELLED | OUTPATIENT
Start: 2022-01-01 | End: 2022-01-01

## 2022-01-01 RX ORDER — ACETAMINOPHEN 325 MG/1
650 TABLET ORAL
Status: COMPLETED | OUTPATIENT
Start: 2022-01-01 | End: 2022-01-01

## 2022-01-01 RX ORDER — DEXAMETHASONE 0.5 MG/1
8 TABLET ORAL ONCE
Status: CANCELLED | OUTPATIENT
Start: 2022-01-01 | End: 2022-01-01

## 2022-01-01 RX ORDER — BACLOFEN 10 MG/1
10 TABLET ORAL PRN
COMMUNITY

## 2022-01-01 RX ORDER — ACETAMINOPHEN 325 MG/1
650 TABLET ORAL
Status: CANCELLED
Start: 2022-01-01

## 2022-01-01 RX ORDER — POTASSIUM CHLORIDE 750 MG/1
10 TABLET, EXTENDED RELEASE ORAL EVERY OTHER DAY
Status: DISCONTINUED | OUTPATIENT
Start: 2022-01-01 | End: 2022-01-01 | Stop reason: HOSPADM

## 2022-01-01 RX ORDER — SPIRONOLACTONE 25 MG
12.5 TABLET ORAL DAILY
Status: DISCONTINUED | OUTPATIENT
Start: 2022-01-01 | End: 2022-01-01 | Stop reason: HOSPADM

## 2022-01-01 RX ORDER — LIDOCAINE 40 MG/G
CREAM TOPICAL
Status: CANCELLED | OUTPATIENT
Start: 2022-01-01

## 2022-01-01 RX ORDER — DEXAMETHASONE 4 MG/1
8 TABLET ORAL ONCE
Status: COMPLETED | OUTPATIENT
Start: 2022-01-01 | End: 2022-01-01

## 2022-01-01 RX ORDER — FENTANYL CITRATE 50 UG/ML
INJECTION, SOLUTION INTRAMUSCULAR; INTRAVENOUS
Status: DISCONTINUED | OUTPATIENT
Start: 2022-01-01 | End: 2022-01-01 | Stop reason: HOSPADM

## 2022-01-01 RX ORDER — MAGNESIUM SULFATE HEPTAHYDRATE 40 MG/ML
4 INJECTION, SOLUTION INTRAVENOUS ONCE
Status: DISCONTINUED | OUTPATIENT
Start: 2022-01-01 | End: 2022-01-01

## 2022-01-01 RX ORDER — SODIUM CHLORIDE 9 MG/ML
INJECTION, SOLUTION INTRAVENOUS CONTINUOUS
Status: DISCONTINUED | OUTPATIENT
Start: 2022-01-01 | End: 2022-01-01 | Stop reason: HOSPADM

## 2022-01-01 RX ORDER — GUAIFENESIN 600 MG/1
TABLET, EXTENDED RELEASE ORAL
COMMUNITY
Start: 2022-01-01 | End: 2022-01-01

## 2022-01-01 RX ORDER — LISINOPRIL 2.5 MG/1
2.5 TABLET ORAL 2 TIMES DAILY
Status: DISCONTINUED | OUTPATIENT
Start: 2022-01-01 | End: 2022-01-01 | Stop reason: HOSPADM

## 2022-01-01 RX ORDER — WARFARIN SODIUM 7.5 MG/1
7.5 TABLET ORAL SEE ADMIN INSTRUCTIONS
Status: DISCONTINUED | OUTPATIENT
Start: 2022-01-01 | End: 2022-01-01 | Stop reason: HOSPADM

## 2022-01-01 RX ORDER — DEXAMETHASONE 4 MG/1
20 TABLET ORAL ONCE
Status: DISCONTINUED | OUTPATIENT
Start: 2022-01-01 | End: 2022-01-01 | Stop reason: HOSPADM

## 2022-01-01 RX ORDER — LIDOCAINE HYDROCHLORIDE 20 MG/ML
INJECTION, SOLUTION INFILTRATION; PERINEURAL
Status: DISCONTINUED | OUTPATIENT
Start: 2022-01-01 | End: 2022-01-01 | Stop reason: HOSPADM

## 2022-01-01 RX ORDER — OSELTAMIVIR PHOSPHATE 75 MG/1
CAPSULE ORAL
COMMUNITY
Start: 2022-01-01 | End: 2022-01-01

## 2022-01-01 RX ORDER — BACLOFEN 10 MG/1
10 TABLET ORAL 3 TIMES DAILY PRN
Start: 2022-01-01 | End: 2022-01-01

## 2022-01-01 RX ORDER — CEPHALEXIN 500 MG/1
500 TABLET ORAL 3 TIMES DAILY
Qty: 15 TABLET | Refills: 0 | Status: SHIPPED | OUTPATIENT
Start: 2022-01-01 | End: 2022-01-01

## 2022-01-01 RX ORDER — ONDANSETRON 4 MG/1
4 TABLET, ORALLY DISINTEGRATING ORAL EVERY 6 HOURS PRN
Status: DISCONTINUED | OUTPATIENT
Start: 2022-01-01 | End: 2022-01-01 | Stop reason: HOSPADM

## 2022-01-01 RX ORDER — MEXILETINE HYDROCHLORIDE 150 MG/1
150 CAPSULE ORAL 2 TIMES DAILY
Status: DISCONTINUED | OUTPATIENT
Start: 2022-01-01 | End: 2022-01-01 | Stop reason: HOSPADM

## 2022-01-01 RX ORDER — SPIRONOLACTONE 25 MG/1
12.5 TABLET ORAL DAILY
Qty: 15 TABLET | Refills: 1
Start: 2022-01-01

## 2022-01-01 RX ORDER — MONTELUKAST SODIUM 10 MG/1
10 TABLET ORAL ONCE
Status: DISCONTINUED | OUTPATIENT
Start: 2022-01-01 | End: 2022-01-01 | Stop reason: HOSPADM

## 2022-01-01 RX ORDER — DEXAMETHASONE 4 MG/1
20 TABLET ORAL ONCE
Status: CANCELLED | OUTPATIENT
Start: 2022-01-01 | End: 2022-01-01

## 2022-01-01 RX ORDER — LACTOBACILLUS RHAMNOSUS GG 10B CELL
1 CAPSULE ORAL 2 TIMES DAILY
COMMUNITY
End: 2022-01-01

## 2022-01-01 RX ORDER — SODIUM CHLORIDE 9 MG/ML
INJECTION, SOLUTION INTRAVENOUS CONTINUOUS
Status: CANCELLED | OUTPATIENT
Start: 2022-01-01

## 2022-01-01 RX ORDER — AZITHROMYCIN 250 MG/1
TABLET, FILM COATED ORAL
Qty: 6 TABLET | Refills: 3 | Status: SHIPPED | OUTPATIENT
Start: 2022-01-01 | End: 2022-01-01

## 2022-01-01 RX ORDER — DEXAMETHASONE 4 MG/1
TABLET ORAL
Qty: 6 TABLET | Refills: 0 | Status: SHIPPED | OUTPATIENT
Start: 2022-01-01 | End: 2022-01-01

## 2022-01-01 RX ORDER — METOPROLOL SUCCINATE 50 MG/1
50 TABLET, EXTENDED RELEASE ORAL DAILY
Qty: 90 TABLET | Refills: 3 | Status: SHIPPED | OUTPATIENT
Start: 2022-01-01

## 2022-01-01 RX ORDER — CEFAZOLIN SODIUM 2 G/50ML
2 SOLUTION INTRAVENOUS
Status: CANCELLED | OUTPATIENT
Start: 2022-01-01

## 2022-01-01 RX ADMIN — DIPHENHYDRAMINE HYDROCHLORIDE 50 MG: 25 CAPSULE ORAL at 14:38

## 2022-01-01 RX ADMIN — SODIUM CHLORIDE: 9 INJECTION, SOLUTION INTRAVENOUS at 07:53

## 2022-01-01 RX ADMIN — Medication: at 11:51

## 2022-01-01 RX ADMIN — DEXAMETHASONE 20 MG: 4 TABLET ORAL at 09:41

## 2022-01-01 RX ADMIN — DIPHENHYDRAMINE HYDROCHLORIDE 50 MG: 25 CAPSULE ORAL at 10:43

## 2022-01-01 RX ADMIN — DEXTROSE MONOHYDRATE 500 ML: 50 INJECTION, SOLUTION INTRAVENOUS at 07:38

## 2022-01-01 RX ADMIN — DIPHENHYDRAMINE HYDROCHLORIDE 50 MG: 25 CAPSULE ORAL at 10:15

## 2022-01-01 RX ADMIN — Medication 3 ML: at 11:07

## 2022-01-01 RX ADMIN — CARFILZOMIB 20 MG: 10 INJECTION, POWDER, LYOPHILIZED, FOR SOLUTION INTRAVENOUS at 09:29

## 2022-01-01 RX ADMIN — CARFILZOMIB 20 MG: 10 INJECTION, POWDER, LYOPHILIZED, FOR SOLUTION INTRAVENOUS at 09:35

## 2022-01-01 RX ADMIN — DARATUMUMAB AND HYALURONIDASE-FIHJ (HUMAN RECOMBINANT) 1800 MG: 1800; 30000 INJECTION SUBCUTANEOUS at 15:14

## 2022-01-01 RX ADMIN — MONTELUKAST 10 MG: 10 TABLET, FILM COATED ORAL at 10:42

## 2022-01-01 RX ADMIN — DEXTROSE MONOHYDRATE 500 ML: 50 INJECTION, SOLUTION INTRAVENOUS at 10:41

## 2022-01-01 RX ADMIN — DIPHENHYDRAMINE HYDROCHLORIDE 50 MG: 25 CAPSULE ORAL at 09:36

## 2022-01-01 RX ADMIN — CARFILZOMIB 20 MG: 10 INJECTION, POWDER, LYOPHILIZED, FOR SOLUTION INTRAVENOUS at 11:17

## 2022-01-01 RX ADMIN — ACETAMINOPHEN 650 MG: 325 TABLET ORAL at 14:38

## 2022-01-01 RX ADMIN — DEXAMETHASONE 20 MG: 4 TABLET ORAL at 08:42

## 2022-01-01 RX ADMIN — DEXTROSE MONOHYDRATE 500 ML: 50 INJECTION, SOLUTION INTRAVENOUS at 09:52

## 2022-01-01 RX ADMIN — CARFILZOMIB 20 MG: 10 INJECTION, POWDER, LYOPHILIZED, FOR SOLUTION INTRAVENOUS at 11:19

## 2022-01-01 RX ADMIN — MAGNESIUM SULFATE HEPTAHYDRATE 2 G: 40 INJECTION, SOLUTION INTRAVENOUS at 09:40

## 2022-01-01 RX ADMIN — DEXAMETHASONE 20 MG: 4 TABLET ORAL at 10:15

## 2022-01-01 RX ADMIN — CARFILZOMIB 20 MG: 10 INJECTION, POWDER, LYOPHILIZED, FOR SOLUTION INTRAVENOUS at 11:22

## 2022-01-01 RX ADMIN — MAGNESIUM SULFATE 2 G: 2 INJECTION INTRAVENOUS at 12:42

## 2022-01-01 RX ADMIN — MONTELUKAST 10 MG: 10 TABLET, FILM COATED ORAL at 10:12

## 2022-01-01 RX ADMIN — DIPHENHYDRAMINE HYDROCHLORIDE 50 MG: 25 CAPSULE ORAL at 09:52

## 2022-01-01 RX ADMIN — AZD7442 6 ML: KIT at 11:37

## 2022-01-01 RX ADMIN — DEXAMETHASONE 20 MG: 4 TABLET ORAL at 09:51

## 2022-01-01 RX ADMIN — ACETAMINOPHEN 650 MG: 325 TABLET ORAL at 09:52

## 2022-01-01 RX ADMIN — DARATUMUMAB AND HYALURONIDASE-FIHJ (HUMAN RECOMBINANT) 1800 MG: 1800; 30000 INJECTION SUBCUTANEOUS at 11:29

## 2022-01-01 RX ADMIN — DEXTROSE MONOHYDRATE 500 ML: 50 INJECTION, SOLUTION INTRAVENOUS at 10:15

## 2022-01-01 RX ADMIN — DARATUMUMAB AND HYALURONIDASE-FIHJ (HUMAN RECOMBINANT) 1800 MG: 1800; 30000 INJECTION SUBCUTANEOUS at 10:10

## 2022-01-01 RX ADMIN — DARATUMUMAB AND HYALURONIDASE-FIHJ (HUMAN RECOMBINANT) 1800 MG: 1800; 30000 INJECTION SUBCUTANEOUS at 11:15

## 2022-01-01 RX ADMIN — DEXTROSE MONOHYDRATE 500 ML: 50 INJECTION, SOLUTION INTRAVENOUS at 10:45

## 2022-01-01 RX ADMIN — DARATUMUMAB AND HYALURONIDASE-FIHJ (HUMAN RECOMBINANT) 1800 MG: 1800; 30000 INJECTION SUBCUTANEOUS at 09:15

## 2022-01-01 RX ADMIN — ACETAMINOPHEN 650 MG: 325 TABLET ORAL at 10:12

## 2022-01-01 RX ADMIN — CARFILZOMIB 20 MG: 10 INJECTION, POWDER, LYOPHILIZED, FOR SOLUTION INTRAVENOUS at 10:53

## 2022-01-01 RX ADMIN — DEXTROSE MONOHYDRATE 500 ML: 50 INJECTION, SOLUTION INTRAVENOUS at 10:22

## 2022-01-01 RX ADMIN — MAGNESIUM SULFATE HEPTAHYDRATE 4 G: 40 INJECTION, SOLUTION INTRAVENOUS at 13:16

## 2022-01-01 RX ADMIN — DIPHENHYDRAMINE HYDROCHLORIDE 50 MG: 25 CAPSULE ORAL at 09:40

## 2022-01-01 RX ADMIN — DEXAMETHASONE 20 MG: 4 TABLET ORAL at 10:14

## 2022-01-01 RX ADMIN — DEXTROSE MONOHYDRATE 500 ML: 50 INJECTION, SOLUTION INTRAVENOUS at 10:59

## 2022-01-01 RX ADMIN — DIPHENHYDRAMINE HYDROCHLORIDE 50 MG: 25 CAPSULE ORAL at 10:14

## 2022-01-01 RX ADMIN — DEXTROSE MONOHYDRATE 500 ML: 50 INJECTION, SOLUTION INTRAVENOUS at 10:10

## 2022-01-01 RX ADMIN — DIPHENHYDRAMINE HYDROCHLORIDE 50 MG: 25 CAPSULE ORAL at 08:42

## 2022-01-01 RX ADMIN — INFLUENZA A VIRUS A/VICTORIA/2570/2019 IVR-215 (H1N1) ANTIGEN (FORMALDEHYDE INACTIVATED), INFLUENZA A VIRUS A/DARWIN/9/2021 SAN-010 (H3N2) ANTIGEN (FORMALDEHYDE INACTIVATED), INFLUENZA B VIRUS B/PHUKET/3073/2013 ANTIGEN (FORMALDEHYDE INACTIVATED), AND INFLUENZA B VIRUS B/MICHIGAN/01/2021 ANTIGEN (FORMALDEHYDE INACTIVATED) 0.7 ML: 60; 60; 60; 60 INJECTION, SUSPENSION INTRAMUSCULAR at 14:42

## 2022-01-01 RX ADMIN — DEXTROSE MONOHYDRATE 500 ML: 50 INJECTION, SOLUTION INTRAVENOUS at 09:46

## 2022-01-01 RX ADMIN — DEXTROSE MONOHYDRATE 500 ML: 50 INJECTION, SOLUTION INTRAVENOUS at 08:10

## 2022-01-01 RX ADMIN — CARFILZOMIB 20 MG: 10 INJECTION, POWDER, LYOPHILIZED, FOR SOLUTION INTRAVENOUS at 11:06

## 2022-01-01 RX ADMIN — ACETAMINOPHEN 650 MG: 325 TABLET ORAL at 10:43

## 2022-01-01 RX ADMIN — DARATUMUMAB AND HYALURONIDASE-FIHJ (HUMAN RECOMBINANT) 1800 MG: 1800; 30000 INJECTION SUBCUTANEOUS at 11:24

## 2022-01-01 RX ADMIN — ACETAMINOPHEN 650 MG: 325 TABLET ORAL at 10:19

## 2022-01-01 RX ADMIN — MAGNESIUM SULFATE 2 G: 2 INJECTION INTRAVENOUS at 10:09

## 2022-01-01 RX ADMIN — CARFILZOMIB 20 MG: 10 INJECTION, POWDER, LYOPHILIZED, FOR SOLUTION INTRAVENOUS at 11:42

## 2022-01-01 RX ADMIN — ACETAMINOPHEN 650 MG: 325 TABLET ORAL at 10:09

## 2022-01-01 RX ADMIN — MONTELUKAST 10 MG: 10 TABLET, FILM COATED ORAL at 09:40

## 2022-01-01 RX ADMIN — ACETAMINOPHEN 650 MG: 325 TABLET ORAL at 09:36

## 2022-01-01 RX ADMIN — CARFILZOMIB 20 MG: 10 INJECTION, POWDER, LYOPHILIZED, FOR SOLUTION INTRAVENOUS at 12:23

## 2022-01-01 RX ADMIN — DIPHENHYDRAMINE HYDROCHLORIDE 50 MG: 25 CAPSULE ORAL at 10:12

## 2022-01-01 RX ADMIN — DARATUMUMAB AND HYALURONIDASE-FIHJ (HUMAN RECOMBINANT) 1800 MG: 1800; 30000 INJECTION SUBCUTANEOUS at 10:37

## 2022-01-01 RX ADMIN — DEXAMETHASONE 8 MG: 4 TABLET ORAL at 09:36

## 2022-01-01 RX ADMIN — ACETAMINOPHEN 650 MG: 325 TABLET ORAL at 10:15

## 2022-01-01 RX ADMIN — ACETAMINOPHEN 650 MG: 325 TABLET ORAL at 08:42

## 2022-01-01 RX ADMIN — CARFILZOMIB 20 MG: 10 INJECTION, POWDER, LYOPHILIZED, FOR SOLUTION INTRAVENOUS at 10:31

## 2022-01-01 RX ADMIN — MAGNESIUM SULFATE HEPTAHYDRATE 2 G: 40 INJECTION, SOLUTION INTRAVENOUS at 11:34

## 2022-01-01 RX ADMIN — MONTELUKAST 10 MG: 10 TABLET, FILM COATED ORAL at 09:52

## 2022-01-01 RX ADMIN — DEXTROSE MONOHYDRATE 500 ML: 50 INJECTION, SOLUTION INTRAVENOUS at 11:45

## 2022-01-01 RX ADMIN — MONTELUKAST 10 MG: 10 TABLET, FILM COATED ORAL at 09:36

## 2022-01-01 RX ADMIN — MONTELUKAST 10 MG: 10 TABLET, FILM COATED ORAL at 14:38

## 2022-01-01 RX ADMIN — DIPHENHYDRAMINE HYDROCHLORIDE 50 MG: 25 CAPSULE ORAL at 10:09

## 2022-01-01 RX ADMIN — DARATUMUMAB AND HYALURONIDASE-FIHJ (HUMAN RECOMBINANT) 1800 MG: 1800; 30000 INJECTION SUBCUTANEOUS at 10:48

## 2022-01-01 RX ADMIN — DARATUMUMAB AND HYALURONIDASE-FIHJ (HUMAN RECOMBINANT) 1800 MG: 1800; 30000 INJECTION SUBCUTANEOUS at 10:31

## 2022-01-01 RX ADMIN — DIPHENHYDRAMINE HYDROCHLORIDE 50 MG: 25 CAPSULE ORAL at 10:19

## 2022-01-01 RX ADMIN — DEXTROSE MONOHYDRATE 250 ML: 50 INJECTION, SOLUTION INTRAVENOUS at 10:11

## 2022-01-01 RX ADMIN — DARATUMUMAB AND HYALURONIDASE-FIHJ (HUMAN RECOMBINANT) 1800 MG: 1800; 30000 INJECTION SUBCUTANEOUS at 10:42

## 2022-01-01 RX ADMIN — ACETAMINOPHEN 650 MG: 325 TABLET ORAL at 09:40

## 2022-01-01 RX ADMIN — CARFILZOMIB 20 MG: 10 INJECTION, POWDER, LYOPHILIZED, FOR SOLUTION INTRAVENOUS at 11:35

## 2022-01-01 RX ADMIN — ACETAMINOPHEN 650 MG: 325 TABLET ORAL at 10:14

## 2022-01-01 RX ADMIN — DEXAMETHASONE 20 MG: 4 TABLET ORAL at 10:19

## 2022-01-01 ASSESSMENT — PAIN SCALES - GENERAL
PAINLEVEL: NO PAIN (0)
PAINLEVEL: MILD PAIN (2)
PAINLEVEL: NO PAIN (0)
PAINLEVEL: SEVERE PAIN (6)
PAINLEVEL: NO PAIN (0)
PAINLEVEL: MILD PAIN (3)
PAINLEVEL: NO PAIN (0)

## 2022-01-03 NOTE — PROGRESS NOTES
Patient called earlier this am with questions surrounding his blood pressure. I called patient back and learned that he was admitted last Thursday for influenza A. He was tested for covid and reports a negative result. He was discharged last evening and is now home. Remaining symptoms are a cough (states minimal chest tightness) and clear nasal drainage. He also informed me that the team at the hospital stopped his spironolactone and lisinopril as his BP s were in the 80/50 range last week. His O2 sats yesterday before discharge were back up in the 90 s after having been in the 80 s upon admission.   He was given Tamaflu, started on Dexamethasone, Mucinex and lactobacillis while hospitalized. Carla, patient's wife, tells me that they also bumped up his magnesium dose.   Patient reports that his BP taken at home at the time of the phone conversation was 153/83 and a repeat BP was 171/74.    Dr. Raygoza was notified of the above information and he then called the patient himself. He informs               this writer that he had Jonh retake his BP and it was 123/68 and patient reported feeling good. Dr. Raygoza advised him to restart the Pomalidamide.

## 2022-01-03 NOTE — PROGRESS NOTES
Received a call on the BMT clinic pager about this patient with regards to blood pressure issues.     I reviewed his chart and he had an auto BMT in 2006 for myeloma and relapsed in 2017 and has been receiving chemotherapy since that time.    I called the BMT office back and asked that they redirect the patient's question to Dr. Raygoza's team in the heme malignancy clinic since he has not been seen by BMT for years.    Tammy Abbasi PA-C  [phone]

## 2022-01-03 NOTE — TELEPHONE ENCOUNTER
Pt called experiencing symptoms, states has been having blood pressure issues. I informed pt that I will page an ES to give Jonh a call @ [phone] and if pt does not get a call back within an hour to call [phone] again. Pt agreed. ES paged.

## 2022-01-11 NOTE — PROGRESS NOTES
BONE MARROW CLINIC VISIT       Jonh returns to the hematology clinic for treatment of relapsed multiple myeloma Jonh has completed 2 cycles of daratumumab 1400 mg weekly Dex 20 mg on days 1 8 and 15 and pomalidomide 4 mg on days 1 through 14 and Decadron on days 1-9 16 and 23 he did have some cytopenias with thrombocytopenia with his platelet count falling into the 80s he otherwise is tolerated this very well He received Day 1 of Cycle 3 on Dec 28 with Jenny, Kyprolis Pomalidamide and Dex. He felt fatigued with cough and SOB. COVID negative but hospitalized.in [place] with Influenza A with acute respiratory failure and hypoxia. Treated with Tamiflu, rocephin and azithro for sinusitus, treated for hypotension and CHF,treated for COPD with dexamethasone. .  Since discharge still feels SOB and cought  Feels very fatigued and deconditioned.He was take of lisinopril and spironolactone He continued pomalidamide finishing yesterday  Remains on warfarin 5mg daily .  PAST MEDICAL HISTORY, SOCIAL HISTORY AND FAMILY HISTORY:  See my note from 12/2021.      REVIEW OF SYSTEMS:  A 12 point review of systems done is negative as in HPI.      PHYSICAL EXAMINATION: /56 (BP Location: Left arm, Patient Position: Sitting, Cuff Size: Adult Regular)   Pulse (!) 49   Temp 98  F (36.7  C) (Oral)   Resp 16   Wt 81.3 kg (179 lb 3.2 oz)   SpO2 96%   BMI 26.09 kg/m      GENERAL:  He is an alert gentleman in no acute distress.   VITAL SIGNS:  Stable.   HEENT:  Normocephalic.  EOM okay.  Mouth without lesion.   RESPIRATORY:  Clear to percussion and auscultation.   CARDIAC:  Irregularly irregular rhythm.  No S3, S4 or murmur.   ABDOMEN:  Soft without hepatosplenomegaly.   EXTREMITIES:  +2 edema bilaterally   SKIN 7mm oblong brownish pink macule left subscapular area    Results for JONH ZAPATA (MRN [id number]) as of 11/30/2021 13:02   Ref. Range 11/30/2021 09:33   Sodium Latest Ref Range: 133 - 144 mmol/L 139   Potassium Latest  Ref Range: 3.4 - 5.3 mmol/L 4.9   Chloride Latest Ref Range: 94 - 109 mmol/L 105   Carbon Dioxide Latest Ref Range: 20 - 32 mmol/L 26   Urea Nitrogen Latest Ref Range: 7 - 30 mg/dL 22   Creatinine Latest Ref Range: 0.66 - 1.25 mg/dL 1.29 (H)   GFR Estimate Latest Ref Range: >60 mL/min/1.73m2 56 (L)   Calcium Latest Ref Range: 8.5 - 10.1 mg/dL 9.0   Anion Gap Latest Ref Range: 3 - 14 mmol/L 8   Magnesium Latest Ref Range: 1.6 - 2.3 mg/dL 1.6   Albumin Latest Ref Range: 3.4 - 5.0 g/dL 3.2 (L)   Protein Total Latest Ref Range: 6.8 - 8.8 g/dL 6.3 (L)   Bilirubin Total Latest Ref Range: 0.2 - 1.3 mg/dL 1.1   Alkaline Phosphatase Latest Ref Range: 40 - 150 U/L 136   ALT Latest Ref Range: 0 - 70 U/L 26   AST Latest Ref Range: 0 - 45 U/L 15   Glucose Latest Ref Range: 70 - 99 mg/dL 117 (H)   WBC Latest Ref Range: 4.0 - 11.0 10e3/uL 5.8   Hemoglobin Latest Ref Range: 13.3 - 17.7 g/dL 9.4 (L)   Hematocrit Latest Ref Range: 40.0 - 53.0 % 29.2 (L)   Platelet Count Latest Ref Range: 150 - 450 10e3/uL 218   RBC Count Latest Ref Range: 4.40 - 5.90 10e6/uL 2.67 (L)   MCV Latest Ref Range: 78 - 100 fL 109 (H)   MCH Latest Ref Range: 26.5 - 33.0 pg 35.2 (H)   MCHC Latest Ref Range: 31.5 - 36.5 g/dL 32.2   RDW Latest Ref Range: 10.0 - 15.0 % 17.5 (H)   % Neutrophils Latest Units: % 75   % Lymphocytes Latest Units: % 9   % Monocytes Latest Units: % 12   % Eosinophils Latest Units: % 2   % Basophils Latest Units: % 1   Absolute Basophils Latest Ref Range: 0.0 - 0.2 10e3/uL 0.0   Absolute Eosinophils Latest Ref Range: 0.0 - 0.7 10e3/uL 0.1   Absolute Immature Granulocytes Latest Ref Range: <=0.0 10e3/uL 0.1 (H)   Absolute Lymphocytes Latest Ref Range: 0.8 - 5.3 10e3/uL 0.5 (L)   Absolute Monocytes Latest Ref Range: 0.0 - 1.3 10e3/uL 0.7   % Immature Granulocytes Latest Units: % 1   Absolute Neutrophils Latest Ref Range: 1.6 - 8.3 10e3/uL 4.4   Absolute NRBCs Latest Units: 10e3/uL 0.0   NRBCs per 100 WBC Latest Ref Range: <1 /100 0   INR  Latest Ref Range: 0.85 - 1.15  2.04 (H)   ABO/Rh(D) Unknown A POS   Antibody Screen Latest Ref Range: Negative  Positive (A)   SPECIMEN EXPIRATION DATE Unknown [id number]   ANTIBODY SCREEN, TUBE Latest Ref Range: Negative  NEG         ASSESSMENT  1.  Multiple myeloma. In relapse  2.  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   3.  Cardiomyopathy with CHF.   4.  Atrial fibrillation.   5.  History of deep vein thrombosis, on warfarin.   6.  Chronic back pain.   7.  Aortic aneurysm.   8.  History of plasmacytoma of the clivus, status post radiation.   9.  History of ventricular tachycardia with implantable defibrillator in place.   10.  Hypomagnesemia.     11  Hyperkalemia   12. Skin lesion   13. Influenza A    Jonh is quite fatigue and recovering from influenza. Will NOT give DAY 8 kyprolis and have him take 2 weeks off and resume day 8 on Jan 25 he should add back lisinopril 2.5mg ONCE per day The patient was seen and evaluated by me. We discussed the risks and benefits of receiving EVUSHELD, as well as alternative treatments. The Emergency Use Administration (EUA) was provided to the patient for review and an opportunity for questions was provided. Patient wishes to proceed with EVUSHELD.     RTC  With Dr HILL Jan 25      I spent a total of 30  minutes face to face with Erasmo Pearce during today's office visit. Over 50% of this time was spent counseling the patient and coordinating the care regarding multiple myeloma and 10minutes preparing to see the patient and  care coordination     Rojas Raygoza MD  [phone]

## 2022-01-11 NOTE — PROGRESS NOTES
Infusion Nursing Note:  Erasmo Pearce presents today for an add-on injection.    Patient seen by provider today: Yes: Dr. Raygoza   present during visit today: Not Applicable.    Note: Patient was scheduled for Cycle 3, Day 8 Kyprolis - which was held per Dr. Raygoza.  Cycle 3, Day 8 Kyprolis has been rescheduled in two weeks - January 25, 2022.        Treatment Conditions:  Per Provider order, Patient received an add-on Evusheld injection.        EVUSHELD Administration Note:  Erasmo Pearce presents today for EVUSHELD.  Patient seen by provider today: Yes: Dr. Raygoza   present during visit today: Not Applicable.    Note: Patient received the cilgavimab injection in his left glut and the tixagevimab injection in his right glut.    Confirmed patient received the Emergency Use Authorization Fact Sheet for Patients, Parents and Caregivers prior to receiving medication. Confirmed patient had conversation with provider about medication and no further questions at this time.     Post Infusion Assessment:    Patient was observed in the room for a minimum of 10 minutes after injection per standard, then remained in the buidling for a total of 60 minute observation period.      Discharge Plan:   Patient discharged in stable condition accompanied by: self.    IRENE WEBER, JOHN WEBER, RN

## 2022-01-11 NOTE — LETTER
1/11/2022         RE: Erasmo Pearce  [address]  [address]  [place] [address]        Dear Colleague,    Thank you for referring your patient, Erasmo Pearce, to the [facility] BLOOD AND MARROW TRANSPLANT PROGRAM [place]. Please see a copy of my visit note below.    BONE MARROW CLINIC VISIT       Jonh returns to the hematology clinic for treatment of relapsed multiple myeloma oJnh has completed 2 cycles of daratumumab 1400 mg weekly Dex 20 mg on days 1 8 and 15 and pomalidomide 4 mg on days 1 through 14 and Decadron on days 1-9 16 and 23 he did have some cytopenias with thrombocytopenia with his platelet count falling into the 80s he otherwise is tolerated this very well He received Day 1 of Cycle 3 on Dec 28 with Jenny, Kyprolis Pomalidamide and Dex. He felt fatigued with cough and SOB. COVID negative but hospitalized.in [place] with Influenza A with acute respiratory failure and hypoxia. Treated with Tamiflu, rocephin and azithro for sinusitus, treated for hypotension and CHF,treated for COPD with dexamethasone. .  Since discharge still feels SOB and cought  Feels very fatigued and deconditioned.He was take of lisinopril and spironolactone He continued pomalidamide finishing yesterday  Remains on warfarin 5mg daily .  PAST MEDICAL HISTORY, SOCIAL HISTORY AND FAMILY HISTORY:  See my note from 12/2021.      REVIEW OF SYSTEMS:  A 12 point review of systems done is negative as in HPI.      PHYSICAL EXAMINATION: /56 (BP Location: Left arm, Patient Position: Sitting, Cuff Size: Adult Regular)   Pulse (!) 49   Temp 98  F (36.7  C) (Oral)   Resp 16   Wt 81.3 kg (179 lb 3.2 oz)   SpO2 96%   BMI 26.09 kg/m      GENERAL:  He is an alert gentleman in no acute distress.   VITAL SIGNS:  Stable.   HEENT:  Normocephalic.  EOM okay.  Mouth without lesion.   RESPIRATORY:  Clear to percussion and auscultation.   CARDIAC:  Irregularly irregular rhythm.  No S3, S4 or murmur.    ABDOMEN:  Soft without hepatosplenomegaly.   EXTREMITIES:  +2 edema bilaterally   SKIN 7mm oblong brownish pink macule left subscapular area    Results for FRANDY ZAPATA (MRN [id number]) as of 11/30/2021 13:02   Ref. Range 11/30/2021 09:33   Sodium Latest Ref Range: 133 - 144 mmol/L 139   Potassium Latest Ref Range: 3.4 - 5.3 mmol/L 4.9   Chloride Latest Ref Range: 94 - 109 mmol/L 105   Carbon Dioxide Latest Ref Range: 20 - 32 mmol/L 26   Urea Nitrogen Latest Ref Range: 7 - 30 mg/dL 22   Creatinine Latest Ref Range: 0.66 - 1.25 mg/dL 1.29 (H)   GFR Estimate Latest Ref Range: >60 mL/min/1.73m2 56 (L)   Calcium Latest Ref Range: 8.5 - 10.1 mg/dL 9.0   Anion Gap Latest Ref Range: 3 - 14 mmol/L 8   Magnesium Latest Ref Range: 1.6 - 2.3 mg/dL 1.6   Albumin Latest Ref Range: 3.4 - 5.0 g/dL 3.2 (L)   Protein Total Latest Ref Range: 6.8 - 8.8 g/dL 6.3 (L)   Bilirubin Total Latest Ref Range: 0.2 - 1.3 mg/dL 1.1   Alkaline Phosphatase Latest Ref Range: 40 - 150 U/L 136   ALT Latest Ref Range: 0 - 70 U/L 26   AST Latest Ref Range: 0 - 45 U/L 15   Glucose Latest Ref Range: 70 - 99 mg/dL 117 (H)   WBC Latest Ref Range: 4.0 - 11.0 10e3/uL 5.8   Hemoglobin Latest Ref Range: 13.3 - 17.7 g/dL 9.4 (L)   Hematocrit Latest Ref Range: 40.0 - 53.0 % 29.2 (L)   Platelet Count Latest Ref Range: 150 - 450 10e3/uL 218   RBC Count Latest Ref Range: 4.40 - 5.90 10e6/uL 2.67 (L)   MCV Latest Ref Range: 78 - 100 fL 109 (H)   MCH Latest Ref Range: 26.5 - 33.0 pg 35.2 (H)   MCHC Latest Ref Range: 31.5 - 36.5 g/dL 32.2   RDW Latest Ref Range: 10.0 - 15.0 % 17.5 (H)   % Neutrophils Latest Units: % 75   % Lymphocytes Latest Units: % 9   % Monocytes Latest Units: % 12   % Eosinophils Latest Units: % 2   % Basophils Latest Units: % 1   Absolute Basophils Latest Ref Range: 0.0 - 0.2 10e3/uL 0.0   Absolute Eosinophils Latest Ref Range: 0.0 - 0.7 10e3/uL 0.1   Absolute Immature Granulocytes Latest Ref Range: <=0.0 10e3/uL 0.1 (H)   Absolute  Lymphocytes Latest Ref Range: 0.8 - 5.3 10e3/uL 0.5 (L)   Absolute Monocytes Latest Ref Range: 0.0 - 1.3 10e3/uL 0.7   % Immature Granulocytes Latest Units: % 1   Absolute Neutrophils Latest Ref Range: 1.6 - 8.3 10e3/uL 4.4   Absolute NRBCs Latest Units: 10e3/uL 0.0   NRBCs per 100 WBC Latest Ref Range: <1 /100 0   INR Latest Ref Range: 0.85 - 1.15  2.04 (H)   ABO/Rh(D) Unknown A POS   Antibody Screen Latest Ref Range: Negative  Positive (A)   SPECIMEN EXPIRATION DATE Unknown [id number]   ANTIBODY SCREEN, TUBE Latest Ref Range: Negative  NEG         ASSESSMENT  1.  Multiple myeloma. In relapse  2.  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   3.  Cardiomyopathy with CHF.   4.  Atrial fibrillation.   5.  History of deep vein thrombosis, on warfarin.   6.  Chronic back pain.   7.  Aortic aneurysm.   8.  History of plasmacytoma of the clivus, status post radiation.   9.  History of ventricular tachycardia with implantable defibrillator in place.   10.  Hypomagnesemia.     11  Hyperkalemia   12. Skin lesion   13. Influenza A    Jonh is quite fatigue and recovering from influenza. Will NOT give DAY 8 kyprolis and have him take 2 weeks off and resume day 8 on Jan 25 he should add back lisinopril 2.5mg ONCE per day The patient was seen and evaluated by me. We discussed the risks and benefits of receiving EVUSHELD, as well as alternative treatments. The Emergency Use Administration (EUA) was provided to the patient for review and an opportunity for questions was provided. Patient wishes to proceed with EVUSHELD.     RTC  With Dr HILL Jan 25      I spent a total of 30  minutes face to face with Erasmo Pearce during today's office visit. Over 50% of this time was spent counseling the patient and coordinating the care regarding multiple myeloma and 10minutes preparing to see the patient and  care coordination     Rojas Raygoza MD  [phone]            Again, thank you for  allowing me to participate in the care of your patient.      Sincerely,    Rojas Raygoza MD

## 2022-01-11 NOTE — NURSING NOTE
"Oncology Rooming Note    January 11, 2022 9:44 AM   Erasmo Pearce is a 71 year old male who presents for:    Chief Complaint   Patient presents with     Blood Draw     PIV started, labs drawn, saline locked, checked into next appt.     RECHECK     provider visit, chemo infusion for multiple myeloma     Initial Vitals: /56 (BP Location: Left arm, Patient Position: Sitting, Cuff Size: Adult Regular)   Pulse (!) 49   Temp 98  F (36.7  C) (Oral)   Resp 16   Wt 81.3 kg (179 lb 3.2 oz)   SpO2 96%   BMI 26.09 kg/m   Estimated body mass index is 26.09 kg/m  as calculated from the following:    Height as of 11/2/21: 1.765 m (5' 9.49\").    Weight as of this encounter: 81.3 kg (179 lb 3.2 oz). Body surface area is 2 meters squared.  Mild Pain (3) Comment: Data Unavailable   No LMP for male patient.  Allergies reviewed: Yes  Medications reviewed: Yes    Medications: Medication refills not needed today.  Pharmacy name entered into [facility]:    [facility] [place] - [address] [facility] - [place], [place] - [address] [facility]  [facility] [place] - [address] [facility]    Clinical concerns: Patient denies fevers/N/V.  Patient states he rotates between diarrhea and constipation.  Patient has been short of breath since he had influenza (end of December.)    IRENE WEBER RN              "

## 2022-01-11 NOTE — NURSING NOTE
Chief Complaint   Patient presents with     Blood Draw     PIV started, labs drawn, saline locked, checked into next appt.     Ari Abraham RN on 1/11/2022 at 9:24 AM

## 2022-01-11 NOTE — LETTER
1/11/2022         RE: Erasmo Pearce  [address]  [address] [facility] [address]        Dear Colleague,    Thank you for referring your patient, Erasmo Pearce, to the [facility] BLOOD AND MARROW TRANSPLANT PROGRAM [place]. Please see a copy of my visit note below.    Infusion Nursing Note:  Erasmo Pearce presents today for an add-on injection.    Patient seen by provider today: Yes: Dr. Raygoza   present during visit today: Not Applicable.    Note: Patient was scheduled for Cycle 3, Day 8 Kyprolis - which was held per Dr. Raygoza.  Cycle 3, Day 8 Kyprolis has been rescheduled in two weeks - January 25, 2022.        Treatment Conditions:  Per Provider order, Patient received an add-on Evusheld injection.        EVUSHELD Administration Note:  Erasmo Pearce presents today for EVUSHELD.  Patient seen by provider today: Yes: Dr. Raygoza   present during visit today: Not Applicable.    Note: Patient received the cilgavimab injection in his left glut and the tixagevimab injection in his right glut.    Confirmed patient received the Emergency Use Authorization Fact Sheet for Patients, Parents and Caregivers prior to receiving medication. Confirmed patient had conversation with provider about medication and no further questions at this time.     Post Infusion Assessment:    Patient was observed in the room for a minimum of 10 minutes after injection per standard, then remained in the buidling for a total of 60 minute observation period.      Discharge Plan:   Patient discharged in stable condition accompanied by: self.    IRENE WEBER, RN          IRENE WEBER, RN                          Again, thank you for allowing me to participate in the care of your patient.        Sincerely,        [facility]

## 2022-01-13 PROBLEM — J43.2 CENTRILOBULAR EMPHYSEMA (H): Status: ACTIVE | Noted: 2022-01-01

## 2022-01-21 NOTE — TELEPHONE ENCOUNTER
"Oral Chemotherapy Monitoring Program     Received call from patient to clarify Pomalyst plan.      Per Dr. Raygoza, \"The Pomalyst should restart in February as part of Cycle 4\"    Pt's Day 8 Kyprolis was rescheduled to 1/25, so next cycle should start ~2/15, and this is when pt should re-start his Pomalyst.    Relayed this information to Jonh and his wife, who verbalized understanding.      Grace Myhre, PharmD  Hematology/Oncology Clinical Pharmacist  [facility]  [phone]            "

## 2022-01-25 NOTE — LETTER
1/25/2022         RE: Erasmo Pearce  [address]  [address]  [facility] [address]        Dear Colleague,    Thank you for referring your patient, Erasmo Pearce, to the [facility] BLOOD AND MARROW TRANSPLANT PROGRAM [place]. Please see a copy of my visit note below.    BONE MARROW CLINIC VISIT       Jonh returns to the hematology clinic for treatment of relapsed multiple myeloma Jonh has completed 2 cycles of daratumumab 1400 mg weekly Dex 20 mg on days 1 8 and 15 and pomalidomide 4 mg on days 1 through 14 and Decadron on days 1-9 16 and 23 he did have some cytopenias with thrombocytopenia with his platelet count falling into the 80s he otherwise is tolerated this very well He received Day 1 of Cycle 3 on Dec 28 with Jenny, Kyprolis Pomalidamide and Dex. He felt fatigued with cough and SOB. COVID negative but hospitalized.in [place] 12/30/21-1/2/22 with Influenza A with acute respiratory failure and hypoxia. Treated with Tamiflu, rocephin and azithro for sinusitus, treated for hypotension and CHF,treated for COPD with dexamethasone. 11 days after discharge he still felt SOB, fatigued and deconditioned. Stopped kyprolis for 2 weeks and returns today for evaluation. He was taken off of lisinopril and spironolactone. Restarted low dose lisinopril at last visit. He continued pomalidamide.    Today, he is back to resume his day 8 carfilzomib. The break was good for him. His appetite and weight are much better. He has regained about 10-12 lbs. Diarrhea has improved. His ankles are a bit swollen. His lasix is now 40mg and his spirinolactone has still been on hold. His breathing has a bit heavier, which he is attributing to his lower hgb.    Remains on warfarin 5mg daily .    PAST MEDICAL HISTORY, SOCIAL HISTORY AND FAMILY HISTORY:  See my note from 12/2021.      REVIEW OF SYSTEMS:  A 12 point review of systems done is negative as in HPI.      PHYSICAL EXAMINATION: /61   Pulse 55    Temp 97.8  F (36.6  C) (Oral)   Resp 16   Wt 88.5 kg (195 lb)   SpO2 98%   BMI 28.39 kg/m      GENERAL:  He is an alert gentleman in no acute distress.   VITAL SIGNS:  Stable.   HEENT:  Normocephalic.  EOM okay.  Mouth without lesion.   RESPIRATORY:  Clear to percussion and auscultation. No rales.   CARDIAC:  Irregularly irregular rhythm.  No S3, S4 or murmur.   ABDOMEN:  Soft without hepatosplenomegaly.   EXTREMITIES:  +3 edema bilaterally   SKIN 7mm oblong brownish pink macule left subscapular area  Recent Labs   Lab Test 01/25/22  1000 01/11/22  0921 11/30/21  0933 11/23/21  0923 10/26/21  1035 08/24/21  1001 04/27/21  1037   0000   WBC 3.8* 11.1*   < > 1.6*   < > 3.8* 6.4  --    HGB 8.1* 10.7*   < > 9.4*   < > 9.3* 12.7*  --     177   < > 80*   < > 105* 157  --    ANEU  --  9.7*  --  0.5*  --  1.6 3.9   < >   ALYM  --  0.2*  --  0.5*  --  1.1 1.7  --     < > = values in this interval not displayed.     Recent Labs   Lab Test 01/11/22  0921 12/28/21  0924 11/16/21  0930 11/09/21  0932 11/02/21  0706 10/26/21  1035 08/24/21  0945    137   < > 142   < > 140 142   POTASSIUM 4.7 4.5   < > 4.2   < > 4.2 5.3   CHLORIDE 101 103   < > 110*   < > 108 111*   CO2 27 25   < > 25   < > 27 27   BUN 36* 27   < > 19   < > 18 33*   CR 0.74 1.41*   < > 1.08   < > 1.13 1.41*   EILEEN 9.2 8.4*   < > 8.4*   < > 8.6 8.9   MAG 2.4* 1.2*   < > 1.5*   < > 1.6  --    LDH  --  208  --  167  --  158 148   URIC  --  5.8  --  5.4  --  5.2 5.1    < > = values in this interval not displayed.     Recent Labs   Lab Test 01/11/22  0921 12/28/21  0924 12/21/21  0944 12/14/21  0910   AST 26 19 14 18   * 26 37 34   ALKPHOS 96 114 122 112   BILITOTAL 1.2 1.3 0.7 1.9*     Recent Labs   Lab Test 12/28/21  0924 11/09/21  0932 10/26/21  1035 08/24/21  0945 04/27/21  1037 10/13/20  1054 10/06/20  0932    215 382   < > 281  --  284   IGM 29* 16* 25*   < > 30*  --  35   ELPM 0.1* 0.1* 0.1*  --  0.2*  --  0.1*   KAPPAFREELT  --    --   --   --  2.29* 3.28* 3.34*   LAMBDAFREELT  --   --   --   --  44.79* 19.28* 21.74*   KLR  --   --   --   --  0.05* 0.17* 0.15*    < > = values in this interval not displayed.     Results for FRANDY ZAPATA (MRN [id number]) as of 1/25/2022 11:10   Ref. Range 11/9/2021 09:32 12/28/2021 09:24   Be[place] Free Light Chains Latest Ref Range: 0.33 - 1.94 mg/dL 1.17 1.17   LAMBDA FREE LT CHAINS Latest Ref Range: 0.57 - 2.63 mg/dL 39.48 (H) 28.05 (H)   KAPPA/LAMBDA RATIO Latest Ref Range: 0.26 - 1.65  0.03 (L) 0.04 (L)       ASSESSMENT  1.  Multiple myeloma. In relapse  2.  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   3.  Cardiomyopathy with CHF.   4.  Atrial fibrillation.   5.  History of deep vein thrombosis, on warfarin.   6.  Chronic back pain.   7.  Aortic aneurysm.   8.  History of plasmacytoma of the clivus, status post radiation.   9.  History of ventricular tachycardia with implantable defibrillator in place.   10.  Hypomagnesemia.     11  Hyperkalemia   12. Skin lesion   13. Influenza A    Continue the higher dose of lasix and restart the sprinolactone. With his fluid retention I am concerned about whether he has any change in his heart function, whether this is from the carfilzomib (like his bortezomib) vs from the influenza vs something else. Agree with cardiac workup, with recent echo, showing EF 45-50%, moderate RV systolic dysfunction, moderate to severe mitral valve regurgitation, pulmonary hypertension. He is getting a SHORTY soon in [place]. His improvement in EF argues against the carfilzomib. He will watch his weight closely and pull back on the lasix when his weight has improved and be in touch with his cardiologist Dr. Prince on 2/7.    Day 8 carfilzomib today and Day 15 and Day 1   Darzalex and dexamethasone 20mg on days 1, 15.  Hold off on pomalidomide until next day 1, scheduled for February 15.  Blood transfusion today  Will also give IV Mg for Mg 1.1  Add on  SPEP and sFLC    Received Evusheld on 1/11.     RTC    Infusions 2/1 and 2/15 for carfilzomib and kenton both times  Follow up Dr HILL Feb 15    Ino Rao MD  Hematology Fellow  Attending  The patient was seen and examined by me separate from the resident/fellow provider.The note above reflects my assessment and plan. I have personally reviewed today's labs,vital and radiology results. The points of care that were added by me are:    I am worried about the heart failure and pulmonary hypertension.Will be interested in SHORTY echo Will add back spironolactone. Need Mag today. Hgb down No bleeding.Will give 1 unit(s) PRBC      I spent a total of 30  minutes face to face with Erasmo Pearce during today's office visit. Over 50% of this time was spent counseling the patient and coordinating the care regarding multiple myeloma and 10minutes preparing to see the patient and  care coordination       Rojas Raygoza MD  [phone]

## 2022-01-25 NOTE — LETTER
Date:January 26, 2022      Provider requested that no letter be sent. Do not send.       [facility]

## 2022-01-25 NOTE — NURSING NOTE
Chief Complaint   Patient presents with     Blood Draw     Labs drawn via PIV placed by RN in lab. VS taken,          Sundeep Liang RN

## 2022-01-25 NOTE — LETTER
1/25/2022         RE: Erasmo Pearce  [address]  [address] [facility] [address]        Dear Colleague,    Thank you for referring your patient, Erasmo Pearce, to the [facility] BLOOD AND MARROW TRANSPLANT PROGRAM [place]. Please see a copy of my visit note below.    Infusion Nursing Note:  Erasmo Pearce presents today for scheduled Cycle 3, Day 8 Kyprolis.    Patient seen by provider today: Yes: Dr. Raygoza   present during visit today: Not Applicable.    Note: Labs were monitored.  Lab parameters for today's treatment were met.  Patient assessment was completed by Provider.  Consent to Receive a Blood Transfusion was obtained by Provider and signed by Patient.      Intravenous Access:  Peripheral IV placed.    Treatment Conditions:  Patient received scheduled pre flush, Kyprolis and post flush.  Patient received an add-on unit of red blood cells for a Hgb of 8.1.  Patient received 4 grams IV magnesium for a magnesium level of 1.1.      Post Infusion Assessment:  Patient tolerated infusions and transfusion without incident.       Discharge Plan:   Patient discharged in stable condition accompanied by: wife.      IRENE WEBER, JOHN                          Again, thank you for allowing me to participate in the care of your patient.        Sincerely,        [facility]

## 2022-01-25 NOTE — PROGRESS NOTES
Infusion Nursing Note:  Erasmo Pearce presents today for scheduled Cycle 3, Day 8 Kyprolis.    Patient seen by provider today: Yes: Dr. Raygoza   present during visit today: Not Applicable.    Note: Labs were monitored.  Lab parameters for today's treatment were met.  Patient assessment was completed by Provider.  Consent to Receive a Blood Transfusion was obtained by Provider and signed by Patient.      Intravenous Access:  Peripheral IV placed.    Treatment Conditions:  Patient received scheduled pre flush, Kyprolis and post flush.  Patient received an add-on unit of red blood cells for a Hgb of 8.1.  Patient received 4 grams IV magnesium for a magnesium level of 1.1.      Post Infusion Assessment:  Patient tolerated infusions and transfusion without incident.       Discharge Plan:   Patient discharged in stable condition accompanied by: wife.      IRENE WEBER RN

## 2022-01-25 NOTE — PROGRESS NOTES
BONE MARROW CLINIC VISIT       Jonh returns to the hematology clinic for treatment of relapsed multiple myeloma Jonh has completed 2 cycles of daratumumab 1400 mg weekly Dex 20 mg on days 1 8 and 15 and pomalidomide 4 mg on days 1 through 14 and Decadron on days 1-9 16 and 23 he did have some cytopenias with thrombocytopenia with his platelet count falling into the 80s he otherwise is tolerated this very well He received Day 1 of Cycle 3 on Dec 28 with Jenny, Kyprolis Pomalidamide and Dex. He felt fatigued with cough and SOB. COVID negative but hospitalized.in [place] 12/30/21-1/2/22 with Influenza A with acute respiratory failure and hypoxia. Treated with Tamiflu, rocephin and azithro for sinusitus, treated for hypotension and CHF,treated for COPD with dexamethasone. 11 days after discharge he still felt SOB, fatigued and deconditioned. Stopped kyprolis for 2 weeks and returns today for evaluation. He was taken off of lisinopril and spironolactone. Restarted low dose lisinopril at last visit. He continued pomalidamide.    Today, he is back to resume his day 8 carfilzomib. The break was good for him. His appetite and weight are much better. He has regained about 10-12 lbs. Diarrhea has improved. His ankles are a bit swollen. His lasix is now 40mg and his spirinolactone has still been on hold. His breathing has a bit heavier, which he is attributing to his lower hgb.    Remains on warfarin 5mg daily .    PAST MEDICAL HISTORY, SOCIAL HISTORY AND FAMILY HISTORY:  See my note from 12/2021.      REVIEW OF SYSTEMS:  A 12 point review of systems done is negative as in HPI.      PHYSICAL EXAMINATION: /61   Pulse 55   Temp 97.8  F (36.6  C) (Oral)   Resp 16   Wt 88.5 kg (195 lb)   SpO2 98%   BMI 28.39 kg/m      GENERAL:  He is an alert gentleman in no acute distress.   VITAL SIGNS:  Stable.   HEENT:  Normocephalic.  EOM okay.  Mouth without lesion.   RESPIRATORY:  Clear to percussion and auscultation. No rales.    CARDIAC:  Irregularly irregular rhythm.  No S3, S4 or murmur.   ABDOMEN:  Soft without hepatosplenomegaly.   EXTREMITIES:  +3 edema bilaterally   SKIN 7mm oblong brownish pink macule left subscapular area  Recent Labs   Lab Test 01/25/22  1000 01/11/22  0921 11/30/21  0933 11/23/21  0923 10/26/21  1035 08/24/21  1001 04/27/21  1037   0000   WBC 3.8* 11.1*   < > 1.6*   < > 3.8* 6.4  --    HGB 8.1* 10.7*   < > 9.4*   < > 9.3* 12.7*  --     177   < > 80*   < > 105* 157  --    ANEU  --  9.7*  --  0.5*  --  1.6 3.9   < >   ALYM  --  0.2*  --  0.5*  --  1.1 1.7  --     < > = values in this interval not displayed.     Recent Labs   Lab Test 01/11/22 0921 12/28/21  0924 11/16/21  0930 11/09/21  0932 11/02/21  0706 10/26/21  1035 08/24/21  0945    137   < > 142   < > 140 142   POTASSIUM 4.7 4.5   < > 4.2   < > 4.2 5.3   CHLORIDE 101 103   < > 110*   < > 108 111*   CO2 27 25   < > 25   < > 27 27   BUN 36* 27   < > 19   < > 18 33*   CR 0.74 1.41*   < > 1.08   < > 1.13 1.41*   EILEEN 9.2 8.4*   < > 8.4*   < > 8.6 8.9   MAG 2.4* 1.2*   < > 1.5*   < > 1.6  --    LDH  --  208  --  167  --  158 148   URIC  --  5.8  --  5.4  --  5.2 5.1    < > = values in this interval not displayed.     Recent Labs   Lab Test 01/11/22  0921 12/28/21  0924 12/21/21  0944 12/14/21  0910   AST 26 19 14 18   * 26 37 34   ALKPHOS 96 114 122 112   BILITOTAL 1.2 1.3 0.7 1.9*     Recent Labs   Lab Test 12/28/21  0924 11/09/21  0932 10/26/21  1035 08/24/21  0945 04/27/21  1037 10/13/20  1054 10/06/20  0932    215 382   < > 281  --  284   IGM 29* 16* 25*   < > 30*  --  35   ELPM 0.1* 0.1* 0.1*  --  0.2*  --  0.1*   KAPPAFREELT  --   --   --   --  2.29* 3.28* 3.34*   LAMBDAFREELT  --   --   --   --  44.79* 19.28* 21.74*   KLR  --   --   --   --  0.05* 0.17* 0.15*    < > = values in this interval not displayed.     Results for JODI FRANDY BLACKBURN (MRN [id number]) as of 1/25/2022 11:10   Ref. Range 11/9/2021 09:32 12/28/2021 09:24    [place] Free Light Chains Latest Ref Range: 0.33 - 1.94 mg/dL 1.17 1.17   LAMBDA FREE LT CHAINS Latest Ref Range: 0.57 - 2.63 mg/dL 39.48 (H) 28.05 (H)   KAPPA/LAMBDA RATIO Latest Ref Range: 0.26 - 1.65  0.03 (L) 0.04 (L)       ASSESSMENT  1.  Multiple myeloma. In relapse  2.  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   3.  Cardiomyopathy with CHF.   4.  Atrial fibrillation.   5.  History of deep vein thrombosis, on warfarin.   6.  Chronic back pain.   7.  Aortic aneurysm.   8.  History of plasmacytoma of the clivus, status post radiation.   9.  History of ventricular tachycardia with implantable defibrillator in place.   10.  Hypomagnesemia.     11  Hyperkalemia   12. Skin lesion   13. Influenza A    Continue the higher dose of lasix and restart the sprinolactone. With his fluid retention I am concerned about whether he has any change in his heart function, whether this is from the carfilzomib (like his bortezomib) vs from the influenza vs something else. Agree with cardiac workup, with recent echo, showing EF 45-50%, moderate RV systolic dysfunction, moderate to severe mitral valve regurgitation, pulmonary hypertension. He is getting a SHORTY soon in [place]. His improvement in EF argues against the carfilzomib. He will watch his weight closely and pull back on the lasix when his weight has improved and be in touch with his cardiologist Dr. Prince on 2/7.    Day 8 carfilzomib today and Day 15 and Day 1   Darzalex and dexamethasone 20mg on days 1, 15.  Hold off on pomalidomide until next day 1, scheduled for February 15.  Blood transfusion today  Will also give IV Mg for Mg 1.1  Add on SPEP and sFLC    Received Evusheld on 1/11.     RTC    Infusions 2/1 and 2/15 for carfilzomib and kenton both times  Follow up Dr HILL Feb 15    Ino Rao MD  Hematology Fellow  Attending  The patient was seen and examined by me separate from the resident/fellow provider.The note above reflects my assessment  and plan. I have personally reviewed today's labs,vital and radiology results. The points of care that were added by me are:    I am worried about the heart failure and pulmonary hypertension.Will be interested in SHORTY echo Will add back spironolactone. Need Mag today. Hgb down No bleeding.Will give 1 unit(s) PRBC      I spent a total of 30  minutes face to face with Erasmo Pearce during today's office visit. Over 50% of this time was spent counseling the patient and coordinating the care regarding multiple myeloma and 10minutes preparing to see the patient and  care coordination     Rojas Raygoza MD  [phone]

## 2022-01-25 NOTE — NURSING NOTE
"Oncology Rooming Note    January 25, 2022 10:18 AM   Erasmo Pearce is a 71 year old male who presents for:    Chief Complaint   Patient presents with     Blood Draw     Labs drawn via PIV placed by RN in lab. VS taken,      RECHECK     provider visit, scheduled chemo infusion for multiple myeloma     Initial Vitals: /49   Pulse 55   Temp 97.8  F (36.6  C) (Oral)   Resp 16   Wt 88.5 kg (195 lb)   SpO2 98%   BMI 28.39 kg/m   Estimated body mass index is 28.39 kg/m  as calculated from the following:    Height as of 11/2/21: 1.765 m (5' 9.49\").    Weight as of this encounter: 88.5 kg (195 lb). Body surface area is 2.08 meters squared.  No Pain (0) Comment: Data Unavailable   No LMP for male patient.  Allergies reviewed: Yes  Medications reviewed: Yes    Medications: Medication refills not needed today.  Pharmacy name entered into [employer]:    [facility] - [place], [place] - [address] [facility] - [address]  [facility] [address]    Clinical concerns: Patient denies any fevers/N/V.  He has occasional diarrhea.  His  respiratory symptoms are improving weekly since he had influenza in December (was admitted to hospital on 12-30 - discharged on 1-2.)    IRENE WEBER RN              "

## 2022-02-01 NOTE — LETTER
2/1/2022         RE: Erasmo Pearce  [address]  [address]  [place] [address]        Dear Colleague,    Thank you for referring your patient, Erasmo Pearce, to the [facility] BLOOD AND MARROW TRANSPLANT PROGRAM [place]. Please see a copy of my visit note below.    Infusion Nursing Note:  Erasmo Pearce presents today for carfilzomib, daratumumab, poss RBC, poss Magnesium.    Patient seen by provider today: No   present during visit today: Not Applicable.    Note: Jonh here today for scheduled chemotherapy infusions. Labs were monitored- parameters met. Jonh was premedicated with 50mg benadryl and 650mg tylenol. Received his day 15 dexamethasone 20mg.  Received 500ml D5 bolus as pre/post flush for kyprolis. Daratumumab given subcutaneous in RLQ of abdomen without difficulty. Kyprolis infused over 10 minutes followed by post flush. Also received 2gm IV magnesium.      Intravenous Access:  Peripheral IV placed.    Treatment Conditions:  Lab Results   Component Value Date    HGB 9.3 (L) 02/01/2022    WBC 6.0 02/01/2022    ANEU 9.7 (H) 01/11/2022    ANEUTAUTO 4.5 02/01/2022     02/01/2022      Lab Results   Component Value Date     02/01/2022    POTASSIUM 3.8 02/01/2022    MAG 1.5 (L) 02/01/2022    CR 1.01 02/01/2022    EILEEN 8.9 02/01/2022    BILITOTAL 0.9 02/01/2022    ALBUMIN 3.3 (L) 02/01/2022    ALT 19 02/01/2022    AST 14 02/01/2022         Post Infusion Assessment:  Patient tolerated infusion without incident.  Site patent and intact, free from redness, edema or discomfort.   Blood return verified at start and stop of infusion.  Access discontinued per protocol.    Discharge Plan:   Patient discharged in stable condition accompanied by: wife.  Departure Mode: Ambulatory.    Mindi Loo RN            Again, thank you for allowing me to participate in the care of your patient.      Sincerely,    [facility]

## 2022-02-01 NOTE — NURSING NOTE
Chief Complaint   Patient presents with     Blood Draw     Labs drawn with piv start by RN. Vitals taken.     Labs drawn from PIV placed by RN. Line flushed with saline. Vitals taken. Pt checked in for appointment(s).      Mickie Brambila RN

## 2022-02-01 NOTE — PROGRESS NOTES
Infusion Nursing Note:  Erasmo Pearce presents today for carfilzomib, daratumumab, poss RBC, poss Magnesium.    Patient seen by provider today: No   present during visit today: Not Applicable.    Note: Jonh here today for scheduled chemotherapy infusions. Labs were monitored- parameters met. Jonh was premedicated with 50mg benadryl and 650mg tylenol. Received his day 15 dexamethasone 20mg.  Received 500ml D5 bolus as pre/post flush for kyprolis. Daratumumab given subcutaneous in RLQ of abdomen without difficulty. Kyprolis infused over 10 minutes followed by post flush. Also received 2gm IV magnesium.      Intravenous Access:  Peripheral IV placed.    Treatment Conditions:  Lab Results   Component Value Date    HGB 9.3 (L) 02/01/2022    WBC 6.0 02/01/2022    ANEU 9.7 (H) 01/11/2022    ANEUTAUTO 4.5 02/01/2022     02/01/2022      Lab Results   Component Value Date     02/01/2022    POTASSIUM 3.8 02/01/2022    MAG 1.5 (L) 02/01/2022    CR 1.01 02/01/2022    EILEEN 8.9 02/01/2022    BILITOTAL 0.9 02/01/2022    ALBUMIN 3.3 (L) 02/01/2022    ALT 19 02/01/2022    AST 14 02/01/2022         Post Infusion Assessment:  Patient tolerated infusion without incident.  Site patent and intact, free from redness, edema or discomfort.   Blood return verified at start and stop of infusion.  Access discontinued per protocol.      Discharge Plan:   Patient discharged in stable condition accompanied by: wife.  Departure Mode: Ambulatory.      Mindi Loo RN

## 2022-02-03 NOTE — PROGRESS NOTES
I had the pleasure of participating in coordination of clinical cardiovascular care for patient, Erasmo Pearce, via  [employer]'s virtual visit protocol during the COVID-19 Pandemic.    History:     Jonh Pearce is a 71-year-old very pleasant gentleman with multiple myeloma s/p PBSCT in 2006, ICM/NICM (LVEF 30-35%, NYHA II Stage B) s/p ICD placement in 2009, episodic VT/VF treated by appropriate ICD shocks and afib (CHADVASC 3) on warfarin followed in Cardio-oncology clinic for cardiomyopathy.      Patient was diagnosed with multiple myeloma in 2006 and underwent chemotherapy and autologous PBSCT. He was started on lenalinomide in 2017 due to relapse and then bortezomib in 07/2019 due to progressive disease. He was hospitalized for acute on chronic systolic heart failure,in 2019 and bortezomib was stopped.  Carfilzomib and daratumumab were added to pomalidomide and dexamethasone in November 2021.     Patient was admitted for influenza infection and respiratory illness in December 2021. BNP level was 400-600. Troponin was negative.  His cardiac medications doses were reduced during his hospitalization for hypotension.  A subsequent transthoracic echocardiogram done at [facility] in January 2022 was notable for mildly reduced left ventricular function, moderate right ventricular dysfunction with moderate to severe mitral regurgitation in the setting of hypervolemia.     Patient continues to make good progress.  He has not had any worsening chest pain or dyspnea.  His wife Carla mentions that his peripheral edema is minimal. He feels tired overall. He denies orthopnea, PND. He received 1 appropriate shock for VT at 246 bpm on 11/13/2021.     PAST MEDICAL HISTORY:  Past Medical History:   Diagnosis Date     Chronic systolic heart failure (H)      Ischemic cardiomyopathy      Other premature beats      Permanent atrial fibrillation (H)      Personal history of multiple myeloma      VF (ventricular fibrillation)  (H)        CURRENT MEDICATIONS:  Current Outpatient Medications   Medication Sig Dispense Refill     acyclovir (ZOVIRAX) 400 MG tablet Take 1 tablet (400 mg) by mouth every 12 hours 60 tablet 3     albuterol (PROAIR HFA, PROVENTIL HFA, VENTOLIN HFA) 108 (90 BASE) MCG/ACT inhaler Inhale 2 puffs into the lungs every 6 hours as needed  (Patient not taking: Reported on 2/1/2022)       amoxicillin (AMOXIL) 500 MG tablet Take 4 tabs one hour before dental appointment (Patient not taking: Reported on 11/2/2021) 4 tablet 5     baclofen (LIORESAL) 10 MG tablet Take 10 mg by mouth 3 times daily as needed prn (Patient not taking: Reported on 1/11/2022)       Calcium Carbonate-Vitamin D (CALCIUM 500 + D PO) Take 2 tablets by mouth daily.       dexamethasone (DECADRON) 4 MG tablet Take  5 tabs Days 1,8, and 15 each cycle 15 tablet 4     diclofenac (VOLTAREN) 75 MG EC tablet Take 75 mg by mouth 2 times daily as needed 1 tab prn (Patient not taking: Reported on 1/11/2022)       digoxin (LANOXIN) 125 MCG tablet Take 1 tablet (125 mcg) by mouth daily 30 tablet 1     fentaNYL (DURAGESIC) 25 mcg/hr patch 72 hr Place 1 patch onto the skin every 72 hours       fluticasone (FLONASE) 50 MCG/ACT nasal spray Spray 1 spray into both nostrils daily       furosemide (LASIX) 20 MG tablet Take 1 tablet (20 mg) by mouth daily (Patient taking differently: Take 20 mg by mouth daily Taking 40 mg) 90 tablet 3     hydrocortisone 2.5 % cream  (Patient not taking: Reported on 1/25/2022)       lisinopril (PRINIVIL/ZESTRIL) 5 MG tablet Take 1 tablet (5 mg) by mouth 2 times daily (Patient taking differently: Take 5 mg by mouth 2 times daily Taking 2.5 mg daily (1/2 tablet in morning)) 60 tablet 1     Magnesium Oxide 420 MG TABS Take 3 tabs (1260 mg) three times a day. (Patient taking differently: 420 mg Take 9 tabs  daily) 810 tablet 3     metoprolol succinate ER (TOPROL-XL) 100 MG 24 hr tablet Take 1 tablet (100 mg) by mouth daily (Patient taking  differently: Take 100 mg by mouth daily 75 mg daily) 90 tablet 3     mexiletine (MEXITIL) 150 MG capsule Take 1 capsule (150 mg) by mouth 2 times daily As close to 12 hours apart as possible 180 capsule 0     omeprazole (PRILOSEC) 20 MG capsule Take 20 mg by mouth daily  90 capsule 3     ondansetron (ZOFRAN-ODT) 4 MG ODT tab Take 4 mg by mouth every 6 hours as needed for nausea        oxycodone (ROXICODONE) 5 MG immediate release tablet Take 1-2 tablets by mouth every 6 hours as needed. For pain.        pomalidomide (POMALYST) 4 MG capsule Take 1 capsule (4 mg) by mouth daily Day 1 thru 14 of each 28 day cycle. Administer with or without food. Swallow whole with water. (Patient not taking: Reported on 1/25/2022) 14 capsule 3     potassium chloride ER (KLOR-CON M) 10 MEQ CR tablet Take 1 tablet (10 mEq) by mouth every other day 90 tablet 3     simvastatin (ZOCOR) 40 MG tablet Take 1 tablet (40 mg) by mouth At Bedtime 90 tablet 0     spironolactone (ALDACTONE) 25 MG tablet Take 0.5 tablets (12.5 mg) by mouth daily (Patient not taking: Reported on 1/11/2022) 15 tablet 1     tiotropium (SPIRIVA) 18 MCG inhalation capsule Inhale 1 capsule (18 mcg) into the lungs daily 30 capsule 3     TRAZODONE HCL PO Take 50 mg by mouth nightly as needed  (Patient not taking: Reported on 12/21/2021)       warfarin (COUMADIN) 5 MG tablet Take 7.5 mg by mouth See Admin Instructions Take 1.5 tab by mojth Monday and Friday and 1 tab Sun Tues WED , Thur and Sat         PAST SURGICAL HISTORY:  Past Surgical History:   Procedure Laterality Date     CV CORONARY ANGIOGRAM N/A 8/29/2019    Procedure: CV CORONARY ANGIOGRAM;  Surgeon: Giorgi Milan MD;  Location:  HEART CARDIAC CATH LAB     CV LEFT HEART CATH N/A 8/29/2019    Procedure: Left Heart Cath;  Surgeon: Giorgi Milan MD;  Location:  HEART CARDIAC CATH LAB     CV RIGHT HEART CATH MEASUREMENTS RECORDED N/A 8/29/2019    Procedure: CV RIGHT HEART CATH;  Surgeon: Giorgi Milan  MD Hank;  Location:  HEART CARDIAC CATH LAB     CV RIGHT HEART EXERCISE STRESS STUDY N/A 2019    Procedure: Stress Drug Study;  Surgeon: Giorgi Milan MD;  Location:  HEART CARDIAC CATH LAB     IMPLANT AUTOMATIC IMPLANTABLE CARDIOVERTER DEFIBRILLATOR         ALLERGIES:     Allergies   Allergen Reactions     Nkda [No Known Drug Allergies]      Sotalol Other (See Comments)     Other reaction(s): Low blood pressure, Prolonged QT interval       FAMILY HISTORY:  - Premature coronary artery disease      SOCIAL HISTORY:  Social History     Tobacco Use     Smoking status: Former Smoker     Packs/day: 1.00     Years: 25.00     Pack years: 25.00     Types: Cigarettes     Quit date: 10/15/1995     Years since quittin.3     Smokeless tobacco: Never Used   Substance Use Topics     Alcohol use: No     Drug use: No       ROS:   ROS: A comprehensive 10-point review of system is negative except for those reported in the HPI.    Exam:    Exam: limited due to the nature of this visit   In general, the patient is in no acute distress.    Breathing is unlabored.   HEENT: Sclerae white, not injected.    Neck: No jugular venous distension.    Extremities: mild edema, per patient.   Neurologic: Alert and oriented to person/place/time, normal speech and affect  Skin: No rash on exposed skin      Labs:    Chemistry panel: Recent Labs   Lab Test 22  0943 22  1000    144   POTASSIUM 3.8 3.9   CHLORIDE 107 106   CO2 28 30   ANIONGAP 7 8   * 113*   BUN 12 17   CR 1.01 1.08   EILEEN 8.9 7.9*   MAG 1.5* 1.1*   GFRESTIMATED 80 74   AST 14 17   ALT 19 32       CBC:   Recent Labs   Lab Test 22  0943 22  1000   WBC 6.0 3.8*   RBC 2.76* 2.30*   HGB 9.3* 8.1*   HCT 29.7* 25.9*   * 113*   MCH 33.7* 35.2*   MCHC 31.3* 31.3*   RDW 20.9* 20.1*    231       Lipid Panel:  Recent Labs   Lab Test 19  0000   CHOL 113   HDL 42   LDL 52   TRIG 93       Thyroid:   TSH   Date Value Ref Range  "Status   03/20/2019 0.62 0.47 - 5.00 mIU/mL Final     T4 Free   Date Value Ref Range Status   09/18/2007 1.18 0.70 - 1.85 ng/dL Final     Hemoglobin A1C POCT   Date Value Ref Range Status   08/29/2019 6.3 (H) 0 - 5.6 % Final     Comment:     Normal <5.7% Prediabetes 5.7-6.4%  Diabetes 6.5% or higher - adopted from ADA   consensus guidelines.       INR   Date Value Ref Range Status   02/01/2022 1.88 (H) 0.85 - 1.15 Final   04/27/2021 2.27 (H) 0.86 - 1.14 Final     Digoxin level December 2021 - 0.59  BNP level 401    Cardiac testing :    Echo 1/14/2022 ([facility])  Mildly reduced LV function, LVEF 45-50%  Moderately reduced RV function.  Moderate TR  Moderate to severe MR  Mean RA pressure 8   PASP 50 mmHg    11/2021 device interrogation  Device: Lybrate E102 TELIGEN 100  Normal Device Function  Mode: VVI 40 bpm  : 13%   Presenting EGM: VS ~ 80 bpm w/ occ PVC's   Lead Trends Appear Stable: yes  Estimated battery longevity to RRT = 10 months.    Anticoagulant: coumadin  Ventricular Arrhythmia: Since last remote transmission there has been 1 treated episode in the VZ zone and 20 NSVT episodes. Treated episode occurred on 11-13-21, EGM displays VT @ 246 bpm that is converted with one 41 j shock.   Pt Notified of Transmission Results: yes, pt reports he was resting on the couch and was half asleep. He reports he sat up because he felt a little \"silly\" and then was shocked. He reports he is feeling well now and felt fine after the shock. Pt did not page the device RN on call. Pt reports the only thing new is he has had some changes to his Chemo regimen on November 2nd.       EKG 2/24/2020 afib, slow ventricular response q waves on inferior leads       Echo:  10/26/21  Left ventricular function is decreased. The ejection fraction is 35-40% (moderately reduced).  Moderate right ventricular dilation is present. Global right ventricular function is mildly to moderately reduced.  Moderate mitral insufficiency is " present. Moderate to severe tricuspid insufficiency is present.  Dilation of the inferior vena cava is present with abnormal respiratory variation in diameter.  No pericardial effusion is present.    Stress echo 7.30.19   Submaximal stress test, achieved 74% of the age predicted maximal heart rate.  Limiting symptom fatigue.   Normal blood pressure response to exercise.  No angina symptoms with exercise.  Baseline rhythm is atrial fibrillation. No ECG evidence of ischemia. There is no chronotropic incompetence.  Severely reduced LV function with EF of 28% at rest; with exercise left ventricular cavity size and LVEF did not change significantly.  Akinesis of the basal-mid inferior/inferoseptal/inferolateral segments present. No new stress induced regional wall motion abnormalities evident.  Less than average functional capacity for age. Exercise time ~2 minutes.     Biventricular dysfunction with moderate mitral and tricuspid regurgitation and evidence of hypervolemia noted on screening 2D and Doppler examination.        Aug 2019 RHC and coronary angiogram       Right sided filling pressures are severely elevated - mean RA 23, PCWP 31, mean PA 40     Moderately elevated pulmonary artery hypertension.    Left sided filling pressures are severely elevated.    Left ventricular filling pressures are severely elevated .    Reduced cardiac output level.    Heparin was held during the procedure due to thrombocytopenia    Nipride was started and titrate to 1mcg/kg/min. PCWP decreased from 30 mmHg to 24 mmHg. mPAP decreased from 40 mmHg to 30 mmHg. After pressures decreased team proceed with angiogram.    Mild LAD and LCx disease    Subtotal proximal RCA occlusion    No intervention performed    No mitral or aortic valve gradient     Assessment and Plan  Jonh is a 71 year old with -     1. Chronic Systolic Heart Failure (LVEF ~45% in 2022)  2. Permanent atrial fibrillation on warfarin  3. Ischemic heart disease - Subtotal  proximal RCA occlusion and mild LAD and LCx disease August 2019  4. History of VT/VF, s/p ICD  5. Hypertension   6. Relapsed multiple myeloma with mets to brain and bone s/p BMT, currently on carfilzomib, daratumumab, Pomalyst and dexa  7. T2DM  8. Hyperlipidemia   9.  Moderate to severe mitral regurgitation.      Jonh has chronic ischemic heart disease with moderate left ventricular dysfunction.  He denies anginal symptoms. His peripheral edema has improved.   I have advised him to continue the neurohormonal blockade with metoprolol, lisinopril.  I would like to increase the lisinopril dose to 2.5 mg twice daily (was on 5 mg twice daily).  His metoprolol dose is at 75 mg daily (was 100 mg /day).  I will reduce the Lasix dose from 40 mg to 20 mg daily given that he feels tired and his volume status has improved. He will continue spironolactone 12.5 mg daily and the low-dose digoxin which was started to optimize his heart failure regimen.  For the arrhythmias he is on mexiletine and warfarin.  His lipids are well managed on simvastatin.     Recommendations  Decrease Lasix to 20 mg daily  Increase lisinopril to 2.5 mg twice daily if systolic blood pressure greater than 130 mmHg  Continue metoprolol, digoxin, spironolactone  Follow-up visit with me in 3 months with lipid panel and transthoracic echocardiogram to assess mitral regurgitation     Video Visit Details:    Type of service:  Video Visit    Start: 02/07/2022 10:05 am  Stop: 02/07/2022 10:24 am    Originating Location (pt. Location): Home    Distant Location (provider location):  [facility]     Platform used for Video Visit: Korin Prince MD, MS  Professor of Medicine  Cardiovascular division      CC  Patient Care Team:  Cong Mcdonald as PCP - General  GinaWillian crandall MD as PCP - Internal Medicine  Rojas Raygoza MD as BMT Physician  Cong Mcdonald as Referring Physician  Yaz Jeong NP as Nurse Practitioner  (Nurse Practitioner)  [facility] as Family Medicine Physician  Ja Duarte MD as MD (Clinical Cardiac Electrophysiology)  Omayra Serra, RN as Specialty Care Coordinator (Cardiology)  Yoko Benson MD as Assigned Heart and Vascular Provider  Dara Arana, RN as Specialty Care Coordinator  Chaparrita Morrison RN as Specialty Care Coordinator (BMT - Adult)  YOKO BENSON

## 2022-02-07 NOTE — PROGRESS NOTES
Jonh is a 71 year old who is being evaluated via a billable video visit.      How would you like to obtain your AVS? MyChart  If the video visit is dropped, the invitation should be resent by: Send to e-mail at: [email or web address]  Will anyone else be joining your video visit? Yes: Wife Carla will be briefly be in for the visit.. How would they like to receive their invitation? Other e-mail: [email or web address] Visit Facilitator/MA.

## 2022-02-07 NOTE — NURSING NOTE
Chief Complaint   Patient presents with     Follow Up     6 month f/u. Pt states he had a 4-day period of being in the hospital with Influenza 12/28-1/02 at [facility] in [place]. 1/14 had an Echo at [facility] in [employer], he follows up with them on 2/11 doing another Echo. Pt reviewed medications via [employer], but states some of his medications have the incorrect dosages, please review.     Lei Zhu, Visit Facilitator/MA.

## 2022-02-07 NOTE — NURSING NOTE
Decrease lasix to 20 mg daily. Lisinopril 25 mg BID.     Follow up with Dr. Prince in 3 months with lipids and echo prior.     Al Phillips, RN   Cardiology Nurse Coordinator

## 2022-02-07 NOTE — LETTER
2/7/2022      RE: Erasmo Pearce  [address]  [address] [facility] [address]       Dear Colleague,    Thank you for the opportunity to participate in the care of your patient, Erasmo Pearce, at the [facility] HEART CLINIC [place] at [facility]. Please see a copy of my visit note below.    I had the pleasure of participating in coordination of clinical cardiovascular care for patient, Erasmo Pearce, via [facility]'s virtual visit protocol during the COVID-19 Pandemic.    History:     Jonh Pearce is a 71-year-old very pleasant gentleman with multiple myeloma s/p PBSCT in 2006, ICM/NICM (LVEF 30-35%, NYHA II Stage B) s/p ICD placement in 2009, episodic VT/VF treated by appropriate ICD shocks and afib (CHADVASC 3) on warfarin followed in Cardio-oncology clinic for cardiomyopathy.      Patient was diagnosed with multiple myeloma in 2006 and underwent chemotherapy and autologous PBSCT. He was started on lenalinomide in 2017 due to relapse and then bortezomib in 07/2019 due to progressive disease. He was hospitalized for acute on chronic systolic heart failure,in 2019 and bortezomib was stopped.  Carfilzomib and daratumumab were added to pomalidomide and dexamethasone in November 2021.     Patient was admitted for influenza infection and respiratory illness in December 2021. BNP level was 400-600. Troponin was negative.  His cardiac medications doses were reduced during his hospitalization for hypotension.  A subsequent transthoracic echocardiogram done at [facility] in January 2022 was notable for mildly reduced left ventricular function, moderate right ventricular dysfunction with moderate to severe mitral regurgitation in the setting of hypervolemia.     Patient continues to make good progress.  He has not had any worsening chest pain or dyspnea.  His wife Carla mentions that his peripheral edema is minimal. He feels tired overall. He  denies orthopnea, PND. He received 1 appropriate shock for VT at 246 bpm on 11/13/2021.     PAST MEDICAL HISTORY:  Past Medical History:   Diagnosis Date     Chronic systolic heart failure (H)      Ischemic cardiomyopathy      Other premature beats      Permanent atrial fibrillation (H)      Personal history of multiple myeloma      VF (ventricular fibrillation) (H)        CURRENT MEDICATIONS:  Current Outpatient Medications   Medication Sig Dispense Refill     acyclovir (ZOVIRAX) 400 MG tablet Take 1 tablet (400 mg) by mouth every 12 hours 60 tablet 3     albuterol (PROAIR HFA, PROVENTIL HFA, VENTOLIN HFA) 108 (90 BASE) MCG/ACT inhaler Inhale 2 puffs into the lungs every 6 hours as needed  (Patient not taking: Reported on 2/1/2022)       amoxicillin (AMOXIL) 500 MG tablet Take 4 tabs one hour before dental appointment (Patient not taking: Reported on 11/2/2021) 4 tablet 5     baclofen (LIORESAL) 10 MG tablet Take 10 mg by mouth 3 times daily as needed prn (Patient not taking: Reported on 1/11/2022)       Calcium Carbonate-Vitamin D (CALCIUM 500 + D PO) Take 2 tablets by mouth daily.       dexamethasone (DECADRON) 4 MG tablet Take  5 tabs Days 1,8, and 15 each cycle 15 tablet 4     diclofenac (VOLTAREN) 75 MG EC tablet Take 75 mg by mouth 2 times daily as needed 1 tab prn (Patient not taking: Reported on 1/11/2022)       digoxin (LANOXIN) 125 MCG tablet Take 1 tablet (125 mcg) by mouth daily 30 tablet 1     fentaNYL (DURAGESIC) 25 mcg/hr patch 72 hr Place 1 patch onto the skin every 72 hours       fluticasone (FLONASE) 50 MCG/ACT nasal spray Spray 1 spray into both nostrils daily       furosemide (LASIX) 20 MG tablet Take 1 tablet (20 mg) by mouth daily (Patient taking differently: Take 20 mg by mouth daily Taking 40 mg) 90 tablet 3     hydrocortisone 2.5 % cream  (Patient not taking: Reported on 1/25/2022)       lisinopril (PRINIVIL/ZESTRIL) 5 MG tablet Take 1 tablet (5 mg) by mouth 2 times daily (Patient taking  differently: Take 5 mg by mouth 2 times daily Taking 2.5 mg daily (1/2 tablet in morning)) 60 tablet 1     Magnesium Oxide 420 MG TABS Take 3 tabs (1260 mg) three times a day. (Patient taking differently: 420 mg Take 9 tabs  daily) 810 tablet 3     metoprolol succinate ER (TOPROL-XL) 100 MG 24 hr tablet Take 1 tablet (100 mg) by mouth daily (Patient taking differently: Take 100 mg by mouth daily 75 mg daily) 90 tablet 3     mexiletine (MEXITIL) 150 MG capsule Take 1 capsule (150 mg) by mouth 2 times daily As close to 12 hours apart as possible 180 capsule 0     omeprazole (PRILOSEC) 20 MG capsule Take 20 mg by mouth daily  90 capsule 3     ondansetron (ZOFRAN-ODT) 4 MG ODT tab Take 4 mg by mouth every 6 hours as needed for nausea        oxycodone (ROXICODONE) 5 MG immediate release tablet Take 1-2 tablets by mouth every 6 hours as needed. For pain.        pomalidomide (POMALYST) 4 MG capsule Take 1 capsule (4 mg) by mouth daily Day 1 thru 14 of each 28 day cycle. Administer with or without food. Swallow whole with water. (Patient not taking: Reported on 1/25/2022) 14 capsule 3     potassium chloride ER (KLOR-CON M) 10 MEQ CR tablet Take 1 tablet (10 mEq) by mouth every other day 90 tablet 3     simvastatin (ZOCOR) 40 MG tablet Take 1 tablet (40 mg) by mouth At Bedtime 90 tablet 0     spironolactone (ALDACTONE) 25 MG tablet Take 0.5 tablets (12.5 mg) by mouth daily (Patient not taking: Reported on 1/11/2022) 15 tablet 1     tiotropium (SPIRIVA) 18 MCG inhalation capsule Inhale 1 capsule (18 mcg) into the lungs daily 30 capsule 3     TRAZODONE HCL PO Take 50 mg by mouth nightly as needed  (Patient not taking: Reported on 12/21/2021)       warfarin (COUMADIN) 5 MG tablet Take 7.5 mg by mouth See Admin Instructions Take 1.5 tab by mojth Monday and Friday and 1 tab Sun Tues WED , Thur and Sat         PAST SURGICAL HISTORY:  Past Surgical History:   Procedure Laterality Date     CV CORONARY ANGIOGRAM N/A 8/29/2019     Procedure: CV CORONARY ANGIOGRAM;  Surgeon: Giorgi Milan MD;  Location:  HEART CARDIAC CATH LAB     CV LEFT HEART CATH N/A 2019    Procedure: Left Heart Cath;  Surgeon: Giorgi Milan MD;  Location:  HEART CARDIAC CATH LAB     CV RIGHT HEART CATH MEASUREMENTS RECORDED N/A 2019    Procedure: CV RIGHT HEART CATH;  Surgeon: Giorgi Milan MD;  Location:  HEART CARDIAC CATH LAB     CV RIGHT HEART EXERCISE STRESS STUDY N/A 2019    Procedure: Stress Drug Study;  Surgeon: Giorgi Milan MD;  Location:  HEART CARDIAC CATH LAB     IMPLANT AUTOMATIC IMPLANTABLE CARDIOVERTER DEFIBRILLATOR         ALLERGIES:     Allergies   Allergen Reactions     Nkda [No Known Drug Allergies]      Sotalol Other (See Comments)     Other reaction(s): Low blood pressure, Prolonged QT interval       FAMILY HISTORY:  - Premature coronary artery disease      SOCIAL HISTORY:  Social History     Tobacco Use     Smoking status: Former Smoker     Packs/day: 1.00     Years: 25.00     Pack years: 25.00     Types: Cigarettes     Quit date: 10/15/1995     Years since quittin.3     Smokeless tobacco: Never Used   Substance Use Topics     Alcohol use: No     Drug use: No       ROS:   ROS: A comprehensive 10-point review of system is negative except for those reported in the HPI.    Exam:    Exam: limited due to the nature of this visit   In general, the patient is in no acute distress.    Breathing is unlabored.   HEENT: Sclerae white, not injected.    Neck: No jugular venous distension.    Extremities: mild edema, per patient.   Neurologic: Alert and oriented to person/place/time, normal speech and affect  Skin: No rash on exposed skin      Labs:    Chemistry panel: Recent Labs   Lab Test 22  0943 22  1000    144   POTASSIUM 3.8 3.9   CHLORIDE 107 106   CO2 28 30   ANIONGAP 7 8   * 113*   BUN 12 17   CR 1.01 1.08   EILEEN 8.9 7.9*   MAG 1.5* 1.1*   GFRESTIMATED 80 74   AST 14 17   ALT 19  "32       CBC:   Recent Labs   Lab Test 02/01/22  0943 01/25/22  1000   WBC 6.0 3.8*   RBC 2.76* 2.30*   HGB 9.3* 8.1*   HCT 29.7* 25.9*   * 113*   MCH 33.7* 35.2*   MCHC 31.3* 31.3*   RDW 20.9* 20.1*    231       Lipid Panel:  Recent Labs   Lab Test 03/20/19  0000   CHOL 113   HDL 42   LDL 52   TRIG 93       Thyroid:   TSH   Date Value Ref Range Status   03/20/2019 0.62 0.47 - 5.00 mIU/mL Final     T4 Free   Date Value Ref Range Status   09/18/2007 1.18 0.70 - 1.85 ng/dL Final     Hemoglobin A1C POCT   Date Value Ref Range Status   08/29/2019 6.3 (H) 0 - 5.6 % Final     Comment:     Normal <5.7% Prediabetes 5.7-6.4%  Diabetes 6.5% or higher - adopted from ADA   consensus guidelines.       INR   Date Value Ref Range Status   02/01/2022 1.88 (H) 0.85 - 1.15 Final   04/27/2021 2.27 (H) 0.86 - 1.14 Final     Digoxin level December 2021 - 0.59  BNP level 401    Cardiac testing :    Echo 1/14/2022 ([facility])  Mildly reduced LV function, LVEF 45-50%  Moderately reduced RV function.  Moderate TR  Moderate to severe MR  Mean RA pressure 8   PASP 50 mmHg    11/2021 device interrogation  Device: [employer] E102 TELIGEN 100  Normal Device Function  Mode: VVI 40 bpm  : 13%   Presenting EGM: VS ~ 80 bpm w/ occ PVC's   Lead Trends Appear Stable: yes  Estimated battery longevity to RRT = 10 months.    Anticoagulant: coumadin  Ventricular Arrhythmia: Since last remote transmission there has been 1 treated episode in the VZ zone and 20 NSVT episodes. Treated episode occurred on 11-13-21, EGM displays VT @ 246 bpm that is converted with one 41 j shock.   Pt Notified of Transmission Results: yes, pt reports he was resting on the couch and was half asleep. He reports he sat up because he felt a little \"silly\" and then was shocked. He reports he is feeling well now and felt fine after the shock. Pt did not page the device RN on call. Pt reports the only thing new is he has had some changes to his Chemo regimen on " November 2nd.       EKG 2/24/2020 afib, slow ventricular response q waves on inferior leads       Echo:  10/26/21  Left ventricular function is decreased. The ejection fraction is 35-40% (moderately reduced).  Moderate right ventricular dilation is present. Global right ventricular function is mildly to moderately reduced.  Moderate mitral insufficiency is present. Moderate to severe tricuspid insufficiency is present.  Dilation of the inferior vena cava is present with abnormal respiratory variation in diameter.  No pericardial effusion is present.    Stress echo 7.30.19   Submaximal stress test, achieved 74% of the age predicted maximal heart rate.  Limiting symptom fatigue.   Normal blood pressure response to exercise.  No angina symptoms with exercise.  Baseline rhythm is atrial fibrillation. No ECG evidence of ischemia. There is no chronotropic incompetence.  Severely reduced LV function with EF of 28% at rest; with exercise left ventricular cavity size and LVEF did not change significantly.  Akinesis of the basal-mid inferior/inferoseptal/inferolateral segments present. No new stress induced regional wall motion abnormalities evident.  Less than average functional capacity for age. Exercise time ~2 minutes.     Biventricular dysfunction with moderate mitral and tricuspid regurgitation and evidence of hypervolemia noted on screening 2D and Doppler examination.        Aug 2019 RHC and coronary angiogram       Right sided filling pressures are severely elevated - mean RA 23, PCWP 31, mean PA 40     Moderately elevated pulmonary artery hypertension.    Left sided filling pressures are severely elevated.    Left ventricular filling pressures are severely elevated .    Reduced cardiac output level.    Heparin was held during the procedure due to thrombocytopenia    Nipride was started and titrate to 1mcg/kg/min. PCWP decreased from 30 mmHg to 24 mmHg. mPAP decreased from 40 mmHg to 30 mmHg. After pressures decreased  team proceed with angiogram.    Mild LAD and LCx disease    Subtotal proximal RCA occlusion    No intervention performed    No mitral or aortic valve gradient     Assessment and Plan  Jonh is a 71 year old with -     1. Chronic Systolic Heart Failure (LVEF ~45% in 2022)  2. Permanent atrial fibrillation on warfarin  3. Ischemic heart disease - Subtotal proximal RCA occlusion and mild LAD and LCx disease August 2019  4. History of VT/VF, s/p ICD  5. Hypertension   6. Relapsed multiple myeloma with mets to brain and bone s/p BMT, currently on carfilzomib, daratumumab, Pomalyst and dexa  7. T2DM  8. Hyperlipidemia   9.  Moderate to severe mitral regurgitation.      Jonh has chronic ischemic heart disease with moderate left ventricular dysfunction.  He denies anginal symptoms. His peripheral edema has improved.   I have advised him to continue the neurohormonal blockade with metoprolol, lisinopril.  I would like to increase the lisinopril dose to 2.5 mg twice daily (was on 5 mg twice daily).  His metoprolol dose is at 75 mg daily (was 100 mg /day).  I will reduce the Lasix dose from 40 mg to 20 mg daily given that he feels tired and his volume status has improved. He will continue spironolactone 12.5 mg daily and the low-dose digoxin which was started to optimize his heart failure regimen.  For the arrhythmias he is on mexiletine and warfarin.  His lipids are well managed on simvastatin.     Recommendations  Decrease Lasix to 20 mg daily  Increase lisinopril to 2.5 mg twice daily if systolic blood pressure greater than 130 mmHg  Continue metoprolol, digoxin, spironolactone  Follow-up visit with me in 3 months with lipid panel and transthoracic echocardiogram to assess mitral regurgitation     Video Visit Details:    Type of service:  Video Visit    Start: 02/07/2022 10:05 am  Stop: 02/07/2022 10:24 am    Originating Location (pt. Location): Home    Distant Location (provider location):  [facility]      Platform used for Video Visit: Korin Prince MD, MS  Professor of Medicine  Cardiovascular division      CC  Patient Care Team:  Cong Mcdonald as PCP - General  [facility], Willian BHATIA MD as PCP - Internal Medicine  Rojas Raygoza MD as BMT Physician  Yaz Jeong NP as Nurse Practitioner (Nurse Practitioner)  [facility], Eligio as Family Medicine Physician  Ja Duarte MD as MD (Clinical Cardiac Electrophysiology)  Omayra Serra, RN as Specialty Care Coordinator (Cardiology)  Yoko Prince MD as Assigned Heart and Vascular Provider  Dara Arana, RN as Specialty Care Coordinator  Chaparrita Morrison RN as Specialty Care Coordinator (BMT - Adult)

## 2022-02-07 NOTE — PATIENT INSTRUCTIONS
Cardiology Providers you saw during your visit:  Dr. Prince    Medication changes:     Decrease Lasix dose to 20 mg daily     Lisinopril 2.5 mg twice daily     Follow up: with Dr. Prince in 3 months. Please check labs and get echocardiogram done prior to this appointment, same day is okay.     Follow the American Heart Association Diet and Lifestyle recommendations:  Limit saturated fat, trans fat, sodium, red meat, sweets and sugar-sweetened beverages. If you choose to eat red meat, compare labels and select the leanest cuts available.  Aim for at least 150 minutes of moderate physical activity or 75 minutes of vigorous physical activity - or an equal combination of both - each week.      If you have any questions, call  Al Phillips RN, at [phone].  Press Option #1 for the [facility], and then press Option #4  We are encouraging the use of Richard Pauer - 3P to communicate with your HealthCare Provider      After hours, weekends or holidays: On Call Cardiologist- [phone] option #4 and ask to speak to the on-call Cardiologist.

## 2022-02-09 NOTE — TELEPHONE ENCOUNTER
Oral Chemotherapy Monitoring Program    Medication: Pomalyst  Rx:  4mg PO daily 14/28 ds    Auth #: [id number]  Risk Category: Adult male    Routine survey questions reviewed. yes        Danitza Boykin   [facility] Liaison, alpha split P[facility]  Phone: [phone]  Fax: [phone]  Email: [email or web address]

## 2022-02-15 NOTE — LETTER
2/15/2022     RE: Erasmo Pearce  [address]  [address]  [facility] [address]    Dear Colleague,    Thank you for referring your patient, Erasmo Pearce, to the [facility] BLOOD AND MARROW TRANSPLANT PROGRAM [place]. Please see a copy of my visit note below.    BONE MARROW CLINIC VISIT       Jonh returns to the hematology clinic for treatment of relapsed multiple myeloma Jonh has completed 2 cycles of daratumumab 1400 mg weekly Dex 20 mg on days 1 8 and 15 and pomalidomide 4 mg on days 1 through 14 and Decadron on days 1-9 16 and 23 he did have some cytopenias with thrombocytopenia with his platelet count falling into the 80s he otherwise is tolerated this very well He received Day 1 of Cycle 3 on Dec 28 with Jenny, Kyprolis Pomalidamide and Dex. He felt fatigued with cough and SOB. COVID negative but hospitalized.in [place] 12/30/21-1/2/22 with Influenza A with acute respiratory failure and hypoxia. Treated with Tamiflu, rocephin and azithro for sinusitus, treated for hypotension and CHF,treated for COPD with dexamethasone.     INTERVAL HISTORY  Jonh is feeling better,he now is on lisinopril 5mg/d, spironolactone 12.5m, lasix 20mg and this has resulted in improved breathing and exercise tolerance. Has mor energy , appetite good, and even played drums lately.   No fever or chills no cough no headache.He is her for next cycle,4.. Received Evusheld on 1/11.  Remains on warfarin 5mg daily .    PAST MEDICAL HISTORY, SOCIAL HISTORY AND FAMILY HISTORY:  See my note from 01/2022.      REVIEW OF SYSTEMS:  A 12 point review of systems done is negative as in HPI.      PHYSICAL EXAMINATION: /69 (BP Location: Right arm, Patient Position: Sitting, Cuff Size: Adult Regular)   Pulse 62   Temp 97.7  F (36.5  C) (Oral)   Resp 16   Wt 86.8 kg (191 lb 6.4 oz)   SpO2 99%   BMI 27.87 kg/m        GENERAL:  He is an alert gentleman in no acute distress.   VITAL SIGNS:  Stable.   HEENT:   Normocephalic.  EOM okay.  Mouth without lesion.   RESPIRATORY:  Clear to percussion and auscultation. No rales.   CARDIAC:  Irregularly irregular rhythm.  No S3, S4 or murmur.   ABDOMEN:  Soft without hepatosplenomegaly.   EXTREMITIES:  +3 edema bilaterally   SKIN 7mm oblong brownish pink macule left subscapular area  Recent Labs   Results for FRANDY ZAPATA (MRN [id number]) as of 2/15/2022 09:27   Ref. Range 2/15/2022 09:13   WBC Latest Ref Range: 4.0 - 11.0 10e3/uL 8.9   Hemoglobin Latest Ref Range: 13.3 - 17.7 g/dL 10.2 (L)   Hematocrit Latest Ref Range: 40.0 - 53.0 % 32.2 (L)   Platelet Count Latest Ref Range: 150 - 450 10e3/uL 154   RBC Count Latest Ref Range: 4.40 - 5.90 10e6/uL 2.97 (L)   MCV Latest Ref Range: 78 - 100 fL 108 (H)   MCH Latest Ref Range: 26.5 - 33.0 pg 34.3 (H)   MCHC Latest Ref Range: 31.5 - 36.5 g/dL 31.7   RDW Latest Ref Range: 10.0 - 15.0 % 18.6 (H)   % Neutrophils Latest Units: % 77   % Lymphocytes Latest Units: % 11   % Monocytes Latest Units: % 10   % Eosinophils Latest Units: % 2   % Basophils Latest Units: % 0   Absolute Basophils Latest Ref Range: 0.0 - 0.2 10e3/uL 0.0   Absolute Eosinophils Latest Ref Range: 0.0 - 0.7 10e3/uL 0.1   Absolute Immature Granulocytes Latest Ref Range: <=0.4 10e3/uL 0.0   Absolute Lymphocytes Latest Ref Range: 0.8 - 5.3 10e3/uL 1.0   Absolute Monocytes Latest Ref Range: 0.0 - 1.3 10e3/uL 0.9   % Immature Granulocytes Latest Units: % 0   Absolute Neutrophils Latest Ref Range: 1.6 - 8.3 10e3/uL 6.9   Absolute NRBCs Latest Units: 10e3/uL 0.0   NRBCs per 100 WBC Latest Ref Range: <1 /100 0       ASSESSMENT  1.  Multiple myeloma. In relapse  2.  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   3.  Cardiomyopathy with CHF.   4.  Atrial fibrillation.   5.  History of deep vein thrombosis, on warfarin.   6.  Chronic back pain.   7.  Aortic aneurysm.   8.  History of plasmacytoma of the clivus, status post radiation.   9.   History of ventricular tachycardia with implantable defibrillator in place.   10.  Hypomagnesemia.     11  Hyperkalemia   12. Skin lesion   13. Influenza A    So glad Jonh saw Dr Mcneal and his CHF improved. His lambda light chains are down to 12.5! Will plan two more cycles of chemo and then consider maintenance kenton  Will check Mg and Cr    Cycle 4   Carfilzomib Day 1, 8  and Day 15   Darzalex and dexamethasone 20mg on days 1, 15.  Pomalidomide Days 1-14    RTC    Infusions 2/22 and March 1 March 15 for carfilzomib and kenton     Follow up Dr HILL March 15    I spent a total of 40  minutes face to face with Erasmo Pearce during today's office visit. Over 50% of this time was spent counseling the patient and coordinating the care regarding multiple myeloma and 10minutes preparing to see the patient and  care coordination     Rojas Raygoza MD  [phone]

## 2022-02-15 NOTE — PROGRESS NOTES
Infusion Nursing Note:  Erasmo BLACKBURN Soniabradford presents today for scheduled infusion.    Patient seen by provider today: Yes: Dr. Raygoza    present during visit today: Not Applicable.    Note: Patient arrived for scheduled Jenny/Kyprolis infusion. Premedicated with Tylenol 650 mg PO and Benadryl 50 mg PO. Patient took Decadron 20 mg PO as ordered.     Received Darazalex 1800 mg subcutaneous injection to right lower abdomen and Kyprolis 20 mg IV over 10 minutes. D5W Pre and post flush given.     Intravenous Access:  Peripheral IV placed.    Treatment Conditions:  Results reviewed, labs MET treatment parameters, ok to proceed with treatment.      Post Infusion Assessment:  Patient tolerated infusion without incident.  Patient tolerated injection without incident.       Discharge Plan:   Patient discharged in stable condition accompanied by: wife.  Departure Mode: Ambulatory.      Laura Almaraz RN

## 2022-02-15 NOTE — NURSING NOTE
"Oncology Rooming Note    February 15, 2022 9:21 AM   Erasmo Pearce is a 71 year old male who presents for:    Chief Complaint   Patient presents with     Oncology Clinic Visit     Multiple myeloma      Blood Draw     Labs drawn via piv by rn in lab. VS taken.     Initial Vitals: /69 (BP Location: Right arm, Patient Position: Sitting, Cuff Size: Adult Regular)   Pulse 62   Temp 97.7  F (36.5  C) (Oral)   Resp 16   Wt 86.8 kg (191 lb 6.4 oz)   SpO2 99%   BMI 27.87 kg/m   Estimated body mass index is 27.87 kg/m  as calculated from the following:    Height as of 11/2/21: 1.765 m (5' 9.49\").    Weight as of this encounter: 86.8 kg (191 lb 6.4 oz). Body surface area is 2.06 meters squared.  No Pain (0) Comment: Data Unavailable   No LMP for male patient.  Allergies reviewed: Yes  Medications reviewed: Yes    Medications: Medication refills not needed today.  Pharmacy name entered into NERI:    [facility] [place] - [address] [facility] - [place], [place] - [address]  [facility] [place] [place] - [address] [facility] [facility]    Clinical concerns:     Pt wondering when he'll need to bring his next collection jug.     Pt's leg swelling is much better.    Pt's transesophageal echo was cancelled until May/June by Dr. Niki Morales            "

## 2022-02-15 NOTE — NURSING NOTE
Chief Complaint   Patient presents with     Oncology Clinic Visit     Multiple myeloma      Blood Draw     Labs drawn via piv by rn in lab. VS taken.     Labs drawn from PIV placed by RN. Line flushed with saline. Vitals taken. Pt checked in for appointment(s).    Hernando Wallis RN

## 2022-02-15 NOTE — LETTER
2/15/2022         RE: Erasmo Pearce  [address]  [address] [facility] [address]        Dear Colleague,    Thank you for referring your patient, Erasmo Pearce, to the [facility] BLOOD AND MARROW TRANSPLANT PROGRAM [place]. Please see a copy of my visit note below.    Infusion Nursing Note:  Erasmo Pearce presents today for scheduled infusion.    Patient seen by provider today: Yes: Dr. Raygoza    present during visit today: Not Applicable.    Note: Patient arrived for scheduled Jenny/Kyprolis infusion. Premedicated with Tylenol 650 mg PO and Benadryl 50 mg PO. Patient took Decadron 20 mg PO as ordered.     Received Darazalex 1800 mg subcutaneous injection to right lower abdomen and Kyprolis 20 mg IV over 10 minutes. D5W Pre and post flush given.     Intravenous Access:  Peripheral IV placed.    Treatment Conditions:  Results reviewed, labs MET treatment parameters, ok to proceed with treatment.      Post Infusion Assessment:  Patient tolerated infusion without incident.  Patient tolerated injection without incident.       Discharge Plan:   Patient discharged in stable condition accompanied by: wife.  Departure Mode: Ambulatory.      Laura Almaraz RN                          Again, thank you for allowing me to participate in the care of your patient.        Sincerely,        [facility]

## 2022-02-15 NOTE — PROGRESS NOTES
BONE MARROW CLINIC VISIT       Jonh returns to the hematology clinic for treatment of relapsed multiple myeloma Jonh has completed 2 cycles of daratumumab 1400 mg weekly Dex 20 mg on days 1 8 and 15 and pomalidomide 4 mg on days 1 through 14 and Decadron on days 1-9 16 and 23 he did have some cytopenias with thrombocytopenia with his platelet count falling into the 80s he otherwise is tolerated this very well He received Day 1 of Cycle 3 on Dec 28 with Jenny, Kyprolis Pomalidamide and Dex. He felt fatigued with cough and SOB. COVID negative but hospitalized.in [place] 12/30/21-1/2/22 with Influenza A with acute respiratory failure and hypoxia. Treated with Tamiflu, rocephin and azithro for sinusitus, treated for hypotension and CHF,treated for COPD with dexamethasone.     INTERVAL HISTORY  Jonh is feeling better,he now is on lisinopril 5mg/d, spironolactone 12.5m, lasix 20mg and this has resulted in improved breathing and exercise tolerance. Has mor energy , appetite good, and even played drums lately.   No fever or chills no cough no headache.He is her for next cycle,4.. Received Evusheld on 1/11.  Remains on warfarin 5mg daily .    PAST MEDICAL HISTORY, SOCIAL HISTORY AND FAMILY HISTORY:  See my note from 01/2022.      REVIEW OF SYSTEMS:  A 12 point review of systems done is negative as in HPI.      PHYSICAL EXAMINATION: /69 (BP Location: Right arm, Patient Position: Sitting, Cuff Size: Adult Regular)   Pulse 62   Temp 97.7  F (36.5  C) (Oral)   Resp 16   Wt 86.8 kg (191 lb 6.4 oz)   SpO2 99%   BMI 27.87 kg/m        GENERAL:  He is an alert gentleman in no acute distress.   VITAL SIGNS:  Stable.   HEENT:  Normocephalic.  EOM okay.  Mouth without lesion.   RESPIRATORY:  Clear to percussion and auscultation. No rales.   CARDIAC:  Irregularly irregular rhythm.  No S3, S4 or murmur.   ABDOMEN:  Soft without hepatosplenomegaly.   EXTREMITIES:  +3 edema bilaterally   SKIN 7mm oblong brownish pink macule  left subscapular area  Recent Labs   Results for JONH ZAPATA (MRN [id number]) as of 2/15/2022 09:27   Ref. Range 2/15/2022 09:13   WBC Latest Ref Range: 4.0 - 11.0 10e3/uL 8.9   Hemoglobin Latest Ref Range: 13.3 - 17.7 g/dL 10.2 (L)   Hematocrit Latest Ref Range: 40.0 - 53.0 % 32.2 (L)   Platelet Count Latest Ref Range: 150 - 450 10e3/uL 154   RBC Count Latest Ref Range: 4.40 - 5.90 10e6/uL 2.97 (L)   MCV Latest Ref Range: 78 - 100 fL 108 (H)   MCH Latest Ref Range: 26.5 - 33.0 pg 34.3 (H)   MCHC Latest Ref Range: 31.5 - 36.5 g/dL 31.7   RDW Latest Ref Range: 10.0 - 15.0 % 18.6 (H)   % Neutrophils Latest Units: % 77   % Lymphocytes Latest Units: % 11   % Monocytes Latest Units: % 10   % Eosinophils Latest Units: % 2   % Basophils Latest Units: % 0   Absolute Basophils Latest Ref Range: 0.0 - 0.2 10e3/uL 0.0   Absolute Eosinophils Latest Ref Range: 0.0 - 0.7 10e3/uL 0.1   Absolute Immature Granulocytes Latest Ref Range: <=0.4 10e3/uL 0.0   Absolute Lymphocytes Latest Ref Range: 0.8 - 5.3 10e3/uL 1.0   Absolute Monocytes Latest Ref Range: 0.0 - 1.3 10e3/uL 0.9   % Immature Granulocytes Latest Units: % 0   Absolute Neutrophils Latest Ref Range: 1.6 - 8.3 10e3/uL 6.9   Absolute NRBCs Latest Units: 10e3/uL 0.0   NRBCs per 100 WBC Latest Ref Range: <1 /100 0         ASSESSMENT  1.  Multiple myeloma. In relapse  2.  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   3.  Cardiomyopathy with CHF.   4.  Atrial fibrillation.   5.  History of deep vein thrombosis, on warfarin.   6.  Chronic back pain.   7.  Aortic aneurysm.   8.  History of plasmacytoma of the clivus, status post radiation.   9.  History of ventricular tachycardia with implantable defibrillator in place.   10.  Hypomagnesemia.     11  Hyperkalemia   12. Skin lesion   13. Influenza A    So glad Jonh saw Dr Mcneal and his CHF improved. His lambda light chains are down to 12.5! Will plan two more cycles of chemo and then  consider maintenance kenton  Will check Mg and Cr    Cycle 4   Carfilzomib Day 1, 8  and Day 15   Darzalex and dexamethasone 20mg on days 1, 15.  Pomalidomide Days 1-14             RTC    Infusions 2/22 and March 1 March 15 for carfilzomib and kenton       Follow up Dr HILL March 15          I spent a total of 40  minutes face to face with Erasmo Pearce during today's office visit. Over 50% of this time was spent counseling the patient and coordinating the care regarding multiple myeloma and 10minutes preparing to see the patient and  care coordination     Rojas Raygoza MD  [phone]

## 2022-02-23 NOTE — ORAL ONC MGMT
Oral Chemotherapy Monitoring Program  Lab Follow Up    Reviewed lab results from 2/23/22    ORAL CHEMOTHERAPY 12/9/2021 12/21/2021 1/10/2022 1/21/2022 1/30/2022 2/8/2022 2/23/2022   Assessment Type Monthly Follow up Lab Monitoring Other Incoming phone call Chart Review Refill Lab Monitoring   Diagnosis Code Multiple Myeloma Multiple Myeloma Multiple Myeloma Multiple Myeloma Multiple Myeloma Multiple Myeloma Multiple Myeloma   Providers Dr. Jaret Raygoza   [facility]   Drug Name Pomalyst (pomalidomide) Pomalyst (pomalidomide) Pomalyst (pomalidomide) Pomalyst (pomalidomide) Pomalyst (pomalidomide) Pomalyst (pomalidomide) Pomalyst (pomalidomide)   Dose 4 mg 4 mg 4 mg 4 mg 4 mg 4 mg 4 mg   Current Schedule Daily Daily Daily Daily Daily Daily Daily   Cycle Details 2 weeks on, 2 weeks off 2 weeks on, 2 weeks off 2 weeks on, 2 weeks off 2 weeks on, 2 weeks off Drug on Hold 2 weeks on, 2 weeks off 2 weeks on, 2 weeks off   Start Date of Last Cycle 11/30/2021 - - - - - -   Planned next cycle start date 12/28/2021 - - - - 2/15/2022 -   Doses missed in last 2 weeks 0 - - - - - -   Adherence Assessment Adherent - - - - - -   Adverse Effects No AE identified during assessment - - - - - -   Myalgias/Arthralgias - - - - - - -   Pharmacist Intervention(myalgias/arthralgias) - - - - - - -   Fatigue - - - - - - -   Pharmacist Intervention(fatigue) - - - - - - -   Pharmacist intervention(other) - - - - - - -   Home BPs - - - - - - -   Any new drug interactions? No - - - - - -   Pharmacist Intervention? - - - - - - -   Intervention(s) - - - - - - -   Is the dose as ordered appropriate for the patient? Yes - - - - - -   Has the patient been assessed within the past 6 months for depression? - - - - - - -   Has the patient missed any days of school, work, or other routine activity?  - - - - - - -       Labs:  _  Result Component Current Result Ref Range   Sodium 140 (2/23/2022) 133 - 144 mmol/L     _  Result Component Current Result Ref Range   Potassium 4.6 (2/23/2022) 3.4 - 5.3 mmol/L     _  Result Component Current Result Ref Range   Calcium 8.4 (L) (2/23/2022) 8.5 - 10.1 mg/dL     _  Result Component Current Result Ref Range   Magnesium 1.4 (L) (2/15/2022) 1.8 - 2.6 mg/dL     No results found for Phos within last 30 days.     _  Result Component Current Result Ref Range   Albumin 3.4 (2/23/2022) 3.4 - 5.0 g/dL     _  Result Component Current Result Ref Range   Urea Nitrogen 20 (2/23/2022) 7 - 30 mg/dL     _  Result Component Current Result Ref Range   Creatinine 1.13 (2/23/2022) 0.66 - 1.25 mg/dL     _  Result Component Current Result Ref Range   AST 16 (2/23/2022) 0 - 45 U/L     _  Result Component Current Result Ref Range   ALT 19 (2/23/2022) 0 - 70 U/L     _  Result Component Current Result Ref Range   Bilirubin Total 1.0 (2/23/2022) 0.2 - 1.3 mg/dL     _  Result Component Current Result Ref Range   WBC Count 6.1 (2/23/2022) 4.0 - 11.0 10e3/uL     _  Result Component Current Result Ref Range   Hemoglobin 9.7 (L) (2/23/2022) 13.3 - 17.7 g/dL     _  Result Component Current Result Ref Range   Platelet Count 98 (L) (2/23/2022) 150 - 450 10e3/uL     No results found for ANC within last 30 days.     _  Result Component Current Result Ref Range   Absolute Neutrophils 4.8 (2/23/2022) 1.6 - 8.3 10e3/uL        Assessment & Plan:  No concerning abnormalities. Continue pomalidomide as prescribed.     Follow-Up:  3/1/22 lab and infusion appointments scheduled    Xiao Chino, PharmD, BCPS  Oral Chemotherapy Monitoring Program  [facility]  [phone]  February 23, 2022

## 2022-02-23 NOTE — NURSING NOTE
Chief Complaint   Patient presents with     Blood Draw     Labs drawn via piv start by RN. Vitals taken.     Labs drawn from PIV placed by RN. Line flushed with saline. Vitals taken. Pt checked in for appointment(s).    Mickie Brambila RN

## 2022-02-23 NOTE — NURSING NOTE
Chief Complaint   Patient presents with     Blood Draw     Labs drawn via piv start by RN. Vitals taken.     Infusion     Scheduled Infusion Day 8 Cycle 4 Carfilzomib     Carfilzomib 20 mg in d5W 65 ml and D5 500 ml pre/post flush

## 2022-02-23 NOTE — NURSING NOTE
"Oncology Rooming Note    February 23, 2022 10:15 AM   Erasmo Pearce is a 71 year old male who presents for:    Chief Complaint   Patient presents with     Blood Draw     Labs drawn via piv start by RN. Vitals taken.     Initial Vitals: /63 (BP Location: Right arm)   Pulse 78   Temp 97.5  F (36.4  C) (Oral)   Resp 16   Wt 89.4 kg (197 lb 1.6 oz)   SpO2 96%   BMI 28.70 kg/m   Estimated body mass index is 28.7 kg/m  as calculated from the following:    Height as of 11/2/21: 1.765 m (5' 9.49\").    Weight as of this encounter: 89.4 kg (197 lb 1.6 oz). Body surface area is 2.09 meters squared.  Mild Pain (2) Comment: Data Unavailable   No LMP for male patient.  Allergies reviewed: Yes  Medications reviewed: Yes    Medications: Medication refills not needed today.  Pharmacy name entered into "[employer]:    [facility] - [place], [place] - [address] [facility] - [place], [place] - [address] [place] [address]  [facility] [place] [facility] [address] [facility]    Clinical concerns: VSS     Rosenda Vázquez RN              "

## 2022-02-23 NOTE — PROGRESS NOTES
Infusion Nursing Note:  Erasmo Pearce presents today for Day 8 Cycle 4 Carfilzomib.    Patient seen by provider today: No   present during visit today: Not Applicable.    Note: parameters met. Dr. Raygoza paged regarding if daratumumab  was needed. Treatment plan was incorrect and patent per  V is to only get carfilzomib today      Intravenous Access:  Peripheral IV placed.    Treatment Conditions:  Pt received 250 ml per flush over 250 ml post flush over 30 min.Pt received 20 mg Carfilzomib per orders. Pt tolerated well. VSS stable.       Post Infusion Assessment:  Patient tolerated infusion without incident.       Discharge Plan:   Patient discharged in stable condition accompanied by: spouse.  PIV removed prior to discharge.       Rosenda Vázquez RN

## 2022-02-23 NOTE — LETTER
2/23/2022         RE: Erasmo Pearce  [address]  [address] [facility] [address]        Dear Colleague,    Thank you for referring your patient, Erasmo Pearce, to the [facility] BLOOD AND MARROW TRANSPLANT PROGRAM [place]. Please see a copy of my visit note below.    Infusion Nursing Note:  Erasmo Pearce presents today for Day 8 Cycle 4 Carfilzomib.    Patient seen by provider today: No   present during visit today: Not Applicable.    Note: parameters met. Dr. Raygoza paged regarding if daratumumab  was needed. Treatment plan was incorrect and patent per Dr. HILL is to only get carfilzomib today    Intravenous Access:  Peripheral IV placed.    Treatment Conditions:  Pt received 250 ml per flush over 250 ml post flush over 30 min.Pt received 20 mg Carfilzomib per orders. Pt tolerated well. VSS stable.     Post Infusion Assessment:  Patient tolerated infusion without incident.     Discharge Plan:   Patient discharged in stable condition accompanied by: spouse.  PIV removed prior to discharge.     Rosenda Vázquez RN              Again, thank you for allowing me to participate in the care of your patient.      Sincerely,    [facility]

## 2022-03-01 NOTE — PROGRESS NOTES
Infusion Nursing Note:  Erasmo Pearce presents today for day 15 cycle 4 daratumumab and kyprolis.    Patient seen by provider today: No   present during visit today: Not Applicable.    Note:     Received verbal order from Dr Raygoza to administer kyprolis and daratumumab even though platelets under parameters (59 today).    Pt received premeds of tylenol and benadryl, and received oral dexamethasone as ordered.    Pt received daratumumab 1800 mg by subcutaneous route to left lower abdomen.    Pt received kyprolis 20 mg over 10 minutes.  Received D5 preflush of 250 ml and post flush of 250 ml.        Intravenous Access:  Peripheral IV placed.    Treatment Conditions:  Labs were monitored.      Post Infusion Assessment:  Patient tolerated infusion without incident.  Patient tolerated injection without incident.  Blood return noted pre and post infusion.  No evidence of extravasations.  Access discontinued per protocol.       Discharge Plan:   Patient discharged in stable condition accompanied by: self and brother.  Departure Mode: Ambulatory.      Sandra Monroy RN

## 2022-03-01 NOTE — LETTER
3/1/2022         RE: Erasmo Pearce  [address]  [address] [facility] [address]        Dear Colleague,    Thank you for referring your patient, Erasmo Pearce, to the [facility] BLOOD AND MARROW TRANSPLANT PROGRAM [place]. Please see a copy of my visit note below.    Infusion Nursing Note:  Erasmo Pearce presents today for day 15 cycle 4 daratumumab and kyprolis.    Patient seen by provider today: No   present during visit today: Not Applicable.    Note:     Received verbal order from Dr Raygoza to administer kyprolis and daratumumab even though platelets under parameters (59 today).    Pt received premeds of tylenol and benadryl, and received oral dexamethasone as ordered.    Pt received daratumumab 1800 mg by subcutaneous route to left lower abdomen.    Pt received kyprolis 20 mg over 10 minutes.  Received D5 preflush of 250 ml and post flush of 250 ml.        Intravenous Access:  Peripheral IV placed.    Treatment Conditions:  Labs were monitored.      Post Infusion Assessment:  Patient tolerated infusion without incident.  Patient tolerated injection without incident.  Blood return noted pre and post infusion.  No evidence of extravasations.  Access discontinued per protocol.       Discharge Plan:   Patient discharged in stable condition accompanied by: self and brother.  Departure Mode: Ambulatory.      Sandra Monroy RN                          Again, thank you for allowing me to participate in the care of your patient.        Sincerely,        [facility]

## 2022-03-01 NOTE — PROGRESS NOTES
Oral Chemotherapy Monitoring Program  Lab Follow Up    Reviewed lab results from 3/1.    ORAL CHEMOTHERAPY 12/21/2021 1/10/2022 1/21/2022 1/30/2022 2/8/2022 2/23/2022 3/1/2022   Assessment Type Lab Monitoring Other Incoming phone call Chart Review Refill Lab Monitoring Lab Monitoring   Diagnosis Code Multiple Myeloma Multiple Myeloma Multiple Myeloma Multiple Myeloma Multiple Myeloma Multiple Myeloma Multiple Myeloma   Providers Dr. Jaret Raygoza   [facility]   Drug Name Pomalyst (pomalidomide) Pomalyst (pomalidomide) Pomalyst (pomalidomide) Pomalyst (pomalidomide) Pomalyst (pomalidomide) Pomalyst (pomalidomide) Pomalyst (pomalidomide)   Dose 4 mg 4 mg 4 mg 4 mg 4 mg 4 mg 4 mg   Current Schedule Daily Daily Daily Daily Daily Daily Daily   Cycle Details 2 weeks on, 2 weeks off 2 weeks on, 2 weeks off 2 weeks on, 2 weeks off Drug on Hold 2 weeks on, 2 weeks off 2 weeks on, 2 weeks off 2 weeks on, 2 weeks off   Start Date of Last Cycle - - - - - - 2/15/2022   Planned next cycle start date - - - - 2/15/2022 - 3/15/2022   Doses missed in last 2 weeks - - - - - - -   Adherence Assessment - - - - - - -   Adverse Effects - - - - - - -   Myalgias/Arthralgias - - - - - - -   Pharmacist Intervention(myalgias/arthralgias) - - - - - - -   Fatigue - - - - - - -   Pharmacist Intervention(fatigue) - - - - - - -   Pharmacist intervention(other) - - - - - - -   Home BPs - - - - - - -   Any new drug interactions? - - - - - - -   Pharmacist Intervention? - - - - - - -   Intervention(s) - - - - - - -   Is the dose as ordered appropriate for the patient? - - - - - - -   Has the patient been assessed within the past 6 months for depression? - - - - - - -   Has the patient missed any days of school, work, or other routine activity? - - - - - - -       Labs:  _  Result Component  Current Result Ref Range   Sodium 140 (3/1/2022) 133 - 144 mmol/L     _  Result Component Current Result Ref Range   Potassium 4.4 (3/1/2022) 3.4 - 5.3 mmol/L     _  Result Component Current Result Ref Range   Calcium 8.5 (3/1/2022) 8.5 - 10.1 mg/dL     _  Result Component Current Result Ref Range   Magnesium 1.6 (3/1/2022) 1.6 - 2.3 mg/dL     No results found for Phos within last 30 days.     _  Result Component Current Result Ref Range   Albumin 3.4 (3/1/2022) 3.4 - 5.0 g/dL     _  Result Component Current Result Ref Range   Urea Nitrogen 22 (3/1/2022) 7 - 30 mg/dL     _  Result Component Current Result Ref Range   Creatinine 1.10 (3/1/2022) 0.66 - 1.25 mg/dL     _  Result Component Current Result Ref Range   AST 11 (3/1/2022) 0 - 45 U/L     _  Result Component Current Result Ref Range   ALT 15 (3/1/2022) 0 - 70 U/L     _  Result Component Current Result Ref Range   Bilirubin Total 1.3 (3/1/2022) 0.2 - 1.3 mg/dL     _  Result Component Current Result Ref Range   WBC Count 5.2 (3/1/2022) 4.0 - 11.0 10e3/uL     _  Result Component Current Result Ref Range   Hemoglobin 9.4 (L) (3/1/2022) 13.3 - 17.7 g/dL     _  Result Component Current Result Ref Range   Platelet Count 59 (L) (3/1/2022) 150 - 450 10e3/uL     No results found for ANC within last 30 days.       Assessment & Plan:  Results are concerning for grade 2 thrombocytopenia. Per [employer], no changes in Pomalyst dosing needed at this time. Plan to continue Pomalyst 4mg daily for 2 weeks on/2 weeks off as prescribed. Pt should be starting his 2 weeks off today and will restart on 3/15 at which time labs will be rechecked and visit with Dr. Raygoza is scheduled for. [email or web address] message sent to patient regarding results and plan.     Follow-Up:  3/15 labs + Dr. Raygoza appt    Janina Lynne, PharmD, BCACP  Oral Chemotherapy Monitoring Program  [facility]  [phone]  March 1, 2022

## 2022-03-01 NOTE — NURSING NOTE
"Oncology Rooming Note    March 1, 2022 10:30 AM   Erasmo Pearce is a 71 year old male who presents for:    Chief Complaint   Patient presents with     Blood Draw     Labs drawn via piv by RN in lab.  VS taken     Chemotherapy     scheduled day 15 cycle 4 kyprolis and daratumumab.  history of multiple myeloma.     Initial Vitals: /57   Pulse 68   Temp 98.1  F (36.7  C) (Oral)   Resp 16   Wt 89.8 kg (198 lb)   SpO2 96%   BMI 28.83 kg/m   Estimated body mass index is 28.83 kg/m  as calculated from the following:    Height as of 11/2/21: 1.765 m (5' 9.49\").    Weight as of this encounter: 89.8 kg (198 lb). Body surface area is 2.1 meters squared.  No Pain (0) Comment: Data Unavailable   No LMP for male patient.  Allergies reviewed: Yes  Medications reviewed: Yes    Medications: Medication refills not needed today.  Pharmacy name entered into [facility]:    [facility] - [place], [place] - [address] [facility] - [place], [place] - [address] [place] [address]  [facility] [place] - [address]    Clinical concerns: none       Sandra Monroy RN              "

## 2022-03-03 NOTE — TELEPHONE ENCOUNTER
Called pt to discuss symptoms d/t recent ICD shocks.     Pt denies any changes in or worsening in HF symptoms other then noticing some worsening LE edema. He denies chest pain and SOB. Pt denies any significant weight gain at this time. States he is flexing up and down a few pounds but this is normal for him.     Dr. Prince is recommending we check BNP at this time. Pt reports understanding and will come in for those labs.     Al Phillips RN   Cardiology Nurse Coordinator

## 2022-03-08 NOTE — NURSING NOTE
Chief Complaint   Patient presents with     Consult     Transferred patient of Dr. Duarte. Pt states he has a pacemaker coming up in the future and may need a abttery replacement this summer. Pt reviewed medicaitons via [employer], pt states his Furosemide has been increased to 40mg by Dr. Prince, and has a flagged removal for Revlimid.     Lei Zhu, Visit Facilitator/MA.

## 2022-03-08 NOTE — PATIENT INSTRUCTIONS
Plan:     We will plan for upgrade to CRT-D in April. You will be contacted to schedule and provided with instructions.      Your Care Team:  EP Cardiology   Telephone Number     Suri Zurita RN [phone]     For scheduling appts or procedures:    Stephanie [place]   [phone]   For the Device Clinic (Pacemakers, ICDs, Loop Recorders)    During business hours: [phone]  After business hours:   [phone]- select option 4 and ask for job code [phone].       Cardiovascular Clinic:   [address] [place] [address]. [place] [address]      As always, Thank you for trusting us with your health care needs!

## 2022-03-08 NOTE — LETTER
3/8/2022      RE: Erasmo Pearce  [address]  [address] [facility] [address]       Dear Colleague,    Thank you for the opportunity to participate in the care of your patient, Erasmo Pearce, at the [facility] [place] at [facility]. Please see a copy of my visit note below.      Electrophysiology Video Clinic Note  HPI:   Jonh Pearce is a 71-year-old very pleasant gentleman with multiple myeloma s/p PBSCT in 2006, ICM/NICM (LVEF 30-35%, NYHA II Stage B) s/p [employer] ICD in 12/2009 initially for primary prevention, multiple appropriate shocks for VF (typically while undergoing chemotherapy) on mexiletine, and permanent afib (CHADVASC 3) on warfarin.  The patient presents to [facility] care. He was seen previously by Dr Duarte and Dr Jean-Baptiste. A device interrogation on 3/2/22 showed 4 VF recorded since 11/15/21. VF on 3/2/22 required ATPx1 and shock x1 (41J) for termination of arrhythmia. 2 VT recorded since 11/15/21. 10 nonsustV episodes recorded. RRT<3 monthns. He had another therapy in Nov 2021: Since last remote transmission there has been 1 treated episode in the VZ zone and 20 NSVT episodes. Treated episode occurred on 11-13-21, EGM displays VT @ 246 bpm that is converted with one 41 j shock.   Had a long run without VF before Nov 2021, > 1 year, started chemo June 2021, more aggressive chemo Nov 2021, still has one month left of aggressive chemo, low DLCO precluding amiodarone. His last ischemic workup was done in 2019: with a borderline echo stress test followed by an angiogram showing  of RCA and mild LAD and LCx disease. The last echocardiogram on record was done in Oct 2021 shooing an ejection fraction of 35-40%, Moderate right ventricular dilation, Moderate mitral insufficiency and Moderate to severe tricuspid insufficiency is present.      PAST MEDICAL HISTORY:  Past Medical History:   Diagnosis Date      Chronic systolic heart failure (H)      Ischemic cardiomyopathy      Other premature beats      Permanent atrial fibrillation (H)      Personal history of multiple myeloma      VF (ventricular fibrillation) (H)        CURRENT MEDICATIONS:  Current Outpatient Medications   Medication Sig Dispense Refill     acyclovir (ZOVIRAX) 400 MG tablet Take 1 tablet (400 mg) by mouth every 12 hours 60 tablet 3     albuterol (PROAIR HFA/PROVENTIL HFA/VENTOLIN HFA) 108 (90 Base) MCG/ACT inhaler Inhale 2 puffs into the lungs every 6 hours as needed for shortness of breath / dyspnea or wheezing 18 g      amoxicillin (AMOXIL) 500 MG tablet Take 4 tabs one hour before dental appointment (Patient not taking: Reported on 2/23/2022) 4 tablet 5     baclofen (LIORESAL) 10 MG tablet Take 1 tablet (10 mg) by mouth 3 times daily as needed for muscle spasms prn       Calcium Carbonate-Vitamin D (CALCIUM 500 + D PO) Take 2 tablets by mouth daily.       dexamethasone (DECADRON) 4 MG tablet Take  5 tabs Days 1,8, and 15 each cycle 15 tablet 4     diclofenac (VOLTAREN) 75 MG EC tablet Take 1 tablet (75 mg) by mouth 2 times daily as needed for moderate pain 1 tab prn       digoxin (LANOXIN) 125 MCG tablet Take 1 tablet (125 mcg) by mouth daily 30 tablet 1     fentaNYL (DURAGESIC) 25 mcg/hr patch 72 hr Place 1 patch onto the skin every 72 hours       fluticasone (FLONASE) 50 MCG/ACT nasal spray Spray 1 spray into both nostrils daily       furosemide (LASIX) 20 MG tablet Take 1 tablet (20 mg) by mouth daily 90 tablet 3     hydrocortisone 2.5 % cream Place rectally 2 times daily       LENalidomide (REVLIMID) 10 MG CAPS capsule Take 1 capsule (10 mg) by mouth daily Take on Days 1 through 14 of 28-day cycle. (Patient not taking: Reported on 2/23/2022) 14 capsule 0     lisinopril (ZESTRIL) 5 MG tablet Take 0.5 tablets (2.5 mg) by mouth 2 times daily 90 tablet 0     Magnesium Oxide 420 MG TABS Take 3 tabs (1260 mg) three times a day. (Patient taking  differently: 420 mg Take 9 tabs  daily) 810 tablet 3     metoprolol succinate ER (TOPROL-XL) 100 MG 24 hr tablet Take 1 tablet (100 mg) by mouth daily (Patient taking differently: Take 100 mg by mouth daily 75 mg daily) 90 tablet 3     mexiletine (MEXITIL) 150 MG capsule Take 1 capsule (150 mg) by mouth 2 times daily As close to 12 hours apart as possible 180 capsule 0     omeprazole (PRILOSEC) 20 MG capsule Take 20 mg by mouth daily  90 capsule 3     ondansetron (ZOFRAN-ODT) 4 MG ODT tab Take 4 mg by mouth every 6 hours as needed for nausea        oxycodone (ROXICODONE) 5 MG immediate release tablet Take 1-2 tablets by mouth every 6 hours as needed. For pain.        pomalidomide (POMALYST) 4 MG capsule Take 1 capsule (4 mg) by mouth daily Day 1 thru 14 of each 28 day cycle. Administer with or without food. Swallow whole with water. 14 capsule 0     pomalidomide (POMALYST) 4 MG capsule Take 1 capsule (4 mg) by mouth daily Day 1 thru 14 of each 28 day cycle. Administer with or without food. Swallow whole with water. 14 capsule 3     potassium chloride ER (KLOR-CON M) 10 MEQ CR tablet Take 1 tablet (10 mEq) by mouth every other day 90 tablet 3     simvastatin (ZOCOR) 40 MG tablet Take 1 tablet (40 mg) by mouth At Bedtime 90 tablet 0     spironolactone (ALDACTONE) 25 MG tablet Take 0.5 tablets (12.5 mg) by mouth daily 15 tablet 1     tiotropium (SPIRIVA) 18 MCG inhalation capsule Inhale 1 capsule (18 mcg) into the lungs daily 30 capsule 3     warfarin (COUMADIN) 5 MG tablet Take 7.5 mg by mouth See Admin Instructions Take 1.5 tab by mojth Monday and Friday and 1 tab Sun Tues WED , Thur and Sat         PAST SURGICAL HISTORY:  Past Surgical History:   Procedure Laterality Date     CV CORONARY ANGIOGRAM N/A 8/29/2019    Procedure: CV CORONARY ANGIOGRAM;  Surgeon: Giorgi Milan MD;  Location:  HEART CARDIAC CATH LAB     CV LEFT HEART CATH N/A 8/29/2019    Procedure: Left Heart Cath;  Surgeon: Giorgi Milan,  MD;  Location:  HEART CARDIAC CATH LAB     CV RIGHT HEART CATH MEASUREMENTS RECORDED N/A 2019    Procedure: CV RIGHT HEART CATH;  Surgeon: Giorgi Milan MD;  Location:  HEART CARDIAC CATH LAB     CV RIGHT HEART EXERCISE STRESS STUDY N/A 2019    Procedure: Stress Drug Study;  Surgeon: Giorgi Milan MD;  Location:  HEART CARDIAC CATH LAB     IMPLANT AUTOMATIC IMPLANTABLE CARDIOVERTER DEFIBRILLATOR         ALLERGIES:     Allergies   Allergen Reactions     Nkda [No Known Drug Allergies]      Sotalol Other (See Comments)     Other reaction(s): Low blood pressure, Prolonged QT interval       FAMILY HISTORY:  No family history on file.    SOCIAL HISTORY:  Social History     Tobacco Use     Smoking status: Former Smoker     Packs/day: 1.00     Years: 25.00     Pack years: 25.00     Types: Cigarettes     Quit date: 10/15/1995     Years since quittin.4     Smokeless tobacco: Never Used   Substance Use Topics     Alcohol use: No     Drug use: No       ROS:   A comprehensive 10 point review of systems negative other than as mentioned in HPI.    Labs:  Reviewed.     Testing/Procedures:  TTE 10/26/2021:  Interpretation Summary  Left ventricular function is decreased. The ejection fraction is 35-40% (moderately reduced).  Moderate right ventricular dilation is present. Global right ventricular function is mildly to moderately reduced.  Moderate mitral insufficiency is present. Moderate to severe tricuspid insufficiency is present.  Dilation of the inferior vena cava is present with abnormal respiratory variation in diameter.  No pericardial effusion is present.    CORS 19:     proximal RCA with bridging collaterals and collaterals from distal LAD reconstituting distal RCA system    Dist LM lesion is 40% stenosed.    Mild proximal / mid LAD and D1 disease    Mild proximal LCx disease    No intervention performed    Stress Echo 2019  Interpretation Summary  Submaximal stress test,  achieved 74% of the age predicted maximal heart rate.  Limiting symptom fatigue.  Normal blood pressure response to exercise.  No angina symptoms with exercise.  Baseline rhythm is atrial fibrillation. No ECG evidence of ischemia. There is no chronotropic incompetence.  Severely reduced LV function with EF of 28% at rest; with exercise left ventricular cavity size and LVEF did not change significantly.  Akinesis of the basal-mid inferior/inferoseptal/inferolateral segments present. No new stress induced regional wall motion abnormalities evident.  Less than average functional capacity for age. Exercise time ~2 minutes.    Assessment and Plan:   Jonh Pearce is a 71-year-old very pleasant gentleman with multiple myeloma s/p PBSCT in 2006, ICM/NICM (LVEF 30-35%, NYHA II Stage B) s/p [employer] ICD in 12/2009 initially for primary prevention, multiple appropriate shocks for VF (typically while undergoing chemotherapy) on mexiletine, and permanent afib (CHADVASC 3) on warfarin. He presents to establish care.     After review of his most recent episodes and the old ones that he had before, the common theme seems to be a fast VT or polymorphic VT triggered by preceding prolonged pause or bradycardia.  This is supported by the fact that these episodes happen mostly at night.  We could target a higher lower rate limit on the device but that would increase his V pacing percentage which is already at 12% while programmed at 40 bpm.  This would potentially increase his RV pacing percentage will be on 40%.  Atrial pacing is not possible given that he is in chronic A. fib.  Hence, the best solution would be to upgrade his device to a CRT-D system in his lower rate limit to 60 bpm or 70 bpm if needed down the road.  Since his generator battery is almost depleted and has less than 3 months on it, we could do a generator change at the same time.  We would like to time this procedure after his aggressive chemotherapy is  completed to avoid delaying and healing of the wound.  The aggressive chemotherapy is aim to finish at the end of March.  We could aim for April for the CRT-D upgrade and generator change.  We did explain to the patient the risks of the procedure that include pocket hematoma, hemothorax or pneumothorax, pericardial effusion and tamponade, infection.  There is a small chance that the CS lead could not be delivered in which case we could discuss a surgical LV lead.  For the time being we will reach out to Dr. Prince, his primary cardiologist, to ask her if she would be okay to stop the digoxin to decrease the chances of bradycardia at night.    Ander Jurado MD [facility]  Cardiology - Electrophysiology    Total time spent on patient visit, reviewing notes, imaging, labs, orders, and completing necessary documentation: 60 minutes.  >50% of visit spent on counseling patient and/or coordination of care        Please do not hesitate to contact me if you have any questions/concerns.     Sincerely,     Ander Jurado MD

## 2022-03-08 NOTE — TELEPHONE ENCOUNTER
Called patient   ATP x 1 on 3/3/22  NT pro BNP is ~6900  Has increased peripheral edema  Was advised to increase the lasix dose to 40 mg/d last week and edema has resolved now  He had an EP evaluation - talked about CRT-D and stopping digoxin. Ok to hold digoxin.       Yoko Prince MD

## 2022-03-08 NOTE — TELEPHONE ENCOUNTER
Oral Chemotherapy Monitoring Program    Medication:Pomalyst  Rx:  4mg PO daily 14/28 ds    Auth #: [id number]  Risk Category: Adult male    Routine survey questions reviewed. Yes, and helped complete patient survey        Danitza Chandler Pharmacy Liaison, [facility]  Phone: [phone]  Fax: [phone]  Email: [email or web address]

## 2022-03-08 NOTE — LETTER
April 20, 2022      TO: Erasmo Pearce  [address]  [address] [facility] [address]         Dear Erasmo,      You are scheduled for an upgrade to Cardiac Resynchronization Therapy Defibrillator (CRT-D).      You will need to undergo a COVID-19 PCR swab test within 4 days of procedure.   Visitor Policy: One visitor.    Below are instructions, if you have questions please contact our office.     1. You should arrive at the [facility]   ([address], [place]) to the [facility] Waiting Room at ___6:15 am________ on   _April 26, 2022___.  2. Do not eat for 8 hours prior to arrival, you can drink water up until 2 hours prior.  3. The morning of your procedure, you may take your scheduled medications with a sip of water - If you take a blood thinner, continue this medication (warfarin).  HOLD:  - Diuretic (if taking) day of for comfort   4. You will likely discharge the same day and need a .  5. Use the antibacterial wipes the night before and morning of your procedure. Follow the included instructions.           Post-Procedure Instructions  Wound Care  1. If no Dermabond has been applied to your incision: Keep your incision (surgery wound) dry for 3 days.  After 3 days, you may remove the outer bandage.  Keep the strips of tape on.  They will be removed at your clinic visit.  a. If Dermabond has been applied to your incision,  do not apply powder or lotion to the incision for 14 days. You may shower in the morning.  2. Check for signs of infection each day.  These include increased redness, swelling, drainage or a fever over 101 F (38.3 C).  Call us immediately if you see any of   these signs.  3. If there are no signs of infection, you may shower in 3 days.  Do not submerge the incision (in a bath tub, hot tub, or swimming pool) until fully healed.    Pain  1. You may have mild to moderate pain for 3 to 5 days.  Take acetaminophen (Tylenol) or ibuprofen (Advil) for the  "pain.  Call us if the pain is severe or lasts more than 5 days.  Activity  1. You should slowly go back to your normal activities after 24 hours.  Healing will take 4 to 6 weeks.  2. No driving for 3 days  3. Avoid climbing a ladder alone.  It is best to stay within 4 feet of the ground.  4. Avoid anything that may cause rough contact or a hard hit to your chest.  This includes football, hockey, and other contact sports.  5. Do not go swimming or boating alone.    6. FOR ATLEAST 4 WEEKS:  a. Do not raise your affected arm above your shoulder.  b. Do not use your affected arm to push, pull, or lift anything over 10 pounds.  c. Avoid repetitive upper body activities for 6 weeks (ie: golf, swimming, and weight lifting)    Follow Up Appointments Date & Time   7-10 day incision check with device clinic  May 3, 2022  8a   4 month follow up visit with ECHO, device clinic, & JEREMIAH CHACKO Aug 31, 2022  9:30 echo/ 10:30 device and 11 Dr. Jurado   If you have further questions, please utilize [employer] to contact us.   If your question concerns the above instructions, contact:  Suri Zurita RN   Electrophysiology Nurse Coordinator.  [phone]    If your question concerns the schedule/appointment times, contact:  [facility]   [phone]    Device Clinic (Pacemakers, Defibrillators, Loop Recorders)  [phone]      Preparing the Skin Before Surgery  Preparing or \"prepping\" skin before surgery can reduce the risk of infection at the surgical site. To make the process easier, this facility has chosen disposable cloths moistened with rinse-free 2% Chlorhexidine Gluconate antiseptic solution designed to reduce the bacteria on the skin. The steps below outline the prepping process and should be carefully followed.    Prep the skin at the following time(s):    - If you wish to shower, bathe or shampoo your hair, do so the night before and prep your skin afterwards for the first time using one package of " cloths.    - Skin must be prepped the morning of the procedure using the second package of cloths.        Prep The Night Before Procedure    Do not allow this product to come in contact with your eyes, ears, mouth or mucous membranes.     Reach into one of the packages, remove the two cloths and place onto a clean table.    Use one clean cloth to prep each are of the body. One cloth for #1 - neck & chest. One cloth for #2 & #3 - both arms, shoulder to fingertips including armpits. Wipe each area in a back and forth motion for 3 minutes. Be sure to wipe each area thoroughly.    Do not rinse or apply any lotions, moisturizer or make up after prepping.    Discard cloths in trash can.     Allow your skin to air dry. Dress in clean clothes/sleepwear      Prepping your skin on the morning of surgery:    Do not shower, bathe or shampoo hair.    Open a new package and follow the instructions listed above.

## 2022-03-08 NOTE — TELEPHONE ENCOUNTER
I have reviewed and verified Rx that is written in the Patient Prescription Form.     Janina Lynne, PharmD, BCACP  Oral Chemotherapy Monitoring Program  [facility]  [phone]  March 8, 2022

## 2022-03-08 NOTE — PROGRESS NOTES
Jonh is a 71 year old who is being evaluated via a billable video visit.      How would you like to obtain your AVS? MyChart  If the video visit is dropped, the invitation should be resent by: Send to e-mail at: [email or web address]  Will anyone else be joining your video visit? Yes: Wife Carla will be joining visit today.. How would they like to receive their invitation? Send to e-mail at: [email or web address]       Lei Zhu, Visit Facilitator/MA.      Video Start Time: 8:00 AM  Video-Visit Details    Type of service:  Video Visit    Video End Time:8:30 MA    Originating Location (pt. Location): Home    Distant Location (provider location):  [facility] HE[facility]     Platform used for Video Visit: [facility]    Electrophysiology Video Clinic Note  HPI:   Jonh Pearce is a 71-year-old very pleasant gentleman with multiple myeloma s/p PBSCT in 2006, ICM/NICM (LVEF 30-35%, NYHA II Stage B) s/p [employer] ICD in 12/2009 initially for primary prevention, multiple appropriate shocks for VF (typically while undergoing chemotherapy) on mexiletine, and permanent afib (CHADVASC 3) on warfarin.  The patient presents to [facility] care. He was seen previously by Dr Duarte and Dr Jean-Baptiste. A device interrogation on 3/2/22 showed 4 VF recorded since 11/15/21. VF on 3/2/22 required ATPx1 and shock x1 (41J) for termination of arrhythmia. 2 VT recorded since 11/15/21. 10 nonsustV episodes recorded. RRT<3 monthns. He had another therapy in Nov 2021: Since last remote transmission there has been 1 treated episode in the VZ zone and 20 NSVT episodes. Treated episode occurred on 11-13-21, EGM displays VT @ 246 bpm that is converted with one 41 j shock.   Had a long run without VF before Nov 2021, > 1 year, started chemo June 2021, more aggressive chemo Nov 2021, still has one month left of aggressive chemo, low DLCO precluding amiodarone. His last ischemic workup was done in 2019: with a borderline echo  stress test followed by an angiogram showing  of RCA and mild LAD and LCx disease. The last echocardiogram on record was done in Oct 2021 shooing an ejection fraction of 35-40%, Moderate right ventricular dilation, Moderate mitral insufficiency and Moderate to severe tricuspid insufficiency is present.      PAST MEDICAL HISTORY:  Past Medical History:   Diagnosis Date     Chronic systolic heart failure (H)      Ischemic cardiomyopathy      Other premature beats      Permanent atrial fibrillation (H)      Personal history of multiple myeloma      VF (ventricular fibrillation) (H)        CURRENT MEDICATIONS:  Current Outpatient Medications   Medication Sig Dispense Refill     acyclovir (ZOVIRAX) 400 MG tablet Take 1 tablet (400 mg) by mouth every 12 hours 60 tablet 3     albuterol (PROAIR HFA/PROVENTIL HFA/VENTOLIN HFA) 108 (90 Base) MCG/ACT inhaler Inhale 2 puffs into the lungs every 6 hours as needed for shortness of breath / dyspnea or wheezing 18 g      amoxicillin (AMOXIL) 500 MG tablet Take 4 tabs one hour before dental appointment (Patient not taking: Reported on 2/23/2022) 4 tablet 5     baclofen (LIORESAL) 10 MG tablet Take 1 tablet (10 mg) by mouth 3 times daily as needed for muscle spasms prn       Calcium Carbonate-Vitamin D (CALCIUM 500 + D PO) Take 2 tablets by mouth daily.       dexamethasone (DECADRON) 4 MG tablet Take  5 tabs Days 1,8, and 15 each cycle 15 tablet 4     diclofenac (VOLTAREN) 75 MG EC tablet Take 1 tablet (75 mg) by mouth 2 times daily as needed for moderate pain 1 tab prn       digoxin (LANOXIN) 125 MCG tablet Take 1 tablet (125 mcg) by mouth daily 30 tablet 1     fentaNYL (DURAGESIC) 25 mcg/hr patch 72 hr Place 1 patch onto the skin every 72 hours       fluticasone (FLONASE) 50 MCG/ACT nasal spray Spray 1 spray into both nostrils daily       furosemide (LASIX) 20 MG tablet Take 1 tablet (20 mg) by mouth daily 90 tablet 3     hydrocortisone 2.5 % cream Place rectally 2 times daily        LENalidomide (REVLIMID) 10 MG CAPS capsule Take 1 capsule (10 mg) by mouth daily Take on Days 1 through 14 of 28-day cycle. (Patient not taking: Reported on 2/23/2022) 14 capsule 0     lisinopril (ZESTRIL) 5 MG tablet Take 0.5 tablets (2.5 mg) by mouth 2 times daily 90 tablet 0     Magnesium Oxide 420 MG TABS Take 3 tabs (1260 mg) three times a day. (Patient taking differently: 420 mg Take 9 tabs  daily) 810 tablet 3     metoprolol succinate ER (TOPROL-XL) 100 MG 24 hr tablet Take 1 tablet (100 mg) by mouth daily (Patient taking differently: Take 100 mg by mouth daily 75 mg daily) 90 tablet 3     mexiletine (MEXITIL) 150 MG capsule Take 1 capsule (150 mg) by mouth 2 times daily As close to 12 hours apart as possible 180 capsule 0     omeprazole (PRILOSEC) 20 MG capsule Take 20 mg by mouth daily  90 capsule 3     ondansetron (ZOFRAN-ODT) 4 MG ODT tab Take 4 mg by mouth every 6 hours as needed for nausea        oxycodone (ROXICODONE) 5 MG immediate release tablet Take 1-2 tablets by mouth every 6 hours as needed. For pain.        pomalidomide (POMALYST) 4 MG capsule Take 1 capsule (4 mg) by mouth daily Day 1 thru 14 of each 28 day cycle. Administer with or without food. Swallow whole with water. 14 capsule 0     pomalidomide (POMALYST) 4 MG capsule Take 1 capsule (4 mg) by mouth daily Day 1 thru 14 of each 28 day cycle. Administer with or without food. Swallow whole with water. 14 capsule 3     potassium chloride ER (KLOR-CON M) 10 MEQ CR tablet Take 1 tablet (10 mEq) by mouth every other day 90 tablet 3     simvastatin (ZOCOR) 40 MG tablet Take 1 tablet (40 mg) by mouth At Bedtime 90 tablet 0     spironolactone (ALDACTONE) 25 MG tablet Take 0.5 tablets (12.5 mg) by mouth daily 15 tablet 1     tiotropium (SPIRIVA) 18 MCG inhalation capsule Inhale 1 capsule (18 mcg) into the lungs daily 30 capsule 3     warfarin (COUMADIN) 5 MG tablet Take 7.5 mg by mouth See Admin Instructions Take 1.5 tab by mojth Monday and  Friday and 1 tab Sun  , Thur and Sat         PAST SURGICAL HISTORY:  Past Surgical History:   Procedure Laterality Date     CV CORONARY ANGIOGRAM N/A 2019    Procedure: CV CORONARY ANGIOGRAM;  Surgeon: Giorgi Milan MD;  Location:  HEART CARDIAC CATH LAB     CV LEFT HEART CATH N/A 2019    Procedure: Left Heart Cath;  Surgeon: Giorgi Milan MD;  Location:  HEART CARDIAC CATH LAB     CV RIGHT HEART CATH MEASUREMENTS RECORDED N/A 2019    Procedure: CV RIGHT HEART CATH;  Surgeon: Giorgi Milan MD;  Location:  HEART CARDIAC CATH LAB     CV RIGHT HEART EXERCISE STRESS STUDY N/A 2019    Procedure: Stress Drug Study;  Surgeon: Giorgi Milan MD;  Location:  HEART CARDIAC CATH LAB     IMPLANT AUTOMATIC IMPLANTABLE CARDIOVERTER DEFIBRILLATOR         ALLERGIES:     Allergies   Allergen Reactions     Nkda [No Known Drug Allergies]      Sotalol Other (See Comments)     Other reaction(s): Low blood pressure, Prolonged QT interval       FAMILY HISTORY:  No family history on file.    SOCIAL HISTORY:  Social History     Tobacco Use     Smoking status: Former Smoker     Packs/day: 1.00     Years: 25.00     Pack years: 25.00     Types: Cigarettes     Quit date: 10/15/1995     Years since quittin.4     Smokeless tobacco: Never Used   Substance Use Topics     Alcohol use: No     Drug use: No       ROS:   A comprehensive 10 point review of systems negative other than as mentioned in HPI.    Labs:  Reviewed.     Testing/Procedures:  TTE 10/26/2021:  Interpretation Summary  Left ventricular function is decreased. The ejection fraction is 35-40% (moderately reduced).  Moderate right ventricular dilation is present. Global right ventricular function is mildly to moderately reduced.  Moderate mitral insufficiency is present. Moderate to severe tricuspid insufficiency is present.  Dilation of the inferior vena cava is present with abnormal respiratory variation in diameter.  No  pericardial effusion is present.    CORS 8/29/19:     proximal RCA with bridging collaterals and collaterals from distal LAD reconstituting distal RCA system    Dist LM lesion is 40% stenosed.    Mild proximal / mid LAD and D1 disease    Mild proximal LCx disease    No intervention performed    Stress Echo July 2019  Interpretation Summary  Submaximal stress test, achieved 74% of the age predicted maximal heart rate.  Limiting symptom fatigue.  Normal blood pressure response to exercise.  No angina symptoms with exercise.  Baseline rhythm is atrial fibrillation. No ECG evidence of ischemia. There is no chronotropic incompetence.  Severely reduced LV function with EF of 28% at rest; with exercise left ventricular cavity size and LVEF did not change significantly.  Akinesis of the basal-mid inferior/inferoseptal/inferolateral segments present. No new stress induced regional wall motion abnormalities evident.  Less than average functional capacity for age. Exercise time ~2 minutes.    Assessment and Plan:   Jonh Pearce is a 71-year-old very pleasant gentleman with multiple myeloma s/p PBSCT in 2006, ICM/NICM (LVEF 30-35%, NYHA II Stage B) s/p [employer] ICD in 12/2009 initially for primary prevention, multiple appropriate shocks for VF (typically while undergoing chemotherapy) on mexiletine, and permanent afib (CHADVASC 3) on warfarin. He presents to establish care.     After review of his most recent episodes and the old ones that he had before, the common theme seems to be a fast VT or polymorphic VT triggered by preceding prolonged pause or bradycardia.  This is supported by the fact that these episodes happen mostly at night.  We could target a higher lower rate limit on the device but that would increase his V pacing percentage which is already at 12% while programmed at 40 bpm.  This would potentially increase his RV pacing percentage will be on 40%.  Atrial pacing is not possible given that he is  in chronic A. fib.  Hence, the best solution would be to upgrade his device to a CRT-D system in his lower rate limit to 60 bpm or 70 bpm if needed down the road.  Since his generator battery is almost depleted and has less than 3 months on it, we could do a generator change at the same time.  We would like to time this procedure after his aggressive chemotherapy is completed to avoid delaying and healing of the wound.  The aggressive chemotherapy is aim to finish at the end of March.  We could aim for April for the CRT-D upgrade and generator change.  We did explain to the patient the risks of the procedure that include pocket hematoma, hemothorax or pneumothorax, pericardial effusion and tamponade, infection.  There is a small chance that the CS lead could not be delivered in which case we could discuss a surgical LV lead.  For the time being we will reach out to Dr. Prince, his primary cardiologist, to ask her if she would be okay to stop the digoxin to decrease the chances of bradycardia at night.    Ander Jurado MD [facility]  Cardiology - Electrophysiology    Total time spent on patient visit, reviewing notes, imaging, labs, orders, and completing necessary documentation: 60 minutes.  >50% of visit spent on counseling patient and/or coordination of care.

## 2022-03-09 NOTE — TELEPHONE ENCOUNTER
Call from Tammy at [facility] by [facility].  Calling to clarify order written by Dr. Raygoza for Pomalyst.  [facility] and it was difficult to read.  Connected this writer with pharmacist, Stephan.  Read order to Stephan.  Stephan read back order.

## 2022-03-15 NOTE — NURSING NOTE
Chief Complaint   Patient presents with     Blood Draw     Labs drawn by RN via piv, vitals done.     Labs drawn from PIV placed by RN. Line flushed with saline. Vitals taken. Pt checked in for appointment(s).      Elena Luna RN

## 2022-03-15 NOTE — LETTER
3/15/2022         RE: Erasmo Pearce  [address]  [address]  [facility] [address]        Dear Colleague,    Thank you for referring your patient, Erasmo Pearce, to the [facility] BLOOD AND MARROW TRANSPLANT PROGRAM [place]. Please see a copy of my visit note below.    BONE MARROW CLINIC VISIT       Jonh returns to the hematology clinic for treatment of relapsed multiple myeloma Jonh has completed 2 cycles of daratumumab 1400 mg weekly Dex 20 mg on days 1 8 and 15 and pomalidomide 4 mg on days 1 through 14 and Decadron on days 1-9 16 and 23 he did have some cytopenias with thrombocytopenia with his platelet count falling into the 80s he otherwise is tolerated this very well He received Day 1 of Cycle 3 on Dec 28 with Jenny, Kyprolis Pomalidamide and Dex. He felt fatigued with cough and SOB. COVID negative but hospitalized.in [place] 12/30/21-1/2/22 with Influenza A with acute respiratory failure and hypoxia. Treated with Tamiflu, rocephin and azithro for sinusitus, treated for hypotension and CHF,treated for COPD with dexamethasone.     INTERVAL HISTORY  Breathing is about the same and has been on diuretics. Defibrillator went off earlier this month. He still is having loose stools. He is not sure whether this is from the magnesium, but has had this since the radiation. Pomalyst actually helps with the diarrhea. Appetite is ok. No fevers, chills, cough, or headache. He is here to start cycle 5.    PAST MEDICAL HISTORY, SOCIAL HISTORY AND FAMILY HISTORY:  See my note from 01/2022.      REVIEW OF SYSTEMS:  A 12 point review of systems done is negative as in HPI.      PHYSICAL EXAMINATION: /50   Pulse 56   Temp 97  F (36.1  C)   Resp 16   Wt 87.5 kg (193 lb)   SpO2 97%   BMI 28.10 kg/m      GENERAL:  He is an alert gentleman in no acute distress.   VITAL SIGNS:  Stable.   HEENT:  Normocephalic.  EOM okay.  Mouth without lesion.   RESPIRATORY:  Clear to percussion and  auscultation. No rales.   CARDIAC:  Irregularly irregular rhythm.  No S3, S4 or murmur.   ABDOMEN:  Soft without hepatosplenomegaly.   EXTREMITIES:  +3 edema bilaterally   SKIN 7mm oblong brownish pink macule left subscapular area  Recent Labs     Recent Labs   Lab Test 03/01/22  0944 01/25/22  1242 12/28/21  0924 11/09/21  0932 10/26/21  1035 08/24/21  0945 04/27/21  1037 10/13/20  1054 10/06/20  0932   IGA  --   --  153 215 382   < > 281  --  284   IGM  --   --  29* 16* 25*   < > 30*  --  35   ELPM 0.1* 0.1* 0.1* 0.1* 0.1*   < > 0.2*  --  0.1*   KAPPAFREELT  --   --   --   --   --   --  2.29* 3.28* 3.34*   LAMBDAFREELT  --   --   --   --   --   --  44.79* 19.28* 21.74*   KLR  --   --   --   --   --   --  0.05* 0.17* 0.15*    < > = values in this interval not displayed.     Results for FRANDY ZAPATA (MRN [id number]) as of 3/15/2022 09:25   Ref. Range 12/28/2021 09:24 1/25/2022 12:42 3/1/2022 09:44   Kappa Lambda Ratio Latest Ref Range: 0.26 - 1.65  0.04 (L) 0.10 (L) 0.17 (L)   Kappa Free Lt Chain Latest Ref Range: 0.33 - 1.94 mg/dL 1.17 1.29 2.59 (H)   Lambda Free Lt Chain Latest Ref Range: 0.57 - 2.63 mg/dL 28.05 (H) 12.39 (H) 15.22 (H)       ASSESSMENT  1.  Multiple myeloma. In relapse  2.  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  Cardiomyopathy with CHF.   4.  Atrial fibrillation.   5.  History of deep vein thrombosis, on warfarin.   6.  Chronic back pain.   7.  Aortic aneurysm.   8.  History of plasmacytoma of the clivus, status post radiation.   9.  History of ventricular tachycardia with implantable defibrillator in place.   10.  Hypomagnesemia.     11  Hyperkalemia   12. Skin lesion   13. Influenza A    Last lambda light chains are 15, which is still a good response compared to a peak of 100+ in October 2021. He has CHF and has followed with Dr Mcneal with improvement.     Cycle 5 starts today  Carfilzomib Day 1, 8  and Day 15   Darzalex and  dexamethasone 20mg on days 1, 15.  Pomalidomide Days 1-14    He will need another defibrillator within the next few months. Based on his light chains we might consider switching over to maintenance jenny vs. continuing full chemotherapy. He has had a lot of Imid therapy, and it would be nice to get rid of the Kyprolis because of his heart issues. Will adjust the chemotherapy for cycle 6 if needed to allow for the defibrillator replacement, as well as plan for holding his warfarin.    [facility] will be happy to help with renewal of the community care plan.  Evy Morrison RNCC  [place]    RTC    Infusions 3/22, 3/29 carfilzomib and jenny   Follow up Dr HILL March 29  Attending  The patient was seen and examined by me separate from the resident/fellow provider.The note above reflects my assessment and plan. I have personally reviewed today's labs,vital and radiology results. The points of care that were added by me are:    Jonh is responding to Jenny, Pom Kyprolis well Main issue is how low to do these cycles vs maintenance (If maintenance what? Pom? Jenny? Both? Would like to minimize carfilzomib with his cardiac issues). Will decide  Soon as he needs his ICD monitor changed.Will need to have optimal platelet count and hold warfarin.    I spent a total of 30  minutes face to face with Erasmo Pearce during today's office visit. Over 50% of this time was spent counseling the patient and coordinating the care regarding multiple myeloma and 10minutes preparing to see the patient and  care coordination         Again, thank you for allowing me to participate in the care of your patient.      Sincerely,    Rojas Raygoza MD

## 2022-03-15 NOTE — PROGRESS NOTES
Infusion Nursing Note:  Erasmo Pearce presents today for scheduled infusion.    Patient seen by provider today: Yes: Dr. Raygoza    present during visit today: Not Applicable.    Note: Patient arrived for scheduled infusion. Premedicated with Tylenol 650 mg PO, Benadryl 50 mg PO, Singulair 10 mg PO, and scheduled Decadron 20 mg PO.  Received Kyprolis 20mg infusion as scheduled. Pre and post flush infused as ordered.     Darzalex Faspro 1800 mg given subcutaneous to left lower abdomen over 5 minutes.       Intravenous Access:  Peripheral IV placed. Positive BR pre and post infusion.    Treatment Conditions:  Lab Results   Component Value Date    HGB 9.1 (L) 03/15/2022    WBC 6.3 03/15/2022    ANEU 9.7 (H) 01/11/2022    ANEUTAUTO 4.6 03/15/2022     03/15/2022          Post Infusion Assessment:  Patient tolerated infusion without incident.  Patient tolerated injection without incident.       Discharge Plan:   Patient discharged in stable condition accompanied by: wife.  Departure Mode: Ambulatory.      Laura Almaraz RN

## 2022-03-15 NOTE — NURSING NOTE
"Oncology Rooming Note    March 15, 2022 9:23 AM   Erasmo Pearce is a 71 year old male who presents for:    Chief Complaint   Patient presents with     Blood Draw     Labs drawn by RN via piv, vitals done.     Initial Vitals: /50   Pulse 56   Temp 97  F (36.1  C)   Resp 16   Wt 87.5 kg (193 lb)   SpO2 97%   BMI 28.10 kg/m   Estimated body mass index is 28.1 kg/m  as calculated from the following:    Height as of 11/2/21: 1.765 m (5' 9.49\").    Weight as of this encounter: 87.5 kg (193 lb). Body surface area is 2.07 meters squared.  No Pain (0) Comment: Data Unavailable   No LMP for male patient.  Allergies reviewed: Yes  Medications reviewed: Yes    Medications: Medication refills not needed today.  Pharmacy name entered into Etable:    [facility] - [place] - [address] [facility] - [place], [place] - [address] [place] [address]  [facility]  [facility] [address]    Clinical concerns: none      Mindi Loo RN            "

## 2022-03-15 NOTE — LETTER
3/15/2022         RE: Erasmo Pearce  [address]  [address]  [facility] [address]        Dear Colleague,    Thank you for referring your patient, Erasmo Pearce, to the [facility] BLOOD AND MARROW TRANSPLANT PROGRAM [place]. Please see a copy of my visit note below.    Infusion Nursing Note:  Erasmo Pearce presents today for scheduled infusion.    Patient seen by provider today: Yes: Dr. Raygoza    present during visit today: Not Applicable.    Note: Patient arrived for scheduled infusion. Premedicated with Tylenol 650 mg PO, Benadryl 50 mg PO, Singulair 10 mg PO, and scheduled Decadron 20 mg PO.  Received Kyprolis 20mg infusion as scheduled. Pre and post flush infused as ordered.     Darzalex Faspro 1800 mg given subcutaneous to left lower abdomen over 5 minutes.       Intravenous Access:  Peripheral IV placed. Positive BR pre and post infusion.    Treatment Conditions:  Lab Results   Component Value Date    HGB 9.1 (L) 03/15/2022    WBC 6.3 03/15/2022    ANEU 9.7 (H) 01/11/2022    ANEUTAUTO 4.6 03/15/2022     03/15/2022          Post Infusion Assessment:  Patient tolerated infusion without incident.  Patient tolerated injection without incident.     Discharge Plan:   Patient discharged in stable condition accompanied by: wife.  Departure Mode: Ambulatory.    Laura Almaraz RN        Again, thank you for allowing me to participate in the care of your patient.      Sincerely,    [facility]

## 2022-03-15 NOTE — PROGRESS NOTES
BONE MARROW CLINIC VISIT       Jonh returns to the hematology clinic for treatment of relapsed multiple myeloma Jonh has completed 2 cycles of daratumumab 1400 mg weekly Dex 20 mg on days 1 8 and 15 and pomalidomide 4 mg on days 1 through 14 and Decadron on days 1-9 16 and 23 he did have some cytopenias with thrombocytopenia with his platelet count falling into the 80s he otherwise is tolerated this very well He received Day 1 of Cycle 3 on Dec 28 with Jenny, Kyprolis Pomalidamide and Dex. He felt fatigued with cough and SOB. COVID negative but hospitalized.in [place] 12/30/21-1/2/22 with Influenza A with acute respiratory failure and hypoxia. Treated with Tamiflu, rocephin and azithro for sinusitus, treated for hypotension and CHF,treated for COPD with dexamethasone.     INTERVAL HISTORY  Breathing is about the same and has been on diuretics. Defibrillator went off earlier this month. He still is having loose stools. He is not sure whether this is from the magnesium, but has had this since the radiation. Pomalyst actually helps with the diarrhea. Appetite is ok. No fevers, chills, cough, or headache. He is here to start cycle 5.    PAST MEDICAL HISTORY, SOCIAL HISTORY AND FAMILY HISTORY:  See my note from 01/2022.      REVIEW OF SYSTEMS:  A 12 point review of systems done is negative as in HPI.      PHYSICAL EXAMINATION: /50   Pulse 56   Temp 97  F (36.1  C)   Resp 16   Wt 87.5 kg (193 lb)   SpO2 97%   BMI 28.10 kg/m      GENERAL:  He is an alert gentleman in no acute distress.   VITAL SIGNS:  Stable.   HEENT:  Normocephalic.  EOM okay.  Mouth without lesion.   RESPIRATORY:  Clear to percussion and auscultation. No rales.   CARDIAC:  Irregularly irregular rhythm.  No S3, S4 or murmur.   ABDOMEN:  Soft without hepatosplenomegaly.   EXTREMITIES:  +3 edema bilaterally   SKIN 7mm oblong brownish pink macule left subscapular area  Recent Labs     Recent Labs   Lab Test 03/01/22  0944 01/25/22  1242  12/28/21 0924 11/09/21  0932 10/26/21  1035 08/24/21  0945 04/27/21  1037 10/13/20  1054 10/06/20  0932   IGA  --   --  153 215 382   < > 281  --  284   IGM  --   --  29* 16* 25*   < > 30*  --  35   ELPM 0.1* 0.1* 0.1* 0.1* 0.1*   < > 0.2*  --  0.1*   KAPPAFREELT  --   --   --   --   --   --  2.29* 3.28* 3.34*   LAMBDAFREELT  --   --   --   --   --   --  44.79* 19.28* 21.74*   KLR  --   --   --   --   --   --  0.05* 0.17* 0.15*    < > = values in this interval not displayed.     Results for FRANDY ZAPATA (MRN [id number]) as of 3/15/2022 09:25   Ref. Range 12/28/2021 09:24 1/25/2022 12:42 3/1/2022 09:44   Kappa Lambda Ratio Latest Ref Range: 0.26 - 1.65  0.04 (L) 0.10 (L) 0.17 (L)   Kappa Free Lt Chain Latest Ref Range: 0.33 - 1.94 mg/dL 1.17 1.29 2.59 (H)   Lambda Free Lt Chain Latest Ref Range: 0.57 - 2.63 mg/dL 28.05 (H) 12.39 (H) 15.22 (H)       ASSESSMENT  1.  Multiple myeloma. In relapse  2.  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  Cardiomyopathy with CHF.   4.  Atrial fibrillation.   5.  History of deep vein thrombosis, on warfarin.   6.  Chronic back pain.   7.  Aortic aneurysm.   8.  History of plasmacytoma of the clivus, status post radiation.   9.  History of ventricular tachycardia with implantable defibrillator in place.   10.  Hypomagnesemia.     11  Hyperkalemia   12. Skin lesion   13. Influenza A    Last lambda light chains are 15, which is still a good response compared to a peak of 100+ in October 2021. He has CHF and has followed with Dr Mcneal with improvement.     Cycle 5 starts today  Carfilzomib Day 1, 8  and Day 15   Darzalex and dexamethasone 20mg on days 1, 15.  Pomalidomide Days 1-14    He will need another defibrillator within the next few months. Based on his light chains we might consider switching over to maintenance kenton vs. continuing full chemotherapy. He has had a lot of Imid therapy, and it would be nice to get rid of the  Kyprolis because of his heart issues. Will adjust the chemotherapy for cycle 6 if needed to allow for the defibrillator replacement, as well as plan for holding his warfarin.    [facility], will be happy to help with renewal of the community care plan.  Evy Morrison RNCC  [facility]    RTC    Infusions 3/22, 3/29 carfilzomib and jenny   Follow up Dr HILL March 29  Attending  The patient was seen and examined by me separate from the resident/fellow provider.The note above reflects my assessment and plan. I have personally reviewed today's labs,vital and radiology results. The points of care that were added by me are:    Jonh is responding to Jenny, Pom Kyprolis well Main issue is how low to do these cycles vs maintenance (If maintenance what? Pom? Jenny? Both? Would like to minimize carfilzomib with his cardiac issues). Will decide  Soon as he needs his ICD monitor changed.Will need to have optimal platelet count and hold warfarin.    I spent a total of 30  minutes face to face with Erasmo Pearce during today's office visit. Over 50% of this time was spent counseling the patient and coordinating the care regarding multiple myeloma and 10minutes preparing to see the patient and  care coordination     Rojas Raygoza MD  [phone]

## 2022-03-19 NOTE — TELEPHONE ENCOUNTER
I left a voice mail and Prema burt to schedule   a three month follow up appointment with Dr. Prince with labs and echo prior to the appointment.  Ugo Kaufman on 3/19/2022 at 6:49 PM

## 2022-03-22 NOTE — LETTER
3/22/2022         RE: Erasmo Pearce  [address]  [address] [facility] [address]        Dear Colleague,    Thank you for referring your patient, Erasmo Pearce, to the [facility] BLOOD AND MARROW TRANSPLANT PROGRAM [place]. Please see a copy of my visit note below.    Infusion Nursing Note:  Erasmo Pearce presents today for Day 8, Cycle 5 of Kyprolis.  Patient seen by provider today: No   present during visit today: Not Applicable.    Note: Lab results monitored; met treatment parameters for Kyprolis today, ANC 5.6, Plts 181. 250mL D5W bolus given 30 minutes prior to Kyprolis infusion. Kyprolis checked by 2nd RN prior to administration, infused over 10 minutes. 250mL D5W bolus given after Kyprolis infusion.    Intravenous Access:  Peripheral IV placed.  +BR noted pre and post infusion  Removed prior to discharge.    Treatment Conditions:  Results reviewed, labs MET treatment parameters, ok to proceed with treatment.    Post Infusion Assessment:  Patient tolerated infusion without incident.     Discharge Plan:   Patient discharged in stable condition accompanied by: wife.    Mindi Ortiz, RN          Again, thank you for allowing me to participate in the care of your patient.      Sincerely,    [facility]

## 2022-03-22 NOTE — PROGRESS NOTES
Infusion Nursing Note:  Erasmo Pearce presents today for Day 8, Cycle 5 of Kyprolis.  Patient seen by provider today: No   present during visit today: Not Applicable.    Note: Lab results monitored; met treatment parameters for Kyprolis today, ANC 5.6, Plts 181. 250mL D5W bolus given 30 minutes prior to Kyprolis infusion. Kyprolis checked by 2nd RN prior to administration, infused over 10 minutes. 250mL D5W bolus given after Kyprolis infusion.    Intravenous Access:  Peripheral IV placed.  +BR noted pre and post infusion  Removed prior to discharge.    Treatment Conditions:  Results reviewed, labs MET treatment parameters, ok to proceed with treatment.      Post Infusion Assessment:  Patient tolerated infusion without incident.       Discharge Plan:   Patient discharged in stable condition accompanied by: wife.      Mindi Ortiz RN

## 2022-03-22 NOTE — NURSING NOTE
Chief Complaint   Patient presents with     Blood Draw     Labs drawn via piv placed by EMT. VS taken.      Labs drawn from PIV placed by ultrasound EMT. Line flushed with saline. Vitals taken. Pt checked in for appointment(s).    Sumi Campbell RN

## 2022-03-29 NOTE — LETTER
3/29/2022     RE: Erasmo Pearce  [address]  [address]  [facility] [address]    Dear Colleague,    Thank you for referring your patient, Erasmo Pearce, to the [facility] BLOOD AND MARROW TRANSPLANT PROGRAM [place]. Please see a copy of my visit note below.    BONE MARROW CLINIC VISIT       Jonh returns to the hematology clinic for treatment of relapsed multiple myeloma Jonh has completed 2 cycles of daratumumab 1400 mg weekly Dex 20 mg on days 1 8 and 15 and pomalidomide 4 mg on days 1 through 14 and Decadron on days 1-9 16 and 23 he did have some cytopenias with thrombocytopenia with his platelet count falling into the 80s he otherwise is tolerated this very well He received Day 1 of Cycle 3 on Dec 28 with Jenny, Kyprolis Pomalidamide and Dex. He felt fatigued with cough and SOB. COVID negative but hospitalized.in [place] 12/30/21-1/2/22 with Influenza A with acute respiratory failure and hypoxia. Treated with Tamiflu, rocephin and azithro for sinusitus, treated for hypotension and CHF,treated for COPD with dexamethasone.     INTERVAL HISTORY  Jonh is here for day 15 of cycle 5.  He is feeling tired and has had frequent stools.  He is scheduled to have his ICD replaced on April 26.  He continues to take pomalidomide 4 mg/day days 1 through 14 and just completed this yesterday.  He denies nausea vomiting or joint pain.  He has some shortness of breath on exertion.  He is taking 9 Mag-Ox per day.      PAST MEDICAL HISTORY, SOCIAL HISTORY AND FAMILY HISTORY:  See my note from 03/2022.      REVIEW OF SYSTEMS:  A 12 point review of systems done is negative as in HPI.      PHYSICAL EXAMINATION: /50 (BP Location: Right arm, Patient Position: Sitting, Cuff Size: Adult Regular)   Pulse 50   Temp 98.3  F (36.8  C) (Tympanic)   Resp 16   Wt 89 kg (196 lb 4.8 oz)   SpO2 94%   BMI 28.58 kg/m      GENERAL:  He is an alert gentleman in no acute distress.   VITAL SIGNS:  Stable.    HEENT:  Normocephalic.  EOM okay.  Mouth without lesion.   RESPIRATORY:  Clear to percussion and auscultation. No rales.   CARDIAC:  Irregularly irregular rhythm.  No S3, S4 or murmur. ICD  In Left chest wall  ABDOMEN:  Soft without hepatosplenomegaly.   EXTREMITIES:  +1 edema bilaterally   SKIN 7mm oblong brownish pink macule left subscapular area  Results for FRANDY ZAPATA (MRN [id number]) as of 3/29/2022 10:39   Ref. Range 3/29/2022 09:32   Sodium Latest Ref Range: 133 - 144 mmol/L 140   Potassium Latest Ref Range: 3.4 - 5.3 mmol/L 4.6   Chloride Latest Ref Range: 94 - 109 mmol/L 108   Carbon Dioxide Latest Ref Range: 20 - 32 mmol/L 26   Urea Nitrogen Latest Ref Range: 7 - 30 mg/dL 28   Creatinine Latest Ref Range: 0.66 - 1.25 mg/dL 1.23   GFR Estimate Latest Ref Range: >60 mL/min/1.73m2 63   Calcium Latest Ref Range: 8.5 - 10.1 mg/dL 8.2 (L)   Anion Gap Latest Ref Range: 3 - 14 mmol/L 6   Magnesium Latest Ref Range: 1.6 - 2.3 mg/dL 2.0   Albumin Latest Ref Range: 3.4 - 5.0 g/dL 3.2 (L)   Protein Total Latest Ref Range: 6.8 - 8.8 g/dL 6.1 (L)   Bilirubin Total Latest Ref Range: 0.2 - 1.3 mg/dL 1.0   Alkaline Phosphatase Latest Ref Range: 40 - 150 U/L 117   ALT Latest Ref Range: 0 - 70 U/L 17   AST Latest Ref Range: 0 - 45 U/L 15   Glucose Latest Ref Range: 70 - 99 mg/dL 117 (H)   WBC Latest Ref Range: 4.0 - 11.0 10e3/uL 4.3   Hemoglobin Latest Ref Range: 13.3 - 17.7 g/dL 8.7 (L)   Hematocrit Latest Ref Range: 40.0 - 53.0 % 27.3 (L)   Platelet Count Latest Ref Range: 150 - 450 10e3/uL 64 (L)   RBC Count Latest Ref Range: 4.40 - 5.90 10e6/uL 2.54 (L)   MCV Latest Ref Range: 78 - 100 fL 108 (H)   MCH Latest Ref Range: 26.5 - 33.0 pg 34.3 (H)   MCHC Latest Ref Range: 31.5 - 36.5 g/dL 31.9   RDW Latest Ref Range: 10.0 - 15.0 % 15.6 (H)   % Neutrophils Latest Units: % 64   % Lymphocytes Latest Units: % 10   % Monocytes Latest Units: % 11   % Eosinophils Latest Units: % 13   % Basophils Latest Units: % 1    Absolute Basophils Latest Ref Range: 0.0 - 0.2 10e3/uL 0.0   Absolute Eosinophils Latest Ref Range: 0.0 - 0.7 10e3/uL 0.6   Absolute Immature Granulocytes Latest Ref Range: <=0.4 10e3/uL 0.0   Absolute Lymphocytes Latest Ref Range: 0.8 - 5.3 10e3/uL 0.4 (L)   Absolute Monocytes Latest Ref Range: 0.0 - 1.3 10e3/uL 0.5   % Immature Granulocytes Latest Units: % 1   Absolute Neutrophils Latest Ref Range: 1.6 - 8.3 10e3/uL 2.8   Absolute NRBCs Latest Units: 10e3/uL 0.0   NRBCs per 100 WBC Latest Ref Range: <1 /100 0       ASSESSMENT  1.  Multiple myeloma. In relapse  2.  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  Cardiomyopathy with CHF.   4.  Atrial fibrillation.   5.  History of deep vein thrombosis, on warfarin.   6.  Chronic back pain.   7.  Aortic aneurysm.   8.  History of plasmacytoma of the clivus, status post radiation.   9.  History of ventricular tachycardia with implantable defibrillator in place.   10.  Hypomagnesemia.     11  Hyperkalemia   12. Skin lesion   13. Influenza A  Jonh's light chains are stable at 15 for lambda I am concerned that he has some thrombocytopenia today and I would like to hold the current progress and give him daratumumab today.  I noted that he has anemia as well and this probably is related to the chemotherapy.  Since we are stalled in the drop of lambda light chains and he is going to have a minor surgical procedure with replacement of the ICD I would like to hold on the next cycle of chemotherapy and reevaluate him in late April and see him on May 3.  He will get laboratory studies done and we can assess the light chains and M spike and 24-hour urine monoclonal protein.  Today his magnesium is 2.0 and with diarrhea I like to decrease his Mag-Ox to 7 times per day.  In terms of Covid he did receive a Evusheld 150 mg I like to give him the other 150 mg of both monoclonal's today to cover him for Covid with a Evusheld.  He should  discuss with his INR clinic and cardiologist about stopping warfarin in preparation for the procedure.  I do not think he needs to be bridged but he could start up warfarin the night after the ICD placement.  By holding the chemotherapy his platelet count should normalize in the past Jonh has rebounded nicely during chemotherapy holiday times with an improvement in his blood counts and wellbeing.  We may need to think of another alternative therapy.  But definitely I want to hold the pomalidomide scheduled in April along with the Kyprolis and daratumumab.    I spent a total of 30  minutes face to face with Erasmo Pearce during today's office visit. Over 50% of this time was spent counseling the patient and coordinating the care regarding multiple myeloma and 10minutes preparing to see the patient and  care coordination     Rojas Raygoza MD  [phone]

## 2022-03-29 NOTE — NURSING NOTE
Chief Complaint   Patient presents with     Blood Draw     labs drawn with IV start by rn in lab. vital signs taken.     Labs drawn with IV start by RN in lab. Vital signs taken. Pt checked in to next appointment.    Arlet Monroy RN

## 2022-03-29 NOTE — LETTER
3/29/2022         RE: Erasmo Pearce  [address]  [address]  [facility] [address]        Dear Colleague,    Thank you for referring your patient, Erasmo Pearce, to the [facility] BLOOD AND MARROW TRANSPLANT PROGRAM [place]. Please see a copy of my visit note below.    Infusion Nursing Note:  Erasmo Pearce presents today for Daratumumab today.    Patient seen by provider today: Yes: Dr Raygoza    present during visit today: Not Applicable.    Note: Labs monitored, VSS. Due to dropping of hgb, wbc, and plts, will hold Kyprolis today and only administer kenton per provider.     EVUSHELD Administration Note:  Erasmo Pearce presents today for EVUSHELD.   present during visit today: Not Applicable.    The following medication was given:     MEDICATION:Evusheld  ROUTE: IM  SITE:Onur ventrogluteal  DOSE: 150mg/3ml  LOT #: [id number]  : [employer]  EXPIRATION DATE:  06/2022  NDC#: 8617-3698-68     Post Injection Assessment:   Patient tolerated injection without incident.      Patient was observed in the room for a minimum of 10 minutes after injection per standard, then remained in the buidling for a total 60 minute observation period.         Discharge Plan:    Patient discharged in stable condition accompanied by: wife.     Intravenous Access:  No Intravenous access/labs at this visit.      Yevgeniy Meyers RN                          Again, thank you for allowing me to participate in the care of your patient.        Sincerely,        [facility]

## 2022-03-29 NOTE — PROGRESS NOTES
BONE MARROW CLINIC VISIT       Jonh returns to the hematology clinic for treatment of relapsed multiple myeloma Jonh has completed 2 cycles of daratumumab 1400 mg weekly Dex 20 mg on days 1 8 and 15 and pomalidomide 4 mg on days 1 through 14 and Decadron on days 1-9 16 and 23 he did have some cytopenias with thrombocytopenia with his platelet count falling into the 80s he otherwise is tolerated this very well He received Day 1 of Cycle 3 on Dec 28 with Jneny, Kyprolis Pomalidamide and Dex. He felt fatigued with cough and SOB. COVID negative but hospitalized.in [place] 12/30/21-1/2/22 with Influenza A with acute respiratory failure and hypoxia. Treated with Tamiflu, rocephin and azithro for sinusitus, treated for hypotension and CHF,treated for COPD with dexamethasone.     INTERVAL HISTORY  Jonh is here for day 15 of cycle 5.  He is feeling tired and has had frequent stools.  He is scheduled to have his ICD replaced on April 26.  He continues to take pomalidomide 4 mg/day days 1 through 14 and just completed this yesterday.  He denies nausea vomiting or joint pain.  He has some shortness of breath on exertion.  He is taking 9 Mag-Ox per day.      PAST MEDICAL HISTORY, SOCIAL HISTORY AND FAMILY HISTORY:  See my note from 03/2022.      REVIEW OF SYSTEMS:  A 12 point review of systems done is negative as in HPI.      PHYSICAL EXAMINATION: /50 (BP Location: Right arm, Patient Position: Sitting, Cuff Size: Adult Regular)   Pulse 50   Temp 98.3  F (36.8  C) (Tympanic)   Resp 16   Wt 89 kg (196 lb 4.8 oz)   SpO2 94%   BMI 28.58 kg/m      GENERAL:  He is an alert gentleman in no acute distress.   VITAL SIGNS:  Stable.   HEENT:  Normocephalic.  EOM okay.  Mouth without lesion.   RESPIRATORY:  Clear to percussion and auscultation. No rales.   CARDIAC:  Irregularly irregular rhythm.  No S3, S4 or murmur. ICD  In Left chest wall  ABDOMEN:  Soft without hepatosplenomegaly.   EXTREMITIES:  +1 edema bilaterally   SKIN  7mm oblong brownish pink macule left subscapular area  Results for FRANDY ZAPATA (MRN [id number]) as of 3/29/2022 10:39   Ref. Range 3/29/2022 09:32   Sodium Latest Ref Range: 133 - 144 mmol/L 140   Potassium Latest Ref Range: 3.4 - 5.3 mmol/L 4.6   Chloride Latest Ref Range: 94 - 109 mmol/L 108   Carbon Dioxide Latest Ref Range: 20 - 32 mmol/L 26   Urea Nitrogen Latest Ref Range: 7 - 30 mg/dL 28   Creatinine Latest Ref Range: 0.66 - 1.25 mg/dL 1.23   GFR Estimate Latest Ref Range: >60 mL/min/1.73m2 63   Calcium Latest Ref Range: 8.5 - 10.1 mg/dL 8.2 (L)   Anion Gap Latest Ref Range: 3 - 14 mmol/L 6   Magnesium Latest Ref Range: 1.6 - 2.3 mg/dL 2.0   Albumin Latest Ref Range: 3.4 - 5.0 g/dL 3.2 (L)   Protein Total Latest Ref Range: 6.8 - 8.8 g/dL 6.1 (L)   Bilirubin Total Latest Ref Range: 0.2 - 1.3 mg/dL 1.0   Alkaline Phosphatase Latest Ref Range: 40 - 150 U/L 117   ALT Latest Ref Range: 0 - 70 U/L 17   AST Latest Ref Range: 0 - 45 U/L 15   Glucose Latest Ref Range: 70 - 99 mg/dL 117 (H)   WBC Latest Ref Range: 4.0 - 11.0 10e3/uL 4.3   Hemoglobin Latest Ref Range: 13.3 - 17.7 g/dL 8.7 (L)   Hematocrit Latest Ref Range: 40.0 - 53.0 % 27.3 (L)   Platelet Count Latest Ref Range: 150 - 450 10e3/uL 64 (L)   RBC Count Latest Ref Range: 4.40 - 5.90 10e6/uL 2.54 (L)   MCV Latest Ref Range: 78 - 100 fL 108 (H)   MCH Latest Ref Range: 26.5 - 33.0 pg 34.3 (H)   MCHC Latest Ref Range: 31.5 - 36.5 g/dL 31.9   RDW Latest Ref Range: 10.0 - 15.0 % 15.6 (H)   % Neutrophils Latest Units: % 64   % Lymphocytes Latest Units: % 10   % Monocytes Latest Units: % 11   % Eosinophils Latest Units: % 13   % Basophils Latest Units: % 1   Absolute Basophils Latest Ref Range: 0.0 - 0.2 10e3/uL 0.0   Absolute Eosinophils Latest Ref Range: 0.0 - 0.7 10e3/uL 0.6   Absolute Immature Granulocytes Latest Ref Range: <=0.4 10e3/uL 0.0   Absolute Lymphocytes Latest Ref Range: 0.8 - 5.3 10e3/uL 0.4 (L)   Absolute Monocytes Latest Ref Range: 0.0 -  1.3 10e3/uL 0.5   % Immature Granulocytes Latest Units: % 1   Absolute Neutrophils Latest Ref Range: 1.6 - 8.3 10e3/uL 2.8   Absolute NRBCs Latest Units: 10e3/uL 0.0   NRBCs per 100 WBC Latest Ref Range: <1 /100 0       ASSESSMENT  1.  Multiple myeloma. In relapse  2.  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  Cardiomyopathy with CHF.   4.  Atrial fibrillation.   5.  History of deep vein thrombosis, on warfarin.   6.  Chronic back pain.   7.  Aortic aneurysm.   8.  History of plasmacytoma of the clivus, status post radiation.   9.  History of ventricular tachycardia with implantable defibrillator in place.   10.  Hypomagnesemia.     11  Hyperkalemia   12. Skin lesion   13. Influenza A  Jonh's light chains are stable at 15 for lambda I am concerned that he has some thrombocytopenia today and I would like to hold the current progress and give him daratumumab today.  I noted that he has anemia as well and this probably is related to the chemotherapy.  Since we are stalled in the drop of lambda light chains and he is going to have a minor surgical procedure with replacement of the ICD I would like to hold on the next cycle of chemotherapy and reevaluate him in late April and see him on May 3.  He will get laboratory studies done and we can assess the light chains and M spike and 24-hour urine monoclonal protein.  Today his magnesium is 2.0 and with diarrhea I like to decrease his Mag-Ox to 7 times per day.  In terms of Covid he did receive a Evusheld 150 mg I like to give him the other 150 mg of both monoclonal's today to cover him for Covid with a Evusheld.  He should discuss with his INR clinic and cardiologist about stopping warfarin in preparation for the procedure.  I do not think he needs to be bridged but he could start up warfarin the night after the ICD placement.  By holding the chemotherapy his platelet count should normalize in the past Jonh has rebounded  nicely during chemotherapy holiday times with an improvement in his blood counts and wellbeing.  We may need to think of another alternative therapy.  But definitely I want to hold the pomalidomide scheduled in April along with the Kyprolis and daratumumab.    I spent a total of 30  minutes face to face with Erasmo Pearce during today's office visit. Over 50% of this time was spent counseling the patient and coordinating the care regarding multiple myeloma and 10minutes preparing to see the patient and  care coordination     Rojas Raygoza MD  [phone]

## 2022-03-29 NOTE — PROGRESS NOTES
Infusion Nursing Note:  Erasmo BLACKBURN Ramses presents today for Daratumumab today.    Patient seen by provider today: Yes: Dr Raygoza    present during visit today: Not Applicable.    Note: Labs monitored, VSS. Due to dropping of hgb, wbc, and plts, will hold Kyprolis today and only administer kenton per provider.     EVUSHELD Administration Note:  Erasmo Pearce presents today for EVUSHELD.   present during visit today: Not Applicable.    The following medication was given:     MEDICATION:Evusheld  ROUTE: IM  SITE:Onur ventrogluteal  DOSE: 150mg/3ml  LOT #: [id number]  : [facility]  EXPIRATION DATE:  06/2022  NDC#: 9741-6434-24     Post Injection Assessment:   Patient tolerated injection without incident.      Patient was observed in the room for a minimum of 10 minutes after injection per standard, then remained in the buidling for a total 60 minute observation period.         Discharge Plan:    Patient discharged in stable condition accompanied by: wife.     Intravenous Access:  No Intravenous access/labs at this visit.      Yevgeniy Meyers RN

## 2022-04-01 NOTE — TELEPHONE ENCOUNTER
Spoke with pt to clarify lab plan for 4/25 and 4/26.  Per discussion, pt wants to drop off 24 hr urine test and have Dr. Raygoza's labs drawn on 4/25 afternoon (3:00).  Keeping 4/26 lab appt at 6:15, prior to cardiology procedure, for labs they need prior to procedure.  Pt appreciated call

## 2022-04-20 NOTE — TELEPHONE ENCOUNTER
The patient left a message on the EP scheduling phone with questions about medications for his procedure on April 26.     Stephanie Wells  [facility]rophys[facility]   [phone]

## 2022-04-20 NOTE — TELEPHONE ENCOUNTER
Called and spoke to patient to review pre-procedure instructions for upcoming CRTD upgrade procedure on 4/26/22. Patient had not received an instruction letter. Reviewed instructions and asked EP  to mail out letter with wipes as well as instructions to [facility].

## 2022-04-25 NOTE — PROGRESS NOTES
Oral Chemotherapy Monitoring Program  Lab Follow Up    Reviewed lab results from 4/25/22.    ORAL CHEMOTHERAPY 1/21/2022 1/30/2022 2/8/2022 2/23/2022 3/1/2022 3/8/2022 4/25/2022   Assessment Type Incoming phone call Chart Review Refill Lab Monitoring Lab Monitoring Refill Lab Monitoring   Diagnosis Code Multiple Myeloma Multiple Myeloma Multiple Myeloma Multiple Myeloma Multiple Myeloma Multiple Myeloma Multiple Myeloma   Providers Dr. Jaret Raygoza   [facility]   Drug Name Pomalyst (pomalidomide) Pomalyst (pomalidomide) Pomalyst (pomalidomide) Pomalyst (pomalidomide) Pomalyst (pomalidomide) Pomalyst (pomalidomide) Pomalyst (pomalidomide)   Dose 4 mg 4 mg 4 mg 4 mg 4 mg 4 mg 4 mg   Current Schedule Daily Daily Daily Daily Daily Daily Daily   Cycle Details 2 weeks on, 2 weeks off Drug on Hold 2 weeks on, 2 weeks off 2 weeks on, 2 weeks off 2 weeks on, 2 weeks off 2 weeks on, 2 weeks off 2 weeks on, 2 weeks off   Start Date of Last Cycle - - - - 2/15/2022 2/15/2022 4/12/2022   Planned next cycle start date - - 2/15/2022 - 3/15/2022 3/15/2022 -   Doses missed in last 2 weeks - - - - - - -   Adherence Assessment - - - - - - -   Adverse Effects - - - - - - Anemia;Thrombocytopenia;Other (See Note for Details)   Myalgias/Arthralgias - - - - - - -   Pharmacist Intervention(myalgias/arthralgias) - - - - - - -   Anemia - - - - - - Grade 2   Pharmacist Intervention(anemia) - - - - - - No   Fatigue - - - - - - -   Pharmacist Intervention(fatigue) - - - - - - -   Thrombocytopenia - - - - - - Grade 1   Pharmacist Intervention(thrombocytopenia) - - - - - - No   Other (See Note for Details) - - - - - - Hypomagnesemia (Grade 1)   Pharmacist intervention(other) - - - - - - No   Home BPs - - - - - - -   Any new drug interactions? - - - - - - -   Pharmacist Intervention? - - - -  - - -   Intervention(s) - - - - - - -   Is the dose as ordered appropriate for the patient? - - - - - - Yes   Has the patient been assessed within the past 6 months for depression? - - - - - - -   Has the patient missed any days of school, work, or other routine activity? - - - - - - -       Labs:  _  Result Component Current Result Ref Range   Sodium 142 (4/25/2022) 133 - 144 mmol/L     _  Result Component Current Result Ref Range   Potassium 4.2 (4/25/2022) 3.4 - 5.3 mmol/L     _  Result Component Current Result Ref Range   Calcium 9.0 (4/25/2022) 8.5 - 10.1 mg/dL     _  Result Component Current Result Ref Range   Magnesium 1.5 (L) (4/25/2022) 1.6 - 2.3 mg/dL     No results found for Phos within last 30 days.     _  Result Component Current Result Ref Range   Albumin 3.9 (4/25/2022) 3.4 - 5.0 g/dL     _  Result Component Current Result Ref Range   Urea Nitrogen 22 (4/25/2022) 7 - 30 mg/dL     _  Result Component Current Result Ref Range   Creatinine 1.07 (4/25/2022) 0.66 - 1.25 mg/dL     _  Result Component Current Result Ref Range   AST 14 (4/25/2022) 0 - 45 U/L     _  Result Component Current Result Ref Range   ALT 16 (4/25/2022) 0 - 70 U/L     _  Result Component Current Result Ref Range   Bilirubin Total 0.7 (4/25/2022) 0.2 - 1.3 mg/dL     _  Result Component Current Result Ref Range   WBC Count 5.8 (4/25/2022) 4.0 - 11.0 10e3/uL     _  Result Component Current Result Ref Range   Hemoglobin 9.9 (L) (4/25/2022) 13.3 - 17.7 g/dL     _  Result Component Current Result Ref Range   Platelet Count 130 (L) (4/25/2022) 150 - 450 10e3/uL     No results found for ANC within last 30 days.     _  Result Component Current Result Ref Range   Absolute Neutrophils 3.4 (4/25/2022) 1.6 - 8.3 10e3/uL        Assessment & Plan:  Results are concerning for grade 1 thrombocytopenia and grade 2 neutropenia.  No dose adjustments necessary.  Continue current regimen.    Follow-Up:  5/4 visit with Dr Jaret Falcon,  PharmD  Oral Chemotherapy Monitoring Program  [facility]  [phone]

## 2022-04-25 NOTE — TELEPHONE ENCOUNTER
Call complete for pre procedure reminder, travel screen and updated visitor policy.  COVID in process. Was tested on 4/22 at [facility].

## 2022-04-26 NOTE — PROGRESS NOTES
Prep complete for Defibrillator Placement. Awaiting consent. Wife Carla at bedside will transport patient home post procedure cell [phone].

## 2022-04-26 NOTE — PROGRESS NOTES
D/I/A:  Patient is tolerating liquids and foods, ambulating, urinating, puncture sites are stable ( no bleeding and no hematoma) and patient has a .  A+O x4 and making needs known.  CCL access sites C/D/I; no bleeding or hematoma; CMS intact.  VSSA.  V-paced/A-fib on monitor.  IV access removed.  Education completed and outlined in AVS or handout: medications reviewed with patient.  Questions answered prior to discharge.  Belongings returned to patient at discharge.    P: Discharged to self care.  Patient to follow up with appts as per discharge instruction.

## 2022-04-26 NOTE — PROGRESS NOTES
D/I/A: Pt roomed on 3C in bay 35.  Arrived via litter and accompanied by Cath lab RN On/Off: On monitor.  VSSA.  Rhythm upon arrival V-paced on monitor.  Denies pain or sob.  Reviewed activity restrictions and when to notify RN, ie-changes to breathing or increased chest pressure or chest pain.  CCL access: Left chest, site covered with premapore. Dressing CDI   P: Continue to monitor status.  Discharge to home once meeting criteria.

## 2022-04-26 NOTE — DISCHARGE INSTRUCTIONS
Home Care after an ICD implant    Wound care:  Keep your incision (surgery wound) dry for 3 days.  After 3 days, you may remove the outer bandage.  Keep the strips of tape on.  They will be removed at your clinic visit.  Check for signs of infection each day.  These include increased redness, swelling, drainage or a fever over 101 F (38.3 C).  Call us immediately if you see any of these signs.  If there are no signs of infection, you may shower in 3 days.  Do not submerge the incision (in a bath tub, hot tub, or swimming pool) until fully healed.  Pain:   You may have mild to moderate pain for 3 to 5 days.  Take acetaminophen (Tylenol) or ibuprofen (Advil) for the pain.  Call us if the pain is severe or lasts more than 5 days.    Activity:  You should slowly go back to your normal activities after 24 hours.  Healing will take 4 to 6 weeks.  No driving for 3 days  Avoid climbing a ladder alone.  It is best to stay within 4 feet of the ground.  Avoid anything that may cause rough contact or a hard hit to your chest.  This includes football, hockey, and other contact sports.  Do not go swimming or boating alone.      For at least 4 weeks:  Do not raise your affected arm above your shoulder.  Do not use your affected arm to push, pull, or lift anything over 10 pounds.  Avoid repetitive upper body activities for 6 weeks (ie: golf, swimming, and weight lifting)    Follow Up Visits:  Return to the clinic in 7 to 10 days to have your device and wound checked.    Telling others about your device:  Before you have any medical tests or treatments, tell the doctors, dentists, and other care providers about your device.  There are a few tests and treatments that may interfere with your device.  (These include MRI, radiation therapy, electrocautery, and others.)  Your care team may need to take special steps to keep you safe.  Before you leave the hospital, you will receive a temporary ID card.  A permanent card will be mailed  to you about 6 to 8 weeks later.  Always carry the ID card with you.  It has important details about your device.  You should also get a MedicAlert ID.  Please ask us for a MedicAlert brochure, or go to [email or web address].    Safety near electrical equipment:  All of these are safe to use when in good repair:  Microwaves  Radios  Cordless phone  Remote controls  Small electrical tools  Cell phones: Keep cell phones at least 6 inches from your device.  Do not carry the phone in a pocket near your device.  Security muro: It is okay to walk through security muro at the airports and department stores.  Tell airport security that you have a defibrillator.  They should keep the screening wand at least 6 inches from your device.  Full-body scanners are safe.    Avoid the following:   MRI tests in the hospital unless you have a MRI safe defibrillator.   Arc welding, chain saws and high-powered industrial or commercial tools.   Power lines, power plants and large power generators.   Electric body fat scales.   Magnetic mattress pads or pillow.    What to do after a shock from your ICD:  Stop what you re doing and rest.  If you feel fine before and after the shock, call the device clinic.  If you feel unwell or receive more than one shock, call 911 or go to the emergency room.  A shock could mean that your condition has changed and you may need to see a doctor.    Questions?  Please call [facility]   Device Nurse:          Business Hours:  [phone]    After Hours:  [phone]   Choose option 4, then ask for the  on-call device nurse at job code [phone].    Your next device clinic appointment is scheduled on:    May 3, 2022 at 8 am.                                                 [facility]  [facility] - [facility] [address]  [address] [place] [address]  [id number]

## 2022-05-03 NOTE — TELEPHONE ENCOUNTER
Oral Chemotherapy Monitoring Program    Medication: Pomalyst  Rx:  4mg PO daily 14/28 ds    Auth #: [id number]  Risk Category: Adult male  Routine survey questions reviewed. yes        Danitza Boykin   [facility] Pharm[facility] Liaison, alpha split R-Z  Phone: [phone]  Fax: [phone]  Email: [email or web address]

## 2022-05-03 NOTE — PROGRESS NOTES
Infusion Nursing Note:  Erasmo Pearce presents today for scheduled infusion.    Patient seen by provider today: Yes: Dr. Raygoza   present during visit today: Not Applicable.    Note: Parameters met for scheduled chemotherapy. Premedicated with Tylenol 650 mg PO, Benadryl 50 mg PO, and Singulair 10 mg PO. Dexamethasone 20 mg PO given per therapy plan. Received Kyprolis 20 mg IV , pre and post D5W flush given. Darzalex 1800 mg given subcutaneous to left lower abdomen.        Intravenous Access:  Peripheral IV placed. Positive BR pre/post infusion    Treatment Conditions:  Lab Results   Component Value Date    HGB 11.1 (L) 05/03/2022    WBC 7.8 05/03/2022    ANEU 9.7 (H) 01/11/2022    ANEUTAUTO 5.9 05/03/2022     05/03/2022          Post Infusion Assessment:  Patient tolerated infusion without incident.  Patient tolerated injection without incident.       Discharge Plan:   Patient discharged in stable condition accompanied by: wife.  Departure Mode: Ambulatory.      Laura Almaraz RN

## 2022-05-03 NOTE — PATIENT INSTRUCTIONS
It was a pleasure to see you in clinic today.  Please do not hesitate to call with any questions or concerns.  We look forward to seeing you in clinic at your next device check in 3 months.    Jonatan Echavarria, JOHN  Electrophysiology Nurse Clinician  [facility]    During Business Hours Please Call:  [phone]  After Hours Please Call:  [phone] - select option #4 and ask for job code [phone]

## 2022-05-03 NOTE — NURSING NOTE
"Oncology Rooming Note    May 3, 2022 9:13 AM   Erasmo Pearce is a 71 year old male who presents for:    Chief Complaint   Patient presents with     Blood Draw     Labs drawn via PIV start by RN. VS taken.     Oncology Clinic Visit     Return clinic visit s/p BMT dx MM     Initial Vitals: /68   Pulse 70   Temp 97.4  F (36.3  C) (Oral)   Resp 16   Wt 83.7 kg (184 lb 8 oz)   SpO2 97%   BMI 26.47 kg/m   Estimated body mass index is 26.47 kg/m  as calculated from the following:    Height as of 4/26/22: 1.778 m (5' 10\").    Weight as of this encounter: 83.7 kg (184 lb 8 oz). Body surface area is 2.03 meters squared.  No Pain (0) Comment: Data Unavailable   No LMP for male patient.  Allergies reviewed: Yes  Medications reviewed: Yes    Medications: Medication refills not needed today.  Pharmacy name entered into [employer]:    [facility] - [place], [place] - [facility] - [place], [place] - [address] [place] [address]  [facility] [place] - [address] [place] [address]    Clinical concerns: Patient reports hypotension and nausea with device check this morning.Took Zofran and feeling improved.  Denies pain.   Laura Almaraz RN              "

## 2022-05-03 NOTE — NURSING NOTE
Chief Complaint   Patient presents with     Blood Draw     Labs drawn via PIV start by RN. VS taken.     Labs drawn with PIV start by rn.  Pt tolerated well.  VS taken and pt checked in for next appt.    Gonzalez Zepeda RN

## 2022-05-03 NOTE — LETTER
5/3/2022         RE: Erasmo Pearce  [address]  [address]  [facility] [address]        Dear Colleague,    Thank you for referring your patient, Erasmo Pearce, to the [facility] BLOOD AND MARROW TRANSPLANT PROGRAM [place]. Please see a copy of my visit note below.    BONE MARROW CLINIC VISIT       Jonh returns to the hematology clinic for treatment of relapsed multiple myeloma Jonh has completed 2 cycles of daratumumab 1400 mg weekly Dex 20 mg on days 1 8 and 15 and pomalidomide 4 mg on days 1 through 14 and Decadron on days 1-9 16 and 23 he did have some cytopenias with thrombocytopenia with his platelet count falling into the 80s he otherwise is tolerated this very well He received Day 1 of Cycle 3 on Dec 28 with Jenny, Kyprolis Pomalidamide and Dex. He felt fatigued with cough and SOB. COVID negative but hospitalized.in [place] 12/30/21-1/2/22 with Influenza A with acute respiratory failure and hypoxia. Treated with Tamiflu, rocephin and azithro for sinusitus, treated for hypotension and CHF,treated for COPD with dexamethasone.     INTERVAL HISTORY  Jonh is here for day 1 of cycle 6.  He is feeling better less fatigued. He did have his ICD replaced on April 16. The ICD fired twice that evening but not since. He denies nausea vomiting or joint pain.  He has some shortness of breath on exertion.  He is taking7 Mag-Ox per day.      PAST MEDICAL HISTORY, SOCIAL HISTORY AND FAMILY HISTORY:  See my note from 03/2022.      REVIEW OF SYSTEMS:  A 12 point review of systems done is negative as in HPI.      PHYSICAL EXAMINATION: /68   Pulse 70   Temp 97.4  F (36.3  C) (Oral)   Resp 16   Wt 83.7 kg (184 lb 8 oz)   SpO2 97%   BMI 26.47 kg/m      GENERAL:  He is an alert gentleman in no acute distress.   VITAL SIGNS:  Stable.   HEENT:  Normocephalic.  EOM okay.  Mouth without lesion.   RESPIRATORY:  Clear to percussion and auscultation. No rales.   CARDIAC:  Irregularly  irregular rhythm.  No S3, S4 or murmur. ICD  In Left chest wall  ABDOMEN:  Soft without hepatosplenomegaly.   EXTREMITIES:  +1 edema bilaterally      Latest Reference Range & Units 05/03/22 09:02   Sodium 133 - 144 mmol/L 139   Potassium 3.4 - 5.3 mmol/L 4.0   Chloride 94 - 109 mmol/L 103   Carbon Dioxide 20 - 32 mmol/L 28   Urea Nitrogen 7 - 30 mg/dL 21   Creatinine 0.66 - 1.25 mg/dL 1.18   GFR Estimate >60 mL/min/1.73m2 66 [1]   Calcium 8.5 - 10.1 mg/dL 9.5   Anion Gap 3 - 14 mmol/L 8   Magnesium 1.6 - 2.3 mg/dL 1.6   Albumin 3.4 - 5.0 g/dL 4.0   Protein Total 6.8 - 8.8 g/dL 6.9   Bilirubin Total 0.2 - 1.3 mg/dL 1.2   Alkaline Phosphatase 40 - 150 U/L 133   ALT 0 - 70 U/L 17   AST 0 - 45 U/L 17   Glucose 70 - 99 mg/dL 154 (H)   WBC 4.0 - 11.0 10e3/uL 7.8   Hemoglobin 13.3 - 17.7 g/dL 11.1 (L)   Hematocrit 40.0 - 53.0 % 34.0 (L)   Platelet Count 150 - 450 10e3/uL 172   RBC Count 4.40 - 5.90 10e6/uL 3.17 (L)   MCV 78 - 100 fL 107 (H)   MCH 26.5 - 33.0 pg 35.0 (H)   MCHC 31.5 - 36.5 g/dL 32.6   RDW 10.0 - 15.0 % 14.6   % Neutrophils % 75   % Lymphocytes % 13   % Monocytes % 8   % Eosinophils % 3   % Basophils % 1   Absolute Basophils 0.0 - 0.2 10e3/uL 0.0   Absolute Eosinophils 0.0 - 0.7 10e3/uL 0.3   Absolute Immature Granulocytes <=0.4 10e3/uL 0.0   Absolute Lymphocytes 0.8 - 5.3 10e3/uL 1.0   Absolute Monocytes 0.0 - 1.3 10e3/uL 0.6   % Immature Granulocytes % 0   (      ASSESSMENT  1.  Multiple myeloma. In relapse  2.  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  Cardiomyopathy with CHF.   4.  Atrial fibrillation.   5.  History of deep vein thrombosis, on warfarin.   6.  Chronic back pain.   7.  Aortic aneurysm.   8.  History of plasmacytoma of the clivus, status post radiation.   9.  History of ventricular tachycardia with implantable defibrillator in place.   10.  Hypomagnesemia.     11  Hyperkalemia   12. Skin lesion   13. Influenza A  Jonh's light chains are  stable at 17 for lambda We are stalled in the drop of lambda light chains I will give him one more cycle of Jenny day 1 Kyprolis days 1,8, 15, Dex Days 1, 8 and 15 and Pomalidamide 4mg days 1-14. Will then pause and give him a drug holiday and resume a maintenance program Aug 2  with Jenny 1800mg Day 1, Dex 20mg Days 1, 8 and 15 and pom 4mg Days 1-14.Will use this as maintenance for now.    I spent a total of 30  minutes face to face with Erasmo Pearce during today's office visit. Over 50% of this time was spent counseling the patient and coordinating the care regarding multiple myeloma and 10minutes preparing to see the patient and  care coordination           Again, thank you for allowing me to participate in the care of your patient.      Sincerely,    Rojas Raygoza MD

## 2022-05-03 NOTE — LETTER
5/3/2022         RE: Erasmo Pearce  [address]  [address]  [facility] [address]        Dear Colleague,    Thank you for referring your patient, Erasmo Pearce, to the [facility] BLOOD AND MARROW TRANSPLANT PROGRAM [place]. Please see a copy of my visit note below.    Infusion Nursing Note:  Erasmo Pearce presents today for scheduled infusion.    Patient seen by provider today: Yes: Dr. Raygoza   present during visit today: Not Applicable.    Note: Parameters met for scheduled chemotherapy. Premedicated with Tylenol 650 mg PO, Benadryl 50 mg PO, and Singulair 10 mg PO. Dexamethasone 20 mg PO given per therapy plan. Received Kyprolis 20 mg IV , pre and post D5W flush given. Darzalex 1800 mg given subcutaneous to left lower abdomen.        Intravenous Access:  Peripheral IV placed. Positive BR pre/post infusion    Treatment Conditions:  Lab Results   Component Value Date    HGB 11.1 (L) 05/03/2022    WBC 7.8 05/03/2022    ANEU 9.7 (H) 01/11/2022    ANEUTAUTO 5.9 05/03/2022     05/03/2022          Post Infusion Assessment:  Patient tolerated infusion without incident.  Patient tolerated injection without incident.       Discharge Plan:   Patient discharged in stable condition accompanied by: wife.  Departure Mode: Ambulatory.      Laura Almaraz RN                          Again, thank you for allowing me to participate in the care of your patient.        Sincerely,        [facility]

## 2022-05-03 NOTE — TELEPHONE ENCOUNTER
I have reviewed and agree to the information submitted for pomalidomide prescription.    Xiao Chino, PharmD, [facility]  Oral Chemotherapy Monitoring Program  [facility]  [phone]  May 3, 2022      [facility] requires this form, will not accept a e-scribe or fax.   Please verify the rx for this patient.  Thank  you!

## 2022-05-06 NOTE — TELEPHONE ENCOUNTER
Jonh called back and he will receive the Pomalyst Saturday morning.    Thank you,    Susy Sloan  Oncology Pharmacy Liaison II  [email or web address]  Phone: [phone]  Fax: [phone]

## 2022-05-06 NOTE — TELEPHONE ENCOUNTER
Update: Sebastian has the verbal RX and are processing. It should go out by 3 today be delivered tomorrow (if [employer] delivers in their area on Saturday's, otherwise Monday) per Stephon at [phone]. Jonh has been contacted and he will call [facility] of he does hear by 2-2:30.      [facility] A [facility] called Colleen and gave a verbal. I will follow up with Colleen and see if they can overnight since he is out.    Thank you,    Susy Sloan  Oncology Pharmacy Liaison II  [email or web address]  Phone: [phone]  Fax: [phone]

## 2022-05-10 NOTE — ORAL ONC MGMT
Subjective/Objective:  Erasmo Pearce is a 71 year old male contacted by phone for a follow-up visit for oral chemotherapy.  Pt confirms they are taking Pomalyst 4mg daily for 2 weeks. Pt reports no missed doses. Pt reports starting cycle on 5/3/22. Pt reports tolerating the medication well. Pt denies side effects including nausea, vomiting, diarrhea and constipation. Jonh does report that both he and his wife have tested positive for COVID, which they are being followed/treated by [facility]. Jonh reports he has been prescribed Paxlovid- there is a minor DDI with pomalyst (may increase Pomalyst concentration), but there is no recommended dose adjustment only monitoring. Jonh has been in communication with care team via [facility] to notify of positive status, [facility] treatment, and assess chemotherapy plan. We will follow along and review plan.     ORAL CHEMOTHERAPY 2/8/2022 2/23/2022 3/1/2022 3/8/2022 4/25/2022 5/3/2022 5/10/2022   Assessment Type Refill Lab Monitoring Lab Monitoring Refill Lab Monitoring Refill Monthly Follow up   Diagnosis Code Multiple Myeloma Multiple Myeloma Multiple Myeloma Multiple Myeloma Multiple Myeloma Multiple Myeloma Multiple Myeloma   Providers Dr. Jaret Raygoza   [facility]   Drug Name Pomalyst (pomalidomide) Pomalyst (pomalidomide) Pomalyst (pomalidomide) Pomalyst (pomalidomide) Pomalyst (pomalidomide) Pomalyst (pomalidomide) Pomalyst (pomalidomide)   Dose 4 mg 4 mg 4 mg 4 mg 4 mg 4 mg 4 mg   Current Schedule Daily Daily Daily Daily Daily Daily Daily   Cycle Details 2 weeks on, 2 weeks off 2 weeks on, 2 weeks off 2 weeks on, 2 weeks off 2 weeks on, 2 weeks off 2 weeks on, 2 weeks off 2 weeks on, 2 weeks off 2 weeks on, 2 weeks off   Start Date of Last Cycle - - 2/15/2022 2/15/2022 4/12/2022 - 5/3/2022   Planned next  "cycle start date 2/15/2022 - 3/15/2022 3/15/2022 - - -   Doses missed in last 2 weeks - - - - - - 0   Adherence Assessment - - - - - - Adherent   Adverse Effects - - - - Anemia;Thrombocytopenia;Other (See Note for Details) - No AE identified during assessment   Myalgias/Arthralgias - - - - - - -   Pharmacist Intervention(myalgias/arthralgias) - - - - - - -   Anemia - - - - Grade 2 - Resolved due to intervention   Pharmacist Intervention(anemia) - - - - No - -   Fatigue - - - - - - -   Pharmacist Intervention(fatigue) - - - - - - -   Thrombocytopenia - - - - Grade 1 - Resolved due to intervention   Pharmacist Intervention(thrombocytopenia) - - - - No - -   Other (See Note for Details) - - - - Hypomagnesemia (Grade 1) - -   Pharmacist intervention(other) - - - - No - -   Home BPs - - - - - - -   Any new drug interactions? - - - - - - Yes   Pharmacist Intervention? - - - - - - No   Intervention(s) - - - - - - -   Is the dose as ordered appropriate for the patient? - - - - Yes - Yes   Has the patient been assessed within the past 6 months for depression? - - - - - - -   Has the patient missed any days of school, work, or other routine activity? - - - - - - -       Vitals:  BP:   BP Readings from Last 1 Encounters:   05/03/22 114/67     Wt Readings from Last 1 Encounters:   05/03/22 83.7 kg (184 lb 8 oz)     Estimated body surface area is 2.03 meters squared as calculated from the following:    Height as of 4/26/22: 1.778 m (5' 10\").    Weight as of 5/3/22: 83.7 kg (184 lb 8 oz).    Labs:  _  Result Component Current Result Ref Range   Sodium 139 (5/3/2022) 133 - 144 mmol/L     _  Result Component Current Result Ref Range   Potassium 4.0 (5/3/2022) 3.4 - 5.3 mmol/L     _  Result Component Current Result Ref Range   Calcium 9.5 (5/3/2022) 8.5 - 10.1 mg/dL     _  Result Component Current Result Ref Range   Magnesium 1.6 (5/3/2022) 1.6 - 2.3 mg/dL     No results found for Phos within last 30 days.     _  Result Component " Current Result Ref Range   Albumin 4.0 (5/3/2022) 3.4 - 5.0 g/dL     _  Result Component Current Result Ref Range   Urea Nitrogen 21 (5/3/2022) 7 - 30 mg/dL     _  Result Component Current Result Ref Range   Creatinine 1.18 (5/3/2022) 0.66 - 1.25 mg/dL       _  Result Component Current Result Ref Range   AST 17 (5/3/2022) 0 - 45 U/L     _  Result Component Current Result Ref Range   ALT 17 (5/3/2022) 0 - 70 U/L     _  Result Component Current Result Ref Range   Bilirubin Total 1.2 (5/3/2022) 0.2 - 1.3 mg/dL       _  Result Component Current Result Ref Range   WBC Count 7.8 (5/3/2022) 4.0 - 11.0 10e3/uL     _  Result Component Current Result Ref Range   Hemoglobin 11.1 (L) (5/3/2022) 13.3 - 17.7 g/dL     _  Result Component Current Result Ref Range   Platelet Count 172 (5/3/2022) 150 - 450 10e3/uL     No results found for ANC within last 30 days.       Assessment/Plan:  Pt is currently tolerating therapy well. Plan to continue Pomalyst as prescribed, unless notified otherwise due to COVID status. Pt verbalized understanding and agrees to plan. Encouraged pt to call with any issues or concerns.    Follow-Up:  5/17: labs and infusion    Refill Due:  Not applicable, plan to have a therapy holiday after completion of this cycle through early August.    Nakia Flores, PharmD  Oral Chemotherapy Monitoring Program  [facility]  [phone]  May 10, 2022

## 2022-05-24 NOTE — PROGRESS NOTES
Infusion Nursing Note:  Erasmo Pearce presents today for scheduled Cycloe 6, Day 8.    Patient seen by provider today: No   present during visit today: Not Applicable.    Note: Labs were monitored.      Intravenous Access:  Peripheral IV placed.  Positive blood flow was noted pre and post Kyprolis infusion    Treatment Conditions:  Patient received scheduled IV fluid and Kyprolis infusion.  Patient received an add-on 2 grams IV magnesium for a magnesium level of 1.4.      Post Infusion Assessment:  Patient tolerated infusion without incident.       Discharge Plan:   Patient discharged in stable condition accompanied by: wife.      IRENE WEBER RN

## 2022-05-24 NOTE — TELEPHONE ENCOUNTER
Jonh had COVID a couple weeks ago Still has runny nose and  ANC 1000 No fever  But has dry cough  Will give azithromycin 500mg today then 250mg/d x 4 days.  Will have dental appt for cracked tooth Will advise amoxicillin 1000mg 1 h before procedure

## 2022-05-24 NOTE — ORAL ONC MGMT
Oral Chemotherapy Monitoring Program  Lab Follow Up    Reviewed lab results from 5/24/22.    ORAL CHEMOTHERAPY 2/23/2022 3/1/2022 3/8/2022 4/25/2022 5/3/2022 5/10/2022 5/24/2022   Assessment Type Lab Monitoring Lab Monitoring Refill Lab Monitoring Refill Monthly Follow up Lab Monitoring   Diagnosis Code Multiple Myeloma Multiple Myeloma Multiple Myeloma Multiple Myeloma Multiple Myeloma Multiple Myeloma Multiple Myeloma   Providers Dr. Jaret Raygoza   [facility]   Drug Name Pomalyst (pomalidomide) Pomalyst (pomalidomide) Pomalyst (pomalidomide) Pomalyst (pomalidomide) Pomalyst (pomalidomide) Pomalyst (pomalidomide) Pomalyst (pomalidomide)   Dose 4 mg 4 mg 4 mg 4 mg 4 mg 4 mg 4 mg   Current Schedule Daily Daily Daily Daily Daily Daily Daily   Cycle Details 2 weeks on, 2 weeks off 2 weeks on, 2 weeks off 2 weeks on, 2 weeks off 2 weeks on, 2 weeks off 2 weeks on, 2 weeks off 2 weeks on, 2 weeks off 2 weeks on, 2 weeks off   Start Date of Last Cycle - 2/15/2022 2/15/2022 4/12/2022 - 5/3/2022 5/3/2022   Planned next cycle start date - 3/15/2022 3/15/2022 - - - -   Doses missed in last 2 weeks - - - - - 0 -   Adherence Assessment - - - - - Adherent -   Adverse Effects - - - Anemia;Thrombocytopenia;Other (See Note for Details) - No AE identified during assessment -   Myalgias/Arthralgias - - - - - - -   Pharmacist Intervention(myalgias/arthralgias) - - - - - - -   Anemia - - - Grade 2 - Resolved due to intervention -   Pharmacist Intervention(anemia) - - - No - - -   Fatigue - - - - - - -   Pharmacist Intervention(fatigue) - - - - - - -   Thrombocytopenia - - - Grade 1 - Resolved due to intervention -   Pharmacist Intervention(thrombocytopenia) - - - No - - -   Other (See Note for Details) - - - Hypomagnesemia (Grade 1) - - -   Pharmacist intervention(other) - - - No -  - -   Home BPs - - - - - - -   Any new drug interactions? - - - - - Yes -   Pharmacist Intervention? - - - - - No -   Intervention(s) - - - - - - -   Is the dose as ordered appropriate for the patient? - - - Yes - Yes -   Has the patient been assessed within the past 6 months for depression? - - - - - - -   Has the patient missed any days of school, work, or other routine activity? - - - - - - -       Labs:  _  Result Component Current Result Ref Range   Sodium 140 (5/24/2022) 133 - 144 mmol/L     _  Result Component Current Result Ref Range   Potassium 4.5 (5/24/2022) 3.4 - 5.3 mmol/L     _  Result Component Current Result Ref Range   Calcium 8.8 (5/24/2022) 8.5 - 10.1 mg/dL     _  Result Component Current Result Ref Range   Magnesium 1.4 (L) (5/24/2022) 1.6 - 2.3 mg/dL     No results found for Phos within last 30 days.     _  Result Component Current Result Ref Range   Albumin 3.5 (5/24/2022) 3.4 - 5.0 g/dL     _  Result Component Current Result Ref Range   Urea Nitrogen 32 (H) (5/24/2022) 7 - 30 mg/dL     _  Result Component Current Result Ref Range   Creatinine 1.20 (5/24/2022) 0.66 - 1.25 mg/dL     _  Result Component Current Result Ref Range   AST 22 (5/24/2022) 0 - 45 U/L     _  Result Component Current Result Ref Range   ALT 26 (5/24/2022) 0 - 70 U/L     _  Result Component Current Result Ref Range   Bilirubin Total 0.7 (5/24/2022) 0.2 - 1.3 mg/dL     _  Result Component Current Result Ref Range   WBC Count 2.5 (L) (5/24/2022) 4.0 - 11.0 10e3/uL     _  Result Component Current Result Ref Range   Hemoglobin 9.8 (L) (5/24/2022) 13.3 - 17.7 g/dL     _  Result Component Current Result Ref Range   Platelet Count 133 (L) (5/24/2022) 150 - 450 10e3/uL     _  Result Component Current Result Ref Range   Absolute Neutrophils 1.0 (L) (5/24/2022) 1.6 - 8.3 10e3/uL     _  Result Component Current Result Ref Range   Absolute Neutrophils 5.9 (5/3/2022) 1.6 - 8.3 10e3/uL        Assessment & Plan:  No concerning  abnormalities related to Pomalyst. ANC today = 1.0, would not require dose adjustment if Jonh were taking. Jonh has finished his 14-day course this cycle and plan is for drug holiday until August.    Follow-Up:  6/28 appt to confirm plans for drug holiday.    Mio Navas, PharmD, BCPS, BCOP  Hematology/Oncology Clinical Pharmacist  Oral Chemotherapy Monitoring Program  [facility]  [phone]

## 2022-05-24 NOTE — NURSING NOTE
"Oncology Rooming Note    May 24, 2022 7:56 AM   Erasmo Pearce is a 71 year old male who presents for:    Chief Complaint   Patient presents with     Labs Only     Labs drawn via piv by RN in lab     Infusion     Scheduled chemo infusion for multiple myeloma     Initial Vitals: /68   Pulse 88   Temp 98.1  F (36.7  C) (Oral)   Resp 18   SpO2 100%  Estimated body mass index is 26.47 kg/m  as calculated from the following:    Height as of 4/26/22: 1.778 m (5' 10\").    Weight as of 5/3/22: 83.7 kg (184 lb 8 oz). There is no height or weight on file to calculate BSA.  No Pain (0) Comment: Data Unavailable   No LMP for male patient.  Allergies reviewed: Yes  Medications reviewed: Yes    Medications: Medication refills not needed today.  Pharmacy name entered into [facility]:    [facility] - [place], [place] - [address] [facility] - [place], [place] - [address] [facility]  [facility], [address]    Clinical concerns:  Patient denies fevers/N/V/respiratory symptoms.  He continues to have occasional diarrhea.  Patient tested positive for Covid on 5-9-22 and denies having any symptoms.  Wife was positive also.  Patient denies any pain this morning.      IRENE WEBER RN              "

## 2022-05-24 NOTE — NURSING NOTE
Chief Complaint   Patient presents with     Labs Only     Labs drawn via piv by RN in lab       Labs drawn from PIV placed by RN. Line flushed with saline.     Danyelle Wood RN

## 2022-05-24 NOTE — LETTER
5/24/2022         RE: Erasmo Pearce  [address]  [address]  [facility] [address]        Dear Colleague,    Thank you for referring your patient, Erasmo Pearce, to the [facility] [place]. Please see a copy of my visit note below.    Infusion Nursing Note:  Erasmo Pearce presents today for scheduled Cycloe 6, Day 8.    Patient seen by provider today: No   present during visit today: Not Applicable.    Note: Labs were monitored.      Intravenous Access:  Peripheral IV placed.  Positive blood flow was noted pre and post Kyprolis infusion    Treatment Conditions:  Patient received scheduled IV fluid and Kyprolis infusion.  Patient received an add-on 2 grams IV magnesium for a magnesium level of 1.4.      Post Infusion Assessment:  Patient tolerated infusion without incident.       Discharge Plan:   Patient discharged in stable condition accompanied by: wife.      IRENE WEBER RN                          Again, thank you for allowing me to participate in the care of your patient.        Sincerely,        [facility]

## 2022-05-27 NOTE — CONFIDENTIAL NOTE
Acyclovir Refill   Last prescribing provider: Dr Raygoza     Last clinic visit date: 5/3/22 Dr Raygoza     Any missed appointments or no-shows since last clinic visit?: no     Recommendations for requested medication (if none, N/A): N/A    Next clinic visit date: 6/28/22 Dr Raygoza     Any other pertinent information (if none, N/A): N/A

## 2022-05-31 NOTE — LETTER
5/31/2022         RE: Erasmo Pearce  [address]  [address]  [place] [address]        Dear Colleague,    Thank you for referring your patient, Erasmo Pearce, to the [facility]PL[facility] PRO[facility] [place]. Please see a copy of my visit note below.    Infusion Nursing Note:  Erasmo Pearce presents today for C6D15 Kyprolis/Daratumamab.    Patient seen by provider today: No   present during visit today: Not Applicable.    Note: Labs monitored, parameters met. Assessment completed, see flowsheets for details. Pt was premedicated with decadron, tylenol, and benadryl. Daratumumab administered in LLQ of abdomen over 4 minutes. Kyprolis administered following pre flush over 10 minutes, post flush administered. Pt tolerated both medications without side effect or symptoms.      Intravenous Access:  Peripheral IV placed.    Treatment Conditions:  Lab Results   Component Value Date    HGB 10.8 (L) 05/31/2022    WBC 8.1 05/31/2022    ANEU 1.0 (L) 05/24/2022    ANEUTAUTO 6.4 05/31/2022     05/31/2022      Lab Results   Component Value Date     05/31/2022    POTASSIUM 4.2 05/31/2022    MAG 1.8 05/31/2022    CR 1.20 05/31/2022    EILEEN 9.8 05/31/2022    BILITOTAL 0.6 05/31/2022    ALBUMIN 3.8 05/31/2022    ALT 27 05/31/2022    AST 22 05/31/2022         Post Infusion Assessment:  Patient tolerated infusion without incident.  Patient tolerated injection without incident.  Blood return noted pre and post infusion.  Site patent and intact, free from redness, edema or discomfort.  No evidence of extravasations.       Discharge Plan:   Patient discharged in stable condition accompanied by: self.  Departure Mode: Ambulatory.      Mindi Loo RN                        Again, thank you for allowing me to participate in the care of your patient.        Sincerely,        [facility]

## 2022-05-31 NOTE — PROGRESS NOTES
Infusion Nursing Note:  Erasmo Pearce presents today for C6D15 Kyprolis/Daratumamab.    Patient seen by provider today: No   present during visit today: Not Applicable.    Note: Labs monitored, parameters met. Assessment completed, see flowsheets for details. Pt was premedicated with decadron, tylenol, and benadryl. Daratumumab administered in LLQ of abdomen over 4 minutes. Kyprolis administered following pre flush over 10 minutes, post flush administered. Pt tolerated both medications without side effect or symptoms.      Intravenous Access:  Peripheral IV placed.    Treatment Conditions:  Lab Results   Component Value Date    HGB 10.8 (L) 05/31/2022    WBC 8.1 05/31/2022    ANEU 1.0 (L) 05/24/2022    ANEUTAUTO 6.4 05/31/2022     05/31/2022      Lab Results   Component Value Date     05/31/2022    POTASSIUM 4.2 05/31/2022    MAG 1.8 05/31/2022    CR 1.20 05/31/2022    EILEEN 9.8 05/31/2022    BILITOTAL 0.6 05/31/2022    ALBUMIN 3.8 05/31/2022    ALT 27 05/31/2022    AST 22 05/31/2022         Post Infusion Assessment:  Patient tolerated infusion without incident.  Patient tolerated injection without incident.  Blood return noted pre and post infusion.  Site patent and intact, free from redness, edema or discomfort.  No evidence of extravasations.       Discharge Plan:   Patient discharged in stable condition accompanied by: self.  Departure Mode: Ambulatory.      Mindi Loo RN

## 2022-06-16 PROBLEM — I34.0 NONRHEUMATIC MITRAL VALVE REGURGITATION: Status: ACTIVE | Noted: 2022-01-01

## 2022-06-20 NOTE — LETTER
6/20/2022      RE: Erasmo Pearce  [address]  [address] [facility] [address]       Dear Colleague,    Thank you for the opportunity to participate in the care of your patient, Erasmo Pearce, at the [facility] [place] at [facility]. Please see a copy of my visit note below.      History:     Jonh Pearce is a 71-year-old very pleasant gentleman with multiple myeloma s/p PBSCT in 2006, ICM/NICM (LVEF 30-35%, NYHA II Stage B) s/p ICD placement in 2009, episodic VT/VF treated by appropriate ICD shocks and afib (CHADVASC 3) on warfarin followed in Cardio-oncology clinic for cardiomyopathy.      Patient was diagnosed with multiple myeloma in 2006 and underwent chemotherapy and autologous PBSCT. He was started on lenalinomide in 2017 due to relapse and then bortezomib in 07/2019 due to progressive disease. He was hospitalized for acute on chronic systolic heart failure,in 2019 and bortezomib was stopped.  Carfilzomib and daratumumab were added to pomalidomide and dexamethasone in November 2021. A transthoracic echocardiogram done at [facility] in January 2022 was notable for mildly reduced left ventricular function, moderate right ventricular dysfunction with moderate to severe mitral regurgitation in the setting of hypervolemia.     ATP x 1 on 3/3/22  NT pro BNP is ~6900  Has increased peripheral edema  Was advised to increase the lasix dose to 40 mg/d last week and edema has resolved now  He had an EP evaluation - talked about CRT-D and stopping digoxin. Ok to hold digoxin.     Interval history:   Had underwent CRTD placement in 4/2022.  He has not received any ATPs or shocks after the upgrade.  Patient continues to make good progress.  He has not had any worsening chest pain or dyspnea. He denies edema, palpitations, orthopnea, PND. He has been taking a lower dose of lasix (20 mg/day) with an extra dose of 20 mg as needed.      PAST MEDICAL HISTORY:  Past Medical History:   Diagnosis Date     Chronic systolic heart failure (H)      Ischemic cardiomyopathy      Other premature beats      Permanent atrial fibrillation (H)      Personal history of multiple myeloma      VF (ventricular fibrillation) (H)        CURRENT MEDICATIONS:  Current Outpatient Medications   Medication Sig Dispense Refill     acyclovir (ZOVIRAX) 400 MG tablet Take 1 tablet (400 mg) by mouth every 12 hours 60 tablet 3     albuterol (PROAIR HFA/PROVENTIL HFA/VENTOLIN HFA) 108 (90 Base) MCG/ACT inhaler Inhale 2 puffs into the lungs every 6 hours as needed for shortness of breath / dyspnea or wheezing 18 g      amoxicillin (AMOXIL) 500 MG capsule Take 2 tabs 1 Hour  before dental procedure (Patient not taking: Reported on 5/31/2022) 8 capsule 3     baclofen (LIORESAL) 10 MG tablet Take 1 tablet (10 mg) by mouth 3 times daily as needed for muscle spasms prn (Patient not taking: No sig reported)       Calcium Carbonate-Vitamin D (CALCIUM 500 + D PO) Take 2 tablets by mouth daily.       dexamethasone (DECADRON) 4 MG tablet Take  5 tabs Days 1,8, and 15 each cycle (Patient taking differently: Take  5 tabs Days 1,8, and 15 each cycle) 15 tablet 4     diclofenac (VOLTAREN) 75 MG EC tablet Take 1 tablet (75 mg) by mouth 2 times daily as needed for moderate pain 1 tab prn       digoxin (LANOXIN) 125 MCG tablet Take 1 tablet (125 mcg) by mouth daily 30 tablet 1     fentaNYL (DURAGESIC) 25 mcg/hr patch 72 hr Place 1 patch onto the skin every 72 hours       furosemide (LASIX) 20 MG tablet Take 1 tablet (20 mg) by mouth daily (Patient taking differently: Take 40 mg by mouth daily) 90 tablet 3     hydrocortisone 2.5 % cream Place rectally 2 times daily       lisinopril (ZESTRIL) 5 MG tablet Take 0.5 tablets (2.5 mg) by mouth 2 times daily 90 tablet 0     Magnesium Oxide 420 MG TABS Take 3 tabs (1260 mg) three times a day. (Patient taking differently: 420 mg Take 7 tabs  daily- taking  4 in AM and 3 night.) 810 tablet 3     metoprolol succinate ER (TOPROL-XL) 100 MG 24 hr tablet Take 1 tablet (100 mg) by mouth daily (Patient taking differently: Take 75 mg by mouth daily 75 mg daily) 90 tablet 3     mexiletine (MEXITIL) 150 MG capsule Take 1 capsule (150 mg) by mouth 2 times daily As close to 12 hours apart as possible 180 capsule 0     omeprazole (PRILOSEC) 20 MG capsule Take 20 mg by mouth daily  90 capsule 3     ondansetron (ZOFRAN-ODT) 4 MG ODT tab Take 4 mg by mouth every 6 hours as needed for nausea        oxycodone (ROXICODONE) 5 MG immediate release tablet Take 1-2 tablets by mouth every 6 hours as needed For pain       pomalidomide (POMALYST) 4 MG capsule Take 1 capsule (4 mg) by mouth daily Day 1 thru 14 of each 28 day cycle. Administer with or without food. Swallow whole with water. (Patient not taking: No sig reported) 14 capsule 0     pomalidomide (POMALYST) 4 MG capsule Take 1 capsule (4 mg) by mouth daily Day 1 thru 14 of each 28 day cycle. Administer with or without food. Swallow whole with water. (Patient not taking: No sig reported) 14 capsule 0     pomalidomide (POMALYST) 4 MG capsule Take 1 capsule (4 mg) by mouth daily Day 1 thru 14 of each 28 day cycle. Administer with or without food. Swallow whole with water. (Patient not taking: No sig reported) 14 capsule 0     pomalidomide (POMALYST) 4 MG capsule Take 1 capsule (4 mg) by mouth daily Day 1 thru 14 of each 28 day cycle. Administer with or without food. Swallow whole with water. (Patient not taking: No sig reported) 14 capsule 3     potassium chloride ER (KLOR-CON M) 10 MEQ CR tablet Take 1 tablet (10 mEq) by mouth every other day 90 tablet 3     simvastatin (ZOCOR) 40 MG tablet Take 1 tablet (40 mg) by mouth At Bedtime 90 tablet 0     spironolactone (ALDACTONE) 25 MG tablet Take 0.5 tablets (12.5 mg) by mouth daily 15 tablet 1     tiotropium (SPIRIVA) 18 MCG inhalation capsule Inhale 1 capsule (18 mcg) into the lungs daily  "30 capsule 3     warfarin (COUMADIN) 5 MG tablet Take 7.5 mg by mouth See Admin Instructions Take 1.5 tab by  and Friday and 1 tab Sun  , Thur and Sat         PAST SURGICAL HISTORY:  Past Surgical History:   Procedure Laterality Date     CV CORONARY ANGIOGRAM N/A 2019    Procedure: CV CORONARY ANGIOGRAM;  Surgeon: Giorgi Milan MD;  [facility]     CV LEFT HEART CATH N/A 2019    Procedure: Left Heart Cath;  Surgeon: Giorgi Milan MD;  [facility]     CV RIGHT HEART CATH MEASUREMENTS RECORDED N/A 2019    Procedure: CV RIGHT HEART CATH;  Surgeon: Giorgi Milan MD;  Location:  [facility]     CV RIGHT HEART EXERCISE STRESS STUDY N/A 2019    Procedure: Stress Drug Study;  Surgeon: Giorgi Milan MD;  Location:  [facility] CATH LAB     EP ICD INSERT BIVENT N/A 2022    Procedure: Implantable Cardioverter Defibrillator Device & Lead Implant - Biventricular;  Surgeon: Ander Jurado MD;  [facility]: [facility]     IMPLANT AUTOMATIC IMPLANTABLE CARDIOVERTER DEFIBRILLATOR         ALLERGIES:     Allergies   Allergen Reactions     Nkda [No Known Drug Allergies]      Sotalol Other (See Comments)     Other reaction(s): Low blood pressure, Prolonged QT interval       FAMILY HISTORY:  - Premature coronary artery disease      SOCIAL HISTORY:  Social History     Tobacco Use     Smoking status: Former Smoker     Packs/day: 1.00     Years: 25.00     Pack years: 25.00     Types: Cigarettes     Quit date: 10/15/1995     Years since quittin.6     Smokeless tobacco: Never Used   Substance Use Topics     Alcohol use: No     Drug use: No       ROS:   ROS: A comprehensive 10-point review of system is negative except for those reported in the HPI.    Exam:  /64 (BP Location: Left arm, Patient Position: Chair, Cuff Size: Adult Regular)   Pulse 80   Ht 1.803 m (5' 11\")   Wt 84.8 kg (187 " lb)   SpO2 96%   BMI 26.08 kg/m    In general, the patient is in no apparent distress.      HEENT: Sclerae white, not injected.    Neck: No JVD. No thyromegaly  Heart: RRR. Normal S1, S2. 2/6 Systolic murmur LLSB.    Lungs: Clear bilaterally.  No rhonchi, wheezes, rales.   GI: Soft, nontender, nondistended.   Extremities: No edema.  The pulses are 2+at the radial bilaterally.  Neuro: grossly non focal.   Psych: pleasant and conversant      Labs:    Chemistry panel: Recent Labs   Lab Test 06/20/22  1502 05/31/22  0757    140   POTASSIUM 4.3 4.2   CHLORIDE 105 105   CO2 26 27   ANIONGAP 9 8   * 110*   BUN 24 27   CR 1.21 1.20   EILEEN 9.2 9.8   MAG 1.6 1.8   GFRESTIMATED 64 65   AST 18 22   ALT 19 27       CBC:   Recent Labs   Lab Test 06/20/22  1502 05/31/22  0757   WBC 6.7 8.1   RBC 2.92* 3.06*   HGB 10.4* 10.8*   HCT 31.1* 32.9*   * 108*   MCH 35.6* 35.3*   MCHC 33.4 32.8   RDW 13.6 14.1   * 289       Lipid Panel:  Recent Labs   Lab Test 03/20/19  0000   CHOL 113   HDL 42   LDL 52   TRIG 93       Thyroid:   TSH   Date Value Ref Range Status   03/20/2019 0.62 0.47 - 5.00 mIU/mL Final     T4 Free   Date Value Ref Range Status   09/18/2007 1.18 0.70 - 1.85 ng/dL Final     Hemoglobin A1C POCT   Date Value Ref Range Status   08/29/2019 6.3 (H) 0 - 5.6 % Final     Comment:     Normal <5.7% Prediabetes 5.7-6.4%  Diabetes 6.5% or higher - adopted from ADA   consensus guidelines.       INR   Date Value Ref Range Status   06/20/2022 3.06 (H) 0.85 - 1.15 Final   04/27/2021 2.27 (H) 0.86 - 1.14 Final     Digoxin level December 2021 - 0.59  3/6/2022 - NT pro-BNP level 6900    Cardiac testing :    Echo 1/14/2022 ([facility])  Mildly reduced LV function, LVEF 45-50%  Moderately reduced RV function.  Moderate TR  Moderate to severe MR  Mean RA pressure 8   PASP 50 mmHg    11/2021 device interrogation  Device: [employer] E102 TELIGEN 100  Normal Device Function  Mode: VVI 40 bpm  : 13%  "  Presenting EGM: VS ~ 80 bpm w/ occ PVC's   Lead Trends Appear Stable: yes  Estimated battery longevity to RRT = 10 months.    Anticoagulant: coumadin  Ventricular Arrhythmia: Since last remote transmission there has been 1 treated episode in the VZ zone and 20 NSVT episodes. Treated episode occurred on 11-13-21, EGM displays VT @ 246 bpm that is converted with one 41 j shock.   Pt Notified of Transmission Results: yes, pt reports he was resting on the couch and was half asleep. He reports he sat up because he felt a little \"silly\" and then was shocked. He reports he is feeling well now and felt fine after the shock. Pt did not page the device RN on call. Pt reports the only thing new is he has had some changes to his Chemo regimen on November 2nd.       EKG 4/26/22  Sinus rhythm at 66    Aug 2019 RHC and coronary angiogram       Right sided filling pressures are severely elevated - mean RA 23, PCWP 31, mean PA 40     Moderately elevated pulmonary artery hypertension.    Left sided filling pressures are severely elevated.    Left ventricular filling pressures are severely elevated .    Reduced cardiac output level.    Heparin was held during the procedure due to thrombocytopenia    Nipride was started and titrate to 1mcg/kg/min. PCWP decreased from 30 mmHg to 24 mmHg. mPAP decreased from 40 mmHg to 30 mmHg. After pressures decreased team proceed with angiogram.    Mild LAD and LCx disease    Subtotal proximal RCA occlusion    No intervention performed    No mitral or aortic valve gradient     Assessment and Plan  Jonh is a 71 year old with -     1. Chronic Systolic Heart Failure (LVEF ~45% in 2022)  2. Persistent atrial fibrillation on warfarin  3. Ischemic heart disease - Subtotal proximal RCA occlusion and mild LAD and LCx disease August 2019  4. History of VT/VF, s/p ICD  5. Hypertension   6. Relapsed multiple myeloma with mets to brain and bone s/p BMT, currently on carfilzomib, daratumumab, Pomalyst and " dexa  7. T2DM  8. Hyperlipidemia   9.  Moderate to severe mitral regurgitation.      Jonh has chronic ischemic heart disease with mild left ventricular dysfunction.  He denies angina symptoms. His peripheral edema has improved.   I have advised him to continue the neurohormonal blockade with metoprolol 75 mg daily, lisinopril 2.5 mg twice daily.  I will reduce the Lasix dose from 40 mg to 20 mg daily given that his volume status has improved. He will continue spironolactone 12.5 mg daily and the low-dose digoxin which was started to optimize his heart failure regimen.  For the arrhythmias he is on mexiletine and warfarin.  His lipids are well managed on simvastatin.     Recommendations  Lasix to 20 mg daily with an extra dose 20 mg if needed if edema and weight gain  Continue lisinopril, metoprolol, digoxin, spironolactone, simvastatin and warfarin  Will check lipids and NT-proBNP with labs done today  Echo when he sees EP     Follow-up visit with me in 6 months - virtual OK    Yoko Prince MD, MS  Professor of Medicine  Cardiovascular division      CC  Patient Care Team:  Cong Mcdonald as PCP - General  GinaWillian MD as PCP - Internal Medicine  Rojas Raygoza MD as BMT Physician  Cong Mcdonald as Referring Physician  Yaz Jeong NP as Nurse Practitioner (Nurse Practitioner)  Eligio Mora as Family Medicine Physician  Omayra Serra, RN as Specialty Care Coordinator (Cardiology)  Dara Arana RN as Specialty Care Coordinator  Chaparrita Morrison RN as Specialty Care Coordinator (BMT - Adult)  Suri Zurita, JOHN as Specialty Care Coordinator (Cardiology)

## 2022-06-20 NOTE — NURSING NOTE
No medication changes today.     Lipid panel and BNP added to labs today. We will contact pt with results.     Follow up with Dr. Prince in 6 months (virtual okay).    Al Phillips RN   Cardiology Nurse Coordinator

## 2022-06-20 NOTE — PROGRESS NOTES
History:     Jonh Pearce is a 71-year-old very pleasant gentleman with multiple myeloma s/p PBSCT in 2006, ICM/NICM (LVEF 30-35%, NYHA II Stage B) s/p ICD placement in 2009, episodic VT/VF treated by appropriate ICD shocks and afib (CHADVASC 3) on warfarin followed in Cardio-oncology clinic for cardiomyopathy.      Patient was diagnosed with multiple myeloma in 2006 and underwent chemotherapy and autologous PBSCT. He was started on lenalinomide in 2017 due to relapse and then bortezomib in 07/2019 due to progressive disease. He was hospitalized for acute on chronic systolic heart failure,in 2019 and bortezomib was stopped.  Carfilzomib and daratumumab were added to pomalidomide and dexamethasone in November 2021. A transthoracic echocardiogram done at [facility] in January 2022 was notable for mildly reduced left ventricular function, moderate right ventricular dysfunction with moderate to severe mitral regurgitation in the setting of hypervolemia.     ATP x 1 on 3/3/22  NT pro BNP is ~6900  Has increased peripheral edema  Was advised to increase the lasix dose to 40 mg/d last week and edema has resolved now  He had an EP evaluation - talked about CRT-D and stopping digoxin. Ok to hold digoxin.     Interval history:   Had underwent CRTD placement in 4/2022.  He has not received any ATPs or shocks after the upgrade.  Patient continues to make good progress.  He has not had any worsening chest pain or dyspnea. He denies edema, palpitations, orthopnea, PND. He has been taking a lower dose of lasix (20 mg/day) with an extra dose of 20 mg as needed.     PAST MEDICAL HISTORY:  Past Medical History:   Diagnosis Date     Chronic systolic heart failure (H)      Ischemic cardiomyopathy      Other premature beats      Permanent atrial fibrillation (H)      Personal history of multiple myeloma      VF (ventricular fibrillation) (H)        CURRENT MEDICATIONS:  Current Outpatient Medications   Medication Sig Dispense  Refill     acyclovir (ZOVIRAX) 400 MG tablet Take 1 tablet (400 mg) by mouth every 12 hours 60 tablet 3     albuterol (PROAIR HFA/PROVENTIL HFA/VENTOLIN HFA) 108 (90 Base) MCG/ACT inhaler Inhale 2 puffs into the lungs every 6 hours as needed for shortness of breath / dyspnea or wheezing 18 g      amoxicillin (AMOXIL) 500 MG capsule Take 2 tabs 1 Hour  before dental procedure (Patient not taking: Reported on 5/31/2022) 8 capsule 3     baclofen (LIORESAL) 10 MG tablet Take 1 tablet (10 mg) by mouth 3 times daily as needed for muscle spasms prn (Patient not taking: No sig reported)       Calcium Carbonate-Vitamin D (CALCIUM 500 + D PO) Take 2 tablets by mouth daily.       dexamethasone (DECADRON) 4 MG tablet Take  5 tabs Days 1,8, and 15 each cycle (Patient taking differently: Take  5 tabs Days 1,8, and 15 each cycle) 15 tablet 4     diclofenac (VOLTAREN) 75 MG EC tablet Take 1 tablet (75 mg) by mouth 2 times daily as needed for moderate pain 1 tab prn       digoxin (LANOXIN) 125 MCG tablet Take 1 tablet (125 mcg) by mouth daily 30 tablet 1     fentaNYL (DURAGESIC) 25 mcg/hr patch 72 hr Place 1 patch onto the skin every 72 hours       furosemide (LASIX) 20 MG tablet Take 1 tablet (20 mg) by mouth daily (Patient taking differently: Take 40 mg by mouth daily) 90 tablet 3     hydrocortisone 2.5 % cream Place rectally 2 times daily       lisinopril (ZESTRIL) 5 MG tablet Take 0.5 tablets (2.5 mg) by mouth 2 times daily 90 tablet 0     Magnesium Oxide 420 MG TABS Take 3 tabs (1260 mg) three times a day. (Patient taking differently: 420 mg Take 7 tabs  daily- taking 4 in AM and 3 night.) 810 tablet 3     metoprolol succinate ER (TOPROL-XL) 100 MG 24 hr tablet Take 1 tablet (100 mg) by mouth daily (Patient taking differently: Take 75 mg by mouth daily 75 mg daily) 90 tablet 3     mexiletine (MEXITIL) 150 MG capsule Take 1 capsule (150 mg) by mouth 2 times daily As close to 12 hours apart as possible 180 capsule 0      omeprazole (PRILOSEC) 20 MG capsule Take 20 mg by mouth daily  90 capsule 3     ondansetron (ZOFRAN-ODT) 4 MG ODT tab Take 4 mg by mouth every 6 hours as needed for nausea        oxycodone (ROXICODONE) 5 MG immediate release tablet Take 1-2 tablets by mouth every 6 hours as needed For pain       pomalidomide (POMALYST) 4 MG capsule Take 1 capsule (4 mg) by mouth daily Day 1 thru 14 of each 28 day cycle. Administer with or without food. Swallow whole with water. (Patient not taking: No sig reported) 14 capsule 0     pomalidomide (POMALYST) 4 MG capsule Take 1 capsule (4 mg) by mouth daily Day 1 thru 14 of each 28 day cycle. Administer with or without food. Swallow whole with water. (Patient not taking: No sig reported) 14 capsule 0     pomalidomide (POMALYST) 4 MG capsule Take 1 capsule (4 mg) by mouth daily Day 1 thru 14 of each 28 day cycle. Administer with or without food. Swallow whole with water. (Patient not taking: No sig reported) 14 capsule 0     pomalidomide (POMALYST) 4 MG capsule Take 1 capsule (4 mg) by mouth daily Day 1 thru 14 of each 28 day cycle. Administer with or without food. Swallow whole with water. (Patient not taking: No sig reported) 14 capsule 3     potassium chloride ER (KLOR-CON M) 10 MEQ CR tablet Take 1 tablet (10 mEq) by mouth every other day 90 tablet 3     simvastatin (ZOCOR) 40 MG tablet Take 1 tablet (40 mg) by mouth At Bedtime 90 tablet 0     spironolactone (ALDACTONE) 25 MG tablet Take 0.5 tablets (12.5 mg) by mouth daily 15 tablet 1     tiotropium (SPIRIVA) 18 MCG inhalation capsule Inhale 1 capsule (18 mcg) into the lungs daily 30 capsule 3     warfarin (COUMADIN) 5 MG tablet Take 7.5 mg by mouth See Admin Instructions Take 1.5 tab by mojth Monday and Friday and 1 tab Sun Tues WED , Thur and Sat         PAST SURGICAL HISTORY:  Past Surgical History:   Procedure Laterality Date     CV CORONARY ANGIOGRAM N/A 8/29/2019    Procedure: CV CORONARY ANGIOGRAM;  Surgeon: Giorgi Milan  "MD Hank;  Location:  HEART CARDIAC CATH LAB     CV LEFT HEART CATH N/A 2019    Procedure: Left Heart Cath;  Surgeon: Giorgi Milan MD;  Location:  HEART CARDIAC CATH LAB     CV RIGHT HEART CATH MEASUREMENTS RECORDED N/A 2019    Procedure: CV RIGHT HEART CATH;  Surgeon: Giorgi Milan MD;  Location:  HEART CARDIAC CATH LAB     CV RIGHT HEART EXERCISE STRESS STUDY N/A 2019    Procedure: Stress Drug Study;  Surgeon: Giorgi Milan MD;  Location:  HEART CARDIAC CATH LAB     EP ICD INSERT BIVENT N/A 2022    Procedure: Implantable Cardioverter Defibrillator Device & Lead Implant - Biventricular;  Surgeon: Ander Jurado MD;  Location:  HEART CARDIAC CATH LAB     IMPLANT AUTOMATIC IMPLANTABLE CARDIOVERTER DEFIBRILLATOR         ALLERGIES:     Allergies   Allergen Reactions     Nkda [No Known Drug Allergies]      Sotalol Other (See Comments)     Other reaction(s): Low blood pressure, Prolonged QT interval       FAMILY HISTORY:  - Premature coronary artery disease      SOCIAL HISTORY:  Social History     Tobacco Use     Smoking status: Former Smoker     Packs/day: 1.00     Years: 25.00     Pack years: 25.00     Types: Cigarettes     Quit date: 10/15/1995     Years since quittin.6     Smokeless tobacco: Never Used   Substance Use Topics     Alcohol use: No     Drug use: No       ROS:   ROS: A comprehensive 10-point review of system is negative except for those reported in the HPI.    Exam:  /64 (BP Location: Left arm, Patient Position: Chair, Cuff Size: Adult Regular)   Pulse 80   Ht 1.803 m (5' 11\")   Wt 84.8 kg (187 lb)   SpO2 96%   BMI 26.08 kg/m    In general, the patient is in no apparent distress.      HEENT: Sclerae white, not injected.    Neck: No JVD. No thyromegaly  Heart: RRR. Normal S1, S2. 2/6 Systolic murmur LLSB.    Lungs: Clear bilaterally.  No rhonchi, wheezes, rales.   GI: Soft, nontender, nondistended.   Extremities: No edema.  The pulses are " 2+at the radial bilaterally.  Neuro: grossly non focal.   Psych: pleasant and conversant      Labs:    Chemistry panel: Recent Labs   Lab Test 06/20/22  1502 05/31/22  0757    140   POTASSIUM 4.3 4.2   CHLORIDE 105 105   CO2 26 27   ANIONGAP 9 8   * 110*   BUN 24 27   CR 1.21 1.20   EILEEN 9.2 9.8   MAG 1.6 1.8   GFRESTIMATED 64 65   AST 18 22   ALT 19 27       CBC:   Recent Labs   Lab Test 06/20/22  1502 05/31/22  0757   WBC 6.7 8.1   RBC 2.92* 3.06*   HGB 10.4* 10.8*   HCT 31.1* 32.9*   * 108*   MCH 35.6* 35.3*   MCHC 33.4 32.8   RDW 13.6 14.1   * 289       Lipid Panel:  Recent Labs   Lab Test 03/20/19  0000   CHOL 113   HDL 42   LDL 52   TRIG 93       Thyroid:   TSH   Date Value Ref Range Status   03/20/2019 0.62 0.47 - 5.00 mIU/mL Final     T4 Free   Date Value Ref Range Status   09/18/2007 1.18 0.70 - 1.85 ng/dL Final     Hemoglobin A1C POCT   Date Value Ref Range Status   08/29/2019 6.3 (H) 0 - 5.6 % Final     Comment:     Normal <5.7% Prediabetes 5.7-6.4%  Diabetes 6.5% or higher - adopted from ADA   consensus guidelines.       INR   Date Value Ref Range Status   06/20/2022 3.06 (H) 0.85 - 1.15 Final   04/27/2021 2.27 (H) 0.86 - 1.14 Final     Digoxin level December 2021 - 0.59  3/6/2022 - NT pro-BNP level 6900    Cardiac testing :    Echo 1/14/2022 ([facility])  Mildly reduced LV function, LVEF 45-50%  Moderately reduced RV function.  Moderate TR  Moderate to severe MR  Mean RA pressure 8   PASP 50 mmHg    11/2021 device interrogation  Device: Ceannate E102 TELIGEN 100  Normal Device Function  Mode: VVI 40 bpm  : 13%   Presenting EGM: VS ~ 80 bpm w/ occ PVC's   Lead Trends Appear Stable: yes  Estimated battery longevity to RRT = 10 months.    Anticoagulant: coumadin  Ventricular Arrhythmia: Since last remote transmission there has been 1 treated episode in the VZ zone and 20 NSVT episodes. Treated episode occurred on 11-13-21, EGM displays VT @ 246 bpm that is converted  "with one 41 j shock.   Pt Notified of Transmission Results: yes, pt reports he was resting on the couch and was half asleep. He reports he sat up because he felt a little \"silly\" and then was shocked. He reports he is feeling well now and felt fine after the shock. Pt did not page the device RN on call. Pt reports the only thing new is he has had some changes to his Chemo regimen on November 2nd.       EKG 4/26/22  Sinus rhythm at 66    Aug 2019 RHC and coronary angiogram       Right sided filling pressures are severely elevated - mean RA 23, PCWP 31, mean PA 40     Moderately elevated pulmonary artery hypertension.    Left sided filling pressures are severely elevated.    Left ventricular filling pressures are severely elevated .    Reduced cardiac output level.    Heparin was held during the procedure due to thrombocytopenia    Nipride was started and titrate to 1mcg/kg/min. PCWP decreased from 30 mmHg to 24 mmHg. mPAP decreased from 40 mmHg to 30 mmHg. After pressures decreased team proceed with angiogram.    Mild LAD and LCx disease    Subtotal proximal RCA occlusion    No intervention performed    No mitral or aortic valve gradient     Assessment and Plan  Jonh is a 71 year old with -     1. Chronic Systolic Heart Failure (LVEF ~45% in 2022)  2. Persistent atrial fibrillation on warfarin  3. Ischemic heart disease - Subtotal proximal RCA occlusion and mild LAD and LCx disease August 2019  4. History of VT/VF, s/p ICD  5. Hypertension   6. Relapsed multiple myeloma with mets to brain and bone s/p BMT, currently on carfilzomib, daratumumab, Pomalyst and dexa  7. T2DM  8. Hyperlipidemia   9.  Moderate to severe mitral regurgitation.      Jonh has chronic ischemic heart disease with mild left ventricular dysfunction.  He denies angina symptoms. His peripheral edema has improved.   I have advised him to continue the neurohormonal blockade with metoprolol 75 mg daily, lisinopril 2.5 mg twice daily.  I will reduce " the Lasix dose from 40 mg to 20 mg daily given that his volume status has improved. He will continue spironolactone 12.5 mg daily and the low-dose digoxin which was started to optimize his heart failure regimen.  For the arrhythmias he is on mexiletine and warfarin.  His lipids are well managed on simvastatin.     Recommendations  Lasix to 20 mg daily with an extra dose 20 mg if needed if edema and weight gain  Continue lisinopril, metoprolol, digoxin, spironolactone, simvastatin and warfarin  Will check lipids and NT-proBNP with labs done today  Echo when he sees EP     Follow-up visit with me in 6 months - virtual OK    Yoko Benson MD, MS  Professor of Medicine  Cardiovascular division      CC  Patient Care Team:  Cong Mcdonald as PCP - General  GinaWillian crandall MD as PCP - Internal Medicine  oRjas Raygoza MD as BMT Physician  Cong Mcdonald as Referring Physician  Yaz Jeong NP as Nurse Practitioner (Nurse Practitioner)  [facility] as Family Medicine Physician  Omayra Serra, RN as Specialty Care Coordinator (Cardiology)  Yoko Benson MD as Assigned Heart and Vascular Provider  Dara Arana RN as Specialty Care Coordinator  Chaparrita Morrison RN as Specialty Care Coordinator (BMT - Adult)  Suri Zurita, JOHN as Specialty Care Coordinator (Cardiology)  YOKO BENSON

## 2022-06-20 NOTE — NURSING NOTE
Chief Complaint   Patient presents with     Follow Up      6 month follow up     Vitals were taken and medications were reconciled.   Vidal Cottrell, EMT  3:26 PM

## 2022-06-20 NOTE — PATIENT INSTRUCTIONS
"Cardiology Providers you saw during your visit:  Dr. Prince    Medication changes: None    Follow up: with Dr. Prince in 6 months (virtual appointment)       Follow the American Heart Association Diet and Lifestyle recommendations:  Limit saturated fat, trans fat, sodium, red meat, sweets and sugar-sweetened beverages. If you choose to eat red meat, compare labels and select the leanest cuts available.  Aim for at least 150 minutes of moderate physical activity or 75 minutes of vigorous physical activity - or an equal combination of both - each week.    If you have any questions, contact  Al Phillips RN. We are encouraging the use of "[employer]" to communicate with your HealthCare Provider     To contact the [facility], please call, [phone]  To schedule an appointment or to leave a message for your Care Team Press #1  If you are a physician calling for another physician Press #2  For Billing Press #3  For Medical Records Press #4\"    "

## 2022-06-28 NOTE — NURSING NOTE
Chief Complaint   Patient presents with     Blood Draw     Labs drawn via  by RN in lab. VS taken.      Labs collected from venipuncture by RN. Vitals taken. Checked in for appointment(s). No INR per pt.     Sumi Campbell RN

## 2022-06-28 NOTE — PROGRESS NOTES
BONE MARROW CLINIC VISIT       Jonh returns to the hematology clinic for treatment of relapsed multiple myeloma Jonh has completed 2 cycles of daratumumab 1400 mg weekly Dex 20 mg on days 1 8 and 15 and pomalidomide 4 mg on days 1 through 14 and Decadron on days 1-9 16 and 23 he did have some cytopenias with thrombocytopenia with his platelet count falling into the 80s he otherwise is tolerated this very well He received Day 1 of Cycle 3 on Dec 28 with Jenny, Kyprolis Pomalidamide and Dex. He felt fatigued with cough and SOB. COVID negative but hospitalized.in [place] 12/30/21-1/2/22 with Influenza A with acute respiratory failure and hypoxia. Treated with Tamiflu, rocephin and azithro for sinusitus, treated for hypotension and CHF,treated for COPD with dexamethasone. He last received chemotherapy with kyprolis  and pom and dex last May [id number]    INTERVAL HISTORY  Jonh is here for follow up. He was off chemo on May 31 2022. He is feeling better less fatigued. Reports able to do yard work for 2-3 hours at a time. He denies nausea vomiting or joint pain.  He has some shortness of breath on exertion. He has some night sweats when on chemo. Has noticed that he bruises more easily.  Tested positive for COVID in late May and was asymptomatic. Reports decreased vision change in the past year; has cataracts on both eyes and is scheduled to see specialist in August 2022 for possible surgery Cataract surgery should occur prior to the start of chemo.Was sen recently by Cardiology Dr Prince. LVEF in Jan was 45-50%.  Continues to take Lasix, takes 20 mg daily and an additional 20 mg if needed. Reports decreased appetite and unintentional weight loss since May.          PAST MEDICAL HISTORY, SOCIAL HISTORY AND FAMILY HISTORY:  See my note from 05/2022.      REVIEW OF SYSTEMS:  A 12 point review of systems done is negative as in HPI.      PHYSICAL EXAMINATION: /68 (BP Location: Right arm, Patient Position: Sitting, Cuff  Size: Adult Regular)   Pulse 84   Temp 96.9  F (36.1  C) (Oral)   Resp 18   Wt 86 kg (189 lb 11.2 oz)   SpO2 95%   BMI 26.46 kg/m      GENERAL:  He is an alert gentleman in no acute distress.   VITAL SIGNS:  Stable.   HEENT:  Normocephalic.  EOM okay.  Mouth without lesion. Cataracts bilaterally, more on left eye.   RESPIRATORY:  Clear to percussion and auscultation. No rales.   CARDIAC:  Irregularly irregular rhythm.  No S3, S4 or murmur. ICD  In Left chest wall  ABDOMEN:  Soft without hepatosplenomegaly.   EXTREMITIES:  TRace ankle edema bilaterally      Latest Reference Range & Units 06/20/22 15:02 06/27/22 05:00 06/28/22 09:21   Sodium 133 - 144 mmol/L 140  143   Potassium 3.4 - 5.3 mmol/L 4.3  3.9   Chloride 94 - 109 mmol/L 105  108   Carbon Dioxide 20 - 32 mmol/L 26  24   Urea Nitrogen 7 - 30 mg/dL 24  21   Creatinine 0.66 - 1.25 mg/dL 1.21  1.14   GFR Estimate >60 mL/min/1.73m2 64  69   Calcium 8.5 - 10.1 mg/dL 9.2  9.2   Anion Gap 3 - 14 mmol/L 9  11   Magnesium 1.6 - 2.3 mg/dL 1.6  1.5 (L)   Albumin 3.4 - 5.0 g/dL 3.9  3.8   Protein Total 6.8 - 8.8 g/dL 6.8  6.6 (L)   Alkaline Phosphatase 40 - 150 U/L 113  106   ALT 0 - 70 U/L 19  17   AST 0 - 45 U/L 18  16   Bilirubin Total 0.2 - 1.3 mg/dL 0.6  0.7   Cholesterol <200 mg/dL 139     Creatinine Urine Timed 1.00 - 2.00 g/spec  1.02    Creatinine Urine mg/dL  44    HDL Cholesterol >=40 mg/dL 43     Lactate Dehydrogenase 85 - 227 U/L 188     LDL Cholesterol Calculated <=100 mg/dL 75     Non HDL Cholesterol <130 mg/dL 96     N-Terminal Pro Bnp 0 - 900 pg/mL 3,684 (H)     Protein Total 24 Hr Urine 0.04 - 0.23 g/spec  0.14    Protein Random Urine g/L  0.06    Protein Total Urine g/gr Creatinine 0.00 - 0.20 g/g Cr  0.14    Triglycerides <150 mg/dL 104     Uric Acid 3.5 - 7.2 mg/dL   7.1   Glucose 70 - 99 mg/dL 108 (H)  188 (H)   WBC 4.0 - 11.0 10e3/uL 6.7  6.7   Hemoglobin 13.3 - 17.7 g/dL 10.4 (L)  11.0 (L)   Hematocrit 40.0 - 53.0 % 31.1 (L)  33.4 (L)    Platelet Count 150 - 450 10e3/uL 131 (L)  147 (L)   RBC Count 4.40 - 5.90 10e6/uL 2.92 (L)  3.08 (L)   MCV 78 - 100 fL 107 (H)  108 (H)   MCH 26.5 - 33.0 pg 35.6 (H)  35.7 (H)   MCHC 31.5 - 36.5 g/dL 33.4  32.9   RDW 10.0 - 15.0 % 13.6  13.5   % Neutrophils % 66  74   % Lymphocytes % 19  14   % Monocytes % 11  10   % Eosinophils % 4  2   % Basophils % 0  0   Absolute Basophils 0.0 - 0.2 10e3/uL 0.0  0.0   Absolute Eosinophils 0.0 - 0.7 10e3/uL 0.3  0.1   Absolute Immature Granulocytes <=0.4 10e3/uL 0.0  0.0   Absolute Lymphocytes 0.8 - 5.3 10e3/uL 1.3  0.9   Absolute Monocytes 0.0 - 1.3 10e3/uL 0.7  0.7   % Immature Granulocytes % 0  0   Absolute Neutrophils 1.6 - 8.3 10e3/uL 4.4  5.0   Absolute NRBCs 10e3/uL 0.0  0.0   NRBCs per 100 WBC <1 /100 0  0   INR 0.85 - 1.15  3.06 (H)     Albumin Fraction 3.7 - 5.1 g/dL 4.2     Alpha 1 Fraction 0.2 - 0.4 g/dL 0.3     Alpha 2 Fraction 0.5 - 0.9 g/dL 0.7     Beta Fraction 0.6 - 1.0 g/dL 0.7     ELP Interpretation:  Two very small monoclonal proteins (each about 0.1 g/dL or less) seen in the gamma fraction, not previously characterized in our laboratory. Consider a serum and urine immunofixation for confirmation and further characterization if clinically indicated and not previously performed elsewhere. Significant hypogammaglobulinemia. Pathologic significance requires clinical correlation. HARRIS Hutton M.D., Ph.D., Pathologist ().     Gamma Fraction 0.7 - 1.6 g/dL 0.4 (L)      - 1,616 mg/dL 429 (L)     [place] Free Lt Chain 0.33 - 1.94 mg/dL 1.23     Kappa Lambda Ratio 0.26 - 1.65  0.05 (L)     Lambda Free Lt Chain 0.57 - 2.63 mg/dL 27.03 (H)     Monoclonal Peak <=0.0 g/dL 0.1 (H)     Total Protein Serum for ELP 6.8 - 8.8 g/dL 6.4 (L)     (L): Data is abnormally low  (H): Data is abnormally high      ASSESSMENT  1.  Multiple myeloma. In relapse  2.  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   Cardiomyopathy with CHF.   4.  Atrial fibrillation.   5.  History of deep vein thrombosis, on warfarin.   6.  Chronic back pain.   7.  Aortic aneurysm.   8.  History of plasmacytoma of the clivus, status post radiation.   9.  History of ventricular tachycardia with implantable defibrillator in place.   10.  Hypomagnesemia.     11  Hyperkalemia   12. Skin lesion   13. Influenza A  14. COVID-19    Jonh's lambda  light chains are 27  Hgb and platelets improved.For the summer Will pause  and give him a drug holiday and resume a maintenance program Aug 23  with Jenny 1800mg Day 1, Dex 8mg Days 1, 8,15  and 22 and pom 4mg Days 1-14.Will use this as maintenance for now. Evusheld last given on 03/29/2022, should administered again on 09/29/2022.     RTC on Aug 23 and myleoma labs around Aug 17    I spent a total of 30  minutes face to face with Erasmo Pearce during today's office visit. Over 50% of this time was spent counseling the patient and coordinating the care regarding multiple myeloma and 10minutes preparing to see the patient and  care coordination     Rojas Raygoza MD  [phone]

## 2022-06-28 NOTE — NURSING NOTE
"Oncology Rooming Note    June 28, 2022 9:50 AM   Erasmo Pearce is a 71 year old male who presents for:    Chief Complaint   Patient presents with     Blood Draw     Labs drawn via  by RN in lab. VS taken.      Oncology Clinic Visit     Multiple Myeloma      Initial Vitals: /68 (BP Location: Right arm, Patient Position: Sitting, Cuff Size: Adult Regular)   Pulse 84   Temp 96.9  F (36.1  C) (Oral)   Resp 18   Wt 86 kg (189 lb 11.2 oz)   SpO2 95%   BMI 26.46 kg/m   Estimated body mass index is 26.46 kg/m  as calculated from the following:    Height as of 6/20/22: 1.803 m (5' 11\").    Weight as of this encounter: 86 kg (189 lb 11.2 oz). Body surface area is 2.08 meters squared.  No Pain (0) Comment: Data Unavailable   No LMP for male patient.  Allergies reviewed: Yes  Medications reviewed: Yes    Medications: Medication refills not needed today.  Pharmacy name entered into [employer]:    [facility] - [place], [place] - [address] [facility] [place], [place] - [address] [facility]  [facility] [place] [place] - [address]    Clinical concerns: none       Ronnie Scales            "

## 2022-06-28 NOTE — LETTER
6/28/2022         RE: Erasmo Pearce  [address]  [address]  [facility] [address]        Dear Colleague,    Thank you for referring your patient, Erasmo Pearce, to the [facility] BLOOD AND MARROW TRANSPLANT PROGRAM [place]. Please see a copy of my visit note below.    BONE MARROW CLINIC VISIT       Jonh returns to the hematology clinic for treatment of relapsed multiple myeloma Jonh has completed 2 cycles of daratumumab 1400 mg weekly Dex 20 mg on days 1 8 and 15 and pomalidomide 4 mg on days 1 through 14 and Decadron on days 1-9 16 and 23 he did have some cytopenias with thrombocytopenia with his platelet count falling into the 80s he otherwise is tolerated this very well He received Day 1 of Cycle 3 on Dec 28 with Jenny, Kyprolis Pomalidamide and Dex. He felt fatigued with cough and SOB. COVID negative but hospitalized.in [place] 12/30/21-1/2/22 with Influenza A with acute respiratory failure and hypoxia. Treated with Tamiflu, rocephin and azithro for sinusitus, treated for hypotension and CHF,treated for COPD with dexamethasone. He last received chemotherapy with kyprolis  and pom and dex last May [id number]    INTERVAL HISTORY  Jonh is here for follow up. He was off chemo on May 31 2022. He is feeling better less fatigued. Reports able to do yard work for 2-3 hours at a time. He denies nausea vomiting or joint pain.  He has some shortness of breath on exertion. He has some night sweats when on chemo. Has noticed that he bruises more easily.  Tested positive for COVID in late May and was asymptomatic. Reports decreased vision change in the past year; has cataracts on both eyes and is scheduled to see specialist in August 2022 for possible surgery Cataract surgery should occur prior to the start of chemo.Was sen recently by Cardiology Dr Prince. LVEF in Jan was 45-50%.  Continues to take Lasix, takes 20 mg daily and an additional 20 mg if needed. Reports decreased appetite and  unintentional weight loss since May.          PAST MEDICAL HISTORY, SOCIAL HISTORY AND FAMILY HISTORY:  See my note from 05/2022.      REVIEW OF SYSTEMS:  A 12 point review of systems done is negative as in HPI.      PHYSICAL EXAMINATION: /68 (BP Location: Right arm, Patient Position: Sitting, Cuff Size: Adult Regular)   Pulse 84   Temp 96.9  F (36.1  C) (Oral)   Resp 18   Wt 86 kg (189 lb 11.2 oz)   SpO2 95%   BMI 26.46 kg/m      GENERAL:  He is an alert gentleman in no acute distress.   VITAL SIGNS:  Stable.   HEENT:  Normocephalic.  EOM okay.  Mouth without lesion. Cataracts bilaterally, more on left eye.   RESPIRATORY:  Clear to percussion and auscultation. No rales.   CARDIAC:  Irregularly irregular rhythm.  No S3, S4 or murmur. ICD  In Left chest wall  ABDOMEN:  Soft without hepatosplenomegaly.   EXTREMITIES:  TRace ankle edema bilaterally      Latest Reference Range & Units 06/20/22 15:02 06/27/22 05:00 06/28/22 09:21   Sodium 133 - 144 mmol/L 140  143   Potassium 3.4 - 5.3 mmol/L 4.3  3.9   Chloride 94 - 109 mmol/L 105  108   Carbon Dioxide 20 - 32 mmol/L 26  24   Urea Nitrogen 7 - 30 mg/dL 24  21   Creatinine 0.66 - 1.25 mg/dL 1.21  1.14   GFR Estimate >60 mL/min/1.73m2 64  69   Calcium 8.5 - 10.1 mg/dL 9.2  9.2   Anion Gap 3 - 14 mmol/L 9  11   Magnesium 1.6 - 2.3 mg/dL 1.6  1.5 (L)   Albumin 3.4 - 5.0 g/dL 3.9  3.8   Protein Total 6.8 - 8.8 g/dL 6.8  6.6 (L)   Alkaline Phosphatase 40 - 150 U/L 113  106   ALT 0 - 70 U/L 19  17   AST 0 - 45 U/L 18  16   Bilirubin Total 0.2 - 1.3 mg/dL 0.6  0.7   Cholesterol <200 mg/dL 139     Creatinine Urine Timed 1.00 - 2.00 g/spec  1.02    Creatinine Urine mg/dL  44    HDL Cholesterol >=40 mg/dL 43     Lactate Dehydrogenase 85 - 227 U/L 188     LDL Cholesterol Calculated <=100 mg/dL 75     Non HDL Cholesterol <130 mg/dL 96     N-Terminal Pro Bnp 0 - 900 pg/mL 3,684 (H)     Protein Total 24 Hr Urine 0.04 - 0.23 g/spec  0.14    Protein Random Urine g/L  0.06     Protein Total Urine g/gr Creatinine 0.00 - 0.20 g/g Cr  0.14    Triglycerides <150 mg/dL 104     Uric Acid 3.5 - 7.2 mg/dL   7.1   Glucose 70 - 99 mg/dL 108 (H)  188 (H)   WBC 4.0 - 11.0 10e3/uL 6.7  6.7   Hemoglobin 13.3 - 17.7 g/dL 10.4 (L)  11.0 (L)   Hematocrit 40.0 - 53.0 % 31.1 (L)  33.4 (L)   Platelet Count 150 - 450 10e3/uL 131 (L)  147 (L)   RBC Count 4.40 - 5.90 10e6/uL 2.92 (L)  3.08 (L)   MCV 78 - 100 fL 107 (H)  108 (H)   MCH 26.5 - 33.0 pg 35.6 (H)  35.7 (H)   MCHC 31.5 - 36.5 g/dL 33.4  32.9   RDW 10.0 - 15.0 % 13.6  13.5   % Neutrophils % 66  74   % Lymphocytes % 19  14   % Monocytes % 11  10   % Eosinophils % 4  2   % Basophils % 0  0   Absolute Basophils 0.0 - 0.2 10e3/uL 0.0  0.0   Absolute Eosinophils 0.0 - 0.7 10e3/uL 0.3  0.1   Absolute Immature Granulocytes <=0.4 10e3/uL 0.0  0.0   Absolute Lymphocytes 0.8 - 5.3 10e3/uL 1.3  0.9   Absolute Monocytes 0.0 - 1.3 10e3/uL 0.7  0.7   % Immature Granulocytes % 0  0   Absolute Neutrophils 1.6 - 8.3 10e3/uL 4.4  5.0   Absolute NRBCs 10e3/uL 0.0  0.0   NRBCs per 100 WBC <1 /100 0  0   INR 0.85 - 1.15  3.06 (H)     Albumin Fraction 3.7 - 5.1 g/dL 4.2     Alpha 1 Fraction 0.2 - 0.4 g/dL 0.3     Alpha 2 Fraction 0.5 - 0.9 g/dL 0.7     Beta Fraction 0.6 - 1.0 g/dL 0.7     ELP Interpretation:  Two very small monoclonal proteins (each about 0.1 g/dL or less) seen in the gamma fraction, not previously characterized in our laboratory. Consider a serum and urine immunofixation for confirmation and further characterization if clinically indicated and not previously performed elsewhere. Significant hypogammaglobulinemia. Pathologic significance requires clinical correlation. HARRIS Hutton M.D., Ph.D., Pathologist ().     Gamma Fraction 0.7 - 1.6 g/dL 0.4 (L)      - 1,616 mg/dL 429 (L)     [place] Free Lt Chain 0.33 - 1.94 mg/dL 1.23     Kappa Lambda Ratio 0.26 - 1.65  0.05 (L)     Lambda Free Lt Chain 0.57 - 2.63 mg/dL 27.03 (H)     Monoclonal  Peak <=0.0 g/dL 0.1 (H)     Total Protein Serum for ELP 6.8 - 8.8 g/dL 6.4 (L)     (L): Data is abnormally low  (H): Data is abnormally high      ASSESSMENT  1.  Multiple myeloma. In relapse  2.  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  Cardiomyopathy with CHF.   4.  Atrial fibrillation.   5.  History of deep vein thrombosis, on warfarin.   6.  Chronic back pain.   7.  Aortic aneurysm.   8.  History of plasmacytoma of the clivus, status post radiation.   9.  History of ventricular tachycardia with implantable defibrillator in place.   10.  Hypomagnesemia.     11  Hyperkalemia   12. Skin lesion   13. Influenza A  14. COVID-19    Jonh's lambda  light chains are 27  Hgb and platelets improved.For the summer Will pause  and give him a drug holiday and resume a maintenance program Aug 23  with Jenny 1800mg Day 1, Dex 8mg Days 1, 8,15  and 22 and pom 4mg Days 1-14.Will use this as maintenance for now. Evusheld last given on 03/29/2022, should administered again on 09/29/2022.     RTC on Aug 23 and myleoma labs around Aug 17    I spent a total of 30  minutes face to face with Erasmo Pearce during today's office visit. Over 50% of this time was spent counseling the patient and coordinating the care regarding multiple myeloma and 10minutes preparing to see the patient and  care coordination         Again, thank you for allowing me to participate in the care of your patient.      Sincerely,    Rojas Raygoza MD

## 2022-08-23 NOTE — LETTER
8/23/2022         RE: Erasmo Pearce  [address]  [address]  [facility] [address]        Dear Colleague,    Thank you for referring your patient, Erasmo Pearce, to the [facility] BLOOD AND MARROW TRANSPLANT PROGRAM [place]. Please see a copy of my visit note below.    BONE MARROW CLINIC VISIT       Jonh returns to the hematology clinic for treatment of relapsed multiple myeloma Jonh has completed 2 cycles of daratumumab 1400 mg weekly Dex 20 mg on days 1 8 and 15 and pomalidomide 4 mg on days 1 through 14 and Decadron on days 1-9 16 and 23 he did have some cytopenias with thrombocytopenia with his platelet count falling into the 80s he otherwise is tolerated this very well He received Day 1 of Cycle 3 on Dec 28 with Jenny, Kyprolis Pomalidamide and Dex. He felt fatigued with cough and SOB. COVID negative but hospitalized.in [place] 12/30/21-1/2/22 with Influenza A with acute respiratory failure and hypoxia. Treated with Tamiflu, rocephin and azithro for sinusitus, treated for hypotension and CHF,treated for COPD with dexamethasone. He last received chemotherapy with kyprolis  and pom and dex last May 31 2022    INTERVAL HISTORY  Jonh is here for follow up. He was off chemo since May 31 2022. Had cataract surgery aug 10 righte eye and Aug 17  Left eye.Seeing much better vera with colors  Less fatigued less SOB on exertion  Same as previously Able to mow lawn with push mower No new COVID sx no fever or chills no headache.Still on coumadin and no other changes in Meds Had dental prophy and cavities filled.    PAST MEDICAL HISTORY, SOCIAL HISTORY AND FAMILY HISTORY:  See my note from 06/2022.      REVIEW OF SYSTEMS:  A 12 point review of systems done is negative as in HPI.      PHYSICAL EXAMINATION: /56   Pulse 81   Temp 97.8  F (36.6  C) (Oral)   Resp 18   Wt 87.5 kg (193 lb)   SpO2 96%   BMI 26.92 kg/m      GENERAL:  He is an alert gentleman in no acute distress.    VITAL SIGNS:  Stable.   HEENT:  Normocephalic.  EOM okay.  Mouth without lesion. Cataracts bilaterally, more on left eye.   RESPIRATORY:  Clear to percussion and auscultation. No rales.   CARDIAC:  Irregularly irregular rhythm.  No S3, S4 or murmur. ICD  In Left chest wall  ABDOMEN:  Soft without hepatosplenomegaly.   EXTREMITIES:  Ttace ankle edema bilaterally      Latest Reference Range & Units 08/17/22 04:00 08/23/22 08:45   Sodium 133 - 144 mmol/L  139   Potassium 3.4 - 5.3 mmol/L  5.2   Chloride 94 - 109 mmol/L  106   Carbon Dioxide 20 - 32 mmol/L  28   Urea Nitrogen 7 - 30 mg/dL  28   Creatinine 0.66 - 1.25 mg/dL  1.33 (H)   GFR Estimate >60 mL/min/1.73m2  57 (L)   Calcium 8.5 - 10.1 mg/dL  9.2   Anion Gap 3 - 14 mmol/L  5   Albumin 3.4 - 5.0 g/dL  4.2   Protein Total 6.8 - 8.8 g/dL  7.0   Alkaline Phosphatase 40 - 150 U/L  122   ALT 0 - 70 U/L  18   AST 0 - 45 U/L  16   Bilirubin Total 0.2 - 1.3 mg/dL  0.8   Creatinine Urine Timed 1.00 - 2.00 g/spec 0.95 (L)    Creatinine Urine mg/dL 59    Lactate Dehydrogenase 85 - 227 U/L  186   Protein Total 24 Hr Urine 0.04 - 0.23 g/spec 0.21    Protein Random Urine g/L 0.13    Protein Total Urine g/gr Creatinine 0.00 - 0.20 g/g Cr 0.22 (H)    Uric Acid 3.5 - 7.2 mg/dL  6.3   Glucose 70 - 99 mg/dL  107 (H)   WBC 4.0 - 11.0 10e3/uL  7.3   Hemoglobin 13.3 - 17.7 g/dL  11.5 (L)   Hematocrit 40.0 - 53.0 %  34.5 (L)   Platelet Count 150 - 450 10e3/uL  155   RBC Count 4.40 - 5.90 10e6/uL  3.31 (L)   MCV 78 - 100 fL  104 (H)   MCH 26.5 - 33.0 pg  34.7 (H)   MCHC 31.5 - 36.5 g/dL  33.3   RDW 10.0 - 15.0 %  12.4   % Neutrophils %  73   % Lymphocytes %  16   % Monocytes %  10   % Eosinophils %  1   % Basophils %  0   Absolute Basophils 0.0 - 0.2 10e3/uL  0.0   Absolute Eosinophils 0.0 - 0.7 10e3/uL  0.1   Absolute Immature Granulocytes <=0.4 10e3/uL  0.0   Absolute Lymphocytes 0.8 - 5.3 10e3/uL  1.1   Absolute Monocytes 0.0 - 1.3 10e3/uL  0.7   % Immature Granulocytes %  0    Absolute Neutrophils 1.6 - 8.3 10e3/uL  5.3   Absolute NRBCs 10e3/uL  0.0   NRBCs per 100 WBC <1 /100  0   Albumin Fraction Urine <=0.0 % 27.9 (H)    Alpha 1 Fraction Urine <=0.0 % 8.0 (H)    Alpha 2 Fraction Urine <=0.0 % 8.2 (H)    Beta Fraction Urine <=0.0 % 49.2 (H)    ELP Interpretation Urine  Trace albumin and globulins seen. A monoclonal protein (35.6 %) is seen in the gamma fraction.  See immunofixation report on this sample. Pathological significance requires clinical correlation.  EZE Avalos M.D., Ph.D., Pathologist    Gamma Fraction Urine <=0.0 % 6.7 (H)    IGA 84 - 499 mg/dL  126    - 1,616 mg/dL  359 (L)   IGM 35 - 242 mg/dL  13 (L)   Immunofix ELP Urine  Monoclonal free immunoglobulin light chain of lambda chain type.  Pathologic significance requires clinical correlation. EZE Avalos M.D., Ph.D., Pathologist    Kappa Free Lt Chain 0.33 - 1.94 mg/dL  1.33   Kappa Lambda Ratio 0.26 - 1.65   0.03 (L)   Lambda Free Lt Chain 0.57 - 2.63 mg/dL  40.97 (H)   Monoclonal Peak Urine <=0.0 % 35.6 (H)    Total Protein Serum for ELP 6.4 - 8.3 g/dL  6.4   (H): Data is abnormally high  (L): Data is abnormally low    ASSESSMENT  1.  Multiple myeloma. In relapse  2.  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  Cardiomyopathy with CHF.   4.  Atrial fibrillation.   5.  History of deep vein thrombosis, on warfarin.   6.  Chronic back pain.   7.  Aortic aneurysm.   8.  History of plasmacytoma of the clivus, status post radiation.   9.  History of ventricular tachycardia with implantable defibrillator in place.   10.  Hypomagnesemia.     11  Hyperkalemia   12. Skin lesion   13. Influenza A  14. COVID-19  The lights chains are increased to 40 so needs to restart therapy   Will resume a maintenance program Aug 23 Cycle 7 with Jenny 1800mg Day 1, Dex 8mg Days 1, 8,15  and 22 and pom 4mg Days 1-14.Will use this as maintenance for now. Evusheld last given on  03/29/2022, should administered again on 09/29/2022.     RTC on Sept 20  and myleoma labs around Sept 17    I spent a total of 30  minutes face to face with Erasmo Pearce during today's office visit. Over 50% of this time was spent counseling the patient and coordinating the care regarding multiple myeloma and 10minutes preparing to see the patient and  care coordination         Again, thank you for allowing me to participate in the care of your patient.      Sincerely,    Rojas Raygoza MD

## 2022-08-23 NOTE — NURSING NOTE
"Oncology Rooming Note    August 23, 2022 9:00 AM   Erasmo Pearce is a 72 year old male who presents for:    Chief Complaint   Patient presents with     Blood Draw     Vpt blood draw by lab RN. Vitals taken and appointment arrived     Oncology Clinic Visit     Provider visit s/p BMT r/t MM     Initial Vitals: /56   Pulse 81   Temp 97.8  F (36.6  C) (Oral)   Resp 18   Wt 87.5 kg (193 lb)   SpO2 96%   BMI 26.92 kg/m   Estimated body mass index is 26.92 kg/m  as calculated from the following:    Height as of 6/20/22: 1.803 m (5' 11\").    Weight as of this encounter: 87.5 kg (193 lb). Body surface area is 2.09 meters squared.  No Pain (0) Comment: Data Unavailable   No LMP for male patient.  Allergies reviewed: Yes  Medications reviewed: Yes    Medications: Medication refills not needed today.  Pharmacy name entered into [employer]:    [facility] - [place], [place] - [address] [facility] - [place], [place] - [address] [place] [address]  [facility] [place] [facility] [address] [place] [address]    Clinical concerns: VSS. No clinical concerns at this time.    Mindi Ortiz, RN              "

## 2022-08-23 NOTE — NURSING NOTE
Chief Complaint   Patient presents with     Blood Draw     Vpt blood draw by lab RN. Vitals taken and appointment arrived     Tammy Rodriguez RN

## 2022-08-23 NOTE — PROGRESS NOTES
Infusion Nursing Note:  Erasmo BLACKBURN Christinedorina presents today for Day 1, Cycle 7 of Daratumumab.  Patient seen by provider today: Yes: Dr. Rock Raygoza   present during visit today: Not Applicable.    Note: Lab results monitored; met treatment parameters. Given 650mg PO Tylenol, 50mg PO Benadryl, 8mg PO Dex, and 10mg PO Montelukast as premeds. Jenny given subcutaneously in RLQ of abdomen over 3 minutes without complication.    Intravenous Access:  No Intravenous access/labs at this visit.    Treatment Conditions:  Results reviewed, labs MET treatment parameters, ok to proceed with treatment.    Post Infusion Assessment:  Patient tolerated injection without incident.     Discharge Plan:   Patient discharged in stable condition accompanied by: wife.      Mindi Ortiz RN

## 2022-08-23 NOTE — PROGRESS NOTES
BONE MARROW CLINIC VISIT       Jonh returns to the hematology clinic for treatment of relapsed multiple myeloma Jonh has completed 2 cycles of daratumumab 1400 mg weekly Dex 20 mg on days 1 8 and 15 and pomalidomide 4 mg on days 1 through 14 and Decadron on days 1-9 16 and 23 he did have some cytopenias with thrombocytopenia with his platelet count falling into the 80s he otherwise is tolerated this very well He received Day 1 of Cycle 3 on Dec 28 with Jenny, Kyprolis Pomalidamide and Dex. He felt fatigued with cough and SOB. COVID negative but hospitalized.in [place] 12/30/21-1/2/22 with Influenza A with acute respiratory failure and hypoxia. Treated with Tamiflu, rocephin and azithro for sinusitus, treated for hypotension and CHF,treated for COPD with dexamethasone. He last received chemotherapy with kyprolis  and pom and dex last May 31 2022    INTERVAL HISTORY  Jonh is here for follow up. He was off chemo since May 31 2022. Had cataract surgery aug 10 righte eye and Aug 17  Left eye.Seeing much better vera with colors  Less fatigued less SOB on exertion  Same as previously Able to mow lawn with push mower No new COVID sx no fever or chills no headache.Still on coumadin and no other changes in Meds Had dental prophy and cavities filled.    PAST MEDICAL HISTORY, SOCIAL HISTORY AND FAMILY HISTORY:  See my note from 06/2022.      REVIEW OF SYSTEMS:  A 12 point review of systems done is negative as in HPI.      PHYSICAL EXAMINATION: /56   Pulse 81   Temp 97.8  F (36.6  C) (Oral)   Resp 18   Wt 87.5 kg (193 lb)   SpO2 96%   BMI 26.92 kg/m      GENERAL:  He is an alert gentleman in no acute distress.   VITAL SIGNS:  Stable.   HEENT:  Normocephalic.  EOM okay.  Mouth without lesion. Cataracts bilaterally, more on left eye.   RESPIRATORY:  Clear to percussion and auscultation. No rales.   CARDIAC:  Irregularly irregular rhythm.  No S3, S4 or murmur. ICD  In Left chest wall  ABDOMEN:  Soft without  hepatosplenomegaly.   EXTREMITIES:  Ttace ankle edema bilaterally      Latest Reference Range & Units 08/17/22 04:00 08/23/22 08:45   Sodium 133 - 144 mmol/L  139   Potassium 3.4 - 5.3 mmol/L  5.2   Chloride 94 - 109 mmol/L  106   Carbon Dioxide 20 - 32 mmol/L  28   Urea Nitrogen 7 - 30 mg/dL  28   Creatinine 0.66 - 1.25 mg/dL  1.33 (H)   GFR Estimate >60 mL/min/1.73m2  57 (L)   Calcium 8.5 - 10.1 mg/dL  9.2   Anion Gap 3 - 14 mmol/L  5   Albumin 3.4 - 5.0 g/dL  4.2   Protein Total 6.8 - 8.8 g/dL  7.0   Alkaline Phosphatase 40 - 150 U/L  122   ALT 0 - 70 U/L  18   AST 0 - 45 U/L  16   Bilirubin Total 0.2 - 1.3 mg/dL  0.8   Creatinine Urine Timed 1.00 - 2.00 g/spec 0.95 (L)    Creatinine Urine mg/dL 59    Lactate Dehydrogenase 85 - 227 U/L  186   Protein Total 24 Hr Urine 0.04 - 0.23 g/spec 0.21    Protein Random Urine g/L 0.13    Protein Total Urine g/gr Creatinine 0.00 - 0.20 g/g Cr 0.22 (H)    Uric Acid 3.5 - 7.2 mg/dL  6.3   Glucose 70 - 99 mg/dL  107 (H)   WBC 4.0 - 11.0 10e3/uL  7.3   Hemoglobin 13.3 - 17.7 g/dL  11.5 (L)   Hematocrit 40.0 - 53.0 %  34.5 (L)   Platelet Count 150 - 450 10e3/uL  155   RBC Count 4.40 - 5.90 10e6/uL  3.31 (L)   MCV 78 - 100 fL  104 (H)   MCH 26.5 - 33.0 pg  34.7 (H)   MCHC 31.5 - 36.5 g/dL  33.3   RDW 10.0 - 15.0 %  12.4   % Neutrophils %  73   % Lymphocytes %  16   % Monocytes %  10   % Eosinophils %  1   % Basophils %  0   Absolute Basophils 0.0 - 0.2 10e3/uL  0.0   Absolute Eosinophils 0.0 - 0.7 10e3/uL  0.1   Absolute Immature Granulocytes <=0.4 10e3/uL  0.0   Absolute Lymphocytes 0.8 - 5.3 10e3/uL  1.1   Absolute Monocytes 0.0 - 1.3 10e3/uL  0.7   % Immature Granulocytes %  0   Absolute Neutrophils 1.6 - 8.3 10e3/uL  5.3   Absolute NRBCs 10e3/uL  0.0   NRBCs per 100 WBC <1 /100  0   Albumin Fraction Urine <=0.0 % 27.9 (H)    Alpha 1 Fraction Urine <=0.0 % 8.0 (H)    Alpha 2 Fraction Urine <=0.0 % 8.2 (H)    Beta Fraction Urine <=0.0 % 49.2 (H)    ELP Interpretation Urine   Trace albumin and globulins seen. A monoclonal protein (35.6 %) is seen in the gamma fraction.  See immunofixation report on this sample. Pathological significance requires clinical correlation.  EZE Avalos M.D., Ph.D., Pathologist    Gamma Fraction Urine <=0.0 % 6.7 (H)    IGA 84 - 499 mg/dL  126    - 1,616 mg/dL  359 (L)   IGM 35 - 242 mg/dL  13 (L)   Immunofix ELP Urine  Monoclonal free immunoglobulin light chain of lambda chain type.  Pathologic significance requires clinical correlation. EZE Avalos M.D., Ph.D., Pathologist    Kappa Free Lt Chain 0.33 - 1.94 mg/dL  1.33   Kappa Lambda Ratio 0.26 - 1.65   0.03 (L)   Lambda Free Lt Chain 0.57 - 2.63 mg/dL  40.97 (H)   Monoclonal Peak Urine <=0.0 % 35.6 (H)    Total Protein Serum for ELP 6.4 - 8.3 g/dL  6.4   (H): Data is abnormally high  (L): Data is abnormally low    ASSESSMENT  1.  Multiple myeloma. In relapse  2.  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  Cardiomyopathy with CHF.   4.  Atrial fibrillation.   5.  History of deep vein thrombosis, on warfarin.   6.  Chronic back pain.   7.  Aortic aneurysm.   8.  History of plasmacytoma of the clivus, status post radiation.   9.  History of ventricular tachycardia with implantable defibrillator in place.   10.  Hypomagnesemia.     11  Hyperkalemia   12. Skin lesion   13. Influenza A  14. COVID-19  The lights chains are increased to 40 so needs to restart therapy   Will resume a maintenance program Aug 23 Cycle 7 with Jenny 1800mg Day 1, Dex 8mg Days 1, 8,15  and 22 and pom 4mg Days 1-14.Will use this as maintenance for now. Evusheld last given on 03/29/2022, should administered again on 09/29/2022.     RTC on Sept 20  and myleoma labs around Sept 17    I spent a total of 30  minutes face to face with Erasmo Pearce during today's office visit. Over 50% of this time was spent counseling the patient and coordinating the care regarding multiple  myeloma and 10minutes preparing to see the patient and  care coordination     Rojas Raygoza MD  [phone]

## 2022-08-23 NOTE — LETTER
8/23/2022         RE: Erasmo Pearce  [address]  [address] [facility] [address]        Dear Colleague,    Thank you for referring your patient, Erasmo Pearce, to the [facility] BLOOD AND MARROW TRANSPLANT PROGRAM [place]. Please see a copy of my visit note below.    Infusion Nursing Note:  Erasmo Pearce presents today for Day 1, Cycle 7 of Daratumumab.  Patient seen by provider today: Yes: Dr. Rock Raygoza   present during visit today: Not Applicable.    Note: Lab results monitored; met treatment parameters. Given 650mg PO Tylenol, 50mg PO Benadryl, 8mg PO Dex, and 10mg PO Montelukast as premeds. Jenny given subcutaneously in RLQ of abdomen over 3 minutes without complication.    Intravenous Access:  No Intravenous access/labs at this visit.    Treatment Conditions:  Results reviewed, labs MET treatment parameters, ok to proceed with treatment.    Post Infusion Assessment:  Patient tolerated injection without incident.     Discharge Plan:   Patient discharged in stable condition accompanied by: wife.      Mindi Ortiz RN                          Again, thank you for allowing me to participate in the care of your patient.        Sincerely,        [facility]

## 2022-08-25 NOTE — TELEPHONE ENCOUNTER
I have reviewed and agree to the information submitted for this Application.     Nakia Flores, Nilay  Oral Chemotherapy Monitoring Program  [facility]  [phone]  August 25, 2022          [facility] requires [facility] own form to be used for Pomalyst.     [facility] will [facility] once I have confirmation that the prescription has been reviewed.     Please verify the rx on the application for free medication for this patient.      Thank you, any further questions feel free to contact me if needed    Danitza Boykin   [facility] Pharm[facility] Liaison, [facility]  Phone: [phone]  Fax: [phone]  Email: [email or web address]

## 2022-08-25 NOTE — TELEPHONE ENCOUNTER
Oral Chemotherapy Monitoring Program    Medication: Pomalyst  Rx:  4mg PO daily 14 / 28 ds    Auth #: [id number]  Risk Category: Adult male    Routine survey questions reviewed. yes        Danitza Boykin   [facility] Liaison, alpha [facility]  Phone: [phone]  Fax: [phone]  Email: [email or web address]

## 2022-08-31 NOTE — LETTER
8/31/2022      RE: Erasmo Pearce  [address]  [address] [facility] [address]       Dear Colleague,    Thank you for the opportunity to participate in the care of your patient, Erasmo Pearce, at the [facility] [place] [facility]. Please see a copy of my visit note below.        ELECTROPHYSIOLOGY CLINIC VISIT    Assessment/Recommendations   Assessment/Plan:    Mr. 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 He presents today for follow up. He had upgrade to CRTD on 4/26/22 for high ventricular pacing. Device site well healed. Echo today shows LVEF 25%, mild/moderate RV dilation with moderately reduced RV function, severe biatrial enlargement, moderate/severe MI moderate/severe TI. He reports feeling he has more energy overall and less SOB. LVEF has decreased from 35% on last echo to 25% on echo today. Lisinopril was stopped during admission for flu in 12/2021 and only resumed at 1/2 dose.. He denies chest discomfort, palpitations, abdominal fullness/bloating or peripheral edema, shortness of breath, paroxysmal nocturnal dyspnea, orthopnea, lightheadedness, dizziness, pre-syncope, or syncope. Presenting 12 lead ECG shows  Vent Rate 76 bpm,   ms, QTc 499 ms. Device interrogation shows normal device function, stable lead parameter, 12 NSVT EGMs show AF with RVR, and BVP 93%. Current cardiac medications include: Spironolactone, Lisinopril, Simvastatin, Mexiletine, Digoxin, Lasix, Toprol XL, and Warfarin.       Mixed NICM/ICM LVEF 20-25%, NYHA II:  1. ACEi/ARB: Increase Lisinopril to  5 mg BID with BMP in 1 week.  2. BB: Decrease Toprol Xl to 50 mg daily    3. Aldosterone antagonist: Continue Spironolactone.  4. SCD prophylaxis: s/p ICD in 12/2009 initially for primary prevention, s/p upgrade to CRTD 4/26/22 now secondary prevention. Adequate BVP %, but LVEF has decreased some. Will decrease LRL from 75to 65 bpm to get more native conduction if possible. However, has slightly higher increased risk of VF but trying to regain some LVEF. If any recurrences, then increase LRL back to 75  Bpm. Will have him send a remote device transmission in 1 monht.   5. Fluid status: appear euvolemic on exam.     Follow up 3 months with echo.        History of Present Illness/Subjective    Mr. Erasmo Pearce is a 72 year old male who comes in today for EP follow-up of ICM/NICM, hx ICD shocks, s/p upgrade to CRTD.    Mr. Pearce is a 72 year old male who has a past medical history significant for multiple myeloma s/p PBSCT in 2006, ICM/NICM (LVEF 30-35%, NYHA II Stage B) s/p [employer] ICD in 12/2009 initially for primary prevention, s/p upgrade to CRTD 4/26/22, multiple appropriate shocks for VF (typically while undergoing chemotherapy) on mexiletine, and permanent AF (CHADVASC 3 on Warfarin).    EP Visit 3/8/22: The patient presents to [facility] care. He was seen previously by Dr Duarte and Dr Jean-Baptiste. A device interrogation on 3/2/22 showed 4 VF recorded since 11/15/21. VF on 3/2/22 required ATPx1 and shock x1 (41J) for termination of arrhythmia. 2 VT recorded since 11/15/21. 10 nonsustV episodes recorded. RRT<3 monthns. He had another therapy in Nov 2021: Since last remote transmission there has been 1 treated episode in the VZ zone and 20 NSVT episodes. Treated episode occurred on 11-13-21, EGM displays VT @ 246 bpm that is converted with one 41 j shock. Had a long run without VF before Nov 2021, > 1 year, started chemo June 2021, more aggressive chemo Nov 2021, still has one month left of  aggressive chemo, low DLCO precluding amiodarone. His last ischemic workup was done in 2019: with a borderline echo stress test followed by an angiogram showing  of RCA and mild LAD and LCx disease. The last echocardiogram on record was done in Oct 2021 shooing an ejection fraction of 35-40%, Moderate right ventricular dilation, Moderate mitral insufficiency and Moderate to severe tricuspid insufficiency is present.    He presents today for follow up. He had upgrade to CRTD on 4/26/22 for high ventricular pacing. Device site well healed. Echo today shows LVEF 25%, mild/moderate RV dilation with moderately reduced RV function, severe biatrial enlargement, moderate/severe MI moderate/severe TI. He reports feeling he has more energy overall and less SOB. LVEF has decreased from 35% on last echo to 25% on echo today. Lisinopril was stopped during admission for flu in 12/2021 and only resumed at 1/2 dose.. He denies chest discomfort, palpitations, abdominal fullness/bloating or peripheral edema, shortness of breath, paroxysmal nocturnal dyspnea, orthopnea, lightheadedness, dizziness, pre-syncope, or syncope. Presenting 12 lead ECG shows  Vent Rate 76 bpm,   ms, QTc 499 ms. Device interrogation shows normal device function, stable lead parameter, 12 NSVT EGMs show AF with RVR, and BVP 93%. Current cardiac medications include: Spironolactone, Lisinopril, Simvastatin, Mexiletine, Digoxin, Lasix, Toprol XL, and Warfarin.     I have reviewed and updated the patient's Past Medical History, Social History, Family History and Medication List.     Cardiographics (Personally Reviewed) :   TTE 10/26/2021:  Interpretation Summary  Left ventricular function is decreased. The ejection fraction is 35-40% (moderately reduced).  Moderate right ventricular dilation is present. Global right ventricular function is mildly to moderately reduced.  Moderate mitral insufficiency is present. Moderate to severe tricuspid insufficiency  "is present.  Dilation of the inferior vena cava is present with abnormal respiratory variation in diameter.  No pericardial effusion is present.     CORS 8/29/19:     proximal RCA with bridging collaterals and collaterals from distal LAD reconstituting distal RCA system    Dist LM lesion is 40% stenosed.    Mild proximal / mid LAD and D1 disease    Mild proximal LCx disease    No intervention performed     Stress Echo July 2019  Interpretation Summary  Submaximal stress test, achieved 74% of the age predicted maximal heart rate.  Limiting symptom fatigue.  Normal blood pressure response to exercise.  No angina symptoms with exercise.  Baseline rhythm is atrial fibrillation. No ECG evidence of ischemia. There is no chronotropic incompetence.  Severely reduced LV function with EF of 28% at rest; with exercise left ventricular cavity size and LVEF did not change significantly.  Akinesis of the basal-mid inferior/inferoseptal/inferolateral segments present. No new stress induced regional wall motion abnormalities evident.  Less than average functional capacity for age. Exercise time ~2 minutes.       Physical Examination   /71 (BP Location: Right arm, Patient Position: Chair, Cuff Size: Adult Regular)   Pulse 70   Ht 1.784 m (5' 10.24\")   Wt 85.4 kg (188 lb 4.8 oz)   SpO2 99%   BMI 26.84 kg/m    Wt Readings from Last 3 Encounters:   08/23/22 87.5 kg (193 lb)   06/28/22 86 kg (189 lb 11.2 oz)   06/20/22 84.8 kg (187 lb)     General Appearance:   Alert, well-appearing and in no acute distress.   HEENT: Atraumatic, normocephalic. PERRL.  MMM.   Chest/Lungs:   Respirations unlabored.  Lungs are clear to auscultation.   Cardiovascular:   Irregular.  S1/S2. Murmur.    Abdomen:  Soft, nontender, nondistended.   Extremities: No cyanosis or clubbing. No edema.    Musculoskeletal: Moves all extremities.  Gait normal.   Skin: Warm, dry, intact.    Neurologic: Mood and affect are appropriate.  Alert and oriented to " person, place, time, and situation.          Medications  Allergies   Current Outpatient Medications   Medication Sig Dispense Refill     acyclovir (ZOVIRAX) 400 MG tablet Take 1 tablet (400 mg) by mouth every 12 hours (Patient not taking: Reported on 8/23/2022) 60 tablet 3     albuterol (PROAIR HFA/PROVENTIL HFA/VENTOLIN HFA) 108 (90 Base) MCG/ACT inhaler Inhale 2 puffs into the lungs every 6 hours as needed for shortness of breath / dyspnea or wheezing 18 g      baclofen (LIORESAL) 10 MG tablet Take 10 mg by mouth as needed for muscle spasms       Calcium Carbonate-Vitamin D (CALCIUM 500 + D PO) Take 2 tablets by mouth daily.       dexamethasone (DECADRON) 4 MG tablet Take 2 tablets (8mg) on days 8, 15, and 22 6 tablet 0     diclofenac (VOLTAREN) 75 MG EC tablet Take 1 tablet (75 mg) by mouth 2 times daily as needed for moderate pain 1 tab prn (Patient not taking: Reported on 8/23/2022)       digoxin (LANOXIN) 125 MCG tablet Take 1 tablet (125 mcg) by mouth daily 30 tablet 1     fentaNYL (DURAGESIC) 25 mcg/hr patch 72 hr Place 1 patch onto the skin every 72 hours       furosemide (LASIX) 20 MG tablet Take 1 tablet (20 mg) by mouth daily (Patient taking differently: Take 40 mg by mouth daily) 90 tablet 3     hydrocortisone 2.5 % cream Place rectally 2 times daily       lisinopril (ZESTRIL) 5 MG tablet Take 0.5 tablets (2.5 mg) by mouth 2 times daily 90 tablet 0     Magnesium Oxide 420 MG TABS Take 3 tabs (1260 mg) three times a day. (Patient taking differently: 420 mg Take 7 tabs  daily- taking 4 in AM and 3 night.) 810 tablet 3     metoprolol succinate ER (TOPROL-XL) 100 MG 24 hr tablet Take 1 tablet (100 mg) by mouth daily (Patient taking differently: Take 75 mg by mouth daily 75 mg daily) 90 tablet 3     mexiletine (MEXITIL) 150 MG capsule Take 1 capsule (150 mg) by mouth 2 times daily As close to 12 hours apart as possible 180 capsule 0     omeprazole (PRILOSEC) 20 MG capsule Take 20 mg by mouth daily  90  capsule 3     ondansetron (ZOFRAN-ODT) 4 MG ODT tab Take 4 mg by mouth every 6 hours as needed for nausea        oxycodone (ROXICODONE) 5 MG immediate release tablet Take 1-2 tablets by mouth every 6 hours as needed For pain       pomalidomide (POMALYST) 4 MG capsule Take 1 capsule (4 mg) by mouth daily Day 1 thru 14 of each 28 day cycle. Administer with or without food. Swallow whole with water. (Patient not taking: Reported on 8/23/2022) 14 capsule 0     pomalidomide (POMALYST) 4 MG capsule Take 1 capsule (4 mg) by mouth daily Day 1 thru 14 of each 28 day cycle. Administer with or without food. Swallow whole with water. (Patient not taking: No sig reported) 14 capsule 0     pomalidomide (POMALYST) 4 MG capsule Take 1 capsule (4 mg) by mouth daily Day 1 thru 14 of each 28 day cycle. Administer with or without food. Swallow whole with water. (Patient not taking: No sig reported) 14 capsule 0     pomalidomide (POMALYST) 4 MG capsule Take 1 capsule (4 mg) by mouth daily Day 1 thru 14 of each 28 day cycle. Administer with or without food. Swallow whole with water. (Patient not taking: No sig reported) 14 capsule 0     pomalidomide (POMALYST) 4 MG capsule Take 1 capsule (4 mg) by mouth daily Day 1 thru 14 of each 28 day cycle. Administer with or without food. Swallow whole with water. (Patient not taking: No sig reported) 14 capsule 3     potassium chloride ER (KLOR-CON M) 10 MEQ CR tablet Take 1 tablet (10 mEq) by mouth every other day 90 tablet 3     simvastatin (ZOCOR) 40 MG tablet Take 1 tablet (40 mg) by mouth At Bedtime 90 tablet 0     spironolactone (ALDACTONE) 25 MG tablet Take 0.5 tablets (12.5 mg) by mouth daily 15 tablet 1     tiotropium (SPIRIVA) 18 MCG inhalation capsule Inhale 1 capsule (18 mcg) into the lungs daily 30 capsule 3     warfarin (COUMADIN) 5 MG tablet Take 7.5 mg by mouth See Admin Instructions Take 1.5 tab by mojth Monday and Friday and 1 tab Sun Tues WED , Thur and Sat      No Known  Allergies      Lab Results (Personally Reviewed)    Chemistry/lipid CBC Cardiac Enzymes/BNP/TSH/INR   Lab Results   Component Value Date    BUN 28 08/23/2022     08/23/2022    CO2 28 08/23/2022     Creatinine   Date Value Ref Range Status   08/23/2022 1.33 (H) 0.66 - 1.25 mg/dL Final   04/27/2021 1.23 0.66 - 1.25 mg/dL Final       Lab Results   Component Value Date    CHOL 139 06/20/2022    HDL 43 06/20/2022    LDL 75 06/20/2022      Lab Results   Component Value Date    WBC 7.3 08/23/2022    HGB 11.5 (L) 08/23/2022    HCT 34.5 (L) 08/23/2022     (H) 08/23/2022     08/23/2022    Lab Results   Component Value Date    TSH 0.62 03/20/2019    INR 3.06 (H) 06/20/2022        The patient states understanding and is agreeable with the plan.   Ander Jurado MD [facility]  Cardiology - Electrophysiology      Total time spent on patient visit, reviewing notes, imaging, labs, orders, and completing necessary documentation: 45 minutes.

## 2022-08-31 NOTE — PROGRESS NOTES
ELECTROPHYSIOLOGY CLINIC VISIT    Assessment/Recommendations   Assessment/Plan:    Mr. 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 He presents today for follow up. He had upgrade to CRTD on 4/26/22 for high ventricular pacing. Device site well healed. Echo today shows LVEF 25%, mild/moderate RV dilation with moderately reduced RV function, severe biatrial enlargement, moderate/severe MI moderate/severe TI. He reports feeling he has more energy overall and less SOB. LVEF has decreased from 35% on last echo to 25% on echo today. Lisinopril was stopped during admission for flu in 12/2021 and only resumed at 1/2 dose.. He denies chest discomfort, palpitations, abdominal fullness/bloating or peripheral edema, shortness of breath, paroxysmal nocturnal dyspnea, orthopnea, lightheadedness, dizziness, pre-syncope, or syncope. Presenting 12 lead ECG shows  Vent Rate 76 bpm,   ms, QTc 499 ms. Device interrogation shows normal device function, stable lead parameter, 12 NSVT EGMs show AF with RVR, and BVP 93%. Current cardiac medications include: Spironolactone, Lisinopril, Simvastatin, Mexiletine, Digoxin, Lasix, Toprol XL, and Warfarin.       Mixed NICM/ICM LVEF 20-25%, NYHA II:  1. ACEi/ARB: Increase Lisinopril to 5 mg BID with BMP in 1 week.  2. BB: Decrease Toprol Xl to 50 mg daily    3. Aldosterone antagonist: Continue Spironolactone.  4. SCD prophylaxis: s/p ICD in 12/2009 initially for primary prevention, s/p upgrade to CRTD 4/26/22 now secondary prevention. Adequate BVP %, but LVEF has decreased some. Will decrease LRL from 75to 65 bpm to get more native conduction  if possible. However, has slightly higher increased risk of VF but trying to regain some LVEF. If any recurrences, then increase LRL back to 75  Bpm. Will have him send a remote device transmission in 1 monht.   5. Fluid status: appear euvolemic on exam.     Follow up 3 months with echo.        History of Present Illness/Subjective    Mr. Erasmo Pearce is a 72 year old male who comes in today for EP follow-up of ICM/NICM, hx ICD shocks, s/p upgrade to CRTD.    Mr. Pearce is a 72 year old male who has a past medical history significant for multiple myeloma s/p PBSCT in 2006, ICM/NICM (LVEF 30-35%, NYHA II Stage B) s/p [employer] ICD in 12/2009 initially for primary prevention, s/p upgrade to CRTD 4/26/22, multiple appropriate shocks for VF (typically while undergoing chemotherapy) on mexiletine, and permanent AF (CHADVASC 3 on Warfarin).    EP Visit 3/8/22: The [facility]. He was seen previously by Dr Duarte and Dr Jean-Baptiste. A device interrogation on 3/2/22 showed 4 VF recorded since 11/15/21. VF on 3/2/22 required ATPx1 and shock x1 (41J) for termination of arrhythmia. 2 VT recorded since 11/15/21. 10 nonsustV episodes recorded. RRT<3 monthns. He had another therapy in Nov 2021: Since last remote transmission there has been 1 treated episode in the VZ zone and 20 NSVT episodes. Treated episode occurred on 11-13-21, EGM displays VT @ 246 bpm that is converted with one 41 j shock. Had a long run without VF before Nov 2021, > 1 year, started chemo June 2021, more aggressive chemo Nov 2021, still has one month left of aggressive chemo, low DLCO precluding amiodarone. His last ischemic workup was done in 2019: with a borderline echo stress test followed by an angiogram showing  of RCA and mild LAD and LCx disease. The last echocardiogram on record was done in Oct 2021 shooing an ejection fraction of 35-40%, Moderate right ventricular dilation, Moderate mitral insufficiency  and Moderate to severe tricuspid insufficiency is present.    He presents today for follow up. He had upgrade to CRTD on 4/26/22 for high ventricular pacing. Device site well healed. Echo today shows LVEF 25%, mild/moderate RV dilation with moderately reduced RV function, severe biatrial enlargement, moderate/severe MI moderate/severe TI. He reports feeling he has more energy overall and less SOB. LVEF has decreased from 35% on last echo to 25% on echo today. Lisinopril was stopped during admission for flu in 12/2021 and only resumed at 1/2 dose.. He denies chest discomfort, palpitations, abdominal fullness/bloating or peripheral edema, shortness of breath, paroxysmal nocturnal dyspnea, orthopnea, lightheadedness, dizziness, pre-syncope, or syncope. Presenting 12 lead ECG shows  Vent Rate 76 bpm,   ms, QTc 499 ms. Device interrogation shows normal device function, stable lead parameter, 12 NSVT EGMs show AF with RVR, and BVP 93%. Current cardiac medications include: Spironolactone, Lisinopril, Simvastatin, Mexiletine, Digoxin, Lasix, Toprol XL, and Warfarin.     I have reviewed and updated the patient's Past Medical History, Social History, Family History and Medication List.     Cardiographics (Personally Reviewed) :   TTE 10/26/2021:  Interpretation Summary  Left ventricular function is decreased. The ejection fraction is 35-40% (moderately reduced).  Moderate right ventricular dilation is present. Global right ventricular function is mildly to moderately reduced.  Moderate mitral insufficiency is present. Moderate to severe tricuspid insufficiency is present.  Dilation of the inferior vena cava is present with abnormal respiratory variation in diameter.  No pericardial effusion is present.     CORS 8/29/19:   proximal RCA with bridging collaterals and collaterals from distal LAD reconstituting distal RCA system  Dist LM lesion is 40% stenosed.  Mild proximal / mid LAD and D1 disease  Mild proximal LCx  "disease  No intervention performed     Stress Echo July 2019  Interpretation Summary  Submaximal stress test, achieved 74% of the age predicted maximal heart rate.  Limiting symptom fatigue.  Normal blood pressure response to exercise.  No angina symptoms with exercise.  Baseline rhythm is atrial fibrillation. No ECG evidence of ischemia. There is no chronotropic incompetence.  Severely reduced LV function with EF of 28% at rest; with exercise left ventricular cavity size and LVEF did not change significantly.  Akinesis of the basal-mid inferior/inferoseptal/inferolateral segments present. No new stress induced regional wall motion abnormalities evident.  Less than average functional capacity for age. Exercise time ~2 minutes.       Physical Examination   /71 (BP Location: Right arm, Patient Position: Chair, Cuff Size: Adult Regular)   Pulse 70   Ht 1.784 m (5' 10.24\")   Wt 85.4 kg (188 lb 4.8 oz)   SpO2 99%   BMI 26.84 kg/m    Wt Readings from Last 3 Encounters:   08/23/22 87.5 kg (193 lb)   06/28/22 86 kg (189 lb 11.2 oz)   06/20/22 84.8 kg (187 lb)     General Appearance:   Alert, well-appearing and in no acute distress.   HEENT: Atraumatic, normocephalic. PERRL.  MMM.   Chest/Lungs:   Respirations unlabored.  Lungs are clear to auscultation.   Cardiovascular:   Irregular.  S1/S2. Murmur.    Abdomen:  Soft, nontender, nondistended.   Extremities: No cyanosis or clubbing. No edema.    Musculoskeletal: Moves all extremities.  Gait normal.   Skin: Warm, dry, intact.    Neurologic: Mood and affect are appropriate.  Alert and oriented to person, place, time, and situation.          Medications  Allergies   Current Outpatient Medications   Medication Sig Dispense Refill     acyclovir (ZOVIRAX) 400 MG tablet Take 1 tablet (400 mg) by mouth every 12 hours (Patient not taking: Reported on 8/23/2022) 60 tablet 3     albuterol (PROAIR HFA/PROVENTIL HFA/VENTOLIN HFA) 108 (90 Base) MCG/ACT inhaler Inhale 2 puffs " into the lungs every 6 hours as needed for shortness of breath / dyspnea or wheezing 18 g      baclofen (LIORESAL) 10 MG tablet Take 10 mg by mouth as needed for muscle spasms       Calcium Carbonate-Vitamin D (CALCIUM 500 + D PO) Take 2 tablets by mouth daily.       dexamethasone (DECADRON) 4 MG tablet Take 2 tablets (8mg) on days 8, 15, and 22 6 tablet 0     diclofenac (VOLTAREN) 75 MG EC tablet Take 1 tablet (75 mg) by mouth 2 times daily as needed for moderate pain 1 tab prn (Patient not taking: Reported on 8/23/2022)       digoxin (LANOXIN) 125 MCG tablet Take 1 tablet (125 mcg) by mouth daily 30 tablet 1     fentaNYL (DURAGESIC) 25 mcg/hr patch 72 hr Place 1 patch onto the skin every 72 hours       furosemide (LASIX) 20 MG tablet Take 1 tablet (20 mg) by mouth daily (Patient taking differently: Take 40 mg by mouth daily) 90 tablet 3     hydrocortisone 2.5 % cream Place rectally 2 times daily       lisinopril (ZESTRIL) 5 MG tablet Take 0.5 tablets (2.5 mg) by mouth 2 times daily 90 tablet 0     Magnesium Oxide 420 MG TABS Take 3 tabs (1260 mg) three times a day. (Patient taking differently: 420 mg Take 7 tabs  daily- taking 4 in AM and 3 night.) 810 tablet 3     metoprolol succinate ER (TOPROL-XL) 100 MG 24 hr tablet Take 1 tablet (100 mg) by mouth daily (Patient taking differently: Take 75 mg by mouth daily 75 mg daily) 90 tablet 3     mexiletine (MEXITIL) 150 MG capsule Take 1 capsule (150 mg) by mouth 2 times daily As close to 12 hours apart as possible 180 capsule 0     omeprazole (PRILOSEC) 20 MG capsule Take 20 mg by mouth daily  90 capsule 3     ondansetron (ZOFRAN-ODT) 4 MG ODT tab Take 4 mg by mouth every 6 hours as needed for nausea        oxycodone (ROXICODONE) 5 MG immediate release tablet Take 1-2 tablets by mouth every 6 hours as needed For pain       pomalidomide (POMALYST) 4 MG capsule Take 1 capsule (4 mg) by mouth daily Day 1 thru 14 of each 28 day cycle. Administer with or without food.  Swallow whole with water. (Patient not taking: Reported on 8/23/2022) 14 capsule 0     pomalidomide (POMALYST) 4 MG capsule Take 1 capsule (4 mg) by mouth daily Day 1 thru 14 of each 28 day cycle. Administer with or without food. Swallow whole with water. (Patient not taking: No sig reported) 14 capsule 0     pomalidomide (POMALYST) 4 MG capsule Take 1 capsule (4 mg) by mouth daily Day 1 thru 14 of each 28 day cycle. Administer with or without food. Swallow whole with water. (Patient not taking: No sig reported) 14 capsule 0     pomalidomide (POMALYST) 4 MG capsule Take 1 capsule (4 mg) by mouth daily Day 1 thru 14 of each 28 day cycle. Administer with or without food. Swallow whole with water. (Patient not taking: No sig reported) 14 capsule 0     pomalidomide (POMALYST) 4 MG capsule Take 1 capsule (4 mg) by mouth daily Day 1 thru 14 of each 28 day cycle. Administer with or without food. Swallow whole with water. (Patient not taking: No sig reported) 14 capsule 3     potassium chloride ER (KLOR-CON M) 10 MEQ CR tablet Take 1 tablet (10 mEq) by mouth every other day 90 tablet 3     simvastatin (ZOCOR) 40 MG tablet Take 1 tablet (40 mg) by mouth At Bedtime 90 tablet 0     spironolactone (ALDACTONE) 25 MG tablet Take 0.5 tablets (12.5 mg) by mouth daily 15 tablet 1     tiotropium (SPIRIVA) 18 MCG inhalation capsule Inhale 1 capsule (18 mcg) into the lungs daily 30 capsule 3     warfarin (COUMADIN) 5 MG tablet Take 7.5 mg by mouth See Admin Instructions Take 1.5 tab by mojth Monday and Friday and 1 tab Sun Tues WED , Thur and Sat      No Known Allergies      Lab Results (Personally Reviewed)    Chemistry/lipid CBC Cardiac Enzymes/BNP/TSH/INR   Lab Results   Component Value Date    BUN 28 08/23/2022     08/23/2022    CO2 28 08/23/2022     Creatinine   Date Value Ref Range Status   08/23/2022 1.33 (H) 0.66 - 1.25 mg/dL Final   04/27/2021 1.23 0.66 - 1.25 mg/dL Final       Lab Results   Component Value Date    CHOL  139 06/20/2022    HDL 43 06/20/2022    LDL 75 06/20/2022      Lab Results   Component Value Date    WBC 7.3 08/23/2022    HGB 11.5 (L) 08/23/2022    HCT 34.5 (L) 08/23/2022     (H) 08/23/2022     08/23/2022    Lab Results   Component Value Date    TSH 0.62 03/20/2019    INR 3.06 (H) 06/20/2022        The patient states understanding and is agreeable with the plan.   Ander Jurado MD [facility]  Cardiology - Electrophysiology      Total time spent on patient visit, reviewing notes, imaging, labs, orders, and completing necessary documentation: 45 minutes.

## 2022-08-31 NOTE — PATIENT INSTRUCTIONS
Plan:   Increase lisinopril to 5mg twice a day  Decrease metoprolol to 50mg daily  Labs in 1 week - sending order to Jerrod Quiñonez at fax #: Fax: [phone] // [phone]  Send a remote transmission in 1 month  Follow-up with Sunshine Henderson NP in 3 months with an echo and device check prior      Your Care Team:  EP Cardiology   Telephone Number     Suri Zurita RN [phone]    After business hours: [phone], ask for cardiologist on-call   Non-procedure scheduling:    Barbara VARGAS   [phone]   Procedure scheduling:    Stephanie Wells   [phone]   Device Clinic (Pacemakers, ICDs, Loop Recorders)    During business hours: [phone]  After business hours:   [phone]- select option 4 and ask for job code [phone].       Cardiovascular Clinic:   [address] [place] [address]. [place] [address]      As always, Thank you for trusting us with your health care needs!

## 2022-08-31 NOTE — NURSING NOTE
Chief Complaint   Patient presents with     Follow Up     4 mo follow-up upgrade to CRTD. Hx VT, permanent AF, ICM, HTN.        Vitals were taken, medications reconciled, and EKG was performed.    Luc Dorsey EMT  10:53 AM

## 2022-08-31 NOTE — PATIENT INSTRUCTIONS
It was a pleasure to see you in clinic today. Please do not hesitate to call with any questions or concerns. You are scheduled for a remote transmission in 3 months., with remote transmissions every three months to follow. These will happen automatically in the night. We look forward to seeing you in clinic at your next device check in 12 months, with your annual cardiology appointment.    Natali Arredondo, RN  Electrophysiology Nurse Clinician  [facility]  During business hours call:  [phone]  Urgent needs after hours- please call: [phone]- select option #4 and ask for job code [phone].

## 2022-09-06 NOTE — TELEPHONE ENCOUNTER
Oral Chemotherapy Monitoring Program    Subjective/Objective:  Erasmo Pearce is a 72 year old male contacted by phone for a follow-up visit for oral chemotherapy. Jonh confirms taking the appropriate dose of Pomalyst 4mg daily for 2 weeks on, 2 weeks off. Denies new or worsening side effects, missed doses, and recent hospital or ED visits. Patient shares that he was recently started on lisinopril and metoprolol therapy by his cardiologist - no DDIs of concern.     ORAL CHEMOTHERAPY 3/8/2022 4/25/2022 5/3/2022 5/10/2022 5/24/2022 7/3/2022 9/6/2022   Assessment Type Refill Lab Monitoring Refill Monthly Follow up Lab Monitoring Chart Review Other   Diagnosis Code Multiple Myeloma Multiple Myeloma Multiple Myeloma Multiple Myeloma Multiple Myeloma Multiple Myeloma Multiple Myeloma   Providers Dr. Jaret Raygoza   [facility]   Drug Name Pomalyst (pomalidomide) Pomalyst (pomalidomide) Pomalyst (pomalidomide) Pomalyst (pomalidomide) Pomalyst (pomalidomide) Pomalyst (pomalidomide) Pomalyst (pomalidomide)   Dose 4 mg 4 mg 4 mg 4 mg 4 mg - 4 mg   Current Schedule Daily Daily Daily Daily Daily - Daily   Cycle Details 2 weeks on, 2 weeks off 2 weeks on, 2 weeks off 2 weeks on, 2 weeks off 2 weeks on, 2 weeks off 2 weeks on, 2 weeks off Drug on Hold 2 weeks on, 2 weeks off   Start Date of Last Cycle 2/15/2022 4/12/2022 - 5/3/2022 5/3/2022 - 8/28/2022   Planned next cycle start date 3/15/2022 - - - - - -   Doses missed in last 2 weeks - - - 0 - - 0   Adherence Assessment - - - Adherent - - Adherent   Adverse Effects - Anemia;Thrombocytopenia;Other (See Note for Details) - No AE identified during assessment - - No AE identified during assessment   Myalgias/Arthralgias - - - - - - -   Pharmacist Intervention(myalgias/arthralgias) - - - - - - -   Anemia - Grade 2 -  "Resolved due to intervention - - -   Pharmacist Intervention(anemia) - No - - - - -   Fatigue - - - - - - -   Pharmacist Intervention(fatigue) - - - - - - -   Thrombocytopenia - Grade 1 - Resolved due to intervention - - -   Pharmacist Intervention(thrombocytopenia) - No - - - - -   Other (See Note for Details) - Hypomagnesemia (Grade 1) - - - - -   Pharmacist intervention(other) - No - - - - -   Home BPs - - - - - - -   Any new drug interactions? - - - Yes - - No   Pharmacist Intervention? - - - No - - -   Intervention(s) - - - - - - -   Is the dose as ordered appropriate for the patient? - Yes - Yes - - Yes   Has the patient been assessed within the past 6 months for depression? - - - - - - -   Has the patient missed any days of school, work, or other routine activity? - - - - - - -   Since the last time we talked, have you been hospitalized or used the emergency room? - - - - - - No       Last PHQ-2 Score on record:   PHQ-2 ( [address] [facility]) 3/8/2022 2/18/2020   Q1: Little interest or pleasure in doing things 0 0   Q2: Feeling down, depressed or hopeless 0 0   PHQ-2 Score 0 0   PHQ-2 Total Score (12-17 Years)- Positive if 3 or more points; Administer PHQ-A if positive - 0       Vitals:  BP:   BP Readings from Last 1 Encounters:   08/31/22 117/71     Wt Readings from Last 1 Encounters:   08/31/22 85.4 kg (188 lb 4.8 oz)     Estimated body surface area is 2.06 meters squared as calculated from the following:    Height as of 8/31/22: 1.784 m (5' 10.24\").    Weight as of 8/31/22: 85.4 kg (188 lb 4.8 oz).    Labs:  _  Result Component Current Result Ref Range   Sodium 139 (8/23/2022) 133 - 144 mmol/L     _  Result Component Current Result Ref Range   Potassium 5.2 (8/23/2022) 3.4 - 5.3 mmol/L     _  Result Component Current Result Ref Range   Calcium 9.2 (8/23/2022) 8.5 - 10.1 mg/dL     No results found for Mag within last 30 days.     No results found for Phos within last 30 days.     _  Result Component Current Result " Ref Range   Albumin 4.2 (8/23/2022) 3.4 - 5.0 g/dL     _  Result Component Current Result Ref Range   Urea Nitrogen 28 (8/23/2022) 7 - 30 mg/dL     _  Result Component Current Result Ref Range   Creatinine 1.33 (H) (8/23/2022) 0.66 - 1.25 mg/dL     _  Result Component Current Result Ref Range   AST 16 (8/23/2022) 0 - 45 U/L     _  Result Component Current Result Ref Range   ALT 18 (8/23/2022) 0 - 70 U/L     _  Result Component Current Result Ref Range   Bilirubin Total 0.8 (8/23/2022) 0.2 - 1.3 mg/dL     _  Result Component Current Result Ref Range   WBC Count 7.3 (8/23/2022) 4.0 - 11.0 10e3/uL     _  Result Component Current Result Ref Range   Hemoglobin 11.5 (L) (8/23/2022) 13.3 - 17.7 g/dL     _  Result Component Current Result Ref Range   Platelet Count 155 (8/23/2022) 150 - 450 10e3/uL     No results found for ANC within last 30 days.     _  Result Component Current Result Ref Range   Absolute Neutrophils 5.3 (8/23/2022) 1.6 - 8.3 10e3/uL        Assessment/Plan:  Jonh is tolerating the restart of therapy well. Continue Pomalyst therapy as planned.     Follow-Up:  9/15: labs      Cleo Cisneros, PharmD, BCACP  Hematology/Oncology Clinical Pharmacist  Oral Chemotherapy Monitoring Program  [facility]  [phone]

## 2022-09-14 NOTE — PROGRESS NOTES
"ORAL CHEMOTHERAPY DISCONTINUATION       Primary Oncologist:  Dr. Raygoza  Primary Oncology Clinic: [facility]  Cancer Diagnosis:  Aziza Myeloma  Therapy History:  C1D1=2/3/2019  C2D1~3/3/19  C3D1 due 3/31, *as of 4/18, Jonh says he started C3D1 on 4/7  C4D1= 05/05  K2S9=25/16   C6D1=7/16 planning to continue and add velcade  C7D1: 8/13    Revlimid 10 mg (1 X 10 mg) by mouth on days 1-14 of 28 day cycle.  Therapy Ended On:  9/10/2019  Reason For Discontinuation: other/unknown (see additional notes)    Additional Notes:  \"Jonh is really looking very good.  I would like to take a drug holiday for Jonh and stop the Velcade, Revlimid and dexamethasone.  We will repeat labs in a month.  I will see him just before Thanksgiving.  I think Velcade could be adding to some cardiotoxicity.  I am so glad the Heart Failure team is also following him regularly.  At this time, we will take him off all myeloma meds.  We will continue to follow his labs.  I am thinking that if we were to reinstitute therapy that I would use daratumumab along with Revlimid and dexamethasone, possibly after Christmas.\" Ari    Thanks you for the opportunity to be a part of this patient's oral chemotherapy. The oncology pharmacy will no longer be following this patient for oral chemotherapy. If there are any questions or the plan changes, feel free to contact us.    Pierre Robbins Pharmacy Intern  Oral Chemotherapy Monitoring Program  [phone]          " Sildenafil Approved      Filling Pharmacy: [id number] - [place], [place]

## 2022-09-15 NOTE — PROGRESS NOTES
Oral Chemotherapy Monitoring Program  Lab Follow Up    Reviewed lab results from 9/15/22.    ORAL CHEMOTHERAPY 4/25/2022 5/3/2022 5/10/2022 5/24/2022 7/3/2022 9/6/2022 9/15/2022   Assessment Type Lab Monitoring Refill Monthly Follow up Lab Monitoring Chart Review Other Lab Monitoring   Diagnosis Code Multiple Myeloma Multiple Myeloma Multiple Myeloma Multiple Myeloma Multiple Myeloma Multiple Myeloma Multiple Myeloma   Providers Dr Jaret Raygoza   [facility]   Drug Name Pomalyst (pomalidomide) Pomalyst (pomalidomide) Pomalyst (pomalidomide) Pomalyst (pomalidomide) Pomalyst (pomalidomide) Pomalyst (pomalidomide) Pomalyst (pomalidomide)   Dose 4 mg 4 mg 4 mg 4 mg - 4 mg 4 mg   Current Schedule Daily Daily Daily Daily - Daily Daily   Cycle Details 2 weeks on, 2 weeks off 2 weeks on, 2 weeks off 2 weeks on, 2 weeks off 2 weeks on, 2 weeks off Drug on Hold 2 weeks on, 2 weeks off 2 weeks on, 2 weeks off   Start Date of Last Cycle 4/12/2022 - 5/3/2022 5/3/2022 - 8/28/2022 -   Planned next cycle start date - - - - - - -   Doses missed in last 2 weeks - - 0 - - 0 -   Adherence Assessment - - Adherent - - Adherent -   Adverse Effects Anemia;Thrombocytopenia;Other (See Note for Details) - No AE identified during assessment - - No AE identified during assessment -   Myalgias/Arthralgias - - - - - - -   Pharmacist Intervention(myalgias/arthralgias) - - - - - - -   Anemia Grade 2 - Resolved due to intervention - - - -   Pharmacist Intervention(anemia) No - - - - - -   Fatigue - - - - - - -   Pharmacist Intervention(fatigue) - - - - - - -   Thrombocytopenia Grade 1 - Resolved due to intervention - - - -   Pharmacist Intervention(thrombocytopenia) No - - - - - -   Other (See Note for Details) Hypomagnesemia (Grade 1) - - - - - -   Pharmacist intervention(other) No - - - - - -    Home BPs - - - - - - -   Any new drug interactions? - - Yes - - No -   Pharmacist Intervention? - - No - - - -   Intervention(s) - - - - - - -   Is the dose as ordered appropriate for the patient? Yes - Yes - - Yes -   Has the patient been assessed within the past 6 months for depression? - - - - - - -   Has the patient missed any days of school, work, or other routine activity? - - - - - - -   Since the last time we talked, have you been hospitalized or used the emergency room? - - - - - No -       Labs:  _  Result Component Current Result Ref Range   Sodium 137 (9/15/2022) 136 - 145 mmol/L     _  Result Component Current Result Ref Range   Potassium 5.0 (9/15/2022) 3.4 - 5.3 mmol/L     _  Result Component Current Result Ref Range   Calcium 9.8 (9/15/2022) 8.8 - 10.2 mg/dL     No results found for Mag within last 30 days.     No results found for Phos within last 30 days.     _  Result Component Current Result Ref Range   Albumin 4.3 (9/15/2022) 3.5 - 5.2 g/dL     _  Result Component Current Result Ref Range   Urea Nitrogen 40.0 (H) (9/15/2022) 8.0 - 23.0 mg/dL     _  Result Component Current Result Ref Range   Creatinine 1.49 (H) (9/15/2022) 0.67 - 1.17 mg/dL     _  Result Component Current Result Ref Range   AST 19 (9/15/2022) 10 - 50 U/L     _  Result Component Current Result Ref Range   ALT 27 (9/15/2022) 10 - 50 U/L     _  Result Component Current Result Ref Range   Bilirubin Total 0.8 (9/15/2022) <=1.2 mg/dL     _  Result Component Current Result Ref Range   WBC Count 3.6 (L) (9/15/2022) 4.0 - 11.0 10e3/uL     _  Result Component Current Result Ref Range   Hemoglobin 12.3 (L) (9/15/2022) 13.3 - 17.7 g/dL     _  Result Component Current Result Ref Range   Platelet Count 76 (L) (9/15/2022) 150 - 450 10e3/uL     No results found for ANC within last 30 days.     _  Result Component Current Result Ref Range   Absolute Neutrophils 1.6 (9/15/2022) 1.6 - 8.3 10e3/uL        Assessment & Plan:  Results demonstrate a  decrease in platelets from 155 K to 76 K. No interventions required at this time. IB sent to Dr. Raygoza as FYI.    [employer] message sent to patient with results.    Follow-Up:  9/20/22 labs and daratumumab infusion.    Addendum: confirmed with Dr. Raygoza that no dose reduction is necessary based on labs from today.

## 2022-09-15 NOTE — NURSING NOTE
Chief Complaint   Patient presents with     Blood Draw     Labs drawn via  by RN in lab.      Labs collected from venipuncture by RN.    Sumi Campbell RN

## 2022-09-16 NOTE — TELEPHONE ENCOUNTER
I have reviewed and agree to the information submitted for this rx.    Parris Falcon PharmD  Oral Chemotherapy Monitoring Program  [facility]  [phone]       Please verify the rx on the [facility] form for this patient. Once completed, please let me know, So I can fax the [facility] to the [facility].    Thank  you!       Danitza Boykin   [facility] Pharmacy Liaison, alpha split R-Z  Phone: [phone]  Fax: [phone]  Email: [email or web address]

## 2022-09-16 NOTE — TELEPHONE ENCOUNTER
Oral Chemotherapy Monitoring Program    Medication:Pomalyst  Rx:  4mg PO daily 14/28 ds    Auth #: [id number]  Risk Category: Adult male    Routine survey questions reviewed. yes        Danitza Boykin   [facility] Pharmacy Liaison, alpha split R-Z  Phone: [phone]  Fax: [phone]  Email: [email or web address]

## 2022-09-20 NOTE — PROGRESS NOTES
Infusion Nursing Note:  Erasmo Pearce presents today for Day 1, Cycle 8 of Daratumumab.  Patient seen by provider today: No   present during visit today: Not Applicable.     Note: Lab results monitored; met treatment parameters. Given 650mg PO Tylenol, 50mg PO Benadryl, 8mg PO Dex taken at home per patient, and 10mg PO Montelukast as premeds. Jenny given subcutaneously in RLQ of abdomen over 3 minutes without complication.  EVUSHELD Administration Note:  Erasmo Pearce presents today for EVUSHELD.   present during visit today: Not Applicable.    Consent:   Evusheld Consent   Confirmed patient received the Emergency Use Authorization Fact Sheet for Patients, Parents and Caregivers prior to receiving medication. We discussed the following risks and benefits of receiving EVUSHELD, as well as alternative treatments and the patient wished to proceed with EVUSHELD.     Providers believe the benefits of Evusheld outweigh the risks for all moderately to severely immunocompromised patients.      Benefits:   Evusheld is a synthetic antibody that provides protection against COVID-19 for 6 months and is recommended for patients that have a weakened immune system that may not be able to make antibodies themselves.     Risks:    There is a very small risk of allergic reactions and you should notify us if you have any symptoms of an allergic reaction after the injection. There were also some extremely rare cardiac events reported in the initial trials in people that already had heart disease, but experts do not think these were caused by the medication. We will observe you for any chest pain or trouble breathing after the injections and you can reach out to your provider if any of these symptoms develop.     Alternatives:   Vaccines to prevent COVID-19 are approved or available under Emergency Use Authorization. Use of EVUSHELD does not replace vaccination against COVID-19. You can continue to  mask and isolate to avoid infections. It is your choice to receive or not receive EVUSHELD. Should you decide not to receive EVUSHELD, it will not change your standard medical care.     Patient does consent to the injection.        Post Injection Assessment:   Patient tolerated infusion without incident.  Patient tolerated injection without incident.      Patient was observed in the room for a minimum of 10 minutes after injection per standard, then remained in the buidling for a total 60 minute observation period.       Intravenous Access:  No Intravenous access/labs at this visit.     Treatment Conditions:  Results reviewed, labs MET treatment parameters, ok to proceed with treatment.     Post Infusion Assessment:  Patient tolerated injection without incident.      Discharge Plan:   Patient discharged in stable condition accompanied by: wife.

## 2022-09-20 NOTE — LETTER
9/20/2022         RE: Erasmo Pearce  [address]  [address] [facility] [address]        Dear Colleague,    Thank you for referring your patient, Erasmo Pearce, to the [facility] [place]. Please see a copy of my visit note below.    Infusion Nursing Note:  Erasmo Pearce presents today for Day 1, Cycle 8 of Daratumumab.  Patient seen by provider today: No   present during visit today: Not Applicable.     Note: Lab results monitored; met treatment parameters. Given 650mg PO Tylenol, 50mg PO Benadryl, 8mg PO Dex taken at home per patient, and 10mg PO Montelukast as premeds. Jenny given subcutaneously in RLQ of abdomen over 3 minutes without complication.  EVUSHELD Administration Note:  Erasmo Pearce presents today for EVUSHELD.   present during visit today: Not Applicable.    Consent:   Evusheld Consent   Confirmed patient received the Emergency Use Authorization Fact Sheet for Patients, Parents and Caregivers prior to receiving medication. We discussed the following risks and benefits of receiving EVUSHELD, as well as alternative treatments and the patient wished to proceed with EVUSHELD.     Providers believe the benefits of Evusheld outweigh the risks for all moderately to severely immunocompromised patients.      Benefits:   Evusheld is a synthetic antibody that provides protection against COVID-19 for 6 months and is recommended for patients that have a weakened immune system that may not be able to make antibodies themselves.     Risks:    There is a very small risk of allergic reactions and you should notify us if you have any symptoms of an allergic reaction after the injection. There were also some extremely rare cardiac events reported in the initial trials in people that already had heart disease, but experts do not think these were caused by the medication. We will observe you for any chest pain or  trouble breathing after the injections and you can reach out to your provider if any of these symptoms develop.     Alternatives:   Vaccines to prevent COVID-19 are approved or available under Emergency Use Authorization. Use of EVUSHELD does not replace vaccination against COVID-19. You can continue to mask and isolate to avoid infections. It is your choice to receive or not receive EVUSHELD. Should you decide not to receive EVUSHELD, it will not change your standard medical care.     Patient does consent to the injection.        Post Injection Assessment:   Patient tolerated infusion without incident.  Patient tolerated injection without incident.      Patient was observed in the room for a minimum of 10 minutes after injection per standard, then remained in the buidling for a total 60 minute observation period.       Intravenous Access:  No Intravenous access/labs at this visit.     Treatment Conditions:  Results reviewed, labs MET treatment parameters, ok to proceed with treatment.     Post Infusion Assessment:  Patient tolerated injection without incident.      Discharge Plan:   Patient discharged in stable condition accompanied by: wife.         Again, thank you for allowing me to participate in the care of your patient.        Sincerely,        [facility]

## 2022-09-20 NOTE — PROGRESS NOTES
Oral Chemotherapy Monitoring Program  Lab Follow Up    Reviewed lab results from today with regard to Pomalyst therapy.    ORAL CHEMOTHERAPY 5/10/2022 5/24/2022 7/3/2022 9/6/2022 9/15/2022 9/16/2022 9/20/2022   Assessment Type Monthly Follow up Lab Monitoring Chart Review Other Lab Monitoring Refill Lab Monitoring   Diagnosis Code Multiple Myeloma Multiple Myeloma Multiple Myeloma Multiple Myeloma Multiple Myeloma Multiple Myeloma Multiple Myeloma   Providers Dr Jaret Raygoza   [facility]   Drug Name Pomalyst (pomalidomide) Pomalyst (pomalidomide) Pomalyst (pomalidomide) Pomalyst (pomalidomide) Pomalyst (pomalidomide) Pomalyst (pomalidomide) Pomalyst (pomalidomide)   Dose 4 mg 4 mg - 4 mg 4 mg 4 mg 4 mg   Current Schedule Daily Daily - Daily Daily Daily Daily   Cycle Details 2 weeks on, 2 weeks off 2 weeks on, 2 weeks off Drug on Hold 2 weeks on, 2 weeks off 2 weeks on, 2 weeks off 2 weeks on, 2 weeks off 2 weeks on, 2 weeks off   Start Date of Last Cycle 5/3/2022 5/3/2022 - 8/28/2022 - - -   Planned next cycle start date - - - - - - -   Doses missed in last 2 weeks 0 - - 0 - - -   Adherence Assessment Adherent - - Adherent - - -   Adverse Effects No AE identified during assessment - - No AE identified during assessment - - -   Myalgias/Arthralgias - - - - - - -   Pharmacist Intervention(myalgias/arthralgias) - - - - - - -   Anemia Resolved due to intervention - - - - - -   Pharmacist Intervention(anemia) - - - - - - -   Fatigue - - - - - - -   Pharmacist Intervention(fatigue) - - - - - - -   Thrombocytopenia Resolved due to intervention - - - - - -   Pharmacist Intervention(thrombocytopenia) - - - - - - -   Other (See Note for Details) - - - - - - -   Pharmacist intervention(other) - - - - - - -   Home BPs - - - - - - -   Any new drug interactions? Yes - - No - -  -   Pharmacist Intervention? No - - - - - -   Intervention(s) - - - - - - -   Is the dose as ordered appropriate for the patient? Yes - - Yes - - -   Has the patient been assessed within the past 6 months for depression? - - - - - - -   Has the patient missed any days of school, work, or other routine activity? - - - - - - -   Since the last time we talked, have you been hospitalized or used the emergency room? - - - No - - -       Labs:  _  Result Component Current Result Ref Range   Sodium 137 (9/20/2022) 136 - 145 mmol/L     _  Result Component Current Result Ref Range   Potassium 5.4 (H) (9/20/2022) 3.4 - 5.3 mmol/L     _  Result Component Current Result Ref Range   Calcium 9.5 (9/20/2022) 8.8 - 10.2 mg/dL     No results found for Mag within last 30 days.     No results found for Phos within last 30 days.     _  Result Component Current Result Ref Range   Albumin 4.0 (9/20/2022) 3.5 - 5.2 g/dL     _  Result Component Current Result Ref Range   Urea Nitrogen 28.2 (H) (9/20/2022) 8.0 - 23.0 mg/dL     _  Result Component Current Result Ref Range   Creatinine 1.14 (9/20/2022) 0.67 - 1.17 mg/dL     _  Result Component Current Result Ref Range   AST 19 (9/20/2022) 10 - 50 U/L     _  Result Component Current Result Ref Range   ALT 23 (9/20/2022) 10 - 50 U/L     _  Result Component Current Result Ref Range   Bilirubin Total 0.4 (9/20/2022) <=1.2 mg/dL     _  Result Component Current Result Ref Range   WBC Count 3.4 (L) (9/20/2022) 4.0 - 11.0 10e3/uL     _  Result Component Current Result Ref Range   Hemoglobin 11.1 (L) (9/20/2022) 13.3 - 17.7 g/dL     _  Result Component Current Result Ref Range   Platelet Count 176 (9/20/2022) 150 - 450 10e3/uL     No results found for ANC within last 30 days.     _  Result Component Current Result Ref Range   Absolute Neutrophils 2.7 (9/20/2022) 1.6 - 8.3 10e3/uL        Assessment & Plan:  No concerning abnormalities.    No outreach made to patient as he was in infusion today and RN  reviewed labs with pt.    Follow-Up:  10/6: monthly assessment    Grace Myhre, PharmD  Hematology/Oncology Pharmacist  [facility]  [phone]

## 2022-10-05 NOTE — PROGRESS NOTES
Contacted Jonh to discuss chemo infusion and follow up apts with Dr Raygoza. Next lab/chemo/MD appt on 10/18 and the following appt will be on 11/15. Pt instructed to collect 24 hour urine for the 10/18 appt and he verified understanding of plan. Message sent to oral chemo pharm team with [facility]'s [facility] [place] Writer's contact info provided to Jonh and he will call if he has questions.

## 2022-10-11 NOTE — TELEPHONE ENCOUNTER
"Please verify the rx on the [facility] Form for this patient.   I will fax to the [facility] as soon as the prescription is verified.   The prescription in the treatment plan can be released \" historically\", or I can local print it, so it does not send to the [facility], they will only accept this attached form.      Thank  you!       ADDENDUM:  I have reviewed and agree to the information submitted for this prescription.   Cleo Cisneros, PharmD, BCACP  Oral Chemotherapy Monitoring Program  [facility]  [phone]     "

## 2022-10-11 NOTE — PROGRESS NOTES
[facility]: Cancer Care Short Note                                                                                          Completed chart audit to assign Oncology Care Coordination enrollment status as per Dr. Rock Raygoza, transferring pt from [facility] to [facility] Oncology clinic.    Yuly Beard, RN, OCN   RN Care Coordinator   [facility]    .

## 2022-10-11 NOTE — TELEPHONE ENCOUNTER
Oral Chemotherapy Monitoring Program    Medication: Pomalyst  Rx:  4mg PO daily 14/28 ds    Auth #: [id number]  Risk Category: Adult male  Routine survey questions reviewed. Yes, message was left       Danitza Boykin   [facility] Pharmacy Liaison, alpha split R-Z  Phone: [phone]  Fax: [phone]  Email: [email or web address]

## 2022-10-18 NOTE — PROGRESS NOTES
Oral Chemotherapy Monitoring Program  Lab Follow Up    Reviewed lab results from 10/18.    ORAL CHEMOTHERAPY 7/3/2022 9/6/2022 9/15/2022 9/16/2022 9/20/2022 10/11/2022 10/18/2022   Assessment Type Chart Review Other Lab Monitoring Refill Lab Monitoring Refill Lab Monitoring   Diagnosis Code Multiple Myeloma Multiple Myeloma Multiple Myeloma Multiple Myeloma Multiple Myeloma Multiple Myeloma Multiple Myeloma   Providers Dr. Jaret Raygoza   [facility]   Drug Name Pomalyst (pomalidomide) Pomalyst (pomalidomide) Pomalyst (pomalidomide) Pomalyst (pomalidomide) Pomalyst (pomalidomide) Pomalyst (pomalidomide) Pomalyst (pomalidomide)   Dose - 4 mg 4 mg 4 mg 4 mg 4 mg 4 mg   Current Schedule - Daily Daily Daily Daily Daily Daily   Cycle Details Drug on Hold 2 weeks on, 2 weeks off 2 weeks on, 2 weeks off 2 weeks on, 2 weeks off 2 weeks on, 2 weeks off 2 weeks on, 2 weeks off 2 weeks on, 2 weeks off   Start Date of Last Cycle - 8/28/2022 - - - - -   Planned next cycle start date - - - - - - -   Doses missed in last 2 weeks - 0 - - - - -   Adherence Assessment - Adherent - - - - -   Adverse Effects - No AE identified during assessment - - - - Anemia   Myalgias/Arthralgias - - - - - - -   Pharmacist Intervention(myalgias/arthralgias) - - - - - - -   Anemia - - - - - - Grade 2   Pharmacist Intervention(anemia) - - - - - - No   Fatigue - - - - - - -   Pharmacist Intervention(fatigue) - - - - - - -   Thrombocytopenia - - - - - - -   Pharmacist Intervention(thrombocytopenia) - - - - - - -   Other (See Note for Details) - - - - - - -   Pharmacist intervention(other) - - - - - - -   Home BPs - - - - - - -   Any new drug interactions? - No - - - - -   Pharmacist Intervention? - - - - - - -   Intervention(s) - - - - - - -   Is the dose as ordered appropriate for the patient? - Yes - -  - - -   Has the patient been assessed within the past 6 months for depression? - - - - - - -   Has the patient missed any days of school, work, or other routine activity? - - - - - - -   Since the last time we talked, have you been hospitalized or used the emergency room? - No - - - - -       Labs:  _  Result Component Current Result Ref Range   Sodium 135 (L) (10/18/2022) 136 - 145 mmol/L     _  Result Component Current Result Ref Range   Potassium 4.8 (10/18/2022) 3.4 - 5.3 mmol/L     _  Result Component Current Result Ref Range   Calcium 9.3 (10/18/2022) 8.8 - 10.2 mg/dL     _  Result Component Current Result Ref Range   Magnesium 1.9 (10/18/2022) 1.7 - 2.3 mg/dL     No results found for Phos within last 30 days.     _  Result Component Current Result Ref Range   Albumin 3.7 (10/18/2022) 3.5 - 5.2 g/dL     _  Result Component Current Result Ref Range   Urea Nitrogen 22.2 (10/18/2022) 8.0 - 23.0 mg/dL     _  Result Component Current Result Ref Range   Creatinine 1.28 (H) (10/18/2022) 0.67 - 1.17 mg/dL     _  Result Component Current Result Ref Range   AST 32 (10/18/2022) 10 - 50 U/L     _  Result Component Current Result Ref Range   ALT 44 (10/18/2022) 10 - 50 U/L     _  Result Component Current Result Ref Range   Bilirubin Total 0.7 (10/18/2022) <=1.2 mg/dL     _  Result Component Current Result Ref Range   WBC Count 9.0 (10/18/2022) 4.0 - 11.0 10e3/uL     _  Result Component Current Result Ref Range   Hemoglobin 8.4 (L) (10/18/2022) 13.3 - 17.7 g/dL     _  Result Component Current Result Ref Range   Platelet Count 315 (10/18/2022) 150 - 450 10e3/uL     No results found for ANC within last 30 days.     _  Result Component Current Result Ref Range   Absolute Neutrophils 8.6 (H) (10/18/2022) 1.6 - 8.3 10e3/uL        Assessment & Plan:  Results are concerning for grade 2 anemia.  Patient's serum creatinine hovers around baseline, and sodium is barely below normal.  Neutrophils are slightly elevated.  No  abnormalities require dose adjustment or hold.  Patient reviewed results in infusion center today so no message sent.      Follow-Up:  11/15 labs + infusion + provider visit    Parris Falcon PharmD  Oral Chemotherapy Monitoring Program  [facility]  [phone]

## 2022-10-18 NOTE — PROGRESS NOTES
Infusion Nursing Note:  Erasmo Pearce presents today for Cycle 9 Day 1 Darzalex Faspro/Flu shot   Patient seen by provider today: No   present during visit today: Not Applicable.    Note: Jonh comes into the clinic today doing well overall and does not have any concerns to offer at this visit. He does not attest to any abnormal pain/SOB/chest tightness/signs of infection/dizziness/bruising/swelling/nausea/urinary issues/vision or hearing changes/fatigue. He on and off struggles with constipation and diarrhea and has neuropathy of his feet which has remained unchanged.     HENRI Isaacs/Jennifer Cabrera 10/18/22 @1430:  - OK to give flu shot today  - Hgb 8.4, patient should be scheduled for lab recheck next week of CMP/CBC + Differential  - ANC elevated due to the Decadron patient took earlier today  - OK to give Darzalex Faspro    Intravenous Access:  No Intravenous access/labs at this visit.    Treatment Conditions:  Lab Results   Component Value Date    HGB 8.4 (L) 10/18/2022    WBC 9.0 10/18/2022    ANEU 1.0 (L) 05/24/2022    ANEUTAUTO 8.6 (H) 10/18/2022     10/18/2022      Lab Results   Component Value Date     (L) 10/18/2022    POTASSIUM 4.8 10/18/2022    MAG 1.9 10/18/2022    CR 1.28 (H) 10/18/2022    EILEEN 9.3 10/18/2022    BILITOTAL 0.7 10/18/2022    ALBUMIN 3.7 10/18/2022    ALT 44 10/18/2022    AST 32 10/18/2022     Results reviewed, labs MET treatment parameters, ok to proceed with treatment.  Parameters to transfuse if hgb is less than 8    Post Infusion Assessment:  Patient tolerated Darzalex Faspro injection to left side low abdomen without incident.  Patient tolerated flu vaccine to left deltoid without incident. Patient was given written information on vaccine.  Patient observed for 15 minutes post flu shot per protocol.     Discharge Plan:   Patient declined prescription refills.  Discharge instructions reviewed with: Patient.  Patient and/or family verbalized  understanding of discharge instructions and all questions answered.  Copy of AVS reviewed with patient and/or family.  Patient will return 11/15 for next appointment in infusion. However, patient has agreed to get labs drawn (CMP and CBC with differential) at [facility]. Care-coordinator updated with this plan and will reach out to patient should he need further instruction.   Patient discharged in stable condition accompanied by: self.  Departure Mode: Ambulatory.      Jennifer Cabrera RN

## 2022-10-18 NOTE — PATIENT INSTRUCTIONS
HENRI Isaacs/Jennifer Cabrera, RN 10/18/22 @1430:  - OK to give flu shot today  - Hgb 8.4, patient should be scheduled for lab recheck next week of CMP/CBC + Differential  - ANC elevated due to the Decadron patient took earlier today  - OK to give Darzalex Faspro    Jonh:  - please go get your labs drawn at [facility] in [place] and if hgb if less than 8, please call to schedule an infusion appointment with us.       [facility] Triage and after hours / weekends / holidays:  [phone]    Please call the triage or after hours line if you experience a temperature greater than or equal to 100.4, shaking chills, have uncontrolled nausea, vomiting and/or diarrhea, dizziness, shortness of breath, chest pain, bleeding, unexplained bruising, or if you have any other new/concerning symptoms, questions or concerns.      If you are having any concerning symptoms or wish to speak to a provider before your next infusion visit, please call your care coordinator or triage to notify them so we can adequately serve you.     If you need a refill on a narcotic prescription or other medication, please call before your infusion appointment.      Latest Reference Range & Units 10/18/22 13:50   Sodium 136 - 145 mmol/L 135 (L)   Potassium 3.4 - 5.3 mmol/L 4.8   Chloride 98 - 107 mmol/L 101   Carbon Dioxide (CO2) 22 - 29 mmol/L 24   Urea Nitrogen 8.0 - 23.0 mg/dL 22.2   Creatinine 0.67 - 1.17 mg/dL 1.28 (H)   GFR Estimate >60 mL/min/1.73m2 60 (L)   Calcium 8.8 - 10.2 mg/dL 9.3   Anion Gap 7 - 15 mmol/L 10   Magnesium 1.7 - 2.3 mg/dL 1.9   Albumin 3.5 - 5.2 g/dL 3.7   Protein Total 6.4 - 8.3 g/dL 6.7   Alkaline Phosphatase 40 - 129 U/L 124   ALT 10 - 50 U/L 44   AST 10 - 50 U/L 32   Bilirubin Total <=1.2 mg/dL 0.7   Glucose 70 - 99 mg/dL 202 (H)   WBC 4.0 - 11.0 10e3/uL 9.0   Hemoglobin 13.3 - 17.7 g/dL 8.4 (L)   Hematocrit 40.0 - 53.0 % 25.8 (L)   Platelet Count 150 - 450 10e3/uL 315   RBC Count 4.40 - 5.90 10e6/uL 2.52 (L)   MCV 78 - 100  fL 102 (H)   MCH 26.5 - 33.0 pg 33.3 (H)   MCHC 31.5 - 36.5 g/dL 32.6   RDW 10.0 - 15.0 % 14.1   % Neutrophils % 97   % Lymphocytes % 1   % Monocytes % 1   % Eosinophils % 0   % Basophils % 0   Absolute Basophils 0.0 - 0.2 10e3/uL 0.0   Absolute Eosinophils 0.0 - 0.7 10e3/uL 0.0   Absolute Immature Granulocytes <=0.4 10e3/uL 0.1   Absolute Lymphocytes 0.8 - 5.3 10e3/uL 0.1 (L)   Absolute Monocytes 0.0 - 1.3 10e3/uL 0.1   % Immature Granulocytes % 1   Absolute Neutrophils 1.6 - 8.3 10e3/uL 8.6 (H)   Absolute NRBCs 10e3/uL 0.0   NRBCs per 100 WBC <1 /100 0

## 2022-10-20 NOTE — TELEPHONE ENCOUNTER
[facility]: Cancer Care                                                                                          Orders for CBC with diff, CMP and type and screen faxed to [facility]     Signature:  Araceli Brown RN

## 2022-10-21 NOTE — TELEPHONE ENCOUNTER
Dr. Raygoza,    Is there a place in [facility] where pt can receive blood transfusion?  Our blood bank does not accept type and crosses from outside of [facility].    Yuly

## 2022-10-21 NOTE — PROGRESS NOTES
[facility]: Cancer Care                                                                                          Per conversation with Jayme, lab staff member at [facility], they will need to send specimen to [place] for antibody testing after collected.  He will work with their lab staff to be prepared for Tuesday lab draw.  Not likely to have blood until Wednesday or Thursday.  Per conversation with JOHN Hdez, [facility], [facility], about pt situation, they would likely give blood transfusion on Wed or Thursday re: delay with needed antibody work up since pt receiving daratumumab. Gave her contact information and asked that she call if does not receive transfusion orders today.  Faxed standing PRBC transfusion order to [facility]. Went thru per our [employer] system.  On review of chart, author noted need additional treatment orders are needed from provider before can schedule additional appointments after his 11/15/22.  Pt had sent Excel spread sheet of when he would be due for trts and wanted to have additional appts scheduled.  Updated pt about need for additional treatment orders from Dr. Rock Raygoza for Dec, before can send message to scheduling team to schedule.  Shared that author sent message to Dr. Rock Raygoza.  Also discussed 24 hr urine testing.  Pt reports he is planning to bring in 24 hr urine specimen on 11/15 when he returns to see Dr. Rock Raygoza.  Per communication with JOHN Hdez, first copy received was not readable.  Author refaxed on a different MFD and has gone thru.  Updated Ketty to expect new copy and call if any issues.  Per conversation with Carla, informed Dr. Rock Raygoza to send dexamethasone prescription to [facility].    RN also sent in basket message to scheduling team requesting appt to be scheduled in Dec per new trt cycle order.      Signature:  Yuly Beard RN, OCN

## 2022-10-21 NOTE — TELEPHONE ENCOUNTER
Dr. Raygoza,  I have reached out to [facility] lab and infusion area.  They are aware pt coming for lab draw on Tuesday and lab will send specimen to [place] for antibody workup re: daratumumab.  Also spoke with JOHN Hdez, in their infusion area.  Pt would likely receive his 1 unit PRBC's (as per standing order) on Wed or Thursday.    Yuly

## 2022-10-25 NOTE — PROGRESS NOTES
[facility]: Cancer Care Short Note                                    Discussion with Patient:                                                      Hgb = 8.5 today, 10/25/22, per call from [facility]ac[facility] in [place].  Stated to not send T&S or antibody screen.  He was going to replace order in case needed later.  Called and discussed with pt.    Assessment:                                                      Assessment completed with:: Patient     Constitutional  Fatigue: Fatigue not relieved by rest OR limiting instrumental ADL (Hard time not falling asleep during day if reads paper.  Lack of energy not relieved by sleep.  Feels drained all the time.  Overall he feels worse than last week but he is feeling a little better today than last day or 2.)  Fever: Absent or within normal limits    Respiratory  Cough: Absent or within normal limits  Dyspnea: Shortness of breath with moderate exertion (Noticing shortness of breath with going up one flight of steps, needs to catch his breath for a minute or two.)    Gastrointestinal  Dehydration: Absent or within normal limits (Reports he is drinking 3 or 4 Ensures and some Gaterade.  Max of 1 cup coffee. 2 glasses of milk and water.  States he drinks at least 64 oz of fluid every day.)       Completed learning assessment with pt today.    Intervention/Education provided during outreach:                                                       Discussed with Dr. Rock Raygoza, pt's symptoms of shortness of breath on exertion and over all feeling worse than last week even if may feel slightly better today than last couple days.  Dr. Raygoza decided to change pt's PRBC transfusion parameter to Hgb 8.5 or less.    Called [facility] lab and spoke with Neal, lab staff need to send T&S and antibody screen as Dr. Rock Raygoza changing pt's transfusion parameter to Hgb 8.5 or less.  She stated they would do T&S and send antibody screen (related to pt being on  daratumumab).    Discussed change in transfusion parameter to 8.5 or less with pt.  Pt very appreciative.    Discussed with pt the possibility of having Cancer Rehab referral.  At this time pt declined stating he thinks transfusion will be very beneficial.  Indicated that he would keep in mind.    Spoke with Joleen, nurse at [facility] area.  Gave her telephone order to change pt's PRBC transfusion parameter to 8.5 or less per Dr. Rock Raygoza.  Will also fax new transfusion orders to them.  Gave her contact information if not received new orders later today.  Later checked with Joleen.  She had not received new transfusion orders yet.  Author re-faxed after double checking fax number; went thru per [employer].    Spoke with pt informing him of elevated creat of 1.42.  Discussed need to increase amount of fluid he is drinking and recheck labs in 2 weeks, ~11/8 per Dr. Rock Raygoza.  Also informed pt to call if having any concerns or symptoms prior to next lab draw.  Pt stated he would call as needed.    Reminded pt, we will still draw labs on his return to clinic on 11/15 and he is due to bring in 24 hr urine.     Added lab orders per Dr. Rock Raygoza communication for CBC/d/p & CMP in 2 weeks and SPEP, CRP and kappa & lambda light chains to labs for 11/15    Faxed new lab orders for CBC/d/p and CMP to [facility] lab.  Did not go thru per [employer].  Per Neal, their fax is down at present.  She recommended faxing to their clinic at [phone].    Patient to call/[employer] message with updates    Signature:  Yuly Beard RN, OCN

## 2022-10-26 NOTE — PROGRESS NOTES
[facility]: Cancer Care                                                                                          Per conversation with Olamide Delacruz, he only see's CBC/d/p order for ~11/7 and reported their fax machine is not working.  He reported fax number of [phone] is clinic fax and nurses will bring orders when they have time.  Since orders faxed to machine ending [phone] at 4:13 yesterday (10/25), will follow up with Jayme or lab team on Thurs or Friday of this week.    Signature:  Yuly Beard, RN, OCN

## 2022-10-28 NOTE — PROGRESS NOTES
[facility]: Cancer Care                                                                                          Neal confirmed they received lab orders (CBC/d/p & chem panel) for pt re: ~11/8.    Signature:  Yuly Beard RN, OCN

## 2022-11-02 NOTE — PROGRESS NOTES
"Finished polmylst on Monday, 10/31.  Last dex was Tuesday, 11/1.  Off both for 14 days.    [facility]: Cancer Care Short Note                                    Discussion with Patient:                                                      Called pt re: creat 1.73, K+ 5.9, Hgb 8.4 today (from draw at [place]).      Assessment:                                                           Constitutional  Fatigue: Fatigue not relieved by rest OR limiting instrumental ADL (Very fatigued.  Did not notice improve with 1 unti PRBC.  Napping \"many\" hours a day per wife.  He expressed concern that his sleeping impacting is ability to drink fluids.  Fall asleep standing up.)  Fever: Absent or within normal limits (Having night sweats for 2 nights after taking his oral dex and needs to change t-shirt.  No change in this per pt.)    Respiratory  Cough: Mild symptoms OR nonprescription intervention indicated (Per tp and wife he is coughting more than previously.  Not waking pt up during sleep.)  Dyspnea: Shortness of breath with moderate exertion (Taking inhalers without issue but climb a flight of steps he is fatigued for a few minutes after.  Needs some deep breaths to catch his breath.  About the same as last week.)    Gastrointestinal  Anorexia: Oral intake altered without significant weight loss or malnutrition OR oral nutritional supplements indicated (Urinates \"a lot\".  Little appetite and not eating much food per wife. Drinking fluids including Ensure x3/day.)  Nausea: Absent or within normal limits  Vomiting: Absent or within normal limits  Dehydration: Absent or within normal limits (Drinking a variety of fluids including Ensure x3, a 20 oz Gaterade, milk x3, 1 cup coffee and 1 diet cola and ~30 oz of water.  He reports he urinates a lot.  Diarrhea also contributing to fluid loss.)  Constipation: Absent or within normal limits  Diarrhea: Increase of 4 - 6 stools per day over baseline OR moderate increase in ostomy " "output compared to baseline OR limiting instrumental ADL (Diarrhea is worsened since home.  having 3 to 5 watery stools a day.  Previously was couple times a day of watery stool.)    Genitourinary  Urinary Frequency:  (Urinating frequently.  Gets up every 2 to 2 1/2 hrs at night.  No burning or urgency with urinating.)    Pain  Patient Reported Pain?: No (Reports having \"godd\" day muscle cramping in fingers, hands and forarms most days.  Bad days: whole arms, neck, and leg.)    Other Patient Concerns  Other Patient Reported Concerns:  (Recent weights: 10/27/26=081, 10/94=562, 10/13=584, 10/90=574, 10/58=215, 11/1=179.)      Intervention/Education provided during outreach:                                                       Dr. Raygoza called pt's wife and instructed to decrease Mg+ tabs to 4/day, have pt drink 1 liter of Gatorade tonight.  Also stated can take imodium to slow stool.  Also instructed to plan on transfusion of 1 unit of blood on Friday locally as well as have labs drawn Friday.  Dr. Raygoza called back to see if pt still on oral K+.  He told wife to hold K+ at this time.  RN to call pt on Thursday to see how feeling.  RN spoke with Jayme, lab staff at [facility] requesting T&C and antibody testing for 1 unit of PRBC's, hopefully on Friday.  Left message for [facility], [facility], hoping for lab draw and transfusion of 1 unit of PRBC's on Friday, 11/4/22.  Requested call back to discuss tomorrow.    RN to follow up tomorrow with pt and [facility].    Signature:  Yuly Beard RN, OCN  "

## 2022-11-04 NOTE — PROGRESS NOTES
[facility]: Cancer Care                                                                                          Received voice mail message from pt's infusion nurse today, Joleen, at [facility] in [place].  Per conversation, Joleen reported pt's Mg 1.4, creat 2.27 and K+ 5.5 today.  Also reported initial BP was 101/45, then after sitting awhile was 93/31 and 95/30.  About 60 minutes into PRBC unit, BP 97/69.  P has been 70's to 80's.  Pt reported he has been having diarrhea issues related to his chemo.      RN called Dr. Rock Ryagoza with above information.  He is going to call Joleen, nurse, and discuss situation.  Likely send him to local ED for assess re dehydration vs heart failure.    Dr. Raygoza called back and stated he spoke with both Joleen, nurse in [place], and pt.  He is having pt go to ED in [place] after receiving 1 unit PRBC's he is currently getting.  Dr. Raygoza sending him to ED for assess because of increased creatinine and concern for possible heart failure.  Dr. Raygoza recommended CXR as part of ED assessment.    Signature:  Yuly Beard, JOHN, OCN

## 2022-11-07 NOTE — PROGRESS NOTES
"[facility]: Post-Discharge Note  SITUATION                                                      Admission:    Admission Date: 11/04/22   Reason for Admission: Pneumonia of right lower lobe due to infectious organism (Primary);  Immunocompromised (HCC); Acute kidney injury (HCC)  Discharge:   Discharge Date: 11/06/22  Discharge Diagnosis: Community acquired pneumonia of right middle lobe of lung; Multiple Meloma; Permanent atrial fibrillation (HCC); Acute renal failure superimposed on stage 3 chronic kidney disease (HCC)    BACKGROUND                                                      Per hospital discharge summary and inpatient provider notes.  \"Hospital Course   11/4/2022. 72 year old male w transfusion dependent multiple myeloma, immunocompromised stem cell transplant, chronic kidney disease, diabetes, chronic pain, cardiomyopathy atrial fibrillation, chronic anticoagulation presented to the Emergency Room after blood transfusion and evidence of kidney disease injury and worsening Shortness of breath. He ws diagnosed w right multilobular pneumonia and showed no real active worsening of CHF. His kidney injury most likely was due to dehydration. In Collaboration with his oncologist, Dr Raygoza at the [facility] he was admitted to [facility] for full evaluation and treatment. He was started on IV Fluids and IV antibiotic Cefpoxime + Doxy. He did not need O2 supplementation on admission. Covid Neg. Influenza Neg. Cr 2.27. HGB 8.5. 184#    11/5/2022. O2 >90. Weight down 2 pounds. VSS. Tmax 99.3. RA. Feels better. Continue IV Fluids and IV antibiotic. Cr 1.51. HGB 8.1. 179#    11/6/2022. O2>90. RA. VSS afebrile. Cr 1.55. HGB 8.8. 181#. Feels good. Agrees w discharge to home.\"      ASSESSMENT           Discharge Assessment  How are you doing now that you are home?: Feeling better since discharge.  Overall feeling better.  Even fatigue is better than pre-hospital though it does continue.  How are your " symptoms? (Red Flag symptoms escalate to triage hotline per guidelines): Improved  Do you feel your condition is stable enough to be safe at home until your provider visit?: Yes  Does the patient have their discharge instructions? : Yes (Pt reports they have discharge instructions but info is on another floor of house at present (and wife not home at present).)  Does the patient have questions regarding their discharge instructions? : No  Were you started on any new medications or were there changes to any of your previous medications? : Yes (Started on 2 antibiotics: cefdinir 300 mg every 12 hrs x9 days and doxycycline 100 mg twice a day x9 days.  Last day about 11/12 per pt.)  Does the patient have all of their medications?: Yes  Do you have questions regarding any of your medications? :  (Pt at home and not on level with his medications and list.  Wants to wait until 11/15 or have wife, Carla, call RN about current medication list.)  Do you have all of your needed medical supplies or equipment (DME)?  (i.e. oxygen tank, CPAP, cane, etc.): Yes  Discharge follow-up appointment scheduled within 14 calendar days? : Yes (Has return visit with Dr. Raygoza on 11/15 and Dr. Cong Mcdonald, with CXR, on 11/16/22.)  Discharge Follow Up Appointment Date: 11/15/22  Discharge Follow Up Appointment Scheduled with?: Specialty Care Provider         Post-op (Clinicians Only)  Did the patient have surgery or a procedure: No  Fever: No  Chills: No  Eating & Drinking:  (Working on drinking plenty of fluids.  Day by day for eating food.  When has hard time eating, he works on drinking more Ensure (3-5).  Otherwise, drinking 2-3 Ensure's when has better day eating.)  Additional Symptoms: decreased appetite (Overall, eating a better than prior to admission to hospital.)  Bowel Function: loose stools (On return home, had couple solid stools but has returned to watery.  Occasional lightheadedness with position changes.  Reports if  "he slows down a bit he is fine.  Stated he is \"remarkably\" better with this then pre hospital.)  Date of last BM: 11/07/22 (loose, watery stool.)  Urinary Status: voiding without complaint/concerns (Up every 2 to 2 1/2 hrs every night but is taking 60 to 80 oz a day.)        PLAN                                                      Outpatient Plan:  As above, return to [facility] for labs on Wed, 11/9, then again on Tuesday, 11/15 for labs, see Dr. Rock Raygoza and possible infusion appt.  Discussed treatment on 11/15/22 will depend on how pt is doing post pneumonia.  He also has follow up with PCP, Dr. Cong Rosenberg.  He was also attending for pt's hospitalization in [place].    Future Appointments   Date Time Provider Department Center   11/9/2022 10:00 AM  [facility] LAB DRAW [facility]   11/15/2022  8:00 AM  [facility] LAB DRAW [facility]   11/15/2022  8:30 AM Rojas Raygoza MD [facility]   11/15/2022  9:00 AM  ONC INFUSION NURSE [facility]   12/2/2022  9:30 AM  CV DEVICE 1 UCCVCV [facility]   12/2/2022 10:00 AM  LAB UCLABR [facility]   12/2/2022 10:[facility]   12/2/2022 11:30 AM Sunshine Merchant, APRN CNP [facility]   12/5/2022  9:30 AM Yoko Prince MD [facility]   12/13/2022  8:30 AM  MASONIC LAB DRAW [facility]   12/13/2022  9:00 AM  ONC INFUSION NURSE [facility]   3/3/2023 12:00 AM  ICD REMOTE UCCVSV [facility]         For any urgent concerns, please contact our 24 hour clinic line:   [place]: [phone]       Yuly Beard RN, OCN                "

## 2022-11-08 NOTE — TELEPHONE ENCOUNTER
Please verify the rx on the [employer] form for this patient.  Thank  you!

## 2022-11-08 NOTE — TELEPHONE ENCOUNTER
Oral Chemotherapy Monitoring Program    Medication: Pomalyst  Rx:  4mg PO daily 14/28 ds    Auth #: [id number]  Risk Category: Adult male    Routine survey questions reviewed. yes        Danitza Boykin   [facility] Liaison, alpha split [place]  Phone: [phone]  Fax: [phone]  Email: [email or web address]

## 2022-11-09 NOTE — NURSING NOTE
Chief Complaint   Patient presents with     Labs Only     Blood draw via VPT By LAKE winslow/ JERROD Mittal LPN

## 2022-11-14 NOTE — PROGRESS NOTES
[facility]: Cancer Care Plan of Care Education Note                                    Discussion with Patient:                                                      Adding Kyprolis to pt's current treatment plan for his relapsed multiple myeloma.    Education concerns: Reviewed side effects and management of Kyprolis even though pt has had this medication in past.        Assessment:                                                      Assessment completed with:: Patient;Spouse or significant other    Current living arrangement       Plan of Care Education   Yearly learning assessment completed?: Yes (see Education tab)  Diagnosis:: Relapse Multiple Myeloma  Does patient understand diagnosis?: Yes  Tx plan/regimen:: Adding Kyprolis  Does patient understand treatment plan/regimen?: Yes  Side effect education:: Diarrhea/Constipation;Fatigue;Mylosuppression;Lab value monitoring (anemia, neutropenia, thrombocytopenia);Infection;Nausea/Vomiting (Discussed potential side effects, including hypertension, pul hypertension, pulmonary inflam, CHF or hrt attack, allergic infusion reaction, focusing on when to reach out for medical assistance.  Also discussed self care management.)  Transportation means:: Regular car  Plan of Care:: MD follow-up appointment (Discussed frequency of Kyprolis is every week x3 per cycle.)  When to call provider:: Bleeding;Increased shortness of breath;Uncontrolled diarrhea/constipation;Uncontrolled nausea/vomiting;Shaking chills;Temperature >100.4F    Evaluation of Learning  Patient Education Provided: Yes  Readiness:: Acceptance  Method:: Booklet/Handout;Explanation (Sending written information from [employer] by [employer] message.)  Response:: Verbalizes understanding (Both pt and wife verbelized understanding.  Author encouraged them to make a list of questions for visit with MD tomorrow.)      Intervention/Education provided during outreach:                                                        Wife and patient asked questions during discussion.    No additional questions at time of completion of conversation though encouraged them to write down questions.    Signature:  Yuly Beard RN, OCN

## 2022-11-15 NOTE — LETTER
11/15/2022         RE: Erasmo Pearce  [address]  [address]  [facility] [address]        Dear Colleague,    Thank you for referring your patient, Erasmo Pearce, to the [facility] [place]. Please see a copy of my visit note below.    BONE MARROW CLINIC VISIT     Jonh developed SOB diarrhea, dehydration, renal insufficiency, anemia. He had a blood transfusion on Oct 21. On Oct 28 seen in [place] ED  Had RLL infiltrate  And admitted  For IV fluids RBC transfusion and antibiotics Imprved and discharged on Oct 30 on doxycycline and omnicef. No specific viral or bacterial infection identified including RSV and COVID and flu.Since discharge feels better, appetite off, SOB continues. Has 2-4 loose stools per day. Here today for chemotherapy.     PAST MEDICAL HISTORY, SOCIAL HISTORY AND FAMILY HISTORY:  See my note from 08/2022.      REVIEW OF SYSTEMS:  A 12 point review of systems done is negative as in HPI.      PHYSICAL EXAMINATION: /53   Pulse 72   Temp 97.7  F (36.5  C) (Oral)   Resp 18   Wt 82.7 kg (182 lb 6.4 oz)   SpO2 98%   BMI 26.00 kg/m      GENERAL:  He is an alert gentleman in no acute distress.   VITAL SIGNS:  Stable.   HEENT:  Normocephalic.  EOM okay.  Mouth without lesion. Cataracts bilaterally, more on left eye.   RESPIRATORY:  Clear to percussion and auscultation. No rales.   CARDIAC:  Irregularly irregular rhythm.  No S3, S4 or murmur. ICD  In Left chest wall  ABDOMEN:  Soft without hepatosplenomegaly.   EXTREMITIES:  No ankle edema bilaterally      Latest Reference Range & Units 11/09/22 10:12 11/15/22 08:21   Sodium 136 - 145 mmol/L 137    Potassium 3.4 - 5.3 mmol/L 4.8    Chloride 98 - 107 mmol/L 103    Carbon Dioxide (CO2) 22 - 29 mmol/L 23    Urea Nitrogen 8.0 - 23.0 mg/dL 27.5 (H)    Creatinine 0.67 - 1.17 mg/dL 1.50 (H)    GFR Estimate >60 mL/min/1.73m2 49 (L)    Calcium 8.8 - 10.2 mg/dL 9.1    Anion Gap 7 - 15  mmol/L 11    Magnesium 1.7 - 2.3 mg/dL 1.3 (L)    Albumin 3.5 - 5.2 g/dL 3.6    Protein Total 6.4 - 8.3 g/dL 6.5    Alkaline Phosphatase 40 - 129 U/L 108    ALT 10 - 50 U/L 38    AST 10 - 50 U/L 22    Bilirubin Total <=1.2 mg/dL 0.7    CRP Inflammation <5.00 mg/L 42.00 (H)    Glucose 70 - 99 mg/dL 130 (H)    WBC 4.0 - 11.0 10e3/uL 6.4 8.4   Hemoglobin 13.3 - 17.7 g/dL 8.6 (L) 8.3 (L)   Hematocrit 40.0 - 53.0 % 26.4 (L) 26.1 (L)   Platelet Count 150 - 450 10e3/uL 212 283   RBC Count 4.40 - 5.90 10e6/uL 2.64 (L) 2.56 (L)   MCV 78 - 100 fL 100 102 (H)   MCH 26.5 - 33.0 pg 32.6 32.4   MCHC 31.5 - 36.5 g/dL 32.6 31.8   RDW 10.0 - 15.0 % 16.2 (H) 17.9 (H)   % Neutrophils % 48 93   % Lymphocytes % 24 4   % Monocytes % 19 1   % Eosinophils % 7 0   % Basophils % 1 1   Absolute Basophils 0.0 - 0.2 10e3/uL 0.1 0.1   Absolute Eosinophils 0.0 - 0.7 10e3/uL 0.5 0.0   Absolute Immature Granulocytes <=0.4 10e3/uL 0.1 0.1   Absolute Lymphocytes 0.8 - 5.3 10e3/uL 1.6 0.3 (L)   Absolute Monocytes 0.0 - 1.3 10e3/uL 1.2 0.1   % Immature Granulocytes % 1 1   Absolute Neutrophils 1.6 - 8.3 10e3/uL 3.0 7.8   Absolute NRBCs 10e3/uL 0.0 0.0   NRBCs per 100 WBC <1 /100 0 0   Albumin Fraction 3.7 - 5.1 g/dL 2.8 (L)    Alpha 1 Fraction 0.2 - 0.4 g/dL 0.5 (H)    Alpha 2 Fraction 0.5 - 0.9 g/dL 0.9    Beta Fraction 0.6 - 1.0 g/dL 0.8    ELP Interpretation:  Very small monoclonal protein (0.1 g/dL) seen in the beta fraction comigrating with C3 complement that was previously characterized in our laboratory on 11/9/2021 as a monoclonal free immunoglobulin of lambda light chain type. Very small monoclonal protein (0.1 g/dL) seen in the gamma fraction that was previously characterized in our laboratory on 11/9/2021 as a monoclonal IgA immunoglobulin of  lambda light chain type. Hypoalbuminemia with increased alpha 1 fraction. Slight hypogammaglobulinemia. Pathologic significance requires clinical correlation. Sumi Jackson M.D., Ph.D.    Gamma Fraction  0.7 - 1.6 g/dL 0.6 (L)    [place] Free Lt Chain 0.33 - 1.94 mg/dL 3.46 (H)    Kappa Lambda Ratio 0.26 - 1.65  0.04 (L)    [place] Free Lt Chain 0.57 - 2.63 mg/dL 98.74 (H)    Monoclonal Peak <=0.0 g/dL 0.1 (H)    Total Protein Serum for ELP 6.4 - 8.3 g/dL 5.5 (L)    (H): Data is abnormally high  (L): Data is abnormally low    ASSESSMENT  1.  Multiple myeloma. In relapse  2.  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  Cardiomyopathy with CHF.   4.  Atrial fibrillation.   5.  History of deep vein thrombosis, on warfarin.   6.  Chronic back pain.   7.  Aortic aneurysm.   8.  History of plasmacytoma of the clivus, status post radiation.   9.  History of ventricular tachycardia with implantable defibrillator in place.   10.  Hypomagnesemia.     11  Hyperkalemia   12. Skin lesion   13. Influenza A  14. COVID-19  15. Pneumonia  Jonh seems to have recovered from pneumonia and has completed antibiotics. He has low Mg 1.2.Will give 4g IV Mg and have him take Mag Ox 5 x per day Over the last couple of months he is clearly had more symptoms related to progression of his multiple myeloma including shortness of breath and anemia, decreased appetite, and infections.  He had taken a short chemotherapy pause over the summer and since then the myeloma has clearly grown with his lambda free light chains now up to 98.  He has responded in the past to Proteasome inhibitors and I like to go ahead and try Kyprolis again here for cycle 10.  Jonh and Carla are agreeable to see whether or not daratumumab pomalidomide kyprolis, and dexamethasone will improve the anemia and decrease the light chains.  Therefore cycle 10 will include daratumumab 1800 mg day 1 kyprolis 20 mg IV days 1 8 and 15,  pomalidomide 4 mg days 1 through 14 and dexamethasone 8 mg days 1, 8, and 15 and 22 we will continue prophylaxis with acyclovir and monitor his counts and kidney function. I hope to do 3 cycles and then  reassess response and decide whether or not more novel therapies might be considered.  Today we discussed long-term outlook of his myeloma and at what point we might consider stopping therapy.  At this time we have elected to continue and still have concerns about infections and cardiac issues.  I am delighted that he has such wonderful support in [place] with Dr. Rosenberg and staff. I will see him again in 1 month's time    I spent a total of 40  minutes face to face with Erasmo Pearce during today's office visit. Over 50% of this time was spent counseling the patient and coordinating the care regarding multiple myeloma and 10minutes preparing to see the patient and  care coordination     Rojas Raygoza MD  [phone]     Latest Reference Range & Units 11/15/22 08:21   Sodium 136 - 145 mmol/L 142   Potassium 3.4 - 5.3 mmol/L 4.7   Chloride 98 - 107 mmol/L 106   Carbon Dioxide (CO2) 22 - 29 mmol/L 24   Urea Nitrogen 8.0 - 23.0 mg/dL 27.0 (H)   Creatinine 0.67 - 1.17 mg/dL 1.23 (H)   GFR Estimate >60 mL/min/1.73m2 63   Calcium 8.8 - 10.2 mg/dL 9.4   Anion Gap 7 - 15 mmol/L 12   Magnesium 1.7 - 2.3 mg/dL 1.2 (L)   Albumin 3.5 - 5.2 g/dL 3.9   Protein Total 6.4 - 8.3 g/dL 6.7   Alkaline Phosphatase 40 - 129 U/L 114   ALT 10 - 50 U/L 26   AST 10 - 50 U/L 21   Bilirubin Total <=1.2 mg/dL 0.5   Glucose 70 - 99 mg/dL 126 (H)   WBC 4.0 - 11.0 10e3/uL 8.4   Hemoglobin 13.3 - 17.7 g/dL 8.3 (L)   Hematocrit 40.0 - 53.0 % 26.1 (L)   Platelet Count 150 - 450 10e3/uL 283   RBC Count 4.40 - 5.90 10e6/uL 2.56 (L)   MCV 78 - 100 fL 102 (H)   MCH 26.5 - 33.0 pg 32.4   MCHC 31.5 - 36.5 g/dL 31.8   RDW 10.0 - 15.0 % 17.9 (H)   % Neutrophils % 93   % Lymphocytes % 4   % Monocytes % 1   % Eosinophils % 0   % Basophils % 1   Absolute Basophils 0.0 - 0.2 10e3/uL 0.1   Absolute Eosinophils 0.0 - 0.7 10e3/uL 0.0   Absolute Immature Granulocytes <=0.4 10e3/uL 0.1   Absolute Lymphocytes 0.8 - 5.3 10e3/uL 0.3 (L)   Absolute  Monocytes 0.0 - 1.3 10e3/uL 0.1   % Immature Granulocytes % 1   Absolute Neutrophils 1.6 - 8.3 10e3/uL 7.8   Absolute NRBCs 10e3/uL 0.0   NRBCs per 100 WBC <1 /100 0   (H): Data is abnormally high  (L): Data is abnormally low      Sincerely,        Rojas Raygoza MD

## 2022-11-15 NOTE — NURSING NOTE
Chief Complaint   Patient presents with     Blood Draw     IV placement with blood draw by lab RN. Vitals taken and appointment arrived     Tammy Rodriguez RN

## 2022-11-15 NOTE — PATIENT INSTRUCTIONS
[facility] Triage and after hours / weekends / holidays:  [phone]    Please call the triage or after hours line if you experience a temperature greater than or equal to 100.4, shaking chills, have uncontrolled nausea, vomiting and/or diarrhea, dizziness, shortness of breath, chest pain, bleeding, unexplained bruising, or if you have any other new/concerning symptoms, questions or concerns.      If you are having any concerning symptoms or wish to speak to a provider before your next infusion visit, please call your care coordinator or triage to notify them so we can adequately serve you.     If you need a refill on a narcotic prescription or other medication, please call before your infusion appointment.      Latest Reference Range & Units 11/15/22 08:21   Sodium 136 - 145 mmol/L 142   Potassium 3.4 - 5.3 mmol/L 4.7   Chloride 98 - 107 mmol/L 106   Carbon Dioxide (CO2) 22 - 29 mmol/L 24   Urea Nitrogen 8.0 - 23.0 mg/dL 27.0 (H)   Creatinine 0.67 - 1.17 mg/dL 1.23 (H)   GFR Estimate >60 mL/min/1.73m2 63   Calcium 8.8 - 10.2 mg/dL 9.4   Anion Gap 7 - 15 mmol/L 12   Magnesium 1.7 - 2.3 mg/dL 1.2 (L)   Albumin 3.5 - 5.2 g/dL 3.9   Protein Total 6.4 - 8.3 g/dL 6.7   Alkaline Phosphatase 40 - 129 U/L 114   ALT 10 - 50 U/L 26   AST 10 - 50 U/L 21   Bilirubin Total <=1.2 mg/dL 0.5   Glucose 70 - 99 mg/dL 126 (H)   WBC 4.0 - 11.0 10e3/uL 8.4   Hemoglobin 13.3 - 17.7 g/dL 8.3 (L)   Hematocrit 40.0 - 53.0 % 26.1 (L)   Platelet Count 150 - 450 10e3/uL 283   RBC Count 4.40 - 5.90 10e6/uL 2.56 (L)   MCV 78 - 100 fL 102 (H)   MCH 26.5 - 33.0 pg 32.4   MCHC 31.5 - 36.5 g/dL 31.8   RDW 10.0 - 15.0 % 17.9 (H)   % Neutrophils % 93   % Lymphocytes % 4   % Monocytes % 1   % Eosinophils % 0   % Basophils % 1   Absolute Basophils 0.0 - 0.2 10e3/uL 0.1   Absolute Eosinophils 0.0 - 0.7 10e3/uL 0.0   Absolute Immature Granulocytes <=0.4 10e3/uL 0.1   Absolute Lymphocytes 0.8 - 5.3 10e3/uL 0.3 (L)   Absolute Monocytes 0.0 - 1.3 10e3/uL  0.1   % Immature Granulocytes % 1   Absolute Neutrophils 1.6 - 8.3 10e3/uL 7.8   Absolute NRBCs 10e3/uL 0.0   NRBCs per 100 WBC <1 /100 0

## 2022-11-15 NOTE — PROGRESS NOTES
[facility]: Cancer Care                                                                                          Met with Jonh and his wife, Carla, today to briefly introduce self after several telephone conversations.  Jonh completed consent to communicate form.  Author gave to back of house team to update chart and send to scanning.  They also asked, if possible, to have return appts start about 10:00 when possible re: long drive from [place].    Signature:  Yuly Beard RN, OCN

## 2022-11-15 NOTE — PROGRESS NOTES
Infusion Nursing Note:  Erasmo Pearce presents today for Cycle 10 Day 1 Kyprolis/Darzalex Faspro/Mg replacement  Patient seen by provider today: Yes: Dr. Isaacs   present during visit today: Not Applicable.    Note: Jonh comes into the clinic today doing well overall and has concerns to offer following his provider visit. He has no pain and denies s/s of infection. Fluids given before and after Kyprolis to prevent renal toxicity and TLS.    TORB 11/15/22 @ 0946 Dr. Isaacs/Jennifer Cabrera, RN:  - Replace Mg per protocol  - OK for chemotherapy, dosing is correct and patient has had these medications prior.    Intravenous Access:  Peripheral IV placed.    Treatment Conditions:  Lab Results   Component Value Date    HGB 8.3 (L) 11/15/2022    WBC 8.4 11/15/2022    ANEU 1.0 (L) 05/24/2022    ANEUTAUTO 7.8 11/15/2022     11/15/2022      Lab Results   Component Value Date     11/15/2022    POTASSIUM 4.7 11/15/2022    MAG 1.2 (L) 11/15/2022    CR 1.23 (H) 11/15/2022    EILEEN 9.4 11/15/2022    BILITOTAL 0.5 11/15/2022    ALBUMIN 3.9 11/15/2022    ALT 26 11/15/2022    AST 21 11/15/2022     Results reviewed, labs MET treatment parameters, ok to proceed with treatment.    Post Infusion Assessment:  Patient tolerated infusion without incident.  Patient tolerated Darzalex Faspro injection to right side low abdomen without incident.  Blood return noted pre and post infusion.  Site patent and intact, free from redness, edema or discomfort.  No evidence of extravasations.  Access discontinued per protocol.     Discharge Plan:   Patient declined prescription refills.  Discharge instructions reviewed with: Patient.  Patient and/or family verbalized understanding of discharge instructions and all questions answered.  Copy of AVS reviewed with patient and/or family.  Patient will return 11/29 for next appointment.  Patient discharged in stable condition accompanied by: self.  Departure Mode:  Ambulatory.      Jennifer Cabrera RN

## 2022-11-15 NOTE — PROGRESS NOTES
BONE MARROW CLINIC VISIT     Jonh developed SOB diarrhea, dehydration, renal insufficiency, anemia. He had a blood transfusion on Oct 21. On Oct 28 seen in [place] ED  Had RLL infiltrate  And admitted  For IV fluids RBC transfusion and antibiotics Imprved and discharged on Oct 30 on doxycycline and omnicef. No specific viral or bacterial infection identified including RSV and COVID and flu.Since discharge feels better, appetite off, SOB continues. Has 2-4 loose stools per day. Here today for chemotherapy.     PAST MEDICAL HISTORY, SOCIAL HISTORY AND FAMILY HISTORY:  See my note from 08/2022.      REVIEW OF SYSTEMS:  A 12 point review of systems done is negative as in HPI.      PHYSICAL EXAMINATION: /53   Pulse 72   Temp 97.7  F (36.5  C) (Oral)   Resp 18   Wt 82.7 kg (182 lb 6.4 oz)   SpO2 98%   BMI 26.00 kg/m      GENERAL:  He is an alert gentleman in no acute distress.   VITAL SIGNS:  Stable.   HEENT:  Normocephalic.  EOM okay.  Mouth without lesion. Cataracts bilaterally, more on left eye.   RESPIRATORY:  Clear to percussion and auscultation. No rales.   CARDIAC:  Irregularly irregular rhythm.  No S3, S4 or murmur. ICD  In Left chest wall  ABDOMEN:  Soft without hepatosplenomegaly.   EXTREMITIES:  No ankle edema bilaterally      Latest Reference Range & Units 11/09/22 10:12 11/15/22 08:21   Sodium 136 - 145 mmol/L 137    Potassium 3.4 - 5.3 mmol/L 4.8    Chloride 98 - 107 mmol/L 103    Carbon Dioxide (CO2) 22 - 29 mmol/L 23    Urea Nitrogen 8.0 - 23.0 mg/dL 27.5 (H)    Creatinine 0.67 - 1.17 mg/dL 1.50 (H)    GFR Estimate >60 mL/min/1.73m2 49 (L)    Calcium 8.8 - 10.2 mg/dL 9.1    Anion Gap 7 - 15 mmol/L 11    Magnesium 1.7 - 2.3 mg/dL 1.3 (L)    Albumin 3.5 - 5.2 g/dL 3.6    Protein Total 6.4 - 8.3 g/dL 6.5    Alkaline Phosphatase 40 - 129 U/L 108    ALT 10 - 50 U/L 38    AST 10 - 50 U/L 22    Bilirubin Total <=1.2 mg/dL 0.7    CRP Inflammation <5.00 mg/L 42.00 (H)    Glucose 70 - 99 mg/dL 130 (H)     WBC 4.0 - 11.0 10e3/uL 6.4 8.4   Hemoglobin 13.3 - 17.7 g/dL 8.6 (L) 8.3 (L)   Hematocrit 40.0 - 53.0 % 26.4 (L) 26.1 (L)   Platelet Count 150 - 450 10e3/uL 212 283   RBC Count 4.40 - 5.90 10e6/uL 2.64 (L) 2.56 (L)   MCV 78 - 100 fL 100 102 (H)   MCH 26.5 - 33.0 pg 32.6 32.4   MCHC 31.5 - 36.5 g/dL 32.6 31.8   RDW 10.0 - 15.0 % 16.2 (H) 17.9 (H)   % Neutrophils % 48 93   % Lymphocytes % 24 4   % Monocytes % 19 1   % Eosinophils % 7 0   % Basophils % 1 1   Absolute Basophils 0.0 - 0.2 10e3/uL 0.1 0.1   Absolute Eosinophils 0.0 - 0.7 10e3/uL 0.5 0.0   Absolute Immature Granulocytes <=0.4 10e3/uL 0.1 0.1   Absolute Lymphocytes 0.8 - 5.3 10e3/uL 1.6 0.3 (L)   Absolute Monocytes 0.0 - 1.3 10e3/uL 1.2 0.1   % Immature Granulocytes % 1 1   Absolute Neutrophils 1.6 - 8.3 10e3/uL 3.0 7.8   Absolute NRBCs 10e3/uL 0.0 0.0   NRBCs per 100 WBC <1 /100 0 0   Albumin Fraction 3.7 - 5.1 g/dL 2.8 (L)    Alpha 1 Fraction 0.2 - 0.4 g/dL 0.5 (H)    Alpha 2 Fraction 0.5 - 0.9 g/dL 0.9    Beta Fraction 0.6 - 1.0 g/dL 0.8    ELP Interpretation:  Very small monoclonal protein (0.1 g/dL) seen in the beta fraction comigrating with C3 complement that was previously characterized in our laboratory on 11/9/2021 as a monoclonal free immunoglobulin of lambda light chain type. Very small monoclonal protein (0.1 g/dL) seen in the gamma fraction that was previously characterized in our laboratory on 11/9/2021 as a monoclonal IgA immunoglobulin of  lambda light chain type. Hypoalbuminemia with increased alpha 1 fraction. Slight hypogammaglobulinemia. Pathologic significance requires clinical correlation. Sumi Jackson M.D., Ph.D.    Gamma Fraction 0.7 - 1.6 g/dL 0.6 (L)    [place] Free Lt Chain 0.33 - 1.94 mg/dL 3.46 (H)    Kappa Lambda Ratio 0.26 - 1.65  0.04 (L)    [place] Free Lt Chain 0.57 - 2.63 mg/dL 98.74 (H)    Monoclonal Peak <=0.0 g/dL 0.1 (H)    Total Protein Serum for ELP 6.4 - 8.3 g/dL 5.5 (L)    (H): Data is abnormally high  (L): Data is  abnormally low    ASSESSMENT  1.  Multiple myeloma. In relapse  2.  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  Cardiomyopathy with CHF.   4.  Atrial fibrillation.   5.  History of deep vein thrombosis, on warfarin.   6.  Chronic back pain.   7.  Aortic aneurysm.   8.  History of plasmacytoma of the clivus, status post radiation.   9.  History of ventricular tachycardia with implantable defibrillator in place.   10.  Hypomagnesemia.     11  Hyperkalemia   12. Skin lesion   13. Influenza A  14. COVID-19  15. Pneumonia  Jonh seems to have recovered from pneumonia and has completed antibiotics. He has low Mg 1.2.Will give 4g IV Mg and have him take Mag Ox 5 x per day Over the last couple of months he is clearly had more symptoms related to progression of his multiple myeloma including shortness of breath and anemia, decreased appetite, and infections.  He had taken a short chemotherapy pause over the summer and since then the myeloma has clearly grown with his lambda free light chains now up to 98.  He has responded in the past to Proteasome inhibitors and I like to go ahead and try Kyprolis again here for cycle 10.  Jonh and Carla are agreeable to see whether or not daratumumab pomalidomide kyprolis, and dexamethasone will improve the anemia and decrease the light chains.  Therefore cycle 10 will include daratumumab 1800 mg day 1 kyprolis 20 mg IV days 1 8 and 15,  pomalidomide 4 mg days 1 through 14 and dexamethasone 8 mg days 1, 8, and 15 and 22 we will continue prophylaxis with acyclovir and monitor his counts and kidney function. I hope to do 3 cycles and then reassess response and decide whether or not more novel therapies might be considered.  Today we discussed long-term outlook of his myeloma and at what point we might consider stopping therapy.  At this time we have elected to continue and still have concerns about infections and cardiac issues.  I am  delighted that he has such wonderful support in [place] with Dr. Rosenberg and staff. I will see him again in 1 month's time    I spent a total of 40  minutes face to face with Erasmo Pearce during today's office visit. Over 50% of this time was spent counseling the patient and coordinating the care regarding multiple myeloma and 10minutes preparing to see the patient and  care coordination     Rojas Raygoza MD  [phone]     Latest Reference Range & Units 11/15/22 08:21   Sodium 136 - 145 mmol/L 142   Potassium 3.4 - 5.3 mmol/L 4.7   Chloride 98 - 107 mmol/L 106   Carbon Dioxide (CO2) 22 - 29 mmol/L 24   Urea Nitrogen 8.0 - 23.0 mg/dL 27.0 (H)   Creatinine 0.67 - 1.17 mg/dL 1.23 (H)   GFR Estimate >60 mL/min/1.73m2 63   Calcium 8.8 - 10.2 mg/dL 9.4   Anion Gap 7 - 15 mmol/L 12   Magnesium 1.7 - 2.3 mg/dL 1.2 (L)   Albumin 3.5 - 5.2 g/dL 3.9   Protein Total 6.4 - 8.3 g/dL 6.7   Alkaline Phosphatase 40 - 129 U/L 114   ALT 10 - 50 U/L 26   AST 10 - 50 U/L 21   Bilirubin Total <=1.2 mg/dL 0.5   Glucose 70 - 99 mg/dL 126 (H)   WBC 4.0 - 11.0 10e3/uL 8.4   Hemoglobin 13.3 - 17.7 g/dL 8.3 (L)   Hematocrit 40.0 - 53.0 % 26.1 (L)   Platelet Count 150 - 450 10e3/uL 283   RBC Count 4.40 - 5.90 10e6/uL 2.56 (L)   MCV 78 - 100 fL 102 (H)   MCH 26.5 - 33.0 pg 32.4   MCHC 31.5 - 36.5 g/dL 31.8   RDW 10.0 - 15.0 % 17.9 (H)   % Neutrophils % 93   % Lymphocytes % 4   % Monocytes % 1   % Eosinophils % 0   % Basophils % 1   Absolute Basophils 0.0 - 0.2 10e3/uL 0.1   Absolute Eosinophils 0.0 - 0.7 10e3/uL 0.0   Absolute Immature Granulocytes <=0.4 10e3/uL 0.1   Absolute Lymphocytes 0.8 - 5.3 10e3/uL 0.3 (L)   Absolute Monocytes 0.0 - 1.3 10e3/uL 0.1   % Immature Granulocytes % 1   Absolute Neutrophils 1.6 - 8.3 10e3/uL 7.8   Absolute NRBCs 10e3/uL 0.0   NRBCs per 100 WBC <1 /100 0   (H): Data is abnormally high  (L): Data is abnormally low

## 2022-11-15 NOTE — NURSING NOTE
"Oncology Rooming Note    November 15, 2022 9:12 AM   Erasmo Pearce is a 72 year old male who presents for:    Chief Complaint   Patient presents with     Blood Draw     IV placement with blood draw by lab RN. Vitals taken and appointment arrived     Oncology Clinic Visit     [facility] RETURN - MULTIPLE MYELOMA     Initial Vitals: /53   Pulse 72   Temp 97.7  F (36.5  C) (Oral)   Resp 18   Wt 82.7 kg (182 lb 6.4 oz)   SpO2 98%   BMI 26.00 kg/m   Estimated body mass index is 26 kg/m  as calculated from the following:    Height as of 8/31/22: 1.784 m (5' 10.24\").    Weight as of this encounter: 82.7 kg (182 lb 6.4 oz). Body surface area is 2.02 meters squared.  No Pain (0) Comment: Data Unavailable   No LMP for male patient.  Allergies reviewed: Yes  Medications reviewed: Yes    Medications: Medication refills not needed today.  Pharmacy name entered into [id number]:    [facility] - [place] - [address] [facility] - [place], [place] - [address] [place] [address]  [facility] [place] - [address] [facility]    Yosef Alcazar LPN            "

## 2022-11-16 NOTE — PROGRESS NOTES
[facility]: Cancer Care                                                                                          Spoke with Carla, wife, who reports Jonh is doing well today after the addition of Kyprolis to his chemo yesterday.  He went to his follow up appts for labs, x-ray and visit with PCP for his previous pneumonia.  Encouraged pt to call [facility] line ([phone], option 5, option 2) or send a non-symptom [employer] message if any questions or concerns.  Carla appreciated call.    Signature:  Yuly Beard RN, OCN

## 2022-11-17 NOTE — PROGRESS NOTES
"[facility]: Cancer Care                                                                                          Spoke with Jonh when he called today (with wife, Carla) in background.  When asked, he stated he felt \"very good\".  They called to discuss note they received about being on a wait list for Tuesday, 11/22, infusion.  Discussed that sometimes we do not know plan until day before and that there may be a possibility of getting his Kyprolis on Monday or Wed if not on Tuesday but author has not sure.  Stated would reach out to infusion supervisor regarding pt's long travel from [place] and sometimes needs to plan to stay overnight before early morning appts or stay overnight evening after appts.  Will update pt/wife when author hears anything (even if repeat of above information).  Message sent to Geraldine Brown Infusion Supervisor.    Signature:  Yuly Beard RN, OCN  "

## 2022-11-22 NOTE — PROGRESS NOTES
Infusion Nursing Note:  Erasmo Pearce presents today for D8 C10 Kyprolis and IV mag replacement.    Patient seen by provider today: No   present during visit today: Not Applicable.    Note: Jonh said he is feeling well.  He is recovering from pneumonia and breathing is much better.  No fevers and decreased cough/SOB.  Mg 1.2 and replaced IV    TORB Dr Raygoza / Karla Gutierrez RN: Replace Mg with IV protocol.     Intravenous Access:  Peripheral IV placed.    Treatment Conditions:  Lab Results   Component Value Date    HGB 10.0 (L) 11/22/2022    WBC 6.0 11/22/2022    ANEU 1.0 (L) 05/24/2022    ANEUTAUTO 5.4 11/22/2022     11/22/2022      Lab Results   Component Value Date     11/22/2022    POTASSIUM 4.6 11/22/2022    MAG 1.2 (L) 11/22/2022    CR 1.32 (H) 11/22/2022    EILEEN 8.9 11/22/2022    BILITOTAL 1.0 11/22/2022    ALBUMIN 3.8 11/22/2022    ALT 16 11/22/2022    AST 16 11/22/2022     Results reviewed, labs MET treatment parameters, ok to proceed with treatment.    Post Infusion Assessment:  Patient tolerated infusion without incident.  Blood return noted pre and post infusion.  Site patent and intact, free from redness, edema or discomfort.  No evidence of extravasations.  Access discontinued per protocol.     Discharge Plan:   Patient declined prescription refills.  Discharge instructions reviewed with: Patient and Family.  Patient and/or family verbalized understanding of discharge instructions and all questions answered.  AVS to patient via [facility].  Patient will return 11/29 for next infusion and 12/13 for next provider appointment.   Patient discharged in stable condition accompanied by: self and wife.  Departure Mode: Ambulatory.      Chantelle Gutierrez, JOHN                  HPI      ROS      Physical Exam

## 2022-11-22 NOTE — PATIENT INSTRUCTIONS
November 2022 Sunday Monday Tuesday Wednesday Thursday Friday Saturday             1     2     3     4     5       6     7     8     9    LAB PERIPHERAL  10:00 AM   (15 min.)    [facility]RAW   [facility] 10     11     12       13     14     15    LAB PERIPHERAL   8:00 AM   (15 min.)   [facility]   [facility]    RETURN   8:15 AM   (30 min.)   Rojas Raygoza MD   [facility] [place]    ONC INFUSION 2 HR (120 MIN)   9:00 AM   (120 min.)    ONC INFUSION NURSE   [facility] 16     17     18     19       20     21     22    LAB PERIPHERAL  11:00 AM   (15 min.)   [facility]   [facility]    ONC INFUSION 0.5 HR (30 MIN)  11:30 AM   (30 min.)    ONC INFUSION NURSE   [facility] 23     24     25     26       27     28     29    LAB PERIPHERAL  11:00 AM   (15 min.)   [facility]   [facility]    ONC INFUSION 0.5 HR (30 MIN)  11:30 AM   (30 min.)    ONC INFUSION NURSE   [facility] 30 December 2022 Sunday Monday Tuesday Wednesday Thursday Friday Saturday                       1     2    CARDIAC DEVICE CHECK - IN CLINIC   9:15 AM   (30 min.)    CV DEVICE 1   [facility]    LAB  10:00 AM   (15 min.)    LAB   [facility] [place]    ECHO COMPLETE  10:30 AM   (60 min.)   [id number]   [facility]    RETURN EP  11:15 AM   (30 min.)   Sunshine Merchant APRN CNP   [facility] 3       4     5    RETURN CARDIOLOGY   9:15 AM   (30 min.)   Yoko Prince MD   [facility] 6     7     8     9     10       11     12     13    LAB PERIPHERAL  11:30 AM   (15 min.)   [facility]     [facility]    RETURN  11:45 AM   (30 min.)   Rojas Raygoza MD   [facility] [place]    ONC INFUSION 3 HR (180 MIN)   1:30 PM   (180 min.)    ONC INFUSION NURSE   [facility] 14     15     16     17       18     19     20    LAB PERIPHERAL  11:00 AM   (15 min.)   [facility]   [facility]    ONC INFUSION 0.5 HR (30 MIN)  11:30 AM   (30 min.)    ONC INFUSION NURSE   [facility] 21     22     23     24       25     26     27    LAB PERIPHERAL  11:00 AM   (15 min.)   [facility]   [facility]    ONC INFUSION 0.5 HR (30 MIN)  11:30 AM   (30 min.)    ONC INFUSION NURSE   [facility] 28     29     30     31                       Lab Results:  Recent Results (from the past 12 hour(s))   Magnesium    Collection Time: 11/22/22 11:14 AM   Result Value Ref Range    Magnesium 1.2 (L) 1.7 - 2.3 mg/dL   Comprehensive metabolic panel    Collection Time: 11/22/22 11:14 AM   Result Value Ref Range    Sodium 140 136 - 145 mmol/L    Potassium 4.6 3.4 - 5.3 mmol/L    Chloride 104 98 - 107 mmol/L    Carbon Dioxide (CO2) 22 22 - 29 mmol/L    Anion Gap 14 7 - 15 mmol/L    Urea Nitrogen 28.8 (H) 8.0 - 23.0 mg/dL    Creatinine 1.32 (H) 0.67 - 1.17 mg/dL    Calcium 8.9 8.8 - 10.2 mg/dL    Glucose 158 (H) 70 - 99 mg/dL    Alkaline Phosphatase 102 40 - 129 U/L    AST 16 10 - 50 U/L    ALT 16 10 - 50 U/L    Protein Total 6.2 (L) 6.4 - 8.3 g/dL    Albumin 3.8 3.5 - 5.2 g/dL    Bilirubin Total 1.0 <=1.2 mg/dL    GFR Estimate 58 (L) >60 mL/min/1.73m2   CBC with platelets and differential    Collection Time: 11/22/22 11:14 AM   Result Value Ref Range    WBC Count 6.0 4.0 - 11.0 10e3/uL    RBC Count 3.02 (L) 4.40 - 5.90 10e6/uL    Hemoglobin 10.0 (L) 13.3 - 17.7 g/dL    Hematocrit 31.0 (L) 40.0 - 53.0 %    MCV  103 (H) 78 - 100 fL    MCH 33.1 (H) 26.5 - 33.0 pg    MCHC 32.3 31.5 - 36.5 g/dL    RDW 19.6 (H) 10.0 - 15.0 %    Platelet Count 192 150 - 450 10e3/uL    % Neutrophils 89 %    % Lymphocytes 6 %    % Monocytes 2 %    % Eosinophils 1 %    % Basophils 1 %    % Immature Granulocytes 1 %    NRBCs per 100 WBC 0 <1 /100    Absolute Neutrophils 5.4 1.6 - 8.3 10e3/uL    Absolute Lymphocytes 0.4 (L) 0.8 - 5.3 10e3/uL    Absolute Monocytes 0.1 0.0 - 1.3 10e3/uL    Absolute Eosinophils 0.0 0.0 - 0.7 10e3/uL    Absolute Basophils 0.1 0.0 - 0.2 10e3/uL    Absolute Immature Granulocytes 0.1 <=0.4 10e3/uL    Absolute NRBCs 0.0 10e3/uL

## 2022-11-30 NOTE — PROGRESS NOTES
[facility]: Cancer Care                                                                                          Per review of Care Everywhere and message from cowork, pt off IV antibiotics and on comfort cares as of 11/29/22.  Left message for cardiac team as well as sent in-basket as he has several appts scheduled for Friday, 12/2.    Signature:  Yuly Beard RN, OCN

## 2022-12-01 ENCOUNTER — PATIENT OUTREACH (OUTPATIENT)
Dept: ONCOLOGY | Facility: CLINIC | Age: 72
End: 2022-12-01

## 2022-12-01 NOTE — PROGRESS NOTES
[facility]: Cancer Care                                                                                        Situation: Patient chart reviewed by care coordinator as pt admitted to [facility] on 22.    Background: Pt with Multiple Myeloma went to local ED and admitted with severe sepsis, pneumonia and acute respiratory failure. See Care Everywhere for more details regarding transition to comfort cares.    Assessment: Pt  late on 22    Plan/Recommendations: RN sent update team and HIM.    Signature:  Yuly Beard RN, OCN

## 2022-12-07 ENCOUNTER — TELEPHONE (OUTPATIENT)
Dept: CARDIOLOGY | Facility: CLINIC | Age: 72
End: 2022-12-07

## 2022-12-07 NOTE — TELEPHONE ENCOUNTER
----- Message from Suri Zurita RN sent at 12/6/2022  4:37 PM CST -----  Regarding: FW: Pt passed away late 11/30/22  Can you please notify HIM with the death documentation?  Rosa SHRESTHA  ----- Message -----  From: Rojas Raygoza MD  Sent: 12/1/2022  12:05 PM CST  To: Yoko Prince MD, Kristin German LPN, #  Subject: RE: Pt passed away late 11/30/22                 Thanks all for your care for Jonh Arreaga in a million yrn  lizz  ----- Message -----  From: Yoko Prince MD  Sent: 12/1/2022  11:44 AM CST  To: Rojas Raygoza MD, Kristin German LPN, #  Subject: RE: Pt passed away late 11/30/22                 Oh no. Such a nice yrn. I will miss seeing him.   Yoko  ----- Message -----  From: Yuly Beard RN  Sent: 12/1/2022   8:26 AM CST  To: Yoko Prince MD, Rojas Raygoza MD, #  Subject: Pt passed away late 11/30/22                     Hi Team,    Thank you all for the care that you provided Jonh.  He passed away in [facility] late on 11/30/22 after transition to comfort cares.    Thanks!     Yuly

## 2024-03-22 NOTE — PROGRESS NOTES
B[facility]OW CLINIC VISIT       Jonh returns to the hematology clinic for treatment of relapsed multiple myeloma Jonh has completed 2 cycles of daratumumab 1400 mg weekly Dex 20 mg on days 1 8 and 15 and pomalidomide 4 mg on days 1 through 14 and Decadron on days 1-9 16 and 23 he did have some cytopenias with thrombocytopenia with his platelet count falling into the 80s he otherwise is tolerated this very well He received Day 1 of Cycle 3 on Dec 28 with Jenny, Kyprolis Pomalidamide and Dex. He felt fatigued with cough and SOB. COVID negative but hospitalized.in [place] 12/30/21-1/2/22 with Influenza A with acute respiratory failure and hypoxia. Treated with Tamiflu, rocephin and azithro for sinusitus, treated for hypotension and CHF,treated for COPD with dexamethasone.     INTERVAL HISTORY  Jonh is here for day 1 of cycle 6.  He is feeling better less fatigued. He did have his ICD replaced on April 16. The ICD fired twice that evening but not since. He denies nausea vomiting or joint pain.  He has some shortness of breath on exertion.  He is taking7 Mag-Ox per day.      PAST MEDICAL HISTORY, SOCIAL HISTORY AND FAMILY HISTORY:  See my note from 03/2022.      REVIEW OF SYSTEMS:  A 12 point review of systems done is negative as in HPI.      PHYSICAL EXAMINATION: /68   Pulse 70   Temp 97.4  F (36.3  C) (Oral)   Resp 16   Wt 83.7 kg (184 lb 8 oz)   SpO2 97%   BMI 26.47 kg/m      GENERAL:  He is an alert gentleman in no acute distress.   VITAL SIGNS:  Stable.   HEENT:  Normocephalic.  EOM okay.  Mouth without lesion.   RESPIRATORY:  Clear to percussion and auscultation. No rales.   CARDIAC:  Irregularly irregular rhythm.  No S3, S4 or murmur. ICD  In Left chest wall  ABDOMEN:  Soft without hepatosplenomegaly.   EXTREMITIES:  +1 edema bilaterally      Latest Reference Range & Units 05/03/22 09:02   Sodium 133 - 144 mmol/L 139   Potassium 3.4 - 5.3 mmol/L 4.0   Chloride 94 - 109 mmol/L 103   Carbon Dioxide 20 -  32 mmol/L 28   Urea Nitrogen 7 - 30 mg/dL 21   Creatinine 0.66 - 1.25 mg/dL 1.18   GFR Estimate >60 mL/min/1.73m2 66 [1]   Calcium 8.5 - 10.1 mg/dL 9.5   Anion Gap 3 - 14 mmol/L 8   Magnesium 1.6 - 2.3 mg/dL 1.6   Albumin 3.4 - 5.0 g/dL 4.0   Protein Total 6.8 - 8.8 g/dL 6.9   Bilirubin Total 0.2 - 1.3 mg/dL 1.2   Alkaline Phosphatase 40 - 150 U/L 133   ALT 0 - 70 U/L 17   AST 0 - 45 U/L 17   Glucose 70 - 99 mg/dL 154 (H)   WBC 4.0 - 11.0 10e3/uL 7.8   Hemoglobin 13.3 - 17.7 g/dL 11.1 (L)   Hematocrit 40.0 - 53.0 % 34.0 (L)   Platelet Count 150 - 450 10e3/uL 172   RBC Count 4.40 - 5.90 10e6/uL 3.17 (L)   MCV 78 - 100 fL 107 (H)   MCH 26.5 - 33.0 pg 35.0 (H)   MCHC 31.5 - 36.5 g/dL 32.6   RDW 10.0 - 15.0 % 14.6   % Neutrophils % 75   % Lymphocytes % 13   % Monocytes % 8   % Eosinophils % 3   % Basophils % 1   Absolute Basophils 0.0 - 0.2 10e3/uL 0.0   Absolute Eosinophils 0.0 - 0.7 10e3/uL 0.3   Absolute Immature Granulocytes <=0.4 10e3/uL 0.0   Absolute Lymphocytes 0.8 - 5.3 10e3/uL 1.0   Absolute Monocytes 0.0 - 1.3 10e3/uL 0.6   % Immature Granulocytes % 0   (      ASSESSMENT  1.  Multiple myeloma. In relapse  2.  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  Cardiomyopathy with CHF.   4.  Atrial fibrillation.   5.  History of deep vein thrombosis, on warfarin.   6.  Chronic back pain.   7.  Aortic aneurysm.   8.  History of plasmacytoma of the clivus, status post radiation.   9.  History of ventricular tachycardia with implantable defibrillator in place.   10.  Hypomagnesemia.     11  Hyperkalemia   12. Skin lesion   13. Influenza A  Jonh's light chains are stable at 17 for lambda We are stalled in the drop of lambda light chains I will give him one more cycle of Jenny day 1 Kyprolis days 1,8, 15, Dex Days 1, 8 and 15 and Pomalidamide 4mg days 1-14. Will then pause and give him a drug holiday and resume a maintenance program Aug 2  with Jenny 1800mg Day 1, Dex 20mg  Days 1, 8 and 15 and pom 4mg Days 1-14.Will use this as maintenance for now.    I spent a total of 30  minutes face to face with Erasmo Pearce during today's office visit. Over 50% of this time was spent counseling the patient and coordinating the care regarding multiple myeloma and 10minutes preparing to see the patient and  care coordination     Rojas Raygoza MD  [phone]         I personally spent
